# Patient Record
Sex: MALE | Race: WHITE | NOT HISPANIC OR LATINO | Employment: OTHER | URBAN - METROPOLITAN AREA
[De-identification: names, ages, dates, MRNs, and addresses within clinical notes are randomized per-mention and may not be internally consistent; named-entity substitution may affect disease eponyms.]

---

## 2017-01-06 ENCOUNTER — GENERIC CONVERSION - ENCOUNTER (OUTPATIENT)
Dept: OTHER | Facility: OTHER | Age: 82
End: 2017-01-06

## 2017-01-09 ENCOUNTER — APPOINTMENT (OUTPATIENT)
Dept: LAB | Facility: CLINIC | Age: 82
End: 2017-01-09
Payer: MEDICARE

## 2017-01-09 DIAGNOSIS — E11.9 TYPE 2 DIABETES MELLITUS WITHOUT COMPLICATIONS (HCC): ICD-10-CM

## 2017-01-09 LAB
ANION GAP SERPL CALCULATED.3IONS-SCNC: 8 MMOL/L (ref 4–13)
BUN SERPL-MCNC: 30 MG/DL (ref 5–25)
CALCIUM SERPL-MCNC: 9.1 MG/DL (ref 8.3–10.1)
CHLORIDE SERPL-SCNC: 106 MMOL/L (ref 100–108)
CO2 SERPL-SCNC: 25 MMOL/L (ref 21–32)
CREAT SERPL-MCNC: 1.82 MG/DL (ref 0.6–1.3)
EST. AVERAGE GLUCOSE BLD GHB EST-MCNC: 140 MG/DL
GFR SERPL CREATININE-BSD FRML MDRD: 35.5 ML/MIN/1.73SQ M
GLUCOSE SERPL-MCNC: 137 MG/DL (ref 65–140)
HBA1C MFR BLD: 6.5 % (ref 4.2–6.3)
POTASSIUM SERPL-SCNC: 4.1 MMOL/L (ref 3.5–5.3)
SODIUM SERPL-SCNC: 139 MMOL/L (ref 136–145)

## 2017-01-09 PROCEDURE — 83036 HEMOGLOBIN GLYCOSYLATED A1C: CPT

## 2017-01-09 PROCEDURE — 80048 BASIC METABOLIC PNL TOTAL CA: CPT

## 2017-01-09 PROCEDURE — 36415 COLL VENOUS BLD VENIPUNCTURE: CPT

## 2017-01-13 ENCOUNTER — ALLSCRIPTS OFFICE VISIT (OUTPATIENT)
Dept: OTHER | Facility: OTHER | Age: 82
End: 2017-01-13

## 2017-01-13 DIAGNOSIS — I10 ESSENTIAL (PRIMARY) HYPERTENSION: ICD-10-CM

## 2017-01-16 ENCOUNTER — APPOINTMENT (OUTPATIENT)
Dept: LAB | Facility: HOSPITAL | Age: 82
End: 2017-01-16
Attending: INTERNAL MEDICINE
Payer: MEDICARE

## 2017-01-16 DIAGNOSIS — N18.30 CHRONIC KIDNEY DISEASE, STAGE III (MODERATE) (HCC): ICD-10-CM

## 2017-01-16 LAB
ALBUMIN SERPL BCP-MCNC: 3.7 G/DL (ref 3.5–5)
ANION GAP SERPL CALCULATED.3IONS-SCNC: 7 MMOL/L (ref 4–13)
BASOPHILS # BLD AUTO: 0.03 THOUSANDS/ΜL (ref 0–0.1)
BASOPHILS NFR BLD AUTO: 0 % (ref 0–1)
BUN SERPL-MCNC: 37 MG/DL (ref 5–25)
CALCIUM SERPL-MCNC: 9.4 MG/DL (ref 8.3–10.1)
CHLORIDE SERPL-SCNC: 108 MMOL/L (ref 100–108)
CO2 SERPL-SCNC: 27 MMOL/L (ref 21–32)
CREAT SERPL-MCNC: 2.14 MG/DL (ref 0.6–1.3)
CREAT UR-MCNC: 170 MG/DL
EOSINOPHIL # BLD AUTO: 0.46 THOUSAND/ΜL (ref 0–0.61)
EOSINOPHIL NFR BLD AUTO: 7 % (ref 0–6)
ERYTHROCYTE [DISTWIDTH] IN BLOOD BY AUTOMATED COUNT: 13 % (ref 11.6–15.1)
GFR SERPL CREATININE-BSD FRML MDRD: 29.4 ML/MIN/1.73SQ M
GLUCOSE SERPL-MCNC: 147 MG/DL (ref 65–140)
HCT VFR BLD AUTO: 36.5 % (ref 36.5–49.3)
HGB BLD-MCNC: 11.8 G/DL (ref 12–17)
LYMPHOCYTES # BLD AUTO: 1.66 THOUSANDS/ΜL (ref 0.6–4.47)
LYMPHOCYTES NFR BLD AUTO: 25 % (ref 14–44)
MCH RBC QN AUTO: 30.3 PG (ref 26.8–34.3)
MCHC RBC AUTO-ENTMCNC: 32.3 G/DL (ref 31.4–37.4)
MCV RBC AUTO: 94 FL (ref 82–98)
MONOCYTES # BLD AUTO: 0.7 THOUSAND/ΜL (ref 0.17–1.22)
MONOCYTES NFR BLD AUTO: 10 % (ref 4–12)
NEUTROPHILS # BLD AUTO: 3.85 THOUSANDS/ΜL (ref 1.85–7.62)
NEUTS SEG NFR BLD AUTO: 58 % (ref 43–75)
PHOSPHATE SERPL-MCNC: 2.8 MG/DL (ref 2.3–4.1)
PLATELET # BLD AUTO: 188 THOUSANDS/UL (ref 149–390)
PMV BLD AUTO: 9.3 FL (ref 8.9–12.7)
POTASSIUM SERPL-SCNC: 4.7 MMOL/L (ref 3.5–5.3)
PROT UR-MCNC: 32 MG/DL
PROT/CREAT UR: 0.19 MG/G{CREAT} (ref 0–0.1)
PTH-INTACT SERPL-MCNC: 80.1 PG/ML (ref 14–72)
RBC # BLD AUTO: 3.9 MILLION/UL (ref 3.88–5.62)
SODIUM SERPL-SCNC: 142 MMOL/L (ref 136–145)
WBC # BLD AUTO: 6.7 THOUSAND/UL (ref 4.31–10.16)

## 2017-01-16 PROCEDURE — 85025 COMPLETE CBC W/AUTO DIFF WBC: CPT

## 2017-01-16 PROCEDURE — 84156 ASSAY OF PROTEIN URINE: CPT

## 2017-01-16 PROCEDURE — 82570 ASSAY OF URINE CREATININE: CPT

## 2017-01-16 PROCEDURE — 83970 ASSAY OF PARATHORMONE: CPT

## 2017-01-16 PROCEDURE — 80069 RENAL FUNCTION PANEL: CPT

## 2017-01-16 PROCEDURE — 36415 COLL VENOUS BLD VENIPUNCTURE: CPT

## 2017-01-24 ENCOUNTER — ALLSCRIPTS OFFICE VISIT (OUTPATIENT)
Dept: OTHER | Facility: OTHER | Age: 82
End: 2017-01-24

## 2017-02-08 ENCOUNTER — GENERIC CONVERSION - ENCOUNTER (OUTPATIENT)
Dept: OTHER | Facility: OTHER | Age: 82
End: 2017-02-08

## 2017-02-15 ENCOUNTER — GENERIC CONVERSION - ENCOUNTER (OUTPATIENT)
Dept: OTHER | Facility: OTHER | Age: 82
End: 2017-02-15

## 2017-04-01 DIAGNOSIS — N18.30 CHRONIC KIDNEY DISEASE, STAGE III (MODERATE) (HCC): ICD-10-CM

## 2017-04-25 ENCOUNTER — TRANSCRIBE ORDERS (OUTPATIENT)
Dept: LAB | Facility: CLINIC | Age: 82
End: 2017-04-25

## 2017-04-25 ENCOUNTER — APPOINTMENT (OUTPATIENT)
Dept: LAB | Facility: CLINIC | Age: 82
End: 2017-04-25
Payer: MEDICARE

## 2017-04-25 DIAGNOSIS — N18.30 CHRONIC KIDNEY DISEASE, STAGE III (MODERATE) (HCC): ICD-10-CM

## 2017-04-25 DIAGNOSIS — I10 ESSENTIAL (PRIMARY) HYPERTENSION: ICD-10-CM

## 2017-04-25 DIAGNOSIS — I10 ESSENTIAL HYPERTENSION, MALIGNANT: ICD-10-CM

## 2017-04-25 DIAGNOSIS — I10 ESSENTIAL HYPERTENSION, MALIGNANT: Primary | ICD-10-CM

## 2017-04-25 LAB
ALBUMIN SERPL BCP-MCNC: 3.6 G/DL (ref 3.5–5)
ALP SERPL-CCNC: 71 U/L (ref 46–116)
ALT SERPL W P-5'-P-CCNC: 20 U/L (ref 12–78)
ANION GAP SERPL CALCULATED.3IONS-SCNC: 7 MMOL/L (ref 4–13)
AST SERPL W P-5'-P-CCNC: 14 U/L (ref 5–45)
BASOPHILS # BLD AUTO: 0.03 THOUSANDS/ΜL (ref 0–0.1)
BASOPHILS NFR BLD AUTO: 1 % (ref 0–1)
BILIRUB SERPL-MCNC: 0.66 MG/DL (ref 0.2–1)
BUN SERPL-MCNC: 24 MG/DL (ref 5–25)
CALCIUM SERPL-MCNC: 8.7 MG/DL (ref 8.3–10.1)
CHLORIDE SERPL-SCNC: 108 MMOL/L (ref 100–108)
CHOLEST SERPL-MCNC: 124 MG/DL (ref 50–200)
CO2 SERPL-SCNC: 26 MMOL/L (ref 21–32)
CREAT SERPL-MCNC: 1.76 MG/DL (ref 0.6–1.3)
EOSINOPHIL # BLD AUTO: 0.51 THOUSAND/ΜL (ref 0–0.61)
EOSINOPHIL NFR BLD AUTO: 8 % (ref 0–6)
ERYTHROCYTE [DISTWIDTH] IN BLOOD BY AUTOMATED COUNT: 13.7 % (ref 11.6–15.1)
GFR SERPL CREATININE-BSD FRML MDRD: 36.9 ML/MIN/1.73SQ M
GLUCOSE P FAST SERPL-MCNC: 146 MG/DL (ref 65–99)
HCT VFR BLD AUTO: 34.8 % (ref 36.5–49.3)
HDLC SERPL-MCNC: 38 MG/DL (ref 40–60)
HGB BLD-MCNC: 11.4 G/DL (ref 12–17)
LDLC SERPL CALC-MCNC: 67 MG/DL (ref 0–100)
LYMPHOCYTES # BLD AUTO: 1.75 THOUSANDS/ΜL (ref 0.6–4.47)
LYMPHOCYTES NFR BLD AUTO: 27 % (ref 14–44)
MCH RBC QN AUTO: 30.4 PG (ref 26.8–34.3)
MCHC RBC AUTO-ENTMCNC: 32.8 G/DL (ref 31.4–37.4)
MCV RBC AUTO: 93 FL (ref 82–98)
MONOCYTES # BLD AUTO: 0.62 THOUSAND/ΜL (ref 0.17–1.22)
MONOCYTES NFR BLD AUTO: 9 % (ref 4–12)
NEUTROPHILS # BLD AUTO: 3.69 THOUSANDS/ΜL (ref 1.85–7.62)
NEUTS SEG NFR BLD AUTO: 55 % (ref 43–75)
NRBC BLD AUTO-RTO: 0 /100 WBCS
PLATELET # BLD AUTO: 209 THOUSANDS/UL (ref 149–390)
PMV BLD AUTO: 9.8 FL (ref 8.9–12.7)
POTASSIUM SERPL-SCNC: 4.4 MMOL/L (ref 3.5–5.3)
PROT SERPL-MCNC: 6.9 G/DL (ref 6.4–8.2)
RBC # BLD AUTO: 3.75 MILLION/UL (ref 3.88–5.62)
SODIUM SERPL-SCNC: 141 MMOL/L (ref 136–145)
T4 FREE SERPL-MCNC: 0.93 NG/DL (ref 0.76–1.46)
TRIGL SERPL-MCNC: 96 MG/DL
TSH SERPL DL<=0.05 MIU/L-ACNC: 1.44 UIU/ML (ref 0.36–3.74)
WBC # BLD AUTO: 6.61 THOUSAND/UL (ref 4.31–10.16)

## 2017-04-25 PROCEDURE — 85025 COMPLETE CBC W/AUTO DIFF WBC: CPT

## 2017-04-25 PROCEDURE — 80061 LIPID PANEL: CPT

## 2017-04-25 PROCEDURE — 84439 ASSAY OF FREE THYROXINE: CPT

## 2017-04-25 PROCEDURE — 80053 COMPREHEN METABOLIC PANEL: CPT

## 2017-04-25 PROCEDURE — 84443 ASSAY THYROID STIM HORMONE: CPT

## 2017-04-25 PROCEDURE — 36415 COLL VENOUS BLD VENIPUNCTURE: CPT

## 2017-05-01 ENCOUNTER — APPOINTMENT (OUTPATIENT)
Dept: LAB | Facility: CLINIC | Age: 82
End: 2017-05-01
Payer: MEDICARE

## 2017-05-01 DIAGNOSIS — I10 ESSENTIAL (PRIMARY) HYPERTENSION: ICD-10-CM

## 2017-05-01 LAB — HEMOCCULT STL QL IA: NEGATIVE

## 2017-05-01 PROCEDURE — G0328 FECAL BLOOD SCRN IMMUNOASSAY: HCPCS

## 2017-05-19 ENCOUNTER — ALLSCRIPTS OFFICE VISIT (OUTPATIENT)
Dept: OTHER | Facility: OTHER | Age: 82
End: 2017-05-19

## 2017-05-19 DIAGNOSIS — E11.29 TYPE 2 DIABETES MELLITUS WITH OTHER DIABETIC KIDNEY COMPLICATION (HCC): ICD-10-CM

## 2017-06-06 ENCOUNTER — GENERIC CONVERSION - ENCOUNTER (OUTPATIENT)
Dept: OTHER | Facility: OTHER | Age: 82
End: 2017-06-06

## 2017-06-15 ENCOUNTER — GENERIC CONVERSION - ENCOUNTER (OUTPATIENT)
Dept: OTHER | Facility: OTHER | Age: 82
End: 2017-06-15

## 2017-09-16 ENCOUNTER — APPOINTMENT (OUTPATIENT)
Dept: LAB | Facility: HOSPITAL | Age: 82
End: 2017-09-16
Payer: MEDICARE

## 2017-09-16 ENCOUNTER — TRANSCRIBE ORDERS (OUTPATIENT)
Dept: ADMINISTRATIVE | Facility: HOSPITAL | Age: 82
End: 2017-09-16

## 2017-09-16 DIAGNOSIS — E11.29 TYPE 2 DIABETES MELLITUS WITH OTHER DIABETIC KIDNEY COMPLICATION (HCC): ICD-10-CM

## 2017-09-16 LAB
EST. AVERAGE GLUCOSE BLD GHB EST-MCNC: 143 MG/DL
GLUCOSE P FAST SERPL-MCNC: 122 MG/DL (ref 65–99)
HBA1C MFR BLD: 6.6 % (ref 4.2–6.3)

## 2017-09-16 PROCEDURE — 36415 COLL VENOUS BLD VENIPUNCTURE: CPT

## 2017-09-16 PROCEDURE — 83036 HEMOGLOBIN GLYCOSYLATED A1C: CPT

## 2017-09-16 PROCEDURE — 82947 ASSAY GLUCOSE BLOOD QUANT: CPT

## 2017-09-19 ENCOUNTER — ALLSCRIPTS OFFICE VISIT (OUTPATIENT)
Dept: OTHER | Facility: OTHER | Age: 82
End: 2017-09-19

## 2017-09-19 DIAGNOSIS — E11.29 TYPE 2 DIABETES MELLITUS WITH OTHER DIABETIC KIDNEY COMPLICATION (HCC): ICD-10-CM

## 2017-09-19 DIAGNOSIS — N25.81 SECONDARY HYPERPARATHYROIDISM OF RENAL ORIGIN (HCC): ICD-10-CM

## 2017-09-19 DIAGNOSIS — I10 ESSENTIAL (PRIMARY) HYPERTENSION: ICD-10-CM

## 2017-09-19 DIAGNOSIS — N18.9 CHRONIC KIDNEY DISEASE: ICD-10-CM

## 2017-09-19 DIAGNOSIS — N18.30 CHRONIC KIDNEY DISEASE, STAGE III (MODERATE) (HCC): ICD-10-CM

## 2017-10-06 ENCOUNTER — APPOINTMENT (OUTPATIENT)
Dept: LAB | Facility: HOSPITAL | Age: 82
End: 2017-10-06
Attending: INTERNAL MEDICINE
Payer: MEDICARE

## 2017-10-06 ENCOUNTER — TRANSCRIBE ORDERS (OUTPATIENT)
Dept: ADMINISTRATIVE | Facility: HOSPITAL | Age: 82
End: 2017-10-06

## 2017-10-06 DIAGNOSIS — N18.30 CHRONIC KIDNEY DISEASE, STAGE III (MODERATE) (HCC): ICD-10-CM

## 2017-10-06 DIAGNOSIS — I10 ESSENTIAL (PRIMARY) HYPERTENSION: ICD-10-CM

## 2017-10-06 DIAGNOSIS — N25.81 SECONDARY HYPERPARATHYROIDISM OF RENAL ORIGIN (HCC): ICD-10-CM

## 2017-10-06 DIAGNOSIS — N18.9 CHRONIC KIDNEY DISEASE: ICD-10-CM

## 2017-10-06 LAB
ALBUMIN SERPL BCP-MCNC: 3.6 G/DL (ref 3.5–5)
ANION GAP SERPL CALCULATED.3IONS-SCNC: 8 MMOL/L (ref 4–13)
BUN SERPL-MCNC: 27 MG/DL (ref 5–25)
CALCIUM SERPL-MCNC: 8.4 MG/DL (ref 8.3–10.1)
CHLORIDE SERPL-SCNC: 108 MMOL/L (ref 100–108)
CO2 SERPL-SCNC: 26 MMOL/L (ref 21–32)
CREAT SERPL-MCNC: 1.94 MG/DL (ref 0.6–1.3)
CREAT UR-MCNC: 139 MG/DL
ERYTHROCYTE [DISTWIDTH] IN BLOOD BY AUTOMATED COUNT: 13.3 % (ref 11.6–15.1)
GFR SERPL CREATININE-BSD FRML MDRD: 30 ML/MIN/1.73SQ M
GLUCOSE P FAST SERPL-MCNC: 142 MG/DL (ref 65–99)
HCT VFR BLD AUTO: 36.4 % (ref 36.5–49.3)
HGB BLD-MCNC: 12.1 G/DL (ref 12–17)
MCH RBC QN AUTO: 31.8 PG (ref 26.8–34.3)
MCHC RBC AUTO-ENTMCNC: 33.2 G/DL (ref 31.4–37.4)
MCV RBC AUTO: 96 FL (ref 82–98)
PHOSPHATE SERPL-MCNC: 3.1 MG/DL (ref 2.3–4.1)
PLATELET # BLD AUTO: 168 THOUSANDS/UL (ref 149–390)
PMV BLD AUTO: 9.4 FL (ref 8.9–12.7)
POTASSIUM SERPL-SCNC: 4.9 MMOL/L (ref 3.5–5.3)
PROT UR-MCNC: 29 MG/DL
PROT/CREAT UR: 0.21 MG/G{CREAT} (ref 0–0.1)
PTH-INTACT SERPL-MCNC: 84.6 PG/ML (ref 14–72)
RBC # BLD AUTO: 3.8 MILLION/UL (ref 3.88–5.62)
SODIUM SERPL-SCNC: 142 MMOL/L (ref 136–145)
WBC # BLD AUTO: 7.75 THOUSAND/UL (ref 4.31–10.16)

## 2017-10-06 PROCEDURE — 84156 ASSAY OF PROTEIN URINE: CPT

## 2017-10-06 PROCEDURE — 83970 ASSAY OF PARATHORMONE: CPT

## 2017-10-06 PROCEDURE — 82570 ASSAY OF URINE CREATININE: CPT

## 2017-10-06 PROCEDURE — 36415 COLL VENOUS BLD VENIPUNCTURE: CPT

## 2017-10-06 PROCEDURE — 85027 COMPLETE CBC AUTOMATED: CPT

## 2017-10-06 PROCEDURE — 80069 RENAL FUNCTION PANEL: CPT

## 2017-10-10 ENCOUNTER — ALLSCRIPTS OFFICE VISIT (OUTPATIENT)
Dept: OTHER | Facility: OTHER | Age: 82
End: 2017-10-10

## 2017-10-11 NOTE — PROGRESS NOTES
Assessment  1  Chronic kidney disease, stage 3 (585 3) (N18 3)   2  Controlled type 2 diabetes with renal manifestation (250 40) (E11 29)   3  Hypertension (401 9) (I10)   4  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)    Plan  Chronic kidney disease, stage 3    · (1) CBC/PLT/DIFF; Status:Active; Requested for:01Jul2018; Perform:[facility]; IPE:[id number];DJGBVIK; For:Chronic kidney disease, stage 3; Ordered By:Otilio Esposito;   · (1) PTH N-TERMINAL (INTACT); Status:Active; Requested for:01Jul2018; Perform:[facility]; ZED:31WMG3227;ZZIKYDJ; For:Chronic kidney disease, stage 3; Ordered By:Otilio Esposito;   · (1) RENAL FUNCTION PANEL; Status:Active; Requested for:01Jul2018; Perform:[facility]; OIX:13VLL6291;RJYSWWS; For:Chronic kidney disease, stage 3; Ordered By:Otilio Esposito;   · (1) URINE PROTEIN CREATININE RATIO; Status:Active; Requested for:01Jul2018; Perform:[facility]; VXZ:42ETN6055;YIAFXOC; For:Chronic kidney disease, stage 3; Ordered By:Otilio Esposito;   · Diabetes & Kidney Disease handout given today; Status:Complete;   Done: 53COB4114   Ordered; For:Chronic kidney disease, stage 3; Ordered By:Otilio Esposito;   · Do not take aspirin or anti-inflammatory medicines ; Status:Complete;   Done: 18PYI3124   Ordered; For:Chronic kidney disease, stage 3; Ordered By:Otilio Esposito;   · Restrict your sodium (salt) intake to 2 grams per day ; Status:Complete;   Done:  81YBL4490   Ordered; For:Chronic kidney disease, stage 3; Ordered By:Otilio Esposito;   · Understanding CKD handout given today; Status:Complete;   Done: 37CXQ3857   Ordered; For:Chronic kidney disease, stage 3; Ordered By:Otilio Esposito;   · Follow-Up visit in 9 months Evaluation and Treatment  Follow-up  Status: Hold For -  Scheduling  Requested for: 11NXE7729   Ordered; For: Chronic kidney disease, stage 3; Ordered By: Jim Sapp Performed:  Due: 18JLL8111    Discussion/Summary    Sameer Minor is a 80years old gentleman who returns for CKD follow up  CKD stage 3B (baseline Creatinine around 1 4-1 9) without proteinurialikely second to DM nephropathy plus some component of HTN  function fairly stable, most recent creatinine 1 94 OTC NSAIDs - pain medications (Motrin, Aleve, Naproxen, Ibuprofen, Advil)  to take Tylenol if needed for pain  up in 9 months with repeat labs  Hypertensionpressure OKwith low salt diet (less than 2 gr salt daily) and blood pressure medications  Hemoglobin 12 1, OK, follow repeat CBC in 9 months  Mineral bone disease - phosphorus and PTH acceptable, follow labs in 9 months  Diabetes - managed by PCP, noted he is on Metformin, IF kidney function decreases recommend to change to an alternative agent as per PCP  NSAIDsrenal function worsened, then recommend to stop Metformin and use an alternative agent as per PCP  up in 9 months with repeat labs  The patient was counseled regarding diagnostic results,-instructions for management,-risk factor reductions,-risks and benefits of treatment options,-importance of compliance with treatment  Patient is able to Self-Care  He has no barriers to learning  Indication for Services: CKD Stage 3  CKD Teaching includes hypertension management, Avoid nephrotoxic medication, sodium restriction and fluid management  Current Patient Status: no bone mineral disease,-no anemia-and-no worsening of renal function  Discussed with the patient      Reason For Visit  CKD, HTN follow up      History of Present Illness  80years old gentleman with CKD stage 3B (Creatinine 1 5 - 1 9 since 2013), DM for 20 years, HTN for >5 years, hyperlipidemia   time seen in our office was Jan 2017  Since that visit, no acute events  returns for CKD follow up  is feeling OK, denies complaints other than chronic joint pain over knees  SOB, no CP, no leg swollen  abdominal pain, no N/V/D/C  urinary problems, nocturia twice per night  is OK, weight stable since last visit  Ginna Dowd deniesoccasionallyNSAIDs use      Review of Systems    Constitutional: no fever,-no chills,-no anorexia,-no fatigue,-no recent weight gain-and-no recent weight loss  Integumentary: no rashes  Gastrointestinal: no abdominal pain,-no constipation,-no nausea,-no diarrhea-and-no vomiting  Respiratory: no shortness of breath,-no cough-and-not coughing up sputum  Cardiovascular: no orthopnea,-no PND,-no chest pain,-no palpitations-and-no lower extremity edema  Musculoskeletal: joint pain, but-no joint swelling  Neurological: no headache,-no lightheadedness-and-no dizziness  Genitourinary: no dysuria,-no hematuria,-no nocturia-and-no change in urinary frequency  Eyes: no eyesight problems-and-no dryness of the eyes  ENT: no hearing loss-and-no nasal discharge  Past Medical History    The active problems and past medical history were reviewed and updated today  Surgical History    The surgical history was reviewed and updated today  Family History    The family history was reviewed and updated today  Social History  The social history was reviewed and updated today  Current Meds   1  AmLODIPine Besylate 2 5 MG Oral Tablet; take one tablet by mouth every day; Therapy: 05ADR6402 to (Evaluate:86Wcg3648)  Requested for: 01UQJ8282; Last   Rx:04Oct2017 Ordered   2  [employer] Vitamin D3 1000 UNIT CAPS; take 1 capsule daily; Therapy: (Recorded:10Jun2014) to Recorded   3  Fish Oil 300 MG CAPS; take 1 capsule daily; Therapy: (Recorded:10Jun2014) to Recorded   4  Glimepiride 1 MG Oral Tablet; take one tablet by mouth every day; Therapy: 47WGD2740 to (Evaluate:63Wea7955)  Requested for: 55Vmc2183; Last   Rx:96Nzi3544 Ordered   5  Lisinopril 20 MG Oral Tablet; TAKE ONE (1) TABLET(S) TWICE DAILY; Therapy: 55KBP7029 to (Evaluate:18Feb2018)  Requested for: 28Xyf3564; Last   Rx:11Wwn7283 Ordered   6   Magnesium 250 MG Oral Tablet; TAKE 1 TABLET DAILY; Therapy: (Recorded:10Jun2014) to Recorded   7  MetFORMIN HCl - 850 MG Oral Tablet; TAKE ONE (1) TABLET(S) TWICE DAILY WITH   MORNING AND EVENING MEAL; Therapy: 49XYD0019 to (XGGRVYQC:95LBJ6065)  Requested for: 31PKD4914; Last   Rx:30Mar2017 Ordered   8  Precision Xtra Blood Glucose In Vitro Strip; TEST ONCE DAILY; Therapy: 62CWM7024 to (Evaluate:59Zga5283)  Requested for: 18Mxn4477; Last   Rx:90Oph8138 Ordered   9  Simvastatin 20 MG Oral Tablet; take one tablet by mouth every day; Therapy: 70SYE5165 to (Evaluate:47Lid4651)  Requested for: 64Zay9711; Last   Rx:01Sep2017 Ordered   10  Terazosin HCl - 1 MG Oral Capsule; TAKE ONE CAPSULE BY MOUTH AT BEDTIME; Therapy: 21WDS5451 to (Evaluate:22Mar2018)  Requested for: 56NHU3949; Last    Rx:27Mar2017 Ordered   11  Vitamin B-12 500 MCG Oral Tablet; TAKE 1 TABLET DAILY; Therapy: (Recorded:10Jun2014) to Recorded    The medication list was reviewed and updated today  Allergies  1  No Known Drug Allergies    Vitals  Vital Signs    Recorded: 39EGU1431 11:40AM Recorded: 84MZT6618 11:29AM   Heart Rate 74    Systolic 912, LUE, Sitting    Diastolic 64, LUE, Sitting    Height  5 ft 10 in   Weight  198 lb    BMI Calculated  28 41   BSA Calculated  2 08     Physical Exam    Constitutional: General appearance: No acute distress, well appearing and well nourished  ENT: External ears and nose appear normal      Eyes: Anicteric sclerae  JVD:  No JVD present  Pulmonary:  Auscultation of lungs: Clear to auscultation  Cardiovascular: Auscultation of heart: Normal rate and rhythm, normal S1 and S2, without murmurs  Abdomen: Non-tender, no masses  Extremities: No cyanosis, clubbing or edema  Rash: No rash present  Neurologic: Non Focal      Psychiatric: Orientation to person, place, and time: Normal  -and-Mood and affect: Normal     Back: No CVA tenderness        Results/Data  (1) CBC/ PLT (NO DIFF) 70OXM6991 10:26AM Beverli Bang   TW Order Number: [id number]     Test Name Result Flag Reference   HEMATOCRIT 36 4 % L 36 5-49 3   HEMOGLOBIN 12 1 g/dL  12 0-17 0   MCHC 33 2 g/dL  31 4-37 4   MCH 31 8 pg  26 8-34 3   MCV 96 fL  82-98   PLATELET COUNT 682 Thousands/uL  149-390   RBC COUNT 3 80 Million/uL L 3 88-5 62   RDW 13 3 %  11 6-15 1   WBC COUNT 7 75 Thousand/uL  4 31-10 16   MPV 9 4 fL  8 9-12 7     (1) RENAL FUNCTION PANEL 09QBP8281 10:26AM [facility] Order Number: [id number]     Test Name Result Flag Reference   ANION GAP (CALC) 8 mmol/L  4-13   ALBUMIN 3 6 g/dL  3 5-5 0   BLOOD UREA NITROGEN 27 mg/dL H 5-25   CALCIUM 8 4 mg/dL  8 3-10 1   CHLORIDE 108 mmol/L  100-108   CARBON DIOXIDE 26 mmol/L  21-32   CREATININE 1 94 mg/dL H 0 60-1 30   Standardized to IDMS reference method   POTASSIUM 4 9 mmol/L  3 5-5 3   SODIUM 142 mmol/L  136-145   PHOSPHORUS 3 1 mg/dL  2 3-4 1   eGFR 30 ml/min/1 73sq m     GLUCOSE FASTING 142 mg/dL H 65-99   Specimen collection should occur prior to Sulfasalazine administration due to the potential for falsely depressed results  Specimen collection should occur prior to Sulfapyridine administration due to the potential for falsely elevated results  Specimen collection should occur prior to Sulfasalazine administration due to the potential for falsely depressed results  Specimen collection should occur prior to Sulfapyridine administration due to the potential for falsely elevated results  National Kidney Disease Education Program recommendations are as follows:  GFR calculation is accurate only with a steady state creatinine  Chronic Kidney disease less than 60 ml/min/1 73 sq  meters  Kidney failure less than 15 ml/min/1 73 sq  meters       (1) PTH N-TERMINAL (INTACT) 17UHL4400 10:26AM Trudy Matson Order Number: [id number]     Test Name Result Flag Reference   PARATHYROID HORMONE INTACT 84 6 pg/mL H 14 0-72 0     (1) URINE PROTEIN CREATININE RATIO 81VTE2488 10:26AM Shweta Brambila Order Number: [id number]     Test Name Result Flag Reference   CREATININE URINE 139 0 mg/dL     URINE PROTEIN:CREATININE RATIO 0 21 H 0 00-0 10   URINE PROTEIN 29 mg/dL         Future Appointments    Date/Time Provider Specialty Site   01/22/2018 01:15 PM Janie Solis DO Internal Medicine [facility] INTERNAL MED     Signatures   Electronically signed by : ROLAND Mendez ; Oct 10 2017 11:48AM EST                       (Author)

## 2017-10-26 NOTE — PROGRESS NOTES
Assessment  1  Controlled type 2 diabetes with renal manifestation (250 40) (E11 29)   2  Hypertension (401 9) (I10)   3  Hyperlipidemia (272 4) (E78 5)   4  Chronic kidney disease, stage 3 (585 3) (N18 3)    Plan  Chronic kidney disease, stage 3    · Follow-up visit in 3 months Evaluation and Treatment  Follow-up  Status: Hold For - Scheduling   Requested for: 37Rbi1720  Controlled diabetes mellitus    · Glimepiride 1 MG Oral Tablet; take one tablet by mouth every day  Controlled type 2 diabetes with renal manifestation    · Precision Xtra Blood Glucose In Vitro Strip; TEST ONCE DAILY   · (1) BASIC METABOLIC PROFILE; Status:Active; Requested for:55Rvk4288;    · (1) HEMOGLOBIN A1C; Status:Active; Requested WOU:42LTT3890;     Discussion/Summary  Discussion Summary:   #1  Diabetes mellitus type 2 with renal manifestations  Patient is stable with his blood sugars and we'll continue present medication, diet  He is limited in his exercise capacity secondary to his arthritis  We will check a fasting blood sugar hemoglobin A1c when he returns to the office  Hypertension  As noted patient's blood pressure is controlled and patient will continue with present medication  His blood pressures also checked with his nephrologist and adjustment of medication would reflect hopefully that we maintain good function of his kidneys  Hyperlipidemia  We did discuss with the patient today the importance of diet and avoiding concentrated fats and cholesterol in his diet  Patient states he is made some major changes in his diet over the years and seems to be doing well  Chronic kidney disease stage III  Patient is stable with his renal function  He continues to follow-up with his nephrologist  Patient was given a slip to check a basic metabolic profile when he returns to the office in 4 months     Counseling Documentation With Imm: The patient was counseled regarding diagnostic results,-- instructions for management,-- risk factor reductions,-- prognosis,-- patient and family education,-- impressions,-- risks and benefits of treatment options,-- importance of compliance with treatment  Medication SE Review and Pt Understands Tx: Possible side effects of new medications were reviewed with the patient/guardian today  The treatment plan was reviewed with the patient/guardian  The patient/guardian understands and agrees with the treatment plan      Chief Complaint  Chief Complaint Free Text Note Form: Patient is here today for a 4 month follow up w/ labs   Chief Complaint Chronic Condition [facility]: Patient is here today for follow up of chronic conditions described in HPI  History of Present Illness  HPI: Patient is a 77-year-old male with a history of hypertension, hyperlipidemia, diabetes mellitus type 2, diffuse DJD, chronic renal insufficiency and abnormal kidney function  Patient is here today for routine follow-up  Patient states he's been doing well and has no new complaints or problems  He's been following his sugars closely at home and that showing good control  With his labs his fasting blood sugars 122 and hemoglobin A1c was 6 6  Patient states he has some arthritic aches and pains but nothing new he continues to follow-up with his nephrologist       Review of Systems  Complete-Male:   Constitutional: No fever or chills, feels well, no tiredness, no recent weight gain or weight loss  Eyes: No complaints of eye pain, no red eyes, no discharge from eyes, no itchy eyes  ENT: no complaints of earache, no hearing loss, no nosebleeds, no nasal discharge, no sore throat, no hoarseness,-- no earache,-- no nosebleeds,-- no sore throat,-- no hearing loss,-- no nasal discharge-- and-- no hoarseness  Cardiovascular: No complaints of slow heart rate, no fast heart rate, no chest pain, no palpitations, no leg claudication, no lower extremity     Respiratory: No complaints of shortness of breath, no wheezing, no cough, no SOB on exertion, no orthopnea or PND  Gastrointestinal: No complaints of abdominal pain, no constipation, no nausea or vomiting, no diarrhea or bloody stools  Genitourinary: nocturia-- and-- Nocturia 1-2 times a night without change, but-- No complaints of dysuria, no incontinence, no hesitancy, no nocturia, no genital lesion, no testicular pain  Musculoskeletal: arthralgias-- and-- joint stiffness  Integumentary: No complaints of skin rash or skin lesions, no itching, no skin wound, no dry skin  Neurological: No compliants of headache, no confusion, no convulsions, no numbness or tingling, no dizziness or fainting, no limb weakness, no difficulty walking  Psychiatric: Is not suicidal, no sleep disturbances, no anxiety or depression, no change in personality, no emotional problems  Endocrine: No complaints of proptosis, no hot flashes, no muscle weakness, no erectile dysfunction, no deepening of the voice, no feelings of weakness  Hematologic/Lymphatic: No complaints of swollen glands, no swollen glands in the neck, does not bleed easily, no easy bruising  ROS Reviewed:   ROS reviewed  Active Problems  1  Abnormal glucose (790 29) (R73 09)   2  Abnormal kidney function (593 9) (N28 9)   3  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) (285 21) (N18 9,D63 1)   4  Arthritis of knee, left (716 96) (M17 12)   5  Arthritis of left shoulder region (716 91) (M19 012)   6  Carotid arterial disease (447 9) (I77 9)   7  Chronic kidney disease, stage 3 (585 3) (N18 3)   8  Controlled diabetes mellitus (250 00) (E11 9)   9  Controlled type 2 diabetes with renal manifestation (250 40) (E11 29)   10  Encounter for screening for malignant neoplasm (V76 9) (Z12 9)   11  Enlarged prostate without lower urinary tract symptoms (luts) (600 00) (N40 0)   12  Glaucoma (365 9) (H40 9)   13  Hyperlipidemia (272 4) (E78 5)   14  Hypertension (401 9) (I10)   15  Impacted cerumen of right ear (380 4) (H61 21)   16   Intestinal obstruction (560  9) (K56 60)   17  Left knee pain (719 46) (M23 562)   18  Need for immunization against influenza (V04 81) (Z23)   19  Screening for diabetic retinopathy (V80 2) (Z13 5)   20  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)   21  Special screening examination for neoplasm of prostate (V76 44) (Z12 5)    Past Medical History  1  History of Diabetes Mellitus (250 00)   2  History of hyperlipidemia (V12 29) (Z86 39)   3  History of hypertension (V12 59) (Z86 79)   4  History of syncope (V15 89) (Z87 898)   5  History of Osteoarthritis (V13 4)   6  History of Visit for screening (V82 9) (Z13 9)    Surgical History  1  History of Appendectomy   2  History of Hemorrhoidectomy    Family History  Mother    1  Family history of Diabetes Mellitus (V18 0)   2  Family history of Mother  At Age 58   4  Family history of Stroke Syndrome (V17 1)  Father    4  Family history of Diabetes Mellitus (V18 0)   5  Family history of Emphysema   6  Family history of Father  At Age 61    Social History   · Being A Social Drinker   · Daily Coffee Consumption (2  Cups/Day)   · Denied: History of Drug Use   · Former smoker (V15 82) (S34 175)    Current Meds   1  AmLODIPine Besylate 2 5 MG Oral Tablet; TAKE ONE (1) TABLET(S) DAILY; Therapy: 29RCO5943 to [phone])  Requested for: 42PAI1632; Last Rx:2016   Ordered   2  [employer] Vitamin D3 1000 UNIT CAPS; take 1 capsule daily; Therapy: (Recorded:2014) to Recorded   3  Fish Oil 300 MG CAPS; take 1 capsule daily; Therapy: (Recorded:2014) to Recorded   4  Glimepiride 1 MG Oral Tablet; take one tablet by mouth every day; Therapy: 59RSI8520 to (Evaluate:83Fsa5471)  Requested for: 30Kee6936; Last Rx:94Ubq9386   Ordered   5  Lisinopril 20 MG Oral Tablet; TAKE ONE (1) TABLET(S) TWICE DAILY; Therapy: 26KRN7005 to (Evaluate:2018)  Requested for: 58Hqe0869; Last Rx:81Zvu4191   Ordered   6   Magnesium 250 MG Oral Tablet; TAKE 1 TABLET DAILY; Therapy: (Recorded:10Jun2014) to Recorded   7  MetFORMIN HCl - 850 MG Oral Tablet; TAKE ONE (1) TABLET(S) TWICE DAILY WITH MORNING AND   EVENING MEAL; Therapy: 86KCZ8772 to (NIPAbra[facility] Scottsd[facility] CampusP:16TFT5320)  Requested for: 31BNJ1224; Last Rx:30Mar2017   Ordered   8  Precision Xtra Blood Glucose In Vitro Strip; TEST ONCE DAILY; Therapy: 10CIX9858 to (Evaluate:92Vlu8295)  Requested for: 88Kdq3979; Last Rx:17Apr2017   Ordered   9  Simvastatin 20 MG Oral Tablet; take one tablet by mouth every day; Therapy: 45IIX8508 to (Evaluate:31Ojl2485)  Requested for: 85Mue8215; Last Rx:01Sep2017   Ordered   10  Terazosin HCl - 1 MG Oral Capsule; TAKE ONE CAPSULE BY MOUTH AT BEDTIME; Therapy: 71SNP4735 to (Evaluate:22Mar2018)  Requested for: 65ZIB7823; Last Rx:27Mar2017    Ordered   11  Vitamin B-12 500 MCG Oral Tablet; TAKE 1 TABLET DAILY; Therapy: (Recorded:10Jun2014) to Recorded    Allergies  1  No Known Drug Allergies    Vitals  Vital Signs    Recorded: 19Sep2017 01:32PM   Temperature 98 4 F   Heart Rate 73   Systolic 443   Diastolic 54   Height 5 ft 10 in   Weight 197 lb    BMI Calculated 28 27   BSA Calculated 2 07   O2 Saturation 97     Physical Exam    Constitutional Pleasant overweight 80-year-old male who is awake alert in no acute distress oriented x3  Eyes   Conjunctiva and lids: No swelling, erythema, or discharge  Pupils and irises: Equal, round and reactive to light  Ears, Nose, Mouth, and Throat   External inspection of ears and nose: Normal     Otoscopic examination: Tympanic membrance translucent with normal light reflex  Canals patent without erythema  Nasal mucosa, septum, and turbinates: Normal without edema or erythema  Oropharynx: Normal with no erythema, edema, exudate or lesions  Pulmonary   Respiratory effort: No increased work of breathing or signs of respiratory distress  Auscultation of lungs: Clear to auscultation, equal breath sounds bilaterally, no wheezes, no rales, no rhonci  Cardiovascular   Palpation of heart: Normal PMI, no thrills  Auscultation of heart: Normal rate and rhythm, normal S1 and S2, without murmurs  Examination of extremities for edema and/or varicosities: Normal     Carotid pulses: Normal     Abdomen   Abdomen: Abnormal  -- Obese soft nontender with positive bowel sounds x4 quadrants  Liver and spleen: No hepatomegaly or splenomegaly  Lymphatic   Palpation of lymph nodes in neck: No lymphadenopathy  Musculoskeletal   Gait and station: Normal     Digits and nails: Abnormal  -- Diffuse degenerative changes to the hands and digits  Inspection/palpation of joints, bones, and muscles: Normal     Skin   Skin and subcutaneous tissue: Normal without rashes or lesions  Neurologic   Cranial nerves: Cranial nerves 2-12 intact  Reflexes: 2+ and symmetric  Sensation: No sensory loss  Psychiatric   Orientation to person, place and time: Normal     Mood and affect: Normal     Diabetic Foot Screen: Normal     Socks and shoes removed, the Right Foot: the foot was normal, no swelling, no erythema  The toes on the right were normal-- and-- with full range of motion  The sensory exam showed  normal vibratory sensation at the level of the toes on the right  Normal tactile sensation with monofilament testing throughout the right foot  Socks and shoes removed, Left Foot: the foot was normal, no swelling, no erythema  The toes on the left were normal-- and-- with full range of motion  The sensory exam showed  normal vibratory sensation at the level of the toes on the left , Normal tactile sensation with monofilament testing throughout the left foot  Pulses:   2+ in the dorsalis pedis on the right  Pulses:   2+ in the dorsalis pedis on the left  Future Appointments    Date/Time Provider Specialty Site   01/22/2018 01:15 PM Cecilia Harvey DO Internal Medicine [facility] INTERNAL MED   10/10/2017 11:30 AM ROLAND Botello   Nephrology [address]     Signatures   Electronically signed by : Carmela Anguiano DO; Sep 19 2017  3:54PM EST                       (Author)

## 2017-12-27 ENCOUNTER — GENERIC CONVERSION - ENCOUNTER (OUTPATIENT)
Dept: INTERNAL MEDICINE CLINIC | Facility: CLINIC | Age: 82
End: 2017-12-27

## 2018-01-12 NOTE — PROGRESS NOTES
Assessment    1  Controlled diabetes mellitus (250 00) (E11 9)   2  Chronic kidney disease, stage 3 (585 3) (N18 3)   3  Hypertension (401 9) (I10)   4  Hyperlipidemia (272 4) (E78 5)    Plan  Controlled diabetes mellitus    · (1) GLUCOSE,  FASTING; Status:Active; Requested for:70Xie4088;    · (1) HEMOGLOBIN A1C; Status:Active; Requested for:63Xrx4925;    · *VB-Foot Exam; Status:Active - Retrospective By Protocol Authorization; Requested  for:98Ggw6098; Controlled type 2 diabetes with renal manifestation    · Precision Xtra Blood Glucose In Vitro Strip; TEST ONCE DAILY  Hyperlipidemia    · Simvastatin 20 MG Oral Tablet; TAKE 1 TABLET DAILY   · Follow-up visit in 4 Months Evaluation and Treatment  Follow-up  Status: Hold For -  Scheduling  Requested for: [id number]    Discussion/Summary    #1  Diabetes mellitus type 2  Patient sugar showing a slight increase in his hemoglobin A1c to 7 2 but acceptable control at this time  Patient feels that because of diet and lack of exercise recently his sugars were little higher and hopefully we will see some improvement with his next visit  Patient was told that if his sugar remains elevated there are other treatment modalities available  #2  Chronic kidney disease stage III  Patient continues to follow-up with the nephrologist and his kidney function is stable  #3  Hypertension  Patient's blood pressure showing very good control with present treatment  #4  Hyperlipidemia  Patient remains under good control of his cholesterol and we will check this on a yearly basis  Patient will continue with his simvastatin as directed  Impression: Subsequent Annual Wellness Visit, with preventive exam as well as age and risk appropriate counseling completed  Cardiovascular screening and counseling: the risks and benefits of screening were discussed and screening is current     Diabetes screening and counseling: the risks and benefits of screening were discussed, screening is current and Dx - V77 1 Screen for DM  Colorectal cancer screening and counseling: the risks and benefits of screening were discussed, screening is current, due for fecal occult blood testing and Dx - V76 51 Screen for CRC  Prostate cancer screening and counseling: the risks and benefits of screening were discussed, the patient declines screening and Dx - V76 44 Screen PSA  Osteoporosis screening and counseling: the risks and benefits of screening were discussed and screening is current  Abdominal aortic aneurysm screening and counseling: the risks and benefits of screening were discussed, the patient declines screening and Dx - V81 2 Screen for CV Disorder  Glaucoma screening and counseling: the risks and benefits of screening were discussed and screening is current  HIV screening and counseling: the risks and benefits of screening were discussed and screening not indicated  Immunizations: influenza vaccine is up to date this year, the lifetime pneumococcal vaccine has been completed, hepatitis B vaccination series is not indicated at this time due to the patient's low risk of anne the disease, the risks and benefits of the Zostavax vaccine were discussed with the patient, the patient declines the Zostavax vaccine, the risks and benefits of the Td vaccine were discussed with the patient and Td vaccination up to date  Advance Directive Planning: complete and up to date  Patient Discussion: plan discussed with the patient, follow-up visit needed in 4 months  Chief Complaint  Patient is here today for his Medicare Wellness exam w/labs      History of Present Illness  HPI: Patient is an 59-year-old male with a history of hypertension, hyperlipidemia, chronic renal insufficiency, diabetes mellitus type 2  Patient's here today for general physical  Patient did have labs done prior to his visit today and his sugar is running a little high with a hemoglobin A1c of 7 2   Patient states he did not have is much exercise during the winter as usual and this is probably the major reason  Patient was also told that diabetes is a progressive disease and over period time we may need to make some adjustments in his medication  He is also not 100% compliant with his diet  He denies any chest pain or pressure no shortness of breath  Since last seen he was having some problems with his left knee and was seen by orthopedics who did a tap and also injection of hydrocortisone which helped relieve the pain  Welcome to [employer] and Wellness Visits: The patient is being seen for the subsequent annual wellness visit  Medicare Screening and Risk Factors   Once per lifetime medicare screening tests: ECG has not been done  Medicare Screening Tests Risk Questions   Abdominal aortic aneurysm risk assessment: over 72years of age  Osteoporosis risk assessment:  and over 48years of age  HIV risk assessment: none indicated  Drug and Alcohol Use: The patient is a former cigarette smoker  He has smoked for 50 pack years year(s)  The patient reports occasional alcohol use and drinking 4-5 drinks per week drinks per week  Alcohol concern:   The patient has no concerns about alcohol abuse  He has never used illicit drugs  Diet and Physical Activity: Current diet includes well balanced meals and limited junk food  He exercises infrequently  Exercise: walking  Mood Disorder and Cognitive Impairment Screening: Geriatric Depression Scale   Depression screening score was Total to the geriatric depression scale   no significant symptoms  He denies feeling down, depressed, or hopeless over the past two weeks  He denies feeling little interest or pleasure in doing things over the past two weeks     Cognitive impairment screening: denies difficulty learning/retaining new information, denies difficulty handling complex tasks, denies difficulty with reasoning, denies difficulty with spatial ability and orientation, denies difficulty with language, denies difficulty with behavior and Total of 30 on Mini-Mental status exam    Functional Ability/Level of Safety: Hearing is slightly decreased and a hearing aid is not used  He denies hearing difficulties  The patient is currently able to do activities of daily living without limitations, able to do instrumental activities of daily living without limitations, able to participate in social activities without limitations and able to drive without limitations  Activities of daily living details: does not need help using the phone, no transportation help needed, does not need help shopping, no meal preparation help needed, does not need help doing housework, does not need help doing laundry, does not need help managing medications and does not need help managing money  Fall risk factors:  alcohol use and antihypertensive use, but The patient fell 0 times in the past 12 months , no polypharmacy, no mobility impairment, no antidepressant use, no deconditioning, no postural hypotension, no sedative use, no visual impairment, no urinary incontinence, no cognitive impairment and up and go test was normal  Home safety risk factors:  no unfamiliar surroundings, no loose rugs, no poor household lighting, no uneven floors, no household clutter, grab bars in the bathroom and handrails on the stairs  Advance Directives: Advance directives: living will, durable power of  for health care directives and advance directives  end of life decisions were reviewed with the patient and I agree with the patient's decisions  Co-Managers and Medical Equipment/Suppliers: See Patient Care Team   Reviewed Updated [facility]:   Last Medicare Wellness Visit Information was reviewed, patient interviewed and updates made to the record today  Preventive Quality Program 65 and Older: Falls Risk: The patient fell 0 times in the past 12 months   The patient is currently asymptomatic Symptoms Include:  Associated symptoms:  No associated symptoms are reported  The patient currently has no urinary incontinence symptoms  Date of last glaucoma screen was Patient states he has screening with his eyes both for glaucoma and for diabetes on a regular basis at least every 4 months      Patient Care Team    Care Team Member Role Specialty Office Number   Joshua RUBIN  Nephrology [phone], [address] [place] [address]  Internal Medicine [phone]     Review of Systems    Constitutional: negative  Head and Face: negative  Eyes: negative  ENT: no sore throat, no scratchy throat, no hoarseness, no nasal congestion, no nasal discharge, no sneezing, no earache, no hearing loss and no white patches in the mouth  Cardiovascular: negative  Respiratory: negative  Gastrointestinal: negative  Genitourinary: nocturia, but no dysuria, no urinary frequency, no urinary urgency, no urinary incontinence, no urinary hesitancy, no hematuria, no pelvic pain and no testicular pain  Musculoskeletal: diffuse joint pain and joint stiffness, but no generalized muscle aches, no back pain, no joint swelling, no pain in other joints and no limping  Integumentary and Breasts: negative  Neurological: negative  Psychiatric: negative  Endocrine: negative  Hematologic and Lymphatic: negative  Score 2   Normal 0 - 9, Mild Depression 10 - 19, Severe Depression 20 - 30      Active Problems    1  Abnormal glucose (790 29) (R73 09)   2  Abnormal kidney function (593 9) (N28 9)   3  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) (285 21,585 9) (N18 9,D63 1)   4  Arthritis of knee, left (716 96) (M19 90)   5  Arthritis of left shoulder region (716 91) (M19 90)   6  Carotid arterial disease (447 9) (I77 9)   7  Chronic kidney disease, stage 3 (585 3) (N18 3)   8  Controlled diabetes mellitus (250 00) (E11 9)   9  Controlled type 2 diabetes with renal manifestation (250 40) (E11 29)   10   Encounter for screening for malignant neoplasm (V76 9) (Z12 9)   11  Enlarged prostate without lower urinary tract symptoms (luts) (600 00) (N40 0)   12  Glaucoma (365 9) (H40 9)   13  Hyperlipidemia (272 4) (E78 5)   14  Hypertension (401 9) (I10)   15  Impacted cerumen of right ear (380 4) (H61 21)   16  Intestinal obstruction (560 9) (K56 60)   17  Left knee pain (719 46) (M25 562)   18  Need for immunization against influenza (V04 81) (Z23)   19  Screening for diabetic retinopathy (V80 2) (Z13 5)   20  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)   21  Special screening examination for neoplasm of prostate (V76 44) (Z12 5)    Past Medical History    · History of Diabetes Mellitus (250 00)   · History of hyperlipidemia (V12 29) (Z86 39)   · History of hypertension (V12 59) (Z86 79)   · History of syncope (V15 89) (F06 325)   · History of Osteoarthritis (V13 4)   · History of Visit for screening (V82 9) (Z13 9)    The active problems and past medical history were reviewed and updated today  Surgical History    · History of Appendectomy   · History of Hemorrhoidectomy    The surgical history was reviewed and updated today  Family History  Mother    · Family history of Diabetes Mellitus (V18 0)   · Family history of Mother  At Age 64   · Family history of Stroke Syndrome (V17 1)  Father    · Family history of Diabetes Mellitus (V18 0)   · Family history of Emphysema   · Family history of Father  At Age 61    The family history was reviewed and updated today  Social History    · Being A Social Drinker   · Daily Coffee Consumption (2  Cups/Day)   · Denied: History of Drug Use   · Former smoker (D23 15) (N53 361)  The social history was reviewed and updated today  The social history was reviewed and is unchanged  Current Meds   1  AmLODIPine Besylate 2 5 MG Oral Tablet; TAKE 1 TABLET DAILY; Therapy: 91PER0814 [facility]  Requested for: 823 84 907;  Last   Rx:2015 Ordered   2  [employer] Vitamin D3 1000 UNIT CAPS; take 1 capsule daily; Therapy: (Recorded:10Jun2014) to Recorded   3  Diflorasone Diacetate 0 05 % External Ointment; Therapy: 59HMF1461 to (Last Rx:30Mar2011)  Requested for: 37QBE2531 Ordered   4  Fish Oil 300 MG CAPS; take 1 capsule daily; Therapy: (Recorded:10Jun2014) to Recorded   5  Glimepiride 1 MG Oral Tablet; TAKE 1 TABLET ONCE DAILY; Therapy: 42RNS1539 to (Evaluate:45Viy3275); Last Rx:44Esd7764 Recorded   6  Lisinopril 20 MG Oral Tablet; Take 1 tablet twice daily; Therapy: 95HHT3847 to (Evaluate:17Hhj2534)  Requested for: 82Czo2084; Last   Rx:69Fyt3232 Ordered   7  Magnesium 250 MG Oral Tablet; TAKE 1 TABLET DAILY; Therapy: (Recorded:10Jun2014) to Recorded   8  MetFORMIN HCl - 850 MG Oral Tablet; TAKE ONE (1) TABLET(S) TWIC E DAILY WITH   MORNING AND EVENING MEAL; Therapy: 78KCU6630 to (Evaluate:01Apr2017)  Requested for: 56KRH0052; Last   Rx:06Apr2016 Ordered   9  Simvastatin 20 MG Oral Tablet; TAKE 1 TABLET DAILY; Therapy: 83NHS0617 to (Evaluate:23May2016); Last Rx:29May2015 Ordered   10  Terazosin HCl - 1 MG Oral Capsule; TAKE ONE CAPSULE BY MOUTH EVERY DAY AT    BEDTIME; Therapy: 40NPL0230 to (Gabino Cushing)  Requested for: 03LCZ1790; Last    Rx:07Mar2016 Ordered   11  Vitamin B-12 500 MCG Oral Tablet; TAKE 1 TABLET DAILY; Therapy: (Recorded:10Jun2014) to Recorded    The medication list was reviewed and updated today  Allergies    1   No Known Drug Allergies    Immunizations   1 2 3    Influenza  [id number] 20Ktw8162    Pneumococcal  Oct 2006      Tetanus  [id number]       Vitals  Signs [Data Includes: Current Encounter]    Temperature: 98 9 F  Heart Rate: 76  Respiration: 18  Systolic: 248  Diastolic: 70  Height: 5 ft 10 in  Weight: 193 lb 6 08 oz  BMI Calculated: 27 75  BSA Calculated: 2 06  O2 Saturation: 96    Physical Exam    Constitutional pleasant intelligent obese 49-year-old male who is awake alert in no acute distress and oriented x3  Head and Face   Head and face: Normal     Palpation of the face and sinuses: No sinus tenderness  Eyes   Conjunctiva and lids: No erythema, swelling or discharge  Pupils and irises: Equal, round, reactive to light  Ophthalmoscopic examination: Normal fundi and optic discs  Unable to see fundi secondary to small pupils the patient sees those today ophthalmologist or retinal specialist twice a year  Ears, Nose, Mouth, and Throat   External inspection of ears and nose: Normal     Otoscopic examination: Tympanic membranes translucent with normal light reflex  Canals patent without erythema  Hearing: Normal     Nasal mucosa, septum, and turbinates: Abnormal   Slight erythema to nares with clear nasal discharge  Lips, teeth, and gums: Normal, good dentition  Oropharynx: Abnormal   Mild erythema to the posterior airway with a whitish postnasal drip  Neck   Neck: Supple, symmetric, trachea midline, no masses  Thyroid: Normal, no thyromegaly  Pulmonary   Respiratory effort: No increased work of breathing or signs of respiratory distress  Percussion of chest: Normal     Palpation of chest: Normal     Auscultation of lungs: Clear to auscultation  Cardiovascular   Palpation of heart: Normal PMI, no thrills  Auscultation of heart: Normal rate and rhythm, normal S1 and S2, no murmurs  Carotid pulses: 2+ bilaterally  Abdominal aorta: Normal     Femoral pulses: 2+ bilaterally  Pedal pulses: 2+ bilaterally  Peripheral vascular exam: Normal     Examination of extremities for edema and/or varicosities: Normal     Chest   Breasts: Normal, no dimpling or skin changes appreciated  Palpation of breasts and axillae: Normal, no masses palpated  Chest: Normal     Abdomen   Abdomen: Abnormal   Patient's abdomen is obese soft nontender with a large ventral hernia  Patient' has positive bowel sounds x4 quadrants     Liver and spleen: No hepatomegaly or splenomegaly  Examination for hernias: Abnormal   Large ventral hernia  Anus, perineum, and rectum: Normal sphincter tone, no masses, no prolapse  Stool sample for occult blood: Abnormal   Awaiting stool samples for occult blood  Genitourinary   Scrotal contents: Normal testes, no masses  Penis: Normal, no lesions  Digital rectal exam of prostate: Normal size, no masses  Prostate is very small firm with no nodules or masses  Lymphatic   Palpation of lymph nodes in neck: No lymphadenopathy  Palpation of lymph nodes in axillae: No lymphadenopathy  Palpation of lymph nodes in groin: No lymphadenopathy  Palpation of lymph nodes in other areas: No lymphadenopathy  Musculoskeletal   Gait and station: Normal     Inspection/palpation of digits and nails: Abnormal   Mild arthritic changes to the hands and digits  Inspection/palpation of joints, bones, and muscles: Abnormal   Generalized arthritis but no acute findings  Range of motion: Normal     Stability: Normal     Muscle strength/tone: Normal     Skin   Skin and subcutaneous tissue: Normal without rashes or lesions  Palpation of skin and subcutaneous tissue: Normal turgor  Neurologic   Cranial nerves: Cranial nerves 2-12 intact  Cortical function: Normal mental status  Reflexes: 2+ and symmetric  Sensation: No sensory loss  Coordination: Normal finger to nose and heel to shin  Diabetic Foot Exam: Right Foot Findings: normal foot  The toes were normal  The sensory exam showed normal vibratory sensation at the level of the toes and normal position sense at the level of the toes  Left Foot Findings: normal foot  The toes were normal  The sensory exam showed normal vibratory sensation at the level of the toes and normal position sense at the level of the toes  Monofilament Testing: normal tactile sensation with monofilament testing throughout both feet    Vascular: Capillary refills findings on the right were normal in the toes  Pulses: 1+ in the posterior tibialis and 1+ in the dorsalis pedis  Capillary refills findings on the left were normal in the toes  Pulses: 1+ in the posterior tibialis and 1+ in the dorsalis pedis  Psychiatric   Judgment and insight: Normal     Orientation to person, place and time: Normal     Recent and remote memory: Intact      Mood and affect: Normal        Future Appointments    Date/Time Provider Specialty Site   09/23/2016 01:00 PM Denny Pollard DO Internal Medicine [facility] INTERNAL MED     Signatures   Electronically signed by : Yesenia Enriquez DO; May 17 2016  3:56PM EST                       (Author)

## 2018-01-13 VITALS
SYSTOLIC BLOOD PRESSURE: 145 MMHG | HEIGHT: 70 IN | BODY MASS INDEX: 28.26 KG/M2 | HEART RATE: 78 BPM | DIASTOLIC BLOOD PRESSURE: 60 MMHG | WEIGHT: 197.38 LBS

## 2018-01-13 VITALS
OXYGEN SATURATION: 96 % | BODY MASS INDEX: 27.99 KG/M2 | HEIGHT: 70 IN | DIASTOLIC BLOOD PRESSURE: 52 MMHG | TEMPERATURE: 97.9 F | SYSTOLIC BLOOD PRESSURE: 120 MMHG | HEART RATE: 75 BPM | WEIGHT: 195.5 LBS

## 2018-01-13 VITALS
BODY MASS INDEX: 28.2 KG/M2 | HEIGHT: 70 IN | OXYGEN SATURATION: 97 % | DIASTOLIC BLOOD PRESSURE: 54 MMHG | WEIGHT: 197 LBS | HEART RATE: 73 BPM | SYSTOLIC BLOOD PRESSURE: 138 MMHG | TEMPERATURE: 98.4 F

## 2018-01-14 VITALS
HEART RATE: 85 BPM | SYSTOLIC BLOOD PRESSURE: 146 MMHG | TEMPERATURE: 97.1 F | HEIGHT: 70 IN | WEIGHT: 199.38 LBS | BODY MASS INDEX: 28.54 KG/M2 | DIASTOLIC BLOOD PRESSURE: 56 MMHG | OXYGEN SATURATION: 96 %

## 2018-01-14 VITALS
HEIGHT: 70 IN | BODY MASS INDEX: 28.35 KG/M2 | SYSTOLIC BLOOD PRESSURE: 136 MMHG | DIASTOLIC BLOOD PRESSURE: 64 MMHG | HEART RATE: 74 BPM | WEIGHT: 198 LBS

## 2018-01-18 ENCOUNTER — APPOINTMENT (OUTPATIENT)
Dept: LAB | Facility: CLINIC | Age: 83
End: 2018-01-18
Payer: MEDICARE

## 2018-01-18 DIAGNOSIS — E11.29 TYPE 2 DIABETES MELLITUS WITH OTHER DIABETIC KIDNEY COMPLICATION (HCC): ICD-10-CM

## 2018-01-18 LAB
ANION GAP SERPL CALCULATED.3IONS-SCNC: 8 MMOL/L (ref 4–13)
BUN SERPL-MCNC: 35 MG/DL (ref 5–25)
CALCIUM SERPL-MCNC: 9 MG/DL (ref 8.3–10.1)
CHLORIDE SERPL-SCNC: 106 MMOL/L (ref 100–108)
CO2 SERPL-SCNC: 24 MMOL/L (ref 21–32)
CREAT SERPL-MCNC: 1.92 MG/DL (ref 0.6–1.3)
EST. AVERAGE GLUCOSE BLD GHB EST-MCNC: 146 MG/DL
GFR SERPL CREATININE-BSD FRML MDRD: 31 ML/MIN/1.73SQ M
GLUCOSE P FAST SERPL-MCNC: 111 MG/DL (ref 65–99)
HBA1C MFR BLD: 6.7 % (ref 4.2–6.3)
POTASSIUM SERPL-SCNC: 4.7 MMOL/L (ref 3.5–5.3)
SODIUM SERPL-SCNC: 138 MMOL/L (ref 136–145)

## 2018-01-18 PROCEDURE — 36415 COLL VENOUS BLD VENIPUNCTURE: CPT

## 2018-01-18 PROCEDURE — 83036 HEMOGLOBIN GLYCOSYLATED A1C: CPT

## 2018-01-18 PROCEDURE — 80048 BASIC METABOLIC PNL TOTAL CA: CPT

## 2018-01-18 NOTE — PROGRESS NOTES
Assessment    1  Left knee pain (719 46) (M25 562)   2  Arthritis of knee, left (716 96) (M19 90)    Plan  Arthritis of knee, left    · *1 - [facility] (ORTHOPEDIC SURGERY ) Physician  Referral  Consult  Status: Hold For - Scheduling  Requested for:  [id number]  Care Summary provided  : Yes  Left knee pain    · * XR KNEE 3 VIEW LEFT; Status:Active; Requested P:51FMF1724;     Discussion/Summary  Discussion Summary:   X-ray left knee demonstrates arthritic changes  Tylenol, 1-2 tablets every 4-6 hours as needed for pain  Rest  May use cool compresses or warm compresses to knee for comfort  May use Ace wrap or neoprene knee sleeve for compression and support  Recommend pain for extra support if leg is painful to walk on  Recommend followup with family doctor in the next week to 2 weeks  If pain persists, further evaluation by family doctor and or orthopedist would be advised  Medication Side Effects Reviewed: Possible side effects of new medications were reviewed with the patient/guardian today  Understands and agrees with treatment plan: The treatment plan was reviewed with the patient/guardian  The patient/guardian understands and agrees with the treatment plan   Counseling Documentation With Imm: The patient was counseled regarding diagnostic results, instructions for management  Chief Complaint    1  Pain  Chief Complaint Free Text Note Form: C/O left knee pain for 4 days  Feels it below patella and behind the knee with walking  History of Present Illness  HPI: 24-year-old male with left knee pain that started about 3-4 days ago  Pain is under his kneecap and up alongside the knee and behind  The pain is worse with walking  He has not done anything other than buy a cane earlier today but has not used it  States that the knee feels like it's going to give out  He did fall onto his knee today after it started hurting      Pt does state that he had done a lot of walking over the last few weeks, hunting deer  Was walking up and [place] in the [place]  He states that last year after hunting he had some soreness but it resolved quickly  Reports that he had to have the left knee drained a number of years ago after he became acutely swollen and painful  Says that the physician who is no longer in practice took out 2 syringes full of fluid  Patient denies any fever or chills  Denies nausea or vomiting  Denies any known acute injury  He does have history of kidney disease and is not supposed to take ibuprofen products  Hospital Based Practices Required Assessment:   Pain Assessment   the patient states they have pain  The pain is located in the left knee  The patient describes the pain as sharp  (on a scale of 0 to 10, the patient rates the pain at 5 )   Abuse And Domestic Violence Screen    Yes, the patient is safe at home  The patient states no one is hurting them  Depression And Suicide Screen  No, the patient has not had thoughts of hurting themself  No, the patient has not felt depressed in the past 7 days  Review of Systems  Focused-Male:   Constitutional: no fever or chills, feels well, no tiredness, no recent weight loss or weight gain  Cardiovascular: no complaints of slow or fast heart rate, no chest pain, no palpitations, no leg claudication or lower extremity edema  Respiratory: no complaints of shortness of breath, no wheezing or cough, no dyspnea on exertion, no orthopnea or PND  Gastrointestinal: no complaints of abdominal pain, no constipation, no nausea or vomiting, no diarrhea or bloody stools  Integumentary: no complaints of skin rash or lesion, no itching or dry skin, no skin wounds  Active Problems    1  Abnormal glucose (790 29) (R73 09)   2  Abnormal kidney function (593 9) (N28 9)   3  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) (285 21,585 9) (N18 9,D63 1)   4  Arthritis of knee, left (716 96) (M19 90)   5   Arthritis of left shoulder region (716 91) (M19 90)   6  Carotid arterial disease (447 9) (I77 9)   7  Chronic kidney disease, stage 3 (585 3) (N18 3)   8  Controlled diabetes mellitus (250 00) (E11 9)   9  Diabetes mellitus with renal manifestation (250 40) (E11 29)   10  Encounter for screening for malignant neoplasm (V76 9) (Z12 9)   11  Enlarged prostate without lower urinary tract symptoms (luts) (600 00) (N40 0)   12  Glaucoma (365 9) (H40 9)   13  Hyperlipidemia (272 4) (E78 5)   14  Hypertension (401 9) (I10)   15  Impacted cerumen of right ear (380 4) (H61 21)   16  Intestinal obstruction (560 9) (K56 60)   17  Need for immunization against influenza (V04 81) (Z23)   18  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)   19  Special screening examination for neoplasm of prostate (V76 44) (Z12 5)    Past Medical History    1  History of Diabetes Mellitus (250 00)   2  History of hyperlipidemia (V12 29) (Z86 39)   3  History of hypertension (V12 59) (Z86 79)   4  History of syncope (V15 89) (Z87 898)   5  History of Osteoarthritis (V13 4)   6  History of Visit for screening (V82 9) (Z13 9)  Active Problems And Past Medical History Reviewed: The active problems and past medical history were reviewed and updated today  Family History    1  Family history of Diabetes Mellitus (V18 0)   2  Family history of Mother  At Age 58   4  Family history of Stroke Syndrome (V17 1)    4  Family history of Diabetes Mellitus (V18 0)   5  Family history of Emphysema   6  Family history of Father  At Age 61  Family History Reviewed: The family history was reviewed and updated today  Social History    · Being A Social Drinker   · Daily Coffee Consumption (2  Cups/Day)   · Denied: History of Drug Use   · Former smoker (U74 59) (M09 795)  Social History Reviewed: The social history was reviewed and updated today  Surgical History    1  History of Appendectomy   2   History of Hemorrhoidectomy  Surgical History Reviewed: The surgical history was reviewed and updated today  Current Meds   1  AmLODIPine Besylate 2 5 MG Oral Tablet; TAKE 1 TABLET DAILY; Therapy: 48SES1982 to (22 728266)  Requested for: 444 14 907; Last   Rx:26Oct2015 Ordered   2  [employer] Vitamin D3 1000 UNIT CAPS; take 1 capsule daily; Therapy: (Recorded:10Jun2014) to Recorded   3  Diflorasone Diacetate 0 05 % External Ointment; Therapy: 17IPL3700 to (Last Rx:30Mar2011)  Requested for: 17IAZ2113 Ordered   4  Fish Oil 300 MG CAPS; take 1 capsule daily; Therapy: (Recorded:10Jun2014) to Recorded   5  Glimepiride 1 MG Oral Tablet; TAKE 1 TABLET ONCE DAILY; Therapy: 03VTC5012 to Recorded   6  Lisinopril 20 MG Oral Tablet; Take 1 tablet twice daily; Therapy: 35DOK1608 to (Evaluate:82Kwb2320)  Requested for: 07Emk4747; Last   Rx:87Lxh5746 Ordered   7  Magnesium 250 MG Oral Tablet; TAKE 1 TABLET DAILY; Therapy: (Recorded:10Jun2014) to Recorded   8  MetFORMIN HCl - 850 MG Oral Tablet; Take one tablet by mouth twice daily  with meals; Therapy: 59BJI9823 to (Toy Severino)  Requested for: 42BZD2542; Last   EW:44TYS5501 Ordered   9  Simvastatin 20 MG Oral Tablet; TAKE 1 TABLET DAILY; Therapy: 43FAN8299 to (Evaluate:32Cls8701); Last Rx:10Rcy0940 Ordered   10  Terazosin HCl - 1 MG Oral Capsule; TAKE ONE CAPSULE BY MOUTH EVERY DAY AT    BEDTIME; Therapy: 71LWD8830 to (Evaluate:19Mar2016)  Requested for: 75MFY6433; Last    Rx:25Mar2015 Ordered   11  Vitamin B-12 500 MCG Oral Tablet; TAKE 1 TABLET DAILY; Therapy: (Recorded:10Jun2014) to Recorded  Medication List Reviewed: The medication list was reviewed and updated today  Allergies    1   No Known Drug Allergies    Vitals  Signs [Data Includes: Current Encounter]   Recorded: 08UFB7512 06:51PM   Temperature: 98 9 F  Heart Rate: 82  Respiration: 18  Systolic: 394, LUE, Sitting  Diastolic: 74, LUE, Sitting  Height: 5 ft 10 in  Weight: 200 lb   BMI Calculated: 28 7  BSA Calculated: 2 09  O2 Saturation: 96    Physical Exam    Constitutional   General appearance: No acute distress, well appearing and well nourished   elderly male  Pulmonary   Respiratory effort: No increased work of breathing or signs of respiratory distress  Musculoskeletal   Inspection/palpation of joints, bones, and muscles: Abnormal   Mild swelling of the left knee versus right  Tenderness to palpation along the medial tibial plateau and popliteal region  Crepitus with range of motion left knee greater than right knee  Skin   Skin and subcutaneous tissue: Abnormal   Slight warmth of left knee versus right  No redness  Skin is intact  Results/Data  Diagnostic Studies Reviewed: I personally reviewed the films/images/results in the office today  My interpretation follows  X-ray Review L  knee: DJD  No acute bony changes  Future Appointments    Date/Time Provider Specialty Site   05/06/2016 10:15 AM Pedro Fry DO Internal Medicine [facility] INTERNAL MED     Signatures   Electronically signed by :  Richard Aldrich, [facility]; Jan 19 2016  8:03PM EST                       (Author)    Electronically signed by : Herber Pérez DO; Jan 19 2016  8:07PM EST                       (Co-author)

## 2018-01-22 ENCOUNTER — ALLSCRIPTS OFFICE VISIT (OUTPATIENT)
Dept: OTHER | Facility: OTHER | Age: 83
End: 2018-01-22

## 2018-01-22 DIAGNOSIS — E78.5 HYPERLIPIDEMIA: ICD-10-CM

## 2018-01-22 DIAGNOSIS — E11.29 TYPE 2 DIABETES MELLITUS WITH OTHER DIABETIC KIDNEY COMPLICATION (CODE): ICD-10-CM

## 2018-01-22 DIAGNOSIS — E11.9 TYPE 2 DIABETES MELLITUS WITHOUT COMPLICATIONS (HCC): ICD-10-CM

## 2018-01-23 NOTE — PROGRESS NOTES
Assessment   1  Controlled diabetes mellitus (250 00) (E11 9)   2  Chronic kidney disease, stage 3 (585 3) (N18 3)   3  Hypertension (401 9) (I10)   4  Hyperlipidemia (272 4) (E78 5)    Plan   Controlled diabetes mellitus    · (1) MICROALBUMIN CREATININE RATIO, RANDOM URINE; Status:Active; Requested for:22Jan2018; Controlled type 2 diabetes with renal manifestation    · (1) BASIC METABOLIC PROFILE; Status:Active; Requested for:22Jan2018;    · (1) COMPREHENSIVE METABOLIC PANEL; Status:Active; Requested for:22Jan2018;    · (1) HEMOGLOBIN A1C; Status:Active; Requested for:22Jan2018;    · (1) URINALYSIS (will reflex a microscopy if leukocytes, occult blood, protein or nitrites are not within    normal limits); Status:Active; Requested for:22Jan2018;   Hyperlipidemia    · (1) LIPID PANEL, FASTING; Status:Active; Requested for:22Jan2018;    · Follow-up visit in 4 Months Evaluation and Treatment  Follow-up  Status: Hold For - Scheduling     Requested for: 15DUL3976    Discussion/Summary   Discussion Summary:    1  Diabetes mellitus type 2  As noted patient's blood sugars are still under control and patient will continue with present medication, diet and we will check a fasting blood sugar, hemoglobin A1c, urine for microalbumin with his next visit  Patient is up-to-date with diabetic eye and foot exams  Chronic kidney disease stage 3  Patient's renal function is stable and patient does have an appointment to see his nephrologist in approximately 6 months  He was given a slip to check on his renal function prior to his next visit  Hypertension  As noted patient's blood pressure is mildly elevated today in the office  The patient states that he will check this a few times a week at home and if it is continuously high will call the office for initiation or change treatment  Hyperlipidemia   Patient states he continues to watch his diet closely and a he will be sent for a lipid profile when he returns to the office in 4 months  Counseling Documentation With Imm: The patient was counseled regarding diagnostic results,-- instructions for management,-- risk factor reductions,-- prognosis,-- patient and family education,-- impressions,-- risks and benefits of treatment options,-- importance of compliance with treatment  Medication SE Review and Pt Understands Tx: Possible side effects of new medications were reviewed with the patient/guardian today  The treatment plan was reviewed with the patient/guardian  The patient/guardian understands and agrees with the treatment plan      Chief Complaint   Chief Complaint Free Text Note Form: Patient is here today for a 4 month follow up    [address] [place] [address] Ave: Patient is here today for follow up of chronic conditions described in HPI  History of Present Illness   HPI: Patient is an 59-year-old male with a history of hypertension, hyperlipidemia, diabetes mellitus type 2, chronic kidney disease stage 3  Patient is here today for routine follow-up  In general patient states he is feeling about the same and has no new medical complaints or concerns  He did have labs performed prior to the visit today and his kidney function is stable as well as his control of his diabetes  As noted patient's blood pressure was moderately elevated today with a visit to the office  This was Re checked with the patient after about 10 minutes of sitting quietly in the office and it still was approximately the same  Patient states that he did have his blood pressure checked recently in other offices and it was found to should be under adequate control  At this point we will continue to monitor this and if it is still elevated with his next visit increase his amlodipine      Review of Systems   Complete-Male:      Constitutional: No fever or chills, feels well, no tiredness, no recent weight gain or weight loss        Eyes: No complaints of eye pain, no red eyes, no discharge from eyes, no itchy eyes  ENT: no complaints of earache, no hearing loss, no nosebleeds, no nasal discharge, no sore throat, no hoarseness,-- no earache,-- no nosebleeds,-- no sore throat,-- no hearing loss,-- no nasal discharge-- and-- no hoarseness  Cardiovascular: No complaints of slow heart rate, no fast heart rate, no chest pain, no palpitations, no leg claudication, no lower extremity  Respiratory: No complaints of shortness of breath, no wheezing, no cough, no SOB on exertion, no orthopnea or PND  Gastrointestinal: No complaints of abdominal pain, no constipation, no nausea or vomiting, no diarrhea or bloody stools  Genitourinary: nocturia-- and-- Nocturia 1-2 times a night without change, but-- No complaints of dysuria, no incontinence, no hesitancy, no nocturia, no genital lesion, no testicular pain  Musculoskeletal: arthralgias-- and-- joint stiffness  Integumentary: No complaints of skin rash or skin lesions, no itching, no skin wound, no dry skin  Neurological: No compliants of headache, no confusion, no convulsions, no numbness or tingling, no dizziness or fainting, no limb weakness, no difficulty walking  Psychiatric: Is not suicidal, no sleep disturbances, no anxiety or depression, no change in personality, no emotional problems  Endocrine: No complaints of proptosis, no hot flashes, no muscle weakness, no erectile dysfunction, no deepening of the voice, no feelings of weakness  Hematologic/Lymphatic: No complaints of swollen glands, no swollen glands in the neck, does not bleed easily, no easy bruising  ROS Reviewed:    ROS reviewed  Active Problems   1  Abnormal glucose (790 29) (R73 09)   2  Abnormal kidney function (593 9) (N28 9)   3  Arthritis of knee, left (716 96) (M17 12)   4  Arthritis of left shoulder region (716 91) (M19 012)   5  Carotid arterial disease (447 9) (I77 9)   6  Chronic kidney disease, stage 3 (585 3) (N18 3)   7   Controlled diabetes mellitus (250 00) (E11 9)   8  Controlled type 2 diabetes with renal manifestation (250 40) (E11 29)   9  Encounter for screening for malignant neoplasm (V76 9) (Z12 9)   10  Enlarged prostate without lower urinary tract symptoms (luts) (600 00) (N40 0)   11  Glaucoma (365 9) (H40 9)   12  Hyperlipidemia (272 4) (E78 5)   13  Hypertension (401 9) (I10)   14  Impacted cerumen of right ear (380 4) (H61 21)   15  Intestinal obstruction (560 9) (K56 609)   16  Left knee pain (719 46) (M25 562)   17  Need for immunization against influenza (V04 81) (Z23)   18  Screening for diabetic retinopathy (V80 2) (Z13 5)   19  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)   20  Special screening examination for neoplasm of prostate (V76 44) (Z12 5)    Past Medical History   1  History of 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) (285 21) (N18 9,D63 1)   2  History of Diabetes Mellitus (250 00)   3  History of hyperlipidemia (V12 29) (Z86 39)   4  History of hypertension (V12 59) (Z86 79)   5  History of syncope (V15 89) (Z87 898)   6  History of Osteoarthritis (V13 4)   7  History of Visit for screening (V82 9) (Z13 9)  Active Problems And Past Medical History Reviewed: The active problems and past medical history were reviewed and updated today  Surgical History   1  History of Appendectomy   2  History of Colonoscopy   3  History of Hemorrhoidectomy  Surgical History Reviewed: The surgical history was reviewed and updated today  Family History   Mother    1  Family history of Diabetes Mellitus (V18 0)   2  Family history of Mother  At Age 58   4  Family history of Stroke Syndrome (V17 1)  Father    4  Family history of Diabetes Mellitus (V18 0)   5  Family history of Emphysema   6  Family history of Father  At Age 61  Family History Reviewed: The family history was reviewed and updated today         Social History    · Being A Social Drinker   · Daily Coffee Consumption (2 Cups/Day)   · Denied: History of Drug Use   · Former smoker (A65 34) (D61 842)  Social History Reviewed: The social history was reviewed and updated today  The social history was reviewed and is unchanged  Current Meds    1  AmLODIPine Besylate 2 5 MG Oral Tablet; take one tablet by mouth every day; Therapy: 96HWK9091 to (Evaluate:34Uix8480)  Requested for: 31REO2177; Last Rx:55Irh3735     Ordered   2  [employer]S Vitamin D3 1000 UNIT CAPS; take 1 capsule daily; Therapy: (Recorded:10Jun2014) to Recorded   3  Fish Oil 300 MG CAPS; take 1 capsule daily; Therapy: (Recorded:10Jun2014) to Recorded   4  Glimepiride 1 MG Oral Tablet; take one tablet by mouth every day; Therapy: 38ATB6560 to (Evaluate:82Hiv8338)  Requested for: 19Jyw4499; Last Rx:90Wpl7643     Ordered   5  Lisinopril 20 MG Oral Tablet; TAKE ONE TABLET BY MOUTH TWICE A DAY; Therapy: 52TNV8911 to (Evaluate:10Nov2018)  Requested for: 29OMF9966; Last Rx:53Iut2898     Ordered   6  Magnesium 250 MG Oral Tablet; TAKE 1 TABLET DAILY; Therapy: (Recorded:10Jun2014) to Recorded   7  MetFORMIN HCl - 850 MG Oral Tablet; TAKE ONE (1) TABLET(S) TWICE DAILY WITH MORNING AND     EVENING MEAL; Therapy: 74ZVI7176 to (HMKDGKCW:18JXI6999)  Requested for: 10YSR6995; Last Rx:30Mar2017     Ordered   8  Precision Xtra Blood Glucose In Vitro Strip; TEST ONCE DAILY; Therapy: 03FCV1639 to (Evaluate:77Xcb1087)  Requested for: 38Cae3948; Last Rx:69Hmj4109     Ordered   9  Simvastatin 20 MG Oral Tablet; take one tablet by mouth every day; Therapy: 75WFL9751 to (Evaluate:42Rbe9257)  Requested for: 10Kjw7529; Last Rx:58Pmj7460     Ordered   10  Terazosin HCl - 1 MG Oral Capsule; TAKE ONE CAPSULE BY MOUTH AT BEDTIME; Therapy: 31VTL4406 to (Evaluate:22Mar2018)  Requested for: 15RGN8036; Last Rx:27Mar2017      Ordered   11  Vitamin B-12 500 MCG Oral Tablet; TAKE 1 TABLET DAILY;       Therapy: (Recorded:10Jun2014) to Recorded  Medication List Reviewed: The medication list was reviewed and updated today  Allergies   1  No Known Drug Allergies    Vitals   Vital Signs    Recorded: 13KWO3749 12:57PM   Temperature 96 6 F   Heart Rate 80   Systolic 773   Diastolic 62   Height 5 ft 10 in   Weight 194 lb    BMI Calculated 27 84   BSA Calculated 2 06   O2 Saturation 98     Physical Exam        Constitutional Pleasant overweight 80-year-old male who is awake alert in no acute distress oriented x3  Eyes      Conjunctiva and lids: No swelling, erythema, or discharge  Pupils and irises: Equal, round and reactive to light  Ears, Nose, Mouth, and Throat      External inspection of ears and nose: Normal        Otoscopic examination: Tympanic membrance translucent with normal light reflex  Canals patent without erythema  Nasal mucosa, septum, and turbinates: Normal without edema or erythema  Oropharynx: Normal with no erythema, edema, exudate or lesions  Pulmonary      Respiratory effort: No increased work of breathing or signs of respiratory distress  Auscultation of lungs: Clear to auscultation, equal breath sounds bilaterally, no wheezes, no rales, no rhonci  Cardiovascular      Palpation of heart: Normal PMI, no thrills  Auscultation of heart: Normal rate and rhythm, normal S1 and S2, without murmurs  Examination of extremities for edema and/or varicosities: Normal        Carotid pulses: Normal        Abdomen      Abdomen: Abnormal  -- Obese soft nontender with positive bowel sounds x4 quadrants  Liver and spleen: No hepatomegaly or splenomegaly  Lymphatic      Palpation of lymph nodes in neck: No lymphadenopathy  Musculoskeletal      Gait and station: Normal        Digits and nails: Abnormal  -- Diffuse degenerative changes to the hands and digits  Inspection/palpation of joints, bones, and muscles: Normal        Skin      Skin and subcutaneous tissue: Normal without rashes or lesions  Neurologic      Cranial nerves: Cranial nerves 2-12 intact  Reflexes: 2+ and symmetric  Sensation: No sensory loss  Psychiatric      Orientation to person, place and time: Normal        Mood and affect: Normal        Diabetic Foot Screen: Normal        Socks and shoes removed, the Right Foot: the foot was normal, no swelling, no erythema  The toes on the right were normal-- and-- with full range of motion  The sensory exam showed  normal vibratory sensation at the level of the toes on the right  Normal tactile sensation with monofilament testing throughout the right foot  Socks and shoes removed, Left Foot: the foot was normal, no swelling, no erythema  The toes on the left were normal-- and-- with full range of motion  The sensory exam showed  normal vibratory sensation at the level of the toes on the left , Normal tactile sensation with monofilament testing throughout the left foot  Pulses:      2+ in the dorsalis pedis on the right  Pulses:      2+ in the dorsalis pedis on the left        Signatures    Electronically signed by : Sarita Herr DO; Jan 22 2018  1:47PM EST                       (Author)

## 2018-03-20 DIAGNOSIS — E11.9 TYPE 2 DIABETES MELLITUS WITHOUT COMPLICATION, WITHOUT LONG-TERM CURRENT USE OF INSULIN (HCC): Primary | ICD-10-CM

## 2018-03-20 DIAGNOSIS — N40.0 BENIGN PROSTATIC HYPERPLASIA, UNSPECIFIED WHETHER LOWER URINARY TRACT SYMPTOMS PRESENT: ICD-10-CM

## 2018-03-20 RX ORDER — TERAZOSIN 1 MG/1
CAPSULE ORAL
Qty: 90 CAPSULE | Refills: 3 | Status: SHIPPED | OUTPATIENT
Start: 2018-03-20 | End: 2019-02-27 | Stop reason: SDUPTHER

## 2018-05-17 ENCOUNTER — APPOINTMENT (OUTPATIENT)
Dept: LAB | Facility: CLINIC | Age: 83
End: 2018-05-17
Payer: MEDICARE

## 2018-05-17 DIAGNOSIS — E11.9 TYPE 2 DIABETES MELLITUS WITHOUT COMPLICATIONS (HCC): ICD-10-CM

## 2018-05-17 DIAGNOSIS — E78.5 HYPERLIPIDEMIA: ICD-10-CM

## 2018-05-17 DIAGNOSIS — E11.29 TYPE 2 DIABETES MELLITUS WITH OTHER DIABETIC KIDNEY COMPLICATION (CODE): ICD-10-CM

## 2018-05-17 LAB
ALBUMIN SERPL BCP-MCNC: 3.7 G/DL (ref 3.5–5)
ALP SERPL-CCNC: 71 U/L (ref 46–116)
ALT SERPL W P-5'-P-CCNC: 19 U/L (ref 12–78)
ANION GAP SERPL CALCULATED.3IONS-SCNC: 6 MMOL/L (ref 4–13)
AST SERPL W P-5'-P-CCNC: 15 U/L (ref 5–45)
BACTERIA UR QL AUTO: NORMAL /HPF
BILIRUB SERPL-MCNC: 0.84 MG/DL (ref 0.2–1)
BILIRUB UR QL STRIP: NEGATIVE
BUN SERPL-MCNC: 31 MG/DL (ref 5–25)
CALCIUM SERPL-MCNC: 8.8 MG/DL (ref 8.3–10.1)
CHLORIDE SERPL-SCNC: 108 MMOL/L (ref 100–108)
CHOLEST SERPL-MCNC: 98 MG/DL (ref 50–200)
CLARITY UR: CLEAR
CO2 SERPL-SCNC: 24 MMOL/L (ref 21–32)
COLOR UR: YELLOW
CREAT SERPL-MCNC: 2.14 MG/DL (ref 0.6–1.3)
CREAT UR-MCNC: 135 MG/DL
EST. AVERAGE GLUCOSE BLD GHB EST-MCNC: 148 MG/DL
GFR SERPL CREATININE-BSD FRML MDRD: 27 ML/MIN/1.73SQ M
GLUCOSE P FAST SERPL-MCNC: 108 MG/DL (ref 65–99)
GLUCOSE UR STRIP-MCNC: NEGATIVE MG/DL
HBA1C MFR BLD: 6.8 % (ref 4.2–6.3)
HDLC SERPL-MCNC: 35 MG/DL (ref 40–60)
HGB UR QL STRIP.AUTO: NEGATIVE
HYALINE CASTS #/AREA URNS LPF: NORMAL /LPF
KETONES UR STRIP-MCNC: NEGATIVE MG/DL
LDLC SERPL CALC-MCNC: 44 MG/DL (ref 0–100)
LEUKOCYTE ESTERASE UR QL STRIP: NEGATIVE
MICROALBUMIN UR-MCNC: 46.4 MG/L (ref 0–20)
MICROALBUMIN/CREAT 24H UR: 34 MG/G CREATININE (ref 0–30)
NITRITE UR QL STRIP: NEGATIVE
NON-SQ EPI CELLS URNS QL MICRO: NORMAL /HPF
NONHDLC SERPL-MCNC: 63 MG/DL
PH UR STRIP.AUTO: 5.5 [PH] (ref 4.5–8)
POTASSIUM SERPL-SCNC: 4.5 MMOL/L (ref 3.5–5.3)
PROT SERPL-MCNC: 7.3 G/DL (ref 6.4–8.2)
PROT UR STRIP-MCNC: ABNORMAL MG/DL
RBC #/AREA URNS AUTO: NORMAL /HPF
SODIUM SERPL-SCNC: 138 MMOL/L (ref 136–145)
SP GR UR STRIP.AUTO: 1.02 (ref 1–1.03)
TRIGL SERPL-MCNC: 93 MG/DL
UROBILINOGEN UR QL STRIP.AUTO: 0.2 E.U./DL
WBC #/AREA URNS AUTO: NORMAL /HPF

## 2018-05-17 PROCEDURE — 80061 LIPID PANEL: CPT

## 2018-05-17 PROCEDURE — 36415 COLL VENOUS BLD VENIPUNCTURE: CPT

## 2018-05-17 PROCEDURE — 82570 ASSAY OF URINE CREATININE: CPT

## 2018-05-17 PROCEDURE — 81001 URINALYSIS AUTO W/SCOPE: CPT

## 2018-05-17 PROCEDURE — 80053 COMPREHEN METABOLIC PANEL: CPT

## 2018-05-17 PROCEDURE — 82043 UR ALBUMIN QUANTITATIVE: CPT

## 2018-05-17 PROCEDURE — 83036 HEMOGLOBIN GLYCOSYLATED A1C: CPT

## 2018-05-25 ENCOUNTER — OFFICE VISIT (OUTPATIENT)
Dept: INTERNAL MEDICINE CLINIC | Facility: CLINIC | Age: 83
End: 2018-05-25
Payer: MEDICARE

## 2018-05-25 VITALS
BODY MASS INDEX: 27.63 KG/M2 | HEART RATE: 64 BPM | TEMPERATURE: 97.2 F | WEIGHT: 193 LBS | OXYGEN SATURATION: 98 % | HEIGHT: 70 IN | DIASTOLIC BLOOD PRESSURE: 40 MMHG | SYSTOLIC BLOOD PRESSURE: 128 MMHG

## 2018-05-25 DIAGNOSIS — I10 ESSENTIAL HYPERTENSION: ICD-10-CM

## 2018-05-25 DIAGNOSIS — Z00.00 MEDICARE ANNUAL WELLNESS VISIT, SUBSEQUENT: ICD-10-CM

## 2018-05-25 DIAGNOSIS — E11.22 CONTROLLED TYPE 2 DIABETES MELLITUS WITH STAGE 3 CHRONIC KIDNEY DISEASE, WITHOUT LONG-TERM CURRENT USE OF INSULIN (HCC): ICD-10-CM

## 2018-05-25 DIAGNOSIS — N18.30 CHRONIC KIDNEY DISEASE, STAGE 3 (HCC): ICD-10-CM

## 2018-05-25 DIAGNOSIS — E11.9 TYPE 2 DIABETES MELLITUS WITHOUT COMPLICATION, WITHOUT LONG-TERM CURRENT USE OF INSULIN (HCC): Primary | ICD-10-CM

## 2018-05-25 DIAGNOSIS — N18.30 CONTROLLED TYPE 2 DIABETES MELLITUS WITH STAGE 3 CHRONIC KIDNEY DISEASE, WITHOUT LONG-TERM CURRENT USE OF INSULIN (HCC): ICD-10-CM

## 2018-05-25 DIAGNOSIS — Z00.00 HEALTHCARE MAINTENANCE: ICD-10-CM

## 2018-05-25 PROCEDURE — G0439 PPPS, SUBSEQ VISIT: HCPCS | Performed by: INTERNAL MEDICINE

## 2018-05-25 RX ORDER — SIMVASTATIN 20 MG
TABLET ORAL
COMMUNITY
Start: 2018-03-10 | End: 2018-09-07 | Stop reason: SDUPTHER

## 2018-05-25 RX ORDER — CHOLECALCIFEROL (VITAMIN D3) 125 MCG
1 CAPSULE ORAL DAILY
COMMUNITY

## 2018-05-25 RX ORDER — AMLODIPINE BESYLATE 2.5 MG/1
TABLET ORAL
COMMUNITY
Start: 2018-03-10 | End: 2018-09-07 | Stop reason: SDUPTHER

## 2018-05-25 RX ORDER — GLIMEPIRIDE 1 MG/1
TABLET ORAL
COMMUNITY
Start: 2018-05-07 | End: 2018-11-03 | Stop reason: SDUPTHER

## 2018-05-25 RX ORDER — LISINOPRIL 20 MG/1
TABLET ORAL
COMMUNITY
Start: 2018-05-12 | End: 2018-09-13 | Stop reason: SDUPTHER

## 2018-05-25 RX ORDER — OMEGA-3S/DHA/EPA/FISH OIL 300-1000MG
1 CAPSULE ORAL DAILY
COMMUNITY

## 2018-05-25 NOTE — PATIENT INSTRUCTIONS
Thank you for enrolling in April kaley Tomas  Please follow the instructions below to securely access your online medical record  [employer] allows you to send messages to your doctor, view your test results, renew your prescriptions, schedule appointments, and more  [facility] uses Single Sign on (SSO) Technology for you to log in and access our [address]  [employer] including [facility]  No more remembering multiple user names and passwords! We are going to guide you through, step by step, to help you set up your [facility] Sanders account which will provide access to your [employer] account  How Do I Sign Up? 1  In your Internet browser, go to [email or web address]       2  Click on the   [employer] patient account and then click Dont have an                 Account? Create one now      3  Enter your demographic information and chose a user name (email address) and password  Think of one that is secure and easy to remember  Enter a Referral code if you have one (this is not your [facility]SK[employer] code ) Accept the Terms and Conditions and the Privacy Policy  4  Select your security questions that you will use to reset your password should you forget it  Click Submit  5  Enter your [employer] Activation Code exactly as it appears below  You will not need to use this code after you have completed the sign-up process  If you do not sign up before the expiration date, you must request a new code  [facility] Activation Code: [email or web address]  Expires: 5/28/2018  1:15 PM    6  Confirm your email address  An email confirmation was sent to you  Please open that email and click Confirm your Email   You should then be redirected to our [facility] Single sign on page, where you will log on with the user name and password you created! Proceed to the [facility] to view your personal health information          Additional Information  If you have questions, you can e-mail patient  [email or web address] or call [phone] to talk to our customer support staff  Remember, MyChart is NOT to be used for urgent needs  For medical emergencies, dial 911

## 2018-05-25 NOTE — PROGRESS NOTES
HPI:  Chet Robles is a 80 y o  male here for his Subsequent Wellness Visit  There is no problem list on file for this patient  Past Medical History:   Diagnosis Date    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)     Last Assessed:  5/19/17    Diabetes mellitus ([facility] [address] )     Hyperlipidemia     Hypertension     Osteoarthritis      Past Surgical History:   Procedure Laterality Date    APPENDECTOMY      COLONOSCOPY  2012    HEMORROIDECTOMY       Family History   Problem Relation Age of Onset    Diabetes Mother     Other Mother      Stroke syndrome    Diabetes Father     Emphysema Father      History   Smoking Status    Former Smoker   Smokeless Tobacco    Never Used     History   Alcohol Use    Yes     Comment: Social drinker      History   Drug Use No     BP (!) 128/40   Pulse 64   Temp (!) 97 2 °F (36 2 °C)   Ht 5' 10" (1 778 m)   Wt 87 5 kg (193 lb)   SpO2 98%   BMI 27 69 kg/m²       Current Outpatient Prescriptions   Medication Sig Dispense Refill    amLODIPine (NORVASC) 2 5 mg tablet       Cholecalciferol (CVS VITAMIN D3) 1000 units capsule Take 1 capsule by mouth daily      cyanocobalamin (VITAMIN B-12) 500 mcg tablet Take 1 tablet by mouth daily      glimepiride (AMARYL) 1 mg tablet       lisinopril (ZESTRIL) 20 mg tablet       metFORMIN (GLUCOPHAGE) 850 mg tablet TAKE ONE (1) TABLET(S) TWICE DAILY WITH MORNING AND EVENING MEAL 180 tablet 3    Omega-3 Fatty Acids (OMEGA-3 FISH OIL) 300 MG CAPS Take 1 capsule by mouth daily      simvastatin (ZOCOR) 20 mg tablet       terazosin (HYTRIN) 1 mg capsule TAKE ONE CAPSULE BY MOUTH AT BEDTIME 90 capsule 3     No current facility-administered medications for this visit        No Known Allergies  Immunization History   Administered Date(s) Administered    Influenza Split High Dose Preservative Free IM 09/27/2013, 09/25/2014, 09/29/2015, 09/23/2016, 09/19/2017    Pneumococcal Polysaccharide PPV23 10/01/2006    TD (adult) Preservative Free 12/11/1930       Patient Care Team:  Memo Soto DO as PCP - MD Memo Garcia DO    Medicare Screening Tests and Risk Assessments:  AWV Clinical     ISAR:   Previous hospitalizations?:  No       Once in a Lifetime Medicare Screening:       Medicare Screening Tests and Risk Assessment:   AAA Risk Assessment    None Indicated:  Yes    Osteoporosis Risk Assessment    HIV Risk Assessment        Drug and Alcohol Use:   Tobacco use    Cigarettes:  former smoker    Smokeless:  never used smokeless tobacco    Tobacco use duration    Tobacco Cessation Readiness    Alcohol use    Alcohol use:  rare use    Alcohol Treatment Readiness   Illicit Drug Use    Drug use:  never        Diet & Exercise:   Diet   What is your diet?:  Regular   How many servings a day of the following:   Fruits and Vegetables:  1-2 Meat:  1-2, 3-4   Whole Grains:  1 Simple Carbs:  1   Dairy:  1 Soda:  1   Coffee:  2 Tea:  0   Exercise    Do you currently exercise?:  yes    Type of exercise:  walking       Cognitive Impairment Screening:   Depression screening preformed:  Yes Depression screen score:  0   Cognitive Impairment Screening    Do you have difficulty learning or retaining new information?:  No Do you have difficulty handling new tasks?:  No   Do you have difficulty with reasoning?:  No Do you have difficulty with spatial ability and orientation?:  No   Do you have difficulty with language?:  No Do you have difficulty with behavior?:  No       Functional Ability/Level of Safety:   Hearing    Hearing difficulties:  No    Hearing aid:  No    Hearing Impairment Assessment    Hearing status:  No impairment   Current Activities    Status:  unlimited driving, unlimited social activities, unlimited IADL's, unlimited ADL's   Help needed with the folllowing:    Using the phone:  No Transportation:  No   Shopping:  No Preparing Meals:  No   Doing Housework:  No Doing Laundry:  No   Managing Medications:  No Managing Money: No   ADL    Feeding:  Independant   Oral hygiene and Facial grooming:  Independant   Bathing:  Independant   Upper Body Dressing:  Independant   Lower Body Dressing:  Independant   Toileting:  Independant   Bed Mobility:  Independant   Fall Risk   Have you fallen in the last 12 months?:  No Are you unsteady on your feet?:  No    Are you taking any medications that may cause fatigue or dizziness?:  No    Do you rush to the bathroom potentially risking a fall?:  No   Injury History       Home Safety:   Are there hazards in your environment?:  No   If you fell, would you need help to get back up from the ground?:  No Do you have problems or concerns getting in/out of a bed, chair, tub, or toilet?:  No   Do you feel unsteady when walking?:  No Is your activity limited by pain?:  No   Do you have handrails and grab-bars in the home?:  Yes    Are you and/or family members aware of the dangers of smoking in bed?:  Yes Are firearms stored securely?:  Yes   Do you have working smoke alarms and fire extinguisher?:  Yes Do all household members know how to use them?:  Yes   Have you left the stove on unsupervised?:  No    Home Safety Risk Factors   Unfamilar with surroundings:  No Uneven floors:  No   Stairs or handrail saftey risk:  No Loose rugs:  No   Household clutter:  No Poor household lighting:  No   No grab bars in bathroom:  No Further evaluation needed:  No       Advanced Directives:   Advanced Directives    Living Will:  Yes Durable POA for healthcare:   Yes   Advanced directive:  Yes    Patient's End of Life Decisions        Urinary Incontinence:   Do you have urinary incontinence?:  No Do you have incomplete emptying?:  No   Do you urinate frequently?:  No Do you have urinary urgency?:  No    Do you have dysuria (painful and/or difficult urination)?:  No   Do you have nocturia (waking up to urinate)?:  Yes Do you strain when urinating (have to push to urinate)?:  No   Do you have a weak stream when urinating?:  No Do you have intermittent streaming when urinating?:  No   Do you dribble urine after finishing?:  No        Glaucoma:            Provider Screening     Preventative Screening/Counseling:   Cardiovascular Screening/Counseling:   (Labs Q5 years, EKG optional one-time)   General:  Risks and Benefits Discussed, Screening Current, Patient Declines Counseling:  Healthy Diet, Healthy Weight, Improve Cholesterol, Improve Blood Pressure, Improve Exercise Tolerance          Diabetes Screening/Counseling:   (2 tests/year if Pre-Diabetes or 1 test/year if no Diabetes)   General:  Risks and Benefits Discussed, Screening Current Counseling:  Healthy Diet, Healthy Weight, Improve Physical Activity          Colorectal Cancer Screening/Counseling:   (FOBT Q1 yr; Flex Sig Q4 yrs or Q10 yrs after Screening Colonoscopy; Screening Colonoscpy Q2 yrs High Risk or Q10 yrs Low Risk; Barium Enema Q2 yrs High Risk or Q4 yrs Low Risk)   General:  Risks and Benefits Discussed, Screening Current           Prostate Cancer Screening/Counseling:   (Annual)    General:  Risks and Benefits Discussed, Screening Current          Breast Cancer Screening/Counseling:   (Baseline Age 28 - 43; Annual Age 36+)         Cervical Cancer Screening/Counseling:   (Annual for High Risk or Childbearing Age with Abnormal Pap in Last 3 yrs; Every 2 all others)         Osteoporosis Screening/Counseling:   (Every 2 Yrs if at risk or more if medically necessary)   General:  Risks and Benefits Discussed, Screening Current           AAA Screening/Counseling:   (Once per Lifetime with risk factors)    General:  Screening Current           Glaucoma Screening/Counseling:   (Annual)   General:  Risks and Benefits Discussed, Screening Current          HIV Screening/Counseling:   (Voluntary;  Once annually for high risk OR 3 times for Pregnancy at diagnosis of IUP; 3rd trimester; and at Labor   General:  Screening Not Indicated           Hepatitis C Screening: Hepatitis C Counseling Provided: Yes               Immunizations:   Influenza (annual):  Risks & Benefits Discussed, Influenza Recommended Annually, Influenza UTD This Year   Pneumococcal (Once in a Lifetime):  Risks & Benefits Discussed, Lifetime Vaccine Completed   Hepatitis B Series (low risk patients):  Series Not Indicated   Zostavax (Medicare D Coverage, Pt >72 yo):  Risks & Benefits Discussed, Patient Declines   TD (Non-Medicare Wellness  Visit required):  Vaccine Status Unknown   Tdap (Non-Medicare Wellness Visit required):  Vaccine Status Unknown       Other Preventative Couseling (Non-Medicare Wellness Visit Required):   nutrition counseling performed, fall prevention education provided, car/seat belt/driving safety reviewed, alcohol use counseling provided, sunscreen education provided, weight reduction was discussed, Skin self-exam counseling given, Increased physical activity counseling given       Referrals (Non-Medicare Wellness Visit Required):       Medical Equipment/Suppliers:           No exam data present    Physical Exam :  Physical Exam   Constitutional: He is oriented to person, place, and time  He appears well-developed and well-nourished  No distress  Pleasant, overweight, articulate 49-year-old male who is awake alert, truncal obesity   HENT:   Head: Normocephalic  Left Ear: External ear normal    Nose: Nose normal    Mouth/Throat: Oropharynx is clear and moist  No oropharyngeal exudate  Eyes: Conjunctivae and EOM are normal  Pupils are equal, round, and reactive to light  Right eye exhibits no discharge  Left eye exhibits no discharge  No scleral icterus  Neck: Neck supple  No JVD present  No tracheal deviation present  No thyromegaly present  Cardiovascular: Normal rate, regular rhythm and intact distal pulses  Exam reveals no gallop and no friction rub  No murmur heard  Pulmonary/Chest: Effort normal  No stridor  No respiratory distress  He has no wheezes   He has no rales  He exhibits no tenderness  Patient on examination has slightly decreased breath sounds anterior and posteriorly but no rales rhonchi or wheezes could be heard on examination   Abdominal: Soft  Bowel sounds are normal  He exhibits no distension and no mass  There is no tenderness  There is no rebound and no guarding  No hernia  Patient's abdomen is notable for truncal obesity  Patient abdomen is soft nontender with positive bowel sounds x4 quadrants with no organomegaly  Patient is refusing rectal, perineal exam   Musculoskeletal: He exhibits deformity  He exhibits no edema or tenderness  Patient has some minor arthritic changes to the hands and digits  Patient has no acute lesions or any inflammation on exam and no disability   Lymphadenopathy:     He has no cervical adenopathy  Neurological: He is alert and oriented to person, place, and time  He displays normal reflexes  No cranial nerve deficit or sensory deficit  He exhibits normal muscle tone  Coordination normal    Skin: Skin is warm and dry  Capillary refill takes less than 2 seconds  No rash noted  He is not diaphoretic  No erythema  No pallor  Psychiatric: He has a normal mood and affect  His behavior is normal  Judgment and thought content normal    Nursing note and vitals reviewed  Reviewed Updated [facility] Prior Wellness Visits:   Last Medicare wellness visit information was reviewed, patient interviewed , no change since last AWVyes  Last Medicare wellness visit information was reviewed, patient interviewed and updates made to the record today yes    Assessment and Plan:  1   Medicare annual wellness visit, subsequent         Health Maintenance Due   Topic Date Due    Depression Screening PHQ-9  12/11/1930    DTaP,Tdap,and Td Vaccines (1 - Tdap) 12/11/1951    Fall Risk  12/11/1995    GLAUCOMA SCREENING 67+ YR  12/11/1997    Diabetic Foot Exam  09/23/2017

## 2018-05-25 NOTE — ASSESSMENT & PLAN NOTE
Chronic kidney disease stage 3  With recent lab test patient is had mild improvement in his kidney function  Patient will continue to follow up with his nephrologist   Patient was told the importance of keeping well hydrated especially during the summer months

## 2018-05-25 NOTE — ASSESSMENT & PLAN NOTE
Hypertension  Patient's blood pressure is showing relatively good control but we did get a lower diastolic reading today which may be secondary to his vascular disease  Patient will continue present medication  He is no compromise as far as his neurologic status with no lightheadedness or dizziness and definitely no episodes of syncope or orthostasis

## 2018-05-25 NOTE — ASSESSMENT & PLAN NOTE
Diabetes mellitus type 2  Patient's sugar showing good control with present medication  Patient has had no episodes of either hyper or hypoglycemia  We did discuss with the patient methods to help him lose weight but he is really not interested    We will check a fasting blood sugar, basic metabolic profile with fasting blood sugar with his next visit

## 2018-05-25 NOTE — ASSESSMENT & PLAN NOTE
Patient is an 51-year-old male with a history of multiple medical problems who is here today for Medicare wellness visit  Patient did have extensive lab testing performed prior to the visit today and we did discuss the results  The only abnormalities are his chronic renal disease but stabilization of his kidney function  Patient's sugar showing good control  Patient because of his age does not wish any further colon rectal screening for prostate screening and LEs that would be a problem  In general the patient states he is feeling well and he has no new complaints or concerns

## 2018-06-06 LAB
LEFT EYE DIABETIC RETINOPATHY: NORMAL
RIGHT EYE DIABETIC RETINOPATHY: NORMAL

## 2018-07-01 DIAGNOSIS — N18.30 CHRONIC KIDNEY DISEASE, STAGE III (MODERATE) (HCC): ICD-10-CM

## 2018-08-24 DIAGNOSIS — E11.9 TYPE 2 DIABETES MELLITUS WITHOUT COMPLICATION, WITHOUT LONG-TERM CURRENT USE OF INSULIN (HCC): Primary | ICD-10-CM

## 2018-08-24 RX ORDER — BLOOD SUGAR DIAGNOSTIC
STRIP MISCELLANEOUS
Qty: 100 EACH | Refills: 2 | Status: SHIPPED | OUTPATIENT
Start: 2018-08-24 | End: 2019-05-21 | Stop reason: SDUPTHER

## 2018-09-07 DIAGNOSIS — I10 ESSENTIAL HYPERTENSION: Primary | ICD-10-CM

## 2018-09-07 DIAGNOSIS — E78.00 PURE HYPERCHOLESTEROLEMIA: ICD-10-CM

## 2018-09-07 RX ORDER — AMLODIPINE BESYLATE 2.5 MG/1
TABLET ORAL
Qty: 90 TABLET | Refills: 3 | Status: SHIPPED | OUTPATIENT
Start: 2018-09-07 | End: 2019-01-25

## 2018-09-07 RX ORDER — SIMVASTATIN 20 MG
TABLET ORAL
Qty: 90 TABLET | Refills: 3 | Status: SHIPPED | OUTPATIENT
Start: 2018-09-07 | End: 2019-08-28 | Stop reason: SDUPTHER

## 2018-09-13 DIAGNOSIS — I10 ESSENTIAL HYPERTENSION: Primary | ICD-10-CM

## 2018-09-13 DIAGNOSIS — N18.30 STAGE 3 CHRONIC KIDNEY DISEASE (HCC): Primary | ICD-10-CM

## 2018-09-13 RX ORDER — LISINOPRIL 20 MG/1
TABLET ORAL
Qty: 180 TABLET | Refills: 3 | Status: SHIPPED | OUTPATIENT
Start: 2018-09-13 | End: 2019-11-26 | Stop reason: SDUPTHER

## 2018-09-14 ENCOUNTER — APPOINTMENT (OUTPATIENT)
Dept: LAB | Facility: CLINIC | Age: 83
End: 2018-09-14
Payer: MEDICARE

## 2018-09-14 DIAGNOSIS — N18.30 STAGE 3 CHRONIC KIDNEY DISEASE (HCC): ICD-10-CM

## 2018-09-14 DIAGNOSIS — E11.9 TYPE 2 DIABETES MELLITUS WITHOUT COMPLICATION, WITHOUT LONG-TERM CURRENT USE OF INSULIN (HCC): ICD-10-CM

## 2018-09-14 DIAGNOSIS — N18.30 CHRONIC KIDNEY DISEASE, STAGE III (MODERATE) (HCC): ICD-10-CM

## 2018-09-14 LAB
ALBUMIN SERPL BCP-MCNC: 3.5 G/DL (ref 3.5–5)
ANION GAP SERPL CALCULATED.3IONS-SCNC: 10 MMOL/L (ref 4–13)
BASOPHILS # BLD AUTO: 0.04 THOUSANDS/ΜL (ref 0–0.1)
BASOPHILS NFR BLD AUTO: 1 % (ref 0–1)
BUN SERPL-MCNC: 31 MG/DL (ref 5–25)
CALCIUM SERPL-MCNC: 8.6 MG/DL (ref 8.3–10.1)
CHLORIDE SERPL-SCNC: 110 MMOL/L (ref 100–108)
CO2 SERPL-SCNC: 21 MMOL/L (ref 21–32)
CREAT SERPL-MCNC: 2.24 MG/DL (ref 0.6–1.3)
CREAT UR-MCNC: 146 MG/DL
EOSINOPHIL # BLD AUTO: 0.42 THOUSAND/ΜL (ref 0–0.61)
EOSINOPHIL NFR BLD AUTO: 7 % (ref 0–6)
ERYTHROCYTE [DISTWIDTH] IN BLOOD BY AUTOMATED COUNT: 13.3 % (ref 11.6–15.1)
EST. AVERAGE GLUCOSE BLD GHB EST-MCNC: 134 MG/DL
GFR SERPL CREATININE-BSD FRML MDRD: 25 ML/MIN/1.73SQ M
GLUCOSE P FAST SERPL-MCNC: 115 MG/DL (ref 65–99)
HBA1C MFR BLD: 6.3 % (ref 4.2–6.3)
HCT VFR BLD AUTO: 34.4 % (ref 36.5–49.3)
HGB BLD-MCNC: 10.9 G/DL (ref 12–17)
IMM GRANULOCYTES # BLD AUTO: 0.01 THOUSAND/UL (ref 0–0.2)
IMM GRANULOCYTES NFR BLD AUTO: 0 % (ref 0–2)
LYMPHOCYTES # BLD AUTO: 1.54 THOUSANDS/ΜL (ref 0.6–4.47)
LYMPHOCYTES NFR BLD AUTO: 26 % (ref 14–44)
MCH RBC QN AUTO: 30.8 PG (ref 26.8–34.3)
MCHC RBC AUTO-ENTMCNC: 31.7 G/DL (ref 31.4–37.4)
MCV RBC AUTO: 97 FL (ref 82–98)
MONOCYTES # BLD AUTO: 0.58 THOUSAND/ΜL (ref 0.17–1.22)
MONOCYTES NFR BLD AUTO: 10 % (ref 4–12)
NEUTROPHILS # BLD AUTO: 3.37 THOUSANDS/ΜL (ref 1.85–7.62)
NEUTS SEG NFR BLD AUTO: 56 % (ref 43–75)
NRBC BLD AUTO-RTO: 0 /100 WBCS
PHOSPHATE SERPL-MCNC: 2.7 MG/DL (ref 2.3–4.1)
PLATELET # BLD AUTO: 168 THOUSANDS/UL (ref 149–390)
PMV BLD AUTO: 10.5 FL (ref 8.9–12.7)
POTASSIUM SERPL-SCNC: 4.8 MMOL/L (ref 3.5–5.3)
PROT UR-MCNC: 28 MG/DL
PROT/CREAT UR: 0.19 MG/G{CREAT} (ref 0–0.1)
PTH-INTACT SERPL-MCNC: 74.1 PG/ML (ref 18.4–80.1)
RBC # BLD AUTO: 3.54 MILLION/UL (ref 3.88–5.62)
SODIUM SERPL-SCNC: 141 MMOL/L (ref 136–145)
WBC # BLD AUTO: 5.96 THOUSAND/UL (ref 4.31–10.16)

## 2018-09-14 PROCEDURE — 84156 ASSAY OF PROTEIN URINE: CPT

## 2018-09-14 PROCEDURE — 85025 COMPLETE CBC W/AUTO DIFF WBC: CPT

## 2018-09-14 PROCEDURE — 80069 RENAL FUNCTION PANEL: CPT

## 2018-09-14 PROCEDURE — 83036 HEMOGLOBIN GLYCOSYLATED A1C: CPT

## 2018-09-14 PROCEDURE — 82570 ASSAY OF URINE CREATININE: CPT

## 2018-09-14 PROCEDURE — 83970 ASSAY OF PARATHORMONE: CPT

## 2018-09-14 PROCEDURE — 36415 COLL VENOUS BLD VENIPUNCTURE: CPT

## 2018-09-25 ENCOUNTER — OFFICE VISIT (OUTPATIENT)
Dept: INTERNAL MEDICINE CLINIC | Facility: CLINIC | Age: 83
End: 2018-09-25
Payer: MEDICARE

## 2018-09-25 VITALS
OXYGEN SATURATION: 95 % | HEIGHT: 70 IN | DIASTOLIC BLOOD PRESSURE: 50 MMHG | SYSTOLIC BLOOD PRESSURE: 132 MMHG | TEMPERATURE: 96.9 F | HEART RATE: 70 BPM | WEIGHT: 189 LBS | BODY MASS INDEX: 27.06 KG/M2

## 2018-09-25 DIAGNOSIS — D63.8 ANEMIA IN OTHER CHRONIC DISEASES CLASSIFIED ELSEWHERE: ICD-10-CM

## 2018-09-25 DIAGNOSIS — N28.9 ABNORMAL KIDNEY FUNCTION: ICD-10-CM

## 2018-09-25 DIAGNOSIS — E11.22 CONTROLLED TYPE 2 DIABETES MELLITUS WITH STAGE 3 CHRONIC KIDNEY DISEASE, WITHOUT LONG-TERM CURRENT USE OF INSULIN (HCC): ICD-10-CM

## 2018-09-25 DIAGNOSIS — I49.9 CARDIAC ARRHYTHMIA, UNSPECIFIED CARDIAC ARRHYTHMIA TYPE: Primary | ICD-10-CM

## 2018-09-25 DIAGNOSIS — N40.0 ENLARGED PROSTATE WITHOUT LOWER URINARY TRACT SYMPTOMS (LUTS): ICD-10-CM

## 2018-09-25 DIAGNOSIS — E78.00 PURE HYPERCHOLESTEROLEMIA: ICD-10-CM

## 2018-09-25 DIAGNOSIS — Z00.00 HEALTHCARE MAINTENANCE: ICD-10-CM

## 2018-09-25 DIAGNOSIS — N18.30 CHRONIC KIDNEY DISEASE, STAGE 3 (HCC): ICD-10-CM

## 2018-09-25 DIAGNOSIS — N18.30 CONTROLLED TYPE 2 DIABETES MELLITUS WITH STAGE 3 CHRONIC KIDNEY DISEASE, WITHOUT LONG-TERM CURRENT USE OF INSULIN (HCC): ICD-10-CM

## 2018-09-25 DIAGNOSIS — I10 ESSENTIAL HYPERTENSION: ICD-10-CM

## 2018-09-25 DIAGNOSIS — Z23 NEED FOR INFLUENZA VACCINATION: ICD-10-CM

## 2018-09-25 PROBLEM — R00.2 PALPITATIONS: Status: ACTIVE | Noted: 2018-09-25

## 2018-09-25 PROCEDURE — 90662 IIV NO PRSV INCREASED AG IM: CPT

## 2018-09-25 PROCEDURE — G0008 ADMIN INFLUENZA VIRUS VAC: HCPCS

## 2018-09-25 PROCEDURE — 99214 OFFICE O/P EST MOD 30 MIN: CPT | Performed by: INTERNAL MEDICINE

## 2018-09-25 NOTE — ASSESSMENT & PLAN NOTE
Patient's blood pressure showing acceptable control with present treatment  Patient will continue with present medication and we will continued to monitor his blood pressure and make adjustments to medication if needed

## 2018-09-25 NOTE — ASSESSMENT & PLAN NOTE
Lab Results   Component Value Date    HGBA1C 6 3 09/14/2018       No results for input(s): POCGLU in the last 72 hours  Blood Sugar Average: Last 72 hrs:   patient did have recent lab tests as noted patient's hemoglobin A1c is now 6 3 showing outstanding control  Patient feels that the improvement in the control of his sugars is secondary to him now adding cinnamon to his dietary regime  We will continue to monitor his blood sugar, hemoglobin A1c  He refused a diabetic foot exam stating he saw his podiatrist yesterday

## 2018-09-25 NOTE — ASSESSMENT & PLAN NOTE
Patient complains of some irregularity to her heart beat with occasional pauses in his rhythm  Patient is asymptomatic from this    We will send the patient for 24 hr Holter for further evaluation and discuss the results when they are available

## 2018-09-25 NOTE — ASSESSMENT & PLAN NOTE
Recent lab testing shows a continued deterioration in his renal function  Patient does have an appointment to be seen by Nephrology next week for further evaluation and consideration of modification of treatment plan  With his continued deterioration in his her renal status we may consider stopping his Glucophage that he takes for his diabetes

## 2018-09-25 NOTE — ASSESSMENT & PLAN NOTE
Patient remains on statin, Zocor 20 mg daily for his hyperlipidemia  Patient admits that he is not always perfect with his diet    We will continue to monitor his cholesterol levels and make adjustments to medication as needed

## 2018-09-25 NOTE — ASSESSMENT & PLAN NOTE
In discussion patient states that he has been having no new urinary complaints, frequency, burning, decreased flow  Patient will remain on Hytrin 1 mg a day

## 2018-09-27 ENCOUNTER — OFFICE VISIT (OUTPATIENT)
Dept: NEPHROLOGY | Facility: CLINIC | Age: 83
End: 2018-09-27
Payer: MEDICARE

## 2018-09-27 VITALS
HEART RATE: 68 BPM | DIASTOLIC BLOOD PRESSURE: 62 MMHG | BODY MASS INDEX: 27.11 KG/M2 | SYSTOLIC BLOOD PRESSURE: 121 MMHG | HEIGHT: 70 IN | WEIGHT: 189.4 LBS

## 2018-09-27 DIAGNOSIS — E11.22 CKD STAGE 4 DUE TO TYPE 2 DIABETES MELLITUS (HCC): Primary | ICD-10-CM

## 2018-09-27 DIAGNOSIS — I12.9 BENIGN HYPERTENSION WITH CKD (CHRONIC KIDNEY DISEASE) STAGE IV (HCC): ICD-10-CM

## 2018-09-27 DIAGNOSIS — E78.00 PURE HYPERCHOLESTEROLEMIA: ICD-10-CM

## 2018-09-27 DIAGNOSIS — N18.4 CKD STAGE 4 DUE TO TYPE 2 DIABETES MELLITUS (HCC): Primary | ICD-10-CM

## 2018-09-27 DIAGNOSIS — N18.4 BENIGN HYPERTENSION WITH CKD (CHRONIC KIDNEY DISEASE) STAGE IV (HCC): ICD-10-CM

## 2018-09-27 PROCEDURE — 99214 OFFICE O/P EST MOD 30 MIN: CPT | Performed by: INTERNAL MEDICINE

## 2018-09-27 NOTE — PROGRESS NOTES
OFFICE FOLLOW UP - Nephrology   Deandra Kennedy 80 y o  male MRN: [id number]       ASSESSMENT and PLAN:  Sharon Sands was seen today for follow-up  Diagnoses and all orders for this visit:    CKD stage 4 due to type 2 diabetes mellitus ([facility] [address] )  -     Renal function panel; Future  -     CBC; Future  -     Protein / creatinine ratio, urine; Future  -     PTH, intact; Future  -     Renal function panel; Future    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 (HCC)    Pure hypercholesterolemia        This is an 49-year-old gentleman for returns to the office for CKD follow-up  1   Chronic kidney disease stage 4, previous creatinine around 1 5-1 9 but noted that his kidney function over the last few months progressed most recent creatinine 2 2 with an estimated GFR of 25 with minimal proteinuria (most recent UPC 0 19 g)  Electrolytes in general are okay, progression of his kidney disease  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, atherosclerotic disease, aging and possible diabetes the noted he does not have significant proteinuria  I would like to see him back in the office in 6 months, given that his kidney function is declining I would like to follow labs in 3 months  Once again discussed about importance of avoiding NSAIDs  Okay to take Tylenol as needed for pain  Noted that blood pressure is currently well controlled, and his most recent is A1c 6 3%  Discussed with patient, recommend to assist to Kidney Smart classes  Discussed with Sharon Sands also about the possibility of dialysis in the future if his kidney function continues to decline, right now he is not 100% sure if he will want it or not if needed  2   Well controlled type 2 diabetes, most recent A1c 6 3%, patient is currently on metformin and glimepiride  Given his declining kidney function recommend to stop metformin now that his GFR is below 30  Okay to continue with the knee per ID    I will cc note to his family doctor  3   Hypertension, blood pressure currently well controlled, on amlodipine and lisinopril  4   Anemia of chronic kidney disease, most recent hemoglobin 10 9, will follow up CBC in 6 months  5   Mineral bone disease, PTH and phosphorus between normal limits  There are no Patient Instructions on file for this visit  HPI: Tiago Uriarte is a 80 y o  male who is here for Follow-up    Last time seen was in October 2017, today he returns for follow-up  Since our last visit, there has been no ER visits or hospitilizations  Patient currently has no complaints at this time and is feeling well  Patient denies any chest pain, shortness of breath and swelling  The last blood work was done on 09/14/2018, which we have reviewed together  Appetite is stable, denies any urinary problems, no nausea vomiting or diarrhea  He does not smoke  Denies NSAID use  ROS:   All the systems were reviewed and were negative except as documented on the HPI  Allergies: Patient has no known allergies      Medications:   Current Outpatient Prescriptions:     amLODIPine (NORVASC) 2 5 mg tablet, TAKE ONE TABLET BY MOUTH EVERY DAY, Disp: 90 tablet, Rfl: 3    Cholecalciferol (CVS VITAMIN D3) 1000 units capsule, Take 1 capsule by mouth daily, Disp: , Rfl:     CINNAMON PO, Take by mouth, Disp: , Rfl:     cyanocobalamin (VITAMIN B-12) 500 mcg tablet, Take 1 tablet by mouth daily, Disp: , Rfl:     glimepiride (AMARYL) 1 mg tablet, , Disp: , Rfl:     lisinopril (ZESTRIL) 20 mg tablet, TAKE ONE TABLET BY MOUTH TWICE A DAY, Disp: 180 tablet, Rfl: 3    metFORMIN (GLUCOPHAGE) 850 mg tablet, TAKE ONE (1) TABLET(S) TWICE DAILY WITH MORNING AND EVENING MEAL, Disp: 180 tablet, Rfl: 3    Omega-3 Fatty Acids (OMEGA-3 FISH OIL) 300 MG CAPS, Take 1 capsule by mouth daily, Disp: , Rfl:     PRECISION XTRA TEST STRIPS test strip, USE TO TEST BLOOD GLUCOSE ONCE DAILY, Disp: 100 each, Rfl: 2    simvastatin (ZOCOR) 20 mg tablet, TAKE ONE TABLET BY MOUTH EVERY DAY, Disp: 90 tablet, Rfl: 3    terazosin (HYTRIN) 1 mg capsule, TAKE ONE CAPSULE BY MOUTH AT BEDTIME, Disp: 90 capsule, Rfl: 3    Past Medical History:   Diagnosis Date    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)     Last Assessed:  5/19/17    Diabetes mellitus ([facility] [address] )     Hyperlipidemia     Hypertension     Osteoarthritis      Past Surgical History:   Procedure Laterality Date    APPENDECTOMY      COLONOSCOPY  2012    HEMORROIDECTOMY       Family History   Problem Relation Age of Onset    Diabetes Mother     Other Mother         Stroke syndrome    Diabetes Father     Emphysema Father       reports that he has quit smoking  He has never used smokeless tobacco  He reports that he drinks alcohol  He reports that he does not use drugs  Physical Exam:   Vitals:    09/27/18 1255   BP: 121/62   BP Location: Right arm   Patient Position: Sitting   Pulse: 68   Weight: 85 9 kg (189 lb 6 4 oz)   Height: 5' 10" (1 778 m)     Body mass index is 27 18 kg/m²      General: cooperative, in not acute distress  Eyes: conjunctivae pink, anicteric sclerae  ENT: lips and mucous membranes moist  Neck: supple, no JVD  Chest: clear breath sounds bilateral, no crackles, ronchus or wheezings  CVS: distinct S1 & S2, normal rate, regular rhythm  Abdomen: soft, non-tender, non-distended, normoactive bowel sounds  Extremities: no edema of both legs  Skin: no rash  Neuro: awake, alert, oriented      Lab Results:  Results for orders placed or performed in visit on 09/14/18   CBC and differential   Result Value Ref Range    WBC 5 96 4 31 - 10 16 Thousand/uL    RBC 3 54 (L) 3 88 - 5 62 Million/uL    Hemoglobin 10 9 (L) 12 0 - 17 0 g/dL    Hematocrit 34 4 (L) 36 5 - 49 3 %    MCV 97 82 - 98 fL    MCH 30 8 26 8 - 34 3 pg    MCHC 31 7 31 4 - 37 4 g/dL    RDW 13 3 11 6 - 15 1 %    MPV 10 5 8 9 - 12 7 fL    Platelets 309 537 - 098 Thousands/uL    nRBC 0 /100 WBCs    Neutrophils Relative 56 43 - 75 %    Immat GRANS % 0 0 - 2 %    Lymphocytes Relative 26 14 - 44 %    Monocytes Relative 10 4 - 12 %    Eosinophils Relative 7 (H) 0 - 6 %    Basophils Relative 1 0 - 1 %    Neutrophils Absolute 3 37 1 85 - 7 62 Thousands/µL    Immature Grans Absolute 0 01 0 00 - 0 20 Thousand/uL    Lymphocytes Absolute 1 54 0 60 - 4 47 Thousands/µL    Monocytes Absolute 0 58 0 17 - 1 22 Thousand/µL    Eosinophils Absolute 0 42 0 00 - 0 61 Thousand/µL    Basophils Absolute 0 04 0 00 - 0 10 Thousands/µL   PTH, intact   Result Value Ref Range    PTH 74 1 18 4 - 80 1 pg/mL   Renal function panel   Result Value Ref Range    Albumin 3 5 3 5 - 5 0 g/dL    Calcium 8 6 8 3 - 10 1 mg/dL    Phosphorus 2 7 2 3 - 4 1 mg/dL    BUN 31 (H) 5 - 25 mg/dL    Creatinine 2 24 (H) 0 60 - 1 30 mg/dL    Sodium 141 136 - 145 mmol/L    Potassium 4 8 3 5 - 5 3 mmol/L    Chloride 110 (H) 100 - 108 mmol/L    CO2 21 21 - 32 mmol/L    ANION GAP 10 4 - 13 mmol/L    eGFR 25 ml/min/1 73sq m    Glucose, Fasting 115 (H) 65 - 99 mg/dL   Protein / creatinine ratio, urine   Result Value Ref Range    Creatinine, Ur 146 0 mg/dL    Protein Urine Random 28 mg/dL    Prot/Creat Ratio, Ur 0 19 (H) 0 00 - 0 10   HEMOGLOBIN A1C W/ EAG ESTIMATION   Result Value Ref Range    Hemoglobin A1C 6 3 4 2 - 6 3 %     mg/dl               Portions of the record may have been created with voice recognition software  Occasional wrong word or "sound a like" substitutions may have occurred due to the inherent limitations of voice recognition software  Read the chart carefully and recognize, using context, where substitutions have occurred  If you have any questions, please contact the dictating provider

## 2018-09-27 NOTE — PATIENT INSTRUCTIONS
Recommend to stop taking metformin given that acute kidney function is decreased, please contact your family doctor for further recommendations  Okay to continue taking glimepiride for diabetes  Avoid NSAIDs (no ibuprofen, Motrin, Aleve, Advil, naproxen)  Okay to take Tylenol as needed for pain  I want you to have blood test in 6 months, if renal function remains stable I would like to see you back in the office in 6 months  Recommend to go to Kidney Smart class

## 2018-10-01 ENCOUNTER — HOSPITAL ENCOUNTER (OUTPATIENT)
Dept: NON INVASIVE DIAGNOSTICS | Facility: CLINIC | Age: 83
Discharge: HOME | End: 2018-10-01
Payer: MEDICARE

## 2018-10-01 DIAGNOSIS — I49.9 CARDIAC ARRHYTHMIA, UNSPECIFIED CARDIAC ARRHYTHMIA TYPE: ICD-10-CM

## 2018-10-01 PROCEDURE — 93225 XTRNL ECG REC<48 HRS REC: CPT

## 2018-10-01 PROCEDURE — 93226 XTRNL ECG REC<48 HR SCAN A/R: CPT

## 2018-10-05 PROCEDURE — 93227 XTRNL ECG REC<48 HR R&I: CPT | Performed by: INTERNAL MEDICINE

## 2018-10-15 ENCOUNTER — OFFICE VISIT (OUTPATIENT)
Dept: INTERNAL MEDICINE CLINIC | Facility: CLINIC | Age: 83
End: 2018-10-15
Payer: MEDICARE

## 2018-10-15 VITALS
WEIGHT: 192 LBS | HEART RATE: 80 BPM | DIASTOLIC BLOOD PRESSURE: 50 MMHG | HEIGHT: 70 IN | SYSTOLIC BLOOD PRESSURE: 136 MMHG | BODY MASS INDEX: 27.49 KG/M2 | OXYGEN SATURATION: 98 % | TEMPERATURE: 96.5 F

## 2018-10-15 DIAGNOSIS — E78.00 PURE HYPERCHOLESTEROLEMIA: ICD-10-CM

## 2018-10-15 DIAGNOSIS — N18.4 STAGE 4 CHRONIC KIDNEY DISEASE (HCC): Primary | ICD-10-CM

## 2018-10-15 DIAGNOSIS — I12.9 BENIGN HYPERTENSION WITH CKD (CHRONIC KIDNEY DISEASE) STAGE IV (HCC): ICD-10-CM

## 2018-10-15 DIAGNOSIS — N18.30 CHRONIC KIDNEY DISEASE, STAGE 3 (HCC): ICD-10-CM

## 2018-10-15 DIAGNOSIS — N40.0 ENLARGED PROSTATE WITHOUT LOWER URINARY TRACT SYMPTOMS (LUTS): ICD-10-CM

## 2018-10-15 DIAGNOSIS — N18.30 CONTROLLED TYPE 2 DIABETES MELLITUS WITH STAGE 3 CHRONIC KIDNEY DISEASE, WITHOUT LONG-TERM CURRENT USE OF INSULIN (HCC): ICD-10-CM

## 2018-10-15 DIAGNOSIS — E11.22 CONTROLLED TYPE 2 DIABETES MELLITUS WITH STAGE 3 CHRONIC KIDNEY DISEASE, WITHOUT LONG-TERM CURRENT USE OF INSULIN (HCC): ICD-10-CM

## 2018-10-15 DIAGNOSIS — Z00.00 HEALTHCARE MAINTENANCE: ICD-10-CM

## 2018-10-15 DIAGNOSIS — N28.9 ABNORMAL KIDNEY FUNCTION: ICD-10-CM

## 2018-10-15 DIAGNOSIS — N18.4 BENIGN HYPERTENSION WITH CKD (CHRONIC KIDNEY DISEASE) STAGE IV (HCC): ICD-10-CM

## 2018-10-15 DIAGNOSIS — R00.2 PALPITATIONS: ICD-10-CM

## 2018-10-15 PROCEDURE — 99214 OFFICE O/P EST MOD 30 MIN: CPT | Performed by: INTERNAL MEDICINE

## 2018-10-15 NOTE — ASSESSMENT & PLAN NOTE
Patient has a history of hypertension with chronic kidney disease stage 4  A again we will be sending the patient for an echocardiogram to look at his LV function    This is more secondary to his arrhythmia and history of hypertension rather than concerned about ischemia

## 2018-10-15 NOTE — PROGRESS NOTES
Assessment/Plan:    Palpitations  Patient is here today to discuss the results of recent Holter monitor testing  Patient was in a normal sinus rhythm throughout the day  He had frequent PVCs, some PACs  Patient with his rhythm today does have some ectopy  Patient is willing to stop drinking caffeinated beverages and we will be checking an echocardiogram for completeness  Would consider placing the patient on a low dose metoprolol if there are no abnormalities found on the echo  Patient has no history of bradycardia  The other consideration is cardiac workup evaluation which could be extensive  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 (HCC)  Patient has a history of hypertension with chronic kidney disease stage 4  A again we will be sending the patient for an echocardiogram to look at his LV function  This is more secondary to his arrhythmia and history of hypertension rather than concerned about ischemia    Controlled diabetes mellitus ([address] )  Lab Results   Component Value Date    HGBA1C 6 3 09/14/2018       No results for input(s): POCGLU in the last 72 hours  Blood Sugar Average: Last 72 hrs:   we continue to monitor the patient's blood sugar readings  Patient has been under relatively good control  Again we reinforced to the patient the importance of watching his diet and limiting his intake of concentrated sweets and simple carbohydrates       Diagnoses and all orders for this visit:    Stage 4 chronic kidney disease ([address] )    Chronic kidney disease, stage 3 ([address] )    Healthcare maintenance    Enlarged prostate without lower urinary tract symptoms (luts)    Pure hypercholesterolemia    Palpitations  -     Echo complete with contrast if indicated;  Future    Abnormal kidney function    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 (HCC)    Controlled type 2 diabetes mellitus with stage 3 chronic kidney disease, without long-term current use of insulin ([address] ) Subjective:      Patient ID: Emerald Pierson is a 80 y o  male  Patient is an 42-year-old male with a history of multiple medical problems  Patient is here today to discuss the results of recent Holter monitor testing with the patient complaining of frequent palpitations of the heart  The following portions of the patient's history were reviewed and updated as appropriate:   He  has a past medical history of 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); Diabetes mellitus ([facility] ); Hyperlipidemia; Hypertension; and Osteoarthritis  He   Patient Active Problem List    Diagnosis Date Noted    Stage 4 chronic kidney disease ([facility] ) 09/27/2018    CKD stage 4 due to type 2 diabetes mellitus ([place] [address] ) 09/27/2018    Palpitations 09/25/2018    Healthcare maintenance 05/25/2018    Enlarged prostate without lower urinary tract symptoms (luts) 03/07/2013    Controlled diabetes mellitus ([facility] ) 08/09/2012    Hyperlipidemia 08/09/2012    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 ([place] [address] ) 08/09/2012     He  has a past surgical history that includes Colonoscopy (2012); Hemorroidectomy; and Appendectomy  His family history includes Diabetes in his father and mother; Emphysema in his father; Other in his mother  He  reports that he has quit smoking  He has never used smokeless tobacco  He reports that he drinks alcohol  He reports that he does not use drugs    Current Outpatient Prescriptions   Medication Sig Dispense Refill    amLODIPine (NORVASC) 2 5 mg tablet TAKE ONE TABLET BY MOUTH EVERY DAY 90 tablet 3    Cholecalciferol ([employer] VITAMIN D3) 1000 units capsule Take 1 capsule by mouth daily      CINNAMON PO Take by mouth      cyanocobalamin (VITAMIN B-12) 500 mcg tablet Take 1 tablet by mouth daily      glimepiride (AMARYL) 1 mg tablet       lisinopril (ZESTRIL) 20 mg tablet TAKE ONE TABLET BY MOUTH TWICE A  tablet 3    metFORMIN (GLUCOPHAGE) 850 mg tablet TAKE ONE (1) TABLET(S) TWICE DAILY WITH MORNING AND EVENING MEAL 180 tablet 3    Omega-3 Fatty Acids (OMEGA-3 FISH OIL) 300 MG CAPS Take 1 capsule by mouth daily      PRECISION XTRA TEST STRIPS test strip USE TO TEST BLOOD GLUCOSE ONCE DAILY 100 each 2    simvastatin (ZOCOR) 20 mg tablet TAKE ONE TABLET BY MOUTH EVERY DAY 90 tablet 3    terazosin (HYTRIN) 1 mg capsule TAKE ONE CAPSULE BY MOUTH AT BEDTIME 90 capsule 3     No current facility-administered medications for this visit  Current Outpatient Prescriptions on File Prior to Visit   Medication Sig    amLODIPine (NORVASC) 2 5 mg tablet TAKE ONE TABLET BY MOUTH EVERY DAY    Cholecalciferol ([employer]S VITAMIN D3) 1000 units capsule Take 1 capsule by mouth daily    CINNAMON PO Take by mouth    cyanocobalamin (VITAMIN B-12) 500 mcg tablet Take 1 tablet by mouth daily    glimepiride (AMARYL) 1 mg tablet     lisinopril (ZESTRIL) 20 mg tablet TAKE ONE TABLET BY MOUTH TWICE A DAY    metFORMIN (GLUCOPHAGE) 850 mg tablet TAKE ONE (1) TABLET(S) TWICE DAILY WITH MORNING AND EVENING MEAL    Omega-3 Fatty Acids (OMEGA-3 FISH OIL) 300 MG CAPS Take 1 capsule by mouth daily    PRECISION XTRA TEST STRIPS test strip USE TO TEST BLOOD GLUCOSE ONCE DAILY    simvastatin (ZOCOR) 20 mg tablet TAKE ONE TABLET BY MOUTH EVERY DAY    terazosin (HYTRIN) 1 mg capsule TAKE ONE CAPSULE BY MOUTH AT BEDTIME     No current facility-administered medications on file prior to visit  He has No Known Allergies       Review of Systems   Constitutional: Negative  HENT: Negative  Eyes: Negative  Respiratory: Negative  Cardiovascular: Positive for palpitations  Negative for chest pain and leg swelling  Gastrointestinal: Negative  Endocrine: Negative  Musculoskeletal: Positive for arthralgias (Some minor diffuse arthritic aches and pains but nothing new and or disabling)  Negative for back pain, gait problem, joint swelling, myalgias and neck pain  Allergic/Immunologic: Negative  Neurological: Negative  Hematological: Negative  Psychiatric/Behavioral: Negative  Objective:      /50   Pulse 80   Temp (!) 96 5 °F (35 8 °C)   Ht 5' 10" (1 778 m)   Wt 87 1 kg (192 lb)   SpO2 98%   BMI 27 55 kg/m²          Physical Exam   Constitutional: He is oriented to person, place, and time  He appears well-developed and well-nourished  No distress  Pleasant, mildly overweight 59-year-old male who is awake alert no acute distress oriented x3   HENT:   Head: Normocephalic and atraumatic  Right Ear: External ear normal    Left Ear: External ear normal    Nose: Nose normal    Mouth/Throat: Oropharynx is clear and moist  No oropharyngeal exudate  Eyes: Pupils are equal, round, and reactive to light  Conjunctivae and EOM are normal  Right eye exhibits no discharge  Left eye exhibits no discharge  No scleral icterus  Neck: Normal range of motion  Neck supple  No JVD present  No tracheal deviation present  No thyromegaly present  Cardiovascular: Normal rate, normal heart sounds and intact distal pulses  Exam reveals no gallop and no friction rub  On auscultation patient has a normal rate, frequent ectopy every 3rd to 4th beat   Pulmonary/Chest: Effort normal and breath sounds normal  No stridor  No respiratory distress  He has no wheezes  He has no rales  He exhibits no tenderness  Abdominal: Soft  Bowel sounds are normal    Musculoskeletal: Normal range of motion  He exhibits deformity  He exhibits no edema or tenderness  Lymphadenopathy:     He has no cervical adenopathy  Neurological: He is alert and oriented to person, place, and time  No cranial nerve deficit  He exhibits normal muscle tone  Coordination normal    Skin: Skin is warm and dry  He is not diaphoretic  Psychiatric: He has a normal mood and affect  His behavior is normal  Judgment and thought content normal    Nursing note and vitals reviewed

## 2018-10-15 NOTE — ASSESSMENT & PLAN NOTE
Patient is here today to discuss the results of recent Holter monitor testing  Patient was in a normal sinus rhythm throughout the day  He had frequent PVCs, some PACs  Patient with his rhythm today does have some ectopy  Patient is willing to stop drinking caffeinated beverages and we will be checking an echocardiogram for completeness  Would consider placing the patient on a low dose metoprolol if there are no abnormalities found on the echo  Patient has no history of bradycardia  The other consideration is cardiac workup evaluation which could be extensive

## 2018-10-15 NOTE — ASSESSMENT & PLAN NOTE
Lab Results   Component Value Date    HGBA1C 6 3 09/14/2018       No results for input(s): POCGLU in the last 72 hours  Blood Sugar Average: Last 72 hrs:   we continue to monitor the patient's blood sugar readings  Patient has been under relatively good control    Again we reinforced to the patient the importance of watching his diet and limiting his intake of concentrated sweets and simple carbohydrates

## 2018-10-22 ENCOUNTER — HOSPITAL ENCOUNTER (OUTPATIENT)
Dept: NON INVASIVE DIAGNOSTICS | Facility: HOSPITAL | Age: 83
Discharge: HOME | End: 2018-10-22
Payer: MEDICARE

## 2018-10-22 DIAGNOSIS — R00.2 PALPITATIONS: ICD-10-CM

## 2018-10-22 PROCEDURE — 93306 TTE W/DOPPLER COMPLETE: CPT

## 2018-10-22 PROCEDURE — 93306 TTE W/DOPPLER COMPLETE: CPT | Performed by: INTERNAL MEDICINE

## 2018-11-02 ENCOUNTER — OFFICE VISIT (OUTPATIENT)
Dept: INTERNAL MEDICINE CLINIC | Facility: CLINIC | Age: 83
End: 2018-11-02
Payer: MEDICARE

## 2018-11-02 VITALS
WEIGHT: 195 LBS | SYSTOLIC BLOOD PRESSURE: 160 MMHG | BODY MASS INDEX: 27.92 KG/M2 | HEART RATE: 68 BPM | DIASTOLIC BLOOD PRESSURE: 56 MMHG | HEIGHT: 70 IN | OXYGEN SATURATION: 98 % | TEMPERATURE: 97 F

## 2018-11-02 DIAGNOSIS — I12.9 BENIGN HYPERTENSION WITH CKD (CHRONIC KIDNEY DISEASE) STAGE IV (HCC): ICD-10-CM

## 2018-11-02 DIAGNOSIS — N28.9 ABNORMAL KIDNEY FUNCTION: ICD-10-CM

## 2018-11-02 DIAGNOSIS — N40.0 ENLARGED PROSTATE WITHOUT LOWER URINARY TRACT SYMPTOMS (LUTS): ICD-10-CM

## 2018-11-02 DIAGNOSIS — R00.2 PALPITATIONS: ICD-10-CM

## 2018-11-02 DIAGNOSIS — N18.4 BENIGN HYPERTENSION WITH CKD (CHRONIC KIDNEY DISEASE) STAGE IV (HCC): ICD-10-CM

## 2018-11-02 DIAGNOSIS — E11.22 CKD STAGE 4 DUE TO TYPE 2 DIABETES MELLITUS (HCC): ICD-10-CM

## 2018-11-02 DIAGNOSIS — N18.4 STAGE 4 CHRONIC KIDNEY DISEASE (HCC): ICD-10-CM

## 2018-11-02 DIAGNOSIS — Z00.00 HEALTHCARE MAINTENANCE: ICD-10-CM

## 2018-11-02 DIAGNOSIS — E78.00 PURE HYPERCHOLESTEROLEMIA: ICD-10-CM

## 2018-11-02 DIAGNOSIS — E11.22 CONTROLLED TYPE 2 DIABETES MELLITUS WITH STAGE 3 CHRONIC KIDNEY DISEASE, WITHOUT LONG-TERM CURRENT USE OF INSULIN (HCC): Primary | ICD-10-CM

## 2018-11-02 DIAGNOSIS — N18.4 CKD STAGE 4 DUE TO TYPE 2 DIABETES MELLITUS (HCC): ICD-10-CM

## 2018-11-02 DIAGNOSIS — N18.30 CONTROLLED TYPE 2 DIABETES MELLITUS WITH STAGE 3 CHRONIC KIDNEY DISEASE, WITHOUT LONG-TERM CURRENT USE OF INSULIN (HCC): Primary | ICD-10-CM

## 2018-11-02 DIAGNOSIS — N18.30 CHRONIC KIDNEY DISEASE, STAGE 3 (HCC): ICD-10-CM

## 2018-11-02 PROCEDURE — 99214 OFFICE O/P EST MOD 30 MIN: CPT | Performed by: INTERNAL MEDICINE

## 2018-11-02 NOTE — ASSESSMENT & PLAN NOTE
Patient has just undergone an echocardiogram to look at the patient's cardiac function  The reason for this is patient is been having problems recently with increasing palpitations of the heart  This was verified by Holter monitor testing  Were happy to report to the patient that he is cardiac function could be considered to be normal for his age with no major abnormalities seen either valvular disease or with left ventricular function  Interestingly patient states that he has had less palpitations to the heart more recently  We did discuss with the patient placing him on a low-dose beta-blocker and he states he prefers to monitor his heart and the palpitations on his own

## 2018-11-02 NOTE — ASSESSMENT & PLAN NOTE
Lab Results   Component Value Date    HGBA1C 6 3 09/14/2018       No results for input(s): POCGLU in the last 72 hours  Blood Sugar Average: Last 72 hrs:   patient's sugars have shown excellent control with present treatment  We did discuss again with the patient today reducing and stopping eventually his metformin because of his deterioration in his renal function  Patient questions this because he states his sugars have been under such excellent control  His finally agreeable to reducing his metformin to once a day instead of twice a day and to have a hemoglobin A1c checked with his next visit and consider stopping the medication altogether  Patient continues to monitor his blood sugars at home and states he is trying the best he can to watch his diet

## 2018-11-02 NOTE — PROGRESS NOTES
Assessment/Plan:      Diagnoses and all orders for this visit:    Controlled type 2 diabetes mellitus with stage 3 chronic kidney disease, without long-term current use of insulin ([address] )    CKD stage 4 due to type 2 diabetes mellitus ([address] )    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 (HCC)    Stage 4 chronic kidney disease (HCC)    Chronic kidney disease, stage 3 ([address] )    Healthcare maintenance    Enlarged prostate without lower urinary tract symptoms (luts)    Pure hypercholesterolemia    Palpitations    Abnormal kidney function          Subjective:     Patient ID: Toyin Steiner is a 80 y o  male  Patient is an 51-year-old male with a history of multiple medical problems  Patient is here today discuss the results of a recent echocardiogram that was obtained in order to look at patient's cardiac function        Review of Systems   Constitutional: Negative  HENT: Negative  Eyes: Negative  Respiratory: Negative  Cardiovascular: Positive for palpitations (Patient states he occasionally still has some palpitations but not as frequently)  Negative for chest pain and leg swelling  Gastrointestinal: Negative  Endocrine: Negative  Genitourinary: Negative  Musculoskeletal: Positive for arthralgias ( some minor arthritic aches and pains but nothing new or disabling at this time)  Negative for back pain, gait problem, joint swelling, myalgias, neck pain and neck stiffness  Skin: Negative  Allergic/Immunologic: Negative  Neurological: Negative  Hematological: Negative  Psychiatric/Behavioral: Negative  Objective:     Physical Exam   Constitutional: He is oriented to person, place, and time  He appears well-developed and well-nourished  No distress  Pleasant, cheerful 51-year-old male who is awake alert no acute distress and oriented times   HENT:   Head: Normocephalic and atraumatic     Right Ear: External ear normal    Left Ear: External ear normal    Nose: Nose normal    Mouth/Throat: Oropharynx is clear and moist  No oropharyngeal exudate  Eyes: Pupils are equal, round, and reactive to light  Conjunctivae and EOM are normal  Right eye exhibits no discharge  Left eye exhibits no discharge  No scleral icterus  Neck: Normal range of motion  Neck supple  No JVD present  No tracheal deviation present  No thyromegaly present  Cardiovascular: Normal rate, normal heart sounds and intact distal pulses  Exam reveals no gallop and no friction rub  No murmur heard  A regular rhythm with occasional palpitations, ectopy   Pulmonary/Chest: Breath sounds normal  No stridor  No respiratory distress  He has no wheezes  He has no rales  He exhibits no tenderness  Abdominal: Soft  Bowel sounds are normal    Obese soft nontender   Lymphadenopathy:     He has no cervical adenopathy  Neurological: He is alert and oriented to person, place, and time  Skin: Skin is warm and dry  He is not diaphoretic  Psychiatric: He has a normal mood and affect  His behavior is normal  Judgment and thought content normal    Nursing note and vitals reviewed

## 2018-11-02 NOTE — ASSESSMENT & PLAN NOTE
The patient's kidney function has been stable  Patient has an order to check on his renal function prior to his next visit  Patient continues to follow-up with Nephrology  Patient was told again the importance of hopefully stopping his metformin in the future secondary to his chronic kidney disease

## 2018-11-02 NOTE — ASSESSMENT & PLAN NOTE
Lab Results   Component Value Date    HGBA1C 6 3 09/14/2018       No results for input(s): POCGLU in the last 72 hours      Blood Sugar Average: Last 72 hrs:

## 2018-11-03 DIAGNOSIS — N18.30 TYPE 2 DIABETES MELLITUS WITH STAGE 3 CHRONIC KIDNEY DISEASE, WITHOUT LONG-TERM CURRENT USE OF INSULIN (HCC): Primary | ICD-10-CM

## 2018-11-03 DIAGNOSIS — E11.22 TYPE 2 DIABETES MELLITUS WITH STAGE 3 CHRONIC KIDNEY DISEASE, WITHOUT LONG-TERM CURRENT USE OF INSULIN (HCC): Primary | ICD-10-CM

## 2018-11-05 RX ORDER — GLIMEPIRIDE 1 MG/1
TABLET ORAL
Qty: 90 TABLET | Refills: 3 | Status: SHIPPED | OUTPATIENT
Start: 2018-11-05 | End: 2019-04-25

## 2018-12-06 LAB
LEFT EYE DIABETIC RETINOPATHY: NORMAL
RIGHT EYE DIABETIC RETINOPATHY: NORMAL

## 2018-12-28 ENCOUNTER — APPOINTMENT (OUTPATIENT)
Dept: LAB | Facility: CLINIC | Age: 83
End: 2018-12-28
Payer: MEDICARE

## 2018-12-28 DIAGNOSIS — E11.22 CKD STAGE 4 DUE TO TYPE 2 DIABETES MELLITUS (HCC): ICD-10-CM

## 2018-12-28 DIAGNOSIS — N18.4 CKD STAGE 4 DUE TO TYPE 2 DIABETES MELLITUS (HCC): ICD-10-CM

## 2018-12-28 LAB
ALBUMIN SERPL BCP-MCNC: 3.8 G/DL (ref 3.5–5)
ANION GAP SERPL CALCULATED.3IONS-SCNC: 7 MMOL/L (ref 4–13)
BUN SERPL-MCNC: 35 MG/DL (ref 5–25)
CALCIUM SERPL-MCNC: 9 MG/DL (ref 8.3–10.1)
CHLORIDE SERPL-SCNC: 108 MMOL/L (ref 100–108)
CO2 SERPL-SCNC: 25 MMOL/L (ref 21–32)
CREAT SERPL-MCNC: 2.23 MG/DL (ref 0.6–1.3)
GFR SERPL CREATININE-BSD FRML MDRD: 25 ML/MIN/1.73SQ M
GLUCOSE P FAST SERPL-MCNC: 129 MG/DL (ref 65–99)
PHOSPHATE SERPL-MCNC: 3.3 MG/DL (ref 2.3–4.1)
POTASSIUM SERPL-SCNC: 4.3 MMOL/L (ref 3.5–5.3)
SODIUM SERPL-SCNC: 140 MMOL/L (ref 136–145)

## 2018-12-28 PROCEDURE — 36415 COLL VENOUS BLD VENIPUNCTURE: CPT

## 2018-12-28 PROCEDURE — 80069 RENAL FUNCTION PANEL: CPT

## 2019-01-02 ENCOUNTER — TELEPHONE (OUTPATIENT)
Dept: NEPHROLOGY | Facility: CLINIC | Age: 84
End: 2019-01-02

## 2019-01-02 NOTE — TELEPHONE ENCOUNTER
Repeat blood test reviewed showing and stable kidney function with serum creatinine of 2 2  I have called and spoke with patient, renal function fairly stable, I would like to see him back in the office as instructed in approximately 3 months with repeat labs (6 months from last office visit)

## 2019-01-18 ENCOUNTER — APPOINTMENT (OUTPATIENT)
Dept: LAB | Facility: CLINIC | Age: 84
End: 2019-01-18
Payer: MEDICARE

## 2019-01-18 DIAGNOSIS — N18.30 CONTROLLED TYPE 2 DIABETES MELLITUS WITH STAGE 3 CHRONIC KIDNEY DISEASE, WITHOUT LONG-TERM CURRENT USE OF INSULIN (HCC): ICD-10-CM

## 2019-01-18 DIAGNOSIS — I10 ESSENTIAL HYPERTENSION: ICD-10-CM

## 2019-01-18 DIAGNOSIS — D63.8 ANEMIA IN OTHER CHRONIC DISEASES CLASSIFIED ELSEWHERE: ICD-10-CM

## 2019-01-18 DIAGNOSIS — E11.22 CONTROLLED TYPE 2 DIABETES MELLITUS WITH STAGE 3 CHRONIC KIDNEY DISEASE, WITHOUT LONG-TERM CURRENT USE OF INSULIN (HCC): ICD-10-CM

## 2019-01-18 DIAGNOSIS — N18.30 CHRONIC KIDNEY DISEASE, STAGE 3 (HCC): ICD-10-CM

## 2019-01-18 LAB
ANION GAP SERPL CALCULATED.3IONS-SCNC: 7 MMOL/L (ref 4–13)
BASOPHILS # BLD AUTO: 0.04 THOUSANDS/ΜL (ref 0–0.1)
BASOPHILS NFR BLD AUTO: 1 % (ref 0–1)
BUN SERPL-MCNC: 35 MG/DL (ref 5–25)
CALCIUM SERPL-MCNC: 8.7 MG/DL (ref 8.3–10.1)
CHLORIDE SERPL-SCNC: 106 MMOL/L (ref 100–108)
CO2 SERPL-SCNC: 25 MMOL/L (ref 21–32)
CREAT SERPL-MCNC: 2.19 MG/DL (ref 0.6–1.3)
EOSINOPHIL # BLD AUTO: 0.53 THOUSAND/ΜL (ref 0–0.61)
EOSINOPHIL NFR BLD AUTO: 8 % (ref 0–6)
ERYTHROCYTE [DISTWIDTH] IN BLOOD BY AUTOMATED COUNT: 13 % (ref 11.6–15.1)
EST. AVERAGE GLUCOSE BLD GHB EST-MCNC: 160 MG/DL
GFR SERPL CREATININE-BSD FRML MDRD: 26 ML/MIN/1.73SQ M
GLUCOSE P FAST SERPL-MCNC: 141 MG/DL (ref 65–99)
HBA1C MFR BLD: 7.2 % (ref 4.2–6.3)
HCT VFR BLD AUTO: 35 % (ref 36.5–49.3)
HGB BLD-MCNC: 11.3 G/DL (ref 12–17)
IMM GRANULOCYTES # BLD AUTO: 0.02 THOUSAND/UL (ref 0–0.2)
IMM GRANULOCYTES NFR BLD AUTO: 0 % (ref 0–2)
LYMPHOCYTES # BLD AUTO: 2.02 THOUSANDS/ΜL (ref 0.6–4.47)
LYMPHOCYTES NFR BLD AUTO: 30 % (ref 14–44)
MCH RBC QN AUTO: 30.5 PG (ref 26.8–34.3)
MCHC RBC AUTO-ENTMCNC: 32.3 G/DL (ref 31.4–37.4)
MCV RBC AUTO: 95 FL (ref 82–98)
MONOCYTES # BLD AUTO: 0.69 THOUSAND/ΜL (ref 0.17–1.22)
MONOCYTES NFR BLD AUTO: 10 % (ref 4–12)
NEUTROPHILS # BLD AUTO: 3.37 THOUSANDS/ΜL (ref 1.85–7.62)
NEUTS SEG NFR BLD AUTO: 51 % (ref 43–75)
NRBC BLD AUTO-RTO: 0 /100 WBCS
PLATELET # BLD AUTO: 173 THOUSANDS/UL (ref 149–390)
PMV BLD AUTO: 9.5 FL (ref 8.9–12.7)
POTASSIUM SERPL-SCNC: 4.2 MMOL/L (ref 3.5–5.3)
RBC # BLD AUTO: 3.7 MILLION/UL (ref 3.88–5.62)
SODIUM SERPL-SCNC: 138 MMOL/L (ref 136–145)
WBC # BLD AUTO: 6.67 THOUSAND/UL (ref 4.31–10.16)

## 2019-01-18 PROCEDURE — 80048 BASIC METABOLIC PNL TOTAL CA: CPT

## 2019-01-18 PROCEDURE — 85025 COMPLETE CBC W/AUTO DIFF WBC: CPT

## 2019-01-18 PROCEDURE — 36415 COLL VENOUS BLD VENIPUNCTURE: CPT

## 2019-01-18 PROCEDURE — 83036 HEMOGLOBIN GLYCOSYLATED A1C: CPT

## 2019-01-25 ENCOUNTER — OFFICE VISIT (OUTPATIENT)
Dept: INTERNAL MEDICINE CLINIC | Facility: CLINIC | Age: 84
End: 2019-01-25
Payer: MEDICARE

## 2019-01-25 VITALS
BODY MASS INDEX: 27.92 KG/M2 | DIASTOLIC BLOOD PRESSURE: 74 MMHG | WEIGHT: 195 LBS | HEART RATE: 68 BPM | HEIGHT: 70 IN | SYSTOLIC BLOOD PRESSURE: 162 MMHG | OXYGEN SATURATION: 99 % | TEMPERATURE: 97.6 F | RESPIRATION RATE: 14 BRPM

## 2019-01-25 DIAGNOSIS — E11.22 CONTROLLED TYPE 2 DIABETES MELLITUS WITH STAGE 3 CHRONIC KIDNEY DISEASE, WITHOUT LONG-TERM CURRENT USE OF INSULIN (HCC): ICD-10-CM

## 2019-01-25 DIAGNOSIS — E78.00 PURE HYPERCHOLESTEROLEMIA: ICD-10-CM

## 2019-01-25 DIAGNOSIS — N18.4 BENIGN HYPERTENSION WITH CKD (CHRONIC KIDNEY DISEASE) STAGE IV (HCC): ICD-10-CM

## 2019-01-25 DIAGNOSIS — N18.4 STAGE 4 CHRONIC KIDNEY DISEASE (HCC): ICD-10-CM

## 2019-01-25 DIAGNOSIS — E11.22 CKD STAGE 4 DUE TO TYPE 2 DIABETES MELLITUS (HCC): Primary | ICD-10-CM

## 2019-01-25 DIAGNOSIS — N18.30 CONTROLLED TYPE 2 DIABETES MELLITUS WITH STAGE 3 CHRONIC KIDNEY DISEASE, WITHOUT LONG-TERM CURRENT USE OF INSULIN (HCC): ICD-10-CM

## 2019-01-25 DIAGNOSIS — E66.3 OVERWEIGHT (BMI 25.0-29.9): ICD-10-CM

## 2019-01-25 DIAGNOSIS — N18.4 CKD STAGE 4 DUE TO TYPE 2 DIABETES MELLITUS (HCC): Primary | ICD-10-CM

## 2019-01-25 DIAGNOSIS — I12.9 BENIGN HYPERTENSION WITH CKD (CHRONIC KIDNEY DISEASE) STAGE IV (HCC): ICD-10-CM

## 2019-01-25 PROCEDURE — 99214 OFFICE O/P EST MOD 30 MIN: CPT | Performed by: INTERNAL MEDICINE

## 2019-01-25 RX ORDER — AMLODIPINE BESYLATE 5 MG/1
5 TABLET ORAL DAILY
Qty: 90 TABLET | Refills: 3 | Status: SHIPPED | OUTPATIENT
Start: 2019-01-25 | End: 2020-02-27

## 2019-01-25 NOTE — PROGRESS NOTES
Assessment/Plan:    Controlled diabetes mellitus ([facility] )  Lab Results   Component Value Date    HGBA1C 7 2 (H) 01/18/2019       No results for input(s): POCGLU in the last 72 hours  Blood Sugar Average: Last 72 hrs:   patient's hemoglobin A1c is now 7 2  Patient was reassured that this point time is acceptable especially considering his advanced age  I did state to the patient the importance of still watching his diet, eliminating concentrated sweets and simple carbohydrates from his diet and trying his best to lose some weight  Patient understands  We will check a fasting blood sugar, hemoglobin A1c with his next visit  There is no indication to change her modify his treatment at this point time he did decrease his metformin dose    CKD stage 4 due to type 2 diabetes mellitus ([facility] )  Lab Results   Component Value Date    HGBA1C 7 2 (H) 01/18/2019       No results for input(s): POCGLU in the last 72 hours  Blood Sugar Average: Last 72 hrs:   recent studies looking at his kidney function show stability  Patient will continue surveillance  He does follow up with his nephrologist   Was told again the importance of watching his sugar and keeping it under control and the hopes of preventing further progression of his kidney disease  Hyperlipidemia  Patient remains on simvastatin 20 mg a day  He states that he was not perfect as far as his diet was concerned during the holidays  We did discuss again the importance of watching his intake of fats and cholesterol  [place] Overweight (BMI 25 0-29  9)  With the patient's BMI he is considered overweight  We did discuss with the patient today the importance of watching his caloric intake and working to reduce his weight  Patient is somewhat limited with his exercise capacity because of his age  We will continue to monitor this    At this point I would be more concerned with weight loss rather than not being able to lose weight       Diagnoses and all orders for this visit:    CKD stage 4 due to type 2 diabetes mellitus ([facility])  -     amLODIPine (NORVASC) 5 mg tablet; Take 1 tablet (5 mg total) by mouth daily  -     Basic metabolic panel; Future    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 ([facility])  -     amLODIPine (NORVASC) 5 mg tablet; Take 1 tablet (5 mg total) by mouth daily    Stage 4 chronic kidney disease ([facility] )    Controlled type 2 diabetes mellitus with stage 3 chronic kidney disease, without long-term current use of insulin ([facility])  -     Hemoglobin A1C; Future    Pure hypercholesterolemia    Overweight (BMI 25 0-29  9)          Subjective:      Patient ID: Piotr Blanc is a 80 y o  male  HPI    The following portions of the patient's history were reviewed and updated as appropriate:   He  has a past medical history of 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); Diabetes mellitus ([facility] ); Hyperlipidemia; Hypertension; and Osteoarthritis  He   Patient Active Problem List    Diagnosis Date Noted    Overweight (BMI 25 0-29 9) 01/25/2019    Stage 4 chronic kidney disease ([facility] ) 09/27/2018    CKD stage 4 due to type 2 diabetes mellitus ([place] [address] ) 09/27/2018    Palpitations 09/25/2018    Healthcare maintenance 05/25/2018    Enlarged prostate without lower urinary tract symptoms (luts) 03/07/2013    Controlled diabetes mellitus ([facility] ) 08/09/2012    Hyperlipidemia 08/09/2012    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 ([place] [address] ) 08/09/2012     He  has a past surgical history that includes Colonoscopy (2012); Hemorroidectomy; and Appendectomy  His family history includes Diabetes in his father and mother; Emphysema in his father; Other in his mother  He  reports that he has quit smoking  He has never used smokeless tobacco  He reports that he drinks alcohol  He reports that he does not use drugs    Current Outpatient Prescriptions   Medication Sig Dispense Refill    Cholecalciferol ([employer] VITAMIN D3) 1000 units capsule Take 1 capsule by mouth daily      CINNAMON PO Take by mouth      cyanocobalamin (VITAMIN B-12) 500 mcg tablet Take 1 tablet by mouth daily      glimepiride (AMARYL) 1 mg tablet TAKE ONE TABLET BY MOUTH EVERY DAY 90 tablet 3    lisinopril (ZESTRIL) 20 mg tablet TAKE ONE TABLET BY MOUTH TWICE A  tablet 3    metFORMIN (GLUCOPHAGE) 850 mg tablet TAKE ONE (1) TABLET(S) TWICE DAILY WITH MORNING AND EVENING MEAL 180 tablet 3    Omega-3 Fatty Acids (OMEGA-3 FISH OIL) 300 MG CAPS Take 1 capsule by mouth daily      PRECISION XTRA TEST STRIPS test strip USE TO TEST BLOOD GLUCOSE ONCE DAILY 100 each 2    simvastatin (ZOCOR) 20 mg tablet TAKE ONE TABLET BY MOUTH EVERY DAY 90 tablet 3    terazosin (HYTRIN) 1 mg capsule TAKE ONE CAPSULE BY MOUTH AT BEDTIME 90 capsule 3    amLODIPine (NORVASC) 5 mg tablet Take 1 tablet (5 mg total) by mouth daily 90 tablet 3     No current facility-administered medications for this visit  Current Outpatient Prescriptions on File Prior to Visit   Medication Sig    Cholecalciferol ([employer]S VITAMIN D3) 1000 units capsule Take 1 capsule by mouth daily    CINNAMON PO Take by mouth    cyanocobalamin (VITAMIN B-12) 500 mcg tablet Take 1 tablet by mouth daily    glimepiride (AMARYL) 1 mg tablet TAKE ONE TABLET BY MOUTH EVERY DAY    lisinopril (ZESTRIL) 20 mg tablet TAKE ONE TABLET BY MOUTH TWICE A DAY    metFORMIN (GLUCOPHAGE) 850 mg tablet TAKE ONE (1) TABLET(S) TWICE DAILY WITH MORNING AND EVENING MEAL    Omega-3 Fatty Acids (OMEGA-3 FISH OIL) 300 MG CAPS Take 1 capsule by mouth daily    PRECISION XTRA TEST STRIPS test strip USE TO TEST BLOOD GLUCOSE ONCE DAILY    simvastatin (ZOCOR) 20 mg tablet TAKE ONE TABLET BY MOUTH EVERY DAY    terazosin (HYTRIN) 1 mg capsule TAKE ONE CAPSULE BY MOUTH AT BEDTIME    [DISCONTINUED] amLODIPine (NORVASC) 2 5 mg tablet TAKE ONE TABLET BY MOUTH EVERY DAY     No current facility-administered medications on file prior to visit        He has No Known Allergies       Review of Systems   Constitutional: Negative  HENT: Negative  Eyes: Positive for visual disturbance (Chronic visual changes and patient sees an ophthalmologist yearly, retinal specialist twice a year)  Negative for photophobia, pain, discharge, redness and itching  Respiratory: Negative  Cardiovascular: Positive for palpitations  Negative for chest pain and leg swelling  Gastrointestinal: Negative  Endocrine: Negative  Genitourinary: Negative  Musculoskeletal: Positive for arthralgias ( some minor diffuse arthritic aches and pains but nothing new or disabling at this time)  Negative for back pain, gait problem, joint swelling, myalgias, neck pain and neck stiffness  Skin: Negative  Allergic/Immunologic: Negative  Neurological: Negative  Hematological: Negative  Psychiatric/Behavioral: Negative  Objective:      /74 (BP Location: Left arm, Patient Position: Sitting, Cuff Size: Large)   Pulse 68   Temp 97 6 °F (36 4 °C)   Resp 14   Ht 5' 10" (1 778 m)   Wt 88 5 kg (195 lb)   SpO2 99%   BMI 27 98 kg/m²          Physical Exam   Constitutional: He is oriented to person, place, and time  He appears well-developed and well-nourished  No distress  Pleasant, cheerful 59-year-old male who is awake alert no acute distress and oriented times   HENT:   Head: Normocephalic and atraumatic  Right Ear: External ear normal    Left Ear: External ear normal    Nose: Nose normal    Mouth/Throat: Oropharynx is clear and moist  No oropharyngeal exudate  Eyes: Pupils are equal, round, and reactive to light  Conjunctivae and EOM are normal  Right eye exhibits no discharge  Left eye exhibits no discharge  No scleral icterus  Neck: Normal range of motion  Neck supple  No JVD present  No tracheal deviation present  No thyromegaly present  Cardiovascular: Normal rate, normal heart sounds and intact distal pulses  Exam reveals no gallop and no friction rub      No murmur heard  A regular rhythm with occasional palpitations, ectopy   Pulmonary/Chest: Breath sounds normal  No stridor  No respiratory distress  He has no wheezes  He has no rales  He exhibits no tenderness  Abdominal: Soft  Bowel sounds are normal    Obese soft nontender   Musculoskeletal: He exhibits deformity (Diffuse arthritis, some increased kyphosis to the dorsal spine, flattening to his lumbar curve, arthritic changes to the hands and digits feet and toes)  He exhibits no edema or tenderness  Lymphadenopathy:     He has no cervical adenopathy  Neurological: He is alert and oriented to person, place, and time  Skin: Skin is warm and dry  He is not diaphoretic  Psychiatric: He has a normal mood and affect  His behavior is normal  Judgment and thought content normal    Nursing note and vitals reviewed

## 2019-01-25 NOTE — ASSESSMENT & PLAN NOTE
Lab Results   Component Value Date    HGBA1C 7 2 (H) 01/18/2019       No results for input(s): POCGLU in the last 72 hours  Blood Sugar Average: Last 72 hrs:   patient's hemoglobin A1c is now 7 2  Patient was reassured that this point time is acceptable especially considering his advanced age  I did state to the patient the importance of still watching his diet, eliminating concentrated sweets and simple carbohydrates from his diet and trying his best to lose some weight  Patient understands  We will check a fasting blood sugar, hemoglobin A1c with his next visit    There is no indication to change her modify his treatment at this point time he did decrease his metformin dose

## 2019-01-25 NOTE — PROGRESS NOTES
Diabetic Foot Exam    Patient's shoes and socks removed  Right Foot/Ankle   Right Foot Inspection  Skin Exam: skin normal and skin intact no dry skin, no warmth, no callus, no erythema, no maceration, no abnormal color, no pre-ulcer, no ulcer and no callus                          Toe Exam: ROM and strength within normal limits and right toe deformity (Had diffuse arthritic changes to the feet and toes, no acute lesions)no swelling, no tenderness and erythema  Sensory   Vibration: intact  Proprioception: intact   Monofilament testing: intact  Vascular  Capillary refills: < 3 seconds  The right DP pulse is 2+  The right PT pulse is 2+  Left Foot/Ankle  Left Foot Inspection  Skin Exam: skin normal and skin intactno dry skin, no warmth, no erythema, no maceration, normal color, no pre-ulcer, no ulcer and no callus                         Toe Exam: ROM and strength within normal limits and left toe deformityno swelling, no tenderness and no erythema                   Sensory   Vibration: intact  Proprioception: intact  Monofilament: intact  Vascular  Capillary refills: < 3 seconds  The left DP pulse is 2+  The left PT pulse is 2+  Assign Risk Category:  No deformity present; Loss of protective sensation; Weak pulses       Risk: 1  BMI Counseling: Body mass index is 27 98 kg/m²  Discussed the patient's BMI with him  The BMI is above average  BMI counseling and education was provided to the patient  Nutrition recommendations include reducing portion sizes and decreasing overall calorie intake

## 2019-01-25 NOTE — PROGRESS NOTES
Assessment/Plan:    Controlled diabetes mellitus ([place] [address] )  Lab Results   Component Value Date    HGBA1C 7 2 (H) 01/18/2019       No results for input(s): POCGLU in the last 72 hours  Blood Sugar Average: Last 72 hrs:   patient's hemoglobin A1c is now 7 2  Patient was reassured that this point time is acceptable especially considering his advanced age  I did state to the patient the importance of still watching his diet, eliminating concentrated sweets and simple carbohydrates from his diet and trying his best to lose some weight  Patient understands  We will check a fasting blood sugar, hemoglobin A1c with his next visit  There is no indication to change her modify his treatment at this point time he did decrease his metformin dose    CKD stage 4 due to type 2 diabetes mellitus ([place] [address] )  Lab Results   Component Value Date    HGBA1C 7 2 (H) 01/18/2019       No results for input(s): POCGLU in the last 72 hours  Blood Sugar Average: Last 72 hrs:   recent studies looking at his kidney function show stability  Patient will continue surveillance  He does follow up with his nephrologist   Was told again the importance of watching his sugar and keeping it under control and the hopes of preventing further progression of his kidney disease  Hyperlipidemia  Patient remains on simvastatin 20 mg a day  He states that he was not perfect as far as his diet was concerned during the holidays  We did discuss again the importance of watching his intake of fats and cholesterol          Diagnoses and all orders for this visit:    CKD stage 4 due to type 2 diabetes mellitus (HCC)  -     amLODIPine (NORVASC) 5 mg tablet; Take 1 tablet (5 mg total) by mouth daily  -     Basic metabolic panel; Future    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 (HCC)  -     amLODIPine (NORVASC) 5 mg tablet;  Take 1 tablet (5 mg total) by mouth daily    Stage 4 chronic kidney disease ([facility] )    Controlled type 2 diabetes mellitus with stage 3 chronic kidney disease, without long-term current use of insulin ([facility])  -     Hemoglobin A1C; Future    Pure hypercholesterolemia          Subjective:      Patient ID: Roverto Troy is a 80 y o  male  Patient is an 44-year-old male with a history of multiple medical problems as outlined previously  Patient is here today for routine follow-up  Patient states he is feeling well and he has no new complaints or concerns  He did have routine labs performed prior to the visit today and we did discuss the results  His renal function remained stable  His hemoglobin A1c is at 7 2 which with his agent underlying disease is adequate  He denies any chest pain or pressure and no increasing shortness of breath with exertion  He states his appetite is good and he is having no bowel or bladder dysfunction        The following portions of the patient's history were reviewed and updated as appropriate:   He  has a past medical history of 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); Diabetes mellitus ([place] [address] ); Hyperlipidemia; Hypertension; and Osteoarthritis  He   Patient Active Problem List    Diagnosis Date Noted    Stage 4 chronic kidney disease ([place] [address] ) 09/27/2018    CKD stage 4 due to type 2 diabetes mellitus ([place] [address] ) 09/27/2018    Palpitations 09/25/2018    Healthcare maintenance 05/25/2018    Enlarged prostate without lower urinary tract symptoms (luts) 03/07/2013    Controlled diabetes mellitus ([place] [address] ) 08/09/2012    Hyperlipidemia 08/09/2012    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 ([place] [address] ) 08/09/2012     He  has a past surgical history that includes Colonoscopy (2012); Hemorroidectomy; and Appendectomy  His family history includes Diabetes in his father and mother; Emphysema in his father; Other in his mother  He  reports that he has quit smoking  He has never used smokeless tobacco  He reports that he drinks alcohol  He reports that he does not use drugs    Current Outpatient Prescriptions   Medication Sig Dispense Refill    Cholecalciferol ([employer] VITAMIN D3) 1000 units capsule Take 1 capsule by mouth daily      CINNAMON PO Take by mouth      cyanocobalamin (VITAMIN B-12) 500 mcg tablet Take 1 tablet by mouth daily      glimepiride (AMARYL) 1 mg tablet TAKE ONE TABLET BY MOUTH EVERY DAY 90 tablet 3    lisinopril (ZESTRIL) 20 mg tablet TAKE ONE TABLET BY MOUTH TWICE A  tablet 3    metFORMIN (GLUCOPHAGE) 850 mg tablet TAKE ONE (1) TABLET(S) TWICE DAILY WITH MORNING AND EVENING MEAL 180 tablet 3    Omega-3 Fatty Acids (OMEGA-3 FISH OIL) 300 MG CAPS Take 1 capsule by mouth daily      PRECISION XTRA TEST STRIPS test strip USE TO TEST BLOOD GLUCOSE ONCE DAILY 100 each 2    simvastatin (ZOCOR) 20 mg tablet TAKE ONE TABLET BY MOUTH EVERY DAY 90 tablet 3    terazosin (HYTRIN) 1 mg capsule TAKE ONE CAPSULE BY MOUTH AT BEDTIME 90 capsule 3    amLODIPine (NORVASC) 5 mg tablet Take 1 tablet (5 mg total) by mouth daily 90 tablet 3     No current facility-administered medications for this visit        Current Outpatient Prescriptions on File Prior to Visit   Medication Sig    Cholecalciferol ([employer] VITAMIN D3) 1000 units capsule Take 1 capsule by mouth daily    CINNAMON PO Take by mouth    cyanocobalamin (VITAMIN B-12) 500 mcg tablet Take 1 tablet by mouth daily    glimepiride (AMARYL) 1 mg tablet TAKE ONE TABLET BY MOUTH EVERY DAY    lisinopril (ZESTRIL) 20 mg tablet TAKE ONE TABLET BY MOUTH TWICE A DAY    metFORMIN (GLUCOPHAGE) 850 mg tablet TAKE ONE (1) TABLET(S) TWICE DAILY WITH MORNING AND EVENING MEAL    Omega-3 Fatty Acids (OMEGA-3 FISH OIL) 300 MG CAPS Take 1 capsule by mouth daily    PRECISION XTRA TEST STRIPS test strip USE TO TEST BLOOD GLUCOSE ONCE DAILY    simvastatin (ZOCOR) 20 mg tablet TAKE ONE TABLET BY MOUTH EVERY DAY    terazosin (HYTRIN) 1 mg capsule TAKE ONE CAPSULE BY MOUTH AT BEDTIME    [DISCONTINUED] amLODIPine (NORVASC) 2 5 mg tablet TAKE ONE TABLET BY MOUTH EVERY DAY     No current facility-administered medications on file prior to visit  He has No Known Allergies       Review of Systems   Constitutional: Negative  HENT: Negative  Eyes: Negative  Respiratory: Negative  Cardiovascular: Negative  Gastrointestinal: Negative  Endocrine: Negative  Genitourinary: Negative  Musculoskeletal: Positive for arthralgias (Some diffuse arthritic aches and pains but nothing new or disabling at this point time)  Negative for back pain, gait problem, joint swelling, myalgias, neck pain and neck stiffness  Skin: Negative  Allergic/Immunologic: Negative  Neurological: Negative  Hematological: Negative  Psychiatric/Behavioral: Negative  Objective:      /74 (BP Location: Left arm, Patient Position: Sitting, Cuff Size: Large)   Pulse 68   Temp 97 6 °F (36 4 °C)   Resp 14   Ht 5' 10" (1 778 m)   Wt 88 5 kg (195 lb)   SpO2 99%   BMI 27 98 kg/m²          Physical Exam   Constitutional: He is oriented to person, place, and time  He appears well-developed and well-nourished  No distress  Pleasant, cheerful 70-year-old male who is awake alert no acute distress and oriented times 3   HENT:   Head: Normocephalic and atraumatic  Right Ear: External ear normal    Left Ear: External ear normal    Nose: Nose normal    Mouth/Throat: Oropharynx is clear and moist  No oropharyngeal exudate  Is very slightly dry but pink oral mucous membranes   Eyes: Pupils are equal, round, and reactive to light  Conjunctivae and EOM are normal  Right eye exhibits no discharge  Left eye exhibits no discharge  No scleral icterus  Neck: Normal range of motion  Neck supple  No JVD present  No tracheal deviation present  No thyromegaly present  Cardiovascular: Normal rate, normal heart sounds and intact distal pulses  Exam reveals no gallop and no friction rub  No murmur heard    A regular rhythm with occasional palpitations, ectopy Pulmonary/Chest: Effort normal and breath sounds normal  No stridor  No respiratory distress  He has no wheezes  He has no rales  He exhibits no tenderness  Abdominal: Soft  Bowel sounds are normal  He exhibits no distension and no mass  There is no tenderness  There is no rebound and no guarding  Obese soft nontender   Musculoskeletal: He exhibits deformity  He exhibits no edema or tenderness  Some slight increased kyphosis to the dorsal spine, flattening to his lumbar curve, some diffuse arthritic changes to the hands and digits  Lymphadenopathy:     He has no cervical adenopathy  Neurological: He is alert and oriented to person, place, and time  Skin: Skin is warm and dry  He is not diaphoretic  Psychiatric: He has a normal mood and affect  His behavior is normal  Judgment and thought content normal    Nursing note and vitals reviewed

## 2019-01-25 NOTE — ASSESSMENT & PLAN NOTE
Lab Results   Component Value Date    HGBA1C 7 2 (H) 01/18/2019       No results for input(s): POCGLU in the last 72 hours  Blood Sugar Average: Last 72 hrs:   recent studies looking at his kidney function show stability  Patient will continue surveillance  He does follow up with his nephrologist   Was told again the importance of watching his sugar and keeping it under control and the hopes of preventing further progression of his kidney disease

## 2019-01-25 NOTE — ASSESSMENT & PLAN NOTE
With the patient's BMI he is considered overweight  We did discuss with the patient today the importance of watching his caloric intake and working to reduce his weight  Patient is somewhat limited with his exercise capacity because of his age  We will continue to monitor this    At this point I would be more concerned with weight loss rather than not being able to lose weight

## 2019-01-25 NOTE — ASSESSMENT & PLAN NOTE
Patient remains on simvastatin 20 mg a day  He states that he was not perfect as far as his diet was concerned during the holidays  We did discuss again the importance of watching his intake of fats and cholesterol  Peyotn Irvin

## 2019-02-27 DIAGNOSIS — E11.9 TYPE 2 DIABETES MELLITUS WITHOUT COMPLICATION, WITHOUT LONG-TERM CURRENT USE OF INSULIN (HCC): ICD-10-CM

## 2019-02-27 DIAGNOSIS — N40.0 BENIGN PROSTATIC HYPERPLASIA, UNSPECIFIED WHETHER LOWER URINARY TRACT SYMPTOMS PRESENT: ICD-10-CM

## 2019-02-27 RX ORDER — TERAZOSIN 1 MG/1
CAPSULE ORAL
Qty: 90 CAPSULE | Refills: 3 | Status: SHIPPED | OUTPATIENT
Start: 2019-02-27 | End: 2020-02-25

## 2019-03-05 ENCOUNTER — APPOINTMENT (OUTPATIENT)
Dept: LAB | Facility: CLINIC | Age: 84
End: 2019-03-05
Payer: MEDICARE

## 2019-03-05 DIAGNOSIS — E11.22 CKD STAGE 4 DUE TO TYPE 2 DIABETES MELLITUS (HCC): ICD-10-CM

## 2019-03-05 DIAGNOSIS — N18.4 CKD STAGE 4 DUE TO TYPE 2 DIABETES MELLITUS (HCC): ICD-10-CM

## 2019-03-05 LAB
ALBUMIN SERPL BCP-MCNC: 3.7 G/DL (ref 3.5–5)
ANION GAP SERPL CALCULATED.3IONS-SCNC: 6 MMOL/L (ref 4–13)
BUN SERPL-MCNC: 39 MG/DL (ref 5–25)
CALCIUM SERPL-MCNC: 8.9 MG/DL (ref 8.3–10.1)
CHLORIDE SERPL-SCNC: 105 MMOL/L (ref 100–108)
CO2 SERPL-SCNC: 25 MMOL/L (ref 21–32)
CREAT SERPL-MCNC: 2.23 MG/DL (ref 0.6–1.3)
CREAT UR-MCNC: 74.3 MG/DL
ERYTHROCYTE [DISTWIDTH] IN BLOOD BY AUTOMATED COUNT: 13 % (ref 11.6–15.1)
GFR SERPL CREATININE-BSD FRML MDRD: 25 ML/MIN/1.73SQ M
GLUCOSE SERPL-MCNC: 150 MG/DL (ref 65–140)
HCT VFR BLD AUTO: 35.1 % (ref 36.5–49.3)
HGB BLD-MCNC: 11.2 G/DL (ref 12–17)
MCH RBC QN AUTO: 30.2 PG (ref 26.8–34.3)
MCHC RBC AUTO-ENTMCNC: 31.9 G/DL (ref 31.4–37.4)
MCV RBC AUTO: 95 FL (ref 82–98)
PHOSPHATE SERPL-MCNC: 2.9 MG/DL (ref 2.3–4.1)
PLATELET # BLD AUTO: 199 THOUSANDS/UL (ref 149–390)
PMV BLD AUTO: 9.5 FL (ref 8.9–12.7)
POTASSIUM SERPL-SCNC: 4.4 MMOL/L (ref 3.5–5.3)
PROT UR-MCNC: 24 MG/DL
PROT/CREAT UR: 0.32 MG/G{CREAT} (ref 0–0.1)
PTH-INTACT SERPL-MCNC: 45.8 PG/ML (ref 18.4–80.1)
RBC # BLD AUTO: 3.71 MILLION/UL (ref 3.88–5.62)
SODIUM SERPL-SCNC: 136 MMOL/L (ref 136–145)
WBC # BLD AUTO: 6.46 THOUSAND/UL (ref 4.31–10.16)

## 2019-03-05 PROCEDURE — 82570 ASSAY OF URINE CREATININE: CPT

## 2019-03-05 PROCEDURE — 36415 COLL VENOUS BLD VENIPUNCTURE: CPT

## 2019-03-05 PROCEDURE — 83970 ASSAY OF PARATHORMONE: CPT

## 2019-03-05 PROCEDURE — 85027 COMPLETE CBC AUTOMATED: CPT

## 2019-03-05 PROCEDURE — 84156 ASSAY OF PROTEIN URINE: CPT

## 2019-03-05 PROCEDURE — 80069 RENAL FUNCTION PANEL: CPT

## 2019-03-06 ENCOUNTER — TELEPHONE (OUTPATIENT)
Dept: NEPHROLOGY | Facility: CLINIC | Age: 84
End: 2019-03-06

## 2019-03-07 ENCOUNTER — OFFICE VISIT (OUTPATIENT)
Dept: NEPHROLOGY | Facility: CLINIC | Age: 84
End: 2019-03-07
Payer: MEDICARE

## 2019-03-07 VITALS — HEIGHT: 70 IN | WEIGHT: 191 LBS | BODY MASS INDEX: 27.35 KG/M2

## 2019-03-07 DIAGNOSIS — I12.9 BENIGN HYPERTENSION WITH CKD (CHRONIC KIDNEY DISEASE) STAGE IV (HCC): Primary | ICD-10-CM

## 2019-03-07 DIAGNOSIS — E11.22 CKD STAGE 4 DUE TO TYPE 2 DIABETES MELLITUS (HCC): ICD-10-CM

## 2019-03-07 DIAGNOSIS — N18.4 BENIGN HYPERTENSION WITH CKD (CHRONIC KIDNEY DISEASE) STAGE IV (HCC): Primary | ICD-10-CM

## 2019-03-07 DIAGNOSIS — E78.00 PURE HYPERCHOLESTEROLEMIA: ICD-10-CM

## 2019-03-07 DIAGNOSIS — N40.0 ENLARGED PROSTATE WITHOUT LOWER URINARY TRACT SYMPTOMS (LUTS): ICD-10-CM

## 2019-03-07 DIAGNOSIS — N18.4 CKD STAGE 4 DUE TO TYPE 2 DIABETES MELLITUS (HCC): ICD-10-CM

## 2019-03-07 DIAGNOSIS — N18.4 STAGE 4 CHRONIC KIDNEY DISEASE (HCC): ICD-10-CM

## 2019-03-07 PROCEDURE — 99214 OFFICE O/P EST MOD 30 MIN: CPT | Performed by: INTERNAL MEDICINE

## 2019-03-07 NOTE — PROGRESS NOTES
OFFICE FOLLOW UP - Nephrology   Ina Pineda 80 y o  male MRN: [id number]       ASSESSMENT and PLAN:  Komal Tran was seen today for follow-up and chronic kidney disease  Diagnoses and all orders for this visit:    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 ([facility])  -     CBC; Future  -     Protein / creatinine ratio, urine; Future  -     PTH, intact; Future  -     Renal function panel; Future    CKD stage 4 due to type 2 diabetes mellitus ([facility] )    Stage 4 chronic kidney disease ([facility] )    Enlarged prostate without lower urinary tract symptoms (luts)    Pure hypercholesterolemia      This is an 44-year-old gentleman with known history of chronic kidney disease stage 4 who returns to the office for six-month follow-up  1   Chronic kidney disease stage 4, his creatinine previously was around 1 5-1 9, but his creatinine increased to 2 2 and is been stable over the last 6 months  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, atherosclerotic disease, aging and possible diabetes, noted he does not have significant proteinuria  Renal function is stable over the last 6 months  Given that his GFR is below 30 recommend to change metformin for an alternative agent  I will defer to his primary care doctor  I would like to see him back in the office in 6 months  He went to [facility] [employer] class  He still and decide if he will want dialysis or not in the future if needed    2  Well controlled type 2 diabetes, most recent A1c 7 2 % on January 2019, patient is currently on metformin and glimepiride  Noted his kidney function is below 30, recommend to use an alternative agent instead of metformin  I will defer to his primary care doctor  3   Hypertension, blood pressure well controlled, on amlodipine and lisinopril  4   Anemia of chronic kidney disease, most recent hemoglobin 11 2, will follow up CBC in 6 months      5   Mineral bone disease, PTH and phosphorus between normal limits  Patient Instructions   I would like to see you back in the office in 6 months with repeat labs  Follow a low-salt diet  Avoid NSAIDs (ibuprofen, Motrin, Aleve, naproxen, Advil)  Okay to take Tylenol or paracetamol or acetaminophen as needed for pain  Recommend to discuss with family doctor regarding metformin use, given that your kidney function is below 30, I recommend to use an alternative agent  HPI: Ramón Wheatley is a 80 y o  male who is here for Follow-up and Chronic Kidney Disease    Last time seen in our office was in September 2018, today returns for six-month follow-up  Since our last visit, there has been no ER visits or hospitilizations  Patient currently has no complaints at this time and is feeling well  Patient denies any chest pain, shortness of breath and swelling  The last blood work was done on 03/05/2018, which we have reviewed together  Appetite is stable, denies any urinary problems, no nausea vomiting or diarrhea  He does not smoke, he quit smoking 28 years ago  Denies NSAID use  He still on metformin, dose previously was cut on half by his PCP    ROS:   All the systems were reviewed and were negative except as documented on the HPI  Allergies: Patient has no known allergies      Medications:   Current Outpatient Medications:     amLODIPine (NORVASC) 5 mg tablet, Take 1 tablet (5 mg total) by mouth daily, Disp: 90 tablet, Rfl: 3    Cholecalciferol ([employer]S VITAMIN D3) 1000 units capsule, Take 1 capsule by mouth daily, Disp: , Rfl:     CINNAMON PO, Take by mouth, Disp: , Rfl:     cyanocobalamin (VITAMIN B-12) 500 mcg tablet, Take 1 tablet by mouth daily, Disp: , Rfl:     glimepiride (AMARYL) 1 mg tablet, TAKE ONE TABLET BY MOUTH EVERY DAY, Disp: 90 tablet, Rfl: 3    lisinopril (ZESTRIL) 20 mg tablet, TAKE ONE TABLET BY MOUTH TWICE A DAY, Disp: 180 tablet, Rfl: 3    metFORMIN (GLUCOPHAGE) 850 mg tablet, TAKE ONE (1) TABLET(S) TWICE DAILY WITH MORNING AND EVENING MEAL (Patient taking differently: TAKE ONE (1) TABLET ONCE A DAY), Disp: 180 tablet, Rfl: 3    Omega-3 Fatty Acids (OMEGA-3 FISH OIL) 300 MG CAPS, Take 1 capsule by mouth daily, Disp: , Rfl:     PRECISION XTRA TEST STRIPS test strip, USE TO TEST BLOOD GLUCOSE ONCE DAILY, Disp: 100 each, Rfl: 2    simvastatin (ZOCOR) 20 mg tablet, TAKE ONE TABLET BY MOUTH EVERY DAY, Disp: 90 tablet, Rfl: 3    terazosin (HYTRIN) 1 mg capsule, TAKE ONE CAPSULE BY MOUTH AT BEDTIME, Disp: 90 capsule, Rfl: 3    Past Medical History:   Diagnosis Date    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)     Last Assessed:  5/19/17    Diabetes mellitus ([facility] [address] )     Hyperlipidemia     Hypertension     Osteoarthritis      Past Surgical History:   Procedure Laterality Date    APPENDECTOMY      COLONOSCOPY  2012    HEMORROIDECTOMY       Family History   Problem Relation Age of Onset    Diabetes Mother     Other Mother         Stroke syndrome    Diabetes Father     Emphysema Father       reports that he has quit smoking  He has never used smokeless tobacco  He reports that he drinks alcohol  He reports that he does not use drugs  Physical Exam:   Vitals:    03/07/19 0930   Weight: 86 6 kg (191 lb)   Height: 5' 10" (1 778 m)     Body mass index is 27 41 kg/m²      General: cooperative, in not acute distress  Eyes: conjunctivae pink, anicteric sclerae  ENT: lips and mucous membranes moist  Neck: supple, no JVD  Chest: clear breath sounds bilateral, no crackles, ronchus or wheezings  CVS: distinct S1 & S2, normal rate, regular rhythm  Abdomen: soft, non-tender, non-distended, normoactive bowel sounds  Extremities: no edema of both legs  Skin: no rash  Neuro: awake, alert, oriented      Lab Results:  Results for orders placed or performed in visit on 03/05/19   CBC   Result Value Ref Range    WBC 6 46 4 31 - 10 16 Thousand/uL    RBC 3 71 (L) 3 88 - 5 62 Million/uL    Hemoglobin 11 2 (L) 12 0 - 17 0 g/dL    Hematocrit 35 1 (L) 36 5 - 49 3 %    MCV 95 82 - 98 fL    MCH 30 2 26 8 - 34 3 pg    MCHC 31 9 31 4 - 37 4 g/dL    RDW 13 0 11 6 - 15 1 %    Platelets 209 152 - 219 Thousands/uL    MPV 9 5 8 9 - 12 7 fL   Protein / creatinine ratio, urine   Result Value Ref Range    Creatinine, Ur 74 3 mg/dL    Protein Urine Random 24 mg/dL    Prot/Creat Ratio, Ur 0 32 (H) 0 00 - 0 10   PTH, intact   Result Value Ref Range    PTH 45 8 18 4 - 80 1 pg/mL   Renal function panel   Result Value Ref Range    Albumin 3 7 3 5 - 5 0 g/dL    Calcium 8 9 8 3 - 10 1 mg/dL    Phosphorus 2 9 2 3 - 4 1 mg/dL    Glucose 150 (H) 65 - 140 mg/dL    BUN 39 (H) 5 - 25 mg/dL    Creatinine 2 23 (H) 0 60 - 1 30 mg/dL    Sodium 136 136 - 145 mmol/L    Potassium 4 4 3 5 - 5 3 mmol/L    Chloride 105 100 - 108 mmol/L    CO2 25 21 - 32 mmol/L    ANION GAP 6 4 - 13 mmol/L    eGFR 25 ml/min/1 73sq m       Portions of the record may have been created with voice recognition software  Occasional wrong word or "sound a like" substitutions may have occurred due to the inherent limitations of voice recognition software  Read the chart carefully and recognize, using context, where substitutions have occurred  If you have any questions, please contact the dictating provider

## 2019-03-07 NOTE — PATIENT INSTRUCTIONS
I would like to see you back in the office in 6 months with repeat labs  Follow a low-salt diet  Avoid NSAIDs (ibuprofen, Motrin, Aleve, naproxen, Advil)  Okay to take Tylenol or paracetamol or acetaminophen as needed for pain  Recommend to discuss with family doctor regarding metformin use, given that your kidney function is below 30, I recommend to use an alternative agent

## 2019-04-17 LAB
LEFT EYE DIABETIC RETINOPATHY: NORMAL
RIGHT EYE DIABETIC RETINOPATHY: NORMAL

## 2019-04-22 ENCOUNTER — APPOINTMENT (OUTPATIENT)
Dept: LAB | Facility: CLINIC | Age: 84
End: 2019-04-22
Payer: MEDICARE

## 2019-04-22 DIAGNOSIS — E11.22 CKD STAGE 4 DUE TO TYPE 2 DIABETES MELLITUS (HCC): ICD-10-CM

## 2019-04-22 DIAGNOSIS — N18.4 CKD STAGE 4 DUE TO TYPE 2 DIABETES MELLITUS (HCC): ICD-10-CM

## 2019-04-22 DIAGNOSIS — N18.30 CONTROLLED TYPE 2 DIABETES MELLITUS WITH STAGE 3 CHRONIC KIDNEY DISEASE, WITHOUT LONG-TERM CURRENT USE OF INSULIN (HCC): ICD-10-CM

## 2019-04-22 DIAGNOSIS — E11.22 CONTROLLED TYPE 2 DIABETES MELLITUS WITH STAGE 3 CHRONIC KIDNEY DISEASE, WITHOUT LONG-TERM CURRENT USE OF INSULIN (HCC): ICD-10-CM

## 2019-04-22 LAB
ANION GAP SERPL CALCULATED.3IONS-SCNC: 5 MMOL/L (ref 4–13)
BUN SERPL-MCNC: 30 MG/DL (ref 5–25)
CALCIUM SERPL-MCNC: 8.7 MG/DL (ref 8.3–10.1)
CHLORIDE SERPL-SCNC: 108 MMOL/L (ref 100–108)
CO2 SERPL-SCNC: 24 MMOL/L (ref 21–32)
CREAT SERPL-MCNC: 2.19 MG/DL (ref 0.6–1.3)
EST. AVERAGE GLUCOSE BLD GHB EST-MCNC: 180 MG/DL
GFR SERPL CREATININE-BSD FRML MDRD: 26 ML/MIN/1.73SQ M
GLUCOSE P FAST SERPL-MCNC: 128 MG/DL (ref 65–99)
HBA1C MFR BLD: 7.9 % (ref 4.2–6.3)
POTASSIUM SERPL-SCNC: 4.6 MMOL/L (ref 3.5–5.3)
SODIUM SERPL-SCNC: 137 MMOL/L (ref 136–145)

## 2019-04-22 PROCEDURE — 36415 COLL VENOUS BLD VENIPUNCTURE: CPT

## 2019-04-22 PROCEDURE — 80048 BASIC METABOLIC PNL TOTAL CA: CPT

## 2019-04-22 PROCEDURE — 83036 HEMOGLOBIN GLYCOSYLATED A1C: CPT

## 2019-04-25 ENCOUNTER — OFFICE VISIT (OUTPATIENT)
Dept: INTERNAL MEDICINE CLINIC | Facility: CLINIC | Age: 84
End: 2019-04-25
Payer: MEDICARE

## 2019-04-25 VITALS
BODY MASS INDEX: 27.2 KG/M2 | HEIGHT: 70 IN | SYSTOLIC BLOOD PRESSURE: 160 MMHG | OXYGEN SATURATION: 97 % | DIASTOLIC BLOOD PRESSURE: 58 MMHG | HEART RATE: 78 BPM | TEMPERATURE: 96.3 F | WEIGHT: 190 LBS

## 2019-04-25 DIAGNOSIS — N18.4 CKD STAGE 4 DUE TO TYPE 2 DIABETES MELLITUS (HCC): ICD-10-CM

## 2019-04-25 DIAGNOSIS — N25.81 SECONDARY HYPERPARATHYROIDISM OF RENAL ORIGIN (HCC): ICD-10-CM

## 2019-04-25 DIAGNOSIS — N18.4 BENIGN HYPERTENSION WITH CKD (CHRONIC KIDNEY DISEASE) STAGE IV (HCC): ICD-10-CM

## 2019-04-25 DIAGNOSIS — Z23 ENCOUNTER FOR IMMUNIZATION: Primary | ICD-10-CM

## 2019-04-25 DIAGNOSIS — I12.9 BENIGN HYPERTENSION WITH CKD (CHRONIC KIDNEY DISEASE) STAGE IV (HCC): ICD-10-CM

## 2019-04-25 DIAGNOSIS — N18.30 CONTROLLED TYPE 2 DIABETES MELLITUS WITH STAGE 3 CHRONIC KIDNEY DISEASE, WITHOUT LONG-TERM CURRENT USE OF INSULIN (HCC): ICD-10-CM

## 2019-04-25 DIAGNOSIS — N18.4 STAGE 4 CHRONIC KIDNEY DISEASE (HCC): ICD-10-CM

## 2019-04-25 DIAGNOSIS — E78.00 PURE HYPERCHOLESTEROLEMIA: ICD-10-CM

## 2019-04-25 DIAGNOSIS — E11.22 CKD STAGE 4 DUE TO TYPE 2 DIABETES MELLITUS (HCC): ICD-10-CM

## 2019-04-25 DIAGNOSIS — E11.22 CONTROLLED TYPE 2 DIABETES MELLITUS WITH STAGE 3 CHRONIC KIDNEY DISEASE, WITHOUT LONG-TERM CURRENT USE OF INSULIN (HCC): ICD-10-CM

## 2019-04-25 PROCEDURE — 99214 OFFICE O/P EST MOD 30 MIN: CPT | Performed by: INTERNAL MEDICINE

## 2019-04-25 PROCEDURE — G0009 ADMIN PNEUMOCOCCAL VACCINE: HCPCS | Performed by: INTERNAL MEDICINE

## 2019-04-25 PROCEDURE — 90670 PCV13 VACCINE IM: CPT | Performed by: INTERNAL MEDICINE

## 2019-04-25 RX ORDER — GLIMEPIRIDE 2 MG/1
2 TABLET ORAL
Qty: 90 TABLET | Refills: 3 | Status: SHIPPED | OUTPATIENT
Start: 2019-04-25 | End: 2020-02-27

## 2019-05-21 DIAGNOSIS — E11.9 TYPE 2 DIABETES MELLITUS WITHOUT COMPLICATION, WITHOUT LONG-TERM CURRENT USE OF INSULIN (HCC): ICD-10-CM

## 2019-05-21 RX ORDER — BLOOD SUGAR DIAGNOSTIC
STRIP MISCELLANEOUS
Qty: 100 EACH | Refills: 2 | Status: SHIPPED | OUTPATIENT
Start: 2019-05-21 | End: 2020-02-14

## 2019-07-24 ENCOUNTER — TELEPHONE (OUTPATIENT)
Dept: NEPHROLOGY | Facility: CLINIC | Age: 84
End: 2019-07-24

## 2019-07-24 NOTE — TELEPHONE ENCOUNTER
Unable to leave a message in regards to scheduling for a Sept follow  Up appointment  His phone kept ringing  No voicemail was set up

## 2019-08-21 ENCOUNTER — APPOINTMENT (OUTPATIENT)
Dept: LAB | Facility: CLINIC | Age: 84
End: 2019-08-21
Payer: MEDICARE

## 2019-08-21 DIAGNOSIS — N18.4 STAGE 4 CHRONIC KIDNEY DISEASE (HCC): ICD-10-CM

## 2019-08-21 DIAGNOSIS — N18.4 BENIGN HYPERTENSION WITH CKD (CHRONIC KIDNEY DISEASE) STAGE IV (HCC): ICD-10-CM

## 2019-08-21 DIAGNOSIS — E78.00 PURE HYPERCHOLESTEROLEMIA: ICD-10-CM

## 2019-08-21 DIAGNOSIS — I12.9 BENIGN HYPERTENSION WITH CKD (CHRONIC KIDNEY DISEASE) STAGE IV (HCC): ICD-10-CM

## 2019-08-21 DIAGNOSIS — N18.30 CONTROLLED TYPE 2 DIABETES MELLITUS WITH STAGE 3 CHRONIC KIDNEY DISEASE, WITHOUT LONG-TERM CURRENT USE OF INSULIN (HCC): ICD-10-CM

## 2019-08-21 DIAGNOSIS — E11.22 CONTROLLED TYPE 2 DIABETES MELLITUS WITH STAGE 3 CHRONIC KIDNEY DISEASE, WITHOUT LONG-TERM CURRENT USE OF INSULIN (HCC): ICD-10-CM

## 2019-08-21 LAB
ALBUMIN SERPL BCP-MCNC: 4.2 G/DL (ref 3.5–5)
ALP SERPL-CCNC: 76 U/L (ref 46–116)
ALT SERPL W P-5'-P-CCNC: 19 U/L (ref 12–78)
ANION GAP SERPL CALCULATED.3IONS-SCNC: 7 MMOL/L (ref 4–13)
AST SERPL W P-5'-P-CCNC: 12 U/L (ref 5–45)
BACTERIA UR QL AUTO: NORMAL /HPF
BASOPHILS # BLD AUTO: 0.05 THOUSANDS/ΜL (ref 0–0.1)
BASOPHILS NFR BLD AUTO: 1 % (ref 0–1)
BILIRUB SERPL-MCNC: 0.67 MG/DL (ref 0.2–1)
BILIRUB UR QL STRIP: NEGATIVE
BUN SERPL-MCNC: 38 MG/DL (ref 5–25)
CALCIUM SERPL-MCNC: 8.7 MG/DL (ref 8.3–10.1)
CHLORIDE SERPL-SCNC: 111 MMOL/L (ref 100–108)
CHOLEST SERPL-MCNC: 126 MG/DL (ref 50–200)
CLARITY UR: CLEAR
CO2 SERPL-SCNC: 25 MMOL/L (ref 21–32)
COLOR UR: YELLOW
CREAT SERPL-MCNC: 2.23 MG/DL (ref 0.6–1.3)
CREAT UR-MCNC: 128 MG/DL
CREAT UR-MCNC: 131 MG/DL
EOSINOPHIL # BLD AUTO: 0.56 THOUSAND/ΜL (ref 0–0.61)
EOSINOPHIL NFR BLD AUTO: 8 % (ref 0–6)
ERYTHROCYTE [DISTWIDTH] IN BLOOD BY AUTOMATED COUNT: 13 % (ref 11.6–15.1)
EST. AVERAGE GLUCOSE BLD GHB EST-MCNC: 166 MG/DL
GFR SERPL CREATININE-BSD FRML MDRD: 25 ML/MIN/1.73SQ M
GLUCOSE P FAST SERPL-MCNC: 125 MG/DL (ref 65–99)
GLUCOSE UR STRIP-MCNC: NEGATIVE MG/DL
HBA1C MFR BLD: 7.4 % (ref 4.2–6.3)
HCT VFR BLD AUTO: 35 % (ref 36.5–49.3)
HDLC SERPL-MCNC: 37 MG/DL (ref 40–60)
HGB BLD-MCNC: 11.3 G/DL (ref 12–17)
HGB UR QL STRIP.AUTO: NEGATIVE
HYALINE CASTS #/AREA URNS LPF: NORMAL /LPF
IMM GRANULOCYTES # BLD AUTO: 0.02 THOUSAND/UL (ref 0–0.2)
IMM GRANULOCYTES NFR BLD AUTO: 0 % (ref 0–2)
KETONES UR STRIP-MCNC: NEGATIVE MG/DL
LDLC SERPL CALC-MCNC: 76 MG/DL (ref 0–100)
LEUKOCYTE ESTERASE UR QL STRIP: NEGATIVE
LYMPHOCYTES # BLD AUTO: 1.92 THOUSANDS/ΜL (ref 0.6–4.47)
LYMPHOCYTES NFR BLD AUTO: 29 % (ref 14–44)
MCH RBC QN AUTO: 30.4 PG (ref 26.8–34.3)
MCHC RBC AUTO-ENTMCNC: 32.3 G/DL (ref 31.4–37.4)
MCV RBC AUTO: 94 FL (ref 82–98)
MICROALBUMIN UR-MCNC: 65.4 MG/L (ref 0–20)
MICROALBUMIN/CREAT 24H UR: 51 MG/G CREATININE (ref 0–30)
MONOCYTES # BLD AUTO: 0.66 THOUSAND/ΜL (ref 0.17–1.22)
MONOCYTES NFR BLD AUTO: 10 % (ref 4–12)
NEUTROPHILS # BLD AUTO: 3.42 THOUSANDS/ΜL (ref 1.85–7.62)
NEUTS SEG NFR BLD AUTO: 52 % (ref 43–75)
NITRITE UR QL STRIP: NEGATIVE
NON-SQ EPI CELLS URNS QL MICRO: NORMAL /HPF
NONHDLC SERPL-MCNC: 89 MG/DL
NRBC BLD AUTO-RTO: 0 /100 WBCS
PH UR STRIP.AUTO: 6 [PH]
PHOSPHATE SERPL-MCNC: 2.8 MG/DL (ref 2.3–4.1)
PLATELET # BLD AUTO: 184 THOUSANDS/UL (ref 149–390)
PMV BLD AUTO: 9.4 FL (ref 8.9–12.7)
POTASSIUM SERPL-SCNC: 4.3 MMOL/L (ref 3.5–5.3)
PROT SERPL-MCNC: 7.1 G/DL (ref 6.4–8.2)
PROT UR STRIP-MCNC: ABNORMAL MG/DL
PROT UR-MCNC: 29 MG/DL
PROT/CREAT UR: 0.22 MG/G{CREAT} (ref 0–0.1)
PTH-INTACT SERPL-MCNC: 93.5 PG/ML (ref 18.4–80.1)
RBC # BLD AUTO: 3.72 MILLION/UL (ref 3.88–5.62)
RBC #/AREA URNS AUTO: NORMAL /HPF
SODIUM SERPL-SCNC: 143 MMOL/L (ref 136–145)
SP GR UR STRIP.AUTO: 1.02 (ref 1–1.03)
TRIGL SERPL-MCNC: 66 MG/DL
UROBILINOGEN UR QL STRIP.AUTO: 0.2 E.U./DL
WBC # BLD AUTO: 6.63 THOUSAND/UL (ref 4.31–10.16)
WBC #/AREA URNS AUTO: NORMAL /HPF

## 2019-08-21 PROCEDURE — 36415 COLL VENOUS BLD VENIPUNCTURE: CPT

## 2019-08-21 PROCEDURE — 84156 ASSAY OF PROTEIN URINE: CPT

## 2019-08-21 PROCEDURE — 81001 URINALYSIS AUTO W/SCOPE: CPT

## 2019-08-21 PROCEDURE — 80053 COMPREHEN METABOLIC PANEL: CPT

## 2019-08-21 PROCEDURE — 83970 ASSAY OF PARATHORMONE: CPT

## 2019-08-21 PROCEDURE — 83036 HEMOGLOBIN GLYCOSYLATED A1C: CPT

## 2019-08-21 PROCEDURE — 82043 UR ALBUMIN QUANTITATIVE: CPT | Performed by: INTERNAL MEDICINE

## 2019-08-21 PROCEDURE — 82570 ASSAY OF URINE CREATININE: CPT | Performed by: INTERNAL MEDICINE

## 2019-08-21 PROCEDURE — 80061 LIPID PANEL: CPT

## 2019-08-21 PROCEDURE — 84100 ASSAY OF PHOSPHORUS: CPT

## 2019-08-21 PROCEDURE — 82570 ASSAY OF URINE CREATININE: CPT

## 2019-08-21 PROCEDURE — 85025 COMPLETE CBC W/AUTO DIFF WBC: CPT

## 2019-08-26 ENCOUNTER — OFFICE VISIT (OUTPATIENT)
Dept: INTERNAL MEDICINE CLINIC | Facility: CLINIC | Age: 84
End: 2019-08-26
Payer: MEDICARE

## 2019-08-26 VITALS
TEMPERATURE: 97.5 F | WEIGHT: 193.2 LBS | HEIGHT: 70 IN | BODY MASS INDEX: 27.66 KG/M2 | DIASTOLIC BLOOD PRESSURE: 46 MMHG | SYSTOLIC BLOOD PRESSURE: 128 MMHG | HEART RATE: 71 BPM | OXYGEN SATURATION: 97 %

## 2019-08-26 DIAGNOSIS — I12.9 BENIGN HYPERTENSION WITH CKD (CHRONIC KIDNEY DISEASE) STAGE IV (HCC): ICD-10-CM

## 2019-08-26 DIAGNOSIS — E78.00 PURE HYPERCHOLESTEROLEMIA: ICD-10-CM

## 2019-08-26 DIAGNOSIS — N18.30 CONTROLLED TYPE 2 DIABETES MELLITUS WITH STAGE 3 CHRONIC KIDNEY DISEASE, WITHOUT LONG-TERM CURRENT USE OF INSULIN (HCC): Primary | ICD-10-CM

## 2019-08-26 DIAGNOSIS — E11.22 CONTROLLED TYPE 2 DIABETES MELLITUS WITH STAGE 3 CHRONIC KIDNEY DISEASE, WITHOUT LONG-TERM CURRENT USE OF INSULIN (HCC): Primary | ICD-10-CM

## 2019-08-26 DIAGNOSIS — N18.4 BENIGN HYPERTENSION WITH CKD (CHRONIC KIDNEY DISEASE) STAGE IV (HCC): ICD-10-CM

## 2019-08-26 DIAGNOSIS — Z00.00 HEALTHCARE MAINTENANCE: ICD-10-CM

## 2019-08-26 PROCEDURE — 99214 OFFICE O/P EST MOD 30 MIN: CPT | Performed by: INTERNAL MEDICINE

## 2019-08-26 PROCEDURE — G0439 PPPS, SUBSEQ VISIT: HCPCS | Performed by: INTERNAL MEDICINE

## 2019-08-26 NOTE — ASSESSMENT & PLAN NOTE
Lab Results   Component Value Date    HGBA1C 7 4 (H) 08/21/2019       No results for input(s): POCGLU in the last 72 hours  Blood Sugar Average: Last 72 hrs:   patient's hemoglobin A1c is 7 4  He states that it is elevated because since his last visit he has been off of the metformin which is contraindicated with his chronic kidney disease  Even though his hemoglobin A1c is elevated from previously it is acceptable at this point time  Patient admits that he is not always perfect with his diet  We will continue present medication and surveillance  Patient has protein in the urine which is expected with his chronic kidney disease and diabetes    He will continue to follow up with Nephrology

## 2019-08-26 NOTE — ASSESSMENT & PLAN NOTE
Patient is here today for repeat Medicare wellness visit  He did have extensive lab testing performed prior to the visit today we did discuss the results  His sugar was checked and the results as noted  Patient also had further studies looking at his renal function which is stable but he is still stage 4 chronic kidney disease  Patient states in general he is feeling well and has no new medical complaints or problems  He is exam did from routine colonoscopy, prostate screening because of his age

## 2019-08-26 NOTE — PROGRESS NOTES
Assessment/Plan:    Controlled diabetes mellitus ([facility] [address] )  Lab Results   Component Value Date    HGBA1C 7 4 (H) 08/21/2019       No results for input(s): POCGLU in the last 72 hours  Blood Sugar Average: Last 72 hrs:   patient's hemoglobin A1c is 7 4  He states that it is elevated because since his last visit he has been off of the metformin which is contraindicated with his chronic kidney disease  Even though his hemoglobin A1c is elevated from previously it is acceptable at this point time  Patient admits that he is not always perfect with his diet  We will continue present medication and surveillance  Patient has protein in the urine which is expected with his chronic kidney disease and diabetes  He will continue to follow up with Nephrology    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 ([facility])  Longstanding history of hypertension, chronic kidney disease stage IV  Recent studies show stability with his renal function  Patient will continue present medication and surveillance and does have an upcoming visit to be seen by Nephrology  Healthcare maintenance  Patient is here today for repeat Medicare wellness visit  He did have extensive lab testing performed prior to the visit today we did discuss the results  His sugar was checked and the results as noted  Patient also had further studies looking at his renal function which is stable but he is still stage 4 chronic kidney disease  Patient states in general he is feeling well and has no new medical complaints or problems  He is exam did from routine colonoscopy, prostate screening because of his age  Diagnoses and all orders for this visit:    Controlled type 2 diabetes mellitus with stage 3 chronic kidney disease, without long-term current use of insulin ([facility])  -     Basic metabolic panel;  Future  -     Hemoglobin A1C; Future    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 ([facility])    Pure hypercholesterolemia    Healthcare maintenance          Subjective:      Patient ID: Robinson Santiago is a 80 y o  male  Patient is an 19-year-old male with a history of multiple medical problems including hypertension, diabetes mellitus type 2, diffuse DJD, chronic kidney disease stage 4  Patient is here today for Medicare wellness visit  Patient states he is feeling well and has no new complaints or problems  He continues to follow-up with his nephrologist concerning his chronic kidney disease  He admits that he is not always perfect his diet has had no significant weight loss or gain since his last visit  The following portions of the patient's history were reviewed and updated as appropriate: allergies, current medications, past family history, past medical history, past social history, past surgical history and problem list     Review of Systems   Constitutional: Negative  HENT: Negative  Eyes: Negative  Patient continues disease out the modest on a regular basis   Respiratory: Negative  Cardiovascular: Negative  Gastrointestinal: Negative  Endocrine: Negative  Genitourinary: Negative  Musculoskeletal: Positive for arthralgias  Negative for back pain, gait problem, joint swelling, myalgias, neck pain and neck stiffness  Skin: Negative  Allergic/Immunologic: Negative  Neurological: Negative  Hematological: Negative  Psychiatric/Behavioral: Negative  Objective:      BP (!) 128/46   Pulse 71   Temp 97 5 °F (36 4 °C)   Ht 5' 10" (1 778 m)   Wt 87 6 kg (193 lb 3 2 oz)   SpO2 97%   BMI 27 72 kg/m²          Physical Exam   Constitutional: He is oriented to person, place, and time  He appears well-developed and well-nourished  No distress  Pleasant, articulate, overweight 19-year-old male who is a week alert no acute distress and oriented x3   HENT:   Head: Normocephalic and atraumatic     Right Ear: External ear normal    Left Ear: External ear normal  Nose: Nose normal    Mouth/Throat: Oropharynx is clear and moist  No oropharyngeal exudate  Eyes: Pupils are equal, round, and reactive to light  Conjunctivae and EOM are normal  Right eye exhibits no discharge  Left eye exhibits no discharge  No scleral icterus  Neck: Normal range of motion  Neck supple  No JVD present  No tracheal deviation present  No thyromegaly present  Cardiovascular: Normal rate, regular rhythm and intact distal pulses  Exam reveals no gallop and no friction rub  No murmur heard  Pulmonary/Chest: Breath sounds normal  No stridor  No respiratory distress  He has no wheezes  He has no rales  He exhibits no tenderness  Abdominal: Soft  Bowel sounds are normal  He exhibits no distension and no mass  There is no tenderness  There is no rebound and no guarding  A hernia is present  Obese   Genitourinary:   Genitourinary Comments: Defers exam   Musculoskeletal: Normal range of motion  He exhibits no edema, tenderness or deformity  Lymphadenopathy:     He has no cervical adenopathy  Neurological: He is alert and oriented to person, place, and time  He displays normal reflexes  No cranial nerve deficit or sensory deficit  He exhibits normal muscle tone  Coordination normal    Skin: Skin is warm and dry  Capillary refill takes less than 2 seconds  He is not diaphoretic  No erythema  Psychiatric: He has a normal mood and affect  His behavior is normal  Judgment and thought content normal    Nursing note and vitals reviewed

## 2019-08-26 NOTE — PROGRESS NOTES
Assessment and Plan:     Problem List Items Addressed This Visit     None         History of Present Illness:     Patient presents for Medicare Annual Wellness visit    Patient Care Team:  Jem Gottlieb DO as PCP - MD Jem Bradshaw DO     Problem List:     Patient Active Problem List   Diagnosis    Healthcare maintenance    Controlled diabetes mellitus ([facility] [address] )    Enlarged prostate without lower urinary tract symptoms (luts)    Hyperlipidemia    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 (HCC)    Palpitations    Stage 4 chronic kidney disease ([facility] [address] )    CKD stage 4 due to type 2 diabetes mellitus ([facility] )    Overweight (BMI 25 0-29  9)    Secondary hyperparathyroidism of renal origin [facility])      Past Medical and Surgical History:     Past Medical History:   Diagnosis Date    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)     Last Assessed:  5/19/17    Diabetes mellitus ([facility] )     Hyperlipidemia     Hypertension     Osteoarthritis      Past Surgical History:   Procedure Laterality Date    APPENDECTOMY      COLONOSCOPY  2012    HEMORROIDECTOMY        Family History:     Family History   Problem Relation Age of Onset    Diabetes Mother     Other Mother         Stroke syndrome    Diabetes Father     Emphysema Father       Social History:     Social History     Tobacco Use   Smoking Status Former Smoker   Smokeless Tobacco Never Used     Social History     Substance and Sexual Activity   Alcohol Use Yes    Comment: Social drinker     Social History     Substance and Sexual Activity   Drug Use No      Medications and Allergies:     Current Outpatient Medications   Medication Sig Dispense Refill    amLODIPine (NORVASC) 5 mg tablet Take 1 tablet (5 mg total) by mouth daily 90 tablet 3    Cholecalciferol ([employer] VITAMIN D3) 1000 units capsule Take 1 capsule by mouth daily      CINNAMON PO Take by mouth      cyanocobalamin (VITAMIN B-12) 500 mcg tablet Take 1 tablet by mouth daily      glimepiride (AMARYL) 2 mg tablet Take 1 tablet (2 mg total) by mouth daily with breakfast 90 tablet 3    lisinopril (ZESTRIL) 20 mg tablet TAKE ONE TABLET BY MOUTH TWICE A  tablet 3    Omega-3 Fatty Acids (OMEGA-3 FISH OIL) 300 MG CAPS Take 1 capsule by mouth daily      PRECISION XTRA TEST STRIPS test strip USE TO TEST BLOOD GLUCOSE ONCE DAILY 100 each 2    simvastatin (ZOCOR) 20 mg tablet TAKE ONE TABLET BY MOUTH EVERY DAY 90 tablet 3    terazosin (HYTRIN) 1 mg capsule TAKE ONE CAPSULE BY MOUTH AT BEDTIME 90 capsule 3     No current facility-administered medications for this visit  No Known Allergies   Immunizations:     Immunization History   Administered Date(s) Administered    Influenza Split High Dose Preservative Free IM 09/27/2013, 09/25/2014, 09/29/2015, 09/23/2016, 09/19/2017    Influenza, high dose seasonal 0 5 mL 09/25/2018    Pneumococcal Conjugate 13-Valent 04/25/2019    Pneumococcal Polysaccharide PPV23 10/01/2006    TD (adult) Preservative Free 12/11/1930      Medicare Screening Tests and Risk Assessments:     Thomas Fermin is here for his Subsequent Wellness visit  Health Risk Assessment:  Patient rates overall health as very good  Patient feels that their physical health rating is Same  Eyesight was rated as Same  Hearing was rated as Same  Patient feels that their emotional and mental health rating is Slightly better  Pain experienced by patient in the last 7 days has been None  Emotional/Mental Health:  Patient has not been feeling nervous/anxious  PHQ-9 Depression Screening:    Frequency of the following problems over the past two weeks:      1  Little interest or pleasure in doing things: 0 - not at all      2  Feeling down, depressed, or hopeless: 0 - not at all  PHQ-2 Score: 0          Broken Bones/Falls:     Fall Risk Assessment:    In the past year, patient has experienced: No history of falling in past year          Bladder/Bowel:  Patient has not leaked urine accidently in the last six months  Patient reports no loss of bowel control  Immunizations:  Patient has had a flu vaccination within the last year  Patient has received a pneumonia shot  Patient has not received a shingles shot  Patient has received tetanus/diphtheria shot  Home Safety:  Patient does not have trouble with stairs inside or outside of their home  Patient currently reports that there are no safety hazards present in home, working smoke alarms, working carbon monoxide detectors  Preventative Screenings:   prostate cancer screen performed, colon cancer screen completed, cholesterol screen completed, glaucoma eye exam completed,     Nutrition:  Current diet: Regular with servings of the following:    Medications:  Patient is currently taking over-the-counter supplements  Patient is able to manage medications  Lifestyle Choices:  Patient reports no tobacco use  Patient has smoked or used tobacco in the past   Patient has stopped his tobacco use  Patient reports alcohol use  Patient drives a vehicle  Patient wears seat belt  Activities of Daily Living:  Can get out of bed by his or her self, able to dress self, able to make own meals, able to do own shopping, able to bathe self, can do own laundry/housekeeping, can manage own money, pay bills and track expenses    Previous Hospitalizations:  No hospitalization or ED visit in past 12 months        Advanced Directives:  Patient has decided on a power of   Patient has spoken to designated power of   Patient has completed advanced directive          Preventative Screening/Counseling:      Cardiovascular:      General: Risks and Benefits Discussed and Screening Current      Counseling: Healthy Diet, Healthy Weight and Improve Exercise Tolerance          Diabetes:      General: Risks and Benefits Discussed and Screening Current      Counseling: Healthy Diet, Healthy Weight and Improve Physical Activity          Colorectal Cancer:      General: Screening Not Indicated          Prostate Cancer:      General: Screening Not Indicated          Osteoporosis:      General: Risks and Benefits Discussed and Patient Declines          AAA:      General: Screening Current          Glaucoma:      General: Risks and Benefits Discussed and Screening Current          HIV:      General: Screening Not Indicated          Hepatitis C:      General: Screening Not Indicated        Advanced Directives:   Patient has living will for healthcare, has durable POA for healthcare, patient has an advanced directive  Information on ACP and/or AD provided  No 5 wishes given  End of life assessment reviewed with patient  Provider agrees with end of life decisions        Immunizations:      Influenza: Influenza UTD This Year      Pneumococcal: Lifetime Vaccine Completed      Shingrix: Patient Declines      Hepatitis B (Low risk patients): Series Not Indicated      Zostavax: Patient Declines      TD: Vaccine Status Unknown      TDAP: Vaccine Status Unknown      Other Preventative Counseling (Non-Medicare):  Alcohol Use, Fall Prevention, Helmet Safety, Increase physical activity, Nutrition Counseling, Car/seat belt/driving safety reviewed, Skin self-exam, Sunscreen use, Dietary education for weight gain and Weight reduction discussed

## 2019-08-26 NOTE — ASSESSMENT & PLAN NOTE
Longstanding history of hypertension, chronic kidney disease stage IV  Recent studies show stability with his renal function  Patient will continue present medication and surveillance and does have an upcoming visit to be seen by Nephrology

## 2019-08-28 DIAGNOSIS — E78.00 PURE HYPERCHOLESTEROLEMIA: ICD-10-CM

## 2019-08-28 RX ORDER — SIMVASTATIN 20 MG
TABLET ORAL
Qty: 90 TABLET | Refills: 3 | Status: SHIPPED | OUTPATIENT
Start: 2019-08-28 | End: 2020-06-29 | Stop reason: HOSPADM

## 2019-09-12 ENCOUNTER — OFFICE VISIT (OUTPATIENT)
Dept: NEPHROLOGY | Facility: CLINIC | Age: 84
End: 2019-09-12
Payer: MEDICARE

## 2019-09-12 VITALS — BODY MASS INDEX: 27.02 KG/M2 | HEIGHT: 71 IN | WEIGHT: 193 LBS

## 2019-09-12 DIAGNOSIS — N18.4 BENIGN HYPERTENSION WITH CKD (CHRONIC KIDNEY DISEASE) STAGE IV (HCC): ICD-10-CM

## 2019-09-12 DIAGNOSIS — E78.00 PURE HYPERCHOLESTEROLEMIA: ICD-10-CM

## 2019-09-12 DIAGNOSIS — N18.4 CKD STAGE 4 DUE TO TYPE 2 DIABETES MELLITUS (HCC): Primary | ICD-10-CM

## 2019-09-12 DIAGNOSIS — E11.22 CKD STAGE 4 DUE TO TYPE 2 DIABETES MELLITUS (HCC): Primary | ICD-10-CM

## 2019-09-12 DIAGNOSIS — I12.9 BENIGN HYPERTENSION WITH CKD (CHRONIC KIDNEY DISEASE) STAGE IV (HCC): ICD-10-CM

## 2019-09-12 DIAGNOSIS — N25.81 SECONDARY HYPERPARATHYROIDISM OF RENAL ORIGIN (HCC): ICD-10-CM

## 2019-09-12 DIAGNOSIS — N40.0 ENLARGED PROSTATE WITHOUT LOWER URINARY TRACT SYMPTOMS (LUTS): ICD-10-CM

## 2019-09-12 PROCEDURE — 99214 OFFICE O/P EST MOD 30 MIN: CPT | Performed by: INTERNAL MEDICINE

## 2019-09-12 NOTE — PROGRESS NOTES
OFFICE FOLLOW UP - Nephrology   Misti Carpio 80 y o  male MRN: [id number]       ASSESSMENT and PLAN:  Ibis Newby was seen today for follow-up, chronic kidney disease and hypertension  Diagnoses and all orders for this visit:    CKD stage 4 due to type 2 diabetes mellitus ([facility] )  -     CBC; Future  -     Protein / creatinine ratio, urine; Future  -     Renal function panel; Future  -     PTH, intact; Future    Secondary hyperparathyroidism of renal origin ([facility] )    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 ([facility])    Enlarged prostate without lower urinary tract symptoms (luts)    Pure hypercholesterolemia      This is an 80-year-old gentleman with known history of chronic kidney disease stage 4 who returns to the office for six-month follow-up  1   Chronic kidney disease stage 4, his creatinine previously was around 1 5-1 9, creatinine around 1 9 to 2 2 since 2018  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, atherosclerotic disease, and possible diabetes as well as age-related nephron loss  Renal function is stable, most recent creatinine at 2 23 with an eGFR 25 with minimal proteinuria  He went to [facility] class  He still undecided if he will want dialysis or not in the future if needed    2  Well controlled type 2 diabetes, most recent A1c 7 4% on 08/2019,   Management as per primary care doctor  3   Hypertension, blood pressure well controlled, on amlodipine and lisinopril  4   Anemia of chronic kidney disease, most recent hemoglobin 11 3, will follow up CBC in 6 months  5   Mineral bone disease, PTH and phosphorus acceptable  Patient Instructions   Your kidney function remains fairly stable, I would like to see you back in the office in 6 months with repeat labs  Continue with good diabetes control (goal to have a hemoglobin A1c close of below 7%), continue with good blood pressure control to slow down the progression of her kidney disease    Stay well-hydrated  Avoid NSAIDs  Okay to take Tylenol or paracetamol or acetaminophen as needed for pain  HPI: Kylie Monday is a 80 y o  male who is here for Follow-up; Chronic Kidney Disease; and Hypertension    Last time seen in our office was in March 2019, today returns for six-month follow-up  Since our last visit, there has been no ER visits or hospitilizations  Patient currently has no complaints at this time and is feeling well  Patient denies any chest pain, shortness of breath and leg swelling  The last blood work was done on 08/21/2019, which we have reviewed together  Most recent serum creatinine 2 23 with an eGFR 25, Hgb 11 3, PTH 93, UPC 0 22  Appetite is stable, denies any urinary problems, nocturia 1-2 per night, no nausea or vomiting or diarrhea  He does not smoke, he quit smoking 28 years ago  Denies NSAID use  Your PCP stopped metformin, started glimepiride instead  ROS:   All the systems were reviewed and were negative except as documented on the HPI  Allergies: Patient has no known allergies      Medications:   Current Outpatient Medications:     amLODIPine (NORVASC) 5 mg tablet, Take 1 tablet (5 mg total) by mouth daily, Disp: 90 tablet, Rfl: 3    Cholecalciferol (CVS VITAMIN D3) 1000 units capsule, Take 1 capsule by mouth daily, Disp: , Rfl:     CINNAMON PO, Take by mouth, Disp: , Rfl:     cyanocobalamin (VITAMIN B-12) 500 mcg tablet, Take 1 tablet by mouth daily, Disp: , Rfl:     glimepiride (AMARYL) 2 mg tablet, Take 1 tablet (2 mg total) by mouth daily with breakfast, Disp: 90 tablet, Rfl: 3    lisinopril (ZESTRIL) 20 mg tablet, TAKE ONE TABLET BY MOUTH TWICE A DAY, Disp: 180 tablet, Rfl: 3    Omega-3 Fatty Acids (OMEGA-3 FISH OIL) 300 MG CAPS, Take 1 capsule by mouth daily, Disp: , Rfl:     PRECISION XTRA TEST STRIPS test strip, USE TO TEST BLOOD GLUCOSE ONCE DAILY, Disp: 100 each, Rfl: 2    simvastatin (ZOCOR) 20 mg tablet, TAKE ONE TABLET BY MOUTH EVERY DAY, Disp: 90 tablet, Rfl: 3    terazosin (HYTRIN) 1 mg capsule, TAKE ONE CAPSULE BY MOUTH AT BEDTIME, Disp: 90 capsule, Rfl: 3    Past Medical History:   Diagnosis Date    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)     Last Assessed:  5/19/17    Diabetes mellitus ([facility] [address] )     Hyperlipidemia     Hypertension     Osteoarthritis      Past Surgical History:   Procedure Laterality Date    APPENDECTOMY      COLONOSCOPY  2012    HEMORROIDECTOMY       Family History   Problem Relation Age of Onset    Diabetes Mother     Other Mother         Stroke syndrome    Diabetes Father     Emphysema Father       reports that he has quit smoking  He has never used smokeless tobacco  He reports that he drinks alcohol  He reports that he does not use drugs  Physical Exam:   Vitals:    09/12/19 0936   Weight: 87 5 kg (193 lb)   Height: 5' 11" (1 803 m)     Body mass index is 26 92 kg/m²      General: cooperative, in not acute distress  Eyes: conjunctivae pink, anicteric sclerae  ENT: lips and mucous membranes moist  Neck: supple, no JVD  Chest: clear breath sounds bilateral, no crackles, ronchus or wheezings  CVS: distinct S1 & S2, normal rate, regular rhythm  Abdomen: soft, non-tender, non-distended, normoactive bowel sounds  Back: no CVA tenderness   Extremities: no edema of both legs  Skin: no rash  Neuro: awake, alert, oriented      Lab Results:  Results for orders placed or performed in visit on 08/21/19   Protein / creatinine ratio, urine   Result Value Ref Range    Creatinine, Ur 131 0 mg/dL    Protein Urine Random 29 mg/dL    Prot/Creat Ratio, Ur 0 22 (H) 0 00 - 0 10   PTH, intact   Result Value Ref Range    PTH 93 5 (H) 18 4 - 80 1 pg/mL   Comprehensive metabolic panel   Result Value Ref Range    Sodium 143 136 - 145 mmol/L    Potassium 4 3 3 5 - 5 3 mmol/L    Chloride 111 (H) 100 - 108 mmol/L    CO2 25 21 - 32 mmol/L    ANION GAP 7 4 - 13 mmol/L    BUN 38 (H) 5 - 25 mg/dL    Creatinine 2 23 (H) 0 60 - 1 30 mg/dL Glucose, Fasting 125 (H) 65 - 99 mg/dL    Calcium 8 7 8 3 - 10 1 mg/dL    AST 12 5 - 45 U/L    ALT 19 12 - 78 U/L    Alkaline Phosphatase 76 46 - 116 U/L    Total Protein 7 1 6 4 - 8 2 g/dL    Albumin 4 2 3 5 - 5 0 g/dL    Total Bilirubin 0 67 0 20 - 1 00 mg/dL    eGFR 25 ml/min/1 73sq m   CBC and differential   Result Value Ref Range    WBC 6 63 4 31 - 10 16 Thousand/uL    RBC 3 72 (L) 3 88 - 5 62 Million/uL    Hemoglobin 11 3 (L) 12 0 - 17 0 g/dL    Hematocrit 35 0 (L) 36 5 - 49 3 %    MCV 94 82 - 98 fL    MCH 30 4 26 8 - 34 3 pg    MCHC 32 3 31 4 - 37 4 g/dL    RDW 13 0 11 6 - 15 1 %    MPV 9 4 8 9 - 12 7 fL    Platelets 250 208 - 885 Thousands/uL    nRBC 0 /100 WBCs    Neutrophils Relative 52 43 - 75 %    Immat GRANS % 0 0 - 2 %    Lymphocytes Relative 29 14 - 44 %    Monocytes Relative 10 4 - 12 %    Eosinophils Relative 8 (H) 0 - 6 %    Basophils Relative 1 0 - 1 %    Neutrophils Absolute 3 42 1 85 - 7 62 Thousands/µL    Immature Grans Absolute 0 02 0 00 - 0 20 Thousand/uL    Lymphocytes Absolute 1 92 0 60 - 4 47 Thousands/µL    Monocytes Absolute 0 66 0 17 - 1 22 Thousand/µL    Eosinophils Absolute 0 56 0 00 - 0 61 Thousand/µL    Basophils Absolute 0 05 0 00 - 0 10 Thousands/µL   Lipid panel   Result Value Ref Range    Cholesterol 126 50 - 200 mg/dL    Triglycerides 66 <=150 mg/dL    HDL, Direct 37 (L) 40 - 60 mg/dL    LDL Calculated 76 0 - 100 mg/dL    Non-HDL-Chol (CHOL-HDL) 89 mg/dl   Urinalysis with reflex to microscopic   Result Value Ref Range    Color, UA Yellow     Clarity, UA Clear     Specific [place], UA 1 018 1 003 - 1 030    pH, UA 6 0 4 5, 5 0, 5 5, 6 0, 6 5, 7 0, 7 5, 8 0    Leukocytes, UA Negative Negative    Nitrite, UA Negative Negative    Protein, UA Trace (A) Negative mg/dl    Glucose, UA Negative Negative mg/dl    Ketones, UA Negative Negative mg/dl    Urobilinogen, UA 0 2 0 2, 1 0 E U /dl E U /dl    Bilirubin, UA Negative Negative    Blood, UA Negative Negative   Hemoglobin A1C Result Value Ref Range    Hemoglobin A1C 7 4 (H) 4 2 - 6 3 %     mg/dl   Phosphorus   Result Value Ref Range    Phosphorus 2 8 2 3 - 4 1 mg/dL   Urine Microscopic   Result Value Ref Range    RBC, UA None Seen None Seen, 0-5 /hpf    WBC, UA None Seen None Seen, 0-5, 5-55, 5-65 /hpf    Epithelial Cells None Seen None Seen, Occasional /hpf    Bacteria, UA None Seen None Seen, Occasional /hpf    Hyaline Casts, UA None Seen None Seen /lpf       Portions of the record may have been created with voice recognition software  Occasional wrong word or "sound a like" substitutions may have occurred due to the inherent limitations of voice recognition software  Read the chart carefully and recognize, using context, where substitutions have occurred  If you have any questions, please contact the dictating provider

## 2019-09-12 NOTE — PATIENT INSTRUCTIONS
Your kidney function remains fairly stable, I would like to see you back in the office in 6 months with repeat labs  Continue with good diabetes control (goal to have a hemoglobin A1c close of below 7%), continue with good blood pressure control to slow down the progression of her kidney disease  Stay well-hydrated  Avoid NSAIDs  Okay to take Tylenol or paracetamol or acetaminophen as needed for pain

## 2019-10-17 LAB
LEFT EYE DIABETIC RETINOPATHY: NORMAL
RIGHT EYE DIABETIC RETINOPATHY: NORMAL

## 2019-11-26 DIAGNOSIS — I10 ESSENTIAL HYPERTENSION: ICD-10-CM

## 2019-11-26 RX ORDER — LISINOPRIL 20 MG/1
TABLET ORAL
Qty: 180 TABLET | Refills: 3 | Status: SHIPPED | OUTPATIENT
Start: 2019-11-26 | End: 2020-07-15 | Stop reason: DRUGHIGH

## 2019-12-23 ENCOUNTER — APPOINTMENT (OUTPATIENT)
Dept: LAB | Facility: CLINIC | Age: 84
End: 2019-12-23
Payer: MEDICARE

## 2019-12-23 DIAGNOSIS — E11.22 CONTROLLED TYPE 2 DIABETES MELLITUS WITH STAGE 3 CHRONIC KIDNEY DISEASE, WITHOUT LONG-TERM CURRENT USE OF INSULIN (HCC): ICD-10-CM

## 2019-12-23 DIAGNOSIS — N18.30 CONTROLLED TYPE 2 DIABETES MELLITUS WITH STAGE 3 CHRONIC KIDNEY DISEASE, WITHOUT LONG-TERM CURRENT USE OF INSULIN (HCC): ICD-10-CM

## 2019-12-23 LAB
ANION GAP SERPL CALCULATED.3IONS-SCNC: 3 MMOL/L (ref 4–13)
BUN SERPL-MCNC: 35 MG/DL (ref 5–25)
CALCIUM SERPL-MCNC: 9.1 MG/DL (ref 8.3–10.1)
CHLORIDE SERPL-SCNC: 108 MMOL/L (ref 100–108)
CO2 SERPL-SCNC: 26 MMOL/L (ref 21–32)
CREAT SERPL-MCNC: 2.21 MG/DL (ref 0.6–1.3)
EST. AVERAGE GLUCOSE BLD GHB EST-MCNC: 171 MG/DL
GFR SERPL CREATININE-BSD FRML MDRD: 25 ML/MIN/1.73SQ M
GLUCOSE P FAST SERPL-MCNC: 141 MG/DL (ref 65–99)
HBA1C MFR BLD: 7.6 % (ref 4.2–6.3)
POTASSIUM SERPL-SCNC: 4.3 MMOL/L (ref 3.5–5.3)
SODIUM SERPL-SCNC: 137 MMOL/L (ref 136–145)

## 2019-12-23 PROCEDURE — 80048 BASIC METABOLIC PNL TOTAL CA: CPT

## 2019-12-23 PROCEDURE — 36415 COLL VENOUS BLD VENIPUNCTURE: CPT

## 2019-12-23 PROCEDURE — 83036 HEMOGLOBIN GLYCOSYLATED A1C: CPT

## 2019-12-30 ENCOUNTER — OFFICE VISIT (OUTPATIENT)
Dept: INTERNAL MEDICINE CLINIC | Facility: CLINIC | Age: 84
End: 2019-12-30
Payer: MEDICARE

## 2019-12-30 VITALS
SYSTOLIC BLOOD PRESSURE: 140 MMHG | DIASTOLIC BLOOD PRESSURE: 62 MMHG | WEIGHT: 197 LBS | HEART RATE: 73 BPM | BODY MASS INDEX: 27.58 KG/M2 | HEIGHT: 71 IN | OXYGEN SATURATION: 98 % | TEMPERATURE: 97.6 F

## 2019-12-30 DIAGNOSIS — E11.22 CONTROLLED TYPE 2 DIABETES MELLITUS WITH STAGE 3 CHRONIC KIDNEY DISEASE, WITHOUT LONG-TERM CURRENT USE OF INSULIN (HCC): ICD-10-CM

## 2019-12-30 DIAGNOSIS — N18.4 BENIGN HYPERTENSION WITH CKD (CHRONIC KIDNEY DISEASE) STAGE IV (HCC): Primary | ICD-10-CM

## 2019-12-30 DIAGNOSIS — N40.0 ENLARGED PROSTATE WITHOUT LOWER URINARY TRACT SYMPTOMS (LUTS): ICD-10-CM

## 2019-12-30 DIAGNOSIS — I12.9 BENIGN HYPERTENSION WITH CKD (CHRONIC KIDNEY DISEASE) STAGE IV (HCC): Primary | ICD-10-CM

## 2019-12-30 DIAGNOSIS — E78.00 PURE HYPERCHOLESTEROLEMIA: ICD-10-CM

## 2019-12-30 DIAGNOSIS — N18.30 CONTROLLED TYPE 2 DIABETES MELLITUS WITH STAGE 3 CHRONIC KIDNEY DISEASE, WITHOUT LONG-TERM CURRENT USE OF INSULIN (HCC): ICD-10-CM

## 2019-12-30 PROCEDURE — 99214 OFFICE O/P EST MOD 30 MIN: CPT | Performed by: INTERNAL MEDICINE

## 2019-12-30 NOTE — PROGRESS NOTES
Assessment/Plan:    Controlled diabetes mellitus ([facility] [address] )    Lab Results   Component Value Date    HGBA1C 7 6 (H) 12/23/2019    Since the patient was taken off of his metformin for his diabetes showing less controlled than previously  He was told that he really needs to work harder on his diet and dietary control of his intake of carbohydrates and concentrated sweets  Will check a fasting blood sugar and hemoglobin A1c with his next visit  Patient is up-to-date with routine screening including diabetic eye exam, diabetic foot exam, urine for microalbumin    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 ([facility])  Patient has a history of hypertension, chronic kidney disease stage 4  Kidney disease most likely secondary to prolonged hypertension long with diabetes  He continues to follow-up with his nephrologist   Blood pressure is variable    Hyperlipidemia  Patient admits that he is not always compliant with his diet as far as his intake of fats and cholesterol  He remains on simvastatin 20 mg a day and omega-3 fatty acids  We continue to monitor his cholesterol levels and modify treatment if necessary in the future  Enlarged prostate without lower urinary tract symptoms (luts)  Patient does have a history of BPH  He remains on Hytrin at 1 mg daily  He states that he read in a paper that if you eat reasons at nighttime it helps with decreasing nocturia  He states he has already tried this and seems to be helpful but I told the patient have no medical knowledge that correlates with this  Diagnoses and all orders for this visit:    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 ([facility])  -     Basic metabolic panel;  Future    Controlled type 2 diabetes mellitus with stage 3 chronic kidney disease, without long-term current use of insulin ([facility])  -     Hemoglobin A1C; Future    Pure hypercholesterolemia    Enlarged prostate without lower urinary tract symptoms (luts) Subjective:      Patient ID: Anais Ray is a 80 y o  male  Patient is a 22-year-old male with history of multiple medical problems as outlined previously  Patient is here today for routine follow-up after a four-month period of time  He did have labs performed prior the visit today we did discuss the results  Patient states in general he is doing about the same and he denies any new complaints or concerns  He continues to follow-up with his nephrologist   He had labs performed prior the visit today we did discuss the results including results of sugar testing, hemoglobin A1c  The following portions of the patient's history were reviewed and updated as appropriate: He  has a past medical history of 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), Diabetes mellitus ([facility] ), Hyperlipidemia, Hypertension, and Osteoarthritis  He   Patient Active Problem List    Diagnosis Date Noted    Secondary hyperparathyroidism of renal origin ([place] [address] ) 04/25/2019    Overweight (BMI 25 0-29 9) 01/25/2019    Stage 4 chronic kidney disease ([place] [address] ) 09/27/2018    CKD stage 4 due to type 2 diabetes mellitus ([place] [address] ) 09/27/2018    Palpitations 09/25/2018    Healthcare maintenance 05/25/2018    Enlarged prostate without lower urinary tract symptoms (luts) 03/07/2013    Controlled diabetes mellitus ([facility] ) 08/09/2012    Hyperlipidemia 08/09/2012    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 ([place] [address] ) 08/09/2012     He  has a past surgical history that includes Colonoscopy (2012); Hemorroidectomy; and Appendectomy  His family history includes Diabetes in his father and mother; Emphysema in his father; Other in his mother  He  reports that he has quit smoking  He has never used smokeless tobacco  He reports that he drinks alcohol  He reports that he does not use drugs    Current Outpatient Medications   Medication Sig Dispense Refill    amLODIPine (NORVASC) 5 mg tablet Take 1 tablet (5 mg total) by mouth daily 90 tablet 3    Cholecalciferol ([employer]S VITAMIN D3) 1000 units capsule Take 1 capsule by mouth daily      CINNAMON PO Take by mouth      cyanocobalamin (VITAMIN B-12) 500 mcg tablet Take 1 tablet by mouth daily      glimepiride (AMARYL) 2 mg tablet Take 1 tablet (2 mg total) by mouth daily with breakfast 90 tablet 3    lisinopril (ZESTRIL) 20 mg tablet TAKE ONE TABLET BY MOUTH TWICE A  tablet 3    Omega-3 Fatty Acids (OMEGA-3 FISH OIL) 300 MG CAPS Take 1 capsule by mouth daily      PRECISION XTRA TEST STRIPS test strip USE TO TEST BLOOD GLUCOSE ONCE DAILY 100 each 2    simvastatin (ZOCOR) 20 mg tablet TAKE ONE TABLET BY MOUTH EVERY DAY 90 tablet 3    terazosin (HYTRIN) 1 mg capsule TAKE ONE CAPSULE BY MOUTH AT BEDTIME 90 capsule 3     No current facility-administered medications for this visit  Current Outpatient Medications on File Prior to Visit   Medication Sig    amLODIPine (NORVASC) 5 mg tablet Take 1 tablet (5 mg total) by mouth daily    Cholecalciferol (CVS VITAMIN D3) 1000 units capsule Take 1 capsule by mouth daily    CINNAMON PO Take by mouth    cyanocobalamin (VITAMIN B-12) 500 mcg tablet Take 1 tablet by mouth daily    glimepiride (AMARYL) 2 mg tablet Take 1 tablet (2 mg total) by mouth daily with breakfast    lisinopril (ZESTRIL) 20 mg tablet TAKE ONE TABLET BY MOUTH TWICE A DAY    Omega-3 Fatty Acids (OMEGA-3 FISH OIL) 300 MG CAPS Take 1 capsule by mouth daily    PRECISION XTRA TEST STRIPS test strip USE TO TEST BLOOD GLUCOSE ONCE DAILY    simvastatin (ZOCOR) 20 mg tablet TAKE ONE TABLET BY MOUTH EVERY DAY    terazosin (HYTRIN) 1 mg capsule TAKE ONE CAPSULE BY MOUTH AT BEDTIME     No current facility-administered medications on file prior to visit  He has No Known Allergies       Review of Systems   Constitutional: Negative  HENT: Negative  Eyes: Negative  Respiratory: Negative  Cardiovascular: Negative  Gastrointestinal: Negative  Endocrine: Negative  Genitourinary: Negative  Musculoskeletal: Positive for arthralgias  Negative for back pain, gait problem, joint swelling, myalgias, neck pain and neck stiffness  Skin: Negative  Allergic/Immunologic: Negative  Hematological: Negative  Psychiatric/Behavioral: Negative  Objective:      /62   Pulse 73   Temp 97 6 °F (36 4 °C)   Ht 5' 11" (1 803 m)   Wt 89 4 kg (197 lb)   SpO2 98%   BMI 27 48 kg/m²          Physical Exam   Constitutional: He is oriented to person, place, and time  He appears well-developed and well-nourished  No distress  Very pleasant, mildly overweight 29-year-old male who is awake alert no acute distress and oriented x3   HENT:   Head: Normocephalic and atraumatic  Right Ear: External ear normal    Left Ear: External ear normal    Nose: Nose normal    Mouth/Throat: Oropharynx is clear and moist  No oropharyngeal exudate  Eyes: Pupils are equal, round, and reactive to light  Conjunctivae and EOM are normal  Right eye exhibits no discharge  Left eye exhibits no discharge  No scleral icterus  Neck: Normal range of motion  Neck supple  No JVD present  No tracheal deviation present  No thyromegaly present  Cardiovascular: Normal rate, regular rhythm, normal heart sounds and intact distal pulses  Exam reveals no gallop and no friction rub  No murmur heard  Pulmonary/Chest: Effort normal and breath sounds normal  No stridor  No respiratory distress  He has no wheezes  He has no rales  He exhibits no tenderness  Abdominal: Soft  Bowel sounds are normal  He exhibits no distension and no mass  There is no tenderness  There is no rebound and no guarding  No hernia  Overweight   Genitourinary: Rectum normal, prostate normal and penis normal  Rectal exam shows guaiac negative stool  No penile tenderness  Musculoskeletal: He exhibits deformity  He exhibits no edema or tenderness     The patient has generalized arthritic changes but not only to the hands and digits, flattening to lumbar spine, no acute lesions or inflammation on exam, decreased range of motion to the lumbar spine both passively and actively but no pain with movement  Lymphadenopathy:     He has no cervical adenopathy  Neurological: He is alert and oriented to person, place, and time  He displays normal reflexes  No cranial nerve deficit or sensory deficit  He exhibits normal muscle tone  Coordination normal    Skin: Skin is warm and dry  Capillary refill takes less than 2 seconds  No rash noted  He is not diaphoretic  No erythema  No pallor  Psychiatric: He has a normal mood and affect  His behavior is normal  Judgment and thought content normal    Nursing note and vitals reviewed

## 2019-12-30 NOTE — ASSESSMENT & PLAN NOTE
Patient admits that he is not always compliant with his diet as far as his intake of fats and cholesterol  He remains on simvastatin 20 mg a day and omega-3 fatty acids  We continue to monitor his cholesterol levels and modify treatment if necessary in the future

## 2019-12-30 NOTE — ASSESSMENT & PLAN NOTE
Lab Results   Component Value Date    HGBA1C 7 6 (H) 12/23/2019    Since the patient was taken off of his metformin for his diabetes showing less controlled than previously  He was told that he really needs to work harder on his diet and dietary control of his intake of carbohydrates and concentrated sweets  Will check a fasting blood sugar and hemoglobin A1c with his next visit    Patient is up-to-date with routine screening including diabetic eye exam, diabetic foot exam, urine for microalbumin

## 2019-12-30 NOTE — ASSESSMENT & PLAN NOTE
Patient has a history of hypertension, chronic kidney disease stage 4  Kidney disease most likely secondary to prolonged hypertension long with diabetes    He continues to follow-up with his nephrologist   Blood pressure is variable

## 2019-12-30 NOTE — ASSESSMENT & PLAN NOTE
Patient does have a history of BPH  He remains on Hytrin at 1 mg daily  He states that he read in a paper that if you eat reasons at nighttime it helps with decreasing nocturia  He states he has already tried this and seems to be helpful but I told the patient have no medical knowledge that correlates with this

## 2020-01-16 DIAGNOSIS — E11.22 CONTROLLED TYPE 2 DIABETES MELLITUS WITH STAGE 3 CHRONIC KIDNEY DISEASE, WITHOUT LONG-TERM CURRENT USE OF INSULIN (HCC): Primary | ICD-10-CM

## 2020-01-16 DIAGNOSIS — N18.30 CONTROLLED TYPE 2 DIABETES MELLITUS WITH STAGE 3 CHRONIC KIDNEY DISEASE, WITHOUT LONG-TERM CURRENT USE OF INSULIN (HCC): Primary | ICD-10-CM

## 2020-01-16 RX ORDER — BLOOD-GLUCOSE METER
EACH MISCELLANEOUS DAILY
Qty: 1 EACH | Refills: 0 | Status: SHIPPED | OUTPATIENT
Start: 2020-01-16

## 2020-02-13 ENCOUNTER — APPOINTMENT (OUTPATIENT)
Dept: LAB | Facility: CLINIC | Age: 85
End: 2020-02-13
Payer: MEDICARE

## 2020-02-13 DIAGNOSIS — N18.4 BENIGN HYPERTENSION WITH CKD (CHRONIC KIDNEY DISEASE) STAGE IV (HCC): ICD-10-CM

## 2020-02-13 DIAGNOSIS — N18.30 CONTROLLED TYPE 2 DIABETES MELLITUS WITH STAGE 3 CHRONIC KIDNEY DISEASE, WITHOUT LONG-TERM CURRENT USE OF INSULIN (HCC): ICD-10-CM

## 2020-02-13 DIAGNOSIS — E11.22 CKD STAGE 4 DUE TO TYPE 2 DIABETES MELLITUS (HCC): ICD-10-CM

## 2020-02-13 DIAGNOSIS — I12.9 BENIGN HYPERTENSION WITH CKD (CHRONIC KIDNEY DISEASE) STAGE IV (HCC): ICD-10-CM

## 2020-02-13 DIAGNOSIS — N18.4 CKD STAGE 4 DUE TO TYPE 2 DIABETES MELLITUS (HCC): ICD-10-CM

## 2020-02-13 DIAGNOSIS — E11.22 CONTROLLED TYPE 2 DIABETES MELLITUS WITH STAGE 3 CHRONIC KIDNEY DISEASE, WITHOUT LONG-TERM CURRENT USE OF INSULIN (HCC): ICD-10-CM

## 2020-02-13 LAB
ALBUMIN SERPL BCP-MCNC: 3.6 G/DL (ref 3.5–5)
ANION GAP SERPL CALCULATED.3IONS-SCNC: 5 MMOL/L (ref 4–13)
BUN SERPL-MCNC: 31 MG/DL (ref 5–25)
CALCIUM SERPL-MCNC: 9 MG/DL (ref 8.3–10.1)
CHLORIDE SERPL-SCNC: 107 MMOL/L (ref 100–108)
CO2 SERPL-SCNC: 25 MMOL/L (ref 21–32)
CREAT SERPL-MCNC: 2.11 MG/DL (ref 0.6–1.3)
CREAT UR-MCNC: 85.3 MG/DL
ERYTHROCYTE [DISTWIDTH] IN BLOOD BY AUTOMATED COUNT: 12.8 % (ref 11.6–15.1)
EST. AVERAGE GLUCOSE BLD GHB EST-MCNC: 171 MG/DL
GFR SERPL CREATININE-BSD FRML MDRD: 27 ML/MIN/1.73SQ M
GLUCOSE P FAST SERPL-MCNC: 150 MG/DL (ref 65–99)
HBA1C MFR BLD: 7.6 %
HCT VFR BLD AUTO: 37.5 % (ref 36.5–49.3)
HGB BLD-MCNC: 12.1 G/DL (ref 12–17)
MCH RBC QN AUTO: 30.7 PG (ref 26.8–34.3)
MCHC RBC AUTO-ENTMCNC: 32.3 G/DL (ref 31.4–37.4)
MCV RBC AUTO: 95 FL (ref 82–98)
PHOSPHATE SERPL-MCNC: 3.1 MG/DL (ref 2.3–4.1)
PLATELET # BLD AUTO: 162 THOUSANDS/UL (ref 149–390)
PMV BLD AUTO: 9.7 FL (ref 8.9–12.7)
POTASSIUM SERPL-SCNC: 4.7 MMOL/L (ref 3.5–5.3)
PROT UR-MCNC: 32 MG/DL
PROT/CREAT UR: 0.38 MG/G{CREAT} (ref 0–0.1)
PTH-INTACT SERPL-MCNC: 113.2 PG/ML (ref 18.4–80.1)
RBC # BLD AUTO: 3.94 MILLION/UL (ref 3.88–5.62)
SODIUM SERPL-SCNC: 137 MMOL/L (ref 136–145)
WBC # BLD AUTO: 6.76 THOUSAND/UL (ref 4.31–10.16)

## 2020-02-13 PROCEDURE — 36415 COLL VENOUS BLD VENIPUNCTURE: CPT

## 2020-02-13 PROCEDURE — 83036 HEMOGLOBIN GLYCOSYLATED A1C: CPT

## 2020-02-13 PROCEDURE — 82570 ASSAY OF URINE CREATININE: CPT

## 2020-02-13 PROCEDURE — 80069 RENAL FUNCTION PANEL: CPT

## 2020-02-13 PROCEDURE — 84156 ASSAY OF PROTEIN URINE: CPT

## 2020-02-13 PROCEDURE — 83970 ASSAY OF PARATHORMONE: CPT

## 2020-02-13 PROCEDURE — 85027 COMPLETE CBC AUTOMATED: CPT

## 2020-02-14 ENCOUNTER — TELEPHONE (OUTPATIENT)
Dept: NEPHROLOGY | Facility: CLINIC | Age: 85
End: 2020-02-14

## 2020-02-14 DIAGNOSIS — E11.9 TYPE 2 DIABETES MELLITUS WITHOUT COMPLICATION, WITHOUT LONG-TERM CURRENT USE OF INSULIN (HCC): ICD-10-CM

## 2020-02-14 RX ORDER — BLOOD SUGAR DIAGNOSTIC
STRIP MISCELLANEOUS
Qty: 100 EACH | Refills: 2 | Status: SHIPPED | OUTPATIENT
Start: 2020-02-14 | End: 2020-02-17

## 2020-02-14 NOTE — TELEPHONE ENCOUNTER
----- Message from Oj Talley MD sent at 2/14/2020 10:18 AM EST -----  Recent blood test reviewed, renal function is stable  Please contact patient and tell him that his labs are essentially unchanged  Please make sure he has a follow-up appointment with me March 2020 (6 months from last office visit)    Thanks

## 2020-02-14 NOTE — TELEPHONE ENCOUNTER
I called and spoke with Jada Salomon  He is aware of his stable lab results   Jada Salomon has been scheduled for a follow up on 3/6/20 @ 10am

## 2020-02-15 DIAGNOSIS — E11.9 TYPE 2 DIABETES MELLITUS WITHOUT COMPLICATION, WITHOUT LONG-TERM CURRENT USE OF INSULIN (HCC): ICD-10-CM

## 2020-02-17 RX ORDER — BLOOD SUGAR DIAGNOSTIC
STRIP MISCELLANEOUS
Qty: 100 EACH | Refills: 2 | Status: SHIPPED | OUTPATIENT
Start: 2020-02-17 | End: 2020-11-27

## 2020-02-25 DIAGNOSIS — N40.0 BENIGN PROSTATIC HYPERPLASIA, UNSPECIFIED WHETHER LOWER URINARY TRACT SYMPTOMS PRESENT: ICD-10-CM

## 2020-02-25 RX ORDER — TERAZOSIN 1 MG/1
CAPSULE ORAL
Qty: 90 CAPSULE | Refills: 3 | Status: SHIPPED | OUTPATIENT
Start: 2020-02-25 | End: 2020-02-27

## 2020-02-27 DIAGNOSIS — N18.30 CONTROLLED TYPE 2 DIABETES MELLITUS WITH STAGE 3 CHRONIC KIDNEY DISEASE, WITHOUT LONG-TERM CURRENT USE OF INSULIN (HCC): ICD-10-CM

## 2020-02-27 DIAGNOSIS — N40.0 BENIGN PROSTATIC HYPERPLASIA, UNSPECIFIED WHETHER LOWER URINARY TRACT SYMPTOMS PRESENT: ICD-10-CM

## 2020-02-27 DIAGNOSIS — N18.4 CKD STAGE 4 DUE TO TYPE 2 DIABETES MELLITUS (HCC): ICD-10-CM

## 2020-02-27 DIAGNOSIS — N18.4 BENIGN HYPERTENSION WITH CKD (CHRONIC KIDNEY DISEASE) STAGE IV (HCC): ICD-10-CM

## 2020-02-27 DIAGNOSIS — E11.22 CKD STAGE 4 DUE TO TYPE 2 DIABETES MELLITUS (HCC): ICD-10-CM

## 2020-02-27 DIAGNOSIS — E11.22 CONTROLLED TYPE 2 DIABETES MELLITUS WITH STAGE 3 CHRONIC KIDNEY DISEASE, WITHOUT LONG-TERM CURRENT USE OF INSULIN (HCC): ICD-10-CM

## 2020-02-27 DIAGNOSIS — I12.9 BENIGN HYPERTENSION WITH CKD (CHRONIC KIDNEY DISEASE) STAGE IV (HCC): ICD-10-CM

## 2020-02-27 RX ORDER — AMLODIPINE BESYLATE 5 MG/1
TABLET ORAL
Qty: 90 TABLET | Refills: 3 | Status: SHIPPED | OUTPATIENT
Start: 2020-02-27 | End: 2020-07-15 | Stop reason: CLARIF

## 2020-02-27 RX ORDER — GLIMEPIRIDE 2 MG/1
TABLET ORAL
Qty: 90 TABLET | Refills: 3 | Status: SHIPPED | OUTPATIENT
Start: 2020-02-27 | End: 2020-11-19 | Stop reason: SDUPTHER

## 2020-02-27 RX ORDER — TERAZOSIN 1 MG/1
CAPSULE ORAL
Qty: 90 CAPSULE | Refills: 3 | Status: SHIPPED | OUTPATIENT
Start: 2020-02-27 | End: 2020-11-19 | Stop reason: SDUPTHER

## 2020-03-06 ENCOUNTER — OFFICE VISIT (OUTPATIENT)
Dept: NEPHROLOGY | Facility: CLINIC | Age: 85
End: 2020-03-06
Payer: MEDICARE

## 2020-03-06 VITALS
DIASTOLIC BLOOD PRESSURE: 60 MMHG | HEART RATE: 72 BPM | BODY MASS INDEX: 28.49 KG/M2 | SYSTOLIC BLOOD PRESSURE: 118 MMHG | RESPIRATION RATE: 18 BRPM | WEIGHT: 199 LBS | HEIGHT: 70 IN

## 2020-03-06 DIAGNOSIS — E78.00 PURE HYPERCHOLESTEROLEMIA: ICD-10-CM

## 2020-03-06 DIAGNOSIS — E11.22 CKD STAGE 4 DUE TO TYPE 2 DIABETES MELLITUS (HCC): Primary | ICD-10-CM

## 2020-03-06 DIAGNOSIS — N18.4 CKD STAGE 4 DUE TO TYPE 2 DIABETES MELLITUS (HCC): Primary | ICD-10-CM

## 2020-03-06 DIAGNOSIS — N18.4 BENIGN HYPERTENSION WITH CKD (CHRONIC KIDNEY DISEASE) STAGE IV (HCC): ICD-10-CM

## 2020-03-06 DIAGNOSIS — I12.9 BENIGN HYPERTENSION WITH CKD (CHRONIC KIDNEY DISEASE) STAGE IV (HCC): ICD-10-CM

## 2020-03-06 DIAGNOSIS — E66.3 OVERWEIGHT (BMI 25.0-29.9): ICD-10-CM

## 2020-03-06 DIAGNOSIS — N18.4 STAGE 4 CHRONIC KIDNEY DISEASE (HCC): ICD-10-CM

## 2020-03-06 DIAGNOSIS — N40.0 ENLARGED PROSTATE WITHOUT LOWER URINARY TRACT SYMPTOMS (LUTS): ICD-10-CM

## 2020-03-06 DIAGNOSIS — N25.81 SECONDARY HYPERPARATHYROIDISM OF RENAL ORIGIN (HCC): ICD-10-CM

## 2020-03-06 PROCEDURE — 3074F SYST BP LT 130 MM HG: CPT | Performed by: INTERNAL MEDICINE

## 2020-03-06 PROCEDURE — 3078F DIAST BP <80 MM HG: CPT | Performed by: INTERNAL MEDICINE

## 2020-03-06 PROCEDURE — 3066F NEPHROPATHY DOC TX: CPT | Performed by: INTERNAL MEDICINE

## 2020-03-06 PROCEDURE — 1160F RVW MEDS BY RX/DR IN RCRD: CPT | Performed by: INTERNAL MEDICINE

## 2020-03-06 PROCEDURE — 3051F HG A1C>EQUAL 7.0%<8.0%: CPT | Performed by: INTERNAL MEDICINE

## 2020-03-06 PROCEDURE — 4040F PNEUMOC VAC/ADMIN/RCVD: CPT | Performed by: INTERNAL MEDICINE

## 2020-03-06 PROCEDURE — 99214 OFFICE O/P EST MOD 30 MIN: CPT | Performed by: INTERNAL MEDICINE

## 2020-03-06 PROCEDURE — 1036F TOBACCO NON-USER: CPT | Performed by: INTERNAL MEDICINE

## 2020-03-06 PROCEDURE — 3008F BODY MASS INDEX DOCD: CPT | Performed by: INTERNAL MEDICINE

## 2020-03-06 NOTE — PATIENT INSTRUCTIONS
Your kidney function remains fairly stable for the last couple of years  I would like to see you back in the office in 6 months with another blood and urine test   Remember to have good blood sugar control as well as good blood pressure control to keep her kidney function is stable  Remember to stay well-hydrated  Avoid NSAIDs  Okay to take Tylenol or paracetamol or acetaminophen as needed for pain

## 2020-03-06 NOTE — PROGRESS NOTES
OFFICE FOLLOW UP - Nephrology   [place] 80 y o  male MRN: [id number]       ASSESSMENT and PLAN:  Antonio Kirk was seen today for follow-up and chronic kidney disease  Diagnoses and all orders for this visit:    CKD stage 4 due to type 2 diabetes mellitus (HCC)  -     PTH, intact; Future  -     Renal function panel; Future  -     Protein / creatinine ratio, urine; Future  -     CBC; Future    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 (HCC)    Stage 4 chronic kidney disease (HCC)    Overweight (BMI 25 0-29  9)    Pure hypercholesterolemia    Secondary hyperparathyroidism of renal origin ([address] )    Enlarged prostate without lower urinary tract symptoms (luts)           This is an 80-year-old gentleman with known history of chronic kidney disease stage 4 who returns to the office for six-month follow-up  1   Chronic kidney disease stage 4, baseline creatinine around 1 9 to 2 2 since 2018  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, atherosclerotic disease, possible diabetes as well as age-related nephron loss  Renal function stable, most recent creatinine at 2 11 with an eGFR 27 with minimal proteinuria  He went to [facility] 11/2018  He still undecided if he will want dialysis or not in the future if needed  No NSAID's     2  Controlled type 2 diabetes, most recent A1c 7 6% on 02/2020,   Management as per primary care doctor  3   Hypertension, blood pressure well controlled, on amlodipine and lisinopril  4   Anemia of chronic kidney disease, Hgb above goal and normal     5   Mineral bone disease, PTH and phosphorus acceptable for his degreee of kidney disease  Follow labs in 6 months  6  Hyperlipidemia, continue with statins, management as per primary doctor  Patient Instructions   Your kidney function remains fairly stable for the last couple of years    I would like to see you back in the office in 6 months with another blood and urine test   Remember to have good blood sugar control as well as good blood pressure control to keep her kidney function is stable  Remember to stay well-hydrated  Avoid NSAIDs  Okay to take Tylenol or paracetamol or acetaminophen as needed for pain  HPI: Oscar Tovar is a 80 y o  male who is here for Follow-up and Chronic Kidney Disease    Last time seen in our office was in 09/2019, today returns for six-month follow-up  Since our last visit, there has been no ER visits or hospitilizations  Patient currently has no complaints at this time and is feeling well  Patient denies any chest pain, shortness of breath and leg swelling  No abdominal pain, no nausea or diarrhea, no constipation  Appetite is stable, weight fairly stable  Denies any urinary problems, nocturia 1-2 per night  He does not smoke, he quit smoking on 1981  Denies NSAID use  The last blood work was done on 02/13/2020, which we have reviewed together  Most recent serum creatinine 2 11 with an eGFR 27, Hgb 12 1, , UPC 0 38  ROS: All the systems were reviewed and were negative except as documented on the H&P  Allergies: Patient has no known allergies      Medications:   Current Outpatient Medications:     amLODIPine (NORVASC) 5 mg tablet, TAKE ONE TABLET BY MOUTH EVERY DAY, Disp: 90 tablet, Rfl: 3    Blood Glucose Monitoring Suppl (PRECISION XTRA MONITOR) MARY, by Does not apply route daily, Disp: 1 each, Rfl: 0    Cholecalciferol (CVS VITAMIN D3) 1000 units capsule, Take 1 capsule by mouth daily, Disp: , Rfl:     CINNAMON PO, Take by mouth, Disp: , Rfl:     cyanocobalamin (VITAMIN B-12) 500 mcg tablet, Take 1 tablet by mouth daily, Disp: , Rfl:     glimepiride (AMARYL) 2 mg tablet, TAKE ONE TABLET BY MOUTH EVERY DAY WITH BREAKFAST , Disp: 90 tablet, Rfl: 3    lisinopril (ZESTRIL) 20 mg tablet, TAKE ONE TABLET BY MOUTH TWICE A DAY, Disp: 180 tablet, Rfl: 3    Omega-3 Fatty Acids (OMEGA-3 FISH OIL) 300 MG CAPS, Take 1 capsule by mouth daily, Disp: , Rfl:     PRECISION XTRA TEST STRIPS test strip, USE TO TEST BLOOD GLUCOSE ONCE DAILY, Disp: 100 each, Rfl: 2    simvastatin (ZOCOR) 20 mg tablet, TAKE ONE TABLET BY MOUTH EVERY DAY, Disp: 90 tablet, Rfl: 3    terazosin (HYTRIN) 1 mg capsule, TAKE ONE CAPSULE BY MOUTH AT BEDTIME, Disp: 90 capsule, Rfl: 3    Past Medical History:   Diagnosis Date    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)     Last Assessed:  5/19/17    Chronic kidney disease     Diabetes mellitus ([facility] [address] )     Hyperlipidemia     Hypertension     Osteoarthritis      Past Surgical History:   Procedure Laterality Date    APPENDECTOMY      COLONOSCOPY  2012    HEMORROIDECTOMY       Family History   Problem Relation Age of Onset    Diabetes Mother     Other Mother         Stroke syndrome    Diabetes Father     Emphysema Father       reports that he has quit smoking  He has never used smokeless tobacco  He reports that he drinks alcohol  He reports that he does not use drugs  Physical Exam:   Vitals:    03/06/20 0943   BP: 118/60   BP Location: Left arm   Patient Position: Sitting   Cuff Size: Standard   Pulse: 72   Resp: 18   Weight: 90 3 kg (199 lb)   Height: 5' 10" (1 778 m)     Body mass index is 28 55 kg/m²      General: conscious, cooperative, in not acute distress  Eyes: conjunctivae pink, anicteric sclerae  ENT: lips and mucous membranes moist  Neck: supple, no JVD  Chest: clear breath sounds bilateral, no crackles, ronchus or wheezings  CVS: distinct S1 & S2, normal rate, regular rhythm  Abdomen: soft, non-tender, non-distended, normoactive bowel sounds  Back: no CVA tenderness  Extremities: no edema of both legs  Skin: no rash  Neuro: awake, alert, oriented          Lab Results:  Results for orders placed or performed in visit on 02/13/20   CBC   Result Value Ref Range    WBC 6 76 4 31 - 10 16 Thousand/uL    RBC 3 94 3 88 - 5 62 Million/uL    Hemoglobin 12 1 12 0 - 17 0 g/dL    Hematocrit 37 5 36 5 - 49 3 %    MCV 95 82 - 98 fL    MCH 30 7 26 8 - 34 3 pg    MCHC 32 3 31 4 - 37 4 g/dL    RDW 12 8 11 6 - 15 1 %    Platelets 606 608 - 906 Thousands/uL    MPV 9 7 8 9 - 12 7 fL   Protein / creatinine ratio, urine   Result Value Ref Range    Creatinine, Ur 85 3 mg/dL    Protein Urine Random 32 mg/dL    Prot/Creat Ratio, Ur 0 38 (H) 0 00 - 0 10   Renal function panel   Result Value Ref Range    Albumin 3 6 3 5 - 5 0 g/dL    Calcium 9 0 8 3 - 10 1 mg/dL    Phosphorus 3 1 2 3 - 4 1 mg/dL    BUN 31 (H) 5 - 25 mg/dL    Creatinine 2 11 (H) 0 60 - 1 30 mg/dL    Sodium 137 136 - 145 mmol/L    Potassium 4 7 3 5 - 5 3 mmol/L    Chloride 107 100 - 108 mmol/L    CO2 25 21 - 32 mmol/L    ANION GAP 5 4 - 13 mmol/L    eGFR 27 ml/min/1 73sq m    Glucose, Fasting 150 (H) 65 - 99 mg/dL   PTH, intact   Result Value Ref Range     2 (H) 18 4 - 80 1 pg/mL   Hemoglobin A1C   Result Value Ref Range    Hemoglobin A1C 7 6 (H) Normal 3 8-5 6%; PreDiabetic 5 7-6 4%; Diabetic >=6 5%; Glycemic control for adults with diabetes <7 0% %     mg/dl       Portions of the record may have been created with voice recognition software  Occasional wrong word or "sound a like" substitutions may have occurred due to the inherent limitations of voice recognition software  Read the chart carefully and recognize, using context, where substitutions have occurred  If you have any questions, please contact the dictating provider

## 2020-06-04 LAB
LEFT EYE DIABETIC RETINOPATHY: NORMAL
RIGHT EYE DIABETIC RETINOPATHY: NORMAL

## 2020-06-08 LAB — HBA1C MFR BLD HPLC: 8.2 %

## 2020-06-15 ENCOUNTER — OFFICE VISIT (OUTPATIENT)
Dept: INTERNAL MEDICINE CLINIC | Facility: CLINIC | Age: 85
End: 2020-06-15
Payer: MEDICARE

## 2020-06-15 VITALS
DIASTOLIC BLOOD PRESSURE: 50 MMHG | HEIGHT: 70 IN | WEIGHT: 194 LBS | TEMPERATURE: 97.8 F | HEART RATE: 78 BPM | BODY MASS INDEX: 27.77 KG/M2 | SYSTOLIC BLOOD PRESSURE: 122 MMHG | OXYGEN SATURATION: 98 %

## 2020-06-15 DIAGNOSIS — N18.4 CKD STAGE 4 DUE TO TYPE 2 DIABETES MELLITUS (HCC): ICD-10-CM

## 2020-06-15 DIAGNOSIS — E11.22 CKD STAGE 4 DUE TO TYPE 2 DIABETES MELLITUS (HCC): ICD-10-CM

## 2020-06-15 DIAGNOSIS — E78.00 PURE HYPERCHOLESTEROLEMIA: ICD-10-CM

## 2020-06-15 DIAGNOSIS — Z00.00 HEALTHCARE MAINTENANCE: ICD-10-CM

## 2020-06-15 DIAGNOSIS — I12.9 BENIGN HYPERTENSION WITH CKD (CHRONIC KIDNEY DISEASE) STAGE IV (HCC): Primary | ICD-10-CM

## 2020-06-15 DIAGNOSIS — N18.4 BENIGN HYPERTENSION WITH CKD (CHRONIC KIDNEY DISEASE) STAGE IV (HCC): Primary | ICD-10-CM

## 2020-06-15 PROCEDURE — 3066F NEPHROPATHY DOC TX: CPT | Performed by: INTERNAL MEDICINE

## 2020-06-15 PROCEDURE — 3074F SYST BP LT 130 MM HG: CPT | Performed by: INTERNAL MEDICINE

## 2020-06-15 PROCEDURE — 3052F HG A1C>EQUAL 8.0%<EQUAL 9.0%: CPT | Performed by: INTERNAL MEDICINE

## 2020-06-15 PROCEDURE — 4040F PNEUMOC VAC/ADMIN/RCVD: CPT | Performed by: INTERNAL MEDICINE

## 2020-06-15 PROCEDURE — 3008F BODY MASS INDEX DOCD: CPT | Performed by: INTERNAL MEDICINE

## 2020-06-15 PROCEDURE — 3078F DIAST BP <80 MM HG: CPT | Performed by: INTERNAL MEDICINE

## 2020-06-15 PROCEDURE — 1036F TOBACCO NON-USER: CPT | Performed by: INTERNAL MEDICINE

## 2020-06-15 PROCEDURE — 1160F RVW MEDS BY RX/DR IN RCRD: CPT | Performed by: INTERNAL MEDICINE

## 2020-06-15 PROCEDURE — 99214 OFFICE O/P EST MOD 30 MIN: CPT | Performed by: INTERNAL MEDICINE

## 2020-06-15 PROCEDURE — 2022F DILAT RTA XM EVC RTNOPTHY: CPT | Performed by: INTERNAL MEDICINE

## 2020-06-26 ENCOUNTER — APPOINTMENT (EMERGENCY)
Dept: RADIOLOGY | Facility: HOSPITAL | Age: 85
DRG: 065 | End: 2020-06-26
Payer: MEDICARE

## 2020-06-26 ENCOUNTER — HOSPITAL ENCOUNTER (INPATIENT)
Facility: HOSPITAL | Age: 85
LOS: 3 days | Discharge: HOME | DRG: 065 | End: 2020-06-29
Attending: EMERGENCY MEDICINE | Admitting: INTERNAL MEDICINE
Payer: MEDICARE

## 2020-06-26 ENCOUNTER — APPOINTMENT (INPATIENT)
Dept: ULTRASOUND IMAGING | Facility: HOSPITAL | Age: 85
DRG: 065 | End: 2020-06-26
Payer: MEDICARE

## 2020-06-26 ENCOUNTER — APPOINTMENT (EMERGENCY)
Dept: CT IMAGING | Facility: HOSPITAL | Age: 85
DRG: 065 | End: 2020-06-26
Payer: MEDICARE

## 2020-06-26 DIAGNOSIS — N17.9 ACUTE-ON-CHRONIC KIDNEY INJURY (HCC): ICD-10-CM

## 2020-06-26 DIAGNOSIS — G45.9 TIA (TRANSIENT ISCHEMIC ATTACK): Primary | ICD-10-CM

## 2020-06-26 DIAGNOSIS — N18.9 ACUTE-ON-CHRONIC KIDNEY INJURY (HCC): ICD-10-CM

## 2020-06-26 DIAGNOSIS — B36.9 FUNGAL DERMATITIS: Primary | ICD-10-CM

## 2020-06-26 DIAGNOSIS — I63.40 CEREBROVASCULAR ACCIDENT (CVA) DUE TO EMBOLISM OF CEREBRAL ARTERY (HCC): ICD-10-CM

## 2020-06-26 DIAGNOSIS — R06.00 DOE (DYSPNEA ON EXERTION): ICD-10-CM

## 2020-06-26 DIAGNOSIS — E78.00 PURE HYPERCHOLESTEROLEMIA: ICD-10-CM

## 2020-06-26 PROBLEM — R29.898 LEFT HAND WEAKNESS: Status: ACTIVE | Noted: 2020-06-26

## 2020-06-26 PROBLEM — E66.3 OVERWEIGHT (BMI 25.0-29.9): Chronic | Status: ACTIVE | Noted: 2019-01-25

## 2020-06-26 PROBLEM — I10 ESSENTIAL HYPERTENSION: Status: ACTIVE | Noted: 2020-06-26

## 2020-06-26 LAB
ANION GAP SERPL CALCULATED.3IONS-SCNC: 11 MMOL/L (ref 4–13)
BASOPHILS # BLD AUTO: 0.04 THOUSANDS/ΜL (ref 0–0.1)
BASOPHILS NFR BLD AUTO: 1 % (ref 0–1)
BUN SERPL-MCNC: 45 MG/DL (ref 5–25)
CALCIUM SERPL-MCNC: 8.2 MG/DL (ref 8.3–10.1)
CHLORIDE SERPL-SCNC: 109 MMOL/L (ref 100–108)
CO2 SERPL-SCNC: 20 MMOL/L (ref 21–32)
CREAT SERPL-MCNC: 2.51 MG/DL (ref 0.6–1.3)
EOSINOPHIL # BLD AUTO: 0.4 THOUSAND/ΜL (ref 0–0.61)
EOSINOPHIL NFR BLD AUTO: 5 % (ref 0–6)
ERYTHROCYTE [DISTWIDTH] IN BLOOD BY AUTOMATED COUNT: 12.8 % (ref 11.6–15.1)
GFR SERPL CREATININE-BSD FRML MDRD: 22 ML/MIN/1.73SQ M
GLUCOSE SERPL-MCNC: 158 MG/DL (ref 65–140)
GLUCOSE SERPL-MCNC: 175 MG/DL (ref 65–140)
GLUCOSE SERPL-MCNC: 243 MG/DL (ref 65–140)
HCT VFR BLD AUTO: 37.6 % (ref 36.5–49.3)
HGB BLD-MCNC: 12.5 G/DL (ref 12–17)
IMM GRANULOCYTES # BLD AUTO: 0.02 THOUSAND/UL (ref 0–0.2)
IMM GRANULOCYTES NFR BLD AUTO: 0 % (ref 0–2)
LYMPHOCYTES # BLD AUTO: 1.85 THOUSANDS/ΜL (ref 0.6–4.47)
LYMPHOCYTES NFR BLD AUTO: 25 % (ref 14–44)
MCH RBC QN AUTO: 31 PG (ref 26.8–34.3)
MCHC RBC AUTO-ENTMCNC: 33.2 G/DL (ref 31.4–37.4)
MCV RBC AUTO: 93 FL (ref 82–98)
MONOCYTES # BLD AUTO: 0.81 THOUSAND/ΜL (ref 0.17–1.22)
MONOCYTES NFR BLD AUTO: 11 % (ref 4–12)
NEUTROPHILS # BLD AUTO: 4.39 THOUSANDS/ΜL (ref 1.85–7.62)
NEUTS SEG NFR BLD AUTO: 58 % (ref 43–75)
NRBC BLD AUTO-RTO: 0 /100 WBCS
PLATELET # BLD AUTO: 185 THOUSANDS/UL (ref 149–390)
PMV BLD AUTO: 10 FL (ref 8.9–12.7)
POTASSIUM SERPL-SCNC: 4.4 MMOL/L (ref 3.5–5.3)
RBC # BLD AUTO: 4.03 MILLION/UL (ref 3.88–5.62)
SODIUM SERPL-SCNC: 140 MMOL/L (ref 136–145)
TROPONIN I SERPL-MCNC: <0.02 NG/ML
WBC # BLD AUTO: 7.51 THOUSAND/UL (ref 4.31–10.16)

## 2020-06-26 PROCEDURE — 70450 CT HEAD/BRAIN W/O DYE: CPT

## 2020-06-26 PROCEDURE — 1123F ACP DISCUSS/DSCN MKR DOCD: CPT | Performed by: EMERGENCY MEDICINE

## 2020-06-26 PROCEDURE — 84484 ASSAY OF TROPONIN QUANT: CPT | Performed by: EMERGENCY MEDICINE

## 2020-06-26 PROCEDURE — 99223 1ST HOSP IP/OBS HIGH 75: CPT | Performed by: INTERNAL MEDICINE

## 2020-06-26 PROCEDURE — 99223 1ST HOSP IP/OBS HIGH 75: CPT | Performed by: PSYCHIATRY & NEUROLOGY

## 2020-06-26 PROCEDURE — 85025 COMPLETE CBC W/AUTO DIFF WBC: CPT | Performed by: EMERGENCY MEDICINE

## 2020-06-26 PROCEDURE — 93005 ELECTROCARDIOGRAM TRACING: CPT

## 2020-06-26 PROCEDURE — 82948 REAGENT STRIP/BLOOD GLUCOSE: CPT

## 2020-06-26 PROCEDURE — 71045 X-RAY EXAM CHEST 1 VIEW: CPT

## 2020-06-26 PROCEDURE — 99285 EMERGENCY DEPT VISIT HI MDM: CPT

## 2020-06-26 PROCEDURE — 93880 EXTRACRANIAL BILAT STUDY: CPT

## 2020-06-26 PROCEDURE — 80048 BASIC METABOLIC PNL TOTAL CA: CPT | Performed by: EMERGENCY MEDICINE

## 2020-06-26 PROCEDURE — 36415 COLL VENOUS BLD VENIPUNCTURE: CPT | Performed by: EMERGENCY MEDICINE

## 2020-06-26 RX ORDER — CLOTRIMAZOLE AND BETAMETHASONE DIPROPIONATE 10; .64 MG/G; MG/G
CREAM TOPICAL 2 TIMES DAILY
Qty: 30 G | Refills: 0 | Status: SHIPPED | OUTPATIENT
Start: 2020-06-26

## 2020-06-26 RX ORDER — ASPIRIN 81 MG/1
81 TABLET, CHEWABLE ORAL DAILY
Status: DISCONTINUED | OUTPATIENT
Start: 2020-06-27 | End: 2020-06-29 | Stop reason: HOSPADM

## 2020-06-26 RX ORDER — CALCIUM CARBONATE 200(500)MG
1000 TABLET,CHEWABLE ORAL DAILY PRN
Status: DISCONTINUED | OUTPATIENT
Start: 2020-06-26 | End: 2020-06-29 | Stop reason: HOSPADM

## 2020-06-26 RX ORDER — AMLODIPINE BESYLATE 5 MG/1
5 TABLET ORAL DAILY
Status: DISCONTINUED | OUTPATIENT
Start: 2020-06-27 | End: 2020-06-29 | Stop reason: HOSPADM

## 2020-06-26 RX ORDER — ONDANSETRON 2 MG/ML
4 INJECTION INTRAMUSCULAR; INTRAVENOUS EVERY 6 HOURS PRN
Status: DISCONTINUED | OUTPATIENT
Start: 2020-06-26 | End: 2020-06-29 | Stop reason: HOSPADM

## 2020-06-26 RX ORDER — SODIUM CHLORIDE 9 MG/ML
75 INJECTION, SOLUTION INTRAVENOUS ONCE
Status: COMPLETED | OUTPATIENT
Start: 2020-06-26 | End: 2020-06-26

## 2020-06-26 RX ORDER — ATORVASTATIN CALCIUM 40 MG/1
40 TABLET, FILM COATED ORAL
Status: DISCONTINUED | OUTPATIENT
Start: 2020-06-26 | End: 2020-06-29 | Stop reason: HOSPADM

## 2020-06-26 RX ORDER — ASPIRIN 81 MG/1
324 TABLET, CHEWABLE ORAL ONCE
Status: COMPLETED | OUTPATIENT
Start: 2020-06-26 | End: 2020-06-26

## 2020-06-26 RX ORDER — SENNOSIDES 8.6 MG
1 TABLET ORAL DAILY
Status: DISCONTINUED | OUTPATIENT
Start: 2020-06-27 | End: 2020-06-29 | Stop reason: HOSPADM

## 2020-06-26 RX ORDER — MELATONIN
1000 DAILY
Status: DISCONTINUED | OUTPATIENT
Start: 2020-06-27 | End: 2020-06-29 | Stop reason: HOSPADM

## 2020-06-26 RX ORDER — TERAZOSIN 1 MG/1
1 CAPSULE ORAL
Status: DISCONTINUED | OUTPATIENT
Start: 2020-06-26 | End: 2020-06-29 | Stop reason: HOSPADM

## 2020-06-26 RX ORDER — HEPARIN SODIUM 5000 [USP'U]/ML
5000 INJECTION, SOLUTION INTRAVENOUS; SUBCUTANEOUS EVERY 8 HOURS SCHEDULED
Status: DISCONTINUED | OUTPATIENT
Start: 2020-06-26 | End: 2020-06-29 | Stop reason: HOSPADM

## 2020-06-26 RX ORDER — CHLORAL HYDRATE 500 MG
1000 CAPSULE ORAL DAILY
Status: DISCONTINUED | OUTPATIENT
Start: 2020-06-27 | End: 2020-06-29 | Stop reason: HOSPADM

## 2020-06-26 RX ADMIN — INSULIN LISPRO 2 UNITS: 100 INJECTION, SOLUTION INTRAVENOUS; SUBCUTANEOUS at 21:31

## 2020-06-26 RX ADMIN — TERAZOSIN HYDROCHLORIDE 1 MG: 1 CAPSULE ORAL at 21:28

## 2020-06-26 RX ADMIN — SODIUM CHLORIDE 75 ML/HR: 0.9 INJECTION, SOLUTION INTRAVENOUS at 19:31

## 2020-06-26 RX ADMIN — ASPIRIN 81 MG 324 MG: 81 TABLET ORAL at 16:51

## 2020-06-26 RX ADMIN — HEPARIN SODIUM 5000 UNITS: 5000 INJECTION INTRAVENOUS; SUBCUTANEOUS at 21:29

## 2020-06-26 RX ADMIN — ATORVASTATIN CALCIUM 40 MG: 40 TABLET, FILM COATED ORAL at 19:33

## 2020-06-26 NOTE — ASSESSMENT & PLAN NOTE
Lab Results   Component Value Date    HGBA1C 8 2 (H) 06/08/2020     Recent Labs     06/26/20  1403   POCGLU 158*     Blood Sugar Average: Last 72 hrs:  (P) 158   · Home regimen includes Amaryl 2 mg daily  · Hold oral hypoglycemic- add sliding scale insulin, Accu-Cheks, diabetic diet

## 2020-06-26 NOTE — ASSESSMENT & PLAN NOTE
· Patient endorses history of palpitations and prior workup included Holter monitor ordered by his PCP in 2018 which revealed occasional PACs and frequent PVCs  · PVCs again noted on telemetry and admission EKG  · Check echocardiogram as per stroke workup  · Pt also complains of easy fatigability, r/o structural heart disease in the setting of PVCs

## 2020-06-26 NOTE — CONSULTS
Consultation - Neurology   Shankar Palomino 80 y o  male MRN: [id number]  Unit/Bed#: ED 24 Encounter: [id number]    Assessment/Plan   Assessment:  3 80year-old with multiple vascular risk factors who presented with left hand weakness and slurred speech  BP on arrival was 176/81  CT head was negative for hemorrhage  Will obtain MRI brain to rule out acute infarction  Plan:  - Initiate stroke pathway  - Patient was given ASA 324mg in the ED  - MRI brain pending  - Recommend echocardiogram  - Recommend MRA head and neck or carotid ultrasound (unable to obtain CTA due to kidney dysfunction)  - Initiate ASA 81mg  - Initiate Lipitor 40mg  - Obtain lipid panel and HgbA1c   - Neuro checks  - Telemetry  - PT/OT/ST  - Notify Neurology with any changes in neuro examination  - Medical management and supportive care as per primary team    Results:  1  CT head: no acute intracranial abnormality    History of Present Illness     Reason for Consult / Principal Problem: TIA, new onset  Hx and PE limited by: N/A  HPI: Shankar Palomino is a 80 y o  male with DTM, HLD, CKD, DM who presented to [facility] ED on 6/26 for left hand weakness and slurred speech  Patient woke up this morning in a normal state  Patient states that this morning he was eating a breakfast sandwich when he dropped something and attempted to pick it up  He states that he was experiencing left hand weakness and was unable to grasp the object with his left hand  Then, patient went outside to get the mail  He reports that the mail fell out of his left hand because he was unable to close his fingers around it completely  This episode occurred approximately around 9am and lasted for 30 minutes  Patient called his nephew around 11:45am  On the telephone, patient's nephew noted that patient's speech sound slurred  Patient's nephew drove him to the ED, and in the car-ride, he reported that patient's speech was very slurred for about 5 minutes and resolved   However, patient reports that his speech did not sound slurred to him  Patient reports a similar episode approximately one month ago, when he dropped his keys and was having difficulty pick them up in his left hand as well  Upon arrival to the ED, BP of 176/81  CT head showed no acute intracranial abnormality  At baseline, patient is on simvastatin 20mg and is not on any antiplatelet agents or anticoagulation  Patient denies history of prior stroke  He reports a family history of strokes in both of his parents  He denied difficulty ambulating, numbness, dizziness, lightheadedness, and changes in vision during the episode  Patient and his nephew report that the symptoms resolved in the ED  Patient was given ASA 324mg in the ED  Consult to Neurology  Consult performed by: Cleveland Lou PA-C  Consult ordered by: Sofia Allen MD        ROS:  A 12 point ROS was completed  Other than the above mentioned complaints in the HPI, all remaining systems were negative  Historical Information   Past Medical History:   Diagnosis Date    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)     Last Assessed:  5/19/17    Chronic kidney disease     Diabetes mellitus ([facility] [address] )     Hyperlipidemia     Hypertension     Osteoarthritis      Past Surgical History:   Procedure Laterality Date    APPENDECTOMY      COLONOSCOPY  2012    HEMORROIDECTOMY       Social History   Social History     Substance and Sexual Activity   Alcohol Use Yes    Comment: Social drinker     Social History     Substance and Sexual Activity   Drug Use No     E-Cigarette/Vaping     E-Cigarette/Vaping Substances     Social History     Tobacco Use   Smoking Status Former Smoker   Smokeless Tobacco Never Used     Family History:   Family History   Problem Relation Age of Onset    Diabetes Mother     Other Mother         Stroke syndrome    Diabetes Father     Emphysema Father      Review of previous medical records was completed       Meds/Allergies   current meds:   No current facility-administered medications for this encounter  and PTA meds:   Prior to Admission Medications   Prescriptions Last Dose Informant Patient Reported? Taking? Blood Glucose Monitoring Suppl (PRECISION XTRA MONITOR) MARY  Self No No   Sig: by Does not apply route daily   CINNAMON PO  Self Yes No   Sig: Take by mouth   Cholecalciferol ([employer] VITAMIN D3) 1000 units capsule  Self Yes No   Sig: Take 1 capsule by mouth daily   Omega-3 Fatty Acids (OMEGA-3 FISH OIL) 300 MG CAPS  Self Yes No   Sig: Take 1 capsule by mouth daily   PRECISION XTRA TEST STRIPS test strip  Self No No   Sig: USE TO TEST BLOOD GLUCOSE ONCE DAILY   amLODIPine (NORVASC) 5 mg tablet  Self No No   Sig: TAKE ONE TABLET BY MOUTH EVERY DAY   cyanocobalamin (VITAMIN B-12) 500 mcg tablet  Self Yes No   Sig: Take 1 tablet by mouth daily   glimepiride (AMARYL) 2 mg tablet  Self No No   Sig: TAKE ONE TABLET BY MOUTH EVERY DAY WITH BREAKFAST    lisinopril (ZESTRIL) 20 mg tablet  Self No No   Sig: TAKE ONE TABLET BY MOUTH TWICE A DAY   simvastatin (ZOCOR) 20 mg tablet  Self No No   Sig: TAKE ONE TABLET BY MOUTH EVERY DAY   terazosin (HYTRIN) 1 mg capsule  Self No No   Sig: TAKE ONE CAPSULE BY MOUTH AT BEDTIME      Facility-Administered Medications: None       No Known Allergies    Objective   Vitals:Blood pressure 155/72, pulse 75, temperature (!) 97 3 °F (36 3 °C), temperature source Oral, resp  rate 16, weight 86 4 kg (190 lb 7 6 oz), SpO2 99 %  ,Body mass index is 27 33 kg/m²  No intake or output data in the 24 hours ending 06/26/20 1516    Invasive Devices: Invasive Devices     Peripheral Intravenous Line            Peripheral IV 06/26/20 Left Arm less than 1 day              Physical Exam   Constitutional: No distress  Elderly male resting in bed  In no acute distress  HENT:   Head: Normocephalic and atraumatic     Right Ear: External ear normal    Left Ear: External ear normal    Nose: Nose normal    Eyes: Pupils are equal, round, and reactive to light  EOM are normal  Right eye exhibits no discharge  Left eye exhibits no discharge  Pulmonary/Chest: No respiratory distress  Neurological: He is alert  He has normal strength  He has a normal Finger-Nose-Finger Test    Reflex Scores:       Bicep reflexes are 1+ on the right side and 1+ on the left side  Brachioradialis reflexes are 1+ on the right side and 1+ on the left side  Patellar reflexes are 2+ on the right side and 2+ on the left side  Achilles reflexes are 0 on the right side and 0 on the left side  Skin: Skin is warm and dry  He is not diaphoretic  Psychiatric: He has a normal mood and affect  His behavior is normal  Judgment and thought content normal      Neurologic Exam     Mental Status   Patient is alert and oriented to person, place, and date  Patient is able to name simple objects  Patient is able to repeat, no evidence of significant dysarthria     Cranial Nerves     CN II   Visual fields full to confrontation  CN III, IV, VI   Pupils are equal, round, and reactive to light  Extraocular motions are normal    Conjugate gaze: present    CN V   Facial sensation intact  CN VII   Facial expression full, symmetric  CN VIII   Hearing impaired: Grossly intact  CN XII   CN XII normal      Motor Exam   Right arm pronator drift: absent  Left arm pronator drift: Very slight L pronator drift  Strength   Strength 5/5 throughout  Sensory Exam   Light touch normal    Right arm vibration: normal  Left arm vibration: normal  Temperature sensation intact X 4 extremities     Gait, Coordination, and Reflexes     Coordination   Finger to nose coordination: normal    Tremor   Resting tremor: absent    Reflexes   Right brachioradialis: 1+  Left brachioradialis: 1+  Right biceps: 1+  Left biceps: 1+  Right patellar: 2+  Left patellar: 2+  Right achilles: 0  Left achilles: 0    Lab Results: I have personally reviewed pertinent reports      Imaging Studies: I have personally reviewed pertinent reports  and I have personally reviewed pertinent films in PACS  EKG, Pathology, and Other Studies: I have personally reviewed pertinent reports      VTE Prophylaxis: Sequential compression device Andriy Velásquez)     Code Status: No Order  Advance Directive and Living Will:      Power of :    POLST:

## 2020-06-26 NOTE — ASSESSMENT & PLAN NOTE
· Creatinine on admission slightly higher than baseline 2 5  · Baseline creatinine since 2018 around 1 9-2 2  · Patient known to Dr Gordon Mejia, chronic kidney disease likely due to hypertensive nephrosclerosis, atherosclerotic disease, diabetes as well as age-related nephron loss  · Patient undecided if he wishes to pursue dialysis in the future  · Avoid NSAIDs and nephrotoxins, will hydrate with 1L IVF overnight   · Hold lisinopril for now

## 2020-06-26 NOTE — ED PROCEDURE NOTE
PROCEDURE  ECG 12 Lead Documentation Only  Date/Time: 6/26/2020 3:48 PM  Performed by: Andrew Espinosa MD  Authorized by: Andrew Espinosa MD     Indications / Diagnosis:  [place]  ECG reviewed by me, the ED Provider: yes    Patient location:  ED and bedside  Previous ECG:     Previous ECG:  Compared to current    Comparison ECG info:  7/21/12- rbbb and t wave cahgnes are new     Similarity:  Changes noted    Comparison to cardiac monitor: Yes    Interpretation:     Interpretation: non-specific    Rate:     ECG rate:  86    ECG rate assessment: normal    Rhythm:     Rhythm: sinus rhythm    Ectopy:     Ectopy: PVCs      PVCs:  Infrequent and multifocal  QRS:     QRS axis:  Normal    QRS intervals:   Wide  Conduction:     Conduction: abnormal      Abnormal conduction: complete RBBB    ST segments:     ST segments:  Normal  T waves:     T waves: flattening      Flattening:  AVL, V1, V2 and V3  Q waves:     Q waves:  V1  Comments:      No ecg signs of ischemia/ injury         Andrew Espinosa MD  06/26/20 1554

## 2020-06-26 NOTE — ASSESSMENT & PLAN NOTE
· Blood pressure elevated on admission in the setting of possible TIA  · Home regimen includes lisinopril 20 mg twice daily, Norvasc 5 mg daily, terazosin 1 mg q h s   · Hold lisinopril due to slight bump in creatinine today

## 2020-06-26 NOTE — PLAN OF CARE
Problem: Potential for Falls  Goal: Patient will remain free of falls  Description  INTERVENTIONS:  - Assess patient frequently for physical needs  -  Identify cognitive and physical deficits and behaviors that affect risk of falls    -  [place] fall precautions as indicated by assessment   - Educate patient/family on patient safety including physical limitations  - Instruct patient to call for assistance with activity based on assessment  - Modify environment to reduce risk of injury  - Consider OT/PT consult to assist with strengthening/mobility  Outcome: Progressing     Problem: PAIN - ADULT  Goal: Verbalizes/displays adequate comfort level or baseline comfort level  Description  Interventions:  - Encourage patient to monitor pain and request assistance  - Assess pain using appropriate pain scale  - Administer analgesics based on type and severity of pain and evaluate response  - Implement non-pharmacological measures as appropriate and evaluate response  - Consider cultural and social influences on pain and pain management  - Notify physician/advanced practitioner if interventions unsuccessful or patient reports new pain  Outcome: Progressing     Problem: SAFETY ADULT  Goal: Maintain or return to baseline ADL function  Description  INTERVENTIONS:  -  Assess patient's ability to carry out ADLs; assess patient's baseline for ADL function and identify physical deficits which impact ability to perform ADLs (bathing, care of mouth/teeth, toileting, grooming, dressing, etc )  - Assess/evaluate cause of self-care deficits   - Assess range of motion  - Assess patient's mobility; develop plan if impaired  - Assess patient's need for assistive devices and provide as appropriate  - Encourage maximum independence but intervene and supervise when necessary  - Involve family in performance of ADLs  - Assess for home care needs following discharge   - Consider OT consult to assist with ADL evaluation and planning for discharge  - Provide patient education as appropriate  Outcome: Progressing  Goal: Maintain or return mobility status to optimal level  Description  INTERVENTIONS:  - Assess patient's baseline mobility status (ambulation, transfers, stairs, etc )    - Identify cognitive and physical deficits and behaviors that affect mobility  - Identify mobility aids required to assist with transfers and/or ambulation (gait belt, sit-to-stand, lift, walker, cane, etc )  - [place] fall precautions as indicated by assessment  - Record patient progress and toleration of activity level on Mobility SBAR; progress patient to next Phase/Stage  - Instruct patient to call for assistance with activity based on assessment  - Consider rehabilitation consult to assist with strengthening/weightbearing, etc   Outcome: Progressing     Problem: DISCHARGE PLANNING  Goal: Discharge to home or other facility with appropriate resources  Description  INTERVENTIONS:  - Identify barriers to discharge w/patient and caregiver  - Arrange for needed discharge resources and transportation as appropriate  - Identify discharge learning needs (meds, wound care, etc )  - Arrange for interpretive services to assist at discharge as needed  - Refer to Case Management Department for coordinating discharge planning if the patient needs post-hospital services based on physician/advanced practitioner order or complex needs related to functional status, cognitive ability, or social support system  Outcome: Progressing     Problem: Knowledge Deficit  Goal: Patient/family/caregiver demonstrates understanding of disease process, treatment plan, medications, and discharge instructions  Description  Complete learning assessment and assess knowledge base    Interventions:  - Provide teaching at level of understanding  - Provide teaching via preferred learning methods  Outcome: Progressing     Problem: NEUROSENSORY - ADULT  Goal: Achieves stable or improved neurological status  Description  INTERVENTIONS  - Monitor and report changes in neurological status  - Monitor vital signs such as temperature, blood pressure, glucose, and any other labs ordered   - Initiate measures to prevent increased intracranial pressure  - Monitor for seizure activity and implement precautions if appropriate      Outcome: Progressing  Goal: Remains free of injury related to seizures activity  Description  INTERVENTIONS  - Maintain airway, patient safety  and administer oxygen as ordered  - Monitor patient for seizure activity, document and report duration and description of seizure to physician/advanced practitioner  - If seizure occurs,  ensure patient safety during seizure  - Reorient patient post seizure  - Seizure pads on all 4 side rails  - Instruct patient/family to notify RN of any seizure activity including if an aura is experienced  - Instruct patient/family to call for assistance with activity based on nursing assessment  - Administer anti-seizure medications if ordered    Outcome: Progressing  Goal: Achieves maximal functionality and self care  Description  INTERVENTIONS  - Monitor swallowing and airway patency with patient fatigue and changes in neurological status  - Encourage and assist patient to increase activity and self care     - Encourage visually impaired, hearing impaired and aphasic patients to use assistive/communication devices  Outcome: Progressing     Problem: CARDIOVASCULAR - ADULT  Goal: Maintains optimal cardiac output and hemodynamic stability  Description  INTERVENTIONS:  - Monitor I/O, vital signs and rhythm  - Monitor for S/S and trends of decreased cardiac output  - Administer and titrate ordered vasoactive medications to optimize hemodynamic stability  - Assess quality of pulses, skin color and temperature  - Assess for signs of decreased coronary artery perfusion  - Instruct patient to report change in severity of symptoms  Outcome: Progressing  Goal: Absence of cardiac dysrhythmias or at baseline rhythm  Description  INTERVENTIONS:  - Continuous cardiac monitoring, vital signs, obtain 12 lead EKG if ordered  - Administer antiarrhythmic and heart rate control medications as ordered  - Monitor electrolytes and administer replacement therapy as ordered  Outcome: Progressing     Problem: RESPIRATORY - ADULT  Goal: Achieves optimal ventilation and oxygenation  Description  INTERVENTIONS:  - Assess for changes in respiratory status  - Assess for changes in mentation and behavior  - Position to facilitate oxygenation and minimize respiratory effort  - Oxygen administered by appropriate delivery if ordered  - Initiate smoking cessation education as indicated  - Encourage broncho-pulmonary hygiene including cough, deep breathe, Incentive Spirometry  - Assess the need for suctioning and aspirate as needed  - Assess and instruct to report SOB or any respiratory difficulty  - Respiratory Therapy support as indicated  Outcome: Progressing     Problem: METABOLIC, FLUID AND ELECTROLYTES - ADULT  Goal: Electrolytes maintained within normal limits  Description  INTERVENTIONS:  - Monitor labs and assess patient for signs and symptoms of electrolyte imbalances  - Administer electrolyte replacement as ordered  - Monitor response to electrolyte replacements, including repeat lab results as appropriate  - Instruct patient on fluid and nutrition as appropriate  Outcome: Progressing     Problem: SKIN/TISSUE INTEGRITY - ADULT  Goal: Skin integrity remains intact  Description  INTERVENTIONS  - Identify patients at risk for skin breakdown  - Assess and monitor skin integrity  - Assess and monitor nutrition and hydration status  - Monitor labs (i e  albumin)  - Assess for incontinence   - Turn and reposition patient  - Assist with mobility/ambulation  - Relieve pressure over bony prominences  - Avoid friction and shearing  - Provide appropriate hygiene as needed including keeping skin clean and dry  - Evaluate need for skin moisturizer/barrier cream  - Collaborate with interdisciplinary team (i e  Nutrition, Rehabilitation, etc )   - Patient/family teaching  Outcome: Progressing  Goal: Incision(s), wounds(s) or drain site(s) healing without S/S of infection  Description  INTERVENTIONS  - Assess and document risk factors for skin impairment   - Assess and document dressing, incision, wound bed, drain sites and surrounding tissue  - Consider nutrition services referral as needed  - Oral mucous membranes remain intact  - Provide patient/ family education  Outcome: Progressing  Goal: Oral mucous membranes remain intact  Description  INTERVENTIONS  - Assess oral mucosa and hygiene practices  - Implement preventative oral hygiene regimen  - Implement oral medicated treatments as ordered  - Initiate Nutrition services referral as needed  Outcome: Progressing     Problem: MUSCULOSKELETAL - ADULT  Goal: Maintain or return mobility to safest level of function  Description  INTERVENTIONS:  - Assess patient's ability to carry out ADLs; assess patient's baseline for ADL function and identify physical deficits which impact ability to perform ADLs (bathing, care of mouth/teeth, toileting, grooming, dressing, etc )  - Assess/evaluate cause of self-care deficits   - Assess range of motion  - Assess patient's mobility  - Assess patient's need for assistive devices and provide as appropriate  - Encourage maximum independence but intervene and supervise when necessary  - Involve family in performance of ADLs  - Assess for home care needs following discharge   - Consider OT consult to assist with ADL evaluation and planning for discharge  - Provide patient education as appropriate  Outcome: Progressing  Goal: Maintain proper alignment of affected body part  Description  INTERVENTIONS:  - Support, maintain and protect limb and body alignment  - Provide patient/ family with appropriate education  Outcome: Progressing

## 2020-06-26 NOTE — ASSESSMENT & PLAN NOTE
· Patient admitted with approximately 30 minute episode of left hand weakness  Slurred speech noted by patient's nephew  Now resolved    · Admit on stroke pathway, neurology consult appreciated  · CT-H negative for intracranial abnormality  · Will check MRI brain, carotid duplex, echocardiogram  · Recent A1c 8 2, check LDL, UA, TSH, W03, folic acid, thiamine, Vit-D  · Start aspirin, statin  · PT/OT/SLP

## 2020-06-26 NOTE — H&P
H&P- Mercedez Able 12/11/1930, 80 y o  male MRN: [id number]  Unit/Bed#: ED 24 Encounter: [id number] DOS: 6/26/2020  Primary Care Provider: Sarita Herr DO   Date and time admitted to hospital: 6/26/2020  2:00 PM    * Left hand weakness  Assessment & Plan  · Patient admitted with approximately 30 minute episode of left hand weakness  Slurred speech noted by patient's nephew  Now resolved    · Admit on stroke pathway, neurology consult appreciated  · CT-H negative for intracranial abnormality  · Will check MRI brain, carotid duplex, echocardiogram  · Recent A1c 8 2, check LDL, UA, TSH, E73, folic acid, thiamine, Vit-D  · Start aspirin, statin  · PT/OT/SLP    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 ([facility])  Assessment & Plan  · Creatinine on admission slightly higher than baseline 2 5  · Baseline creatinine since 2018 around 1 9-2 2  · Patient known to Dr Nichole Hernandez, chronic kidney disease likely due to hypertensive nephrosclerosis, atherosclerotic disease, diabetes as well as age-related nephron loss  · Patient undecided if he wishes to pursue dialysis in the future  · Avoid NSAIDs and nephrotoxins, will hydrate with 1L IVF overnight   · Hold lisinopril for now    Controlled diabetes mellitus [facility])  Assessment & Plan  Lab Results   Component Value Date    HGBA1C 8 2 (H) 06/08/2020     Recent Labs     06/26/20  1403   POCGLU 158*     Blood Sugar Average: Last 72 hrs:  (P) 158   · Home regimen includes Amaryl 2 mg daily  · Hold oral hypoglycemic- add sliding scale insulin, Accu-Cheks, diabetic diet    Palpitations  Assessment & Plan  · Patient endorses history of palpitations and prior workup included Holter monitor ordered by his PCP in 2018 which revealed occasional PACs and frequent PVCs  · PVCs again noted on telemetry and admission EKG  · Check echocardiogram as per stroke workup  · Pt also complains of easy fatigability, r/o structural heart disease in the setting of PVCs    Essential hypertension  Assessment & Plan  · Blood pressure elevated on admission in the setting of possible TIA  · Home regimen includes lisinopril 20 mg twice daily, Norvasc 5 mg daily, terazosin 1 mg q h s   · Hold lisinopril due to slight bump in creatinine today    Hyperlipidemia  Assessment & Plan  · Patient on Zocor 20 mg daily at home, most recent LDL 1 year ago was 68  · Recheck FLP    Enlarged prostate without lower urinary tract symptoms (luts)  Assessment & Plan  · Continue terazosin    VTE Prophylaxis: Heparin  / sequential compression device   Code Status: DNR DNI - d/w pt   POLST: POLST form is not discussed and not completed at this time  Discussion with family: nephew at bedside     Anticipated Length of Stay:  Patient will be admitted on an Inpatient basis with an anticipated length of stay of  > 2 midnights  Justification for Hospital Stay: TIA work up     Total Time for Visit, including Counseling / Coordination of Care: 45 minutes  Greater than 50% of this total time spent on direct patient counseling and coordination of care  Chief Complaint:   Left hand weakness     History of Present Illness:    Romain Cai is a 80 y o  male with past medical history significant for hypertension, hyperlipidemia, stage 4 chronic kidney disease, obesity, BPH who presents with left hand weakness  Patient reports otherwise waking up in his normal state of health today and went to [facility] for breakfast sandwich  States when he got home he sat down to watch TV and eat his sandwich and felt something was wrong in his left hand after he dropped to sandwich and did not realize it  States he ended up finishing his breakfast and went outside to  his mail and newspaper and dropped is male  Sat down to read the newspaper and felt he could not hold the paper in his left hand  States this resolved about 30-60 minutes without recurrence throughout the day    He later on recalls an episode of similar hand weakness that occurred about 1 month ago in his left hand  States he kept dropping his keys  Also recalls that his mom had a mini-stroke and his dad also has history of stroke and felt he should come to the ER after talking to his nephew who works here  Patient states he occasionally has generalized fatigue with doing moderate activity likely needing his house  States getting dressed in the morning, showering and walking up a flight of steps does not cause him to feel fatigued  He denies shortness of breath but nephew at bedside does state he had some difficulty breathing earlier today when he picked him up  Patient denies history of stroke/TIA/mi/CAD/peripheral arterial disease  Denies chest pain but states he does get palpitations at times and was worked up with a Holter monitor in the past by his PCP  States he occasionally thinks his heart rates are low when he checks his pulse is  He admits to chronic pain but denies low back pain or sciatica type discomfort  Denies fevers or chills recently or sick contacts  States he resides alone and is very independent, still drives goes to grocery store and cooks for himself, etc   Currently his left hand weakness has resolved and he denies difficulty swallowing, facial weakness or numbness, is moving all 4 extremities without difficulty and actually talking with his left hand  Nephew at bedside states he noticed he had slurring of his words earlier  Patient himself denies difficulty forming sentences , finding the words to say or having difficulty speaking  He denies difficulty ambulating throughout the day  No numbness or tingling in the lower extremities  States he knows the signs and symptoms of a stroke since he is a retired   Review of Systems:    Review of Systems   Constitutional: Positive for fatigue  Negative for chills and fever  HENT: Negative for congestion  Respiratory: Negative for cough and shortness of breath      Cardiovascular: Positive for palpitations  Negative for chest pain and leg swelling  Gastrointestinal: Negative for abdominal pain, diarrhea, nausea and vomiting  Genitourinary: Negative for dysuria  Musculoskeletal: Positive for back pain (buttock)  Negative for gait problem  Neurological: Negative for dizziness, light-headedness and headaches  Psychiatric/Behavioral: Negative for confusion  Past Medical and Surgical History:     Past Medical History:   Diagnosis Date    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)     Last Assessed:  5/19/17    Chronic kidney disease     Diabetes mellitus ([facility] [address] )     Hyperlipidemia     Hypertension     Osteoarthritis        Past Surgical History:   Procedure Laterality Date    APPENDECTOMY      COLONOSCOPY  2012    HEMORROIDECTOMY         Meds/Allergies:    Prior to Admission medications    Medication Sig Start Date End Date Taking?  Authorizing Provider   amLODIPine (NORVASC) 5 mg tablet TAKE ONE TABLET BY MOUTH EVERY DAY 2/27/20   Carlos Garcia DO   Blood Glucose Monitoring Suppl (PRECISION XTRA MONITOR) MARY by Does not apply route daily 1/16/20   Carlos Garcia DO   Cholecalciferol ([employer] VITAMIN D3) 1000 units capsule Take 1 capsule by mouth daily    Historical Provider, MD   CINNAMON PO Take by mouth    Historical Provider, MD   clotrimazole-betamethasone (LOTRISONE) 1-0 05 % cream Apply topically 2 (two) times a day 6/26/20   Carlos Garcia DO   cyanocobalamin (VITAMIN B-12) 500 mcg tablet Take 1 tablet by mouth daily    Historical Provider, MD   glimepiride (AMARYL) 2 mg tablet TAKE ONE TABLET BY MOUTH EVERY DAY WITH BREAKFAST  2/27/20   Carlos Garcia DO   lisinopril (ZESTRIL) 20 mg tablet TAKE ONE TABLET BY MOUTH TWICE A DAY 11/26/19   Nicole Brian MD   Omega-3 Fatty Acids (OMEGA-3 FISH OIL) 300 MG CAPS Take 1 capsule by mouth daily    Historical Provider, MD   PRECISION XTRA TEST STRIPS test strip USE TO TEST BLOOD GLUCOSE ONCE DAILY 2/17/20   Julieta Mari DO Cristian   simvastatin (ZOCOR) 20 mg tablet TAKE ONE TABLET BY MOUTH EVERY DAY 8/28/19   Ledy Bruno DO   terazosin (HYTRIN) 1 mg capsule TAKE ONE CAPSULE BY MOUTH AT BEDTIME 2/27/20   Ledy Bruno DO     I have reviewed home medications with patient personally  Allergies: No Known Allergies    Social History:     Marital Status: Single   Occupation: retired    Patient Pre-hospital Living Situation: alone   Patient Pre-hospital Level of Mobility: no limits   Patient Pre-hospital Diet Restrictions: none   Substance Use History:   Social History     Substance and Sexual Activity   Alcohol Use Yes    Comment: Social drinker     Social History     Tobacco Use   Smoking Status Former Smoker   Smokeless Tobacco Never Used     Social History     Substance and Sexual Activity   Drug Use No       Family History:    Family History   Problem Relation Age of Onset    Diabetes Mother     Other Mother         Stroke syndrome    Diabetes Father     Emphysema Father        Physical Exam:     Vitals:   Blood Pressure: 155/72 (06/26/20 1450)  Pulse: 75 (06/26/20 1450)  Temperature: (!) 97 3 °F (36 3 °C) (06/26/20 1405)  Temp Source: Oral (06/26/20 1405)  Respirations: 16 (06/26/20 1450)  Weight - Scale: 86 4 kg (190 lb 7 6 oz) (06/26/20 1405)  SpO2: 99 % (06/26/20 1450)    Physical Exam   Constitutional: He is oriented to person, place, and time  He appears well-developed and well-nourished  No distress  Appears younger than stated age   HENT:   Head: Normocephalic and atraumatic  Eyes: EOM are normal  No scleral icterus  Neck: Normal range of motion  Neck supple  Cardiovascular: Normal rate, regular rhythm and normal heart sounds  Pulmonary/Chest: Effort normal and breath sounds normal    Abdominal: Soft  Bowel sounds are normal  There is no tenderness  Musculoskeletal: Normal range of motion  He exhibits no edema  Neurological: He is alert and oriented to person, place, and time     Moves all 4 extremities and strength intact 5/5 proximal and distally in all 4 extremities  No gross sensory deficit noted over extremities or face  No obvious cranial nerve deficit appreciated  Negative pronator drift  Speech is clear  Skin: Skin is warm and dry  Psychiatric: He has a normal mood and affect  Additional Data:     Lab Results: I have personally reviewed pertinent reports  Results from last 7 days   Lab Units 06/26/20  1446   WBC Thousand/uL 7 51   HEMOGLOBIN g/dL 12 5   HEMATOCRIT % 37 6   PLATELETS Thousands/uL 185   NEUTROS PCT % 58   LYMPHS PCT % 25   MONOS PCT % 11   EOS PCT % 5     Results from last 7 days   Lab Units 06/26/20  1446   SODIUM mmol/L 140   POTASSIUM mmol/L 4 4   CHLORIDE mmol/L 109*   CO2 mmol/L 20*   BUN mg/dL 45*   CREATININE mg/dL 2 51*   ANION GAP mmol/L 11   CALCIUM mg/dL 8 2*   GLUCOSE RANDOM mg/dL 175*         Results from last 7 days   Lab Units 06/26/20  1403   POC GLUCOSE mg/dl 158*               Imaging: I have personally reviewed pertinent reports  CT head without contrast   Final Result by Tom Gibson MD (06/26 1507)      No acute intracranial abnormality  Workstation performed: [id number]         XR chest 1 view portable   ED Interpretation by Verito Chan MD (06/26 1540)   nad      Final Result by Joselito Sequeira MD (06/26 1515)      Minimal left base atelectasis  Workstation performed: [id number]             EKG, Pathology, and Other Studies Reviewed on Admission:   · EKG: NSR with pvc    Allscripts / Epic Records Reviewed: Yes     ** Please Note: This note has been constructed using a voice recognition system   **

## 2020-06-27 ENCOUNTER — APPOINTMENT (INPATIENT)
Dept: MRI IMAGING | Facility: HOSPITAL | Age: 85
DRG: 065 | End: 2020-06-27
Payer: MEDICARE

## 2020-06-27 PROBLEM — I63.40 CEREBROVASCULAR ACCIDENT (CVA) DUE TO EMBOLISM OF CEREBRAL ARTERY (HCC): Status: ACTIVE | Noted: 2020-06-26

## 2020-06-27 LAB
ALBUMIN SERPL BCP-MCNC: 3.4 G/DL (ref 3.5–5)
ALP SERPL-CCNC: 76 U/L (ref 46–116)
ALT SERPL W P-5'-P-CCNC: 16 U/L (ref 12–78)
ANION GAP SERPL CALCULATED.3IONS-SCNC: 8 MMOL/L (ref 4–13)
AST SERPL W P-5'-P-CCNC: 22 U/L (ref 5–45)
BACTERIA UR QL AUTO: ABNORMAL /HPF
BILIRUB SERPL-MCNC: 0.72 MG/DL (ref 0.2–1)
BILIRUB UR QL STRIP: NEGATIVE
BUN SERPL-MCNC: 37 MG/DL (ref 5–25)
CALCIUM SERPL-MCNC: 8.1 MG/DL (ref 8.3–10.1)
CHLORIDE SERPL-SCNC: 111 MMOL/L (ref 100–108)
CHOLEST SERPL-MCNC: 107 MG/DL (ref 50–200)
CLARITY UR: CLEAR
CO2 SERPL-SCNC: 22 MMOL/L (ref 21–32)
COLOR UR: YELLOW
CREAT SERPL-MCNC: 2.09 MG/DL (ref 0.6–1.3)
ERYTHROCYTE [DISTWIDTH] IN BLOOD BY AUTOMATED COUNT: 13 % (ref 11.6–15.1)
EST. AVERAGE GLUCOSE BLD GHB EST-MCNC: 171 MG/DL
FOLATE SERPL-MCNC: 16.5 NG/ML (ref 3.1–17.5)
GFR SERPL CREATININE-BSD FRML MDRD: 27 ML/MIN/1.73SQ M
GLUCOSE SERPL-MCNC: 113 MG/DL (ref 65–140)
GLUCOSE SERPL-MCNC: 119 MG/DL (ref 65–140)
GLUCOSE SERPL-MCNC: 141 MG/DL (ref 65–140)
GLUCOSE SERPL-MCNC: 155 MG/DL (ref 65–140)
GLUCOSE SERPL-MCNC: 163 MG/DL (ref 65–140)
GLUCOSE UR STRIP-MCNC: ABNORMAL MG/DL
HBA1C MFR BLD: 7.6 %
HCT VFR BLD AUTO: 34.9 % (ref 36.5–49.3)
HDLC SERPL-MCNC: 35 MG/DL
HGB BLD-MCNC: 11.5 G/DL (ref 12–17)
HGB UR QL STRIP.AUTO: ABNORMAL
KETONES UR STRIP-MCNC: NEGATIVE MG/DL
LDLC SERPL CALC-MCNC: 61 MG/DL (ref 0–100)
LEUKOCYTE ESTERASE UR QL STRIP: NEGATIVE
MCH RBC QN AUTO: 30.8 PG (ref 26.8–34.3)
MCHC RBC AUTO-ENTMCNC: 33 G/DL (ref 31.4–37.4)
MCV RBC AUTO: 94 FL (ref 82–98)
NITRITE UR QL STRIP: NEGATIVE
NON-SQ EPI CELLS URNS QL MICRO: ABNORMAL /HPF
PH UR STRIP.AUTO: 6 [PH]
PLATELET # BLD AUTO: 171 THOUSANDS/UL (ref 149–390)
PMV BLD AUTO: 9.6 FL (ref 8.9–12.7)
POTASSIUM SERPL-SCNC: 4.4 MMOL/L (ref 3.5–5.3)
PROT SERPL-MCNC: 6.8 G/DL (ref 6.4–8.2)
PROT UR STRIP-MCNC: NEGATIVE MG/DL
RBC # BLD AUTO: 3.73 MILLION/UL (ref 3.88–5.62)
RBC #/AREA URNS AUTO: ABNORMAL /HPF
SODIUM SERPL-SCNC: 141 MMOL/L (ref 136–145)
SP GR UR STRIP.AUTO: 1.02 (ref 1–1.03)
TRIGL SERPL-MCNC: 53 MG/DL
TSH SERPL DL<=0.05 MIU/L-ACNC: 1.45 UIU/ML (ref 0.36–3.74)
UROBILINOGEN UR QL STRIP.AUTO: 0.2 E.U./DL
VIT B12 SERPL-MCNC: 244 PG/ML (ref 100–900)
WBC # BLD AUTO: 6.47 THOUSAND/UL (ref 4.31–10.16)
WBC #/AREA URNS AUTO: ABNORMAL /HPF

## 2020-06-27 PROCEDURE — 84425 ASSAY OF VITAMIN B-1: CPT | Performed by: PHYSICIAN ASSISTANT

## 2020-06-27 PROCEDURE — 83036 HEMOGLOBIN GLYCOSYLATED A1C: CPT | Performed by: PHYSICIAN ASSISTANT

## 2020-06-27 PROCEDURE — 99233 SBSQ HOSP IP/OBS HIGH 50: CPT | Performed by: PHYSICIAN ASSISTANT

## 2020-06-27 PROCEDURE — 82607 VITAMIN B-12: CPT | Performed by: PHYSICIAN ASSISTANT

## 2020-06-27 PROCEDURE — 80053 COMPREHEN METABOLIC PANEL: CPT | Performed by: PHYSICIAN ASSISTANT

## 2020-06-27 PROCEDURE — 70551 MRI BRAIN STEM W/O DYE: CPT

## 2020-06-27 PROCEDURE — 82948 REAGENT STRIP/BLOOD GLUCOSE: CPT

## 2020-06-27 PROCEDURE — 82746 ASSAY OF FOLIC ACID SERUM: CPT | Performed by: PHYSICIAN ASSISTANT

## 2020-06-27 PROCEDURE — 81001 URINALYSIS AUTO W/SCOPE: CPT | Performed by: PHYSICIAN ASSISTANT

## 2020-06-27 PROCEDURE — 82652 VIT D 1 25-DIHYDROXY: CPT | Performed by: PHYSICIAN ASSISTANT

## 2020-06-27 PROCEDURE — 99233 SBSQ HOSP IP/OBS HIGH 50: CPT | Performed by: PSYCHIATRY & NEUROLOGY

## 2020-06-27 PROCEDURE — 80061 LIPID PANEL: CPT | Performed by: PHYSICIAN ASSISTANT

## 2020-06-27 PROCEDURE — 85027 COMPLETE CBC AUTOMATED: CPT | Performed by: PHYSICIAN ASSISTANT

## 2020-06-27 PROCEDURE — 93880 EXTRACRANIAL BILAT STUDY: CPT | Performed by: SURGERY

## 2020-06-27 PROCEDURE — 84443 ASSAY THYROID STIM HORMONE: CPT | Performed by: PHYSICIAN ASSISTANT

## 2020-06-27 PROCEDURE — 97163 PT EVAL HIGH COMPLEX 45 MIN: CPT

## 2020-06-27 RX ORDER — CLOPIDOGREL BISULFATE 75 MG/1
75 TABLET ORAL DAILY
Status: DISCONTINUED | OUTPATIENT
Start: 2020-06-27 | End: 2020-06-29 | Stop reason: HOSPADM

## 2020-06-27 RX ORDER — CLOPIDOGREL BISULFATE 75 MG/1
75 TABLET ORAL DAILY
Status: DISCONTINUED | OUTPATIENT
Start: 2020-06-27 | End: 2020-06-27

## 2020-06-27 RX ORDER — LISINOPRIL 20 MG/1
20 TABLET ORAL 2 TIMES DAILY
Status: DISCONTINUED | OUTPATIENT
Start: 2020-06-27 | End: 2020-06-29 | Stop reason: HOSPADM

## 2020-06-27 RX ADMIN — INSULIN LISPRO 1 UNITS: 100 INJECTION, SOLUTION INTRAVENOUS; SUBCUTANEOUS at 17:55

## 2020-06-27 RX ADMIN — AMLODIPINE BESYLATE 5 MG: 5 TABLET ORAL at 08:50

## 2020-06-27 RX ADMIN — HEPARIN SODIUM 5000 UNITS: 5000 INJECTION INTRAVENOUS; SUBCUTANEOUS at 05:03

## 2020-06-27 RX ADMIN — HEPARIN SODIUM 5000 UNITS: 5000 INJECTION INTRAVENOUS; SUBCUTANEOUS at 21:52

## 2020-06-27 RX ADMIN — LISINOPRIL 20 MG: 20 TABLET ORAL at 09:33

## 2020-06-27 RX ADMIN — Medication 1000 MG: at 08:50

## 2020-06-27 RX ADMIN — ASPIRIN 81 MG 81 MG: 81 TABLET ORAL at 08:49

## 2020-06-27 RX ADMIN — HEPARIN SODIUM 5000 UNITS: 5000 INJECTION INTRAVENOUS; SUBCUTANEOUS at 13:57

## 2020-06-27 RX ADMIN — LISINOPRIL 20 MG: 20 TABLET ORAL at 17:58

## 2020-06-27 RX ADMIN — INSULIN LISPRO 1 UNITS: 100 INJECTION, SOLUTION INTRAVENOUS; SUBCUTANEOUS at 21:54

## 2020-06-27 RX ADMIN — TERAZOSIN HYDROCHLORIDE 1 MG: 1 CAPSULE ORAL at 21:52

## 2020-06-27 RX ADMIN — CYANOCOBALAMIN TAB 500 MCG 500 MCG: 500 TAB at 08:50

## 2020-06-27 RX ADMIN — MELATONIN 1000 UNITS: at 08:50

## 2020-06-27 RX ADMIN — CLOPIDOGREL BISULFATE 75 MG: 75 TABLET, FILM COATED ORAL at 12:13

## 2020-06-27 RX ADMIN — ATORVASTATIN CALCIUM 40 MG: 40 TABLET, FILM COATED ORAL at 17:55

## 2020-06-27 NOTE — PROGRESS NOTES
Neurology Progress Note - Piotr Blanc 12/11/1930, 80 y o  male   MRN: [id number] Unit/Bed#: S -01 Encounter: [id number]        * Embolic bilateral punctate CVA, acute as well as subacute  Assessment & Plan  The patient reports that in addition to the slurred speech which and mild left arm numbness in his hand that has now resolved which was responsible for bringing him in, he reports he had an episode 2 weeks ago to where his left hand was weak he could not hold anything it was floppy  He reports reports that that also cleared up quickly  Given his MRI there appears to be acute right-sided small punctate embolic appearing infarcts in the MCA territory  However also on the left on the pre motor cortex of the left MCA territory there is appears to be a somewhat subacute punctate infarct as well  At this time we would suggest that we keep this patient on dual anti-platelet, aspirin and Plavix and that he have a loop recorder placed as an outpatient  He can then subsequently be monitored the occurrence of AFib  He otherwise is doing well  I anticipate he should be able to be discharged on Monday the 28th  Subjective/Objective     Subjective:  No I feel pretty good right now everything went away  But it happened 2 weeks ago to    ROS:  The patient reports that he feels well right now  He has no complaints  He denies any headache or visual disturbances  He denies any residual numbness or tingling  He reports an incident similar to this 1 where he lost power in his left hand twice worse over the course of the day  He reports that it had not reoccurred until he had the numbness yesterday  He he was not necessarily aware of the slurred speech until his son said something to him as well  He is otherwise doing well by his report        Current Facility-Administered Medications:  amLODIPine 5 mg Oral Daily   aspirin 81 mg Oral Daily   atorvastatin 40 mg Oral Daily With Dinner   calcium carbonate 1,000 mg Oral Daily PRN   cholecalciferol 1,000 Units Oral Daily   clopidogrel x 21 days 75 mg Oral Daily   vitamin B-12 500 mcg Oral Daily   fish oil 1,000 mg Oral Daily   heparin (porcine) 5,000 Units Subcutaneous Q8H [address]   insulin lispro 1-5 Units Subcutaneous TID AC   insulin lispro 1-5 Units Subcutaneous HS   lisinopril 20 mg Oral BID   ondansetron 4 mg Intravenous Q6H PRN   senna 1 tablet Oral Daily   terazosin 1 mg Oral HS     calcium carbonate    ondansetron    Vitals: Blood pressure 133/63, pulse 72, temperature 98 1 °F (36 7 °C), temperature source Oral, resp  rate 16, height 5' 11" (1 803 m), weight 84 6 kg (186 lb 6 4 oz), SpO2 98 %  ,Body mass index is 26 kg/m²  Physical Exam:     Laron Care seen in:  Seated at the edge of the bed  General appearance: alert,   Neck, Lungs, Heart, & abdomen: WNL  Extremities: atraumatic, no cyanosis or edema    Neurologic:   Mental status: Alert, oriented, thought content appropriate, no cognitive deficits  CN:  Symmetrical cranial nerve exam EOM's I, Gaze conjugate  Non lateralizing sensory & motor exam, (PP not tested on face)  Reminder CNVIII-XII normal    Motor: full power age appropriate x 4 limbs bilaterally  Sensory: grossly intact  X 4 limbs, PP tested symmetrical  Cerebellar: no ataxia or past pointing w pronation from a modified Romberg position  Well preserved given his age    Gait: Fluid smooth, to and from the bathroom  DTR's: Age appropriate,  Plantars: downgoing bilaterally      Lab Results:   I have personally reviewed pertinent reports    , CBC:   Results from last 7 days   Lab Units 06/27/20  0440 06/26/20  1446   WBC Thousand/uL 6 47 7 51   RBC Million/uL 3 73* 4 03   HEMOGLOBIN g/dL 11 5* 12 5   HEMATOCRIT % 34 9* 37 6   MCV fL 94 93   PLATELETS Thousands/uL 171 185   , BMP/CMP:   Results from last 7 days   Lab Units 06/27/20  0440 06/26/20  1446   SODIUM mmol/L 141 140   POTASSIUM mmol/L 4 4 4 4   CHLORIDE mmol/L 111* 109*   CO2 mmol/L 22 20*   BUN mg/dL 37* 45*   CREATININE mg/dL 2 09* 2 51*   CALCIUM mg/dL 8 1* 8 2*   AST U/L 22  --    ALT U/L 16  --    ALK PHOS U/L 76  --    EGFR ml/min/1 73sq m 27 22   , Vitamin B12:   Results from last 7 days   Lab Units 06/27/20  0440   VITAMIN B 12 pg/mL 244   , HgBA1C:   Results from last 7 days   Lab Units 06/27/20  0440   HEMOGLOBIN A1C % 7 6*   , TSH:   Results from last 7 days   Lab Units 06/27/20  0440   TSH 3RD GENERATON uIU/mL 1 451   , Coagulation:   , Lipid Profile:   Results from last 7 days   Lab Units 06/27/20  0440   HDL mg/dL 35*   LDL CALC mg/dL 61   TRIGLYCERIDES mg/dL 53        Imaging Studies: I have personally reviewed pertinent films in PACS and His a CT scan appears some the without acute pathology  Review of his MRI demonstrate 2 small possibly 3 punctate infarcts in the right frontal territory  Also on the left there is a question of a small punctate ischemic infarct there is well  This appears somewhat older , subacute in nature  His flares are without significant small vessel disease  They are actually very clean  He does have atrophy appreciated globally  EEG, Echo, Pathology, and Other Studies: Echocardiogram is due for tomorrow  Counseling / Coordination of Care  Total time spent today Greater than 35 minutes  Greater than 50% of total time was spent with the patient and / or family counseling and / or coordination of care  A description of the counseling / coordination of care: We have discussed with this patient the findings of his MRI so far  We were also discussed with him drug therapy that we are planning  He will likely have a loop recorder placed as an outpatient  His echo is pending

## 2020-06-27 NOTE — ASSESSMENT & PLAN NOTE
Lab Results   Component Value Date    HGBA1C 8 2 (H) 06/08/2020     Recent Labs     06/26/20  1403 06/26/20  2115 06/27/20  0711   POCGLU 158* 243* 113     Blood Sugar Average: Last 72 hrs:  (P) 962 [id number]   · Home regimen includes Amaryl 2 mg daily  · Hold oral hypoglycemic- add sliding scale insulin, Accu-Cheks, diabetic diet  · Sugars improved this morning

## 2020-06-27 NOTE — ASSESSMENT & PLAN NOTE
· Patient endorses history of palpitations and prior workup included Holter monitor ordered by his PCP in 2018 which revealed occasional PACs and frequent PVCs  · PVCs again noted on telemetry and admission EKG  · Check echocardiogram as per stroke workup  · Pt also complains of easy fatigability, r/o structural heart disease in the setting of PVCs  · Echo still pending

## 2020-06-27 NOTE — PHYSICAL THERAPY NOTE
PHYSICAL THERAPY EVALUATION  NAME:  Oral Dux: 06/27/20    AGE:   80 y o   Mrn:   [id number]  ADMIT DX:  TIA (transient ischemic attack) [G45 9]  SAAB (dyspnea on exertion) [R06 00]  Acute-on-chronic kidney injury ([facility] [address] ) [N17 9, N18 9]    Past Medical History:   Diagnosis Date    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)     Last Assessed:  5/19/17    Chronic kidney disease     [facility] ([email or web address]     Hyperlipidemia     Hypertension     Osteoarthritis      Past Surgical History:   Procedure Laterality Date    APPENDECTOMY      COLONOSCOPY  2012    HEMORROIDECTOMY         Length Of Stay: 1  Performed at least 2 patient identifiers during session: Name and Birthday  PHYSICAL THERAPY EVALUATION :    06/27/20 1420   Note Type   Note type Eval only   Pain Assessment   Pain Assessment Tool Pain Assessment not indicated - pt denies pain   Home Living   Type of [address] Two level  (+ ROHAN)   Home Equipment   (none used prior to admit)   Prior Function   Level of [place] Independent with ADLs and functional mobility   Lives With Alone   Receives Help From Family   ADL Assistance Independent   IADLs Independent  (drives)   Vocational Retired  (army)   Restrictions/Precautions   [facility] Bearing Precautions Per Order No   General   Family/Caregiver Present No   Cognition   Overall Cognitive Status WFL   Arousal/Participation Alert   Memory Within functional limits   Following Commands Follows one step commands with increased time or repetition   RUE Strength   RUE Overall Strength Within Functional Limits - able to perform ADL tasks with strength   LUE Strength   LUE Overall Strength Within Functional Limits - able to perform ADL tasks with strength   Strength RLE   R Hip Flexion 4+/5   R Knee Extension 4+/5   R Ankle Dorsiflexion 4+/5   Strength LLE   L Hip Flexion 4+/5   L Knee Extension 4+/5   L Ankle Dorsiflexion 4+/5   Coordination   Movements are Fluid and Coordinated 0   Coordination and Movement Description propulsive, heavy heel strike gait   Sensation   (vision WFL, hearing mild limited (usually reads lips))   Light Touch   RLE Light Touch Grossly intact   LLE Light Touch Grossly intact   Bed Mobility   Supine to Sit 6  Modified independent   Additional items Assist x 1;HOB elevated; Bedrails   Sit to Supine 6  Modified independent   Additional items Assist x 1; Increased time required;HOB elevated   Transfers   Sit to Stand 6  Modified independent   Additional items Increased time required   Stand to Sit 6  Modified independent   Additional items Increased time required   Ambulation/Elevation   Gait pattern Wide SHAHIDA  (propulsive, heavy heel strike)   Gait Assistance 5  Supervision   Additional items Assist x 1   Assistive Device None   Distance 150'+450'   Stair Management Assistance 5  Supervision   Additional items Assist x 1   Stair Management Technique Alternating pattern  (1-2 rails)   Number of Stairs 8   Balance   Static Sitting Normal   Static Standing Fair +   Ambulatory Fair   Endurance Deficit   Endurance Deficit No   Activity Tolerance   Activity Tolerance Patient limited by fatigue   Nurse Made Aware spoke to [facility] RN   Assessment   Prognosis Fair   Problem List Decreased endurance; Impaired balance;Decreased mobility; Decreased coordination; Impaired hearing  (gait deviations)   Barriers to Discharge Decreased caregiver support   Goals   Patient Goals to get back home, walk   STG Expiration Date 07/07/20   PT Treatment Day 0   Plan   Treatment/Interventions Functional transfer training;LE strengthening/ROM; Elevations; Therapeutic exercise; Endurance training;Patient/family training;Equipment eval/education;Gait training;Bed mobility   PT Frequency Other (Comment)  (3-5x/wk)   Recommendation   PT Discharge Recommendation Return to previous environment with social support; +/- OPPT   Equipment Recommended   (+/- cane)   Barthel Index   Transfers (Bed/Chair) Score 15   Mobility (Level Surface) Score 10   Stairs Score 5   4 Item Dynamic Gait Index  2/3 Gait level surface  3/3 Change in gait speed  3/3 Gait with horizontal head turns  3/3 Gait with vertical head turns  11/12 total score (<10/12 indicates increased risk of fall)  (Please find full objective findings from PT assessment regarding body systems outlined above)  Assessment: Pt is a 80 y o  male seen for PT evaluation s/p admit to [facility] on 6/26/2020 w/ Left hand weakness  Order placed for PT  Upon evaluation: Pt requires no physical assistance for bed mobility and transfers and S assistance for ambulation with out device and S for stairs  Pt's clinical presentation is currently unstable/unpredictable given the functional mobility deficits above, especially (but not limited to) gait deviations, coupled with fall risks including impaired balance and limited hearing, and combined with medical complications of hypertension , bradycardia and abnormal renal lab values  Pt to benefit from continued skilled PT tx while in hospital and upon DC to address deficits as defined above and maximize level of functional mobility  Recommend trial with cane next 1-2 sessions  Goals: If not DCed Pt will:   Perform ambulation with +/- cane for >150' w/o uncorrected loss of balance and non-propulsive gait pattern with  modified I  to improve gait quality and promote proper use of assistive device  Perform flight of stairs w/ railing and w/Supervision to return to home with ROHAN and return to multilevel home  Perform 12 point DGI and achieve patient class cut off score to demonstrate decreased risk for falls  Comorbidities affecting pt's physical performance at time of assessment include:palpitations, hypertension, stage 4 chronic kidney disease, obesity  Personal factors affecting pt at time of IE include: steps to enter environment, multi-level environment, limited home support and advanced age       Jd Sanchez, PT, DPT

## 2020-06-27 NOTE — QUICK NOTE
Patient with acute kidney injury present on admission with creatinine elevated to 2 5  Creatinine has subsequently come down to 2 02 with hydration  Likely in the setting of dehydration  Continue to monitor creatinine  Will restart lisinopril

## 2020-06-27 NOTE — ASSESSMENT & PLAN NOTE
· Patient on Zocor 20 mg daily at home, most recent LDL 1 year ago was 76  · Recheck FLP  · Lipids noted to be cholesterol 107, TG 53, HDL 35, LDL 61

## 2020-06-27 NOTE — ASSESSMENT & PLAN NOTE
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

## 2020-06-27 NOTE — PLAN OF CARE
Problem: Potential for Falls  Goal: Patient will remain free of falls  Description  INTERVENTIONS:  - Assess patient frequently for physical needs  -  Identify cognitive and physical deficits and behaviors that affect risk of falls    -  [place] fall precautions as indicated by assessment   - Educate patient/family on patient safety including physical limitations  - Instruct patient to call for assistance with activity based on assessment  - Modify environment to reduce risk of injury  - Consider OT/PT consult to assist with strengthening/mobility  Outcome: Progressing     Problem: PAIN - ADULT  Goal: Verbalizes/displays adequate comfort level or baseline comfort level  Description  Interventions:  - Encourage patient to monitor pain and request assistance  - Assess pain using appropriate pain scale  - Administer analgesics based on type and severity of pain and evaluate response  - Implement non-pharmacological measures as appropriate and evaluate response  - Consider cultural and social influences on pain and pain management  - Notify physician/advanced practitioner if interventions unsuccessful or patient reports new pain  Outcome: Progressing     Problem: SAFETY ADULT  Goal: Maintain or return to baseline ADL function  Description  INTERVENTIONS:  -  Assess patient's ability to carry out ADLs; assess patient's baseline for ADL function and identify physical deficits which impact ability to perform ADLs (bathing, care of mouth/teeth, toileting, grooming, dressing, etc )  - Assess/evaluate cause of self-care deficits   - Assess range of motion  - Assess patient's mobility; develop plan if impaired  - Assess patient's need for assistive devices and provide as appropriate  - Encourage maximum independence but intervene and supervise when necessary  - Involve family in performance of ADLs  - Assess for home care needs following discharge   - Consider OT consult to assist with ADL evaluation and planning for discharge  - Provide patient education as appropriate  Outcome: Progressing  Goal: Maintain or return mobility status to optimal level  Description  INTERVENTIONS:  - Assess patient's baseline mobility status (ambulation, transfers, stairs, etc )    - Identify cognitive and physical deficits and behaviors that affect mobility  - Identify mobility aids required to assist with transfers and/or ambulation (gait belt, sit-to-stand, lift, walker, cane, etc )  - [place] fall precautions as indicated by assessment  - Record patient progress and toleration of activity level on Mobility SBAR; progress patient to next Phase/Stage  - Instruct patient to call for assistance with activity based on assessment  - Consider rehabilitation consult to assist with strengthening/weightbearing, etc   Outcome: Progressing     Problem: DISCHARGE PLANNING  Goal: Discharge to home or other facility with appropriate resources  Description  INTERVENTIONS:  - Identify barriers to discharge w/patient and caregiver  - Arrange for needed discharge resources and transportation as appropriate  - Identify discharge learning needs (meds, wound care, etc )  - Arrange for interpretive services to assist at discharge as needed  - Refer to Case Management Department for coordinating discharge planning if the patient needs post-hospital services based on physician/advanced practitioner order or complex needs related to functional status, cognitive ability, or social support system  Outcome: Progressing     Problem: Knowledge Deficit  Goal: Patient/family/caregiver demonstrates understanding of disease process, treatment plan, medications, and discharge instructions  Description  Complete learning assessment and assess knowledge base    Interventions:  - Provide teaching at level of understanding  - Provide teaching via preferred learning methods  Outcome: Progressing     Problem: NEUROSENSORY - ADULT  Goal: Achieves stable or improved neurological status  Description  INTERVENTIONS  - Monitor and report changes in neurological status  - Monitor vital signs such as temperature, blood pressure, glucose, and any other labs ordered   - Initiate measures to prevent increased intracranial pressure  - Monitor for seizure activity and implement precautions if appropriate      Outcome: Progressing  Goal: Remains free of injury related to seizures activity  Description  INTERVENTIONS  - Maintain airway, patient safety  and administer oxygen as ordered  - Monitor patient for seizure activity, document and report duration and description of seizure to physician/advanced practitioner  - If seizure occurs,  ensure patient safety during seizure  - Reorient patient post seizure  - Seizure pads on all 4 side rails  - Instruct patient/family to notify RN of any seizure activity including if an aura is experienced  - Instruct patient/family to call for assistance with activity based on nursing assessment  - Administer anti-seizure medications if ordered    Outcome: Progressing  Goal: Achieves maximal functionality and self care  Description  INTERVENTIONS  - Monitor swallowing and airway patency with patient fatigue and changes in neurological status  - Encourage and assist patient to increase activity and self care     - Encourage visually impaired, hearing impaired and aphasic patients to use assistive/communication devices  Outcome: Progressing     Problem: CARDIOVASCULAR - ADULT  Goal: Maintains optimal cardiac output and hemodynamic stability  Description  INTERVENTIONS:  - Monitor I/O, vital signs and rhythm  - Monitor for S/S and trends of decreased cardiac output  - Administer and titrate ordered vasoactive medications to optimize hemodynamic stability  - Assess quality of pulses, skin color and temperature  - Assess for signs of decreased coronary artery perfusion  - Instruct patient to report change in severity of symptoms  Outcome: Progressing  Goal: Absence of cardiac dysrhythmias or at baseline rhythm  Description  INTERVENTIONS:  - Continuous cardiac monitoring, vital signs, obtain 12 lead EKG if ordered  - Administer antiarrhythmic and heart rate control medications as ordered  - Monitor electrolytes and administer replacement therapy as ordered  Outcome: Progressing     Problem: RESPIRATORY - ADULT  Goal: Achieves optimal ventilation and oxygenation  Description  INTERVENTIONS:  - Assess for changes in respiratory status  - Assess for changes in mentation and behavior  - Position to facilitate oxygenation and minimize respiratory effort  - Oxygen administered by appropriate delivery if ordered  - Initiate smoking cessation education as indicated  - Encourage broncho-pulmonary hygiene including cough, deep breathe, Incentive Spirometry  - Assess the need for suctioning and aspirate as needed  - Assess and instruct to report SOB or any respiratory difficulty  - Respiratory Therapy support as indicated  Outcome: Progressing     Problem: METABOLIC, FLUID AND ELECTROLYTES - ADULT  Goal: Electrolytes maintained within normal limits  Description  INTERVENTIONS:  - Monitor labs and assess patient for signs and symptoms of electrolyte imbalances  - Administer electrolyte replacement as ordered  - Monitor response to electrolyte replacements, including repeat lab results as appropriate  - Instruct patient on fluid and nutrition as appropriate  Outcome: Progressing     Problem: SKIN/TISSUE INTEGRITY - ADULT  Goal: Skin integrity remains intact  Description  INTERVENTIONS  - Identify patients at risk for skin breakdown  - Assess and monitor skin integrity  - Assess and monitor nutrition and hydration status  - Monitor labs (i e  albumin)  - Assess for incontinence   - Turn and reposition patient  - Assist with mobility/ambulation  - Relieve pressure over bony prominences  - Avoid friction and shearing  - Provide appropriate hygiene as needed including keeping skin clean and dry  - Evaluate need for skin moisturizer/barrier cream  - Collaborate with interdisciplinary team (i e  Nutrition, Rehabilitation, etc )   - Patient/family teaching  Outcome: Progressing  Goal: Incision(s), wounds(s) or drain site(s) healing without S/S of infection  Description  INTERVENTIONS  - Assess and document risk factors for skin impairment   - Assess and document dressing, incision, wound bed, drain sites and surrounding tissue  - Consider nutrition services referral as needed  - Oral mucous membranes remain intact  - Provide patient/ family education  Outcome: Progressing  Goal: Oral mucous membranes remain intact  Description  INTERVENTIONS  - Assess oral mucosa and hygiene practices  - Implement preventative oral hygiene regimen  - Implement oral medicated treatments as ordered  - Initiate Nutrition services referral as needed  Outcome: Progressing     Problem: MUSCULOSKELETAL - ADULT  Goal: Maintain or return mobility to safest level of function  Description  INTERVENTIONS:  - Assess patient's ability to carry out ADLs; assess patient's baseline for ADL function and identify physical deficits which impact ability to perform ADLs (bathing, care of mouth/teeth, toileting, grooming, dressing, etc )  - Assess/evaluate cause of self-care deficits   - Assess range of motion  - Assess patient's mobility  - Assess patient's need for assistive devices and provide as appropriate  - Encourage maximum independence but intervene and supervise when necessary  - Involve family in performance of ADLs  - Assess for home care needs following discharge   - Consider OT consult to assist with ADL evaluation and planning for discharge  - Provide patient education as appropriate  Outcome: Progressing  Goal: Maintain proper alignment of affected body part  Description  INTERVENTIONS:  - Support, maintain and protect limb and body alignment  - Provide patient/ family with appropriate education  Outcome: Progressing Problem: Neurological Deficit  Goal: Neurological status is stable or improving  Description  Interventions:  - Monitor and assess patient's level of consciousness, motor function, sensory function, and level of assistance needed for ADLs  - Monitor and report changes from baseline  Collaborate with interdisciplinary team to initiate plan and implement interventions as ordered  - Provide and maintain a safe environment  - Consider seizure precautions  - Consider fall precautions  - Consider aspiration precautions  - Consider bleeding precautions  Outcome: Progressing     Problem: Activity Intolerance/Impaired Mobility  Goal: Mobility/activity is maintained at optimum level for patient  Description  Interventions:  - Assess and monitor patient  barriers to mobility and need for assistive/adaptive devices  - Assess patient's emotional response to limitations  - Collaborate with interdisciplinary team and initiate plans and interventions as ordered  - Encourage independent activity per ability   - Maintain proper body alignment  - Perform active/passive rom as tolerated/ordered  - Plan activities to conserve energy   - Turn patient as appropriate  Outcome: Progressing     Problem: Communication Impairment  Goal: Ability to express needs and understand communication  Description  Assess patient's communication skills and ability to understand information  Patient will demonstrate use of effective communication techniques, alternative methods of communication and understanding even if not able to speak  - Encourage communication and provide alternate methods of communication as needed  - Collaborate with case management/ for discharge needs  - Include patient/family/caregiver in decisions related to communication    Outcome: Progressing     Problem: Potential for Aspiration  Goal: Non-ventilated patient's risk of aspiration is minimized  Description  Assess and monitor vital signs, respiratory status, and labs (WBC)  Monitor for signs of aspiration (tachypnea, cough, rales, wheezing, cyanosis, fever)  - Assess and monitor patient's ability to swallow  - Place patient up in chair to eat if possible  - HOB up at 90 degrees to eat if unable to get patient up into chair   - Supervise patient during oral intake  - Instruct patient/ family to take small bites  - Instruct patient/ family to take small single sips when taking liquids  - Follow patient-specific strategies generated by speech pathologist   Outcome: Progressing  Goal: Ventilated patient's risk of aspiration is minimized  Description  Assess and monitor vital signs, respiratory status, airway cuff pressure, and labs (WBC)  Monitor for signs of aspiration (tachypnea, cough, rales, wheezing, cyanosis, fever)  - Elevate head of bed 30 degrees if patient has tube feeding   - Monitor tube feeding  Outcome: Progressing     Problem: Nutrition  Goal: Nutrition/Hydration status is improving  Description  Monitor and assess patient's nutrition/hydration status for malnutrition (ex- brittle hair, bruises, dry skin, pale skin and conjunctiva, muscle wasting, smooth red tongue, and disorientation)  Collaborate with interdisciplinary team and initiate plan and interventions as ordered  Monitor patient's weight and dietary intake as ordered or per policy  Utilize nutrition screening tool and intervene per policy  Determine patient's food preferences and provide high-protein, high-caloric foods as appropriate  - Assist patient with eating   - Allow adequate time for meals   - Encourage patient to take dietary supplement as ordered  - Collaborate with clinical nutritionist   - Include patient/family/caregiver in decisions related to nutrition    Outcome: Progressing

## 2020-06-27 NOTE — ASSESSMENT & PLAN NOTE
· Patient admitted with approximately 30 minute episode of left hand weakness  Slurred speech noted by patient's nephew  Now resolved  · Admit on stroke pathway, neurology consult appreciated  · CT-H negative for intracranial abnormality  · Will check MRI brain, carotid duplex, echocardiogram  · Recent A1c 8 2, check LDL, UA, TSH, Y56, folic acid, thiamine, Vit-D  · Start aspirin, statin  · PT/OT/SLP  · Carotid duplex shows no significant stenosis  MRI still pending  · Still awaiting neurology recommendations

## 2020-06-27 NOTE — PLAN OF CARE
Problem: PHYSICAL THERAPY ADULT  Goal: Performs mobility at highest level of function for planned discharge setting  See evaluation for individualized goals  Description  Treatment/Interventions: Functional transfer training, LE strengthening/ROM, Elevations, Therapeutic exercise, Endurance training, Patient/family training, Equipment eval/education, Gait training, Bed mobility  Equipment Recommended: (+/- cane)       See flowsheet documentation for full assessment, interventions and recommendations  Note:   Prognosis: Fair  Problem List: Decreased endurance, Impaired balance, Decreased mobility, Decreased coordination, Impaired hearing(gait deviations)  Assessment: Pt is a 80 y o  male seen for PT evaluation s/p admit to [facility] on 6/26/2020 w/ Left hand weakness  Order placed for PT  Upon evaluation: Pt requires no physical assistance for bed mobility and transfers and S assistance for ambulation with out device and S for stairs  Pt's clinical presentation is currently unstable/unpredictable given the functional mobility deficits above, especially (but not limited to) gait deviations, coupled with fall risks including impaired balance and limited hearing, and combined with medical complications of hypertension , bradycardia and abnormal renal lab values  Pt to benefit from continued skilled PT tx while in hospital and upon DC to address deficits as defined above and maximize level of functional mobility  Recommend trial with cane next 1-2 sessions  Barriers to Discharge: Decreased caregiver support     PT Discharge Recommendation: Return to previous environment with social support          See flowsheet documentation for full assessment

## 2020-06-27 NOTE — ASSESSMENT & PLAN NOTE
· Creatinine on admission slightly higher than baseline 2 5  · Baseline creatinine since 2018 around 1 9-2 2  · Patient known to Dr Mendez Felton, chronic kidney disease likely due to hypertensive nephrosclerosis, atherosclerotic disease, diabetes as well as age-related nephron loss  · Patient undecided if he wishes to pursue dialysis in the future  · Avoid NSAIDs and nephrotoxins, will hydrate with 1L IVF overnight   · Hold lisinopril for now  · Creatinine has improved to 2 02   · Will restart lisinopril no

## 2020-06-27 NOTE — ASSESSMENT & PLAN NOTE
· Blood pressure elevated on admission in the setting of possible TIA  · Home regimen includes lisinopril 20 mg twice daily, Norvasc 5 mg daily, terazosin 1 mg q h s   · Hold lisinopril due to slight bump in creatinine today  · Restart lisinopril as cr improved

## 2020-06-27 NOTE — SPEECH THERAPY NOTE
Consult received and chart reviewed  Per chart review, and discussion with RN, pt passed RN dysphagia assessment and is tolerating regular diet with thin liquids with no s/s of aspiration  No dysarthria or language deficits reported  Therefore, formal speech/swallow evaluation not indicated at this time  If new concerns arise, please reconsult       ROLAND Sosa , [place]  Pathologist   [phone]

## 2020-06-27 NOTE — PROGRESS NOTES
Progress Note - Altaf Rubio 12/11/1930, 80 y o  male MRN: [id number]    Unit/Bed#: S -01 Encounter: [id number]    Primary Care Provider: Mick Roman DO   Date and time admitted to hospital: 6/26/2020  2:00 PM        Essential hypertension  Assessment & Plan  · Blood pressure elevated on admission in the setting of possible TIA  · Home regimen includes lisinopril 20 mg twice daily, Norvasc 5 mg daily, terazosin 1 mg q h s   · Hold lisinopril due to slight bump in creatinine today  · Restart lisinopril as cr improved     Palpitations  Assessment & Plan  · Patient endorses history of palpitations and prior workup included Holter monitor ordered by his PCP in 2018 which revealed occasional PACs and frequent PVCs  · PVCs again noted on telemetry and admission EKG  · Check echocardiogram as per stroke workup  · Pt also complains of easy fatigability, r/o structural heart disease in the setting of PVCs  · Echo still pending    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 ([facility])  Assessment & Plan  · Creatinine on admission slightly higher than baseline 2 5  · Baseline creatinine since 2018 around 1 9-2 2  · Patient known to Dr Luis Fernando Naranjo, chronic kidney disease likely due to hypertensive nephrosclerosis, atherosclerotic disease, diabetes as well as age-related nephron loss  · Patient undecided if he wishes to pursue dialysis in the future  · Avoid NSAIDs and nephrotoxins, will hydrate with 1L IVF overnight   · Hold lisinopril for now  · Creatinine has improved to 2 02   · Will restart lisinopril    Hyperlipidemia  Assessment & Plan  · Patient on Zocor 20 mg daily at home, most recent LDL 1 year ago was 76  · Recheck FLP  · Lipids noted to be cholesterol 107, TG 53, HDL 35, LDL 61    Enlarged prostate without lower urinary tract symptoms (luts)  Assessment & Plan  · Continue terazosin    Controlled diabetes mellitus ([facility] [address] )  Assessment & Plan  Lab Results   Component Value Date    HGBA1C 8 2 (H) 2020     Recent Labs     20  1403 20  2115 20  0711   POCGLU 158* 243* 113     Blood Sugar Average: Last 72 hrs:  (P) 117 [id number]   · Home regimen includes Amaryl 2 mg daily  · Hold oral hypoglycemic- add sliding scale insulin, Accu-Cheks, diabetic diet  · Sugars improved this morning    * Left hand weakness  Assessment & Plan  · Patient admitted with approximately 30 minute episode of left hand weakness  Slurred speech noted by patient's nephew  Now resolved  · Admit on stroke pathway, neurology consult appreciated  · CT-H negative for intracranial abnormality  · Will check MRI brain, carotid duplex, echocardiogram  · Recent A1c 8 2, check LDL, UA, TSH, W72, folic acid, thiamine, Vit-D  · Start aspirin, statin  · PT/OT/SLP  · Carotid duplex shows no significant stenosis  MRI still pending  · Still awaiting neurology recommendations  VTE Pharmacologic Prophylaxis:   Pharmacologic: Heparin  Mechanical VTE Prophylaxis in Place: Yes    Patient Centered Rounds: I have performed bedside rounds with nursing staff today  Discussions with Specialists or Other Care Team Provider: neurology     Education and Discussions with Family / Patient: patient     Time Spent for Care: 30 min  More than 50% of total time spent on counseling and coordination of care as described above  Current Length of Stay: 1 day(s)    Current Patient Status: Inpatient   Certification Statement: The patient will continue to require additional inpatient hospital stay due to neurology follow up     Discharge Plan:  Patient to be discharged once cleared by Neurology  Still awaiting echocardiogram, and MRI    Code Status: Level 3 - DNAR and DNI      Subjective:   Patient feels a lot better today  Neurologic symptoms have completely resolved    Eager to leave    Objective:     Vitals:   Temp (24hrs), Av 9 °F (36 6 °C), Min:97 3 °F (36 3 °C), Max:98 4 °F (36 9 °C)    Temp:  [97 3 °F (36 3 °C)-98 4 °F (36 9 °C)] 98 °F (36 7 °C)  HR:  [44-88] 74  Resp:  [14-18] 18  BP: (128-178)/(60-84) 161/70  SpO2:  [94 %-100 %] 98 %  Body mass index is 26 kg/m²  Input and Output Summary (last 24 hours):     No intake or output data in the 24 hours ending 06/27/20 0901    Physical Exam:     Physical Exam   Constitutional: He is oriented to person, place, and time  He appears well-developed and well-nourished  No distress  HENT:   Head: Normocephalic and atraumatic  Eyes: Pupils are equal, round, and reactive to light  Conjunctivae and EOM are normal  Right eye exhibits no discharge  Left eye exhibits no discharge  Neck: Normal range of motion  Neck supple  No JVD present  No tracheal deviation present  Cardiovascular: Normal rate, regular rhythm and normal heart sounds  No murmur heard  Pulmonary/Chest: Effort normal and breath sounds normal  He has no wheezes  He has no rales  Abdominal: Soft  Bowel sounds are normal  He exhibits no distension  There is no tenderness  Musculoskeletal: Normal range of motion  He exhibits no edema or tenderness  Neurological: He is alert and oriented to person, place, and time  No cranial nerve deficit or sensory deficit  Cranial nerves 2-12 grossly intact  Finger-nose test normal   Strength in upper and lower extremities 5/5   strength normal   Patient follows commands appropriately  Speech is clear without slurring  Skin: Skin is warm and dry  Capillary refill takes less than 2 seconds  He is not diaphoretic  No erythema  No pallor  Psychiatric: He has a normal mood and affect  Nursing note and vitals reviewed        Additional Data:     Labs:    Results from last 7 days   Lab Units 06/27/20  0440 06/26/20  1446   WBC Thousand/uL 6 47 7 51   HEMOGLOBIN g/dL 11 5* 12 5   HEMATOCRIT % 34 9* 37 6   PLATELETS Thousands/uL 171 185   NEUTROS PCT %  --  58   LYMPHS PCT %  --  25   MONOS PCT %  --  11   EOS PCT %  --  5     Results from last 7 days   Lab Units 06/27/20  0440   SODIUM mmol/L 141   POTASSIUM mmol/L 4 4   CHLORIDE mmol/L 111*   CO2 mmol/L 22   BUN mg/dL 37*   CREATININE mg/dL 2 09*   ANION GAP mmol/L 8   CALCIUM mg/dL 8 1*   ALBUMIN g/dL 3 4*   TOTAL BILIRUBIN mg/dL 0 72   ALK PHOS U/L 76   ALT U/L 16   AST U/L 22   GLUCOSE RANDOM mg/dL 119         Results from last 7 days   Lab Units 06/27/20  0711 06/26/20  2115 06/26/20  1403   POC GLUCOSE mg/dl 113 243* 158*                   * I Have Reviewed All Lab Data Listed Above  * Additional Pertinent Lab Tests Reviewed: [place] Admission Reviewed    Imaging:    Imaging Reports Reviewed Today Include: carotid ultrasound  Imaging Personally Reviewed by Myself Includes:  None     Recent Cultures (last 7 days):           Last 24 Hours Medication List:     Current Facility-Administered Medications:  amLODIPine 5 mg Oral Daily Familia Murrieta PA-C   aspirin 81 mg Oral Daily Familia Murrieta PA-C   atorvastatin 40 mg Oral Daily With [employer] Felix Murrieta PA-C   calcium carbonate 1,000 mg Oral Daily PRN Hazen Fabry Deleon, PA-C   cholecalciferol 1,000 Units Oral Daily Familia Murrieta PA-C   vitamin B-12 500 mcg Oral Daily Familia Murrieta PA-C   fish oil 1,000 mg Oral Daily Familia Murrieta PA-C   heparin (porcine) 5,000 Units Subcutaneous Q8H [address] Familia Murrieta PA-C   insulin lispro 1-5 Units Subcutaneous TID AC Familia Murrieta PA-C   insulin lispro 1-5 Units Subcutaneous HS Familia Murrieta PA-C   lisinopril 20 mg Oral BID Sen Painter PA-C   ondansetron 4 mg Intravenous Q6H PRN Familia Murrieta PA-C   senna 1 tablet Oral Daily Familia Murrieta PA-C   terazosin 1 mg Oral HS Familia Murrieta PA-C        Today, Patient Was Seen By: Erik Deluca PA-C    ** Please Note: Dictation voice to text software may have been used in the creation of this document   **

## 2020-06-28 PROBLEM — N17.9 ACUTE RENAL FAILURE SUPERIMPOSED ON STAGE 4 CHRONIC KIDNEY DISEASE (HCC): Status: ACTIVE | Noted: 2018-09-27

## 2020-06-28 LAB
ATRIAL RATE: 86 BPM
GLUCOSE SERPL-MCNC: 116 MG/DL (ref 65–140)
GLUCOSE SERPL-MCNC: 130 MG/DL (ref 65–140)
GLUCOSE SERPL-MCNC: 150 MG/DL (ref 65–140)
GLUCOSE SERPL-MCNC: 183 MG/DL (ref 65–140)
P AXIS: 64 DEGREES
PR INTERVAL: 176 MS
QRS AXIS: -6 DEGREES
QRSD INTERVAL: 130 MS
QT INTERVAL: 378 MS
QTC INTERVAL: 452 MS
T WAVE AXIS: 48 DEGREES
VENTRICULAR RATE: 86 BPM

## 2020-06-28 PROCEDURE — 82948 REAGENT STRIP/BLOOD GLUCOSE: CPT

## 2020-06-28 PROCEDURE — 93010 ELECTROCARDIOGRAM REPORT: CPT | Performed by: INTERNAL MEDICINE

## 2020-06-28 PROCEDURE — 99232 SBSQ HOSP IP/OBS MODERATE 35: CPT | Performed by: PHYSICIAN ASSISTANT

## 2020-06-28 PROCEDURE — 97166 OT EVAL MOD COMPLEX 45 MIN: CPT

## 2020-06-28 RX ADMIN — CYANOCOBALAMIN TAB 500 MCG 500 MCG: 500 TAB at 08:35

## 2020-06-28 RX ADMIN — AMLODIPINE BESYLATE 5 MG: 5 TABLET ORAL at 08:35

## 2020-06-28 RX ADMIN — HEPARIN SODIUM 5000 UNITS: 5000 INJECTION INTRAVENOUS; SUBCUTANEOUS at 14:12

## 2020-06-28 RX ADMIN — INSULIN LISPRO 1 UNITS: 100 INJECTION, SOLUTION INTRAVENOUS; SUBCUTANEOUS at 14:12

## 2020-06-28 RX ADMIN — Medication 1000 MG: at 08:35

## 2020-06-28 RX ADMIN — ASPIRIN 81 MG 81 MG: 81 TABLET ORAL at 08:35

## 2020-06-28 RX ADMIN — TERAZOSIN HYDROCHLORIDE 1 MG: 1 CAPSULE ORAL at 21:44

## 2020-06-28 RX ADMIN — LISINOPRIL 20 MG: 20 TABLET ORAL at 17:49

## 2020-06-28 RX ADMIN — MELATONIN 1000 UNITS: at 08:35

## 2020-06-28 RX ADMIN — LISINOPRIL 20 MG: 20 TABLET ORAL at 08:35

## 2020-06-28 RX ADMIN — CLOPIDOGREL BISULFATE 75 MG: 75 TABLET, FILM COATED ORAL at 08:35

## 2020-06-28 RX ADMIN — ATORVASTATIN CALCIUM 40 MG: 40 TABLET, FILM COATED ORAL at 17:49

## 2020-06-28 NOTE — ASSESSMENT & PLAN NOTE
· Patient admitted with approximately 30 minute episode of left hand weakness  Slurred speech noted by patient's nephew  Now resolved  Echo pending   Subacute appearing tiny frontal lobe cortical infarcts  · Neurology consult appreciated  · Echo pending  · Needs loop recorder outpatient   · Continue DAPT, Statin

## 2020-06-28 NOTE — ASSESSMENT & PLAN NOTE
· Present on admission, now resolved and at baseline   · No further work up   · Home medications restarted

## 2020-06-28 NOTE — PLAN OF CARE
Problem: OCCUPATIONAL THERAPY ADULT  Goal: Performs self-care activities at highest level of function for planned discharge setting  See evaluation for individualized goals  Description  Treatment Interventions: ADL retraining, Functional transfer training, Endurance training, Patient/family training, Neuromuscular reeducation, Continued evaluation, Activityengagement          See flowsheet documentation for full assessment, interventions and recommendations  Note:   Limitation: Decreased ADL status, Decreased UE strength, Decreased endurance, Decreased self-care trans, Decreased high-level ADLs  Prognosis: Fair  Assessment: Patient evaluated by Occupational Therapy  Patient admitted with Cerebrovascular accident (CVA) due to embolism of cerebral artery ([facility] [address] )  The patients occupational profile, medical and therapy history includes a expanded review of medical and/or therapy records and additional review of physical, cognitive, or psychosocial history related to current functional performance  Comorbidities affecting functional mobility and ADLS include: CVA, diabetes, hypertension and hyperlipidemia  Prior to admission, patient was independent with functional mobility without assistive device, independent with ADLS, independent with IADLS and living alone in 2 story home with 2 steps to enter  The evaluation identifies the following performance deficits: weakness, impaired balance, decreased endurance, decreased coordination, increased fall risk, decreased ADLS, decreased IADLS and decreased activity tolerance, that result in activity limitations and/or participation restrictions  Pt performed LB dressing at Sup and grooming sup , UB dressing Mod I and Toileting at Sup  Patient performed Bed mobility at Mod I, Transfers at  [facility]  And functional mobility at [facility] with no AD  This evaluation requires clinical decision making of moderate complexity, because the patient may present with comorbidities that affect occupational performance and required minimal or moderate modification of tasks or assistance with the consideration of several treatment options  The Barthel Index was used as a functional outcome tool presenting with a score of 60  Patient will benefit from skilled Occupational Therapy services to address above deficits and facilitate a safe return to prior level of function

## 2020-06-28 NOTE — PLAN OF CARE
Problem: Potential for Falls  Goal: Patient will remain free of falls  Description  INTERVENTIONS:  - Assess patient frequently for physical needs  -  Identify cognitive and physical deficits and behaviors that affect risk of falls    -  [place] fall precautions as indicated by assessment   - Educate patient/family on patient safety including physical limitations  - Instruct patient to call for assistance with activity based on assessment  - Modify environment to reduce risk of injury  - Consider OT/PT consult to assist with strengthening/mobility  Outcome: Progressing     Problem: PAIN - ADULT  Goal: Verbalizes/displays adequate comfort level or baseline comfort level  Description  Interventions:  - Encourage patient to monitor pain and request assistance  - Assess pain using appropriate pain scale  - Administer analgesics based on type and severity of pain and evaluate response  - Implement non-pharmacological measures as appropriate and evaluate response  - Consider cultural and social influences on pain and pain management  - Notify physician/advanced practitioner if interventions unsuccessful or patient reports new pain  Outcome: Progressing     Problem: SAFETY ADULT  Goal: Maintain or return to baseline ADL function  Description  INTERVENTIONS:  -  Assess patient's ability to carry out ADLs; assess patient's baseline for ADL function and identify physical deficits which impact ability to perform ADLs (bathing, care of mouth/teeth, toileting, grooming, dressing, etc )  - Assess/evaluate cause of self-care deficits   - Assess range of motion  - Assess patient's mobility; develop plan if impaired  - Assess patient's need for assistive devices and provide as appropriate  - Encourage maximum independence but intervene and supervise when necessary  - Involve family in performance of ADLs  - Assess for home care needs following discharge   - Consider OT consult to assist with ADL evaluation and planning for discharge  - Provide patient education as appropriate  Outcome: Progressing  Goal: Maintain or return mobility status to optimal level  Description  INTERVENTIONS:  - Assess patient's baseline mobility status (ambulation, transfers, stairs, etc )    - Identify cognitive and physical deficits and behaviors that affect mobility  - Identify mobility aids required to assist with transfers and/or ambulation (gait belt, sit-to-stand, lift, walker, cane, etc )  - [place] fall precautions as indicated by assessment  - Record patient progress and toleration of activity level on Mobility SBAR; progress patient to next Phase/Stage  - Instruct patient to call for assistance with activity based on assessment  - Consider rehabilitation consult to assist with strengthening/weightbearing, etc   Outcome: Progressing     Problem: DISCHARGE PLANNING  Goal: Discharge to home or other facility with appropriate resources  Description  INTERVENTIONS:  - Identify barriers to discharge w/patient and caregiver  - Arrange for needed discharge resources and transportation as appropriate  - Identify discharge learning needs (meds, wound care, etc )  - Arrange for interpretive services to assist at discharge as needed  - Refer to Case Management Department for coordinating discharge planning if the patient needs post-hospital services based on physician/advanced practitioner order or complex needs related to functional status, cognitive ability, or social support system  Outcome: Progressing     Problem: Knowledge Deficit  Goal: Patient/family/caregiver demonstrates understanding of disease process, treatment plan, medications, and discharge instructions  Description  Complete learning assessment and assess knowledge base    Interventions:  - Provide teaching at level of understanding  - Provide teaching via preferred learning methods  Outcome: Progressing     Problem: NEUROSENSORY - ADULT  Goal: Achieves stable or improved neurological status  Description  INTERVENTIONS  - Monitor and report changes in neurological status  - Monitor vital signs such as temperature, blood pressure, glucose, and any other labs ordered   - Initiate measures to prevent increased intracranial pressure  - Monitor for seizure activity and implement precautions if appropriate      Outcome: Progressing  Goal: Remains free of injury related to seizures activity  Description  INTERVENTIONS  - Maintain airway, patient safety  and administer oxygen as ordered  - Monitor patient for seizure activity, document and report duration and description of seizure to physician/advanced practitioner  - If seizure occurs,  ensure patient safety during seizure  - Reorient patient post seizure  - Seizure pads on all 4 side rails  - Instruct patient/family to notify RN of any seizure activity including if an aura is experienced  - Instruct patient/family to call for assistance with activity based on nursing assessment  - Administer anti-seizure medications if ordered    Outcome: Progressing  Goal: Achieves maximal functionality and self care  Description  INTERVENTIONS  - Monitor swallowing and airway patency with patient fatigue and changes in neurological status  - Encourage and assist patient to increase activity and self care     - Encourage visually impaired, hearing impaired and aphasic patients to use assistive/communication devices  Outcome: Progressing     Problem: CARDIOVASCULAR - ADULT  Goal: Maintains optimal cardiac output and hemodynamic stability  Description  INTERVENTIONS:  - Monitor I/O, vital signs and rhythm  - Monitor for S/S and trends of decreased cardiac output  - Administer and titrate ordered vasoactive medications to optimize hemodynamic stability  - Assess quality of pulses, skin color and temperature  - Assess for signs of decreased coronary artery perfusion  - Instruct patient to report change in severity of symptoms  Outcome: Progressing  Goal: Absence of cardiac dysrhythmias or at baseline rhythm  Description  INTERVENTIONS:  - Continuous cardiac monitoring, vital signs, obtain 12 lead EKG if ordered  - Administer antiarrhythmic and heart rate control medications as ordered  - Monitor electrolytes and administer replacement therapy as ordered  Outcome: Progressing     Problem: RESPIRATORY - ADULT  Goal: Achieves optimal ventilation and oxygenation  Description  INTERVENTIONS:  - Assess for changes in respiratory status  - Assess for changes in mentation and behavior  - Position to facilitate oxygenation and minimize respiratory effort  - Oxygen administered by appropriate delivery if ordered  - Initiate smoking cessation education as indicated  - Encourage broncho-pulmonary hygiene including cough, deep breathe, Incentive Spirometry  - Assess the need for suctioning and aspirate as needed  - Assess and instruct to report SOB or any respiratory difficulty  - Respiratory Therapy support as indicated  Outcome: Progressing     Problem: METABOLIC, FLUID AND ELECTROLYTES - ADULT  Goal: Electrolytes maintained within normal limits  Description  INTERVENTIONS:  - Monitor labs and assess patient for signs and symptoms of electrolyte imbalances  - Administer electrolyte replacement as ordered  - Monitor response to electrolyte replacements, including repeat lab results as appropriate  - Instruct patient on fluid and nutrition as appropriate  Outcome: Progressing     Problem: SKIN/TISSUE INTEGRITY - ADULT  Goal: Skin integrity remains intact  Description  INTERVENTIONS  - Identify patients at risk for skin breakdown  - Assess and monitor skin integrity  - Assess and monitor nutrition and hydration status  - Monitor labs (i e  albumin)  - Assess for incontinence   - Turn and reposition patient  - Assist with mobility/ambulation  - Relieve pressure over bony prominences  - Avoid friction and shearing  - Provide appropriate hygiene as needed including keeping skin clean and dry  - Evaluate need for skin moisturizer/barrier cream  - Collaborate with interdisciplinary team (i e  Nutrition, Rehabilitation, etc )   - Patient/family teaching  Outcome: Progressing  Goal: Incision(s), wounds(s) or drain site(s) healing without S/S of infection  Description  INTERVENTIONS  - Assess and document risk factors for skin impairment   - Assess and document dressing, incision, wound bed, drain sites and surrounding tissue  - Consider nutrition services referral as needed  - Oral mucous membranes remain intact  - Provide patient/ family education  Outcome: Progressing  Goal: Oral mucous membranes remain intact  Description  INTERVENTIONS  - Assess oral mucosa and hygiene practices  - Implement preventative oral hygiene regimen  - Implement oral medicated treatments as ordered  - Initiate Nutrition services referral as needed  Outcome: Progressing     Problem: MUSCULOSKELETAL - ADULT  Goal: Maintain or return mobility to safest level of function  Description  INTERVENTIONS:  - Assess patient's ability to carry out ADLs; assess patient's baseline for ADL function and identify physical deficits which impact ability to perform ADLs (bathing, care of mouth/teeth, toileting, grooming, dressing, etc )  - Assess/evaluate cause of self-care deficits   - Assess range of motion  - Assess patient's mobility  - Assess patient's need for assistive devices and provide as appropriate  - Encourage maximum independence but intervene and supervise when necessary  - Involve family in performance of ADLs  - Assess for home care needs following discharge   - Consider OT consult to assist with ADL evaluation and planning for discharge  - Provide patient education as appropriate  Outcome: Progressing  Goal: Maintain proper alignment of affected body part  Description  INTERVENTIONS:  - Support, maintain and protect limb and body alignment  - Provide patient/ family with appropriate education  Outcome: Progressing Problem: Neurological Deficit  Goal: Neurological status is stable or improving  Description  Interventions:  - Monitor and assess patient's level of consciousness, motor function, sensory function, and level of assistance needed for ADLs  - Monitor and report changes from baseline  Collaborate with interdisciplinary team to initiate plan and implement interventions as ordered  - Provide and maintain a safe environment  - Consider seizure precautions  - Consider fall precautions  - Consider aspiration precautions  - Consider bleeding precautions  Outcome: Progressing     Problem: Activity Intolerance/Impaired Mobility  Goal: Mobility/activity is maintained at optimum level for patient  Description  Interventions:  - Assess and monitor patient  barriers to mobility and need for assistive/adaptive devices  - Assess patient's emotional response to limitations  - Collaborate with interdisciplinary team and initiate plans and interventions as ordered  - Encourage independent activity per ability   - Maintain proper body alignment  - Perform active/passive rom as tolerated/ordered  - Plan activities to conserve energy   - Turn patient as appropriate  Outcome: Progressing     Problem: Communication Impairment  Goal: Ability to express needs and understand communication  Description  Assess patient's communication skills and ability to understand information  Patient will demonstrate use of effective communication techniques, alternative methods of communication and understanding even if not able to speak  - Encourage communication and provide alternate methods of communication as needed  - Collaborate with case management/ for discharge needs  - Include patient/family/caregiver in decisions related to communication    Outcome: Progressing     Problem: Potential for Aspiration  Goal: Non-ventilated patient's risk of aspiration is minimized  Description  Assess and monitor vital signs, respiratory status, and labs (WBC)  Monitor for signs of aspiration (tachypnea, cough, rales, wheezing, cyanosis, fever)  - Assess and monitor patient's ability to swallow  - Place patient up in chair to eat if possible  - HOB up at 90 degrees to eat if unable to get patient up into chair   - Supervise patient during oral intake  - Instruct patient/ family to take small bites  - Instruct patient/ family to take small single sips when taking liquids  - Follow patient-specific strategies generated by speech pathologist   Outcome: Progressing  Goal: Ventilated patient's risk of aspiration is minimized  Description  Assess and monitor vital signs, respiratory status, airway cuff pressure, and labs (WBC)  Monitor for signs of aspiration (tachypnea, cough, rales, wheezing, cyanosis, fever)  - Elevate head of bed 30 degrees if patient has tube feeding   - Monitor tube feeding  Outcome: Progressing     Problem: Nutrition  Goal: Nutrition/Hydration status is improving  Description  Monitor and assess patient's nutrition/hydration status for malnutrition (ex- brittle hair, bruises, dry skin, pale skin and conjunctiva, muscle wasting, smooth red tongue, and disorientation)  Collaborate with interdisciplinary team and initiate plan and interventions as ordered  Monitor patient's weight and dietary intake as ordered or per policy  Utilize nutrition screening tool and intervene per policy  Determine patient's food preferences and provide high-protein, high-caloric foods as appropriate  - Assist patient with eating   - Allow adequate time for meals   - Encourage patient to take dietary supplement as ordered  - Collaborate with clinical nutritionist   - Include patient/family/caregiver in decisions related to nutrition    Outcome: Progressing

## 2020-06-28 NOTE — ASSESSMENT & PLAN NOTE
· Patient endorses history of palpitations and prior workup included Holter monitor ordered by his PCP in 2018 which revealed occasional PACs and frequent PVCs   PVCs again noted on telemetry and admission EKG  · Check echocardiogram as per stroke workup  · Pt also complains of easy fatigability, r/o structural heart disease in the setting of PVCs

## 2020-06-28 NOTE — OCCUPATIONAL THERAPY NOTE
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 (HCC)    Palpitations    Essential hypertension    Past Medical History  Past Medical History:   Diagnosis Date    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)     Last Assessed:  5/19/17    Chronic kidney disease     Diabetes mellitus ([facility] [address] )     Hyperlipidemia     Hypertension     Osteoarthritis      Past Surgical History  Past Surgical History:   Procedure Laterality Date    APPENDECTOMY      COLONOSCOPY  2012    HEMORROIDECTOMY             06/28/20 0950   Note Type   Note type Eval only   Restrictions/Precautions   Weight Bearing Precautions Per Order No   Other Precautions Fall Risk;Bed Alarm; Chair Alarm   Pain Assessment   Pain Assessment Tool Pain Assessment not indicated - pt denies pain   Home Living   Type of [address] [place] [address] Two level  (8 steps to second floor and 2 ROHAN)   Bathroom Shower/Tub Tub/shower unit   Bathroom Toilet Standard   Bathroom Equipment Shower chair   Home Equipment   (Did not use any equipment prior to admission)   Prior Function   Level of [place] Independent with ADLs and functional mobility   Lives With Alone   Receives Help From Family   ADL Assistance Independent   IADLs Independent   Falls in the last 6 months 0   Lifestyle   Intrinsic Gratification Walking   ADL   Eating Assistance 7  Independent   Grooming Assistance 5  Supervision/Setup   Grooming Deficit Standing with assistive device; Increased time to complete;Supervision/safety; Wash/dry hands; Wash/dry face;Brushing hair   UB Bathing Assistance 5  Supervision/Setup   UB Bathing Deficit Right arm;Left arm   LB Bathing Assistance Unable to assess   UB Dressing Assistance 6  Modified independent   LB Dressing Assistance 5  Supervision/Setup   LB Dressing Deficit Thread RLE into pants; Thread LLE into pants; Increased time to complete;Supervision/safety   Toileting Assistance  5  Supervision/Setup   Toileting Deficit Increased time to complete;Verbal cueing;Supervison/safety   Bed Mobility   Supine to Sit 6  Modified independent   Sit to Supine 6  Modified independent   Transfers   Sit to Stand 6  Modified independent   Additional items Verbal cues   Stand to Sit 6  Modified independent   Additional items Increased time required;Verbal cues   Toilet transfer 6  Modified independent   Additional items Increased time required;Verbal cues   Functional Mobility   Functional Mobility 5  Supervision   Additional Comments   (No AD)   Balance   Dynamic Sitting Fair +   Static Standing Fair +   Dynamic Standing Fair   Activity Tolerance   Activity Tolerance Patient tolerated treatment well   Nurse Made Aware Sheryl rahman   RUE Assessment   RUE Assessment WFL   RUE Strength   RUE Overall Strength Within Functional Limits - strength 5/5   LUE Assessment   LUE Assessment WFL   LUE Strength   LUE Overall Strength   (/5)   Hand Function   Fine Motor Coordination Functional   Sensation   Light Touch No apparent deficits   Vision - Complex Assessment   Tracking Able to track stimulus in all quads without difficulty   Acuity Able to read clock/calendar on wall without difficulty   Cognition   Orientation Level Oriented X4   Following Commands Follows one step commands with increased time or repetition   Comments Pt ID by wristband name and    Assessment   Limitation Decreased ADL status; Decreased UE strength;Decreased endurance;Decreased self-care trans;Decreased high-level ADLs   Prognosis Fair   Assessment Patient evaluated by Occupational Therapy  Patient admitted with Cerebrovascular accident (CVA) due to embolism of cerebral artery ([facility] [address] )    The patients occupational profile, medical and therapy history includes a expanded review of medical and/or therapy records and additional review of physical, cognitive, or psychosocial history related to current functional performance  Comorbidities affecting functional mobility and ADLS include: CVA, diabetes, hypertension and hyperlipidemia  Prior to admission, patient was independent with functional mobility without assistive device, independent with ADLS, independent with IADLS and living alone in 2 story home with 2 steps to enter  The evaluation identifies the following performance deficits: weakness, impaired balance, decreased endurance, decreased coordination, increased fall risk, decreased ADLS, decreased IADLS and decreased activity tolerance, that result in activity limitations and/or participation restrictions  Pt performed LB dressing at [facility] and grooming sup , UB dressing Mod I and Toileting at Sup  Patient performed Bed mobility at [facility], Transfers at  [facility]  And functional mobility at [facility] with no AD  This evaluation requires clinical decision making of moderate complexity, because the patient may present with comorbidities that affect occupational performance and required minimal or moderate modification of tasks or assistance with the consideration of several treatment options  The Barthel Index was used as a functional outcome tool presenting with a score of 60  Patient will benefit from skilled Occupational Therapy services to address above deficits and facilitate a safe return to prior level of function  Goals   Patient Goals To go home   STG Time Frame   (1-7)   Short Term Goal #1 Goals established to promote patient goal of going home:  Patient will increase standing tolerance to 10 minutes during ADL task to decrease assistance level and decrease fall risk  Patient will increase functional mobility to and from bathroom with no assistive device  To Mod I increase performance with ADLS and to use a toilet; Patient will tolerate 10 minutes of UE ROM/strengthening to increase general activity tolerance and performance in ADLS/IADLS; Patient will improve functional activity tolerance to 10 minutes of sustained functional tasks to increase participation in basic self-care and decrease assistance level;   Patient will increase dynamic standing balance to fair+ to improve postural stability and decrease fall risk during standing ADLS and transfers  LTG Time Frame   (8-14)   Long Term Goal #1 Goals established to promote patient goal of going home:  Patient will increase standing tolerance to 12 minutes during ADL task to decrease assistance level and decrease fall risk  Patient will increase functional mobility to and from bathroom with no assistive device  To Indepdent increase performance with ADLS and to use a toilet; Patient will tolerate 12 minutes of UE ROM/strengthening to increase general activity tolerance and performance in ADLS/IADLS; Patient will improve functional activity tolerance to 12 minutes of sustained functional tasks to increase participation in basic self-care and decrease assistance level;   Patient will increase dynamic standing balance to good to improve postural stability and decrease fall risk during standing ADLS and transfers  Goals established to promote patient goal of going home:  Patient will increase standing tolerance to 12 minutes during ADL task to decrease assistance level and decrease fall risk  Patient will increase functional mobility to and from bathroom with no assistive device  To Indepdent increase performance with ADLS and to use a toilet; Patient will tolerate 12 minutes of UE ROM/strengthening to increase general activity tolerance and performance in ADLS/IADLS; Patient will improve functional activity tolerance to 12 minutes of sustained functional tasks to increase participation in basic self-care and decrease assistance level;   Patient will increase dynamic standing balance to good to improve postural stability and decrease fall risk during standing ADLS and transfers  ADL Goals   Pt Will Perform Eating Independently   Pt Will Perform Grooming Independently   Pt Will Perform Bathing Independently   Pt Will Perform UE Dressing Independently   Pt Will Perform LE Dressing With mod indep   Pt Will Perform Toileting With mod indep   Plan   Treatment Interventions ADL retraining;Functional transfer training; Endurance training;Patient/family training;Neuromuscular reeducation;Continued evaluation; Activityengagement   Goal Expiration Date 07/12/20   OT Frequency 2-3x/wk     Discharge Recommendation: Return Home with social supports      Barthel Index   Feeding 10   Bathing 5   Grooming Score 0   Dressing Score 5   Bladder Score 10   Bowels Score 10   Toilet Use Score 5   Transfers (Bed/Chair) Score 10   Mobility (Level Surface) Score 0   Stairs Score 5   Barthel Index Score 60   Modified [place] scale 1    Discharge recommendation return home with social supports  Nikita OTR/L

## 2020-06-28 NOTE — ASSESSMENT & PLAN NOTE
· Given CVA needs to be on Lipitor rather than Zocor   · Continue Lipitor   · Lipids overall well controlled

## 2020-06-28 NOTE — ASSESSMENT & PLAN NOTE
· Blood pressure elevated on admission in the setting of possible TIA  · Home regimen includes lisinopril 20 mg twice daily, Norvasc 5 mg daily, terazosin 1 mg QHS - continue

## 2020-06-28 NOTE — PROGRESS NOTES
[facility]  Progress Note - Emerald Pierson 12/11/1930, 80 y o  male MRN: [id number]    Unit/Bed#: S -01 Encounter: [id number]    Primary Care Provider: Ora Tripp DO   Date and time admitted to hospital: 6/26/2020  2:00 PM        * Embolic bilateral punctate CVA, acute as well as subacute  Assessment & Plan  · Patient admitted with approximately 30 minute episode of left hand weakness  Slurred speech noted by patient's nephew  Now resolved  Echo pending  Subacute appearing tiny frontal lobe cortical infarcts  · Neurology consult appreciated  · Echo pending  · Needs loop recorder outpatient   · Continue DAPT, Statin     Acute renal failure superimposed on stage 4 chronic kidney disease ([facility] [address] )  Assessment & Plan  · Present on admission, now resolved and at baseline   · No further work up   · Home medications restarted     Controlled diabetes mellitus [facility])  Assessment & Plan  Lab Results   Component Value Date    HGBA1C 7 6 (H) 06/27/2020     Recent Labs     06/27/20  1640 06/27/20  2114 06/28/20  0655 06/28/20  1026   POCGLU 155* 163* 116 183*     Blood Sugar Average: Last 72 hrs:  (P) 159   · Home regimen includes Amaryl 2 mg daily  Hold oral hypoglycemic  · Add sliding scale insulin, Accu-Cheks, diabetic diet    Essential hypertension  Assessment & Plan  · Blood pressure elevated on admission in the setting of possible TIA  · Home regimen includes lisinopril 20 mg twice daily, Norvasc 5 mg daily, terazosin 1 mg QHS - continue      Hyperlipidemia  Assessment & Plan  · Given CVA needs to be on Lipitor rather than Zocor   · Continue Lipitor   · Lipids overall well controlled     Palpitations  Assessment & Plan  · Patient endorses history of palpitations and prior workup included Holter monitor ordered by his PCP in 2018 which revealed occasional PACs and frequent PVCs   PVCs again noted on telemetry and admission EKG  · Check echocardiogram as per stroke workup  · Pt also complains of easy fatigability, r/o structural heart disease in the setting of PVCs    Enlarged prostate without lower urinary tract symptoms (luts)  Assessment & Plan  · Continue terazosin      VTE Pharmacologic Prophylaxis:   Pharmacologic: Heparin  Mechanical VTE Prophylaxis in Place: No    Patient Centered Rounds: I have performed bedside rounds with nursing staff today  Discussions with Specialists or Other Care Team Provider: Discussed with RNARTHUR    Education and Discussions with Family / Patient: Discussed with patient, declined update     Time Spent for Care: 30 minutes  More than 50% of total time spent on counseling and coordination of care as described above  Current Length of Stay: 2 day(s)    Current Patient Status: Inpatient   Certification Statement: The patient will continue to require additional inpatient hospital stay due to pending echo     Discharge Plan: Tomorrow after echo done     Code Status: Level 3 - DNAR and DNI      Subjective:   Patient reports that his left hand numbness has resolved and he is feeling well  Denies new numbness, tingling, or weakness  Denies fevers or chills    Objective:     Vitals:   Temp (24hrs), Av 3 °F (36 8 °C), Min:98 1 °F (36 7 °C), Max:98 5 °F (36 9 °C)    Temp:  [98 1 °F (36 7 °C)-98 5 °F (36 9 °C)] 98 5 °F (36 9 °C)  HR:  [67-75] 69  Resp:  [16-20] 20  BP: (122-145)/(60-79) 122/60  SpO2:  [97 %-99 %] 97 %  Body mass index is 26 kg/m²  Input and Output Summary (last 24 hours): Intake/Output Summary (Last 24 hours) at 2020 1315  Last data filed at 2020 0842  Gross per 24 hour   Intake 240 ml   Output 240 ml   Net 0 ml       Physical Exam:     Physical Exam   Constitutional: He is oriented to person, place, and time  Vital signs are normal  He appears well-developed and well-nourished  Non-toxic appearance  No distress  HENT:   Head: Normocephalic and atraumatic  Mouth/Throat: Mucous membranes are not dry     Eyes: Pupils are equal, round, and reactive to light  Conjunctivae and EOM are normal  No scleral icterus  Pupils are equal    Neck: Neck supple  Cardiovascular: Normal rate, regular rhythm, S1 normal, S2 normal, normal heart sounds and intact distal pulses  Exam reveals no S3 and no S4  No murmur heard  Pulmonary/Chest: Effort normal and breath sounds normal  No accessory muscle usage or stridor  No respiratory distress  He has no decreased breath sounds  He has no wheezes  He has no rhonchi  He has no rales  He exhibits no tenderness  Abdominal: Soft  Bowel sounds are normal  He exhibits no distension and no mass  There is no tenderness  There is no rigidity, no rebound and no guarding  Neurological: He is alert and oriented to person, place, and time  He is not disoriented  He displays no tremor  He displays no seizure activity  Skin: Skin is warm and dry  Additional Data:     Labs:    Results from last 7 days   Lab Units 06/27/20  0440 06/26/20  1446   WBC Thousand/uL 6 47 7 51   HEMOGLOBIN g/dL 11 5* 12 5   HEMATOCRIT % 34 9* 37 6   PLATELETS Thousands/uL 171 185   NEUTROS PCT %  --  58   LYMPHS PCT %  --  25   MONOS PCT %  --  11   EOS PCT %  --  5     Results from last 7 days   Lab Units 06/27/20  0440   POTASSIUM mmol/L 4 4   CHLORIDE mmol/L 111*   CO2 mmol/L 22   BUN mg/dL 37*   CREATININE mg/dL 2 09*   CALCIUM mg/dL 8 1*   ALK PHOS U/L 76   ALT U/L 16   AST U/L 22           * I Have Reviewed All Lab Data Listed Above  * Additional Pertinent Lab Tests Reviewed:  [facility]    Imaging:    Imaging Reports Reviewed Today Include: MRI - subacute infarcts  Imaging Personally Reviewed by Myself Includes:  None    Recent Cultures (last 7 days):           Last 24 Hours Medication List:     Current Facility-Administered Medications:  amLODIPine 5 mg Oral Daily Familia Murrieta PA-C   aspirin 81 mg Oral Daily Familia Murrieta PA-C   atorvastatin 40 mg Oral Daily With [address] Edwin Chen PA-C calcium carbonate 1,000 mg Oral Daily PRN David Murritea PA-C   cholecalciferol 1,000 Units Oral Daily Familia Murrieta PA-C   clopidogrel 75 mg Oral Daily KONSTANTIN Butler   vitamin B-12 500 mcg Oral Daily Familia Murrieta PA-C   fish oil 1,000 mg Oral Daily Familia Murrieta PA-C   heparin (porcine) 5,000 Units Subcutaneous Q8H [address] Familia Murrieta PA-C   insulin lispro 1-5 Units Subcutaneous TID AC Familia Murrieta PA-C   insulin lispro 1-5 Units Subcutaneous HS Familia Murrieta PA-C   lisinopril 20 mg Oral BID Sen Painter PA-C   ondansetron 4 mg Intravenous Q6H PRN Familia Murrieta PA-C   senna 1 tablet Oral Daily Familia Murrieta PA-C   terazosin 1 mg Oral HS Familia Murrieta PA-C        Today, Patient Was Seen By: Shanice Madrid PA-C    ** Please Note: Dictation voice to text software may have been used in the creation of this document   **

## 2020-06-28 NOTE — PLAN OF CARE
Problem: Potential for Falls  Goal: Patient will remain free of falls  Description  INTERVENTIONS:  - Assess patient frequently for physical needs  -  Identify cognitive and physical deficits and behaviors that affect risk of falls    -  [place] fall precautions as indicated by assessment   - Educate patient/family on patient safety including physical limitations  - Instruct patient to call for assistance with activity based on assessment  - Modify environment to reduce risk of injury  - Consider OT/PT consult to assist with strengthening/mobility  Outcome: Progressing     Problem: PAIN - ADULT  Goal: Verbalizes/displays adequate comfort level or baseline comfort level  Description  Interventions:  - Encourage patient to monitor pain and request assistance  - Assess pain using appropriate pain scale  - Administer analgesics based on type and severity of pain and evaluate response  - Implement non-pharmacological measures as appropriate and evaluate response  - Consider cultural and social influences on pain and pain management  - Notify physician/advanced practitioner if interventions unsuccessful or patient reports new pain  Outcome: Progressing     Problem: SAFETY ADULT  Goal: Maintain or return to baseline ADL function  Description  INTERVENTIONS:  -  Assess patient's ability to carry out ADLs; assess patient's baseline for ADL function and identify physical deficits which impact ability to perform ADLs (bathing, care of mouth/teeth, toileting, grooming, dressing, etc )  - Assess/evaluate cause of self-care deficits   - Assess range of motion  - Assess patient's mobility; develop plan if impaired  - Assess patient's need for assistive devices and provide as appropriate  - Encourage maximum independence but intervene and supervise when necessary  - Involve family in performance of ADLs  - Assess for home care needs following discharge   - Consider OT consult to assist with ADL evaluation and planning for [FreeTextEntry1] : Pt with hematuria and urinary frequency. Dipstick today only with trace blood, negative for LE and nitrite. Does not seem highly suggestive of UTI. I do not believe an IUD can cause her symptoms. Will send a urine culture and empirically cover with Cipro for now. Drink plenty of fluids.\par \par RTO in 5/19 for annual physical. discharge  - Provide patient education as appropriate  Outcome: Progressing  Goal: Maintain or return mobility status to optimal level  Description  INTERVENTIONS:  - Assess patient's baseline mobility status (ambulation, transfers, stairs, etc )    - Identify cognitive and physical deficits and behaviors that affect mobility  - Identify mobility aids required to assist with transfers and/or ambulation (gait belt, sit-to-stand, lift, walker, cane, etc )  - [place] fall precautions as indicated by assessment  - Record patient progress and toleration of activity level on Mobility SBAR; progress patient to next Phase/Stage  - Instruct patient to call for assistance with activity based on assessment  - Consider rehabilitation consult to assist with strengthening/weightbearing, etc   Outcome: Progressing     Problem: DISCHARGE PLANNING  Goal: Discharge to home or other facility with appropriate resources  Description  INTERVENTIONS:  - Identify barriers to discharge w/patient and caregiver  - Arrange for needed discharge resources and transportation as appropriate  - Identify discharge learning needs (meds, wound care, etc )  - Arrange for interpretive services to assist at discharge as needed  - Refer to Case Management Department for coordinating discharge planning if the patient needs post-hospital services based on physician/advanced practitioner order or complex needs related to functional status, cognitive ability, or social support system  Outcome: Progressing     Problem: Knowledge Deficit  Goal: Patient/family/caregiver demonstrates understanding of disease process, treatment plan, medications, and discharge instructions  Description  Complete learning assessment and assess knowledge base    Interventions:  - Provide teaching at level of understanding  - Provide teaching via preferred learning methods  Outcome: Progressing     Problem: NEUROSENSORY - ADULT  Goal: Achieves stable or improved neurological status  Description  INTERVENTIONS  - Monitor and report changes in neurological status  - Monitor vital signs such as temperature, blood pressure, glucose, and any other labs ordered   - Initiate measures to prevent increased intracranial pressure  - Monitor for seizure activity and implement precautions if appropriate      Outcome: Progressing  Goal: Remains free of injury related to seizures activity  Description  INTERVENTIONS  - Maintain airway, patient safety  and administer oxygen as ordered  - Monitor patient for seizure activity, document and report duration and description of seizure to physician/advanced practitioner  - If seizure occurs,  ensure patient safety during seizure  - Reorient patient post seizure  - Seizure pads on all 4 side rails  - Instruct patient/family to notify RN of any seizure activity including if an aura is experienced  - Instruct patient/family to call for assistance with activity based on nursing assessment  - Administer anti-seizure medications if ordered    Outcome: Progressing  Goal: Achieves maximal functionality and self care  Description  INTERVENTIONS  - Monitor swallowing and airway patency with patient fatigue and changes in neurological status  - Encourage and assist patient to increase activity and self care     - Encourage visually impaired, hearing impaired and aphasic patients to use assistive/communication devices  Outcome: Progressing     Problem: CARDIOVASCULAR - ADULT  Goal: Maintains optimal cardiac output and hemodynamic stability  Description  INTERVENTIONS:  - Monitor I/O, vital signs and rhythm  - Monitor for S/S and trends of decreased cardiac output  - Administer and titrate ordered vasoactive medications to optimize hemodynamic stability  - Assess quality of pulses, skin color and temperature  - Assess for signs of decreased coronary artery perfusion  - Instruct patient to report change in severity of symptoms  Outcome: Progressing  Goal: Absence of cardiac dysrhythmias or at baseline rhythm  Description  INTERVENTIONS:  - Continuous cardiac monitoring, vital signs, obtain 12 lead EKG if ordered  - Administer antiarrhythmic and heart rate control medications as ordered  - Monitor electrolytes and administer replacement therapy as ordered  Outcome: Progressing     Problem: RESPIRATORY - ADULT  Goal: Achieves optimal ventilation and oxygenation  Description  INTERVENTIONS:  - Assess for changes in respiratory status  - Assess for changes in mentation and behavior  - Position to facilitate oxygenation and minimize respiratory effort  - Oxygen administered by appropriate delivery if ordered  - Initiate smoking cessation education as indicated  - Encourage broncho-pulmonary hygiene including cough, deep breathe, Incentive Spirometry  - Assess the need for suctioning and aspirate as needed  - Assess and instruct to report SOB or any respiratory difficulty  - Respiratory Therapy support as indicated  Outcome: Progressing     Problem: METABOLIC, FLUID AND ELECTROLYTES - ADULT  Goal: Electrolytes maintained within normal limits  Description  INTERVENTIONS:  - Monitor labs and assess patient for signs and symptoms of electrolyte imbalances  - Administer electrolyte replacement as ordered  - Monitor response to electrolyte replacements, including repeat lab results as appropriate  - Instruct patient on fluid and nutrition as appropriate  Outcome: Progressing     Problem: SKIN/TISSUE INTEGRITY - ADULT  Goal: Skin integrity remains intact  Description  INTERVENTIONS  - Identify patients at risk for skin breakdown  - Assess and monitor skin integrity  - Assess and monitor nutrition and hydration status  - Monitor labs (i e  albumin)  - Assess for incontinence   - Turn and reposition patient  - Assist with mobility/ambulation  - Relieve pressure over bony prominences  - Avoid friction and shearing  - Provide appropriate hygiene as needed including keeping skin clean and dry  - Evaluate need for skin moisturizer/barrier cream  - Collaborate with interdisciplinary team (i e  Nutrition, Rehabilitation, etc )   - Patient/family teaching  Outcome: Progressing  Goal: Incision(s), wounds(s) or drain site(s) healing without S/S of infection  Description  INTERVENTIONS  - Assess and document risk factors for skin impairment   - Assess and document dressing, incision, wound bed, drain sites and surrounding tissue  - Consider nutrition services referral as needed  - Oral mucous membranes remain intact  - Provide patient/ family education  Outcome: Progressing  Goal: Oral mucous membranes remain intact  Description  INTERVENTIONS  - Assess oral mucosa and hygiene practices  - Implement preventative oral hygiene regimen  - Implement oral medicated treatments as ordered  - Initiate Nutrition services referral as needed  Outcome: Progressing     Problem: MUSCULOSKELETAL - ADULT  Goal: Maintain or return mobility to safest level of function  Description  INTERVENTIONS:  - Assess patient's ability to carry out ADLs; assess patient's baseline for ADL function and identify physical deficits which impact ability to perform ADLs (bathing, care of mouth/teeth, toileting, grooming, dressing, etc )  - Assess/evaluate cause of self-care deficits   - Assess range of motion  - Assess patient's mobility  - Assess patient's need for assistive devices and provide as appropriate  - Encourage maximum independence but intervene and supervise when necessary  - Involve family in performance of ADLs  - Assess for home care needs following discharge   - Consider OT consult to assist with ADL evaluation and planning for discharge  - Provide patient education as appropriate  Outcome: Progressing  Goal: Maintain proper alignment of affected body part  Description  INTERVENTIONS:  - Support, maintain and protect limb and body alignment  - Provide patient/ family with appropriate education  Outcome: Progressing Problem: Neurological Deficit  Goal: Neurological status is stable or improving  Description  Interventions:  - Monitor and assess patient's level of consciousness, motor function, sensory function, and level of assistance needed for ADLs  - Monitor and report changes from baseline  Collaborate with interdisciplinary team to initiate plan and implement interventions as ordered  - Provide and maintain a safe environment  - Consider seizure precautions  - Consider fall precautions  - Consider aspiration precautions  - Consider bleeding precautions  Outcome: Progressing     Problem: Activity Intolerance/Impaired Mobility  Goal: Mobility/activity is maintained at optimum level for patient  Description  Interventions:  - Assess and monitor patient  barriers to mobility and need for assistive/adaptive devices  - Assess patient's emotional response to limitations  - Collaborate with interdisciplinary team and initiate plans and interventions as ordered  - Encourage independent activity per ability   - Maintain proper body alignment  - Perform active/passive rom as tolerated/ordered  - Plan activities to conserve energy   - Turn patient as appropriate  Outcome: Progressing     Problem: Communication Impairment  Goal: Ability to express needs and understand communication  Description  Assess patient's communication skills and ability to understand information  Patient will demonstrate use of effective communication techniques, alternative methods of communication and understanding even if not able to speak  - Encourage communication and provide alternate methods of communication as needed  - Collaborate with case management/ for discharge needs  - Include patient/family/caregiver in decisions related to communication    Outcome: Progressing     Problem: Potential for Aspiration  Goal: Non-ventilated patient's risk of aspiration is minimized  Description  Assess and monitor vital signs, respiratory status, and labs (WBC)  Monitor for signs of aspiration (tachypnea, cough, rales, wheezing, cyanosis, fever)  - Assess and monitor patient's ability to swallow  - Place patient up in chair to eat if possible  - HOB up at 90 degrees to eat if unable to get patient up into chair   - Supervise patient during oral intake  - Instruct patient/ family to take small bites  - Instruct patient/ family to take small single sips when taking liquids  - Follow patient-specific strategies generated by speech pathologist   Outcome: Progressing  Goal: Ventilated patient's risk of aspiration is minimized  Description  Assess and monitor vital signs, respiratory status, airway cuff pressure, and labs (WBC)  Monitor for signs of aspiration (tachypnea, cough, rales, wheezing, cyanosis, fever)  - Elevate head of bed 30 degrees if patient has tube feeding   - Monitor tube feeding  Outcome: Progressing     Problem: Nutrition  Goal: Nutrition/Hydration status is improving  Description  Monitor and assess patient's nutrition/hydration status for malnutrition (ex- brittle hair, bruises, dry skin, pale skin and conjunctiva, muscle wasting, smooth red tongue, and disorientation)  Collaborate with interdisciplinary team and initiate plan and interventions as ordered  Monitor patient's weight and dietary intake as ordered or per policy  Utilize nutrition screening tool and intervene per policy  Determine patient's food preferences and provide high-protein, high-caloric foods as appropriate  - Assist patient with eating   - Allow adequate time for meals   - Encourage patient to take dietary supplement as ordered  - Collaborate with clinical nutritionist   - Include patient/family/caregiver in decisions related to nutrition    Outcome: Progressing

## 2020-06-28 NOTE — ASSESSMENT & PLAN NOTE
Lab Results   Component Value Date    HGBA1C 7 6 (H) 06/27/2020     Recent Labs     06/27/20  1640 06/27/20  2114 06/28/20  0655 06/28/20  1026   POCGLU 155* 163* 116 183*     Blood Sugar Average: Last 72 hrs:  (P) 159   · Home regimen includes Amaryl 2 mg daily   Hold oral hypoglycemic  · Add sliding scale insulin, Accu-Cheks, diabetic diet

## 2020-06-29 ENCOUNTER — APPOINTMENT (INPATIENT)
Dept: NON INVASIVE DIAGNOSTICS | Facility: HOSPITAL | Age: 85
DRG: 065 | End: 2020-06-29
Payer: MEDICARE

## 2020-06-29 ENCOUNTER — TELEPHONE (OUTPATIENT)
Dept: NEUROLOGY | Facility: CLINIC | Age: 85
End: 2020-06-29

## 2020-06-29 VITALS
HEART RATE: 85 BPM | SYSTOLIC BLOOD PRESSURE: 120 MMHG | DIASTOLIC BLOOD PRESSURE: 60 MMHG | HEIGHT: 71 IN | RESPIRATION RATE: 18 BRPM | TEMPERATURE: 97 F | OXYGEN SATURATION: 98 % | BODY MASS INDEX: 26.1 KG/M2 | WEIGHT: 186.4 LBS

## 2020-06-29 LAB
1,25(OH)2D3 SERPL-MCNC: 42.4 PG/ML (ref 19.9–79.3)
GLUCOSE SERPL-MCNC: 125 MG/DL (ref 65–140)
GLUCOSE SERPL-MCNC: 146 MG/DL (ref 65–140)

## 2020-06-29 PROCEDURE — 82948 REAGENT STRIP/BLOOD GLUCOSE: CPT

## 2020-06-29 PROCEDURE — 99285 EMERGENCY DEPT VISIT HI MDM: CPT | Performed by: EMERGENCY MEDICINE

## 2020-06-29 PROCEDURE — C8929 TTE W OR WO FOL WCON,DOPPLER: HCPCS

## 2020-06-29 PROCEDURE — 93306 TTE W/DOPPLER COMPLETE: CPT | Performed by: INTERNAL MEDICINE

## 2020-06-29 PROCEDURE — 99239 HOSP IP/OBS DSCHRG MGMT >30: CPT | Performed by: NURSE PRACTITIONER

## 2020-06-29 RX ORDER — CLOPIDOGREL BISULFATE 75 MG/1
75 TABLET ORAL DAILY
Qty: 18 TABLET | Refills: 0 | Status: SHIPPED | OUTPATIENT
Start: 2020-06-30 | End: 2020-07-15 | Stop reason: SDUPTHER

## 2020-06-29 RX ORDER — ASPIRIN 81 MG/1
81 TABLET, CHEWABLE ORAL DAILY
Qty: 30 TABLET | Refills: 0 | Status: SHIPPED | OUTPATIENT
Start: 2020-06-30 | End: 2022-05-06 | Stop reason: ALTCHOICE

## 2020-06-29 RX ORDER — ATORVASTATIN CALCIUM 40 MG/1
40 TABLET, FILM COATED ORAL
Qty: 30 TABLET | Refills: 0 | Status: SHIPPED | OUTPATIENT
Start: 2020-06-29 | End: 2020-07-15 | Stop reason: SDUPTHER

## 2020-06-29 RX ADMIN — CLOPIDOGREL BISULFATE 75 MG: 75 TABLET, FILM COATED ORAL at 08:30

## 2020-06-29 RX ADMIN — PERFLUTREN 0.6 ML/MIN: 6.52 INJECTION, SUSPENSION INTRAVENOUS at 11:31

## 2020-06-29 RX ADMIN — AMLODIPINE BESYLATE 5 MG: 5 TABLET ORAL at 08:30

## 2020-06-29 RX ADMIN — MELATONIN 1000 UNITS: at 08:30

## 2020-06-29 RX ADMIN — CYANOCOBALAMIN TAB 500 MCG 500 MCG: 500 TAB at 08:30

## 2020-06-29 RX ADMIN — HEPARIN SODIUM 5000 UNITS: 5000 INJECTION INTRAVENOUS; SUBCUTANEOUS at 06:21

## 2020-06-29 RX ADMIN — Medication 1000 MG: at 08:30

## 2020-06-29 RX ADMIN — ASPIRIN 81 MG 81 MG: 81 TABLET ORAL at 08:30

## 2020-06-29 RX ADMIN — STANDARDIZED SENNA CONCENTRATE 8.6 MG: 8.6 TABLET ORAL at 08:30

## 2020-06-29 RX ADMIN — LISINOPRIL 20 MG: 20 TABLET ORAL at 08:30

## 2020-06-29 NOTE — ED PROVIDER NOTES
History  Chief Complaint   Patient presents with    Slurred Speech     C/o episode of left arm numbness two weeks ago  Called nephew today, slurred speech on phone at [phone] today  Pt admits second episode of left arm numbness again today  80 yr male- c/o  Onset this am -- greater than 3 hrs ago of left hand weakness- - which  Lasted for aprorx 2 hrs and has resolved-- pt is r handed- denies any vision problems- no left leg comps- no head/neck pain- - grandson states when he went to house to check on him speech seemed slurred-- grandson states en route to hospital speech returned back to normal -- unrelated- pt alsO states 1 WEEK OF SAAB- BUT NO CP AT ANYTTIME AND NO CP TODAY       History provided by:  Patient and relative   used: No        Prior to Admission Medications   Prescriptions Last Dose Informant Patient Reported? Taking?    Blood Glucose Monitoring Suppl (PRECISION XTRA MONITOR) MARY  Self No No   Sig: by Does not apply route daily   CINNAMON PO  Self Yes No   Sig: Take by mouth   Cholecalciferol ([employer] VITAMIN D3) 1000 units capsule  Self Yes No   Sig: Take 1 capsule by mouth daily   Omega-3 Fatty Acids (OMEGA-3 FISH OIL) 300 MG CAPS  Self Yes No   Sig: Take 1 capsule by mouth daily   PRECISION XTRA TEST STRIPS test strip  Self No No   Sig: USE TO TEST BLOOD GLUCOSE ONCE DAILY   amLODIPine (NORVASC) 5 mg tablet  Self No No   Sig: TAKE ONE TABLET BY MOUTH EVERY DAY   clotrimazole-betamethasone (LOTRISONE) 1-0 05 % cream   No No   Sig: Apply topically 2 (two) times a day   cyanocobalamin (VITAMIN B-12) 500 mcg tablet  Self Yes No   Sig: Take 1 tablet by mouth daily   glimepiride (AMARYL) 2 mg tablet  Self No No   Sig: TAKE ONE TABLET BY MOUTH EVERY DAY WITH BREAKFAST    lisinopril (ZESTRIL) 20 mg tablet  Self No No   Sig: TAKE ONE TABLET BY MOUTH TWICE A DAY   simvastatin (ZOCOR) 20 mg tablet  Self No No   Sig: TAKE ONE TABLET BY MOUTH EVERY DAY   terazosin (HYTRIN) 1 mg capsule  Self No No   Sig: TAKE ONE CAPSULE BY MOUTH AT BEDTIME      Facility-Administered Medications: None       Past Medical History:   Diagnosis Date    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)     Last Assessed:  5/19/17    Chronic kidney disease     Diabetes mellitus ([facility] [address]     Hyperlipidemia     Hypertension     Osteoarthritis        Past Surgical History:   Procedure Laterality Date    APPENDECTOMY      COLONOSCOPY  2012    HEMORROIDECTOMY         Family History   Problem Relation Age of Onset    Diabetes Mother     Other Mother         Stroke syndrome    Diabetes Father     Emphysema Father      I have reviewed and agree with the history as documented  E-Cigarette/Vaping     E-Cigarette/Vaping Substances     Social History     Tobacco Use    Smoking status: Former Smoker    Smokeless tobacco: Never Used   Substance Use Topics    Alcohol use: Yes     Comment: Social drinker    Drug use: No       Review of Systems   Constitutional: Negative  HENT: Negative  Eyes: Negative  Respiratory: Positive for shortness of breath  Negative for apnea, cough, choking, chest tightness, wheezing and stridor  Cardiovascular: Negative  Gastrointestinal: Negative  Endocrine: Negative  Genitourinary: Negative  Musculoskeletal: Negative  Skin: Negative  Allergic/Immunologic: Negative  Neurological: Positive for speech difficulty and weakness  Negative for dizziness, tremors, seizures, syncope, facial asymmetry, light-headedness, numbness and headaches  Hematological: Negative  Psychiatric/Behavioral: Negative  Physical Exam  Physical Exam   Constitutional: He is oriented to person, place, and time  No distress  AVSS-  HTNSIVE- WHICH RESOLVED IN THE ER- PULSE OX  98 % ON RA- INTERPRETATION IS NORMAL- NO INTERVENTION    HENT:   Head: Normocephalic and atraumatic  Eyes: Pupils are equal, round, and reactive to light  Conjunctivae and EOM are normal  Right eye exhibits no discharge  Left eye exhibits no discharge  No scleral icterus  MM PINK   Neck: Normal range of motion  Neck supple  No JVD present  No tracheal deviation present  No thyromegaly present  NO PMT C/T/L/S SPINE- NO CAROTID BRUITS BILATERALLY    Cardiovascular: Normal rate, regular rhythm, normal heart sounds and intact distal pulses  Exam reveals no gallop and no friction rub  No murmur heard  Pulmonary/Chest: Effort normal and breath sounds normal  No stridor  No respiratory distress  He has no wheezes  He has no rales  He exhibits no tenderness  Abdominal: Soft  Bowel sounds are normal  He exhibits no distension and no mass  There is no tenderness  There is no rebound and no guarding  No hernia  SOFT NT/ND- NO HSM/ NO CVA TENDERNESS- NO PULSATILE ABD MASS/BRUIT/ TENDERNESS- NO PERITONEAL SIGNS   Musculoskeletal: Normal range of motion  He exhibits no edema, tenderness or deformity  EQUAL BILATERAL RADIAL/DP PULSES- NO BLE EDEMA/CALF TENDERNESS/ASYM/ ERYTHEMA   Lymphadenopathy:     He has no cervical adenopathy  Neurological: He is alert and oriented to person, place, and time  He displays normal reflexes  No cranial nerve deficit or sensory deficit  He exhibits normal muscle tone  Coordination normal    NO NYSTAGMUS- NORMAL FINGER TO NOSE  BILATERALLY - NON FOCAL NEURO EXAM    Skin: Skin is warm  Capillary refill takes less than 2 seconds  No rash noted  He is not diaphoretic  No erythema  No pallor  Psychiatric: He has a normal mood and affect  His behavior is normal  Judgment and thought content normal    Nursing note and vitals reviewed        Vital Signs  ED Triage Vitals   Temperature Pulse Respirations Blood Pressure SpO2   06/26/20 1405 06/26/20 1405 06/26/20 1405 06/26/20 1405 06/26/20 1405   (!) 97 3 °F (36 3 °C) 88 18 (!) 176/81 98 %      Temp Source Heart Rate Source Patient Position - Orthostatic VS BP Location FiO2 (%)   06/26/20 1405 06/26/20 1405 06/26/20 1405 06/26/20 1405 --   Oral Monitor Sitting Right arm       Pain Score       06/26/20 1831       No Pain           Vitals:    06/28/20 1749 06/28/20 2144 06/28/20 2300 06/29/20 0700   BP: 120/59 125/58 125/58 120/60   Pulse:   74 85   Patient Position - Orthostatic VS:   Lying Lying         Visual Acuity  Visual Acuity      Most Recent Value   L Pupil Size (mm)  3   R Pupil Size (mm)  3   L Pupil Shape  Round   R Pupil Shape  Round          ED Medications  Medications   amLODIPine (NORVASC) tablet 5 mg (5 mg Oral Given 6/28/20 0835)   cholecalciferol (VITAMIN D3) tablet 1,000 Units (1,000 Units Oral Given 6/28/20 0835)   cyanocobalamin (VITAMIN B-12) tablet 500 mcg (500 mcg Oral Given 6/28/20 0835)   fish oil capsule 1,000 mg (1,000 mg Oral Given 6/28/20 0835)   atorvastatin (LIPITOR) tablet 40 mg (40 mg Oral Given 6/28/20 1749)   terazosin (HYTRIN) capsule 1 mg (1 mg Oral Given 6/28/20 2144)   senna (SENOKOT) tablet 8 6 mg (8 6 mg Oral Not Given 6/28/20 0835)   ondansetron (ZOFRAN) injection 4 mg (has no administration in time range)   calcium carbonate (TUMS) chewable tablet 1,000 mg (has no administration in time range)   aspirin chewable tablet 81 mg (81 mg Oral Given 6/28/20 0835)   heparin (porcine) subcutaneous injection 5,000 Units (5,000 Units Subcutaneous Given 6/29/20 0621)   insulin lispro (HumaLOG) 100 units/mL subcutaneous injection 1-5 Units (1 Units Subcutaneous Not Given 6/28/20 1740)   insulin lispro (HumaLOG) 100 units/mL subcutaneous injection 1-5 Units (1 Units Subcutaneous Not Given 6/28/20 2226)   lisinopril (ZESTRIL) tablet 20 mg (20 mg Oral Given 6/28/20 1749)   clopidogrel (PLAVIX) tablet 75 mg (75 mg Oral Given 6/28/20 0835)   aspirin chewable tablet 324 mg (324 mg Oral Given 6/26/20 1651)   sodium chloride 0 9 % infusion (75 mL/hr Intravenous New Bag 6/26/20 1931)       Diagnostic Studies  Results Reviewed     Procedure Component Value Units Date/Time    Vitamin B12 [[id number]]  (Normal) Collected:  06/27/20 0440    Lab Status:  Final result Specimen:  Blood from Arm, Right Updated:  06/27/20 1229     Vitamin B-12 244 pg/mL     Folate [[id number]]  (Normal) Collected:  06/27/20 0440    Lab Status:  Final result Specimen:  Blood from Arm, Right Updated:  06/27/20 1229     Folate 16 5 ng/mL     Hemoglobin A1c w/EAG Estimation [[id number]]  (Abnormal) Collected:  06/27/20 0440    Lab Status:  Final result Specimen:  Blood from Arm, Right Updated:  06/27/20 1201     Hemoglobin A1C 7 6 %       mg/dl     UA (URINE) with reflex to Scope [[id number]]  (Abnormal) Collected:  06/27/20 0611    Lab Status:  Final result Specimen:  Urine, Clean Catch Updated:  06/27/20 0625     Color, UA Yellow     Clarity, UA Clear     Specific [place], UA 1 020     pH, UA 6 0     Leukocytes, UA Negative     Nitrite, UA Negative     Protein, UA Negative mg/dl      Glucose,  (1/10%) mg/dl      Ketones, UA Negative mg/dl      Urobilinogen, UA 0 2 E U /dl      Bilirubin, UA Negative     Blood, UA Small    Lipid Panel with Direct LDL reflex [[id number]]  (Abnormal) Collected:  06/27/20 0440    Lab Status:  Final result Specimen:  Blood from Arm, Right Updated:  06/27/20 0552     Cholesterol 107 mg/dL      Triglycerides 53 mg/dL      HDL, Direct 35 mg/dL      LDL Calculated 61 mg/dL     TSH, 3rd generation [[id number]]  (Normal) Collected:  06/27/20 0440    Lab Status:  Final result Specimen:  Blood from Arm, Right Updated:  06/27/20 0552     TSH 3RD GENERATON 1 451 uIU/mL     Narrative:       Patients undergoing fluorescein dye angiography may retain small amounts of fluorescein in the body for 48-72 hours post procedure  Samples containing fluorescein can produce falsely depressed TSH values  If the patient had this procedure,a specimen should be resubmitted post fluorescein clearance        Comprehensive metabolic panel [[id number]]  (Abnormal) Collected:  06/27/20 0440    Lab Status:  Final result Specimen:  Blood from Arm, Right Updated:  06/27/20 6631 Sodium 141 mmol/L      Potassium 4 4 mmol/L      Chloride 111 mmol/L      CO2 22 mmol/L      ANION GAP 8 mmol/L      BUN 37 mg/dL      Creatinine 2 09 mg/dL      Glucose 119 mg/dL      Calcium 8 1 mg/dL      AST 22 U/L      ALT 16 U/L      Alkaline Phosphatase 76 U/L      Total Protein 6 8 g/dL      Albumin 3 4 g/dL      Total Bilirubin 0 72 mg/dL      eGFR 27 ml/min/1 73sq m     Narrative:       [place] guidelines for Chronic Kidney Disease (CKD):     Stage 1 with normal or high GFR (GFR > 90 mL/min/1 73 square meters)    Stage 2 Mild CKD (GFR = 60-89 mL/min/1 73 square meters)    Stage 3A Moderate CKD (GFR = 45-59 mL/min/1 73 square meters)    Stage 3B Moderate CKD (GFR = 30-44 mL/min/1 73 square meters)    Stage 4 Severe CKD (GFR = 15-29 mL/min/1 73 square meters)    Stage 5 End Stage CKD (GFR <15 mL/min/1 73 square meters)  Note: GFR calculation is accurate only with a steady state creatinine    CBC (With Platelets) [[id number]]  (Abnormal) Collected:  06/27/20 0440    Lab Status:  Final result Specimen:  Blood from Arm, Right Updated:  06/27/20 0501     WBC 6 47 Thousand/uL      RBC 3 73 Million/uL      Hemoglobin 11 5 g/dL      Hematocrit 34 9 %      MCV 94 fL      MCH 30 8 pg      MCHC 33 0 g/dL      RDW 13 0 %      Platelets 510 Thousands/uL      MPV 9 6 fL     Vitamin B1, whole blood [[id number]] Collected:  06/27/20 0440    Lab Status: In process Specimen:  Blood from Arm, Right Updated:  06/27/20 0458    Vitamin D 1,25 dihydroxy [[id number]] Collected:  06/27/20 0440    Lab Status:   In process Specimen:  Blood from Arm, Right Updated:  06/27/20 0457    Platelet count [[id number]]     Lab Status:  No result Specimen:  Blood     Basic metabolic panel [[id number]]  (Abnormal) Collected:  06/26/20 1446    Lab Status:  Final result Specimen:  Blood from Arm, Left Updated:  06/26/20 1519     Sodium 140 mmol/L      Potassium 4 4 mmol/L      Chloride 109 mmol/L      CO2 20 mmol/L ANION GAP 11 mmol/L      BUN 45 mg/dL      Creatinine 2 51 mg/dL      Glucose 175 mg/dL      Calcium 8 2 mg/dL      eGFR 22 ml/min/1 73sq m     Narrative:       National Kid[employer] Disease Foundation guidelines for Chronic Kidney Disease (CKD):     Stage 1 with normal or high GFR (GFR > 90 mL/min/1 73 square meters)    Stage 2 Mild CKD (GFR = 60-89 mL/min/1 73 square meters)    Stage 3A Moderate CKD (GFR = 45-59 mL/min/1 73 square meters)    Stage 3B Moderate CKD (GFR = 30-44 mL/min/1 73 square meters)    Stage 4 Severe CKD (GFR = 15-29 mL/min/1 73 square meters)    Stage 5 End Stage CKD (GFR <15 mL/min/1 73 square meters)  Note: GFR calculation is accurate only with a steady state creatinine    Troponin I [[id number]]  (Normal) Collected:  06/26/20 1446    Lab Status:  Final result Specimen:  Blood from Arm, Left Updated:  06/26/20 1518     Troponin I <0 02 ng/mL     CBC and differential [[id number]] Collected:  06/26/20 1446    Lab Status:  Final result Specimen:  Blood from Arm, Left Updated:  06/26/20 1459     WBC 7 51 Thousand/uL      RBC 4 03 Million/uL      Hemoglobin 12 5 g/dL      Hematocrit 37 6 %      MCV 93 fL      MCH 31 0 pg      MCHC 33 2 g/dL      RDW 12 8 %      MPV 10 0 fL      Platelets 526 Thousands/uL      nRBC 0 /100 WBCs      Neutrophils Relative 58 %      Immat GRANS % 0 %      Lymphocytes Relative 25 %      Monocytes Relative 11 %      Eosinophils Relative 5 %      Basophils Relative 1 %      Neutrophils Absolute 4 39 Thousands/µL      Immature Grans Absolute 0 02 Thousand/uL      Lymphocytes Absolute 1 85 Thousands/µL      Monocytes Absolute 0 81 Thousand/µL      Eosinophils Absolute 0 40 Thousand/µL      Basophils Absolute 0 04 Thousands/µL     Fingerstick Glucose (POCT) [[id number]]  (Abnormal) Collected:  06/26/20 1403    Lab Status:  Final result Updated:  06/26/20 1404     POC Glucose 158 mg/dl                  MRI brain wo contrast   Final Result by Franklin Zafar MD (06/27 1129) 1   Subacute appearing tiny frontal lobe cortical infarcts  No significant edema or mass effect  No intracranial hemorrhage  2   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  3   Paranasal sinus disease and left mastoid effusion  I personally discussed this study with Rosa Dunn on 6/27/2020 at 11:22 AM                Workstation performed: [id number]         VAS carotid complete study (*Order only if CTA has not been completed*)   Final Result by Vivi Olivia MD (06/27 1003)      CT head without contrast   Final Result by Racehl Obregon MD (06/26 1507)      No acute intracranial abnormality  Workstation performed: [id number]         XR chest 1 view portable   ED Interpretation by Andrew Espinosa MD (06/26 1540)   nad      Final Result by Ishan Gamboa MD (06/26 8958)      Minimal left base atelectasis  Workstation performed: [id number]                    Procedures  Procedures         ED Course  ED Course as of Jun 29 0755   Fri Jun 26, 2020   1419 Er md note- 6/15/20 labs reviewed by er md      46 Er md note- pt seen  immediately in er by er md- all neuro symptoms have resolved- will discuss with neurology and proceed with  cva /tia workup      65 Er md note- bs checked at home was 180 this am       1448 ER MD NOTE-  NEUROLOGY CONTACTED- WILL NOT CALL A CVA ALERT- WILL PRCEED WITH  TIA WORKUP AND ADMIT      1540 Cxr portable- borderline cardiomegaly - normal mediastinum/ no free /sq air- no infiltrate/ ptx/ pulm  edema   Pleural effusions                    Stroke Assessment     Row Name 06/26/20 1449             NIH Stroke Scale    Interval  Baseline      Level of Consciousness (1a )  0      LOC Questions (1b )  0      LOC Commands (1c )  0      Best Gaze (2 )  0      Visual (3 )  0      Facial Palsy (4 )  0      Motor Arm, Left (5a )  0      Motor Arm, Right (5b )  0      Motor Leg, Left (6a )  0      Motor Leg, Right (6b )  0 Limb Ataxia (7 )  0      Sensory (8 )  0      Best Language (9 )  0      Dysarthria (10 )  0      Extinction and Inattention (11 ) (Formerly Neglect)  0      Total  0          First Filed Value   TPA Decision  Patient not a TPA candidate  Patient is not a candidate options  comment [ALL SYMPOTMS HAVE RESOLVED]                                  MDM      Disposition  Final diagnoses:   TIA (transient ischemic attack)   SAAB (dyspnea on exertion)   Acute-on-chronic kidney injury ([facility] [address] )     Time reflects when diagnosis was documented in both MDM as applicable and the Disposition within this note     Time User Action Codes Description Comment    6/26/2020  3:51 PM [facility] D Add [G45 9] TIA (transient ischemic attack)     6/26/2020  3:51 PM Amy Glad Add [R06 00] SAAB (dyspnea on exertion)     6/26/2020  4:38 PM Amy Glad Add [N17 9,  N18 9] Acute-on-chronic kidney injury [facility])       ED Disposition     ED Disposition Condition Date/Time Comment    Admit Stable Fri Jun 26, 2020  3:51 PM Case was discussed with dr Cammie Tavarez and the patient's admission status was agreed to be Admission Status: inpatient status to the service of Dr Cammie Tavarez   Follow-up Information     Follow up With Specialties Details Why Contact Info Additional Mary [facility] Neurology [facility] Neurology Follow up Please call next week after discharge to set up an approximate 1 month stroke follow-up appointment in our office  Please stay on your aspirin, Plavix, and Lipitor until we see you in the office   Tyrell Margarita [id number] [phone] [facility] Neurology [facility], [address], [place], [place], [address] [facility]          Current Discharge Medication List      START taking these medications    Details   clotrimazole-betamethasone (LOTRISONE) 1-0 05 % cream Apply topically 2 (two) times a day  Qty: 30 g, Refills: 0    Associated Diagnoses: Fungal dermatitis         CONTINUE these medications which have NOT CHANGED    Details   amLODIPine (NORVASC) 5 mg tablet TAKE ONE TABLET BY MOUTH EVERY DAY  Qty: 90 tablet, Refills: 3    Associated Diagnoses: CKD stage 4 due to type 2 diabetes mellitus ([facility] [address] ); 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 ([facility])      Blood Glucose Monitoring Suppl (PRECISION XTRA MONITOR) MARY by Does not apply route daily  Qty: 1 each, Refills: 0    Associated Diagnoses: Controlled type 2 diabetes mellitus with stage 3 chronic kidney disease, without long-term current use of insulin ([facility])      Cholecalciferol ([employer] VITAMIN D3) 1000 units capsule Take 1 capsule by mouth daily      CINNAMON PO Take by mouth      cyanocobalamin (VITAMIN B-12) 500 mcg tablet Take 1 tablet by mouth daily      glimepiride (AMARYL) 2 mg tablet TAKE ONE TABLET BY MOUTH EVERY DAY WITH BREAKFAST  Qty: 90 tablet, Refills: 3    Associated Diagnoses: Controlled type 2 diabetes mellitus with stage 3 chronic kidney disease, without long-term current use of insulin ([facility])      lisinopril (ZESTRIL) 20 mg tablet TAKE ONE TABLET BY MOUTH TWICE A DAY  Qty: 180 tablet, Refills: 3    Associated Diagnoses: Essential hypertension      Omega-3 Fatty Acids (OMEGA-3 FISH OIL) 300 MG CAPS Take 1 capsule by mouth daily      PRECISION XTRA TEST STRIPS test strip USE TO TEST BLOOD GLUCOSE ONCE DAILY  Qty: 100 each, Refills: 2    Associated Diagnoses: Type 2 diabetes mellitus without complication, without long-term current use of insulin ([facility])      simvastatin (ZOCOR) 20 mg tablet TAKE ONE TABLET BY MOUTH EVERY DAY  Qty: 90 tablet, Refills: 3    Associated Diagnoses: Pure hypercholesterolemia      terazosin (HYTRIN) 1 mg capsule TAKE ONE CAPSULE BY MOUTH AT BEDTIME  Qty: 90 capsule, Refills: 3    Associated Diagnoses: Benign prostatic hyperplasia, unspecified whether lower urinary tract symptoms present           No discharge procedures on file      PDMP Review None          ED Provider  Electronically Signed by           Charlotte Parekh MD  06/29/20 4894

## 2020-06-29 NOTE — ASSESSMENT & PLAN NOTE
· Patient endorses history of palpitations and prior workup included Holter monitor ordered by his PCP in 2018 which revealed occasional PACs and frequent PVCs   PVCs again noted on telemetry and admission EKG  · Echo LVEF 55%, no regional wall motion abnormalities, wall thickness was mildly increased, G1DD, moderate tricuspid valve regurgitation, and moderate pulmonic valve regurgitation   · Cardiology follow-up

## 2020-06-29 NOTE — PLAN OF CARE
Problem: Potential for Falls  Goal: Patient will remain free of falls  Description  INTERVENTIONS:  - Assess patient frequently for physical needs  -  Identify cognitive and physical deficits and behaviors that affect risk of falls    -  [place] fall precautions as indicated by assessment   - Educate patient/family on patient safety including physical limitations  - Instruct patient to call for assistance with activity based on assessment  - Modify environment to reduce risk of injury  - Consider OT/PT consult to assist with strengthening/mobility  Outcome: Progressing     Problem: PAIN - ADULT  Goal: Verbalizes/displays adequate comfort level or baseline comfort level  Description  Interventions:  - Encourage patient to monitor pain and request assistance  - Assess pain using appropriate pain scale  - Administer analgesics based on type and severity of pain and evaluate response  - Implement non-pharmacological measures as appropriate and evaluate response  - Consider cultural and social influences on pain and pain management  - Notify physician/advanced practitioner if interventions unsuccessful or patient reports new pain  Outcome: Progressing     Problem: SAFETY ADULT  Goal: Maintain or return to baseline ADL function  Description  INTERVENTIONS:  -  Assess patient's ability to carry out ADLs; assess patient's baseline for ADL function and identify physical deficits which impact ability to perform ADLs (bathing, care of mouth/teeth, toileting, grooming, dressing, etc )  - Assess/evaluate cause of self-care deficits   - Assess range of motion  - Assess patient's mobility; develop plan if impaired  - Assess patient's need for assistive devices and provide as appropriate  - Encourage maximum independence but intervene and supervise when necessary  - Involve family in performance of ADLs  - Assess for home care needs following discharge   - Consider OT consult to assist with ADL evaluation and planning for discharge  - Provide patient education as appropriate  Outcome: Progressing  Goal: Maintain or return mobility status to optimal level  Description  INTERVENTIONS:  - Assess patient's baseline mobility status (ambulation, transfers, stairs, etc )    - Identify cognitive and physical deficits and behaviors that affect mobility  - Identify mobility aids required to assist with transfers and/or ambulation (gait belt, sit-to-stand, lift, walker, cane, etc )  - [place] fall precautions as indicated by assessment  - Record patient progress and toleration of activity level on Mobility SBAR; progress patient to next Phase/Stage  - Instruct patient to call for assistance with activity based on assessment  - Consider rehabilitation consult to assist with strengthening/weightbearing, etc   Outcome: Progressing     Problem: DISCHARGE PLANNING  Goal: Discharge to home or other facility with appropriate resources  Description  INTERVENTIONS:  - Identify barriers to discharge w/patient and caregiver  - Arrange for needed discharge resources and transportation as appropriate  - Identify discharge learning needs (meds, wound care, etc )  - Arrange for interpretive services to assist at discharge as needed  - Refer to Case Management Department for coordinating discharge planning if the patient needs post-hospital services based on physician/advanced practitioner order or complex needs related to functional status, cognitive ability, or social support system  Outcome: Progressing     Problem: Knowledge Deficit  Goal: Patient/family/caregiver demonstrates understanding of disease process, treatment plan, medications, and discharge instructions  Description  Complete learning assessment and assess knowledge base    Interventions:  - Provide teaching at level of understanding  - Provide teaching via preferred learning methods  Outcome: Progressing     Problem: NEUROSENSORY - ADULT  Goal: Achieves stable or improved neurological status  Description  INTERVENTIONS  - Monitor and report changes in neurological status  - Monitor vital signs such as temperature, blood pressure, glucose, and any other labs ordered   - Initiate measures to prevent increased intracranial pressure  - Monitor for seizure activity and implement precautions if appropriate      Outcome: Progressing  Goal: Remains free of injury related to seizures activity  Description  INTERVENTIONS  - Maintain airway, patient safety  and administer oxygen as ordered  - Monitor patient for seizure activity, document and report duration and description of seizure to physician/advanced practitioner  - If seizure occurs,  ensure patient safety during seizure  - Reorient patient post seizure  - Seizure pads on all 4 side rails  - Instruct patient/family to notify RN of any seizure activity including if an aura is experienced  - Instruct patient/family to call for assistance with activity based on nursing assessment  - Administer anti-seizure medications if ordered    Outcome: Progressing  Goal: Achieves maximal functionality and self care  Description  INTERVENTIONS  - Monitor swallowing and airway patency with patient fatigue and changes in neurological status  - Encourage and assist patient to increase activity and self care     - Encourage visually impaired, hearing impaired and aphasic patients to use assistive/communication devices  Outcome: Progressing     Problem: CARDIOVASCULAR - ADULT  Goal: Maintains optimal cardiac output and hemodynamic stability  Description  INTERVENTIONS:  - Monitor I/O, vital signs and rhythm  - Monitor for S/S and trends of decreased cardiac output  - Administer and titrate ordered vasoactive medications to optimize hemodynamic stability  - Assess quality of pulses, skin color and temperature  - Assess for signs of decreased coronary artery perfusion  - Instruct patient to report change in severity of symptoms  Outcome: Progressing  Goal: Absence of cardiac dysrhythmias or at baseline rhythm  Description  INTERVENTIONS:  - Continuous cardiac monitoring, vital signs, obtain 12 lead EKG if ordered  - Administer antiarrhythmic and heart rate control medications as ordered  - Monitor electrolytes and administer replacement therapy as ordered  Outcome: Progressing     Problem: RESPIRATORY - ADULT  Goal: Achieves optimal ventilation and oxygenation  Description  INTERVENTIONS:  - Assess for changes in respiratory status  - Assess for changes in mentation and behavior  - Position to facilitate oxygenation and minimize respiratory effort  - Oxygen administered by appropriate delivery if ordered  - Initiate smoking cessation education as indicated  - Encourage broncho-pulmonary hygiene including cough, deep breathe, Incentive Spirometry  - Assess the need for suctioning and aspirate as needed  - Assess and instruct to report SOB or any respiratory difficulty  - Respiratory Therapy support as indicated  Outcome: Progressing     Problem: METABOLIC, FLUID AND ELECTROLYTES - ADULT  Goal: Electrolytes maintained within normal limits  Description  INTERVENTIONS:  - Monitor labs and assess patient for signs and symptoms of electrolyte imbalances  - Administer electrolyte replacement as ordered  - Monitor response to electrolyte replacements, including repeat lab results as appropriate  - Instruct patient on fluid and nutrition as appropriate  Outcome: Progressing     Problem: SKIN/TISSUE INTEGRITY - ADULT  Goal: Skin integrity remains intact  Description  INTERVENTIONS  - Identify patients at risk for skin breakdown  - Assess and monitor skin integrity  - Assess and monitor nutrition and hydration status  - Monitor labs (i e  albumin)  - Assess for incontinence   - Turn and reposition patient  - Assist with mobility/ambulation  - Relieve pressure over bony prominences  - Avoid friction and shearing  - Provide appropriate hygiene as needed including keeping skin clean and dry  - Evaluate need for skin moisturizer/barrier cream  - Collaborate with interdisciplinary team (i e  Nutrition, Rehabilitation, etc )   - Patient/family teaching  Outcome: Progressing  Goal: Incision(s), wounds(s) or drain site(s) healing without S/S of infection  Description  INTERVENTIONS  - Assess and document risk factors for skin impairment   - Assess and document dressing, incision, wound bed, drain sites and surrounding tissue  - Consider nutrition services referral as needed  - Oral mucous membranes remain intact  - Provide patient/ family education  Outcome: Progressing  Goal: Oral mucous membranes remain intact  Description  INTERVENTIONS  - Assess oral mucosa and hygiene practices  - Implement preventative oral hygiene regimen  - Implement oral medicated treatments as ordered  - Initiate Nutrition services referral as needed  Outcome: Progressing     Problem: MUSCULOSKELETAL - ADULT  Goal: Maintain or return mobility to safest level of function  Description  INTERVENTIONS:  - Assess patient's ability to carry out ADLs; assess patient's baseline for ADL function and identify physical deficits which impact ability to perform ADLs (bathing, care of mouth/teeth, toileting, grooming, dressing, etc )  - Assess/evaluate cause of self-care deficits   - Assess range of motion  - Assess patient's mobility  - Assess patient's need for assistive devices and provide as appropriate  - Encourage maximum independence but intervene and supervise when necessary  - Involve family in performance of ADLs  - Assess for home care needs following discharge   - Consider OT consult to assist with ADL evaluation and planning for discharge  - Provide patient education as appropriate  Outcome: Progressing  Goal: Maintain proper alignment of affected body part  Description  INTERVENTIONS:  - Support, maintain and protect limb and body alignment  - Provide patient/ family with appropriate education  Outcome: Progressing Problem: Neurological Deficit  Goal: Neurological status is stable or improving  Description  Interventions:  - Monitor and assess patient's level of consciousness, motor function, sensory function, and level of assistance needed for ADLs  - Monitor and report changes from baseline  Collaborate with interdisciplinary team to initiate plan and implement interventions as ordered  - Provide and maintain a safe environment  - Consider seizure precautions  - Consider fall precautions  - Consider aspiration precautions  - Consider bleeding precautions  Outcome: Progressing     Problem: Activity Intolerance/Impaired Mobility  Goal: Mobility/activity is maintained at optimum level for patient  Description  Interventions:  - Assess and monitor patient  barriers to mobility and need for assistive/adaptive devices  - Assess patient's emotional response to limitations  - Collaborate with interdisciplinary team and initiate plans and interventions as ordered  - Encourage independent activity per ability   - Maintain proper body alignment  - Perform active/passive rom as tolerated/ordered  - Plan activities to conserve energy   - Turn patient as appropriate  Outcome: Progressing     Problem: Communication Impairment  Goal: Ability to express needs and understand communication  Description  Assess patient's communication skills and ability to understand information  Patient will demonstrate use of effective communication techniques, alternative methods of communication and understanding even if not able to speak  - Encourage communication and provide alternate methods of communication as needed  - Collaborate with case management/ for discharge needs  - Include patient/family/caregiver in decisions related to communication    Outcome: Progressing     Problem: Potential for Aspiration  Goal: Non-ventilated patient's risk of aspiration is minimized  Description  Assess and monitor vital signs, respiratory status, and labs (WBC)  Monitor for signs of aspiration (tachypnea, cough, rales, wheezing, cyanosis, fever)  - Assess and monitor patient's ability to swallow  - Place patient up in chair to eat if possible  - HOB up at 90 degrees to eat if unable to get patient up into chair   - Supervise patient during oral intake  - Instruct patient/ family to take small bites  - Instruct patient/ family to take small single sips when taking liquids  - Follow patient-specific strategies generated by speech pathologist   Outcome: Progressing  Goal: Ventilated patient's risk of aspiration is minimized  Description  Assess and monitor vital signs, respiratory status, airway cuff pressure, and labs (WBC)  Monitor for signs of aspiration (tachypnea, cough, rales, wheezing, cyanosis, fever)  - Elevate head of bed 30 degrees if patient has tube feeding   - Monitor tube feeding  Outcome: Progressing     Problem: Nutrition  Goal: Nutrition/Hydration status is improving  Description  Monitor and assess patient's nutrition/hydration status for malnutrition (ex- brittle hair, bruises, dry skin, pale skin and conjunctiva, muscle wasting, smooth red tongue, and disorientation)  Collaborate with interdisciplinary team and initiate plan and interventions as ordered  Monitor patient's weight and dietary intake as ordered or per policy  Utilize nutrition screening tool and intervene per policy  Determine patient's food preferences and provide high-protein, high-caloric foods as appropriate  - Assist patient with eating   - Allow adequate time for meals   - Encourage patient to take dietary supplement as ordered  - Collaborate with clinical nutritionist   - Include patient/family/caregiver in decisions related to nutrition    Outcome: Progressing

## 2020-06-29 NOTE — SOCIAL WORK
LOS 3 DAYS  RISK OF UNPLANNED READMISSION SCORE 16  30 DAY READMISSION: NO  BUNDLE: YES  SNF LOS 15-19    Per rounding, patient is from home and independent  Patient to discharge today pending ECHO with recommendations for OP follow-up  CM to follow through discharge as needed

## 2020-06-29 NOTE — DISCHARGE SUMMARY
Discharge- Hong Nolasco 12/11/1930, 80 y o  male MRN: [id number]    Unit/Bed#: S -01 Encounter: [id number]    Primary Care Provider: Caryn Patterson DO   Date and time admitted to hospital: 6/26/2020  2:00 PM        * Embolic bilateral punctate CVA, acute as well as subacute  Assessment & Plan  · Patient admitted with approximately 30 minute episode of left hand weakness  Slurred speech noted by patient's nephew  Now resolved  Echo pending  Subacute appearing tiny frontal lobe cortical infarcts  · Neurology consult appreciated  · Echo LVEF 55%, no regional wall motion abnormalities, wall thickness was mildly increased, G1DD, moderate tricuspid valve regurgitation, and moderate pulmonic valve regurgitation   · Needs loop recorder outpatient - cardiology follow-up  · Continue DAPT, statin (changed to Lipitor)    Palpitations  Assessment & Plan  · Patient endorses history of palpitations and prior workup included Holter monitor ordered by his PCP in 2018 which revealed occasional PACs and frequent PVCs   PVCs again noted on telemetry and admission EKG  · Echo LVEF 55%, no regional wall motion abnormalities, wall thickness was mildly increased, G1DD, moderate tricuspid valve regurgitation, and moderate pulmonic valve regurgitation   · Cardiology follow-up     Essential hypertension  Assessment & Plan  · Blood pressure elevated on admission in the setting of possible TIA  · Home regimen includes lisinopril 20 mg twice daily, Norvasc 5 mg daily, terazosin 1 mg QHS - continue    Hyperlipidemia  Assessment & Plan  · Given CVA needs to be on Lipitor rather than Zocor   · Continue Lipitor   · Lipids overall well controlled     Acute renal failure superimposed on stage 4 chronic kidney disease ([address] )  Assessment & Plan  · Present on admission, now resolved and at baseline   · No further work up   · Home medications restarted     Enlarged prostate without lower urinary tract symptoms (luts)  Assessment & Plan  · Continue terazosin    Controlled diabetes mellitus [facility])  Assessment & Plan  Lab Results   Component Value Date    HGBA1C 7 6 (H) 06/27/2020     Recent Labs     06/28/20  1026 06/28/20  1707 06/28/20  2130 06/29/20  0706   POCGLU 183* 150* 130 125     Blood Sugar Average: Last 72 hrs:  (P) 261 [id number]   · Home regimen includes Amaryl 2 mg daily  Hold oral hypoglycemic  · Add sliding scale insulin, Accu-Cheks, diabetic diet    Discharging Physician / Practitioner: KONSTANTIN Don  PCP: Corey Chatman DO  Admission Date:   Admission Orders (From admission, onward)     Ordered        06/26/20 1552  Inpatient Admission  Once                   Discharge Date: 06/29/20    Resolved Problems  Date Reviewed: 6/29/2020    None          Consultations During Hospital Stay:  · Neurology     Procedures Performed:   · CXR: Minimal left base atelectasis   · CT head: No acute intracranial abnormality  · Carotid doppler: < 50% stenosis noted in the ICA bilaterally  · MRI brain: 1  Subacute appearing tiny frontal lobe cortical infarcts  No significant edema or mass effect  No intracranial hemorrhage  2  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  3   Paranasal sinus disease and left mastoid effusion  · Echocardiogram: LVEF 55%, no regional wall motion abnormalities, wall thickness was mildly increased, G1DD, moderate tricuspid valve regurgitation, and moderate pulmonic valve regurgitation     Significant Findings / Test Results:   · Subacute appearing tiny frontal lobe cortical infarcts    Incidental Findings:   · As above     Test Results Pending at Discharge (will require follow up): · None      Outpatient Tests Requested:  · none    Complications:  none    Reason for Admission: left hand numbness, stroke pathway     Hospital Course:     Albert Jolly is a 80 y o  male patient who originally presented to the hospital on 6/26/2020 due to left hand numbness    Patient was admitted for stroke pathway  He was seen by Neurology  His MRI brain confirmed a subacute frontal lobe infarct  Patient will be discharged home with follow-up with neurology  He will need to see cardiology for an event monitor as an outpatient  His Zocor will be changed to Lipitor 40 mg daily with dinner  He is to start aspirin 81 mg daily  He is to start Plavix 75 mg daily for 18 more days  Please see above list of diagnoses and related plan for additional information  Condition at Discharge: stable     Discharge Day Visit / Exam:     Subjective:  Resting comfortably OOB in chair  Denies further numbness to left hand  Denies HA, dizziness, CP, SOB, abdominal pain, N/V/D  Desires discharge home  Vitals: Blood Pressure: 120/60 (06/29/20 0700)  Pulse: 85 (06/29/20 0700)  Temperature: (!) 97 °F (36 1 °C) (06/29/20 0700)  Temp Source: Oral (06/29/20 0700)  Respirations: 18 (06/29/20 0700)  Height: 5' 11" (180 3 cm) (06/26/20 1900)  Weight - Scale: 84 6 kg (186 lb 6 4 oz) (06/26/20 1900)  SpO2: 98 % (06/29/20 0700)  Exam:   Physical Exam   Constitutional: He is oriented to person, place, and time  He appears well-developed  No distress  HENT:   Head: Normocephalic  Neck: Normal range of motion  Cardiovascular: Normal rate and intact distal pulses  Pulmonary/Chest: Effort normal and breath sounds normal  No respiratory distress  He has no wheezes  He has no rhonchi  He has no rales  Abdominal: Soft  Bowel sounds are normal  He exhibits no distension  There is no tenderness  Musculoskeletal: Normal range of motion  He exhibits no edema or tenderness  Neurological: He is alert and oriented to person, place, and time  Skin: Skin is warm and dry  Capillary refill takes less than 2 seconds  No rash noted  He is not diaphoretic  Psychiatric: He has a normal mood and affect  Judgment normal    Nursing note and vitals reviewed  Discussion with Family:  Voicemail for patient's son, Rod He    Called patient's sister, no answer  Discharge instructions/Information to patient and family:   See after visit summary for information provided to patient and family  Provisions for Follow-Up Care:  See after visit summary for information related to follow-up care and any pertinent home health orders  Disposition:     Home    For Discharges to [address] SN[facility]:   · Not Applicable to this Patient - Not Applicable to this Patient    Planned Readmission: None     Discharge Statement:  I spent > 30 minutes discharging the patient  This time was spent on the day of discharge  I had direct contact with the patient on the day of discharge  Greater than 50% of the total time was spent examining patient, answering all patient questions, arranging and discussing plan of care with patient as well as directly providing post-discharge instructions  Additional time then spent on discharge activities  Discharge Medications:  See after visit summary for reconciled discharge medications provided to patient and family        ** Please Note: This note has been constructed using a voice recognition system **

## 2020-06-29 NOTE — PLAN OF CARE
Problem: Potential for Falls  Goal: Patient will remain free of falls  Description  INTERVENTIONS:  - Assess patient frequently for physical needs  -  Identify cognitive and physical deficits and behaviors that affect risk of falls    -  [place] fall precautions as indicated by assessment   - Educate patient/family on patient safety including physical limitations  - Instruct patient to call for assistance with activity based on assessment  - Modify environment to reduce risk of injury  - Consider OT/PT consult to assist with strengthening/mobility  Outcome: Progressing     Problem: PAIN - ADULT  Goal: Verbalizes/displays adequate comfort level or baseline comfort level  Description  Interventions:  - Encourage patient to monitor pain and request assistance  - Assess pain using appropriate pain scale  - Administer analgesics based on type and severity of pain and evaluate response  - Implement non-pharmacological measures as appropriate and evaluate response  - Consider cultural and social influences on pain and pain management  - Notify physician/advanced practitioner if interventions unsuccessful or patient reports new pain  Outcome: Progressing     Problem: SAFETY ADULT  Goal: Maintain or return to baseline ADL function  Description  INTERVENTIONS:  -  Assess patient's ability to carry out ADLs; assess patient's baseline for ADL function and identify physical deficits which impact ability to perform ADLs (bathing, care of mouth/teeth, toileting, grooming, dressing, etc )  - Assess/evaluate cause of self-care deficits   - Assess range of motion  - Assess patient's mobility; develop plan if impaired  - Assess patient's need for assistive devices and provide as appropriate  - Encourage maximum independence but intervene and supervise when necessary  - Involve family in performance of ADLs  - Assess for home care needs following discharge   - Consider OT consult to assist with ADL evaluation and planning for discharge  - Provide patient education as appropriate  Outcome: Progressing  Goal: Maintain or return mobility status to optimal level  Description  INTERVENTIONS:  - Assess patient's baseline mobility status (ambulation, transfers, stairs, etc )    - Identify cognitive and physical deficits and behaviors that affect mobility  - Identify mobility aids required to assist with transfers and/or ambulation (gait belt, sit-to-stand, lift, walker, cane, etc )  - [place] fall precautions as indicated by assessment  - Record patient progress and toleration of activity level on Mobility SBAR; progress patient to next Phase/Stage  - Instruct patient to call for assistance with activity based on assessment  - Consider rehabilitation consult to assist with strengthening/weightbearing, etc   Outcome: Progressing     Problem: DISCHARGE PLANNING  Goal: Discharge to home or other facility with appropriate resources  Description  INTERVENTIONS:  - Identify barriers to discharge w/patient and caregiver  - Arrange for needed discharge resources and transportation as appropriate  - Identify discharge learning needs (meds, wound care, etc )  - Arrange for interpretive services to assist at discharge as needed  - Refer to Case Management Department for coordinating discharge planning if the patient needs post-hospital services based on physician/advanced practitioner order or complex needs related to functional status, cognitive ability, or social support system  Outcome: Progressing     Problem: Knowledge Deficit  Goal: Patient/family/caregiver demonstrates understanding of disease process, treatment plan, medications, and discharge instructions  Description  Complete learning assessment and assess knowledge base    Interventions:  - Provide teaching at level of understanding  - Provide teaching via preferred learning methods  Outcome: Progressing     Problem: NEUROSENSORY - ADULT  Goal: Achieves stable or improved neurological status  Description  INTERVENTIONS  - Monitor and report changes in neurological status  - Monitor vital signs such as temperature, blood pressure, glucose, and any other labs ordered   - Initiate measures to prevent increased intracranial pressure  - Monitor for seizure activity and implement precautions if appropriate      Outcome: Progressing  Goal: Remains free of injury related to seizures activity  Description  INTERVENTIONS  - Maintain airway, patient safety  and administer oxygen as ordered  - Monitor patient for seizure activity, document and report duration and description of seizure to physician/advanced practitioner  - If seizure occurs,  ensure patient safety during seizure  - Reorient patient post seizure  - Seizure pads on all 4 side rails  - Instruct patient/family to notify RN of any seizure activity including if an aura is experienced  - Instruct patient/family to call for assistance with activity based on nursing assessment  - Administer anti-seizure medications if ordered    Outcome: Progressing  Goal: Achieves maximal functionality and self care  Description  INTERVENTIONS  - Monitor swallowing and airway patency with patient fatigue and changes in neurological status  - Encourage and assist patient to increase activity and self care     - Encourage visually impaired, hearing impaired and aphasic patients to use assistive/communication devices  Outcome: Progressing     Problem: CARDIOVASCULAR - ADULT  Goal: Maintains optimal cardiac output and hemodynamic stability  Description  INTERVENTIONS:  - Monitor I/O, vital signs and rhythm  - Monitor for S/S and trends of decreased cardiac output  - Administer and titrate ordered vasoactive medications to optimize hemodynamic stability  - Assess quality of pulses, skin color and temperature  - Assess for signs of decreased coronary artery perfusion  - Instruct patient to report change in severity of symptoms  Outcome: Progressing  Goal: Absence of cardiac dysrhythmias or at baseline rhythm  Description  INTERVENTIONS:  - Continuous cardiac monitoring, vital signs, obtain 12 lead EKG if ordered  - Administer antiarrhythmic and heart rate control medications as ordered  - Monitor electrolytes and administer replacement therapy as ordered  Outcome: Progressing     Problem: RESPIRATORY - ADULT  Goal: Achieves optimal ventilation and oxygenation  Description  INTERVENTIONS:  - Assess for changes in respiratory status  - Assess for changes in mentation and behavior  - Position to facilitate oxygenation and minimize respiratory effort  - Oxygen administered by appropriate delivery if ordered  - Initiate smoking cessation education as indicated  - Encourage broncho-pulmonary hygiene including cough, deep breathe, Incentive Spirometry  - Assess the need for suctioning and aspirate as needed  - Assess and instruct to report SOB or any respiratory difficulty  - Respiratory Therapy support as indicated  Outcome: Progressing     Problem: METABOLIC, FLUID AND ELECTROLYTES - ADULT  Goal: Electrolytes maintained within normal limits  Description  INTERVENTIONS:  - Monitor labs and assess patient for signs and symptoms of electrolyte imbalances  - Administer electrolyte replacement as ordered  - Monitor response to electrolyte replacements, including repeat lab results as appropriate  - Instruct patient on fluid and nutrition as appropriate  Outcome: Progressing     Problem: SKIN/TISSUE INTEGRITY - ADULT  Goal: Skin integrity remains intact  Description  INTERVENTIONS  - Identify patients at risk for skin breakdown  - Assess and monitor skin integrity  - Assess and monitor nutrition and hydration status  - Monitor labs (i e  albumin)  - Assess for incontinence   - Turn and reposition patient  - Assist with mobility/ambulation  - Relieve pressure over bony prominences  - Avoid friction and shearing  - Provide appropriate hygiene as needed including keeping skin clean and dry  - Evaluate need for skin moisturizer/barrier cream  - Collaborate with interdisciplinary team (i e  Nutrition, Rehabilitation, etc )   - Patient/family teaching  Outcome: Progressing  Goal: Incision(s), wounds(s) or drain site(s) healing without S/S of infection  Description  INTERVENTIONS  - Assess and document risk factors for skin impairment   - Assess and document dressing, incision, wound bed, drain sites and surrounding tissue  - Consider nutrition services referral as needed  - Oral mucous membranes remain intact  - Provide patient/ family education  Outcome: Progressing  Goal: Oral mucous membranes remain intact  Description  INTERVENTIONS  - Assess oral mucosa and hygiene practices  - Implement preventative oral hygiene regimen  - Implement oral medicated treatments as ordered  - Initiate Nutrition services referral as needed  Outcome: Progressing     Problem: MUSCULOSKELETAL - ADULT  Goal: Maintain or return mobility to safest level of function  Description  INTERVENTIONS:  - Assess patient's ability to carry out ADLs; assess patient's baseline for ADL function and identify physical deficits which impact ability to perform ADLs (bathing, care of mouth/teeth, toileting, grooming, dressing, etc )  - Assess/evaluate cause of self-care deficits   - Assess range of motion  - Assess patient's mobility  - Assess patient's need for assistive devices and provide as appropriate  - Encourage maximum independence but intervene and supervise when necessary  - Involve family in performance of ADLs  - Assess for home care needs following discharge   - Consider OT consult to assist with ADL evaluation and planning for discharge  - Provide patient education as appropriate  Outcome: Progressing  Goal: Maintain proper alignment of affected body part  Description  INTERVENTIONS:  - Support, maintain and protect limb and body alignment  - Provide patient/ family with appropriate education  Outcome: Progressing Problem: Neurological Deficit  Goal: Neurological status is stable or improving  Description  Interventions:  - Monitor and assess patient's level of consciousness, motor function, sensory function, and level of assistance needed for ADLs  - Monitor and report changes from baseline  Collaborate with interdisciplinary team to initiate plan and implement interventions as ordered  - Provide and maintain a safe environment  - Consider seizure precautions  - Consider fall precautions  - Consider aspiration precautions  - Consider bleeding precautions  Outcome: Progressing     Problem: Activity Intolerance/Impaired Mobility  Goal: Mobility/activity is maintained at optimum level for patient  Description  Interventions:  - Assess and monitor patient  barriers to mobility and need for assistive/adaptive devices  - Assess patient's emotional response to limitations  - Collaborate with interdisciplinary team and initiate plans and interventions as ordered  - Encourage independent activity per ability   - Maintain proper body alignment  - Perform active/passive rom as tolerated/ordered  - Plan activities to conserve energy   - Turn patient as appropriate  Outcome: Progressing     Problem: Communication Impairment  Goal: Ability to express needs and understand communication  Description  Assess patient's communication skills and ability to understand information  Patient will demonstrate use of effective communication techniques, alternative methods of communication and understanding even if not able to speak  - Encourage communication and provide alternate methods of communication as needed  - Collaborate with case management/ for discharge needs  - Include patient/family/caregiver in decisions related to communication    Outcome: Progressing     Problem: Potential for Aspiration  Goal: Non-ventilated patient's risk of aspiration is minimized  Description  Assess and monitor vital signs, respiratory status, and labs (WBC)  Monitor for signs of aspiration (tachypnea, cough, rales, wheezing, cyanosis, fever)  - Assess and monitor patient's ability to swallow  - Place patient up in chair to eat if possible  - HOB up at 90 degrees to eat if unable to get patient up into chair   - Supervise patient during oral intake  - Instruct patient/ family to take small bites  - Instruct patient/ family to take small single sips when taking liquids  - Follow patient-specific strategies generated by speech pathologist   Outcome: Progressing  Goal: Ventilated patient's risk of aspiration is minimized  Description  Assess and monitor vital signs, respiratory status, airway cuff pressure, and labs (WBC)  Monitor for signs of aspiration (tachypnea, cough, rales, wheezing, cyanosis, fever)  - Elevate head of bed 30 degrees if patient has tube feeding   - Monitor tube feeding  Outcome: Progressing     Problem: Nutrition  Goal: Nutrition/Hydration status is improving  Description  Monitor and assess patient's nutrition/hydration status for malnutrition (ex- brittle hair, bruises, dry skin, pale skin and conjunctiva, muscle wasting, smooth red tongue, and disorientation)  Collaborate with interdisciplinary team and initiate plan and interventions as ordered  Monitor patient's weight and dietary intake as ordered or per policy  Utilize nutrition screening tool and intervene per policy  Determine patient's food preferences and provide high-protein, high-caloric foods as appropriate  - Assist patient with eating   - Allow adequate time for meals   - Encourage patient to take dietary supplement as ordered  - Collaborate with clinical nutritionist   - Include patient/family/caregiver in decisions related to nutrition    Outcome: Progressing

## 2020-06-29 NOTE — ASSESSMENT & PLAN NOTE
· Patient admitted with approximately 30 minute episode of left hand weakness  Slurred speech noted by patient's nephew  Now resolved  Echo pending   Subacute appearing tiny frontal lobe cortical infarcts  · Neurology consult appreciated  · Echo LVEF 55%, no regional wall motion abnormalities, wall thickness was mildly increased, G1DD, moderate tricuspid valve regurgitation, and moderate pulmonic valve regurgitation   · Needs loop recorder outpatient - cardiology follow-up  · Continue DAPT, statin (changed to Lipitor)

## 2020-06-29 NOTE — ASSESSMENT & PLAN NOTE
Lab Results   Component Value Date    HGBA1C 7 6 (H) 06/27/2020     Recent Labs     06/28/20  1026 06/28/20  1707 06/28/20  2130 06/29/20  0706   POCGLU 183* 150* 130 125     Blood Sugar Average: Last 72 hrs:  (P) 289 [id number]   · Home regimen includes Amaryl 2 mg daily   Hold oral hypoglycemic  · Add sliding scale insulin, Accu-Cheks, diabetic diet

## 2020-06-29 NOTE — DISCHARGE INSTRUCTIONS
Stroke:   Take Plavix 75 mg daily for 18 more days  Take Aspirin 81 mg daily  Take Lipitor 40 mg daily with dinner (Take this in place of Zocor)  Follow-up with neurology  Follow-up with cardiology for an event monitor such as a Loop Recorder

## 2020-06-30 ENCOUNTER — EPISODE CHANGES (OUTPATIENT)
Dept: CASE MANAGEMENT | Facility: OTHER | Age: 85
End: 2020-06-30

## 2020-07-01 LAB — VIT B1 BLD-SCNC: 78.5 NMOL/L (ref 66.5–200)

## 2020-07-06 ENCOUNTER — TRANSITIONAL CARE MANAGEMENT (OUTPATIENT)
Dept: INTERNAL MEDICINE CLINIC | Facility: CLINIC | Age: 85
End: 2020-07-06

## 2020-07-08 ENCOUNTER — OFFICE VISIT (OUTPATIENT)
Dept: INTERNAL MEDICINE CLINIC | Facility: CLINIC | Age: 85
End: 2020-07-08
Payer: MEDICARE

## 2020-07-08 VITALS
BODY MASS INDEX: 25.9 KG/M2 | DIASTOLIC BLOOD PRESSURE: 60 MMHG | HEIGHT: 71 IN | OXYGEN SATURATION: 97 % | HEART RATE: 90 BPM | SYSTOLIC BLOOD PRESSURE: 138 MMHG | WEIGHT: 185 LBS | TEMPERATURE: 97.4 F | RESPIRATION RATE: 18 BRPM

## 2020-07-08 DIAGNOSIS — E08.21 DIABETES MELLITUS DUE TO UNDERLYING CONDITION, CONTROLLED, WITH DIABETIC NEPHROPATHY, WITH LONG-TERM CURRENT USE OF INSULIN (HCC): ICD-10-CM

## 2020-07-08 DIAGNOSIS — R00.2 PALPITATIONS: ICD-10-CM

## 2020-07-08 DIAGNOSIS — E11.22 CKD STAGE 4 DUE TO TYPE 2 DIABETES MELLITUS (HCC): ICD-10-CM

## 2020-07-08 DIAGNOSIS — I63.40 CEREBROVASCULAR ACCIDENT (CVA) DUE TO EMBOLISM OF CEREBRAL ARTERY (HCC): Primary | ICD-10-CM

## 2020-07-08 DIAGNOSIS — N18.4 CKD STAGE 4 DUE TO TYPE 2 DIABETES MELLITUS (HCC): ICD-10-CM

## 2020-07-08 DIAGNOSIS — E78.00 PURE HYPERCHOLESTEROLEMIA: ICD-10-CM

## 2020-07-08 DIAGNOSIS — I10 ESSENTIAL HYPERTENSION: ICD-10-CM

## 2020-07-08 DIAGNOSIS — Z79.4 DIABETES MELLITUS DUE TO UNDERLYING CONDITION, CONTROLLED, WITH DIABETIC NEPHROPATHY, WITH LONG-TERM CURRENT USE OF INSULIN (HCC): ICD-10-CM

## 2020-07-08 PROCEDURE — 99495 TRANSJ CARE MGMT MOD F2F 14D: CPT | Performed by: NURSE PRACTITIONER

## 2020-07-08 NOTE — ASSESSMENT & PLAN NOTE
Pt previously controlled on metformin but sugars have been higher since needing to stop metformin due to CKD  Leo is now on glimepiride  A1C was checked in the hospital and did show a decrease, now at 7 6  Plan for repeating at his f/u in October  He does try to avoid carbs as much as possible but indulges in candy, chips, donuts on occasion    Lab Results   Component Value Date    HGBA1C 7 6 (H) 06/27/2020

## 2020-07-08 NOTE — ASSESSMENT & PLAN NOTE
No longer symptomatic of presenting symptoms  He is now taking plavix and changed statin treatment from zocor to lipitor  He is scheduled for neuro f/u 7/30 and referred to cardiology as recommended for f/u and loop recorder  Reinforced return to ER with recurrent symptoms

## 2020-07-08 NOTE — ASSESSMENT & PLAN NOTE
Renal function at baseline, he is followed by nephrology  He is due for f/u and needs to call the office to schedule  Continue ongoing monitoring    Lab Results   Component Value Date    HGBA1C 7 6 (H) 06/27/2020

## 2020-07-08 NOTE — PROGRESS NOTES
Assessment/Plan:    Controlled diabetes mellitus (HCC)  Pt previously controlled on metformin but sugars have been higher since needing to stop metformin due to CKD  Leo is now on glimepiride  A1C was checked in the hospital and did show a decrease, now at 7 6  Plan for repeating at his f/u in October  He does try to avoid carbs as much as possible but indulges in candy, chips, donuts on occasion  Lab Results   Component Value Date    HGBA1C 7 6 (H) 06/27/2020       CKD stage 4 due to type 2 diabetes mellitus ([facility] )  Renal function at baseline, he is followed by nephrology  He is due for f/u and needs to call the office to schedule  Continue ongoing monitoring  Lab Results   Component Value Date    HGBA1C 7 6 (H) 06/27/2020       Essential hypertension  Blood pressure is well controlled  Continue with amlodipine, lisinopril    Embolic bilateral punctate CVA, acute as well as subacute  No longer symptomatic of presenting symptoms  He is now taking plavix and changed statin treatment from zocor to lipitor  He is scheduled for neuro f/u 7/30 and referred to cardiology as recommended for f/u and loop recorder  Reinforced return to ER with recurrent symptoms  Hyperlipidemia  No on lipitor 40 mg, aspirin  continue treatment with ongoing monitoring  Diagnoses and all orders for this visit:    Embolic bilateral punctate CVA, acute as well as subacute  -     Ambulatory referral to Cardiology; Future    Palpitations  -     Ambulatory referral to Cardiology; Future    Diabetes mellitus due to underlying condition, controlled, with diabetic nephropathy, with long-term current use of insulin ([place] [id number] )    CKD stage 4 due to type 2 diabetes mellitus ([facility] )    Essential hypertension    Pure hypercholesterolemia          Subjective:      Patient ID: Rebekah Alvarado is a 80 y o  male      Pt is a 80y o  year old male who is here for TCM visit following hospitalization at [address] [place] [address] from 6/26-6/29 with discharge to Home   PMH HTN, DM, HLD, BPH, palpitations, and CKD  Admission diagnosis was CVA  Presented to the ER with left hand weakness, slurred speech at home noted to have subacute frontal cortical infarcts  Discharge medications reviewed yes  Pt is not experiencing symptoms  The following portions of the patient's history were reviewed and updated as appropriate: allergies, current medications, past family history, past medical history, past social history, past surgical history and problem list     Review of Systems   Constitutional: Negative for activity change, appetite change, chills, fatigue, fever and unexpected weight change  HENT: Negative for hearing loss  Eyes: Negative for visual disturbance  Respiratory: Negative for cough, chest tightness and shortness of breath  Cardiovascular: Negative for chest pain, palpitations and leg swelling  Gastrointestinal: Negative for abdominal pain, constipation, diarrhea, nausea and vomiting  Genitourinary: Negative for difficulty urinating, dysuria and frequency  Musculoskeletal: Negative for arthralgias and myalgias  Neurological: Negative for dizziness, syncope, speech difficulty, weakness, numbness and headaches  Psychiatric/Behavioral: Negative for dysphoric mood and sleep disturbance  The patient is not nervous/anxious  Objective:      /60   Pulse 90   Temp (!) 97 4 °F (36 3 °C)   Resp 18   Ht 5' 11" (1 803 m)   Wt 83 9 kg (185 lb)   SpO2 97%   BMI 25 80 kg/m²          Physical Exam   Constitutional: He is oriented to person, place, and time  Vital signs are normal  He appears well-developed and well-nourished  He is cooperative  HENT:   Right Ear: Hearing and external ear normal    Left Ear: Hearing and external ear normal    Eyes: Pupils are equal, round, and reactive to light  Conjunctivae and lids are normal  Right eye exhibits abnormal extraocular motion   Left eye exhibits abnormal extraocular motion  EOM are not fluid   Neck: Normal carotid pulses and no JVD present  Carotid bruit is not present  Cardiovascular: Normal rate, regular rhythm, S1 normal, S2 normal, normal heart sounds and intact distal pulses  No murmur heard  Pulmonary/Chest: Effort normal and breath sounds normal    Abdominal: Soft  Normal appearance and bowel sounds are normal  There is no tenderness  Musculoskeletal: Normal range of motion  He exhibits no edema  Lymphadenopathy:     He has no cervical adenopathy  Neurological: He is alert and oriented to person, place, and time  He has normal reflexes  He displays no tremor  No cranial nerve deficit or sensory deficit  He exhibits abnormal muscle tone  Gait normal    R shoulder weakness   Skin: Skin is warm, dry and intact  Psychiatric: He has a normal mood and affect  His speech is normal and behavior is normal  Judgment and thought content normal  Cognition and memory are normal    Vitals reviewed  TCM Call (since 6/7/2020)     Date and time call was made  7/1/2020  1:09 PM    Hospital care reviewed  Records reviewed    Patient was hospitialized at  [address] [place] [address]    Date of Admission  06/26/20    Date of discharge  06/29/20    Diagnosis  Embolic Bilateral Puncture CVA    Disposition  Home    Current Symptoms  None      TCM Call (since 6/7/2020)     Post hospital issues  None    Scheduled for follow up?   Yes    I have advised the patient to call PCP with any new or worsening symptoms  Jagjit Sohail CMA    Counseling  Patient    Comments  Patient appt scheduled for 7/8/20

## 2020-07-10 ENCOUNTER — PATIENT OUTREACH (OUTPATIENT)
Dept: INTERNAL MEDICINE CLINIC | Facility: CLINIC | Age: 85
End: 2020-07-10

## 2020-07-10 NOTE — PROGRESS NOTES
Spoke with patient explained why I was calling and the [facility] program he will participate  He is driving feels good  Reviewed upcoming appointments   today  He is working on avoiding carbs  Does his own housework and yard work  I will check back in two weeks

## 2020-07-15 ENCOUNTER — TELEPHONE (OUTPATIENT)
Dept: CARDIOLOGY CLINIC | Facility: CLINIC | Age: 85
End: 2020-07-15

## 2020-07-15 ENCOUNTER — OFFICE VISIT (OUTPATIENT)
Dept: CARDIOLOGY CLINIC | Facility: CLINIC | Age: 85
End: 2020-07-15
Payer: MEDICARE

## 2020-07-15 VITALS
TEMPERATURE: 97.3 F | WEIGHT: 185 LBS | OXYGEN SATURATION: 96 % | HEART RATE: 89 BPM | DIASTOLIC BLOOD PRESSURE: 32 MMHG | HEIGHT: 71 IN | BODY MASS INDEX: 25.9 KG/M2 | SYSTOLIC BLOOD PRESSURE: 98 MMHG

## 2020-07-15 DIAGNOSIS — I63.40 CEREBROVASCULAR ACCIDENT (CVA) DUE TO EMBOLISM OF CEREBRAL ARTERY (HCC): ICD-10-CM

## 2020-07-15 DIAGNOSIS — E78.5 HYPERLIPIDEMIA, UNSPECIFIED HYPERLIPIDEMIA TYPE: ICD-10-CM

## 2020-07-15 DIAGNOSIS — N18.4 CKD (CHRONIC KIDNEY DISEASE), STAGE IV (HCC): ICD-10-CM

## 2020-07-15 DIAGNOSIS — I95.2 HYPOTENSION DUE TO DRUGS: Primary | ICD-10-CM

## 2020-07-15 PROCEDURE — 99203 OFFICE O/P NEW LOW 30 MIN: CPT | Performed by: NURSE PRACTITIONER

## 2020-07-15 PROCEDURE — 93000 ELECTROCARDIOGRAM COMPLETE: CPT | Performed by: NURSE PRACTITIONER

## 2020-07-15 RX ORDER — ATORVASTATIN CALCIUM 40 MG/1
40 TABLET, FILM COATED ORAL
Qty: 90 TABLET | Refills: 3 | Status: SHIPPED | OUTPATIENT
Start: 2020-07-15 | End: 2020-07-21 | Stop reason: SDUPTHER

## 2020-07-15 RX ORDER — LISINOPRIL 10 MG/1
10 TABLET ORAL 2 TIMES DAILY
Qty: 60 TABLET | Refills: 3 | Status: SHIPPED | OUTPATIENT
Start: 2020-07-15 | End: 2020-11-09

## 2020-07-15 RX ORDER — CLOPIDOGREL BISULFATE 75 MG/1
75 TABLET ORAL DAILY
Qty: 30 TABLET | Refills: 1 | Status: SHIPPED | OUTPATIENT
Start: 2020-07-15 | End: 2020-07-30 | Stop reason: SDUPTHER

## 2020-07-15 NOTE — TELEPHONE ENCOUNTER
COVID Pre-Visit Screening     1  Is this a family member screening? Yes  2  Have you traveled outside of your state in the past 2 weeks? No  3  Do you presently have a fever or flu-like symptoms? No  4  Do you have symptoms of an upper respiratory infection like runny nose, sore throat, or cough? No  5  Are you suffering from new headache that you have not had in the past?  No  6  Do you have/have you experienced any new shortness of breath recently? No  7  Do you have any new diarrhea, nausea or vomiting? No  8  Have you been in contact with anyone who has been sick or diagnosed with COVID-19? No  9  Do you have any new loss of taste or smell? No  10  Are you able to wear a mask without a valve for the entire visit? No    Patient schedule for loop implant at [address] [place] [address] on 7/21/20 with Dr Lucho Araujo  Patient aware of general instructions  Patient cover under medicare

## 2020-07-15 NOTE — PROGRESS NOTES
Cardiology Consultation     [facility]  [id number]  12/11/1930  HEART & VASCULAR [facility] [place] [id number] [place] [id number]    1  TIA (transient ischemic attack)  POCT ECG   Mr Christopher Juarez was admitted to [address] [place] [address] on 6/26 - 6/29/20 with with acute and subacute bilateral punctate CVA  Mr Noemi Espinosa Presented to the emergency room with left hand numbness  He was at home and   Experience 30 minutes episode of left hand weakness   And slurred speech  This was noted by his nephew He was placed on the stroke pathway  Neurology was consulted  6/27/20  MRI of the brain showed subacute  Frontal lobe cortical infarcts, sequential chronic infarcts and mild micro angiopathy  [place] Carotid ultrasound showed <50% ICA bilaterally  6/29/20 TTE showed LVEF 55%, grade 1 DD, LA and RA mildly dilated, mild to mod TR  Neurology recommended Loop recorder as OP  He was discharged on dual Antiplatelet and high Lipitor  Creat on admission 2 51 and at discharge improved to 2 09  Mr Christopher Juarez presents to our office for a recent hospitalization follow up visit  He denies numbness in his hands or fingers, memory difficulties, CP,  Palpitations, lightheadedness, or dizziness  Denies family hx of CAD  Remote tobacco abuse quit in [address] use     HPI:  HTN  Hyperlipidemia 6/27/20 , TG 53, HDL 35, LDL 61  CKD IV baseline creat 2 09- 2 21  DM2 HgbA1C 7 6 done on 6/27/20  Patient Active Problem List   Diagnosis    Healthcare maintenance    Controlled diabetes mellitus ([facility] )    Enlarged prostate without lower urinary tract symptoms (luts)    Hyperlipidemia    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 (HCC)    Palpitations    Acute renal failure superimposed on stage 4 chronic kidney disease ([address] )    CKD stage 4 due to type 2 diabetes mellitus ([address] )    Overweight (BMI 25 0-29  9)    Secondary hyperparathyroidism of renal origin ([facility] )    Embolic bilateral punctate CVA, acute as well as subacute    Essential hypertension     Past Medical History:   Diagnosis Date    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)     Last Assessed:  5/19/17    Chronic kidney disease     Diabetes mellitus ([facility] )     Hyperlipidemia     Hypertension     Osteoarthritis     Palpitations     TIA (transient ischemic attack)      Social History     Socioeconomic History    Marital status: Single     Spouse name: Not on file    Number of children: Not on file    Years of education: Not on file    Highest education level: Not on file   Occupational History    Not on file   Social Needs    Financial resource strain: Not on file    Food insecurity:     Worry: Never true     Inability: Never true   Aligned TeleHealth needs:     Medical: No     Non-medical: No   Tobacco Use    Smoking status: Former Smoker    Smokeless tobacco: Never Used   Substance and Sexual Activity    Alcohol use: Yes     Comment: Social drinker    Drug use: No    Sexual activity: Not on file   Lifestyle    Physical activity:     Days per week: 3 days     Minutes per session: 20 min    Stress: Not on file   Relationships    Social connections:     Talks on phone: Not on file     Gets together: Not on file     Attends Latter day service: Not on file     Active member of club or organization: Not on file     Attends meetings of clubs or organizations: Not on file     Relationship status: Not on file    Intimate partner violence:     Fear of current or ex partner: Not on file     Emotionally abused: Not on file     Physically abused: Not on file     Forced sexual activity: Not on file   Other Topics Concern    Not on file   Social History Narrative    Daily coffee consumption (2 cups/day)      Family History   Problem Relation Age of Onset    Diabetes Mother     Other Mother         Stroke syndrome    Diabetes Father     Emphysema Father Past Surgical History:   Procedure Laterality Date    APPENDECTOMY      COLONOSCOPY  2012    HEMORROIDECTOMY         Current Outpatient Medications:     amLODIPine (NORVASC) 5 mg tablet, TAKE ONE TABLET BY MOUTH EVERY DAY, Disp: 90 tablet, Rfl: 3    aspirin 81 mg chewable tablet, Chew 1 tablet (81 mg total) daily, Disp: 30 tablet, Rfl: 0    atorvastatin (LIPITOR) 40 mg tablet, Take 1 tablet (40 mg total) by mouth daily with dinner, Disp: 30 tablet, Rfl: 0    Blood Glucose Monitoring Suppl (PRECISION XTRA MONITOR) MARY, by Does not apply route daily, Disp: 1 each, Rfl: 0    Cholecalciferol ([employer]S VITAMIN D3) 1000 units capsule, Take 1 capsule by mouth daily, Disp: , Rfl:     CINNAMON PO, Take by mouth, Disp: , Rfl:     clotrimazole-betamethasone (LOTRISONE) 1-0 05 % cream, Apply topically 2 (two) times a day (Patient taking differently: Apply topically as needed ), Disp: 30 g, Rfl: 0    cyanocobalamin (VITAMIN B-12) 500 mcg tablet, Take 1 tablet by mouth daily, Disp: , Rfl:     glimepiride (AMARYL) 2 mg tablet, TAKE ONE TABLET BY MOUTH EVERY DAY WITH BREAKFAST , Disp: 90 tablet, Rfl: 3    lisinopril (ZESTRIL) 20 mg tablet, TAKE ONE TABLET BY MOUTH TWICE A DAY, Disp: 180 tablet, Rfl: 3    Omega-3 Fatty Acids (OMEGA-3 FISH OIL) 300 MG CAPS, Take 1 capsule by mouth daily, Disp: , Rfl:     PRECISION XTRA TEST STRIPS test strip, USE TO TEST BLOOD GLUCOSE ONCE DAILY, Disp: 100 each, Rfl: 2    terazosin (HYTRIN) 1 mg capsule, TAKE ONE CAPSULE BY MOUTH AT BEDTIME, Disp: 90 capsule, Rfl: 3    clopidogrel (PLAVIX) 75 mg tablet, Take 1 tablet (75 mg total) by mouth daily for 18 doses (Patient not taking: Reported on 7/15/2020), Disp: 18 tablet, Rfl: 0  No Known Allergies  Vitals:    07/15/20 0932   BP: (!) 98/32   BP Location: Right arm   Patient Position: Sitting   Cuff Size: Standard   Pulse: 89   Temp: (!) 97 3 °F (36 3 °C)   SpO2: 96%   Weight: 83 9 kg (185 lb)   Height: 5' 11" (1 803 m) Labs:  Admission on 06/26/2020, Discharged on 06/29/2020   Component Date Value    POC Glucose 06/26/2020 158*    WBC 06/26/2020 7 51     RBC 06/26/2020 4 03     Hemoglobin 06/26/2020 12 5     Hematocrit 06/26/2020 37 6     MCV 06/26/2020 93     MCH 06/26/2020 31 0     MCHC 06/26/2020 33 2     RDW 06/26/2020 12 8     MPV 06/26/2020 10 0     Platelets 49/33/6493 185     nRBC 06/26/2020 0     Neutrophils Relative 06/26/2020 58     Immat GRANS % 06/26/2020 0     Lymphocytes Relative 06/26/2020 25     Monocytes Relative 06/26/2020 11     Eosinophils Relative 06/26/2020 5     Basophils Relative 06/26/2020 1     Neutrophils Absolute 06/26/2020 4 39     Immature Grans Absolute 06/26/2020 0 02     Lymphocytes Absolute 06/26/2020 1 85     Monocytes Absolute 06/26/2020 0 81     Eosinophils Absolute 06/26/2020 0 40     Basophils Absolute 06/26/2020 0 04     Sodium 06/26/2020 140     Potassium 06/26/2020 4 4     Chloride 06/26/2020 109*    CO2 06/26/2020 20*    ANION GAP 06/26/2020 11     BUN 06/26/2020 45*    Creatinine 06/26/2020 2 51*    Glucose 06/26/2020 175*    Calcium 06/26/2020 8 2*    eGFR 06/26/2020 22     Troponin I 06/26/2020 <0 02     Color, UA 06/27/2020 Yellow     Clarity, UA 06/27/2020 Clear     Specific Gravity, UA 06/27/2020 1 020     pH, UA 06/27/2020 6 0     Leukocytes, UA 06/27/2020 Negative     Nitrite, UA 06/27/2020 Negative     Protein, UA 06/27/2020 Negative     Glucose, UA 06/27/2020 100 (1/10%)*    Ketones, UA 06/27/2020 Negative     Urobilinogen, UA 06/27/2020 0 2     Bilirubin, UA 06/27/2020 Negative     Blood, UA 06/27/2020 Small*    POC Glucose 06/26/2020 243*    Cholesterol 06/27/2020 107     Triglycerides 06/27/2020 53     HDL, Direct 06/27/2020 35*    LDL Calculated 06/27/2020 61     Hemoglobin A1C 06/27/2020 7 6*    EAG 06/27/2020 171     Sodium 06/27/2020 141     Potassium 06/27/2020 4 4     Chloride 06/27/2020 111*    CO2 06/27/2020 22     ANION GAP 06/27/2020 8     BUN 06/27/2020 37*    Creatinine 06/27/2020 2 09*    Glucose 06/27/2020 119     Calcium 06/27/2020 8 1*    AST 06/27/2020 22     ALT 06/27/2020 16     Alkaline Phosphatase 06/27/2020 76     Total Protein 06/27/2020 6 8     Albumin 06/27/2020 3 4*    Total Bilirubin 06/27/2020 0 72     eGFR 06/27/2020 27     WBC 06/27/2020 6 47     RBC 06/27/2020 3 73*    Hemoglobin 06/27/2020 11 5*    Hematocrit 06/27/2020 34 9*    MCV 06/27/2020 94     MCH 06/27/2020 30 8     MCHC 06/27/2020 33 0     RDW 06/27/2020 13 0     Platelets 80/28/2003 171     MPV 06/27/2020 9 6     TSH 3RD GENERATON 06/27/2020 1 451     Vitamin B-12 06/27/2020 244     Folate 06/27/2020 16 5     Vitamin B1, Whole Blood 06/27/2020 78 5     Vit D, 1,25-Dihydroxy 06/27/2020 42 4     RBC, UA 06/27/2020 0-1*    WBC, UA 06/27/2020 0-1*    Epithelial Cells 06/27/2020 Occasional     Bacteria, UA 06/27/2020 Occasional     POC Glucose 06/27/2020 113     POC Glucose 06/27/2020 141*    POC Glucose 06/27/2020 155*    POC Glucose 06/27/2020 163*    POC Glucose 06/28/2020 116     POC Glucose 06/28/2020 183*    POC Glucose 06/28/2020 150*    POC Glucose 06/28/2020 130     Ventricular Rate 06/26/2020 86     Atrial Rate 06/26/2020 86     NH Interval 06/26/2020 176     QRSD Interval 06/26/2020 130     QT Interval 06/26/2020 378     QTC Interval 06/26/2020 452     P Axis 06/26/2020 64     QRS Axis 06/26/2020 -6     T Wave Axis 06/26/2020 48     POC Glucose 06/29/2020 125     POC Glucose 06/29/2020 146*   Orders Only on 06/08/2020   Component Date Value    Hemoglobin A1C 06/08/2020 8 2    Orders Only on 06/04/2020   Component Date Value    Right Eye Diabetic Retin* 06/04/2020 None     Left Eye Diabetic Retino* 06/04/2020 None      Imaging: Xr Chest 1 View Portable    Result Date: 6/26/2020  Narrative: CHEST INDICATION:   sob   COMPARISON:  7/27/2012 EXAM PERFORMED/VIEWS:  XR CHEST PORTABLE FINDINGS: Cardiomediastinal silhouette appears unremarkable  Minimal left base atelectasis  No pneumothorax or pleural effusion  Osseous structures appear within normal limits for patient age  Impression: Minimal left base atelectasis  Workstation performed: [id number]     Ct Head Without Contrast    Result Date: 6/26/2020  Narrative: CT BRAIN - WITHOUT CONTRAST INDICATION:   TIA, initial exam  COMPARISON:  None  TECHNIQUE:  CT examination of the brain was performed  In addition to axial images, coronal 2D reformatted images were created and submitted for interpretation  Radiation dose length product (DLP) for this visit:  879 mGy-cm   This examination, like all CT scans performed in the [facility], was performed utilizing techniques to minimize radiation dose exposure, including the use of iterative reconstruction and automated exposure control  IMAGE QUALITY:  Diagnostic  FINDINGS: PARENCHYMA: Decreased attenuation is noted in periventricular and subcortical white matter demonstrating an appearance that is statistically most likely to represent mild microangiopathic change  No CT signs of acute infarction  No intracranial mass, mass effect or midline shift  No acute parenchymal hemorrhage  VENTRICLES AND EXTRA-AXIAL SPACES:  Normal for the patient's age  VISUALIZED ORBITS AND PARANASAL SINUSES:  No acute abnormality involving the orbits  There is complete opacification of the left maxillary sinus, and moderate mucosal thickening in the right maxillary sinus, with intermixed calcifications, compatible with chronic sinusitis  No air-fluid levels  No fluid levels are seen  CALVARIUM AND EXTRACRANIAL SOFT TISSUES:  Normal      Impression: No acute intracranial abnormality   Workstation performed: [id number]     Mri Brain Wo Contrast    Result Date: 6/27/2020  Narrative: MRI BRAIN WITHOUT CONTRAST INDICATION: cva  COMPARISON:   Head CT 6/26/2020 TECHNIQUE:  Sagittal T1, axial T2, axial FLAIR, axial T1, axial Achille and axial diffusion imaging  IMAGE QUALITY:  Diagnostic  FINDINGS: BRAIN PARENCHYMA: A few punctuate cortical foci of mild restricted diffusion in the right frontal lobe (series 3 image 21-22) Age-related cerebral atrophy  No intracranial hemorrhage  Nonspecific foci of T2/FLAIR hyperintensities involving periventricular and subcortical white matter most likely representing mild microangiopathic change  Sequela of chronic cortical infarcts in the right frontal, parietal, and occipital lobes (series 5 image 24 and 19-20)  VENTRICLES:  Normal for the patient's age  SELLA AND PITUITARY GLAND:  Normal  ORBITS:  Normal  PARANASAL SINUSES:  Paranasal sinus disease  No fluid level VASCULATURE:  Evaluation of the major intracranial vasculature demonstrates appropriate flow voids  CALVARIUM AND SKULL BASE:  Normal  Left mastoid effusion  EXTRACRANIAL SOFT TISSUES:  Normal      Impression: 1  Subacute appearing tiny frontal lobe cortical infarcts  No significant edema or mass effect  No intracranial hemorrhage  2   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  3   Paranasal sinus disease and left mastoid effusion  I personally discussed this study with Yamilet Kenney on 6/27/2020 at 11:22 AM  Workstation performed: [id number]     Vas Carotid Complete Study (*order Only If Cta Has Not Been Completed*)    Result Date: 6/27/2020  Narrative:  [facility] REPORT CLINICAL: Indications: The patient was experiencing left arm and hand weakness for one day  Operative History: appendectomy hemorrhiodectomy Risk Factors The patient has history of HTN, Diabetes (Yes) and CKD  He has no history of DVT or Recent Trauma  Clinical Right Pressure:  134/72 mm Hg  FINDINGS:  Right        Impression  PSV  EDV (cm/s)  Direction of Flow  Ratio  Dist  ICA                 66          14                      0 82  Mid  ICA                  86          15                      1 08  Prox   ICA 1 - 49%     104          18                      1 31  Dist CCA                  62           7                            Mid CCA                   80          12                      1 11  Prox CCA                  72          11                            Ext Carotid               98           5                      1 23  Prox Vert                 68          14  Antegrade                 Subclavian               183           0                             Left         Impression  PSV  EDV (cm/s)  Direction of Flow  Ratio  Dist  ICA                 62          13                      0 75  Mid  ICA                  88          15                      1 07  Prox  ICA    1 - 49%      72          14                      0 88  Dist CCA                  77          11                            Mid CCA                   82          14                      1 31  Prox CCA                  63          10                            Ext Carotid               94           7                      1 14  Prox Vert                 63          13  Antegrade                 Subclavian               165           0                               CONCLUSION:  Impression RIGHT: There is <50% stenosis noted in the internal carotid artery  Plaque is heterogenous and irregular  Vertebral artery flow is antegrade  There is no significant subclavian artery disease  LEFT: There is <50% stenosis noted in the internal carotid artery  Plaque is heterogenous and irregular  Vertebral artery flow is antegrade  There is no significant subclavian artery disease  Compared to previous study on 5/27/14 , there is no significant change in disease process  Internal carotid artery stenosis determination by consensus criteria from: Jessica Orantes et al  Carotid Artery Stenosis: Gray-Scale and Doppler US Diagnosis - Society of Radiologists in [address] [place] [address], Radiology 2003; [phone]    SIGNATURE: Electronically Signed by: Kyle Ocasio MD JOANNA,RVT on 2020-06-27 10:03:52 AM      Review of Systems:  Review of Systems   Constitutional: Positive for fatigue  HENT: Positive for dental problem and hearing loss  Respiratory: Positive for shortness of breath  Exertional CP   Cardiovascular: Negative for palpitations  Gastrointestinal: Negative  Genitourinary: Negative  Musculoskeletal: Negative  Neurological: Negative  Hematological: Negative  Psychiatric/Behavioral: Negative  Physical Exam:  Physical Exam   Constitutional: He is oriented to person, place, and time  He appears well-developed and well-nourished  HENT:   Head: Normocephalic  Eyes: Pupils are equal, round, and reactive to light  EOM are normal    Neck: Normal range of motion  Cardiovascular: Normal rate and regular rhythm  Ectopic beat, 1/2 right ANAYA, 2/6 left ICS flow murmur   Pulmonary/Chest: Effort normal and breath sounds normal    Abdominal: Soft  Bowel sounds are normal    obese   Musculoskeletal: Normal range of motion  He exhibits no edema  Neurological: He is alert and oriented to person, place, and time  Skin: Skin is warm and dry  Capillary refill takes less than 2 seconds  Psychiatric: He has a normal mood and affect  Vitals reviewed  Discussion/Summary:  1  Sp acute and subacute Punctuate CVA- Continues on Plavix 75mg daily, ASA 81mg daily, Mr Marilia Richmond is in agreement to undergo loop recorder placement to R/O atrial fibrillation/atrial flutter contributing to CVA  Loop recorder to be placed in near future  2  Hypotension- asymptomatic LUE sitting and RUE sitting 64/30, stop Amlodipine, decrease Lisinopril from 20mg BID to 10mg BID,  Follow up in our office at the end of the week for BP check  3  Hyperlipidemia 6/27/20 , TG 53, HDL 35, LDL 61- continue on Omega 3 fish oil, Lipitor 40mg daily    4  CKD IV baseline creat 2 09- 2 21  5   DM2 HgbA1C 7 6 done on 6/27/20

## 2020-07-15 NOTE — PATIENT INSTRUCTIONS
2gm sodium low fat low cholesterol diet, eating fresh is best, fresh fruit, fresh vegetables, lean protein    Stop Amlodipine  Decrease Lisinopril to 10mg twice a day  Plavix 75mg daily    Return for blood pressure check with your home BP monitor

## 2020-07-15 NOTE — H&P (VIEW-ONLY)
Cardiology Consultation     Justine Smith  [id number]  12/11/1930  HEART & VASCULAR [facility] [address] [id number]    1  TIA (transient ischemic attack)  POCT ECG   Mr Astrid Villagran was admitted to [facility] on 6/26 - 6/29/20 with with acute and subacute bilateral punctate CVA  Mr Astrid Garcia Presented to the emergency room with left hand numbness  He was at home and   Experience 30 minutes episode of left hand weakness   And slurred speech  This was noted by his nephew He was placed on the stroke pathway  Neurology was consulted  6/27/20  MRI of the brain showed subacute  Frontal lobe cortical infarcts, sequential chronic infarcts and mild micro angiopathy  [place] Carotid ultrasound showed <50% ICA bilaterally  6/29/20 TTE showed LVEF 55%, grade 1 DD, LA and RA mildly dilated, mild to mod TR  Neurology recommended Loop recorder as OP  He was discharged on dual Antiplatelet and high Lipitor  Creat on admission 2 51 and at discharge improved to 2 09  Mr Astrid Villagran presents to our office for a recent hospitalization follow up visit  He denies numbness in his hands or fingers, memory difficulties, CP,  Palpitations, lightheadedness, or dizziness  Denies family hx of CAD  Remote tobacco abuse quit in [address] use     HPI:  HTN  Hyperlipidemia 6/27/20 , TG 53, HDL 35, LDL 61  CKD IV baseline creat 2 09- 2 21  DM2 HgbA1C 7 6 done on 6/27/20  Patient Active Problem List   Diagnosis    Healthcare maintenance    Controlled diabetes mellitus ([facility] )    Enlarged prostate without lower urinary tract symptoms (luts)    Hyperlipidemia    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 (HCC)    Palpitations    Acute renal failure superimposed on stage 4 chronic kidney disease ([address] )    CKD stage 4 due to type 2 diabetes mellitus ([address] )    Overweight (BMI 25 0-29  9)    Secondary hyperparathyroidism of renal origin ([facility] )    Embolic bilateral punctate CVA, acute as well as subacute    Essential hypertension     Past Medical History:   Diagnosis Date    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)     Last Assessed:  5/19/17    Chronic kidney disease     Diabetes mellitus ([facility] )     Hyperlipidemia     Hypertension     Osteoarthritis     Palpitations     TIA (transient ischemic attack)      Social History     Socioeconomic History    Marital status: Single     Spouse name: Not on file    Number of children: Not on file    Years of education: Not on file    Highest education level: Not on file   Occupational History    Not on file   Social Needs    Financial resource strain: Not on file    Food insecurity:     Worry: Never true     Inability: Never true   [employer] needs:     Medical: No     Non-medical: No   Tobacco Use    Smoking status: Former Smoker    Smokeless tobacco: Never Used   Substance and Sexual Activity    Alcohol use: Yes     Comment: Social drinker    Drug use: No    Sexual activity: Not on file   Lifestyle    Physical activity:     Days per week: 3 days     Minutes per session: 20 min    Stress: Not on file   Relationships    Social connections:     Talks on phone: Not on file     Gets together: Not on file     Attends [employer] service: Not on file     Active member of club or organization: Not on file     Attends meetings of clubs or organizations: Not on file     Relationship status: Not on file    Intimate partner violence:     Fear of current or ex partner: Not on file     Emotionally abused: Not on file     Physically abused: Not on file     Forced sexual activity: Not on file   Other Topics Concern    Not on file   Social History Narrative    Daily coffee consumption (2 cups/day)      Family History   Problem Relation Age of Onset    Diabetes Mother     Other Mother         Stroke syndrome    Diabetes Father     Emphysema Father Past Surgical History:   Procedure Laterality Date    APPENDECTOMY      COLONOSCOPY  2012    HEMORROIDECTOMY         Current Outpatient Medications:     amLODIPine (NORVASC) 5 mg tablet, TAKE ONE TABLET BY MOUTH EVERY DAY, Disp: 90 tablet, Rfl: 3    aspirin 81 mg chewable tablet, Chew 1 tablet (81 mg total) daily, Disp: 30 tablet, Rfl: 0    atorvastatin (LIPITOR) 40 mg tablet, Take 1 tablet (40 mg total) by mouth daily with dinner, Disp: 30 tablet, Rfl: 0    Blood Glucose Monitoring Suppl (PRECISION XTRA MONITOR) MARY, by Does not apply route daily, Disp: 1 each, Rfl: 0    Cholecalciferol ([employer]S VITAMIN D3) 1000 units capsule, Take 1 capsule by mouth daily, Disp: , Rfl:     CINNAMON PO, Take by mouth, Disp: , Rfl:     clotrimazole-betamethasone (LOTRISONE) 1-0 05 % cream, Apply topically 2 (two) times a day (Patient taking differently: Apply topically as needed ), Disp: 30 g, Rfl: 0    cyanocobalamin (VITAMIN B-12) 500 mcg tablet, Take 1 tablet by mouth daily, Disp: , Rfl:     glimepiride (AMARYL) 2 mg tablet, TAKE ONE TABLET BY MOUTH EVERY DAY WITH BREAKFAST , Disp: 90 tablet, Rfl: 3    lisinopril (ZESTRIL) 20 mg tablet, TAKE ONE TABLET BY MOUTH TWICE A DAY, Disp: 180 tablet, Rfl: 3    Omega-3 Fatty Acids (OMEGA-3 FISH OIL) 300 MG CAPS, Take 1 capsule by mouth daily, Disp: , Rfl:     PRECISION XTRA TEST STRIPS test strip, USE TO TEST BLOOD GLUCOSE ONCE DAILY, Disp: 100 each, Rfl: 2    terazosin (HYTRIN) 1 mg capsule, TAKE ONE CAPSULE BY MOUTH AT BEDTIME, Disp: 90 capsule, Rfl: 3    clopidogrel (PLAVIX) 75 mg tablet, Take 1 tablet (75 mg total) by mouth daily for 18 doses (Patient not taking: Reported on 7/15/2020), Disp: 18 tablet, Rfl: 0  No Known Allergies  Vitals:    07/15/20 0932   BP: (!) 98/32   BP Location: Right arm   Patient Position: Sitting   Cuff Size: Standard   Pulse: 89   Temp: (!) 97 3 °F (36 3 °C)   SpO2: 96%   Weight: 83 9 kg (185 lb)   Height: 5' 11" (1 803 m) Labs:  Admission on 06/26/2020, Discharged on 06/29/2020   Component Date Value    POC Glucose 06/26/2020 158*    WBC 06/26/2020 7 51     RBC 06/26/2020 4 03     Hemoglobin 06/26/2020 12 5     Hematocrit 06/26/2020 37 6     MCV 06/26/2020 93     MCH 06/26/2020 31 0     MCHC 06/26/2020 33 2     RDW 06/26/2020 12 8     MPV 06/26/2020 10 0     Platelets 44/75/6344 185     nRBC 06/26/2020 0     Neutrophils Relative 06/26/2020 58     Immat GRANS % 06/26/2020 0     Lymphocytes Relative 06/26/2020 25     Monocytes Relative 06/26/2020 11     Eosinophils Relative 06/26/2020 5     Basophils Relative 06/26/2020 1     Neutrophils Absolute 06/26/2020 4 39     Immature Grans Absolute 06/26/2020 0 02     Lymphocytes Absolute 06/26/2020 1 85     Monocytes Absolute 06/26/2020 0 81     Eosinophils Absolute 06/26/2020 0 40     Basophils Absolute 06/26/2020 0 04     Sodium 06/26/2020 140     Potassium 06/26/2020 4 4     Chloride 06/26/2020 109*    CO2 06/26/2020 20*    ANION GAP 06/26/2020 11     BUN 06/26/2020 45*    Creatinine 06/26/2020 2 51*    Glucose 06/26/2020 175*    Calcium 06/26/2020 8 2*    eGFR 06/26/2020 22     Troponin I 06/26/2020 <0 02     Color, UA 06/27/2020 Yellow     Clarity, UA 06/27/2020 Clear     Specific Gravity, UA 06/27/2020 1 020     pH, UA 06/27/2020 6 0     Leukocytes, UA 06/27/2020 Negative     Nitrite, UA 06/27/2020 Negative     Protein, UA 06/27/2020 Negative     Glucose, UA 06/27/2020 100 (1/10%)*    Ketones, UA 06/27/2020 Negative     Urobilinogen, UA 06/27/2020 0 2     Bilirubin, UA 06/27/2020 Negative     Blood, UA 06/27/2020 Small*    POC Glucose 06/26/2020 243*    Cholesterol 06/27/2020 107     Triglycerides 06/27/2020 53     HDL, Direct 06/27/2020 35*    LDL Calculated 06/27/2020 61     Hemoglobin A1C 06/27/2020 7 6*    EAG 06/27/2020 171     Sodium 06/27/2020 141     Potassium 06/27/2020 4 4     Chloride 06/27/2020 111*    CO2 06/27/2020 22     ANION GAP 06/27/2020 8     BUN 06/27/2020 37*    Creatinine 06/27/2020 2 09*    Glucose 06/27/2020 119     Calcium 06/27/2020 8 1*    AST 06/27/2020 22     ALT 06/27/2020 16     Alkaline Phosphatase 06/27/2020 76     Total Protein 06/27/2020 6 8     Albumin 06/27/2020 3 4*    Total Bilirubin 06/27/2020 0 72     eGFR 06/27/2020 27     WBC 06/27/2020 6 47     RBC 06/27/2020 3 73*    Hemoglobin 06/27/2020 11 5*    Hematocrit 06/27/2020 34 9*    MCV 06/27/2020 94     MCH 06/27/2020 30 8     MCHC 06/27/2020 33 0     RDW 06/27/2020 13 0     Platelets 46/53/3191 171     MPV 06/27/2020 9 6     TSH 3RD GENERATON 06/27/2020 1 451     Vitamin B-12 06/27/2020 244     Folate 06/27/2020 16 5     Vitamin B1, Whole Blood 06/27/2020 78 5     Vit D, 1,25-Dihydroxy 06/27/2020 42 4     RBC, UA 06/27/2020 0-1*    WBC, UA 06/27/2020 0-1*    Epithelial Cells 06/27/2020 Occasional     Bacteria, UA 06/27/2020 Occasional     POC Glucose 06/27/2020 113     POC Glucose 06/27/2020 141*    POC Glucose 06/27/2020 155*    POC Glucose 06/27/2020 163*    POC Glucose 06/28/2020 116     POC Glucose 06/28/2020 183*    POC Glucose 06/28/2020 150*    POC Glucose 06/28/2020 130     Ventricular Rate 06/26/2020 86     Atrial Rate 06/26/2020 86     SC Interval 06/26/2020 176     QRSD Interval 06/26/2020 130     QT Interval 06/26/2020 378     QTC Interval 06/26/2020 452     P Axis 06/26/2020 64     QRS Axis 06/26/2020 -6     T Wave Axis 06/26/2020 48     POC Glucose 06/29/2020 125     POC Glucose 06/29/2020 146*   Orders Only on 06/08/2020   Component Date Value    Hemoglobin A1C 06/08/2020 8 2    Orders Only on 06/04/2020   Component Date Value    Right Eye Diabetic Retin* 06/04/2020 None     Left Eye Diabetic Retino* 06/04/2020 None      Imaging: Xr Chest 1 View Portable    Result Date: 6/26/2020  Narrative: CHEST INDICATION:   sob   COMPARISON:  7/27/2012 EXAM PERFORMED/VIEWS:  XR CHEST PORTABLE FINDINGS: Cardiomediastinal silhouette appears unremarkable  Minimal left base atelectasis  No pneumothorax or pleural effusion  Osseous structures appear within normal limits for patient age  Impression: Minimal left base atelectasis  Workstation performed: [id number]     Ct Head Without Contrast    Result Date: 6/26/2020  Narrative: CT BRAIN - WITHOUT CONTRAST INDICATION:   TIA, initial exam  COMPARISON:  None  TECHNIQUE:  CT examination of the brain was performed  In addition to axial images, coronal 2D reformatted images were created and submitted for interpretation  Radiation dose length product (DLP) for this visit:  879 mGy-cm   This examination, like all CT scans performed in the [facility], was performed utilizing techniques to minimize radiation dose exposure, including the use of iterative reconstruction and automated exposure control  IMAGE QUALITY:  Diagnostic  FINDINGS: PARENCHYMA: Decreased attenuation is noted in periventricular and subcortical white matter demonstrating an appearance that is statistically most likely to represent mild microangiopathic change  No CT signs of acute infarction  No intracranial mass, mass effect or midline shift  No acute parenchymal hemorrhage  VENTRICLES AND EXTRA-AXIAL SPACES:  Normal for the patient's age  VISUALIZED ORBITS AND PARANASAL SINUSES:  No acute abnormality involving the orbits  There is complete opacification of the left maxillary sinus, and moderate mucosal thickening in the right maxillary sinus, with intermixed calcifications, compatible with chronic sinusitis  No air-fluid levels  No fluid levels are seen  CALVARIUM AND EXTRACRANIAL SOFT TISSUES:  Normal      Impression: No acute intracranial abnormality   Workstation performed: [id number]     Mri Brain Wo Contrast    Result Date: 6/27/2020  Narrative: MRI BRAIN WITHOUT CONTRAST INDICATION: cva  COMPARISON:   Head CT 6/26/2020 TECHNIQUE:  Sagittal T1, axial T2, axial FLAIR, axial T1, axial [place] and axial diffusion imaging  IMAGE QUALITY:  Diagnostic  FINDINGS: BRAIN PARENCHYMA: A few punctuate cortical foci of mild restricted diffusion in the right frontal lobe (series 3 image 21-22) Age-related cerebral atrophy  No intracranial hemorrhage  Nonspecific foci of T2/FLAIR hyperintensities involving periventricular and subcortical white matter most likely representing mild microangiopathic change  Sequela of chronic cortical infarcts in the right frontal, parietal, and occipital lobes (series 5 image 24 and 19-20)  VENTRICLES:  Normal for the patient's age  SELLA AND PITUITARY GLAND:  Normal  ORBITS:  Normal  PARANASAL SINUSES:  Paranasal sinus disease  No fluid level VASCULATURE:  Evaluation of the major intracranial vasculature demonstrates appropriate flow voids  CALVARIUM AND SKULL BASE:  Normal  Left mastoid effusion  EXTRACRANIAL SOFT TISSUES:  Normal      Impression: 1  Subacute appearing tiny frontal lobe cortical infarcts  No significant edema or mass effect  No intracranial hemorrhage  2   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  3   Paranasal sinus disease and left mastoid effusion  I personally discussed this study with Beulah Rodas on 6/27/2020 at 11:22 AM  Workstation performed: [id number]     Vas Carotid Complete Study (*order Only If Cta Has Not Been Completed*)    Result Date: 6/27/2020  Narrative:  [facility] REPORT CLINICAL: Indications: The patient was experiencing left arm and hand weakness for one day  Operative History: appendectomy hemorrhiodectomy Risk Factors The patient has history of HTN, Diabetes (Yes) and CKD  He has no history of DVT or Recent Trauma  Clinical Right Pressure:  134/72 mm Hg  FINDINGS:  Right        Impression  PSV  EDV (cm/s)  Direction of Flow  Ratio  Dist  ICA                 66          14                      0 82  Mid  ICA                  86          15                      1 08  Prox   ICA 1 - 49%     104          18                      1 31  Dist CCA                  62           7                            Mid CCA                   80          12                      1 11  Prox CCA                  72          11                            Ext Carotid               98           5                      1 23  Prox Vert                 68          14  Antegrade                 Subclavian               183           0                             Left         Impression  PSV  EDV (cm/s)  Direction of Flow  Ratio  Dist  ICA                 62          13                      0 75  Mid  ICA                  88          15                      1 07  Prox  ICA    1 - 49%      72          14                      0 88  Dist CCA                  77          11                            Mid CCA                   82          14                      1 31  Prox CCA                  63          10                            Ext Carotid               94           7                      1 14  Prox Vert                 63          13  Antegrade                 Subclavian               165           0                               CONCLUSION:  Impression RIGHT: There is <50% stenosis noted in the internal carotid artery  Plaque is heterogenous and irregular  Vertebral artery flow is antegrade  There is no significant subclavian artery disease  LEFT: There is <50% stenosis noted in the internal carotid artery  Plaque is heterogenous and irregular  Vertebral artery flow is antegrade  There is no significant subclavian artery disease  Compared to previous study on 5/27/14 , there is no significant change in disease process  Internal carotid artery stenosis determination by consensus criteria from: Marguerite Butler et al  Carotid Artery Stenosis: Gray-Scale and Doppler US Diagnosis - Society of Radiologists in [address] [place] [address], Radiology 2003; [phone]    SIGNATURE: Electronically Signed by: Antonio Gonzalez MD JOANNA,RVT on 2020-06-27 10:03:52 AM      Review of Systems:  Review of Systems   Constitutional: Positive for fatigue  HENT: Positive for dental problem and hearing loss  Respiratory: Positive for shortness of breath  Exertional CP   Cardiovascular: Negative for palpitations  Gastrointestinal: Negative  Genitourinary: Negative  Musculoskeletal: Negative  Neurological: Negative  Hematological: Negative  Psychiatric/Behavioral: Negative  Physical Exam:  Physical Exam   Constitutional: He is oriented to person, place, and time  He appears well-developed and well-nourished  HENT:   Head: Normocephalic  Eyes: Pupils are equal, round, and reactive to light  EOM are normal    Neck: Normal range of motion  Cardiovascular: Normal rate and regular rhythm  Ectopic beat, 1/2 right ANAYA, 2/6 left ICS flow murmur   Pulmonary/Chest: Effort normal and breath sounds normal    Abdominal: Soft  Bowel sounds are normal    obese   Musculoskeletal: Normal range of motion  He exhibits no edema  Neurological: He is alert and oriented to person, place, and time  Skin: Skin is warm and dry  Capillary refill takes less than 2 seconds  Psychiatric: He has a normal mood and affect  Vitals reviewed  Discussion/Summary:  1  Sp acute and subacute Punctuate CVA- Continues on Plavix 75mg daily, ASA 81mg daily, Mr Tito Jensen is in agreement to undergo loop recorder placement to R/O atrial fibrillation/atrial flutter contributing to CVA  Loop recorder to be placed in near future  2  Hypotension- asymptomatic LUE sitting and RUE sitting 64/30, stop Amlodipine, decrease Lisinopril from 20mg BID to 10mg BID,  Follow up in our office at the end of the week for BP check  3  Hyperlipidemia 6/27/20 , TG 53, HDL 35, LDL 61- continue on Omega 3 fish oil, Lipitor 40mg daily    4  CKD IV baseline creat 2 09- 2 21  5   DM2 HgbA1C 7 6 done on 6/27/20

## 2020-07-17 ENCOUNTER — CLINICAL SUPPORT (OUTPATIENT)
Dept: CARDIOLOGY CLINIC | Facility: CLINIC | Age: 85
End: 2020-07-17
Payer: MEDICARE

## 2020-07-17 VITALS
SYSTOLIC BLOOD PRESSURE: 108 MMHG | TEMPERATURE: 98.2 F | BODY MASS INDEX: 25.9 KG/M2 | HEIGHT: 71 IN | HEART RATE: 94 BPM | WEIGHT: 185 LBS | DIASTOLIC BLOOD PRESSURE: 40 MMHG

## 2020-07-17 DIAGNOSIS — I12.9 BENIGN HYPERTENSION WITH CKD (CHRONIC KIDNEY DISEASE) STAGE IV (HCC): ICD-10-CM

## 2020-07-17 DIAGNOSIS — N18.4 BENIGN HYPERTENSION WITH CKD (CHRONIC KIDNEY DISEASE) STAGE IV (HCC): ICD-10-CM

## 2020-07-17 PROCEDURE — 3078F DIAST BP <80 MM HG: CPT | Performed by: INTERNAL MEDICINE

## 2020-07-17 PROCEDURE — 3008F BODY MASS INDEX DOCD: CPT | Performed by: INTERNAL MEDICINE

## 2020-07-17 PROCEDURE — 2022F DILAT RTA XM EVC RTNOPTHY: CPT | Performed by: INTERNAL MEDICINE

## 2020-07-17 PROCEDURE — 99211 OFF/OP EST MAY X REQ PHY/QHP: CPT | Performed by: INTERNAL MEDICINE

## 2020-07-17 PROCEDURE — 4040F PNEUMOC VAC/ADMIN/RCVD: CPT | Performed by: INTERNAL MEDICINE

## 2020-07-17 PROCEDURE — 1160F RVW MEDS BY RX/DR IN RCRD: CPT | Performed by: INTERNAL MEDICINE

## 2020-07-17 PROCEDURE — 3074F SYST BP LT 130 MM HG: CPT | Performed by: INTERNAL MEDICINE

## 2020-07-17 PROCEDURE — 1111F DSCHRG MED/CURRENT MED MERGE: CPT | Performed by: INTERNAL MEDICINE

## 2020-07-17 NOTE — PROGRESS NOTES
Mr Millan is here today under the direction of Severino Perez for a blood pressure check, he states no cardiac complaints, the doc in the box which was Dr Hercules Sportsman reviewed findings making no changes to his medical regimen and stating it was ok to check out

## 2020-07-21 ENCOUNTER — HOSPITAL ENCOUNTER (OUTPATIENT)
Dept: NON INVASIVE DIAGNOSTICS | Facility: HOSPITAL | Age: 85
Discharge: HOME | End: 2020-07-21
Payer: MEDICARE

## 2020-07-21 DIAGNOSIS — I63.40 CEREBROVASCULAR ACCIDENT (CVA) DUE TO EMBOLISM OF CEREBRAL ARTERY (HCC): ICD-10-CM

## 2020-07-21 DIAGNOSIS — E78.5 HYPERLIPIDEMIA, UNSPECIFIED HYPERLIPIDEMIA TYPE: ICD-10-CM

## 2020-07-21 PROCEDURE — 33285 INSJ SUBQ CAR RHYTHM MNTR: CPT

## 2020-07-21 PROCEDURE — 33285 INSJ SUBQ CAR RHYTHM MNTR: CPT | Performed by: INTERNAL MEDICINE

## 2020-07-21 PROCEDURE — C1764 EVENT RECORDER, CARDIAC: HCPCS

## 2020-07-21 RX ORDER — ATORVASTATIN CALCIUM 40 MG/1
40 TABLET, FILM COATED ORAL
Qty: 90 TABLET | Refills: 1 | Status: SHIPPED | OUTPATIENT
Start: 2020-07-21 | End: 2020-07-30 | Stop reason: SDUPTHER

## 2020-07-21 RX ORDER — LIDOCAINE HYDROCHLORIDE 10 MG/ML
INJECTION, SOLUTION EPIDURAL; INFILTRATION; INTRACAUDAL; PERINEURAL CODE/TRAUMA/SEDATION MEDICATION
Status: COMPLETED | OUTPATIENT
Start: 2020-07-21 | End: 2020-07-21

## 2020-07-21 RX ADMIN — LIDOCAINE HYDROCHLORIDE 20 ML: 10 INJECTION, SOLUTION EPIDURAL; INFILTRATION; INTRACAUDAL; PERINEURAL at 15:27

## 2020-07-21 NOTE — OP NOTE
PP  LINQ    History and physical were reviewed  Patient was examined and history was reviewed  No change in patient's condition Since history and physical has been completed        The pre- operative diagnosis:  Prior CVA  Hypertension  Hyperlipidemia  Diabetes mellitus  Chronic kidney disease stage 4     Proceed with LINQ implantation          Postoperative diagnosis:  Prior CVA  Hypertension  Hyperlipidemia  Diabetes mellitus  Chronic kidney disease stage 4     Proceed with LINQ implantation          Procedure:  LINQ        Surgeon: [id number] [address] -none    Specimens - none    Estimated blood loss-  5 ml    Findings-none    Complications none    Anesthesia-  local lidocaine by myself      Details of the device      Description of procedure: The patient was seen before the procedure  The details of the procedure was explained and patient agreed to the same  Appropriate consent was signed  The patient was brought to the electrophysiologic laboratory  Proper time out was done  Sterile dressing and draping was done  Local lidocaine was infiltrated, in the left second intercostal space, about 2 cm lateral to the sternal edge  Using the stab knife an incision was made  Thereafter the  was used, moved around to form a pocket, along the long axis of the heart, in the second intercostal space region  Then plunger was used to deploy the device  The plunger was disengaged and removed  The  was pulled back  Pressure was held and hemostasis was obtained  Dressing was done with Steri-Strips, Telfa and Tegaderm      Summary of the procedure: The patient came in to the laboratory for linq device placement   The device has been placed and patient tolerated the procedure well

## 2020-07-21 NOTE — DISCHARGE INSTRUCTIONS
Do not use lotions/powders/creams on incision  Remove outer bandage 48 hours after procedure - if present, leave underlying steri-strips in place, they will either fall off on their own or will be removed at 2 week follow up appointment  Please keep incision dry for the first first week - either keeping incision out of direct shower water or placing over the counter waterproof bandages over top when showering  Please call the office [phone] if you notice redness, swelling, bleeding, or drainage from incision or if you develop fevers  Cardiac Loop Recorder Insertion      WHAT YOU SHOULD KNOW:    A cardiac loop recorder is a device used to diagnose heart rhythm problems, such as a fast or irregular heartbeat  It is implanted in your left chest, just under the skin  The device records a pattern of your heart's rhythm, called an EKG  Your device records automatic EKGs, depending on how your caregiver programs it  You may also receive a handheld controller  You press a button on the controller when you have symptoms, such as dizziness, lightheadedness, or palpitations  The device will record an EKG at that moment  The recording can help your caregiver see if your symptoms may be caused by heart rhythm problems  Your caregiver will remove the device after it has collected enough data  You may need the device for up to 3 years  The procedure to remove the device is similar to the procedure used to implant it       AFTER YOU LEAVE:    Follow up with your cardiologist as directed: You will need to return in 1 to 2 weeks  Your cardiologist will check your incision  He may also program your device settings again  He will retrieve data from the device every 1 to 3 months with a monitor held over your skin  You may be able to transmit data from your device from home as well  You will do this by calling a number provided by your cardiologist, or as they have instructed you  Ask for information about this process   Write down your questions so you remember to ask them during your visits       Wound care: Keep loop recorder incision dry for one week  Do not use lotions/powders/creams on incision  Remove outer bandage 48 hours after procedure - leave underlying steri-strips in place, they will either fall off on their own or will be removed at 2 week follow up appointment  Please call the office if you notice redness, swelling, bleeding, or drainage from incision or if you develop fevers  After that first week, carefully wash your incision with soap and water  Keep the area clean and dry until it heals       Return to activity: If you received anesthesia, you will not be able to drive for 24 hours  Otherwise, most people can return to normal activities soon after the procedure  Your cardiologist may want to know if your work involves electrical current or high-voltage equipment  Ask about other electrical items that could interfere with your cardiac loop recorder       Contact your cardiologist if:   · You have a fever or chills  · Your wound is red, swollen, or draining pus  · You have questions or concerns about your condition or care  Seek care immediately or call 911 if:   · You feel weak, dizzy, or faint  · You lose consciousness  © 2014 [address] [employer] is for End User's use only and may not be sold, redistributed or otherwise used for commercial purposes  All illustrations and images included in CareNotes® are the copyrighted property of [employer]  or Navid Akhtar  The above information is an  only  It is not intended as medical advice for individual conditions or treatments  Talk to your doctor, nurse or pharmacist before following any medical regimen to see if it is safe and effective for you

## 2020-07-21 NOTE — INTERVAL H&P NOTE
Patient is a pleasant 80year old with prior CVA, essential hypertension, hyperlipidemia, EF of 55% per echo 6/2020, diabetes mellitus, and CKD stage IV  He presented to the [facility] last month due to left sided upper extremity weakness and was found to have acute and subacute bilateral punctate CVA  He was seen by neurology who recommended loop recorder implantation  He presents today to undergo this procedure  He denies any chest pain, shortness of breath, lightheadedness or dizziness  Only complain is fatigue that he contributes to the heat  There were no vitals taken for this visit

## 2020-07-24 ENCOUNTER — PATIENT OUTREACH (OUTPATIENT)
Dept: INTERNAL MEDICINE CLINIC | Facility: CLINIC | Age: 85
End: 2020-07-24

## 2020-07-24 NOTE — PROGRESS NOTES
Spoke with patient  He is doing well Had loop recorder placed on 7/21/20  Reviewed his follow up appointments and signs and symptoms of hypoglycemia  He has not had any hypoglycemia reactions in over 4 years  Weight today 185 pounds  FBS run 130's-170's  Patient feels his years of walking has kept him in good shape  No questions or concerns I will check back in two weeks

## 2020-07-30 ENCOUNTER — OFFICE VISIT (OUTPATIENT)
Dept: NEUROLOGY | Facility: CLINIC | Age: 85
End: 2020-07-30
Payer: MEDICARE

## 2020-07-30 VITALS
TEMPERATURE: 97.1 F | BODY MASS INDEX: 26.04 KG/M2 | WEIGHT: 186 LBS | DIASTOLIC BLOOD PRESSURE: 42 MMHG | HEIGHT: 71 IN | SYSTOLIC BLOOD PRESSURE: 112 MMHG

## 2020-07-30 DIAGNOSIS — E78.5 HYPERLIPIDEMIA, UNSPECIFIED HYPERLIPIDEMIA TYPE: ICD-10-CM

## 2020-07-30 DIAGNOSIS — I63.40 CEREBROVASCULAR ACCIDENT (CVA) DUE TO EMBOLISM OF CEREBRAL ARTERY (HCC): ICD-10-CM

## 2020-07-30 PROCEDURE — 3078F DIAST BP <80 MM HG: CPT | Performed by: PSYCHIATRY & NEUROLOGY

## 2020-07-30 PROCEDURE — 1111F DSCHRG MED/CURRENT MED MERGE: CPT | Performed by: PSYCHIATRY & NEUROLOGY

## 2020-07-30 PROCEDURE — 4040F PNEUMOC VAC/ADMIN/RCVD: CPT | Performed by: PSYCHIATRY & NEUROLOGY

## 2020-07-30 PROCEDURE — 1160F RVW MEDS BY RX/DR IN RCRD: CPT | Performed by: PSYCHIATRY & NEUROLOGY

## 2020-07-30 PROCEDURE — 1036F TOBACCO NON-USER: CPT | Performed by: PSYCHIATRY & NEUROLOGY

## 2020-07-30 PROCEDURE — 99215 OFFICE O/P EST HI 40 MIN: CPT | Performed by: PSYCHIATRY & NEUROLOGY

## 2020-07-30 PROCEDURE — 3008F BODY MASS INDEX DOCD: CPT | Performed by: PSYCHIATRY & NEUROLOGY

## 2020-07-30 PROCEDURE — 3074F SYST BP LT 130 MM HG: CPT | Performed by: PSYCHIATRY & NEUROLOGY

## 2020-07-30 PROCEDURE — 2022F DILAT RTA XM EVC RTNOPTHY: CPT | Performed by: PSYCHIATRY & NEUROLOGY

## 2020-07-30 RX ORDER — CLOPIDOGREL BISULFATE 75 MG/1
75 TABLET ORAL DAILY
Qty: 30 TABLET | Refills: 2 | Status: SHIPPED | OUTPATIENT
Start: 2020-07-30 | End: 2020-10-15

## 2020-07-30 RX ORDER — AMLODIPINE BESYLATE 5 MG/1
5 TABLET ORAL DAILY
COMMUNITY
End: 2021-02-10

## 2020-07-30 RX ORDER — ATORVASTATIN CALCIUM 40 MG/1
40 TABLET, FILM COATED ORAL
Qty: 90 TABLET | Refills: 1 | Status: SHIPPED | OUTPATIENT
Start: 2020-07-30 | End: 2020-10-29

## 2020-07-30 NOTE — PATIENT INSTRUCTIONS
Continue aspirin, plavix (clopidogrel) and atorvastatin     Things that we know are helpful for brain health   1) Exercise program: gradually increase your physical activity over time  Start small and be patient  Aerobic (cardio) activity is best but incorporate balance, strength and flexibility training as well    a  Try to get at least 30min 3 times per week   2) Diet: Mediterranean diet (colorful fruits and vegetables, olive oil, fish, whole grains, very little red meet is any), MIND diet, anti-inflammatory diet  a  Stay well hydrated: drink 6-8 glasses of water per day   b  What's good for your heart is good for your brain  c  Avoid high-salt foods   3) Sleep: aim for at least 7-8 hours per night   a   Avoid alcohol and medications like Benadryl (Tylenol PM) or other sedating drugs

## 2020-07-30 NOTE — PROGRESS NOTES
Patient: Leonard Mathew 12/11/1930, 80 y o , male  MRN: [id number]     Assessment and Plan:    1  Embolic bilateral punctate CVA, acute as well as subacute  Assessment & Plan:  Patient is a right-handed 80 y o gentleman with history of DM, CKD, and HTN  Presented with episode of left hand weakness noted by the patient when he went to  the mail  Lasted only a brief period of time and then resolved  No further recurrence of symptoms  No focal numbness or tingling, slurred speech, facial asymmetry  The patient was evaluated in the hospital and was found to have subacute frontal lobe cortical infarcts and sequelae of chronic infarcts with microangiopathy on MRI  No known history of A-fib or other arrhythmias and currently has a loop recorder  Carotid doppler and echo are essentially normal  Unable to obtain CTA of vessels given renal function  He was started on dual antiplatelet therapy and atorvastatin and was discharged home  Has been doing well since then  Episode likely represents embolic stroke given bilateral cortical infarcts, less likely TIA given MRI changes  Patient is to continue on dual antiplatelet therapy for a total of 3 months, continue atorvastatin daily, and is to follow up with neurology stroke clinic in 2 months  Orders:  -     clopidogrel (PLAVIX) 75 mg tablet; Take 1 tablet (75 mg total) by mouth daily for 90 doses  -     atorvastatin (LIPITOR) 40 mg tablet; Take 1 tablet (40 mg total) by mouth daily with dinner    2  Hyperlipidemia, unspecified hyperlipidemia type  -     atorvastatin (LIPITOR) 40 mg tablet; Take 1 tablet (40 mg total) by mouth daily with dinner      Chief Complaint: Hospital follow up for focal left hand weakness, stroke    HPI:     Chico Frederick is an 80 y o male, right handed, with history of DM, CKD, and HTN who was admitted at [address] [place] [address] from 6/26/2020  to 6/29/2020 for evaluation of left hand weakness      The patient reports that on 6/26/2020, he went to his mailbox to get his mail  He dropped a piece of mail on the ground and bent down to pick it up  When he tried to  the mail with his left hand, he realized that he had no strength in his left hand and was unable to pick it up  No other focal weakness  No vision, facial asymmetry, right arm or bilateral leg weakness, numbness, tingling  No headache  No dizziness or lightheadedness  Hand weakness lasted less than two hours and spontaneously resolved  He called his grandson after first noticing the symptoms  When his grandson arrived, he also noted that the patient had some slurred speech which resolved before he arrived at the hospital  As he arrived to the hospital outside of the window and symptoms were resolving, a code stroke was not called but TIA/Stroke workup was initiated  Initial CT of the head without contrast was negative for hemorrhage  MRI of the brain revealed subacute small frontal lobe chronic infarcts as well as scattered chronic infarcts and mild microangiopathy  Carotid doppler and echo were essentially normal  A CT head could not be obtained due to the patient's renal function in setting of chronic kidney disease  The patient was started on atorvastatin for secondary stroke prevention as well as dual antiplatelet therapy ASA and Plavix  He was discharged home in stable condition  Since being home, the patient reports that he has been feeling well  No recurrence of symptoms  He has since been evaluated by cardiology and has a loop recorder  He has not been taking the atorvastatin as it sounds like there was some confusion on how long he should be taking each of the new medications      PAST MEDICAL HISTORY  Past Medical History:   Diagnosis Date    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)     Last Assessed:  5/19/17    Chronic kidney disease     Diabetes mellitus ([address] )     Hyperlipidemia     Hypertension     Osteoarthritis     Palpitations     TIA (transient ischemic attack) PAST SURGICAL HISTORY  Past Surgical History:   Procedure Laterality Date    APPENDECTOMY      COLONOSCOPY  2012    HEMORROIDECTOMY         ALLERGIES:   No Known Allergies    CURRENT MEDICATIONS    Current Outpatient Medications:     amLODIPine (NORVASC) 5 mg tablet, Take 5 mg by mouth daily, Disp: , Rfl:     aspirin 81 mg chewable tablet, Chew 1 tablet (81 mg total) daily, Disp: 30 tablet, Rfl: 0    clopidogrel (PLAVIX) 75 mg tablet, Take 1 tablet (75 mg total) by mouth daily for 90 doses, Disp: 30 tablet, Rfl: 2    glimepiride (AMARYL) 2 mg tablet, TAKE ONE TABLET BY MOUTH EVERY DAY WITH BREAKFAST , Disp: 90 tablet, Rfl: 3    lisinopril (ZESTRIL) 10 mg tablet, Take 1 tablet (10 mg total) by mouth 2 (two) times a day, Disp: 60 tablet, Rfl: 3    atorvastatin (LIPITOR) 40 mg tablet, Take 1 tablet (40 mg total) by mouth daily with dinner, Disp: 90 tablet, Rfl: 1    Blood Glucose Monitoring Suppl (PRECISION XTRA MONITOR) MARY, by Does not apply route daily (Patient not taking: Reported on 7/30/2020), Disp: 1 each, Rfl: 0    Cholecalciferol (CVS VITAMIN D3) 1000 units capsule, Take 1 capsule by mouth daily, Disp: , Rfl:     CINNAMON PO, Take by mouth, Disp: , Rfl:     clotrimazole-betamethasone (LOTRISONE) 1-0 05 % cream, Apply topically 2 (two) times a day (Patient not taking: Reported on 7/17/2020), Disp: 30 g, Rfl: 0    cyanocobalamin (VITAMIN B-12) 500 mcg tablet, Take 1 tablet by mouth daily, Disp: , Rfl:     Omega-3 Fatty Acids (OMEGA-3 FISH OIL) 300 MG CAPS, Take 1 capsule by mouth daily, Disp: , Rfl:     PRECISION XTRA TEST STRIPS test strip, USE TO TEST BLOOD GLUCOSE ONCE DAILY (Patient not taking: Reported on 7/30/2020), Disp: 100 each, Rfl: 2    terazosin (HYTRIN) 1 mg capsule, TAKE ONE CAPSULE BY MOUTH AT BEDTIME, Disp: 90 capsule, Rfl: 3      SOCIAL HISTORY   reports that he has quit smoking  He has never used smokeless tobacco  He reports that he drinks alcohol   He reports that he does not use drugs  The patient currently lives alone  He is retired as a   His baseline functional status is good  and he is able to perform all ADLs  He does not utilize any assistive devices for ambulation  He is a former smoker, quit decades ago  FAMILY HISTORY      REVIEW OF SYSTEMS   Constitutional: Negative for fever, chills, diaphoresis, activity change and appetite change  HEENT: Negative for hearing loss, ear pain, facial swelling, neck pain, neck stiffness, tinnitus  Negative for ocular pain, discharge, redness and itching  Respiratory: Negative for apnea, chest tightness, shortness of breath, wheezing  Cardiovascular: Negative for chest pain, palpitations and leg swelling  Gastrointestinal: Negative for diarrhea, constipation and abdominal pain  Endocrine: Negative for cold intolerance and heat intolerance  Genitourinary: Negative for dysuria, urgency, frequency, hematuria, flank pain and difficulty urinating  Neurological: Negative for dizziness, seizures, syncope, facial asymmetry, speech difficulty, light-headedness, numbness and headaches  Hematological: Negative for adenopathy  Does not bruise/bleed easily  Psychiatric/Behavioral: Negative for behavioral problems  Otherwise complete review of systems is negative aside from what is mentioned above and in the HPI  PHYSICAL EXAMINATION  BP (!) 112/42   Temp (!) 97 1 °F (36 2 °C)   Ht 5' 11" (1 803 m)   Wt 84 4 kg (186 lb)   BMI 25 94 kg/m²   General Examination: In no apparent distress, well developed and well nourished, and cooperative   HEENT: Normocephalic, Atraumatic  Moist mucus membranes  Anicteric  PPC    CVS: Regular rate and rhythm  S1 S2 noted  No audible murmurs  No carotids bruits  Peripheral pulses palpable throughout   Lungs: Clear to auscultation bilaterally  No rales, rhonchi, wheezing  Ext: No edema   Psych: Normal mood and affect  Skin - No rash    Neurological Examination:   Mental Status:  The patient was awake, alert, attentive, oriented to person, place, and time  Recent and remote memory intact to conversation with no evidence of language dysfunction  Satisfactory fund of knowledge  Normal attention span and concentration  Cranial Nerves:   I: smell Not tested   II: visual fields Full to confrontation  Pupils equal, round, reactive to light with normal accomodation  Fundus: benign fundus  III,IV,VI: extraocular muscles EOMI, no nystagmus   V: masseter and pterygoid strength full  Sensation in the V1 through V3 distributions intact to pinprick and light touch bilaterally  VII: Face is symmetric with no weakness noted  VIII: Audition intact to finger rub bilaterally  IX/X: Uvula midline  Soft palate elevation symmetric  XI: Trapezius and SCM strength 5/5 B/L  XII: Tongue midline with no atrophy or fasciculations with appropriate movement  Motor Examination:   No pronator drift  Bulk: Normal  No atrophy Tone: Normal  Fasciculations: None  Deltoid Biceps Triceps WE   WF   FF IO     Right        4          5          5         5      5      5   5        Left           5         5          5          5      5     5   5                       IP        Quad   Ham     TA       Gastroc   Right      5            5          5         5                5  Left         5            5         5         5                5       Reflexes:                   Biceps Brachioradialis Triceps Patella Achilles Plantars   Right          2+            2+                  2+        2+       2+         Down   Left            2+             2+                 2+         2+       2+         Down     Clonus: None    Coordination: Patient able to perform normal finger-to-nose and heel to shin appropriately  Normal rapid alternating movements  Sensory: Normal sensation to light touch, pin prick and vibratory sensation throughout       Gait:normal stance and posture, normal stride length and arm swing, normal turn around  Labs:    Lab Results   Component Value Date    WBC 6 47 06/27/2020    HGB 11 5 (L) 06/27/2020    HCT 34 9 (L) 06/27/2020    MCV 94 06/27/2020     06/27/2020     Lab Results   Component Value Date     01/04/2016    SODIUM 141 06/27/2020    K 4 4 06/27/2020     (H) 06/27/2020    CO2 22 06/27/2020    ANIONGAP 7 01/04/2016    AGAP 8 06/27/2020    BUN 37 (H) 06/27/2020    CREATININE 2 09 (H) 06/27/2020    GLUC 119 06/27/2020    GLUF 150 (H) 02/13/2020    CALCIUM 8 1 (L) 06/27/2020    AST 22 06/27/2020    ALT 16 06/27/2020    ALKPHOS 76 06/27/2020    PROT 7 3 01/04/2016    TP 6 8 06/27/2020    BILITOT 0 91 01/04/2016    TBILI 0 72 06/27/2020    EGFR 27 06/27/2020        Lab Results   Component Value Date    HGBA1C 7 6 (H) 06/27/2020    TSH i: No results found for: TSH     Imaging reviewed personally by me: CT head, MRI brain        -----  Thank you for allowing me to participate in the care of this patient     Rell Rashid DO  Neurology PGY-1 Resident

## 2020-07-30 NOTE — ASSESSMENT & PLAN NOTE
Patient is a right-handed 80 y o gentleman with history of DM, CKD, and HTN  Presented with episode of left hand weakness noted by the patient when he went to  the mail  Lasted only a brief period of time and then resolved  No further recurrence of symptoms  No focal numbness or tingling, slurred speech, facial asymmetry  The patient was evaluated in the hospital and was found to have subacute frontal lobe cortical infarcts and sequelae of chronic infarcts with microangiopathy on MRI  No known history of A-fib or other arrhythmias and currently has a loop recorder  Carotid doppler and echo are essentially normal  Unable to obtain CTA of vessels given renal function  He was started on dual antiplatelet therapy and atorvastatin and was discharged home  Has been doing well since then  Episode likely represents embolic stroke given bilateral cortical infarcts, less likely TIA given MRI changes  Patient is to continue on dual antiplatelet therapy for a total of 3 months, continue atorvastatin daily, and is to follow up with neurology stroke clinic in 2 months

## 2020-08-06 ENCOUNTER — IN-CLINIC DEVICE VISIT (OUTPATIENT)
Dept: CARDIOLOGY CLINIC | Facility: CLINIC | Age: 85
End: 2020-08-06
Payer: MEDICARE

## 2020-08-06 DIAGNOSIS — Z95.818 PRESENCE OF CARDIAC DEVICE: Primary | ICD-10-CM

## 2020-08-06 PROCEDURE — 93291 INTERROG DEV EVAL SCRMS IP: CPT | Performed by: INTERNAL MEDICINE

## 2020-08-06 NOTE — PROGRESS NOTES
Results for orders placed or performed in visit on 08/06/20   Cardiac EP device report    Narrative    MDT LOOP/ ACTIVE SYSTEM IS MRI CONDITIONAL  WOUND CHECK: INCISION CLEAN AND DRY WITH EDGES APPROXIMATED; SUTURES REMOVED; WOUND CARE AND RESTRICTIONS REVIEWED WITH PATIENT  R:WAVES 0 32 mV  NO EPISODES  NORMAL DEVICE FUNCTION  WILL F/U REMOTELY   PL

## 2020-08-07 ENCOUNTER — PATIENT OUTREACH (OUTPATIENT)
Dept: INTERNAL MEDICINE CLINIC | Facility: CLINIC | Age: 85
End: 2020-08-07

## 2020-08-07 NOTE — PROGRESS NOTES
Cardiology Follow Up    Rebekah Alvarado  12/11/1930  [id number]  [facility]  [address] [address]  [address] [facility]  [phone]    1  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 ([address] )     2  Pure hypercholesterolemia     3  TIA (transient ischemic attack)         Interval History:  Patient is here for a follow-up visit  He has a prior history of hypertension, hyperlipidemia and TIA  He was hospitalized at the [facility] in June 2020  At that time,  MRI of the brain showed subacute frontal lobe cortical infarcts with micro angiopathy  Carotid Doppler showed noncritical disease  Loop recorder was recommended by Neurology at that time and was implanted July 21, 2020  Most recent interrogation of his device done 8/2020 demonstrated no significant arrhythmia  Echocardiogram done June 29, 2020 demonstrated preserved LV systolic function with mild LVH and mild biatrial cavity enlargement  There was mild to moderate tricuspid and pulmonic regurgitation  Patient has been well  He has had no chest pain or significant dyspnea  His vital signs are stable today  Patient Active Problem List   Diagnosis    Healthcare maintenance    Controlled diabetes mellitus ([address] )    Enlarged prostate without lower urinary tract symptoms (luts)    Hyperlipidemia    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 (HCC)    Palpitations    Acute renal failure superimposed on stage 4 chronic kidney disease (HCC)    CKD stage 4 due to type 2 diabetes mellitus ([address] )    Overweight (BMI 25 0-29  9)    Secondary hyperparathyroidism of renal origin ([address] )    Embolic bilateral punctate CVA, acute as well as subacute    Essential hypertension     Past Medical History:   Diagnosis Date    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)     Last Assessed:  5/19/17    Chronic kidney disease     Diabetes mellitus ([facility] )     Hyperlipidemia     Hypertension     Osteoarthritis     Palpitations     TIA (transient ischemic attack)      Social History     Socioeconomic History    Marital status: Single     Spouse name: Not on file    Number of children: Not on file    Years of education: Not on file    Highest education level: Not on file   Occupational History    Not on file   Social Needs    Financial resource strain: Not on file    Food insecurity     Worry: Never true     Inability: Never true    Transportation needs     Medical: No     Non-medical: No   Tobacco Use    Smoking status: Former Smoker    Smokeless tobacco: Never Used   Substance and Sexual Activity    Alcohol use: Yes     Frequency: Monthly or less     Comment: Social drinker    Drug use: No    Sexual activity: Not Currently   Lifestyle    Physical activity     Days per week: 3 days     Minutes per session: 20 min    Stress: Not on file   Relationships    Social connections     Talks on phone: Not on file     Gets together: Not on file     Attends L[employer] service: Not on file     Active member of club or organization: Not on file     Attends meetings of clubs or organizations: Not on file     Relationship status: Not on file    Intimate partner violence     Fear of current or ex partner: Not on file     Emotionally abused: Not on file     Physically abused: Not on file     Forced sexual activity: Not on file   Other Topics Concern    Not on file   Social History Narrative    Daily coffee consumption (2 cups/day)      Family History   Problem Relation Age of Onset    Diabetes Mother     Other Mother         Stroke syndrome    Diabetes Father     Emphysema Father      Past Surgical History:   Procedure Laterality Date    APPENDECTOMY      COLONOSCOPY  2012    HEMORROIDECTOMY         Current Outpatient Medications:     amLODIPine (NORVASC) 5 mg tablet, Take 5 mg by mouth daily, Disp: , Rfl:     aspirin 81 mg chewable tablet, Chew 1 tablet (81 mg total) daily, Disp: 30 tablet, Rfl: 0    atorvastatin (LIPITOR) 40 mg tablet, Take 1 tablet (40 mg total) by mouth daily with dinner, Disp: 90 tablet, Rfl: 1    Cholecalciferol (CVS VITAMIN D3) 1000 units capsule, Take 1 capsule by mouth daily, Disp: , Rfl:     CINNAMON PO, Take 1 capsule by mouth daily , Disp: , Rfl:     clopidogrel (PLAVIX) 75 mg tablet, Take 1 tablet (75 mg total) by mouth daily for 90 doses, Disp: 30 tablet, Rfl: 2    cyanocobalamin (VITAMIN B-12) 500 mcg tablet, Take 1 tablet by mouth daily, Disp: , Rfl:     glimepiride (AMARYL) 2 mg tablet, TAKE ONE TABLET BY MOUTH EVERY DAY WITH BREAKFAST , Disp: 90 tablet, Rfl: 3    lisinopril (ZESTRIL) 10 mg tablet, Take 1 tablet (10 mg total) by mouth 2 (two) times a day, Disp: 60 tablet, Rfl: 3    Omega-3 Fatty Acids (OMEGA-3 FISH OIL) 300 MG CAPS, Take 1 capsule by mouth daily, Disp: , Rfl:     terazosin (HYTRIN) 1 mg capsule, TAKE ONE CAPSULE BY MOUTH AT BEDTIME, Disp: 90 capsule, Rfl: 3    Blood Glucose Monitoring Suppl (PRECISION XTRA MONITOR) MARY, by Does not apply route daily (Patient not taking: Reported on 7/30/2020), Disp: 1 each, Rfl: 0    clotrimazole-betamethasone (LOTRISONE) 1-0 05 % cream, Apply topically 2 (two) times a day (Patient not taking: Reported on 7/17/2020), Disp: 30 g, Rfl: 0    PRECISION XTRA TEST STRIPS test strip, USE TO TEST BLOOD GLUCOSE ONCE DAILY (Patient not taking: Reported on 7/30/2020), Disp: 100 each, Rfl: 2  No Known Allergies    Labs:not applicable  Imaging: No results found  Review of Systems:  Review of Systems   All other systems reviewed and are negative  Physical Exam:  /58 (BP Location: Right arm, Patient Position: Sitting, Cuff Size: Standard)   Pulse 75   Temp 98 2 °F (36 8 °C)   Ht 5' 11" (1 803 m)   Wt 83 3 kg (183 lb 11 2 oz)   SpO2 95%   BMI 25 62 kg/m²   Physical Exam   Constitutional: He is oriented to person, place, and time   He appears well-developed  HENT:   Head: Normocephalic and atraumatic  Eyes: Pupils are equal, round, and reactive to light  Conjunctivae are normal    Neck: Normal range of motion  Neck supple  Cardiovascular: Normal rate and normal heart sounds  Pulmonary/Chest: Effort normal and breath sounds normal    Neurological: He is alert and oriented to person, place, and time  Skin: Skin is warm and dry  Vitals reviewed  Discussion/Summary:I will continue the patient's present medical regimen  The patient appears well compensated  I have asked the patient to call if there is a problem in the interim otherwise I will see the patient in six months time

## 2020-08-11 ENCOUNTER — APPOINTMENT (OUTPATIENT)
Dept: LAB | Facility: CLINIC | Age: 85
End: 2020-08-11
Payer: MEDICARE

## 2020-08-11 DIAGNOSIS — N18.4 BENIGN HYPERTENSION WITH CKD (CHRONIC KIDNEY DISEASE) STAGE IV (HCC): ICD-10-CM

## 2020-08-11 DIAGNOSIS — E11.22 CKD STAGE 4 DUE TO TYPE 2 DIABETES MELLITUS (HCC): ICD-10-CM

## 2020-08-11 DIAGNOSIS — Z00.00 HEALTHCARE MAINTENANCE: ICD-10-CM

## 2020-08-11 DIAGNOSIS — N18.4 CKD STAGE 4 DUE TO TYPE 2 DIABETES MELLITUS (HCC): ICD-10-CM

## 2020-08-11 DIAGNOSIS — I12.9 BENIGN HYPERTENSION WITH CKD (CHRONIC KIDNEY DISEASE) STAGE IV (HCC): ICD-10-CM

## 2020-08-11 DIAGNOSIS — E78.00 PURE HYPERCHOLESTEROLEMIA: ICD-10-CM

## 2020-08-11 LAB
ALBUMIN SERPL BCP-MCNC: 3.3 G/DL (ref 3.5–5)
ALP SERPL-CCNC: 73 U/L (ref 46–116)
ALT SERPL W P-5'-P-CCNC: 16 U/L (ref 12–78)
ANION GAP SERPL CALCULATED.3IONS-SCNC: 5 MMOL/L (ref 4–13)
AST SERPL W P-5'-P-CCNC: 13 U/L (ref 5–45)
BASOPHILS # BLD AUTO: 0.05 THOUSANDS/ΜL (ref 0–0.1)
BASOPHILS NFR BLD AUTO: 1 % (ref 0–1)
BILIRUB SERPL-MCNC: 0.69 MG/DL (ref 0.2–1)
BUN SERPL-MCNC: 30 MG/DL (ref 5–25)
CALCIUM SERPL-MCNC: 8.3 MG/DL (ref 8.3–10.1)
CHLORIDE SERPL-SCNC: 113 MMOL/L (ref 100–108)
CHOLEST SERPL-MCNC: 92 MG/DL (ref 50–200)
CO2 SERPL-SCNC: 24 MMOL/L (ref 21–32)
CREAT SERPL-MCNC: 2.11 MG/DL (ref 0.6–1.3)
CREAT UR-MCNC: 123 MG/DL
CREAT UR-MCNC: 129 MG/DL
EOSINOPHIL # BLD AUTO: 0.44 THOUSAND/ΜL (ref 0–0.61)
EOSINOPHIL NFR BLD AUTO: 7 % (ref 0–6)
ERYTHROCYTE [DISTWIDTH] IN BLOOD BY AUTOMATED COUNT: 13.9 % (ref 11.6–15.1)
EST. AVERAGE GLUCOSE BLD GHB EST-MCNC: 157 MG/DL
GFR SERPL CREATININE-BSD FRML MDRD: 27 ML/MIN/1.73SQ M
GLUCOSE P FAST SERPL-MCNC: 124 MG/DL (ref 65–99)
HBA1C MFR BLD: 7.1 %
HCT VFR BLD AUTO: 33.2 % (ref 36.5–49.3)
HDLC SERPL-MCNC: 31 MG/DL
HGB BLD-MCNC: 11 G/DL (ref 12–17)
IMM GRANULOCYTES # BLD AUTO: 0.02 THOUSAND/UL (ref 0–0.2)
IMM GRANULOCYTES NFR BLD AUTO: 0 % (ref 0–2)
LDLC SERPL CALC-MCNC: 48 MG/DL (ref 0–100)
LYMPHOCYTES # BLD AUTO: 1.57 THOUSANDS/ΜL (ref 0.6–4.47)
LYMPHOCYTES NFR BLD AUTO: 26 % (ref 14–44)
MCH RBC QN AUTO: 31.3 PG (ref 26.8–34.3)
MCHC RBC AUTO-ENTMCNC: 33.1 G/DL (ref 31.4–37.4)
MCV RBC AUTO: 94 FL (ref 82–98)
MICROALBUMIN UR-MCNC: 67.1 MG/L (ref 0–20)
MICROALBUMIN/CREAT 24H UR: 52 MG/G CREATININE (ref 0–30)
MONOCYTES # BLD AUTO: 0.71 THOUSAND/ΜL (ref 0.17–1.22)
MONOCYTES NFR BLD AUTO: 12 % (ref 4–12)
NEUTROPHILS # BLD AUTO: 3.2 THOUSANDS/ΜL (ref 1.85–7.62)
NEUTS SEG NFR BLD AUTO: 54 % (ref 43–75)
NONHDLC SERPL-MCNC: 61 MG/DL
NRBC BLD AUTO-RTO: 0 /100 WBCS
PHOSPHATE SERPL-MCNC: 2.6 MG/DL (ref 2.3–4.1)
PLATELET # BLD AUTO: 163 THOUSANDS/UL (ref 149–390)
PMV BLD AUTO: 10 FL (ref 8.9–12.7)
POTASSIUM SERPL-SCNC: 3.7 MMOL/L (ref 3.5–5.3)
PROT SERPL-MCNC: 6.6 G/DL (ref 6.4–8.2)
PROT UR-MCNC: 35 MG/DL
PROT/CREAT UR: 0.28 MG/G{CREAT} (ref 0–0.1)
PTH-INTACT SERPL-MCNC: 91.3 PG/ML (ref 18.4–80.1)
RBC # BLD AUTO: 3.52 MILLION/UL (ref 3.88–5.62)
SODIUM SERPL-SCNC: 142 MMOL/L (ref 136–145)
TRIGL SERPL-MCNC: 63 MG/DL
WBC # BLD AUTO: 5.99 THOUSAND/UL (ref 4.31–10.16)

## 2020-08-11 PROCEDURE — 82570 ASSAY OF URINE CREATININE: CPT

## 2020-08-11 PROCEDURE — 85025 COMPLETE CBC W/AUTO DIFF WBC: CPT

## 2020-08-11 PROCEDURE — 82570 ASSAY OF URINE CREATININE: CPT | Performed by: INTERNAL MEDICINE

## 2020-08-11 PROCEDURE — 84100 ASSAY OF PHOSPHORUS: CPT

## 2020-08-11 PROCEDURE — 83036 HEMOGLOBIN GLYCOSYLATED A1C: CPT

## 2020-08-11 PROCEDURE — 84156 ASSAY OF PROTEIN URINE: CPT

## 2020-08-11 PROCEDURE — 80061 LIPID PANEL: CPT

## 2020-08-11 PROCEDURE — 83970 ASSAY OF PARATHORMONE: CPT

## 2020-08-11 PROCEDURE — 80053 COMPREHEN METABOLIC PANEL: CPT

## 2020-08-11 PROCEDURE — 36415 COLL VENOUS BLD VENIPUNCTURE: CPT

## 2020-08-11 PROCEDURE — 82043 UR ALBUMIN QUANTITATIVE: CPT | Performed by: INTERNAL MEDICINE

## 2020-08-12 ENCOUNTER — OFFICE VISIT (OUTPATIENT)
Dept: CARDIOLOGY CLINIC | Facility: CLINIC | Age: 85
End: 2020-08-12
Payer: MEDICARE

## 2020-08-12 ENCOUNTER — PATIENT OUTREACH (OUTPATIENT)
Dept: INTERNAL MEDICINE CLINIC | Facility: CLINIC | Age: 85
End: 2020-08-12

## 2020-08-12 VITALS
SYSTOLIC BLOOD PRESSURE: 142 MMHG | TEMPERATURE: 98.2 F | BODY MASS INDEX: 25.72 KG/M2 | HEIGHT: 71 IN | HEART RATE: 75 BPM | OXYGEN SATURATION: 95 % | DIASTOLIC BLOOD PRESSURE: 58 MMHG | WEIGHT: 183.7 LBS

## 2020-08-12 DIAGNOSIS — N18.4 BENIGN HYPERTENSION WITH CKD (CHRONIC KIDNEY DISEASE) STAGE IV (HCC): Primary | ICD-10-CM

## 2020-08-12 DIAGNOSIS — E78.00 PURE HYPERCHOLESTEROLEMIA: ICD-10-CM

## 2020-08-12 DIAGNOSIS — I12.9 BENIGN HYPERTENSION WITH CKD (CHRONIC KIDNEY DISEASE) STAGE IV (HCC): Primary | ICD-10-CM

## 2020-08-12 DIAGNOSIS — G45.9 TIA (TRANSIENT ISCHEMIC ATTACK): ICD-10-CM

## 2020-08-12 PROCEDURE — 1036F TOBACCO NON-USER: CPT | Performed by: INTERNAL MEDICINE

## 2020-08-12 PROCEDURE — 4040F PNEUMOC VAC/ADMIN/RCVD: CPT | Performed by: INTERNAL MEDICINE

## 2020-08-12 PROCEDURE — 1160F RVW MEDS BY RX/DR IN RCRD: CPT | Performed by: INTERNAL MEDICINE

## 2020-08-12 PROCEDURE — 3077F SYST BP >= 140 MM HG: CPT | Performed by: INTERNAL MEDICINE

## 2020-08-12 PROCEDURE — 3008F BODY MASS INDEX DOCD: CPT | Performed by: INTERNAL MEDICINE

## 2020-08-12 PROCEDURE — 2022F DILAT RTA XM EVC RTNOPTHY: CPT | Performed by: INTERNAL MEDICINE

## 2020-08-12 PROCEDURE — 1111F DSCHRG MED/CURRENT MED MERGE: CPT | Performed by: INTERNAL MEDICINE

## 2020-08-12 PROCEDURE — 99214 OFFICE O/P EST MOD 30 MIN: CPT | Performed by: INTERNAL MEDICINE

## 2020-08-12 PROCEDURE — 3078F DIAST BP <80 MM HG: CPT | Performed by: INTERNAL MEDICINE

## 2020-08-12 NOTE — PROGRESS NOTES
Patient returned my call  Fasting Blood sugars 145-150  Weight today 182 pounds  He just saw podiatrist had a dead toenail  No other concerns or complaints  I will check back in 2-3 weeks

## 2020-08-24 ENCOUNTER — TELEPHONE (OUTPATIENT)
Dept: NEPHROLOGY | Facility: CLINIC | Age: 85
End: 2020-08-24

## 2020-08-24 NOTE — TELEPHONE ENCOUNTER
Left a message to schedule pt's follow up   The patient can be scheduled with Julio Cesar Perez or Ariel Campbell

## 2020-08-25 ENCOUNTER — TELEPHONE (OUTPATIENT)
Dept: OTHER | Facility: HOSPITAL | Age: 85
End: 2020-08-25

## 2020-08-25 NOTE — TELEPHONE ENCOUNTER
Recent blood test reviewed, renal function is stable with a creatinine of 2 1 and estimated GFR of 27    Please contact patient and arrange follow-up appointment with me, last time seen was on 03/06/2020 and he is supposed to come back for six-month follow-up    Thanks

## 2020-08-26 NOTE — TELEPHONE ENCOUNTER
Tried calling patient with no success  There is no answering machine to leave a message  I will keep trying throughout the day

## 2020-08-31 ENCOUNTER — PATIENT OUTREACH (OUTPATIENT)
Dept: INTERNAL MEDICINE CLINIC | Facility: CLINIC | Age: 85
End: 2020-08-31

## 2020-09-03 ENCOUNTER — PATIENT OUTREACH (OUTPATIENT)
Dept: INTERNAL MEDICINE CLINIC | Facility: CLINIC | Age: 85
End: 2020-09-03

## 2020-09-03 NOTE — PROGRESS NOTES
Spoke with Rahel Jaramillo he is doing well  Weight running 188-192 pounds, Fasting  Blood sugar 148-161  He does take his blood pressure at times 138-150/ 68-74  No complaints or concerns expressed today  He keep busy around the house with yard work  I will check back at end of BPCI episode

## 2020-09-23 ENCOUNTER — TELEPHONE (OUTPATIENT)
Dept: NEPHROLOGY | Facility: CLINIC | Age: 85
End: 2020-09-23

## 2020-09-23 NOTE — PROGRESS NOTES
FFICE FOLLOW UP - Nephrology   Toyin Steiner 80 y o  male MRN: [id number]       ASSESSMENT and PLAN:  Lula Vargas was seen today for follow-up and chronic kidney disease  Diagnoses and all orders for this visit:    CKD stage 4 due to type 2 diabetes mellitus ([facility] [address] )  -     Basic metabolic panel; Future  -     CBC and differential; Future  -     Magnesium; Future  -     Phosphorus; Future  -     PTH, intact; Future  -     Protein / creatinine ratio, urine;  Future    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 (HCC)    Acute renal failure superimposed on stage 4 chronic kidney disease, unspecified acute renal failure type (HCC)    Embolic bilateral punctate CVA, acute as well as subacute    Hyperlipidemia, unspecified hyperlipidemia type    Enlarged prostate without lower urinary tract symptoms (luts)    Diabetes mellitus due to underlying condition, controlled, with diabetic nephropathy, with long-term current use of insulin ([facility])      Chronic kidney disease IV:  Suspect secondary to hypertensive nephrosclerosis, arterial nephrosclerosis possible diabetes along with age-related nephron loss  -after review medical records baseline creatinine appears to be 2-2 2 dating back to 2018  -most recent creatinine on 08/11/2020 was 2 11 stable electrolytes and acid-base balance already reviewed with Dr Jamin Stoddard  -of note he already attended the Kidney Smart class in November 2018  -he continues to be on decided if he would want dialysis or not in the future if needed-will continue to inquired with each encounter  -avoid NSAIDs  -avoid hypotension to prevent decreased renal perfusion  -renal function remains stable-will have patient return in four months for ongoing Chronic Kidney Disease management updated blood work and urine studies with Dr Jamin Stoddard    Recent acute kidney injury:  Resolved  -Suspect secondary to hemodynamic compromise in the setting of embolic and loss of autoregulatory system with lisinopril  -creatinine on admission in June 2 51 and decreased back to baseline at 2 11  -nephrology was not consulted during admission    Hypertension with Chronic Kidney Disease IV:  /54 on arrival and decreased to 128/40 after about 10 minutes  -continues on lisinopril 10 mg 2 times daily, amlodipine 5 mg daily  -no change in current medications  -will continue to trend    Embolic CVA:  Follows neurology as outpatient  -status post loop recorder implant  -following EP and Cardiology  -on Plavix  -carotid Doppler which revealed less than 50% stenosis bilaterally and when compared to previous study in 2014 was no significant change in disease process    Hyperlipidemia:  Changed to Lipitor from Zocor with recent CVA  -for cardiology and primary team    Enlarged prostate  -patient denies urinary issues  -currently on Hytrin    Diabetes:  Per primary team  -last A1c on 08/11/2020 was 7 1%  -continue with good glucose control        Patient Instructions   All questions asked and answered  The patient has been instructed to call office at [phone] with any questions or concerns  The patient has been instructed to obtain prescribed blood work and urine studies prior to next appointment  Avoid NSAID products to include Motrin, Ibuprofen, Aleve, Naprosyn, or naproxen   Continue low-sodium diet  Return in 4 months for Chronic Kidney Disease follow-up updated blood work and urine studies        HPI: Fatoumata Livingston is a 80 y o  male who is here for Follow-up and Chronic Kidney Disease  Patient returns to office for CKD IV follow-up  He was last seen 03/06/2020 with Dr Gordon Mejia in renal function remained stable  Patient has completed Kidney Smart class 11/2018 and is undecided if he would want dialysis in the future if needed  Most recent blood work already reviewed by [facility] which remains stable with creatinine 2 11, potassium 3 7, sodium 142, phosphorus 2 6  Medication list reviewed    Of note, he was hospitalized 6/26/2020-6/29/2020 at the [facility] with subacute frontal lobe cortical infarcts with micro angiopathy  Creatinine on 06/26/2020 was elevated at 2 51 and was discharged with creatinine 2 09  Nephrology was not consulted  He underwent carotid Doppler which revealed less than 50% stenosis bilaterally and when compared to previous study in 2014 was no significant change in disease process  He underwent loop recorder implant on 07/21/2020 and most recent interrogation in August did not reveal significant arrhythmias  He was changed to Lipitor from Zocor  Patient is following with Cardiology and last seen on 08/12/2020  On discussion, he feels well  Denies chest pain, shortness of breath, dizziness, lightheadedness, nausea, vomiting, urinary issues, fevers, chills  No lower extremity edema  Following cardiology and neurology  ROS:   All the systems were reviewed and were negative except as documented on the HPI  Allergies: Patient has no known allergies      Medications:   Current Outpatient Medications:     atorvastatin (LIPITOR) 40 mg tablet, Take 1 tablet (40 mg total) by mouth daily with dinner, Disp: 90 tablet, Rfl: 1    Cholecalciferol (CVS VITAMIN D3) 1000 units capsule, Take 1 capsule by mouth daily, Disp: , Rfl:     CINNAMON PO, Take 1 capsule by mouth daily , Disp: , Rfl:     clopidogrel (PLAVIX) 75 mg tablet, Take 1 tablet (75 mg total) by mouth daily for 90 doses, Disp: 30 tablet, Rfl: 2    cyanocobalamin (VITAMIN B-12) 500 mcg tablet, Take 1 tablet by mouth daily, Disp: , Rfl:     glimepiride (AMARYL) 2 mg tablet, TAKE ONE TABLET BY MOUTH EVERY DAY WITH BREAKFAST , Disp: 90 tablet, Rfl: 3    lisinopril (ZESTRIL) 10 mg tablet, Take 1 tablet (10 mg total) by mouth 2 (two) times a day, Disp: 60 tablet, Rfl: 3    magnesium oxide (MAG-OX) 400 mg, Take 400 mg by mouth daily, Disp: , Rfl:     Omega-3 Fatty Acids (OMEGA-3 FISH OIL) 300 MG CAPS, Take 1 capsule by mouth daily, Disp: , Rfl:     terazosin (HYTRIN) 1 mg capsule, TAKE ONE CAPSULE BY MOUTH AT BEDTIME, Disp: 90 capsule, Rfl: 3    amLODIPine (NORVASC) 5 mg tablet, Take 5 mg by mouth daily, Disp: , Rfl:     aspirin 81 mg chewable tablet, Chew 1 tablet (81 mg total) daily (Patient not taking: Reported on 9/24/2020), Disp: 30 tablet, Rfl: 0    Blood Glucose Monitoring Suppl (PRECISION XTRA MONITOR) MARY, by Does not apply route daily (Patient not taking: Reported on 7/30/2020), Disp: 1 each, Rfl: 0    clotrimazole-betamethasone (LOTRISONE) 1-0 05 % cream, Apply topically 2 (two) times a day (Patient not taking: Reported on 7/17/2020), Disp: 30 g, Rfl: 0    PRECISION XTRA TEST STRIPS test strip, USE TO TEST BLOOD GLUCOSE ONCE DAILY (Patient not taking: Reported on 7/30/2020), Disp: 100 each, Rfl: 2    Past Medical History:   Diagnosis Date    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)     Last Assessed:  5/19/17    Chronic kidney disease     Diabetes mellitus ([facility] [address] )     Hyperlipidemia     Hypertension     Osteoarthritis     Palpitations     TIA (transient ischemic attack)      Past Surgical History:   Procedure Laterality Date    APPENDECTOMY      COLONOSCOPY  2012    HEMORROIDECTOMY       Family History   Problem Relation Age of Onset    Diabetes Mother     Other Mother         Stroke syndrome    Diabetes Father     Emphysema Father       reports that he has quit smoking  He has never used smokeless tobacco  He reports current alcohol use  He reports that he does not use drugs  Physical Exam:   Vitals:    09/24/20 1449 09/24/20 1457   BP: 148/54 (!) 128/40   BP Location: Left arm    Patient Position: Sitting    Cuff Size: Standard    Pulse: 72    Resp: 16    Temp: (!) 96 5 °F (35 8 °C)    TempSrc: Temporal    Weight: 84 4 kg (186 lb)    Height: 5' 11" (1 803 m)      Body mass index is 25 94 kg/m²      General: cooperative, in not acute distress  Eyes: conjunctivae pink, anicteric sclerae  ENT: lips and mucous membranes moist  Neck: supple, no JVD  Chest: clear breath sounds bilateral, no crackles, ronchus or wheezings  CVS: normal rate, regular rhythm  Abdomen: soft, non-tender, non-distended, normoactive bowel sounds  Back: no CVA tenderness  Extremities: no edema of both legs  Skin: no rash, warm and dry  Neuro: awake, alert, oriented      Lab Results:  Results for orders placed or performed in visit on 08/11/20   PTH, intact   Result Value Ref Range    PTH 91 3 (H) 18 4 - 80 1 pg/mL   Protein / creatinine ratio, urine   Result Value Ref Range    Creatinine, Ur 123 0 mg/dL    Protein Urine Random 35 mg/dL    Prot/Creat Ratio, Ur 0 28 (H) 0 00 - 0 10   Comprehensive metabolic panel   Result Value Ref Range    Sodium 142 136 - 145 mmol/L    Potassium 3 7 3 5 - 5 3 mmol/L    Chloride 113 (H) 100 - 108 mmol/L    CO2 24 21 - 32 mmol/L    ANION GAP 5 4 - 13 mmol/L    BUN 30 (H) 5 - 25 mg/dL    Creatinine 2 11 (H) 0 60 - 1 30 mg/dL    Glucose, Fasting 124 (H) 65 - 99 mg/dL    Calcium 8 3 8 3 - 10 1 mg/dL    AST 13 5 - 45 U/L    ALT 16 12 - 78 U/L    Alkaline Phosphatase 73 46 - 116 U/L    Total Protein 6 6 6 4 - 8 2 g/dL    Albumin 3 3 (L) 3 5 - 5 0 g/dL    Total Bilirubin 0 69 0 20 - 1 00 mg/dL    eGFR 27 ml/min/1 73sq m   CBC and differential   Result Value Ref Range    WBC 5 99 4 31 - 10 16 Thousand/uL    RBC 3 52 (L) 3 88 - 5 62 Million/uL    Hemoglobin 11 0 (L) 12 0 - 17 0 g/dL    Hematocrit 33 2 (L) 36 5 - 49 3 %    MCV 94 82 - 98 fL    MCH 31 3 26 8 - 34 3 pg    MCHC 33 1 31 4 - 37 4 g/dL    RDW 13 9 11 6 - 15 1 %    MPV 10 0 8 9 - 12 7 fL    Platelets 434 723 - 420 Thousands/uL    nRBC 0 /100 WBCs    Neutrophils Relative 54 43 - 75 %    Immat GRANS % 0 0 - 2 %    Lymphocytes Relative 26 14 - 44 %    Monocytes Relative 12 4 - 12 %    Eosinophils Relative 7 (H) 0 - 6 %    Basophils Relative 1 0 - 1 %    Neutrophils Absolute 3 20 1 85 - 7 62 Thousands/µL    Immature Grans Absolute 0 02 0 00 - 0 20 Thousand/uL Lymphocytes Absolute 1 57 0 60 - 4 47 Thousands/µL    Monocytes Absolute 0 71 0 17 - 1 22 Thousand/µL    Eosinophils Absolute 0 44 0 00 - 0 61 Thousand/µL    Basophils Absolute 0 05 0 00 - 0 10 Thousands/µL   Lipid panel   Result Value Ref Range    Cholesterol 92 50 - 200 mg/dL    Triglycerides 63 <=150 mg/dL    HDL, Direct 31 (L) >=40 mg/dL    LDL Calculated 48 0 - 100 mg/dL    Non-HDL-Chol (CHOL-HDL) 61 mg/dl   Hemoglobin A1C   Result Value Ref Range    Hemoglobin A1C 7 1 (H) Normal 3 8-5 6%; PreDiabetic 5 7-6 4%; Diabetic >=6 5%; Glycemic control for adults with diabetes <7 0% %     mg/dl   Phosphorus   Result Value Ref Range    Phosphorus 2 6 2 3 - 4 1 mg/dL               Portions of the record may have been created with voice recognition software  Occasional wrong word or "sound a like" substitutions may have occurred due to the inherent limitations of voice recognition software   Read the chart carefully and recognize,

## 2020-09-23 NOTE — PATIENT INSTRUCTIONS
All questions asked and answered  The patient has been instructed to call office at [phone] with any questions or concerns      The patient has been instructed to obtain prescribed blood work and urine studies prior to next appointment  Avoid NSAID products to include Motrin, Ibuprofen, Aleve, Naprosyn, or naproxen   Continue low-sodium diet  Return in 4 months for Chronic Kidney Disease follow-up updated blood work and urine studies

## 2020-09-24 ENCOUNTER — OFFICE VISIT (OUTPATIENT)
Dept: NEPHROLOGY | Facility: CLINIC | Age: 85
End: 2020-09-24
Payer: MEDICARE

## 2020-09-24 VITALS
WEIGHT: 186 LBS | RESPIRATION RATE: 16 BRPM | HEART RATE: 72 BPM | BODY MASS INDEX: 26.04 KG/M2 | DIASTOLIC BLOOD PRESSURE: 40 MMHG | TEMPERATURE: 96.5 F | SYSTOLIC BLOOD PRESSURE: 128 MMHG | HEIGHT: 71 IN

## 2020-09-24 DIAGNOSIS — N18.4 ACUTE RENAL FAILURE SUPERIMPOSED ON STAGE 4 CHRONIC KIDNEY DISEASE, UNSPECIFIED ACUTE RENAL FAILURE TYPE (HCC): ICD-10-CM

## 2020-09-24 DIAGNOSIS — N17.9 ACUTE RENAL FAILURE SUPERIMPOSED ON STAGE 4 CHRONIC KIDNEY DISEASE, UNSPECIFIED ACUTE RENAL FAILURE TYPE (HCC): ICD-10-CM

## 2020-09-24 DIAGNOSIS — N18.4 CKD STAGE 4 DUE TO TYPE 2 DIABETES MELLITUS (HCC): Primary | ICD-10-CM

## 2020-09-24 DIAGNOSIS — N40.0 ENLARGED PROSTATE WITHOUT LOWER URINARY TRACT SYMPTOMS (LUTS): ICD-10-CM

## 2020-09-24 DIAGNOSIS — E78.5 HYPERLIPIDEMIA, UNSPECIFIED HYPERLIPIDEMIA TYPE: ICD-10-CM

## 2020-09-24 DIAGNOSIS — I12.9 BENIGN HYPERTENSION WITH CKD (CHRONIC KIDNEY DISEASE) STAGE IV (HCC): ICD-10-CM

## 2020-09-24 DIAGNOSIS — I63.40 CEREBROVASCULAR ACCIDENT (CVA) DUE TO EMBOLISM OF CEREBRAL ARTERY (HCC): ICD-10-CM

## 2020-09-24 DIAGNOSIS — Z79.4 DIABETES MELLITUS DUE TO UNDERLYING CONDITION, CONTROLLED, WITH DIABETIC NEPHROPATHY, WITH LONG-TERM CURRENT USE OF INSULIN (HCC): ICD-10-CM

## 2020-09-24 DIAGNOSIS — E11.22 CKD STAGE 4 DUE TO TYPE 2 DIABETES MELLITUS (HCC): Primary | ICD-10-CM

## 2020-09-24 DIAGNOSIS — N18.4 BENIGN HYPERTENSION WITH CKD (CHRONIC KIDNEY DISEASE) STAGE IV (HCC): ICD-10-CM

## 2020-09-24 DIAGNOSIS — E08.21 DIABETES MELLITUS DUE TO UNDERLYING CONDITION, CONTROLLED, WITH DIABETIC NEPHROPATHY, WITH LONG-TERM CURRENT USE OF INSULIN (HCC): ICD-10-CM

## 2020-09-24 PROCEDURE — 99213 OFFICE O/P EST LOW 20 MIN: CPT | Performed by: NURSE PRACTITIONER

## 2020-09-25 ENCOUNTER — PATIENT OUTREACH (OUTPATIENT)
Dept: INTERNAL MEDICINE CLINIC | Facility: CLINIC | Age: 85
End: 2020-09-25

## 2020-09-25 NOTE — PROGRESS NOTES
Spoke with Katarina Ink he is doing well  He saw nephrology yesterday  No new orders   today it is running a little higher since off metformin  Weight 188 pounds that is normal range for him  I explained this was end of [facility] calling program  Reminded him of PCP appointment 10/15/20  He did not have any questions or concerns today

## 2020-09-28 ENCOUNTER — PATIENT OUTREACH (OUTPATIENT)
Dept: INTERNAL MEDICINE CLINIC | Facility: CLINIC | Age: 85
End: 2020-09-28

## 2020-10-15 ENCOUNTER — OFFICE VISIT (OUTPATIENT)
Dept: INTERNAL MEDICINE CLINIC | Facility: CLINIC | Age: 85
End: 2020-10-15
Payer: MEDICARE

## 2020-10-15 VITALS
TEMPERATURE: 97.7 F | HEIGHT: 71 IN | SYSTOLIC BLOOD PRESSURE: 140 MMHG | DIASTOLIC BLOOD PRESSURE: 70 MMHG | OXYGEN SATURATION: 96 % | BODY MASS INDEX: 26.74 KG/M2 | WEIGHT: 191 LBS | HEART RATE: 94 BPM

## 2020-10-15 DIAGNOSIS — N40.0 ENLARGED PROSTATE WITHOUT LOWER URINARY TRACT SYMPTOMS (LUTS): ICD-10-CM

## 2020-10-15 DIAGNOSIS — E11.22 CKD STAGE 4 DUE TO TYPE 2 DIABETES MELLITUS (HCC): ICD-10-CM

## 2020-10-15 DIAGNOSIS — I63.40 CEREBROVASCULAR ACCIDENT (CVA) DUE TO EMBOLISM OF CEREBRAL ARTERY (HCC): Primary | ICD-10-CM

## 2020-10-15 DIAGNOSIS — Z00.00 HEALTHCARE MAINTENANCE: ICD-10-CM

## 2020-10-15 DIAGNOSIS — I12.9 BENIGN HYPERTENSION WITH CKD (CHRONIC KIDNEY DISEASE) STAGE IV (HCC): ICD-10-CM

## 2020-10-15 DIAGNOSIS — E08.21 DIABETES MELLITUS DUE TO UNDERLYING CONDITION, CONTROLLED, WITH DIABETIC NEPHROPATHY, WITH LONG-TERM CURRENT USE OF INSULIN (HCC): ICD-10-CM

## 2020-10-15 DIAGNOSIS — Z23 ENCOUNTER FOR IMMUNIZATION: ICD-10-CM

## 2020-10-15 DIAGNOSIS — R00.2 PALPITATIONS: ICD-10-CM

## 2020-10-15 DIAGNOSIS — E66.3 OVERWEIGHT (BMI 25.0-29.9): Chronic | ICD-10-CM

## 2020-10-15 DIAGNOSIS — E78.5 HYPERLIPIDEMIA, UNSPECIFIED HYPERLIPIDEMIA TYPE: ICD-10-CM

## 2020-10-15 DIAGNOSIS — I10 ESSENTIAL HYPERTENSION: ICD-10-CM

## 2020-10-15 DIAGNOSIS — N18.4 BENIGN HYPERTENSION WITH CKD (CHRONIC KIDNEY DISEASE) STAGE IV (HCC): ICD-10-CM

## 2020-10-15 DIAGNOSIS — N18.4 CKD STAGE 4 DUE TO TYPE 2 DIABETES MELLITUS (HCC): ICD-10-CM

## 2020-10-15 DIAGNOSIS — Z79.4 DIABETES MELLITUS DUE TO UNDERLYING CONDITION, CONTROLLED, WITH DIABETIC NEPHROPATHY, WITH LONG-TERM CURRENT USE OF INSULIN (HCC): ICD-10-CM

## 2020-10-15 PROCEDURE — 90662 IIV NO PRSV INCREASED AG IM: CPT

## 2020-10-15 PROCEDURE — G0439 PPPS, SUBSEQ VISIT: HCPCS | Performed by: INTERNAL MEDICINE

## 2020-10-15 PROCEDURE — 99214 OFFICE O/P EST MOD 30 MIN: CPT | Performed by: INTERNAL MEDICINE

## 2020-10-15 PROCEDURE — G0008 ADMIN INFLUENZA VIRUS VAC: HCPCS

## 2020-10-15 RX ORDER — CLOPIDOGREL BISULFATE 75 MG/1
75 TABLET ORAL DAILY
Qty: 90 TABLET | Refills: 3 | Status: SHIPPED | OUTPATIENT
Start: 2020-10-15 | End: 2020-11-19 | Stop reason: SDUPTHER

## 2020-10-29 DIAGNOSIS — E78.5 HYPERLIPIDEMIA, UNSPECIFIED HYPERLIPIDEMIA TYPE: ICD-10-CM

## 2020-10-29 DIAGNOSIS — I63.40 CEREBROVASCULAR ACCIDENT (CVA) DUE TO EMBOLISM OF CEREBRAL ARTERY (HCC): ICD-10-CM

## 2020-10-29 RX ORDER — ATORVASTATIN CALCIUM 40 MG/1
40 TABLET, FILM COATED ORAL
Qty: 90 TABLET | Refills: 1 | Status: SHIPPED | OUTPATIENT
Start: 2020-10-29 | End: 2021-05-20 | Stop reason: SDUPTHER

## 2020-11-07 DIAGNOSIS — N18.4 CKD (CHRONIC KIDNEY DISEASE), STAGE IV (HCC): ICD-10-CM

## 2020-11-09 RX ORDER — LISINOPRIL 10 MG/1
TABLET ORAL
Qty: 60 TABLET | Refills: 3 | Status: SHIPPED | OUTPATIENT
Start: 2020-11-09 | End: 2020-12-02 | Stop reason: SDUPTHER

## 2020-11-10 ENCOUNTER — REMOTE DEVICE CLINIC VISIT (OUTPATIENT)
Dept: CARDIOLOGY CLINIC | Facility: CLINIC | Age: 85
End: 2020-11-10
Payer: MEDICARE

## 2020-11-10 DIAGNOSIS — Z95.818 PRESENCE OF OTHER CARDIAC IMPLANTS AND GRAFTS: Primary | ICD-10-CM

## 2020-11-10 PROCEDURE — 93298 REM INTERROG DEV EVAL SCRMS: CPT | Performed by: INTERNAL MEDICINE

## 2020-11-10 PROCEDURE — G2066 INTER DEVC REMOTE 30D: HCPCS | Performed by: INTERNAL MEDICINE

## 2020-11-19 DIAGNOSIS — N40.0 BENIGN PROSTATIC HYPERPLASIA, UNSPECIFIED WHETHER LOWER URINARY TRACT SYMPTOMS PRESENT: ICD-10-CM

## 2020-11-19 DIAGNOSIS — E11.22 CONTROLLED TYPE 2 DIABETES MELLITUS WITH STAGE 3 CHRONIC KIDNEY DISEASE, WITHOUT LONG-TERM CURRENT USE OF INSULIN (HCC): ICD-10-CM

## 2020-11-19 DIAGNOSIS — N18.30 CONTROLLED TYPE 2 DIABETES MELLITUS WITH STAGE 3 CHRONIC KIDNEY DISEASE, WITHOUT LONG-TERM CURRENT USE OF INSULIN (HCC): ICD-10-CM

## 2020-11-19 DIAGNOSIS — I63.40 CEREBROVASCULAR ACCIDENT (CVA) DUE TO EMBOLISM OF CEREBRAL ARTERY (HCC): ICD-10-CM

## 2020-11-19 RX ORDER — TERAZOSIN 1 MG/1
1 CAPSULE ORAL
Qty: 90 CAPSULE | Refills: 3 | Status: SHIPPED | OUTPATIENT
Start: 2020-11-19 | End: 2021-04-28

## 2020-11-19 RX ORDER — CLOPIDOGREL BISULFATE 75 MG/1
75 TABLET ORAL DAILY
Qty: 90 TABLET | Refills: 3 | Status: SHIPPED | OUTPATIENT
Start: 2020-11-19 | End: 2021-09-21

## 2020-11-19 RX ORDER — GLIMEPIRIDE 2 MG/1
2 TABLET ORAL
Qty: 90 TABLET | Refills: 3 | Status: SHIPPED | OUTPATIENT
Start: 2020-11-19 | End: 2021-10-25

## 2020-11-23 DIAGNOSIS — E78.5 HYPERLIPIDEMIA, UNSPECIFIED HYPERLIPIDEMIA TYPE: ICD-10-CM

## 2020-11-23 DIAGNOSIS — I63.40 CEREBROVASCULAR ACCIDENT (CVA) DUE TO EMBOLISM OF CEREBRAL ARTERY (HCC): ICD-10-CM

## 2020-11-23 RX ORDER — AMLODIPINE BESYLATE 5 MG/1
5 TABLET ORAL DAILY
Qty: 30 TABLET | Refills: 3 | OUTPATIENT
Start: 2020-11-23

## 2020-11-23 RX ORDER — ATORVASTATIN CALCIUM 40 MG/1
40 TABLET, FILM COATED ORAL
Qty: 90 TABLET | Refills: 1 | OUTPATIENT
Start: 2020-11-23

## 2020-11-27 DIAGNOSIS — E11.9 TYPE 2 DIABETES MELLITUS WITHOUT COMPLICATION, WITHOUT LONG-TERM CURRENT USE OF INSULIN (HCC): ICD-10-CM

## 2020-11-27 RX ORDER — BLOOD SUGAR DIAGNOSTIC
STRIP MISCELLANEOUS
Qty: 100 EACH | Refills: 2 | Status: SHIPPED | OUTPATIENT
Start: 2020-11-27 | End: 2021-08-20

## 2020-12-02 ENCOUNTER — TELEPHONE (OUTPATIENT)
Dept: NEPHROLOGY | Facility: CLINIC | Age: 85
End: 2020-12-02

## 2020-12-02 DIAGNOSIS — N18.4 CKD (CHRONIC KIDNEY DISEASE), STAGE IV (HCC): ICD-10-CM

## 2020-12-02 RX ORDER — LISINOPRIL 10 MG/1
10 TABLET ORAL 2 TIMES DAILY
Qty: 180 TABLET | Refills: 0 | Status: SHIPPED | OUTPATIENT
Start: 2020-12-02 | End: 2021-03-22

## 2021-01-08 ENCOUNTER — LAB (OUTPATIENT)
Dept: LAB | Facility: CLINIC | Age: 86
End: 2021-01-08
Payer: MEDICARE

## 2021-01-08 DIAGNOSIS — E08.21 DIABETES MELLITUS DUE TO UNDERLYING CONDITION, CONTROLLED, WITH DIABETIC NEPHROPATHY, WITH LONG-TERM CURRENT USE OF INSULIN (HCC): ICD-10-CM

## 2021-01-08 DIAGNOSIS — I10 ESSENTIAL HYPERTENSION: ICD-10-CM

## 2021-01-08 DIAGNOSIS — Z79.4 DIABETES MELLITUS DUE TO UNDERLYING CONDITION, CONTROLLED, WITH DIABETIC NEPHROPATHY, WITH LONG-TERM CURRENT USE OF INSULIN (HCC): ICD-10-CM

## 2021-01-08 DIAGNOSIS — N18.4 CKD STAGE 4 DUE TO TYPE 2 DIABETES MELLITUS (HCC): ICD-10-CM

## 2021-01-08 DIAGNOSIS — E11.22 CKD STAGE 4 DUE TO TYPE 2 DIABETES MELLITUS (HCC): ICD-10-CM

## 2021-01-08 LAB
ANION GAP SERPL CALCULATED.3IONS-SCNC: 3 MMOL/L (ref 4–13)
BASOPHILS # BLD AUTO: 0.05 THOUSANDS/ΜL (ref 0–0.1)
BASOPHILS NFR BLD AUTO: 1 % (ref 0–1)
BUN SERPL-MCNC: 35 MG/DL (ref 5–25)
CALCIUM SERPL-MCNC: 8.9 MG/DL (ref 8.3–10.1)
CHLORIDE SERPL-SCNC: 110 MMOL/L (ref 100–108)
CO2 SERPL-SCNC: 26 MMOL/L (ref 21–32)
CREAT SERPL-MCNC: 1.88 MG/DL (ref 0.6–1.3)
CREAT UR-MCNC: 86.8 MG/DL
EOSINOPHIL # BLD AUTO: 0.54 THOUSAND/ΜL (ref 0–0.61)
EOSINOPHIL NFR BLD AUTO: 7 % (ref 0–6)
ERYTHROCYTE [DISTWIDTH] IN BLOOD BY AUTOMATED COUNT: 13.5 % (ref 11.6–15.1)
EST. AVERAGE GLUCOSE BLD GHB EST-MCNC: 169 MG/DL
GFR SERPL CREATININE-BSD FRML MDRD: 31 ML/MIN/1.73SQ M
GLUCOSE P FAST SERPL-MCNC: 140 MG/DL (ref 65–99)
HBA1C MFR BLD: 7.5 %
HCT VFR BLD AUTO: 39.6 % (ref 36.5–49.3)
HGB BLD-MCNC: 12.3 G/DL (ref 12–17)
IMM GRANULOCYTES # BLD AUTO: 0.03 THOUSAND/UL (ref 0–0.2)
IMM GRANULOCYTES NFR BLD AUTO: 0 % (ref 0–2)
LYMPHOCYTES # BLD AUTO: 2.06 THOUSANDS/ΜL (ref 0.6–4.47)
LYMPHOCYTES NFR BLD AUTO: 27 % (ref 14–44)
MAGNESIUM SERPL-MCNC: 2 MG/DL (ref 1.6–2.6)
MCH RBC QN AUTO: 29.6 PG (ref 26.8–34.3)
MCHC RBC AUTO-ENTMCNC: 31.1 G/DL (ref 31.4–37.4)
MCV RBC AUTO: 95 FL (ref 82–98)
MONOCYTES # BLD AUTO: 0.74 THOUSAND/ΜL (ref 0.17–1.22)
MONOCYTES NFR BLD AUTO: 10 % (ref 4–12)
NEUTROPHILS # BLD AUTO: 4.33 THOUSANDS/ΜL (ref 1.85–7.62)
NEUTS SEG NFR BLD AUTO: 55 % (ref 43–75)
NRBC BLD AUTO-RTO: 0 /100 WBCS
PHOSPHATE SERPL-MCNC: 2.6 MG/DL (ref 2.3–4.1)
PLATELET # BLD AUTO: 177 THOUSANDS/UL (ref 149–390)
PMV BLD AUTO: 9.9 FL (ref 8.9–12.7)
POTASSIUM SERPL-SCNC: 4.7 MMOL/L (ref 3.5–5.3)
PROT UR-MCNC: 33 MG/DL
PROT/CREAT UR: 0.38 MG/G{CREAT} (ref 0–0.1)
PTH-INTACT SERPL-MCNC: 91.2 PG/ML (ref 18.4–80.1)
RBC # BLD AUTO: 4.16 MILLION/UL (ref 3.88–5.62)
SODIUM SERPL-SCNC: 139 MMOL/L (ref 136–145)
WBC # BLD AUTO: 7.75 THOUSAND/UL (ref 4.31–10.16)

## 2021-01-08 PROCEDURE — 83735 ASSAY OF MAGNESIUM: CPT

## 2021-01-08 PROCEDURE — 80048 BASIC METABOLIC PNL TOTAL CA: CPT

## 2021-01-08 PROCEDURE — 85025 COMPLETE CBC W/AUTO DIFF WBC: CPT

## 2021-01-08 PROCEDURE — 83036 HEMOGLOBIN GLYCOSYLATED A1C: CPT

## 2021-01-08 PROCEDURE — 83970 ASSAY OF PARATHORMONE: CPT

## 2021-01-08 PROCEDURE — 36415 COLL VENOUS BLD VENIPUNCTURE: CPT

## 2021-01-08 PROCEDURE — 84100 ASSAY OF PHOSPHORUS: CPT

## 2021-01-08 PROCEDURE — 82570 ASSAY OF URINE CREATININE: CPT

## 2021-01-08 PROCEDURE — 84156 ASSAY OF PROTEIN URINE: CPT

## 2021-01-15 ENCOUNTER — OFFICE VISIT (OUTPATIENT)
Dept: NEPHROLOGY | Facility: CLINIC | Age: 86
End: 2021-01-15
Payer: MEDICARE

## 2021-01-15 VITALS
SYSTOLIC BLOOD PRESSURE: 138 MMHG | BODY MASS INDEX: 26.88 KG/M2 | TEMPERATURE: 98 F | RESPIRATION RATE: 16 BRPM | HEIGHT: 71 IN | DIASTOLIC BLOOD PRESSURE: 78 MMHG | WEIGHT: 192 LBS | HEART RATE: 74 BPM

## 2021-01-15 DIAGNOSIS — E11.22 CKD STAGE 4 DUE TO TYPE 2 DIABETES MELLITUS (HCC): Primary | ICD-10-CM

## 2021-01-15 DIAGNOSIS — N25.81 SECONDARY HYPERPARATHYROIDISM OF RENAL ORIGIN (HCC): ICD-10-CM

## 2021-01-15 DIAGNOSIS — I63.40 CEREBROVASCULAR ACCIDENT (CVA) DUE TO EMBOLISM OF CEREBRAL ARTERY (HCC): ICD-10-CM

## 2021-01-15 DIAGNOSIS — N18.4 CKD STAGE 4 DUE TO TYPE 2 DIABETES MELLITUS (HCC): Primary | ICD-10-CM

## 2021-01-15 DIAGNOSIS — I12.9 BENIGN HYPERTENSION WITH CKD (CHRONIC KIDNEY DISEASE) STAGE IV (HCC): ICD-10-CM

## 2021-01-15 DIAGNOSIS — N18.4 BENIGN HYPERTENSION WITH CKD (CHRONIC KIDNEY DISEASE) STAGE IV (HCC): ICD-10-CM

## 2021-01-15 PROBLEM — N17.9 ACUTE RENAL FAILURE SUPERIMPOSED ON STAGE 4 CHRONIC KIDNEY DISEASE (HCC): Status: RESOLVED | Noted: 2018-09-27 | Resolved: 2021-01-15

## 2021-01-15 PROCEDURE — 99214 OFFICE O/P EST MOD 30 MIN: CPT | Performed by: INTERNAL MEDICINE

## 2021-01-15 NOTE — PATIENT INSTRUCTIONS
Based on your most recent blood test your kidney function remains stable  I would like to see you back in the office in 4-6 months with another blood and urine test   Remember to follow low-salt diet  Avoid NSAIDs (no ibuprofen, Motrin, Advil, Aleve naproxen)  Okay to take Tylenol or paracetamol or acetaminophen needed for pain  Continue with regular follow-up primary care doctor  As we discussed in the office visit, recommend to review your living will and it would be very important that you add your wishes regarding dialysis on it

## 2021-01-15 NOTE — PROGRESS NOTES
OFFICE FOLLOW UP - Nephrology   Oscar Tovar 80 y o  male MRN: [id number]       ASSESSMENT and PLAN:  Amilcar Camejo was seen today for chronic kidney disease and follow-up  Diagnoses and all orders for this visit:    CKD stage 4 due to type 2 diabetes mellitus ([facility] [address] )  -     CBC; Future  -     PTH, intact; Future  -     Renal function panel; Future  -     Protein / creatinine ratio, urine; Future    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 (HCC)    Secondary hyperparathyroidism of renal origin ([facility] [address] )    Embolic bilateral punctate CVA, acute as well as subacute           This is an 51-year-old gentleman with known history of chronic kidney disease stage 4 who returns to the office for follow-up  1   Chronic kidney disease stage 4, baseline creatinine around 1 9 to 2 2 since 2018  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, atherosclerotic disease, possible diabetes as well as age-related nephron loss  Renal function stable, most recent creatinine at 1 88 with an eGFR 31 with minimal proteinuria  He went to [facility] class 11/2018  At this moment history decide about dialysis in the future if needed or not  He states he has a living will, suggest to clarify his living will regarding dialysis  Again advised to avoid NSAIDs  Stay well-hydrated  Follow low-salt diet  I would like to see him back in the office in 4-6 months with repeat blood and urine test     2  Controlled type 2 diabetes, most recent A1c 7 5% on 01/08/2021,   Management as per primary care doctor  Hemoglobin A1c at goal given his age    1  Hypertension, blood pressure well controlled, on amlodipine and lisinopril  4   Hemoglobin normal    5  Mineral bone disease, PTH and phosphorus acceptable for his degreee of kidney disease  Follow labs in 6 months  6  Hyperlipidemia, continue with statins, management as per primary doctor          Patient Instructions   Based on your most recent blood test your kidney function remains stable  I would like to see you back in the office in 4-6 months with another blood and urine test   Remember to follow low-salt diet  Avoid NSAIDs (no ibuprofen, Motrin, Advil, Aleve naproxen)  Okay to take Tylenol or paracetamol or acetaminophen needed for pain  Continue with regular follow-up primary care doctor  As we discussed in the office visit, recommend to review your living will and it would be very important that you add your wishes regarding dialysis on it  HPI: Moe Mcnulty is a 80 y o  male who is here for Chronic Kidney Disease and Follow-up    Last time seen in our office was in 09/2020 by our nurse practitioner, today returns for follow-up  Since our last visit, there has been no ER visits or hospitilizations  Patient currently has no complaints at this time and is feeling well  Patient denies any chest pain, shortness of breath or leg swelling  No abdominal pain, no nausea or diarrhea, no constipation  Appetite is stable, weight fairly stable  Denies any urinary problems, nocturia 1-2 per night  He does not smoke, he quit smoking on 1981  Denies NSAID use  The last blood work was done on 01/08/2021, which we have reviewed together  Most recent serum creatinine 1 88 with an eGFR 31, Hgb 12 3, PTH 91, UPC 0 38  ROS: All the systems were reviewed and were negative except as documented on the H&P  Allergies: Patient has no known allergies      Medications:   Current Outpatient Medications:     aspirin 81 mg chewable tablet, Chew 1 tablet (81 mg total) daily, Disp: 30 tablet, Rfl: 0    atorvastatin (LIPITOR) 40 mg tablet, Take 1 tablet (40 mg total) by mouth daily with dinner, Disp: 90 tablet, Rfl: 1    Cholecalciferol (CVS VITAMIN D3) 1000 units capsule, Take 1 capsule by mouth daily, Disp: , Rfl:     CINNAMON PO, Take 1 capsule by mouth daily , Disp: , Rfl:     clopidogrel (PLAVIX) 75 mg tablet, Take 1 tablet (75 mg total) by mouth daily, Disp: 90 tablet, Rfl: 3    cyanocobalamin (VITAMIN B-12) 500 mcg tablet, Take 1 tablet by mouth daily, Disp: , Rfl:     glimepiride (AMARYL) 2 mg tablet, Take 1 tablet (2 mg total) by mouth daily with breakfast, Disp: 90 tablet, Rfl: 3    lisinopril (ZESTRIL) 10 mg tablet, Take 1 tablet (10 mg total) by mouth 2 (two) times a day, Disp: 180 tablet, Rfl: 0    magnesium oxide (MAG-OX) 400 mg, Take 400 mg by mouth daily, Disp: , Rfl:     Omega-3 Fatty Acids (OMEGA-3 FISH OIL) 300 MG CAPS, Take 1 capsule by mouth daily, Disp: , Rfl:     PRECISION XTRA TEST STRIPS test strip, USE TO TEST BLOOD GLUCOSE ONCE A DAY, Disp: 100 each, Rfl: 2    terazosin (HYTRIN) 1 mg capsule, Take 1 capsule (1 mg total) by mouth daily at bedtime, Disp: 90 capsule, Rfl: 3    amLODIPine (NORVASC) 5 mg tablet, Take 5 mg by mouth daily, Disp: , Rfl:     Blood Glucose Monitoring Suppl (PRECISION XTRA MONITOR) MARY, by Does not apply route daily (Patient not taking: Reported on 7/30/2020), Disp: 1 each, Rfl: 0    clotrimazole-betamethasone (LOTRISONE) 1-0 05 % cream, Apply topically 2 (two) times a day (Patient not taking: Reported on 7/17/2020), Disp: 30 g, Rfl: 0    Past Medical History:   Diagnosis Date    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)     Last Assessed:  5/19/17    Chronic kidney disease     Diabetes mellitus (HCC)     Hyperlipidemia     Hypertension     Osteoarthritis     Palpitations     TIA (transient ischemic attack)      Past Surgical History:   Procedure Laterality Date    APPENDECTOMY      COLONOSCOPY  2012    HEMORROIDECTOMY       Family History   Problem Relation Age of Onset    Diabetes Mother     Other Mother         Stroke syndrome    Diabetes Father     Emphysema Father       reports that he has quit smoking  He has never used smokeless tobacco  He reports current alcohol use  He reports that he does not use drugs        Physical Exam:   Vitals:    01/15/21 0908   BP: 138/78   BP Location: Left arm   Patient Position: Sitting   Cuff Size: Standard   Pulse: 74   Resp: 16   Temp: 98 °F (36 7 °C)   TempSrc: Temporal   Weight: 87 1 kg (192 lb)   Height: 5' 11" (1 803 m)     Body mass index is 26 78 kg/m²      General: conscious, cooperative, in not acute distress  Eyes: conjunctivae pink, anicteric sclerae  ENT: lips and mucous membranes moist  Neck: supple, no JVD  Chest: clear breath sounds bilateral, no crackles, ronchus or wheezings  CVS: distinct S1 & S2, normal rate, regular rhythm  Abdomen: soft, non-tender, non-distended, normoactive bowel sounds  Back: no CVA tenderness  Extremities: no edema of both legs  Skin: no rash  Neuro: awake, alert, oriented            Lab Results:  Results for orders placed or performed in visit on 96/99/39   Basic metabolic panel   Result Value Ref Range    Sodium 139 136 - 145 mmol/L    Potassium 4 7 3 5 - 5 3 mmol/L    Chloride 110 (H) 100 - 108 mmol/L    CO2 26 21 - 32 mmol/L    ANION GAP 3 (L) 4 - 13 mmol/L    BUN 35 (H) 5 - 25 mg/dL    Creatinine 1 88 (H) 0 60 - 1 30 mg/dL    Glucose, Fasting 140 (H) 65 - 99 mg/dL    Calcium 8 9 8 3 - 10 1 mg/dL    eGFR 31 ml/min/1 73sq m   CBC and differential   Result Value Ref Range    WBC 7 75 4 31 - 10 16 Thousand/uL    RBC 4 16 3 88 - 5 62 Million/uL    Hemoglobin 12 3 12 0 - 17 0 g/dL    Hematocrit 39 6 36 5 - 49 3 %    MCV 95 82 - 98 fL    MCH 29 6 26 8 - 34 3 pg    MCHC 31 1 (L) 31 4 - 37 4 g/dL    RDW 13 5 11 6 - 15 1 %    MPV 9 9 8 9 - 12 7 fL    Platelets 506 369 - 829 Thousands/uL    nRBC 0 /100 WBCs    Neutrophils Relative 55 43 - 75 %    Immat GRANS % 0 0 - 2 %    Lymphocytes Relative 27 14 - 44 %    Monocytes Relative 10 4 - 12 %    Eosinophils Relative 7 (H) 0 - 6 %    Basophils Relative 1 0 - 1 %    Neutrophils Absolute 4 33 1 85 - 7 62 Thousands/µL    Immature Grans Absolute 0 03 0 00 - 0 20 Thousand/uL    Lymphocytes Absolute 2 06 0 60 - 4 47 Thousands/µL    Monocytes Absolute 0 74 0 17 - 1 22 Thousand/µL    Eosinophils Absolute 0 54 0 00 - 0 61 Thousand/µL    Basophils Absolute 0 05 0 00 - 0 10 Thousands/µL   Magnesium   Result Value Ref Range    Magnesium 2 0 1 6 - 2 6 mg/dL   Phosphorus   Result Value Ref Range    Phosphorus 2 6 2 3 - 4 1 mg/dL   PTH, intact   Result Value Ref Range    PTH 91 2 (H) 18 4 - 80 1 pg/mL   Protein / creatinine ratio, urine   Result Value Ref Range    Creatinine, Ur 86 8 mg/dL    Protein Urine Random 33 mg/dL    Prot/Creat Ratio, Ur 0 38 (H) 0 00 - 0 10   Hemoglobin A1C   Result Value Ref Range    Hemoglobin A1C 7 5 (H) Normal 3 8-5 6%; PreDiabetic 5 7-6 4%; Diabetic >=6 5%; Glycemic control for adults with diabetes <7 0% %     mg/dl       Portions of the record may have been created with voice recognition software  Occasional wrong word or "sound a like" substitutions may have occurred due to the inherent limitations of voice recognition software  Read the chart carefully and recognize, using context, where substitutions have occurred  If you have any questions, please contact the dictating provider

## 2021-01-19 ENCOUNTER — OFFICE VISIT (OUTPATIENT)
Dept: INTERNAL MEDICINE CLINIC | Facility: CLINIC | Age: 86
End: 2021-01-19
Payer: MEDICARE

## 2021-01-19 VITALS
OXYGEN SATURATION: 98 % | BODY MASS INDEX: 26.74 KG/M2 | WEIGHT: 191 LBS | SYSTOLIC BLOOD PRESSURE: 126 MMHG | DIASTOLIC BLOOD PRESSURE: 52 MMHG | HEART RATE: 73 BPM | HEIGHT: 71 IN | TEMPERATURE: 97.3 F

## 2021-01-19 DIAGNOSIS — E08.21 DIABETES MELLITUS DUE TO UNDERLYING CONDITION, CONTROLLED, WITH DIABETIC NEPHROPATHY, WITH LONG-TERM CURRENT USE OF INSULIN (HCC): Primary | ICD-10-CM

## 2021-01-19 DIAGNOSIS — Z79.4 DIABETES MELLITUS DUE TO UNDERLYING CONDITION, CONTROLLED, WITH DIABETIC NEPHROPATHY, WITH LONG-TERM CURRENT USE OF INSULIN (HCC): Primary | ICD-10-CM

## 2021-01-19 DIAGNOSIS — E66.3 OVERWEIGHT (BMI 25.0-29.9): Chronic | ICD-10-CM

## 2021-01-19 DIAGNOSIS — N18.4 BENIGN HYPERTENSION WITH CKD (CHRONIC KIDNEY DISEASE) STAGE IV (HCC): ICD-10-CM

## 2021-01-19 DIAGNOSIS — I12.9 BENIGN HYPERTENSION WITH CKD (CHRONIC KIDNEY DISEASE) STAGE IV (HCC): ICD-10-CM

## 2021-01-19 DIAGNOSIS — I10 ESSENTIAL HYPERTENSION: ICD-10-CM

## 2021-01-19 DIAGNOSIS — E78.5 HYPERLIPIDEMIA, UNSPECIFIED HYPERLIPIDEMIA TYPE: ICD-10-CM

## 2021-01-19 PROCEDURE — 99214 OFFICE O/P EST MOD 30 MIN: CPT | Performed by: INTERNAL MEDICINE

## 2021-01-19 NOTE — ASSESSMENT & PLAN NOTE
Lab Results   Component Value Date    EGFR 31 01/08/2021    EGFR 27 08/11/2020    EGFR 27 06/27/2020    CREATININE 1 88 (H) 01/08/2021    CREATININE 2 11 (H) 08/11/2020    CREATININE 2 09 (H) 06/27/2020    patient was recently seen by his nephrologist   As noted his renal function at this point is stable    Patient will continue present surveillance

## 2021-01-19 NOTE — PROGRESS NOTES
Assessment/Plan:    Controlled diabetes mellitus ([facility] [address] )   As noted patient did have labs performed prior to the visit today looking at control of his blood sugar  Is hemoglobin A1c is 7 5 which is stable  He admits that he is not always compliant with his diet  He continues to follow-up with his podiatrist and does have an appointment next week period also continues with his eye doctors in no new visual changes  Patient was told to look closely at his dietary intake to try to reduce his calories especially concentrated sweets and simple carbohydrates  He has no episodes of hypoglycemia and we will check a fasting blood sugar hemoglobin A1c with his next visit  Lab Results   Component Value Date    HGBA1C 7 5 (H) 01/08/2021       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 (HCC)  Lab Results   Component Value Date    EGFR 31 01/08/2021    EGFR 27 08/11/2020    EGFR 27 06/27/2020    CREATININE 1 88 (H) 01/08/2021    CREATININE 2 11 (H) 08/11/2020    CREATININE 2 09 (H) 06/27/2020    patient was recently seen by his nephrologist   As noted his renal function at this point is stable  Patient will continue present surveillance    Essential hypertension   Blood pressure as well as renal function stable  Patient will continue present medication and surveillance  Hyperlipidemia    Patient remains on atorvastatin at 40 mg a day with a history of hyperlipidemia and CVA in the past   Again patient does not watch his diet closely and does not restrict his intake of fats and cholesterol although this is stressed  Will continue to monitor his cholesterol and make adjustment to medication depending on results    Overweight (BMI 25 0-29  9)   With the patient's BMI is considered overweight  Patient states he does not plan to make any adjustments with his diet and he is limited with his exercise capacity         Diagnoses and all orders for this visit:    Diabetes mellitus due to underlying condition, controlled, with diabetic nephropathy, with long-term current use of insulin ([facility] )  -     Hemoglobin A1C; Future    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 ([facility])  -     Basic metabolic panel; Future    Essential hypertension    Hyperlipidemia, unspecified hyperlipidemia type    Overweight (BMI 25 0-29  9)          Subjective:      Patient ID: Edith Wray is a 80 y o  male  51-year-old male history of medical problems as previously  Patient is here today for routine follow-up and he did have labs performed prior to visit today looking at his blood sugar control  Patient states in general he is feeling well and has no new medical complaints or concerns  He continues to follow-up with his nephrologist, cardiologist, neurologist and podiatrist      The following portions of the patient's history were reviewed and updated as appropriate: He  has a past medical history of 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), Chronic kidney disease, Diabetes mellitus ([facility] ), Hyperlipidemia, Hypertension, Osteoarthritis, Palpitations, and TIA (transient ischemic attack)  He   Patient Active Problem List    Diagnosis Date Noted    Embolic bilateral punctate CVA, 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/26/2020    Essential hypertension 06/26/2020    Secondary hyperparathyroidism of renal origin ([facility] ) 04/25/2019    Overweight (BMI 25 0-29 9) 01/25/2019    CKD stage 4 due to type 2 diabetes mellitus ([facility] ) 09/27/2018    Palpitations 09/25/2018    Healthcare maintenance 05/25/2018    Enlarged prostate without lower urinary tract symptoms (luts) 03/07/2013    Controlled diabetes mellitus ([facility] ) 08/09/2012    Hyperlipidemia 08/09/2012    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 ([facility] ) 08/09/2012     He  has a past surgical history that includes Colonoscopy (2012); Hemorroidectomy; and Appendectomy  His family history includes Diabetes in his father and mother; Emphysema in his father;  Other in his mother  He  reports that he has quit smoking  He has never used smokeless tobacco  He reports current alcohol use  He reports that he does not use drugs  Current Outpatient Medications   Medication Sig Dispense Refill    amLODIPine (NORVASC) 5 mg tablet Take 5 mg by mouth daily      aspirin 81 mg chewable tablet Chew 1 tablet (81 mg total) daily 30 tablet 0    atorvastatin (LIPITOR) 40 mg tablet Take 1 tablet (40 mg total) by mouth daily with dinner 90 tablet 1    Cholecalciferol ([employer]S VITAMIN D3) 1000 units capsule Take 1 capsule by mouth daily      CINNAMON PO Take 1 capsule by mouth daily       clopidogrel (PLAVIX) 75 mg tablet Take 1 tablet (75 mg total) by mouth daily 90 tablet 3    cyanocobalamin (VITAMIN B-12) 500 mcg tablet Take 1 tablet by mouth daily      glimepiride (AMARYL) 2 mg tablet Take 1 tablet (2 mg total) by mouth daily with breakfast 90 tablet 3    lisinopril (ZESTRIL) 10 mg tablet Take 1 tablet (10 mg total) by mouth 2 (two) times a day 180 tablet 0    magnesium oxide (MAG-OX) 400 mg Take 400 mg by mouth daily      Omega-3 Fatty Acids (OMEGA-3 FISH OIL) 300 MG CAPS Take 1 capsule by mouth daily      PRECISION XTRA TEST STRIPS test strip USE TO TEST BLOOD GLUCOSE ONCE A  each 2    terazosin (HYTRIN) 1 mg capsule Take 1 capsule (1 mg total) by mouth daily at bedtime 90 capsule 3    Blood Glucose Monitoring Suppl (PRECISION XTRA MONITOR) MARY by Does not apply route daily (Patient not taking: Reported on 1/19/2021) 1 each 0    clotrimazole-betamethasone (LOTRISONE) 1-0 05 % cream Apply topically 2 (two) times a day (Patient not taking: Reported on 1/19/2021) 30 g 0     No current facility-administered medications for this visit        Current Outpatient Medications on File Prior to Visit   Medication Sig    amLODIPine (NORVASC) 5 mg tablet Take 5 mg by mouth daily    aspirin 81 mg chewable tablet Chew 1 tablet (81 mg total) daily    atorvastatin (LIPITOR) 40 mg tablet Take 1 tablet (40 mg total) by mouth daily with dinner    Cholecalciferol (CVS VITAMIN D3) 1000 units capsule Take 1 capsule by mouth daily    CINNAMON PO Take 1 capsule by mouth daily     clopidogrel (PLAVIX) 75 mg tablet Take 1 tablet (75 mg total) by mouth daily    cyanocobalamin (VITAMIN B-12) 500 mcg tablet Take 1 tablet by mouth daily    glimepiride (AMARYL) 2 mg tablet Take 1 tablet (2 mg total) by mouth daily with breakfast    lisinopril (ZESTRIL) 10 mg tablet Take 1 tablet (10 mg total) by mouth 2 (two) times a day    magnesium oxide (MAG-OX) 400 mg Take 400 mg by mouth daily    Omega-3 Fatty Acids (OMEGA-3 FISH OIL) 300 MG CAPS Take 1 capsule by mouth daily    PRECISION XTRA TEST STRIPS test strip USE TO TEST BLOOD GLUCOSE ONCE A DAY    terazosin (HYTRIN) 1 mg capsule Take 1 capsule (1 mg total) by mouth daily at bedtime    Blood Glucose Monitoring Suppl (PRECISION XTRA MONITOR) MARY by Does not apply route daily (Patient not taking: Reported on 1/19/2021)    clotrimazole-betamethasone (LOTRISONE) 1-0 05 % cream Apply topically 2 (two) times a day (Patient not taking: Reported on 1/19/2021)     No current facility-administered medications on file prior to visit  He has No Known Allergies       Review of Systems   Constitutional: Negative  HENT: Negative  Eyes: Negative  Respiratory: Negative  Cardiovascular: Negative  Gastrointestinal: Negative  Endocrine: Negative  Genitourinary: Negative  Musculoskeletal: Negative  Skin: Negative  Allergic/Immunologic: Negative  Neurological: Negative  Hematological: Negative  Psychiatric/Behavioral: Negative  Objective:      /52   Pulse 73   Temp (!) 97 3 °F (36 3 °C) (Tympanic)   Ht 5' 11" (1 803 m)   Wt 86 6 kg (191 lb)   SpO2 98%   BMI 26 64 kg/m²          Physical Exam  Vitals signs and nursing note reviewed  Constitutional:       General: He is not in acute distress       Appearance: Normal appearance  He is not ill-appearing, toxic-appearing or diaphoretic  Comments:  Cheerful 51-year-old female who is awake alert in no acute distress and oriented x3   HENT:      Head: Normocephalic and atraumatic  Right Ear: Tympanic membrane, ear canal and external ear normal  There is no impacted cerumen  Left Ear: Tympanic membrane, ear canal and external ear normal  There is no impacted cerumen  Nose: Nose normal  No congestion or rhinorrhea  Mouth/Throat:      Mouth: Mucous membranes are moist       Pharynx: Oropharyngeal exudate present  No posterior oropharyngeal erythema  Comments:  Slight amount of thick white mucus to posterior airway  Eyes:      General: No scleral icterus  Right eye: No discharge  Left eye: No discharge  Extraocular Movements: Extraocular movements intact  Conjunctiva/sclera: Conjunctivae normal       Pupils: Pupils are equal, round, and reactive to light  Neck:      Musculoskeletal: Normal range of motion and neck supple  No neck rigidity or muscular tenderness  Vascular: No carotid bruit  Cardiovascular:      Rate and Rhythm: Normal rate and regular rhythm  Pulses: Normal pulses  Heart sounds: Normal heart sounds  No murmur  No friction rub  No gallop  Pulmonary:      Effort: Pulmonary effort is normal  No respiratory distress  Breath sounds: Normal breath sounds  No stridor  No wheezing, rhonchi or rales  Chest:      Chest wall: No tenderness  Abdominal:      General: Bowel sounds are normal  There is no distension  Palpations: Abdomen is soft  There is no mass  Tenderness: There is no abdominal tenderness  There is no right CVA tenderness, left CVA tenderness, guarding or rebound  Hernia: No hernia is present  Comments:  overweight   Musculoskeletal: Normal range of motion           General: Deformity ( diffuse arthritic changes as noted previously but nothing acute) present  No swelling, tenderness or signs of injury  Right lower leg: No edema  Left lower leg: No edema  Lymphadenopathy:      Cervical: No cervical adenopathy  Skin:     General: Skin is warm and dry  Coloration: Skin is not jaundiced or pale  Findings: No bruising, erythema, lesion or rash  Neurological:      General: No focal deficit present  Mental Status: He is alert  Mental status is at baseline  Cranial Nerves: No cranial nerve deficit  Sensory: No sensory deficit  Motor: No weakness  Coordination: Coordination normal       Gait: Gait normal       Deep Tendon Reflexes: Reflexes abnormal ( absent patella and Achilles tendon reflexes bilaterally)  Psychiatric:         Mood and Affect: Mood normal          Behavior: Behavior normal          Thought Content:  Thought content normal          Judgment: Judgment normal

## 2021-01-19 NOTE — ASSESSMENT & PLAN NOTE
Blood pressure as well as renal function stable  Patient will continue present medication and surveillance

## 2021-01-19 NOTE — ASSESSMENT & PLAN NOTE
Patient remains on atorvastatin at 40 mg a day with a history of hyperlipidemia and CVA in the past   Again patient does not watch his diet closely and does not restrict his intake of fats and cholesterol although this is stressed    Will continue to monitor his cholesterol and make adjustment to medication depending on results

## 2021-01-19 NOTE — ASSESSMENT & PLAN NOTE
As noted patient did have labs performed prior to the visit today looking at control of his blood sugar  Is hemoglobin A1c is 7 5 which is stable  He admits that he is not always compliant with his diet  He continues to follow-up with his podiatrist and does have an appointment next week period also continues with his eye doctors in no new visual changes  Patient was told to look closely at his dietary intake to try to reduce his calories especially concentrated sweets and simple carbohydrates    He has no episodes of hypoglycemia and we will check a fasting blood sugar hemoglobin A1c with his next visit  Lab Results   Component Value Date    HGBA1C 7 5 (H) 01/08/2021

## 2021-01-19 NOTE — ASSESSMENT & PLAN NOTE
With the patient's BMI is considered overweight  Patient states he does not plan to make any adjustments with his diet and he is limited with his exercise capacity

## 2021-01-26 NOTE — PROGRESS NOTES
Cardiology Follow Up    [place]  12/11/1930  [id number]  [facility]  [address] [place] [address]  [address]  [phone]    1  Essential (primary) hypertension     2  Pure hypercholesterolemia     3  Presence of cardiac device     4  TIA (transient ischemic attack)         Interval History:  Patient is here for a follow-up visit  He has a prior history of hypertension, hyperlipidemia and TIA  He was hospitalized at the [facility] in June 2020  At that time,  MRI of the brain showed subacute frontal lobe cortical infarcts with micro angiopathy  Carotid Doppler showed noncritical disease  Loop recorder was recommended by Neurology at that time and was implanted July 2020  Most recent interrogation of his device done 11/2020 demonstrated no significant arrhythmia  One episode of sinus rhythm with V bigeminy was noted  Echocardiogram done June 2020 demonstrated preserved LV systolic function with mild LVH and mild ELIZABETH  There was mild to moderate tricuspid and pulmonic regurgitation  A lipid profile done August 2020 demonstrated total cholesterol of 92 with an HDL of 31 and a calculated LDL of 48  Patient has been well  He has had no chest pain or significant dyspnea  His vital signs are stable today  Patient Active Problem List   Diagnosis    Healthcare maintenance    Controlled diabetes mellitus ([address] )    Enlarged prostate without lower urinary tract symptoms (luts)    Hyperlipidemia    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 (HCC)    Palpitations    CKD stage 4 due to type 2 diabetes mellitus (HCC)    Overweight (BMI 25 0-29  9)    Secondary hyperparathyroidism of renal origin ([address] )    Embolic bilateral punctate CVA, acute as well as subacute    Essential hypertension     Past Medical History:   Diagnosis Date    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)     Last Assessed:  5/19/17    Chronic kidney disease     Diabetes mellitus ([facility] [address] )     Hyperlipidemia     Hypertension     Osteoarthritis     Palpitations     TIA (transient ischemic attack)      Social History     Socioeconomic History    Marital status: Single     Spouse name: Not on file    Number of children: Not on file    Years of education: Not on file    Highest education level: Not on file   Occupational History    Not on file   Social Needs    Financial resource strain: Not on file    Food insecurity     Worry: Never true     Inability: Never true    Transportation needs     Medical: No     Non-medical: No   Tobacco Use    Smoking status: Former Smoker    Smokeless tobacco: Never Used   Substance and Sexual Activity    Alcohol use: Yes     Frequency: Monthly or less     Comment: Social drinker    Drug use: No    Sexual activity: Not Currently   Lifestyle    Physical activity     Days per week: 3 days     Minutes per session: 20 min    Stress: Not on file   Relationships    Social connections     Talks on phone: Not on file     Gets together: Not on file     Attends Buddhism service: Not on file     Active member of club or organization: Not on file     Attends meetings of clubs or organizations: Not on file     Relationship status: Not on file    Intimate partner violence     Fear of current or ex partner: Not on file     Emotionally abused: Not on file     Physically abused: Not on file     Forced sexual activity: Not on file   Other Topics Concern    Not on file   Social History Narrative    Daily coffee consumption (2 cups/day)      Family History   Problem Relation Age of Onset    Diabetes Mother     Other Mother         Stroke syndrome    Diabetes Father     Emphysema Father      Past Surgical History:   Procedure Laterality Date    APPENDECTOMY      COLONOSCOPY  2012    HEMORROIDECTOMY         Current Outpatient Medications:     aspirin 81 mg chewable tablet, Chew 1 tablet (81 mg total) daily, Disp: 30 tablet, Rfl: 0    atorvastatin (LIPITOR) 40 mg tablet, Take 1 tablet (40 mg total) by mouth daily with dinner, Disp: 90 tablet, Rfl: 1    Blood Glucose Monitoring Suppl (PRECISION XTRA MONITOR) MARY, by Does not apply route daily, Disp: 1 each, Rfl: 0    Cholecalciferol (CVS VITAMIN D3) 1000 units capsule, Take 1 capsule by mouth daily, Disp: , Rfl:     CINNAMON PO, Take 1 capsule by mouth daily , Disp: , Rfl:     clopidogrel (PLAVIX) 75 mg tablet, Take 1 tablet (75 mg total) by mouth daily, Disp: 90 tablet, Rfl: 3    cyanocobalamin (VITAMIN B-12) 500 mcg tablet, Take 1 tablet by mouth daily, Disp: , Rfl:     glimepiride (AMARYL) 2 mg tablet, Take 1 tablet (2 mg total) by mouth daily with breakfast, Disp: 90 tablet, Rfl: 3    lisinopril (ZESTRIL) 10 mg tablet, Take 1 tablet (10 mg total) by mouth 2 (two) times a day, Disp: 180 tablet, Rfl: 0    magnesium oxide (MAG-OX) 400 mg, Take 400 mg by mouth daily, Disp: , Rfl:     Omega-3 Fatty Acids (OMEGA-3 FISH OIL) 300 MG CAPS, Take 1 capsule by mouth daily, Disp: , Rfl:     terazosin (HYTRIN) 1 mg capsule, Take 1 capsule (1 mg total) by mouth daily at bedtime, Disp: 90 capsule, Rfl: 3    amLODIPine (NORVASC) 5 mg tablet, Take 5 mg by mouth daily, Disp: , Rfl:     clotrimazole-betamethasone (LOTRISONE) 1-0 05 % cream, Apply topically 2 (two) times a day (Patient not taking: Reported on 1/19/2021), Disp: 30 g, Rfl: 0    PRECISION XTRA TEST STRIPS test strip, USE TO TEST BLOOD GLUCOSE ONCE A DAY (Patient not taking: Reported on 2/4/2021), Disp: 100 each, Rfl: 2  No Known Allergies    Labs:not applicable  Imaging: No results found  Review of Systems:  Review of Systems   All other systems reviewed and are negative        Physical Exam:  /58 (BP Location: Right arm, Patient Position: Sitting, Cuff Size: Standard)   Pulse 66   Ht 5' 11" (1 803 m)   Wt 87 kg (191 lb 12 8 oz)   BMI 26 75 kg/m²   Physical Exam  Vitals signs reviewed  Constitutional:       Appearance: He is well-developed  HENT:      Head: Normocephalic and atraumatic  Eyes:      Conjunctiva/sclera: Conjunctivae normal       Pupils: Pupils are equal, round, and reactive to light  Neck:      Musculoskeletal: Normal range of motion and neck supple  Cardiovascular:      Rate and Rhythm: Normal rate  Heart sounds: Normal heart sounds  Pulmonary:      Effort: Pulmonary effort is normal       Breath sounds: Normal breath sounds  Skin:     General: Skin is warm and dry  Neurological:      Mental Status: He is alert and oriented to person, place, and time  Discussion/Summary:I will continue the patient's present medical regimen  The patient appears well compensated  I have asked the patient to call if there is a problem in the interim otherwise I will see the patient in six months time

## 2021-01-27 ENCOUNTER — IMMUNIZATIONS (OUTPATIENT)
Dept: FAMILY MEDICINE CLINIC | Facility: HOSPITAL | Age: 86
End: 2021-01-27

## 2021-01-27 DIAGNOSIS — Z23 ENCOUNTER FOR IMMUNIZATION: Primary | ICD-10-CM

## 2021-01-27 DIAGNOSIS — I63.40 CEREBROVASCULAR ACCIDENT (CVA) DUE TO EMBOLISM OF CEREBRAL ARTERY (HCC): ICD-10-CM

## 2021-01-27 DIAGNOSIS — E78.5 HYPERLIPIDEMIA, UNSPECIFIED HYPERLIPIDEMIA TYPE: ICD-10-CM

## 2021-01-27 PROCEDURE — 0011A SARS-COV-2 / COVID-19 MRNA VACCINE (MODERNA) 100 MCG: CPT

## 2021-01-27 PROCEDURE — 91301 SARS-COV-2 / COVID-19 MRNA VACCINE (MODERNA) 100 MCG: CPT

## 2021-01-27 RX ORDER — ATORVASTATIN CALCIUM 40 MG/1
TABLET, FILM COATED ORAL
Qty: 90 TABLET | Refills: 1 | OUTPATIENT
Start: 2021-01-27

## 2021-02-04 ENCOUNTER — OFFICE VISIT (OUTPATIENT)
Dept: CARDIOLOGY CLINIC | Facility: CLINIC | Age: 86
End: 2021-02-04
Payer: MEDICARE

## 2021-02-04 VITALS
SYSTOLIC BLOOD PRESSURE: 120 MMHG | HEART RATE: 66 BPM | WEIGHT: 191.8 LBS | DIASTOLIC BLOOD PRESSURE: 58 MMHG | HEIGHT: 71 IN | BODY MASS INDEX: 26.85 KG/M2

## 2021-02-04 DIAGNOSIS — E78.00 PURE HYPERCHOLESTEROLEMIA: ICD-10-CM

## 2021-02-04 DIAGNOSIS — Z95.818 PRESENCE OF CARDIAC DEVICE: ICD-10-CM

## 2021-02-04 DIAGNOSIS — G45.9 TIA (TRANSIENT ISCHEMIC ATTACK): ICD-10-CM

## 2021-02-04 DIAGNOSIS — I10 ESSENTIAL (PRIMARY) HYPERTENSION: Primary | ICD-10-CM

## 2021-02-04 PROCEDURE — 99214 OFFICE O/P EST MOD 30 MIN: CPT | Performed by: INTERNAL MEDICINE

## 2021-02-09 ENCOUNTER — REMOTE DEVICE CLINIC VISIT (OUTPATIENT)
Dept: CARDIOLOGY CLINIC | Facility: CLINIC | Age: 86
End: 2021-02-09
Payer: MEDICARE

## 2021-02-09 DIAGNOSIS — Z95.818 PRESENCE OF OTHER CARDIAC IMPLANTS AND GRAFTS: Primary | ICD-10-CM

## 2021-02-09 PROCEDURE — 93298 REM INTERROG DEV EVAL SCRMS: CPT | Performed by: INTERNAL MEDICINE

## 2021-02-09 PROCEDURE — G2066 INTER DEVC REMOTE 30D: HCPCS | Performed by: INTERNAL MEDICINE

## 2021-02-09 NOTE — PROGRESS NOTES
MDT LOOP/ ACTIVE SYSTEM IS MRI CONDITIONAL   CARELINK TRANSMISSION: LOOP RECORDER  PRESENTING RHYTHM NSR @ 85 BPM  BATTERY STATUS "OK"  1 TACHY EPISODES W/ EGRAM SHOWING SVT @ 167 BPM FOR 9 SECs  1 AF EPISODE W/ EGRAM SUGGESTING SR W/ PACs  PVCs AND UNDERSENSING  CAN NOT R/O AF  AF BURDEN= 0%  NO PATIENT ACTIVATED EPISODES  NORMAL DEVICE FUNCTION   DL

## 2021-02-10 DIAGNOSIS — N18.4 BENIGN HYPERTENSION WITH CKD (CHRONIC KIDNEY DISEASE) STAGE IV (HCC): Primary | ICD-10-CM

## 2021-02-10 DIAGNOSIS — I12.9 BENIGN HYPERTENSION WITH CKD (CHRONIC KIDNEY DISEASE) STAGE IV (HCC): Primary | ICD-10-CM

## 2021-02-10 RX ORDER — AMLODIPINE BESYLATE 5 MG/1
TABLET ORAL
Qty: 90 TABLET | Refills: 3 | Status: SHIPPED | OUTPATIENT
Start: 2021-02-10

## 2021-02-22 ENCOUNTER — IMMUNIZATIONS (OUTPATIENT)
Dept: FAMILY MEDICINE CLINIC | Facility: HOSPITAL | Age: 86
End: 2021-02-22

## 2021-02-22 DIAGNOSIS — Z23 ENCOUNTER FOR IMMUNIZATION: Primary | ICD-10-CM

## 2021-02-22 PROCEDURE — 91301 SARS-COV-2 / COVID-19 MRNA VACCINE (MODERNA) 100 MCG: CPT

## 2021-02-22 PROCEDURE — 0012A SARS-COV-2 / COVID-19 MRNA VACCINE (MODERNA) 100 MCG: CPT

## 2021-03-22 DIAGNOSIS — N18.4 CKD (CHRONIC KIDNEY DISEASE), STAGE IV (HCC): ICD-10-CM

## 2021-03-22 RX ORDER — LISINOPRIL 10 MG/1
TABLET ORAL
Qty: 180 TABLET | Refills: 0 | Status: SHIPPED | OUTPATIENT
Start: 2021-03-22 | End: 2021-06-01

## 2021-04-06 NOTE — PROGRESS NOTES
Diabetic Foot Exam    Right Foot/Ankle   Right Foot Inspection  Skin Exam: skin normal and skin intact no dry skin, no warmth, no callus, no erythema, no maceration, no abnormal color, no pre-ulcer, no ulcer and no callus                          Toe Exam: ROM and strength within normal limitsno swelling, no tenderness, erythema and  no right toe deformity  Sensory   Vibration: intact  Proprioception: intact   Monofilament testing: intact  Vascular  Capillary refills: < 3 seconds  The right DP pulse is 1+  The right PT pulse is 1+  Left Foot/Ankle  Left Foot Inspection  Skin Exam: skin normal and skin intactno dry skin, no warmth, no erythema, no maceration, normal color, no pre-ulcer, no ulcer and no callus                         Toe Exam: ROM and strength within normal limitsno swelling, no tenderness, no erythema and no left toe deformity                   Sensory   Vibration: intact  Proprioception: intact  Monofilament: intact  Vascular  Capillary refills: < 3 seconds  The left DP pulse is 1+  The left PT pulse is 1+  Assign Risk Category:  No deformity present; No loss of protective sensation;  No weak pulses       Risk: 0 I reviewed his results with him. His ultrasound indicates severe but stable bilateral hydronephrosis. I ordered a basic metabolic profile for him. Please have him scheduled to see me within the next few weeks and have him scheduled to obtain a bladder scan postvoid residual at that time. In the event that his basic metabolic profile is not consistent with prior results, we will need to get him in sooner. Thanks.     Dean Askew MD

## 2021-04-28 DIAGNOSIS — N40.0 BENIGN PROSTATIC HYPERPLASIA, UNSPECIFIED WHETHER LOWER URINARY TRACT SYMPTOMS PRESENT: ICD-10-CM

## 2021-04-28 RX ORDER — TERAZOSIN 1 MG/1
CAPSULE ORAL
Qty: 90 CAPSULE | Refills: 3 | Status: SHIPPED | OUTPATIENT
Start: 2021-04-28 | End: 2022-06-23

## 2021-05-07 NOTE — PATIENT INSTRUCTIONS
Low Fat Diet   AMBULATORY CARE:   A low-fat diet  is an eating plan that is low in total fat, unhealthy fat, and cholesterol  You may need to follow a low-fat diet if you have trouble digesting or absorbing fat  You may also need to follow this diet if you have high cholesterol  You can also lower your cholesterol by increasing the amount of fiber in your diet  Soluble fiber is a type of fiber that helps to decrease cholesterol levels  Different types of fat in food:   · Limit unhealthy fats  A diet that is high in cholesterol, saturated fat, and trans fat may cause unhealthy cholesterol levels  Unhealthy cholesterol levels increase your risk of heart disease  ¨ Cholesterol:  Limit intake of cholesterol to less than 200 mg per day  Cholesterol is found in meat, eggs, and dairy  ¨ Saturated fat:  Limit saturated fat to less than 7% of your total daily calories  Ask your dietitian how many calories you need each day  Saturated fat is found in butter, cheese, ice cream, whole milk, and palm oil  Saturated fat is also found in meat, such as beef, pork, chicken skin, and processed meats  Processed meats include sausage, hot dogs, and bologna  ¨ Trans fat:  Avoid trans fat as much as possible  Trans fat is used in fried and baked foods  Foods that say trans fat free on the label may still have up to 0 5 grams of trans fat per serving  · Include healthy fats  Replace foods that are high in saturated and trans fat with foods high in healthy fats  This may help to decrease high cholesterol levels  ¨ Monounsaturated fats: These are found in avocados, nuts, and vegetable oils, such as olive, canola, and sunflower oil  ¨ Polyunsaturated fats: These can be found in vegetable oils, such as soybean or corn oil  Omega-3 fats can help to decrease the risk of heart disease  Omega-3 fats are found in fish, such as salmon, herring, trout, and tuna   Omega-3 fats can also be found in plant foods, such as walnuts, flaxseed, soybeans, and canola oil    Foods to limit or avoid:   · Grains:      ¨ Snacks that are made with partially hydrogenated oils, such as chips, regular crackers, and butter-flavored popcorn    ¨ High-fat baked goods, such as biscuits, croissants, doughnuts, pies, cookies, and pastries    · Dairy:      ¨ Whole milk, 2% milk, and yogurt and ice cream made with whole milk    ¨ Half and half creamer, heavy cream, and whipping cream    ¨ Cheese, cream cheese, and sour cream    · Meats and proteins:      ¨ High-fat cuts of meat (T-bone steak, regular hamburger, and ribs)    ¨ Fried meat, poultry (turkey and chicken), and fish    ¨ Poultry (chicken and turkey) with skin    ¨ Cold cuts (salami or bologna), hot dogs, mendes, and sausage    ¨ Whole eggs and egg yolks    · Vegetables and fruits with added fat:      ¨ Fried vegetables or vegetables in butter or high-fat sauces, such as cream or cheese sauces    ¨ Fried fruit or fruit served with butter or cream    · Fats:      ¨ Butter, stick margarine, and shortening    ¨ Coconut, palm oil, and palm kernel oil  Foods to include:   · Grains:      ¨ Whole-grain breads, cereals, pasta, and brown rice    ¨ Low-fat crackers and pretzels    · Vegetables and fruits:      ¨ Fresh, frozen, or canned vegetables (no salt or low-sodium)    ¨ Fresh, frozen, dried, or canned fruit (canned in light syrup or fruit juice)    ¨ Avocado    · Low-fat dairy products:      ¨ Nonfat (skim) or 1% milk    ¨ Nonfat or low-fat cheese, yogurt, and cottage cheese    · Meats and proteins:      ¨ Chicken or turkey with no skin    ¨ Baked or broiled fish    ¨ Lean beef and pork (loin, round, extra lean hamburger)    ¨ Beans and peas, unsalted nuts, soy products    ¨ Egg whites and substitutes    ¨ Seeds and nuts    · Fats:      ¨ Unsaturated oil, such as canola, olive, peanut, soybean, or sunflower oil    ¨ Soft or liquid margarine and vegetable oil spread    ¨ Low-fat salad dressing  Other ways to decrease fat:   · Read food labels before you buy foods  Choose foods that have less than 30% of calories from fat  Choose low-fat or fat-free dairy products  Remember that fat free does not mean calorie free  These foods still contain calories, and too many calories can lead to weight gain  · Trim fat from meat and avoid fried food  Trim all visible fat from meat before you cook it  Remove the skin from poultry  Do not [place] meat, fish, or poultry  Bake, roast, boil, or broil these foods instead  Avoid fried foods  Eat a baked potato instead of [place] fries  Steam vegetables instead of sautéing them in butter  · Add less fat to foods  Use imitation mendes bits on salads and baked potatoes instead of regular mendes bits  Use fat-free or low-fat salad dressings instead of regular dressings  Use low-fat or nonfat butter-flavored topping instead of regular butter or margarine on popcorn and other foods  Ways to decrease fat in recipes:  Replace high-fat ingredients with low-fat or nonfat ones  This may cause baked goods to be drier than usual  You may need to use nonfat cooking spray on pans to prevent food from sticking  You also may need to change the amount of other ingredients, such as water, in the recipe  Try the following:  · Use low-fat or light margarine instead of regular margarine or shortening  · Use lean ground turkey breast or chicken, or lean ground beef (less than 5% fat) instead of hamburger  · Add 1 teaspoon of canola oil to 8 ounces of skim milk instead of using cream or half and half  · Use grated zucchini, carrots, or apples in breads instead of coconut  · Use blenderized, low-fat cottage cheese, plain tofu, or low-fat ricotta cheese instead of cream cheese  · Use 1 egg white and 1 teaspoon of canola oil, or use ¼ cup (2 ounces) of fat-free egg substitute instead of a whole egg       · Replace half of the oil that is called for in a recipe with applesauce when you bake  Use 3 tablespoons of cocoa powder and 1 tablespoon of canola oil instead of a square of baking chocolate  How to increase fiber:  Eat enough high-fiber foods to get 20 to 30 grams of fiber every day  Slowly increase your fiber intake to avoid stomach cramps, gas, and other problems  · Eat 3 ounces of whole-grain foods each day  An ounce is about 1 slice of bread  Eat whole-grain breads, such as whole-wheat bread  Whole wheat, whole-wheat flour, or other whole grains should be listed as the first ingredient on the food label  Replace white flour with whole-grain flour or use half of each in recipes  Whole-grain flour is heavier than white flour, so you may have to add more yeast or baking powder  · Eat a high-fiber cereal for breakfast   Oatmeal is a good source of soluble fiber  Look for cereals that have bran or fiber in the name  Choose whole-grain products, such as brown rice, barley, and whole-wheat pasta  · Eat more beans, peas, and lentils  For example, add beans to soups or salads  Eat at least 5 cups of fruits and vegetables each day  Eat fruits and vegetables with the peel because the peel is high in fiber  © 2017 [address] [employer] Information is for End User's use only and may not be sold, redistributed or otherwise used for commercial purposes  All illustrations and images included in CareNotes® are the copyrighted property of [employer]  or Navid Akhtar  The above information is an  only  It is not intended as medical advice for individual conditions or treatments  Talk to your doctor, nurse or pharmacist before following any medical regimen to see if it is safe and effective for you  Heart Healthy Diet   AMBULATORY CARE:   A heart healthy diet  is an eating plan low in total fat, unhealthy fats, and sodium (salt)  A heart healthy diet helps decrease your risk for heart disease and stroke   Limit the amount of fat you eat to 25% to 35% of your total daily calories  Limit sodium to less than 2,300 mg each day  Healthy fats:  Healthy fats can help improve cholesterol levels  The risk for heart disease is decreased when cholesterol levels are normal  Choose healthy fats, such as the following:  · Unsaturated fat  is found in foods such as soybean, canola, olive, corn, and safflower oils  It is also found in soft tub margarine that is made with liquid vegetable oil  · Omega-3 fat  is found in certain fish, such as salmon, tuna, and trout, and in walnuts and flaxseed  Unhealthy fats:  Unhealthy fats can cause unhealthy cholesterol levels in your blood and increase your risk of heart disease  Limit unhealthy fats, such as the following:  · Cholesterol  is found in animal foods, such as eggs and lobster, and in dairy products made from whole milk  Limit cholesterol to less than 300 milligrams (mg) each day  You may need to limit cholesterol to 200 mg each day if you have heart disease  · Saturated fat  is found in meats, such as mendes and hamburger  It is also found in chicken or turkey skin, whole milk, and butter  Limit saturated fat to less than 7% of your total daily calories  Limit saturated fat to less than 6% if you have heart disease or are at increased risk for it  · Trans fat  is found in packaged foods, such as potato chips and cookies  It is also in hard margarine, some fried foods, and shortening  Avoid trans fats as much as possible    Heart healthy foods and drinks to include:  Ask your dietitian or healthcare provider how many servings to have from each of the following food groups:  · Grains:      ¨ Whole-wheat breads, cereals, and pastas, and brown rice    ¨ Low-fat, low-sodium crackers and chips    · Vegetables:      ¨ Broccoli, green beans, green peas, and spinach    ¨ Collards, kale, and lima beans    ¨ Carrots, sweet potatoes, tomatoes, and peppers    ¨ Canned vegetables with no salt added    · Fruits:      ¨ Bananas, peaches, pears, and pineapple    ¨ Grapes, raisins, and dates    ¨ Oranges, tangerines, grapefruit, orange juice, and grapefruit juice    ¨ Apricots, mangoes, melons, and papaya    ¨ Raspberries and strawberries    ¨ Canned fruit with no added sugar    · Low-fat dairy products:      ¨ Nonfat (skim) milk, 1% milk, and low-fat almond, cashew, or soy milks fortified with calcium    ¨ Low-fat cheese, regular or frozen yogurt, and cottage cheese    · Meats and proteins , such as lean cuts of beef and pork (loin, leg, round), skinless chicken and turkey, legumes, soy products, egg whites, and nuts  Foods and drinks to limit or avoid:  Ask your dietitian or healthcare provider about these and other foods that are high in unhealthy fat, sodium, and sugar:  · Snack or packaged foods , such as frozen dinners, cookies, macaroni and cheese, and cereals with more than 300 mg of sodium per serving    · Canned or dry mixes  for cakes, soups, sauces, or gravies    · Vegetables with added sodium , such as instant potatoes, vegetables with added sauces, or regular canned vegetables    · Other foods high in sodium , such as ketchup, barbecue sauce, salad dressing, pickles, olives, soy sauce, and miso    · High-fat dairy foods  such as whole or 2% milk, cream cheese, or sour cream, and cheeses     · High-fat protein foods  such as high-fat cuts of beef (T-bone steaks, ribs), chicken or turkey with skin, and organ meats, such as liver    · Cured or smoked meats , such as hot dogs, mendes, and sausage    · Unhealthy fats and oils , such as butter, stick margarine, shortening, and cooking oils such as coconut or palm oil    · Food and drinks high in sugar , such as soft drinks (soda), sports drinks, sweetened tea, candy, cake, cookies, pies, and doughnuts  Other diet guidelines to follow:   · Eat more foods containing omega-3 fats  Eat fish high in omega-3 fats at least 2 times a week  · Limit alcohol    Too much alcohol can damage your heart and raise your blood pressure  Women should limit alcohol to 1 drink a day  Men should limit alcohol to 2 drinks a day  A drink of alcohol is 12 ounces of beer, 5 ounces of wine, or 1½ ounces of liquor  · Choose low-sodium foods  High-sodium foods can lead to high blood pressure  Add little or no salt to food you prepare  Use herbs and spices in place of salt  · Eat more fiber  to help lower cholesterol levels  Eat at least 5 servings of fruits and vegetables each day  Eat 3 ounces of whole-grain foods each day  Legumes (beans) are also a good source of fiber  Lifestyle guidelines:   · Do not smoke  Nicotine and other chemicals in cigarettes and cigars can cause lung and heart damage  Ask your healthcare provider for information if you currently smoke and need help to quit  E-cigarettes or smokeless tobacco still contain nicotine  Talk to your healthcare provider before you use these products  · Exercise regularly  to help you maintain a healthy weight and improve your blood pressure and cholesterol levels  Ask your healthcare provider about the best exercise plan for you  Do not start an exercise program without asking your healthcare provider  Follow up with your healthcare provider as directed:  Write down your questions so you remember to ask them during your visits  © 2017 [address] [facility] Information is for End User's use only and may not be sold, redistributed or otherwise used for commercial purposes  All illustrations and images included in CareNotes® are the copyrighted property of [employer]  or Navid Akhtar  The above information is an  only  It is not intended as medical advice for individual conditions or treatments  Talk to your doctor, nurse or pharmacist before following any medical regimen to see if it is safe and effective for you  Calorie Counting Diet   WHAT YOU NEED TO KNOW:   What is a calorie counting diet?   It is a meal plan based on counting calories each day to reach a healthy body weight  You will need to eat fewer calories if you are trying to lose weight  Weight loss may decrease your risk for certain health problems or improve your health if you have health problems  Some of these health problems include heart disease, high blood pressure, and diabetes  What foods should I avoid? Your dietitian will tell you if you need to avoid certain foods based on your body weight and health condition  You may need to avoid high-fat foods if you are at risk for or have heart disease  You may need to eat fewer foods from the breads and starches food group if you have diabetes  How many calories are in foods? The following is a list of foods and drinks with the approximate number of calories in each  Check the food label to find the exact number of calories  A dietitian can tell you how many calories you should have from each food group each day    · Carbohydrate:      ¨ ½ of a 3-inch bagel, 1 slice of bread, or ½ of a hamburger bun or hot dog bun (80)    ¨ 1 (8-inch) flour tortilla or ½ cup of cooked rice (100)    ¨ 1 (6-inch) corn tortilla (80)    ¨ 1 (6-inch) pancake or 1 cup of bran flakes cereal (110)    ¨ ½ cup of cooked cereal (80)    ¨ ½ cup of cooked pasta (85)    ¨ 1 ounce of pretzels (100)    ¨ 3 cups of air-popped popcorn without butter or oil (80)    · Dairy:      ¨ 1 cup of skim or 1% milk (90)    ¨ 1 cup of 2% milk (120)    ¨ 1 cup of whole milk (160)    ¨ 1 cup of 2% chocolate milk (220)    ¨ 1 ounce of low-fat cheese with 3 grams of fat per ounce (70)    ¨ 1 ounce of cheddar cheese (114)    ¨ ½ cup of 1% fat cottage cheese (80)    ¨ 1 cup of plain or sugar-free, fat-free yogurt (90)    · Protein foods:      ¨ 3 ounces of fish (not breaded or fried) (95)    ¨ 3 ounces of breaded, fried fish (195)    ¨ ¾ cup of tuna canned in water (105)    ¨ 3 ounces of chicken breast without skin (105)    ¨ 1 fried chicken breast with skin (350)    ¨ ¼ cup of fat free egg substitute (40)    ¨ 1 large egg (75)    ¨ 3 ounces of lean beef or pork (165)    ¨ 3 ounces of fried pork chop or ham (185)    ¨ ½ cup of cooked dried beans, such as kidney, bautista, lentils, or navy (115)    ¨ 3 ounces of bologna or lunch meat (225)    ¨ 2 links of breakfast sausage (140)    · Vegetables:      ¨ ½ cup of sliced mushrooms (10)    ¨ 1 cup of salad greens, such as lettuce, spinach, or shanti (15)    ¨ ½ cup of steamed asparagus (20)    ¨ ½ cup of cooked summer squash, zucchini squash, or green or wax beans (25)    ¨ 1 cup of broccoli or cauliflower florets, or 1 medium tomato (25)    ¨ 1 large raw carrot or ½ cup of cooked carrots (40)    ¨ ? of a medium cucumber or 1 stalk of celery (5)    ¨ 1 small baked potato (160)    ¨ 1 cup of breaded, fried vegetables (230)    · Fruit:      ¨ 1 (6-inch) banana (55)     ¨ ½ of a 4-inch grapefruit (55)    ¨ 15 grapes (60)    ¨ 1 medium orange or apple (70)    ¨ 1 large peach (65)    ¨ 1 cup of fresh pineapple chunks (75)    ¨ 1 cup of melon cubes (50)    ¨ 1¼ cups of whole strawberries (45)    ¨ ½ cup of fruit canned in juice (55)    ¨ ½ cup of fruit canned in heavy syrup (110)    ¨ ?  cup of raisins (130)    ¨ ½ cup of unsweetened fruit juice (60)    ¨ ½ cup of grape, cranberry, or prune juice (90)    · Fat:      ¨ 10 peanuts or 2 teaspoons of peanut butter (55)    ¨ 2 tablespoons of avocado or 1 tablespoon of regular salad dressing (45)    ¨ 2 slices of mendes (90)    ¨ 1 teaspoon of oil, such as safflower, canola, corn, or olive oil (45)    ¨ 2 teaspoons of low-fat margarine, or 1 tablespoon of low-fat mayonnaise (50)    ¨ 1 teaspoon of regular margarine (40)    ¨ 1 tablespoon of regular mayonnaise (135)    ¨ 1 tablespoon of cream cheese or 2 tablespoons of low-fat cream cheese (45)    ¨ 2 tablespoons of vegetable shortening (215)    · Dessert and sweets:      ¨ 8 animal crackers or 5 vanilla wafers (80)    ¨ 1 frozen fruit juice bar (80)    ¨ ½ cup of ice milk or low-fat frozen yogurt (90)    ¨ ½ cup of sherbet or sorbet (125)    ¨ ½ cup of sugar-free pudding or custard (60)    ¨ ½ cup of ice cream (140)    ¨ ½ cup of pudding or custard (175)    ¨ 1 (2-inch) square chocolate brownie (185)    · Combination foods:      ¨ Bean burrito made with an 8-inch tortilla, without cheese (275)    ¨ Chicken breast sandwich with lettuce and tomato (325)    ¨ 1 cup of chicken noodle soup (60)    ¨ 1 beef taco (175)    ¨ Regular hamburger with lettuce and tomato (310)    ¨ Regular cheeseburger with lettuce and tomato (410)     ¨ ¼ of a 12-inch cheese pizza (280)    ¨ Fried fish sandwich with lettuce and tomato (425)    ¨ Hot dog and bun (275)    ¨ 1½ cups of macaroni and cheese (310)    ¨ Taco salad with a fried tortilla shell (870)    · Low-calorie foods:      ¨ 1 tablespoon of ketchup or 1 tablespoon of fat free sour cream (15)    ¨ 1 teaspoon of mustard (5)    ¨ ¼ cup of salsa (20)    ¨ 1 large dill pickle (15)    ¨ 1 tablespoon of fat free salad dressing (10)    ¨ 2 teaspoons of low-sugar, light jam or jelly, or 1 tablespoon of sugar-free syrup (15)    ¨ 1 sugar-free popsicle (15)    ¨ 1 cup of club soda, seltzer water, or diet soda (0)  CARE AGREEMENT:   You have the right to help plan your care  Discuss treatment options with your caregivers to decide what care you want to receive  You always have the right to refuse treatment  The above information is an  only  It is not intended as medical advice for individual conditions or treatments  Talk to your doctor, nurse or pharmacist before following any medical regimen to see if it is safe and effective for you  © 2017 [address] [employer] Information is for End User's use only and may not be sold, redistributed or otherwise used for commercial purposes   All illustrations and images included in CareNotes® are the copyrighted property of [employer] [facility] [employer]  or [employer]  Helical Rim Text: The closure involved the helical rim.

## 2021-05-11 ENCOUNTER — REMOTE DEVICE CLINIC VISIT (OUTPATIENT)
Dept: CARDIOLOGY CLINIC | Facility: CLINIC | Age: 86
End: 2021-05-11
Payer: MEDICARE

## 2021-05-11 DIAGNOSIS — Z95.818 PRESENCE OF OTHER CARDIAC IMPLANTS AND GRAFTS: Primary | ICD-10-CM

## 2021-05-11 PROCEDURE — 93298 REM INTERROG DEV EVAL SCRMS: CPT | Performed by: INTERNAL MEDICINE

## 2021-05-11 PROCEDURE — G2066 INTER DEVC REMOTE 30D: HCPCS | Performed by: INTERNAL MEDICINE

## 2021-05-14 ENCOUNTER — APPOINTMENT (OUTPATIENT)
Dept: LAB | Facility: CLINIC | Age: 86
End: 2021-05-14
Payer: MEDICARE

## 2021-05-14 DIAGNOSIS — Z79.4 DIABETES MELLITUS DUE TO UNDERLYING CONDITION, CONTROLLED, WITH DIABETIC NEPHROPATHY, WITH LONG-TERM CURRENT USE OF INSULIN (HCC): ICD-10-CM

## 2021-05-14 DIAGNOSIS — I12.9 BENIGN HYPERTENSION WITH CKD (CHRONIC KIDNEY DISEASE) STAGE IV (HCC): ICD-10-CM

## 2021-05-14 DIAGNOSIS — N18.4 BENIGN HYPERTENSION WITH CKD (CHRONIC KIDNEY DISEASE) STAGE IV (HCC): ICD-10-CM

## 2021-05-14 DIAGNOSIS — E08.21 DIABETES MELLITUS DUE TO UNDERLYING CONDITION, CONTROLLED, WITH DIABETIC NEPHROPATHY, WITH LONG-TERM CURRENT USE OF INSULIN (HCC): ICD-10-CM

## 2021-05-14 DIAGNOSIS — N18.4 CKD STAGE 4 DUE TO TYPE 2 DIABETES MELLITUS (HCC): ICD-10-CM

## 2021-05-14 DIAGNOSIS — E11.22 CKD STAGE 4 DUE TO TYPE 2 DIABETES MELLITUS (HCC): ICD-10-CM

## 2021-05-14 LAB
ALBUMIN SERPL BCP-MCNC: 3.5 G/DL (ref 3.5–5)
ANION GAP SERPL CALCULATED.3IONS-SCNC: 6 MMOL/L (ref 4–13)
BUN SERPL-MCNC: 36 MG/DL (ref 5–25)
CALCIUM SERPL-MCNC: 9.3 MG/DL (ref 8.3–10.1)
CHLORIDE SERPL-SCNC: 110 MMOL/L (ref 100–108)
CO2 SERPL-SCNC: 24 MMOL/L (ref 21–32)
CREAT SERPL-MCNC: 2.13 MG/DL (ref 0.6–1.3)
CREAT UR-MCNC: 95.3 MG/DL
ERYTHROCYTE [DISTWIDTH] IN BLOOD BY AUTOMATED COUNT: 13.3 % (ref 11.6–15.1)
EST. AVERAGE GLUCOSE BLD GHB EST-MCNC: 186 MG/DL
GFR SERPL CREATININE-BSD FRML MDRD: 26 ML/MIN/1.73SQ M
GLUCOSE P FAST SERPL-MCNC: 187 MG/DL (ref 65–99)
HBA1C MFR BLD: 8.1 %
HCT VFR BLD AUTO: 38.2 % (ref 36.5–49.3)
HGB BLD-MCNC: 12.3 G/DL (ref 12–17)
MCH RBC QN AUTO: 30.7 PG (ref 26.8–34.3)
MCHC RBC AUTO-ENTMCNC: 32.2 G/DL (ref 31.4–37.4)
MCV RBC AUTO: 95 FL (ref 82–98)
PHOSPHATE SERPL-MCNC: 2.8 MG/DL (ref 2.3–4.1)
PLATELET # BLD AUTO: 191 THOUSANDS/UL (ref 149–390)
PMV BLD AUTO: 9.7 FL (ref 8.9–12.7)
POTASSIUM SERPL-SCNC: 4.5 MMOL/L (ref 3.5–5.3)
PROT UR-MCNC: 35 MG/DL
PROT/CREAT UR: 0.37 MG/G{CREAT} (ref 0–0.1)
PTH-INTACT SERPL-MCNC: 96.8 PG/ML (ref 18.4–80.1)
RBC # BLD AUTO: 4.01 MILLION/UL (ref 3.88–5.62)
SODIUM SERPL-SCNC: 140 MMOL/L (ref 136–145)
WBC # BLD AUTO: 7.08 THOUSAND/UL (ref 4.31–10.16)

## 2021-05-14 PROCEDURE — 85027 COMPLETE CBC AUTOMATED: CPT

## 2021-05-14 PROCEDURE — 84156 ASSAY OF PROTEIN URINE: CPT

## 2021-05-14 PROCEDURE — 83970 ASSAY OF PARATHORMONE: CPT

## 2021-05-14 PROCEDURE — 83036 HEMOGLOBIN GLYCOSYLATED A1C: CPT

## 2021-05-14 PROCEDURE — 80069 RENAL FUNCTION PANEL: CPT

## 2021-05-14 PROCEDURE — 36415 COLL VENOUS BLD VENIPUNCTURE: CPT

## 2021-05-14 PROCEDURE — 82570 ASSAY OF URINE CREATININE: CPT

## 2021-05-20 ENCOUNTER — OFFICE VISIT (OUTPATIENT)
Dept: INTERNAL MEDICINE CLINIC | Facility: CLINIC | Age: 86
End: 2021-05-20
Payer: MEDICARE

## 2021-05-20 VITALS
DIASTOLIC BLOOD PRESSURE: 60 MMHG | OXYGEN SATURATION: 98 % | HEIGHT: 71 IN | BODY MASS INDEX: 26.74 KG/M2 | SYSTOLIC BLOOD PRESSURE: 138 MMHG | TEMPERATURE: 98.1 F | HEART RATE: 88 BPM | WEIGHT: 191 LBS

## 2021-05-20 DIAGNOSIS — I12.9 BENIGN HYPERTENSION WITH CKD (CHRONIC KIDNEY DISEASE) STAGE IV (HCC): Primary | ICD-10-CM

## 2021-05-20 DIAGNOSIS — I77.9 DISORDER OF CAROTID ARTERY (HCC): ICD-10-CM

## 2021-05-20 DIAGNOSIS — I10 ESSENTIAL HYPERTENSION: ICD-10-CM

## 2021-05-20 DIAGNOSIS — E66.3 OVERWEIGHT (BMI 25.0-29.9): Chronic | ICD-10-CM

## 2021-05-20 DIAGNOSIS — E08.21 DIABETES MELLITUS DUE TO UNDERLYING CONDITION, CONTROLLED, WITH DIABETIC NEPHROPATHY, WITH LONG-TERM CURRENT USE OF INSULIN (HCC): ICD-10-CM

## 2021-05-20 DIAGNOSIS — N18.4 BENIGN HYPERTENSION WITH CKD (CHRONIC KIDNEY DISEASE) STAGE IV (HCC): Primary | ICD-10-CM

## 2021-05-20 DIAGNOSIS — Z79.4 DIABETES MELLITUS DUE TO UNDERLYING CONDITION, CONTROLLED, WITH DIABETIC NEPHROPATHY, WITH LONG-TERM CURRENT USE OF INSULIN (HCC): ICD-10-CM

## 2021-05-20 DIAGNOSIS — E78.5 HYPERLIPIDEMIA, UNSPECIFIED HYPERLIPIDEMIA TYPE: ICD-10-CM

## 2021-05-20 DIAGNOSIS — I63.40 CEREBROVASCULAR ACCIDENT (CVA) DUE TO EMBOLISM OF CEREBRAL ARTERY (HCC): ICD-10-CM

## 2021-05-20 PROCEDURE — 99214 OFFICE O/P EST MOD 30 MIN: CPT | Performed by: INTERNAL MEDICINE

## 2021-05-20 RX ORDER — ATORVASTATIN CALCIUM 40 MG/1
40 TABLET, FILM COATED ORAL
Qty: 90 TABLET | Refills: 1 | Status: SHIPPED | OUTPATIENT
Start: 2021-05-20 | End: 2021-09-23

## 2021-05-20 NOTE — PATIENT INSTRUCTIONS
Heart Healthy Diet   AMBULATORY CARE:   A heart healthy diet  is an eating plan low in unhealthy fats and sodium (salt)  The plan is high in healthy fats and fiber  A heart healthy diet helps improve your cholesterol levels and lowers your risk for heart disease and stroke  A dietitian will teach you how to read and understand food labels  Heart healthy diet guidelines to follow:   · Choose foods that contain healthy fats  ? Unsaturated fats  include monounsaturated and polyunsaturated fats  Unsaturated fat is found in foods such as soybean, canola, olive, corn, and safflower oils  It is also found in soft tub margarine that is made with liquid vegetable oil  ? Omega-3 fat  is found in certain fish, such as salmon, tuna, and trout, and in walnuts and flaxseed  Eat fish high in omega-3 fats at least 2 times a week  · Get 20 to 30 grams of fiber each day  Fruits, vegetables, whole-grain foods, and legumes (cooked beans) are good sources of fiber  · Limit or do not have unhealthy fats  ? Cholesterol  is found in animal foods, such as eggs and lobster, and in dairy products made from whole milk  Limit cholesterol to less than 200 mg each day  ? Saturated fat  is found in meats, such as mendes and hamburger  It is also found in chicken or turkey skin, whole milk, and butter  Limit saturated fat to less than 7% of your total daily calories  ? Trans fat  is found in packaged foods, such as potato chips and cookies  It is also in hard margarine, some fried foods, and shortening  Do not eat foods that contain trans fats  · Limit sodium as directed  You may be told to limit sodium to 2,000 to 2,300 mg each day  Choose low-sodium or no-salt-added foods  Add little or no salt to food you prepare  Use herbs and spices in place of salt         Include the following in your heart healthy plan:  Ask your dietitian or healthcare provider how many servings to have from each of the following food groups:  · Grains:      ? Whole-wheat breads, cereals, and pastas, and brown rice    ? Low-fat, low-sodium crackers and chips    · Vegetables:      ? Broccoli, green beans, green peas, and spinach    ? Collards, kale, and lima beans    ? Carrots, sweet potatoes, tomatoes, and peppers    ? Canned vegetables with no salt added    · Fruits:      ? Bananas, peaches, pears, and pineapple    ? Grapes, raisins, and dates    ? Oranges, tangerines, grapefruit, orange juice, and grapefruit juice    ? Apricots, mangoes, melons, and papaya    ? Raspberries and strawberries    ? Canned fruit with no added sugar    · Low-fat dairy:      ? Nonfat (skim) milk, 1% milk, and low-fat almond, cashew, or soy milks fortified with calcium    ? Low-fat cheese, regular or frozen yogurt, and cottage cheese    · Meats and proteins:      ? Lean cuts of beef and pork (loin, leg, round), skinless chicken and turkey    ? Legumes, soy products, egg whites, or nuts    Limit or do not include the following in your heart healthy plan:   · Unhealthy fats and oils:      ? Whole or 2% milk, cream cheese, sour cream, or cheese    ? High-fat cuts of beef (T-bone steaks, ribs), chicken or turkey with skin, and organ meats such as liver    ? Butter, stick margarine, shortening, and cooking oils such as coconut or palm oil    · Foods and liquids high in sodium:      ? Packaged foods, such as frozen dinners, cookies, macaroni and cheese, and cereals with more than 300 mg of sodium per serving    ? Vegetables with added sodium, such as instant potatoes, vegetables with added sauces, or regular canned vegetables    ? Cured or smoked meats, such as hot dogs, mendes, and sausage    ? High-sodium ketchup, barbecue sauce, salad dressing, pickles, olives, soy sauce, or miso    · Foods and liquids high in sugar:      ? Candy, cake, cookies, pies, or doughnuts    ? Soft drinks (soda), sports drinks, or sweetened tea    ?  Canned or dry mixes for cakes, soups, sauces, or gravies    Other healthy heart guidelines:   · Do not smoke  Nicotine and other chemicals in cigarettes and cigars can cause lung and heart damage  Ask your healthcare provider for information if you currently smoke and need help to quit  E-cigarettes or smokeless tobacco still contain nicotine  Talk to your healthcare provider before you use these products  · Limit or do not drink alcohol as directed  Alcohol can damage your heart and raise your blood pressure  Your healthcare provider may give you specific daily and weekly limits  The general recommended limit is 1 drink a day for women 21 or older and for men 72 or older  Do not have more than 3 drinks in a day or 7 in a week  The recommended limit is 2 drinks a day for men 24to 59years of age  Do not have more than 4 drinks in a day or 14 in a week  A drink of alcohol is 12 ounces of beer, 5 ounces of wine, or 1½ ounces of liquor  · Exercise regularly  Exercise can help you maintain a healthy weight and improve your blood pressure and cholesterol levels  Regular exercise can also decrease your risk for heart problems  Ask your healthcare provider about the best exercise plan for you  Do not start an exercise program without asking your healthcare provider  Follow up with your doctor or cardiologist as directed:  Write down your questions so you remember to ask them during your visits  © Copyright [address] Information is for End User's use only and may not be sold, redistributed or otherwise used for commercial purposes  All illustrations and images included in CareNotes® are the copyrighted property of [employer]  or [address] [place], [place] [id number]   The above information is an  only  It is not intended as medical advice for individual conditions or treatments  Talk to your doctor, nurse or pharmacist before following any medical regimen to see if it is safe and effective for you      Calorie Counting Diet   WHAT YOU NEED TO KNOW:   What is a calorie counting diet? It is a meal plan based on counting calories each day to reach a healthy body weight  You will need to eat fewer calories if you are trying to lose weight  Weight loss may decrease your risk for certain health problems or improve your health if you have health problems  Some of these health problems include heart disease, high blood pressure, and diabetes  What foods should I avoid? Your dietitian will tell you if you need to avoid certain foods based on your body weight and health condition  You may need to avoid high-fat foods if you are at risk for or have heart disease  You may need to eat fewer foods from the breads and starches food group if you have diabetes  How many calories are in foods? The following is a list of foods and drinks with the approximate number of calories in each  Check the food label to find the exact number of calories  A dietitian can tell you how many calories you should have from each food group each day  · Carbohydrate:      ? ½ of a 3-inch bagel, 1 slice of bread, or ½ of a hamburger bun or hot dog bun (80)    ? 1 (8-inch) flour tortilla or ½ cup of cooked rice (100)    ? 1 (6-inch) corn tortilla (80)    ? 1 (6-inch) pancake or 1 cup of bran flakes cereal (110)    ? ½ cup of cooked cereal (80)    ? ½ cup of cooked pasta (85)    ? 1 ounce of pretzels (100)    ? 3 cups of air-popped popcorn without butter or oil (80)    · Dairy:      ? 1 cup of skim or 1% milk (90)    ? 1 cup of 2% milk (120)    ? 1 cup of whole milk (160)    ? 1 cup of 2% chocolate milk (220)    ? 1 ounce of low-fat cheese with 3 grams of fat per ounce (70)    ? 1 ounce of cheddar cheese (114)    ? ½ cup of 1% fat cottage cheese (80)    ? 1 cup of plain or sugar-free, fat-free yogurt (90)    · Protein foods:      ? 3 ounces of fish (not breaded or fried) (95)    ? 3 ounces of breaded, fried fish (195)    ? ¾ cup of tuna canned in water (105)    ?  3 ounces of chicken breast without skin (105)    ? 1 fried chicken breast with skin (350)    ? ¼ cup of fat free egg substitute (40)    ? 1 large egg (75)    ? 3 ounces of lean beef or pork (165)    ? 3 ounces of fried pork chop or ham (185)    ? ½ cup of cooked dried beans, such as kidney, bautista, lentils, or navy (115)    ? 3 ounces of bologna or lunch meat (225)    ? 2 links of breakfast sausage (140)    · Vegetables:      ? ½ cup of sliced mushrooms (10)    ? 1 cup of salad greens, such as lettuce, spinach, or shanti (15)    ? ½ cup of steamed asparagus (20)    ? ½ cup of cooked summer squash, zucchini squash, or green or wax beans (25)    ? 1 cup of broccoli or cauliflower florets, or 1 medium tomato (25)    ? 1 large raw carrot or ½ cup of cooked carrots (40)    ? ? of a medium cucumber or 1 stalk of celery (5)    ? 1 small baked potato (160)    ? 1 cup of breaded, fried vegetables (230)    · Fruit:      ? 1 (6-inch) banana (55)     ? ½ of a 4-inch grapefruit (55)    ? 15 grapes (60)    ? 1 medium orange or apple (70)    ? 1 large peach (65)    ? 1 cup of fresh pineapple chunks (75)    ? 1 cup of melon cubes (50)    ? 1¼ cups of whole strawberries (45)    ? ½ cup of fruit canned in juice (55)    ? ½ cup of fruit canned in heavy syrup (110)    ? ? cup of raisins (130)    ? ½ cup of unsweetened fruit juice (60)    ? ½ cup of grape, cranberry, or prune juice (90)    · Fat:      ? 10 peanuts or 2 teaspoons of peanut butter (55)    ? 2 tablespoons of avocado or 1 tablespoon of regular salad dressing (45)    ? 2 slices of mendes (90)    ? 1 teaspoon of oil, such as safflower, canola, corn, or olive oil (45)    ? 2 teaspoons of low-fat margarine, or 1 tablespoon of low-fat mayonnaise (50)    ? 1 teaspoon of regular margarine (40)    ? 1 tablespoon of regular mayonnaise (135)    ? 1 tablespoon of cream cheese or 2 tablespoons of low-fat cream cheese (45)    ?  2 tablespoons of vegetable shortening (215)    · Dessert and sweets:      ? 8 animal crackers or 5 vanilla wafers (80)    ? 1 frozen fruit juice bar (80)    ? ½ cup of ice milk or low-fat frozen yogurt (90)    ? ½ cup of sherbet or sorbet (125)    ? ½ cup of sugar-free pudding or custard (60)    ? ½ cup of ice cream (140)    ? ½ cup of pudding or custard (175)    ? 1 (2-inch) square chocolate brownie (185)    · Combination foods:      ? Bean burrito made with an 8-inch tortilla, without cheese (275)    ? Chicken breast sandwich with lettuce and tomato (325)    ? 1 cup of chicken noodle soup (60)    ? 1 beef taco (175)    ? Regular hamburger with lettuce and tomato (310)    ? Regular cheeseburger with lettuce and tomato (410)     ? ¼ of a 12-inch cheese pizza (280)    ? Fried fish sandwich with lettuce and tomato (425)    ? Hot dog and bun (275)    ? 1½ cups of macaroni and cheese (310)    ? Taco salad with a fried tortilla shell (870)    · Low-calorie foods:      ? 1 tablespoon of ketchup or 1 tablespoon of fat free sour cream (15)    ? 1 teaspoon of mustard (5)    ? ¼ cup of salsa (20)    ? 1 large dill pickle (15)    ? 1 tablespoon of fat free salad dressing (10)    ? 2 teaspoons of low-sugar, light jam or jelly, or 1 tablespoon of sugar-free syrup (15)    ? 1 sugar-free popsicle (15)    ? 1 cup of club soda, seltzer water, or diet soda (0)    CARE AGREEMENT:   You have the right to help plan your care  Discuss treatment options with your healthcare provider to decide what care you want to receive  You always have the right to refuse treatment  The above information is an  only  It is not intended as medical advice for individual conditions or treatments  Talk to your doctor, nurse or pharmacist before following any medical regimen to see if it is safe and effective for you  © Copyright [address] Information is for End User's use only and may not be sold, redistributed or otherwise used for commercial purposes   All illustrations and images included in [facility] are the copyrighted property of [employer] M , [employer]  or [address] [place], [place] [address]abbie [facility]

## 2021-05-20 NOTE — ASSESSMENT & PLAN NOTE
Again patient's blood pressure was mildly elevated with initial presentation  He states he just walked up the stairs to our office period once the patient was allowed to relax is come down to an acceptable range  Patient will continue present surveillance, medication

## 2021-05-20 NOTE — ASSESSMENT & PLAN NOTE
Lab Results   Component Value Date    HGBA1C 8 1 (H) 05/14/2021     As noted patient's hemoglobin A1c is elevated from his baseline previously  Patient admits that he is not watching his dietary intake of concentrated sweets and simple carbohydrates  He states that when he was on the metformin he had much better control of his blood sugars  We did discuss with the patient starting on a low dose of insulin but he is refusing  We will check a fasting blood sugar, hemoglobin A1c with his next visit    Patient is up-to-date with diabetic eye exam diabetic foot exam

## 2021-05-20 NOTE — PROGRESS NOTES
Diabetic Foot Exam    Patient's shoes and socks removed  Right Foot/Ankle   Right Foot Inspection  Skin Exam: skin normal and skin intact no dry skin, no warmth, no callus, no erythema, no maceration, no abnormal color, no pre-ulcer, no ulcer and no callus                          Toe Exam: ROM and strength within normal limitsno swelling, no tenderness, erythema and  no right toe deformity  Sensory   Vibration: diminished  Proprioception: diminished   Monofilament testing: diminished  Vascular  Capillary refills: < 3 seconds  The right DP pulse is 2+  The right PT pulse is 2+  Left Foot/Ankle  Left Foot Inspection  Skin Exam: skin normal and skin intactno dry skin, no warmth, no erythema, no maceration, normal color, no pre-ulcer, no ulcer and no callus                         Toe Exam: ROM and strength within normal limitsno swelling, no tenderness, no erythema and no left toe deformity                   Sensory   Vibration: diminished  Proprioception: diminished  Monofilament: diminished  Vascular  Capillary refills: < 3 seconds  The left DP pulse is 2+  The left PT pulse is 2+  Assign Risk Category:  No deformity present; No loss of protective sensation;  No weak pulses       Risk: 0

## 2021-05-20 NOTE — ASSESSMENT & PLAN NOTE
With the patient's BMI is considered overweight  He admits that he  The does not follow any type of diet in order to help with weight loss  He is limited in his exercise capacity because of his age  His weight has been stable    We did discuss with him looking at his caloric intake not only to help with some weight loss but also to better control  his diabetes

## 2021-05-20 NOTE — ASSESSMENT & PLAN NOTE
Lab Results   Component Value Date    EGFR 26 05/14/2021    EGFR 31 01/08/2021    EGFR 27 08/11/2020    CREATININE 2 13 (H) 05/14/2021    CREATININE 1 88 (H) 01/08/2021    CREATININE 2 11 (H) 08/11/2020    initially with presentation the office today his blood pressure was slightly elevated above his baseline  Once the patient was allowed to sit relax it did come down to an acceptable range once the patient was allowed to sit relax  His renal function is being followed very closely by his nephrologist   They have already discussed with him possibility of dialysis in the future which she is contemplating but he states that his age he may not consider this an option  He will continue present medications surveillance

## 2021-05-20 NOTE — PROGRESS NOTES
Assessment/Plan:    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 [facility])  Lab Results   Component Value Date    EGFR 26 05/14/2021    EGFR 31 01/08/2021    EGFR 27 08/11/2020    CREATININE 2 13 (H) 05/14/2021    CREATININE 1 88 (H) 01/08/2021    CREATININE 2 11 (H) 08/11/2020    initially with presentation the office today his blood pressure was slightly elevated above his baseline  Once the patient was allowed to sit relax it did come down to an acceptable range once the patient was allowed to sit relax  His renal function is being followed very closely by his nephrologist   They have already discussed with him possibility of dialysis in the future which she is contemplating but he states that his age he may not consider this an option  He will continue present medications surveillance  CKD stage 4 due to type 2 diabetes mellitus ([facility] [address] )    Lab Results   Component Value Date    HGBA1C 8 1 (H) 05/14/2021     As noted patient's hemoglobin A1c is elevated from his baseline previously  Patient admits that he is not watching his dietary intake of concentrated sweets and simple carbohydrates  He states that when he was on the metformin he had much better control of his blood sugars  We did discuss with the patient starting on a low dose of insulin but he is refusing  We will check a fasting blood sugar, hemoglobin A1c with his next visit  Patient is up-to-date with diabetic eye exam diabetic foot exam    Essential hypertension   Again patient's blood pressure was mildly elevated with initial presentation  He states he just walked up the stairs to our office period once the patient was allowed to relax is come down to an acceptable range  Patient will continue present surveillance, medication  Overweight (BMI 25 0-29  9)    With the patient's BMI is considered overweight  He admits that he  The does not follow any type of diet in order to help with weight loss    He is limited in his exercise capacity because of his age  His weight has been stable  We did discuss with him looking at his caloric intake not only to help with some weight loss but also to better control  his diabetes       Diagnoses and all orders for this visit:    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 (HCC)    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HCC)    Diabetes mellitus due to underlying condition, controlled, with diabetic nephropathy, with long-term current use of insulin ([facility] [address] )  -     Basic metabolic panel; Future  -     Hemoglobin A1C; Future  -     Lipid panel; Future    Essential hypertension  -     Lipid panel; Future    Overweight (BMI 25 0-29  9)          Subjective:      Patient ID: Amber Marie is a 80 y o  male  78-year-old male history of multiple medical problems as outlined previously  Patient is here today for routine follow-up after four-month period of time  Patient did have labs performed prior to the visit today and we did discuss the results  Patient states in general he is doing relatively well and continues to follow-up with his nephrologist, podiatrist   He denies chest pain or pressure no increasing shortness of breath  Relates he is no longer seeing the physicians at the [facility]      The following portions of the patient's history were reviewed and updated as appropriate:   He  has a past medical history of 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), Chronic kidney disease, Diabetes mellitus ([address] ), Hyperlipidemia, Hypertension, Osteoarthritis, Palpitations, and TIA (transient ischemic attack)    He   Patient Active Problem List    Diagnosis Date Noted    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[address] ) 70/51/1673    Embolic bilateral punctate CVA, 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/26/2020    Essential hypertension 06/26/2020    Secondary hyperparathyroidism of renal origin ([address] ) 04/25/2019    Overweight (BMI 25 0-29 9) 01/25/2019    CKD stage 4 due to type 2 diabetes mellitus ([address] ) 09/27/2018    Palpitations 09/25/2018    Healthcare maintenance 05/25/2018    Enlarged prostate without lower urinary tract symptoms (luts) 03/07/2013    Controlled diabetes mellitus ([place] [id number] ) 08/09/2012    Hyperlipidemia 08/09/2012    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 ([place] [address] ) 08/09/2012     He  has a past surgical history that includes Colonoscopy (2012); Hemorroidectomy; and Appendectomy  His family history includes Diabetes in his father and mother; Emphysema in his father; Other in his mother  He  reports that he has quit smoking  He has never used smokeless tobacco  He reports current alcohol use  He reports that he does not use drugs    Current Outpatient Medications   Medication Sig Dispense Refill    amLODIPine (NORVASC) 5 mg tablet TAKE ONE TABLET BY MOUTH EVERY DAY 90 tablet 3    aspirin 81 mg chewable tablet Chew 1 tablet (81 mg total) daily 30 tablet 0    atorvastatin (LIPITOR) 40 mg tablet Take 1 tablet (40 mg total) by mouth daily with dinner 90 tablet 1    Blood Glucose Monitoring Suppl (PRECISION XTRA MONITOR) MARY by Does not apply route daily 1 each 0    Cholecalciferol (CVS VITAMIN D3) 1000 units capsule Take 1 capsule by mouth daily      CINNAMON PO Take 1 capsule by mouth daily       clopidogrel (PLAVIX) 75 mg tablet Take 1 tablet (75 mg total) by mouth daily 90 tablet 3    cyanocobalamin (VITAMIN B-12) 500 mcg tablet Take 1 tablet by mouth daily      glimepiride (AMARYL) 2 mg tablet Take 1 tablet (2 mg total) by mouth daily with breakfast 90 tablet 3    lisinopril (ZESTRIL) 10 mg tablet TAKE 1 TABLET TWICE DAILY 180 tablet 0    magnesium oxide (MAG-OX) 400 mg Take 400 mg by mouth daily      Omega-3 Fatty Acids (OMEGA-3 FISH OIL) 300 MG CAPS Take 1 capsule by mouth daily      terazosin (HYTRIN) 1 mg capsule TAKE 1 CAPSULE AT BEDTIME 90 capsule 3    clotrimazole-betamethasone (LOTRISONE) 1-0 05 % cream Apply topically 2 (two) times a day (Patient not taking: Reported on 1/19/2021) 30 g 0    PRECISION XTRA TEST STRIPS test strip USE TO TEST BLOOD GLUCOSE ONCE A DAY (Patient not taking: Reported on 2/4/2021) 100 each 2     No current facility-administered medications for this visit  Current Outpatient Medications on File Prior to Visit   Medication Sig    amLODIPine (NORVASC) 5 mg tablet TAKE ONE TABLET BY MOUTH EVERY DAY    aspirin 81 mg chewable tablet Chew 1 tablet (81 mg total) daily    Blood Glucose Monitoring Suppl (PRECISION XTRA MONITOR) MARY by Does not apply route daily    Cholecalciferol (CVS VITAMIN D3) 1000 units capsule Take 1 capsule by mouth daily    CINNAMON PO Take 1 capsule by mouth daily     clopidogrel (PLAVIX) 75 mg tablet Take 1 tablet (75 mg total) by mouth daily    cyanocobalamin (VITAMIN B-12) 500 mcg tablet Take 1 tablet by mouth daily    glimepiride (AMARYL) 2 mg tablet Take 1 tablet (2 mg total) by mouth daily with breakfast    lisinopril (ZESTRIL) 10 mg tablet TAKE 1 TABLET TWICE DAILY    magnesium oxide (MAG-OX) 400 mg Take 400 mg by mouth daily    Omega-3 Fatty Acids (OMEGA-3 FISH OIL) 300 MG CAPS Take 1 capsule by mouth daily    terazosin (HYTRIN) 1 mg capsule TAKE 1 CAPSULE AT BEDTIME    clotrimazole-betamethasone (LOTRISONE) 1-0 05 % cream Apply topically 2 (two) times a day (Patient not taking: Reported on 1/19/2021)    PRECISION XTRA TEST STRIPS test strip USE TO TEST BLOOD GLUCOSE ONCE A DAY (Patient not taking: Reported on 2/4/2021)    [DISCONTINUED] atorvastatin (LIPITOR) 40 mg tablet Take 1 tablet (40 mg total) by mouth daily with dinner     No current facility-administered medications on file prior to visit  He has No Known Allergies       Review of Systems   Constitutional: Negative  HENT: Negative  Eyes: Negative  Respiratory: Negative  Cardiovascular: Negative  Gastrointestinal: Negative  Endocrine: Negative  Genitourinary: Negative  Musculoskeletal: Negative      Skin: Negative  Allergic/Immunologic: Negative  Neurological: Negative  Hematological: Negative  Psychiatric/Behavioral: Negative  Objective:      /60   Pulse 88   Temp 98 1 °F (36 7 °C) (Skin)   Ht 5' 11" (1 803 m)   Wt 86 6 kg (191 lb)   SpO2 98%   BMI 26 64 kg/m²          Physical Exam  Vitals signs and nursing note reviewed  Constitutional:       General: He is not in acute distress  Appearance: Normal appearance  He is not ill-appearing, toxic-appearing or diaphoretic  Comments: Pleasant articulate, overweight 80-year-old male who is awake alert no acute distress and oriented x3   HENT:      Head: Normocephalic and atraumatic  Right Ear: Tympanic membrane, ear canal and external ear normal  There is no impacted cerumen  Left Ear: Tympanic membrane, ear canal and external ear normal  There is no impacted cerumen  Nose: Nose normal  No congestion or rhinorrhea  Mouth/Throat:      Mouth: Mucous membranes are dry  Pharynx: Oropharynx is clear  No oropharyngeal exudate or posterior oropharyngeal erythema  Eyes:      General: No scleral icterus  Right eye: No discharge  Left eye: No discharge  Extraocular Movements: Extraocular movements intact  Conjunctiva/sclera: Conjunctivae normal       Pupils: Pupils are equal, round, and reactive to light  Neck:      Musculoskeletal: Normal range of motion and neck supple  No neck rigidity or muscular tenderness  Vascular: No carotid bruit  Cardiovascular:      Rate and Rhythm: Normal rate and regular rhythm  Pulses: Normal pulses  Heart sounds: Normal heart sounds  No murmur  No friction rub  No gallop  Pulmonary:      Effort: Pulmonary effort is normal  No respiratory distress  Breath sounds: Normal breath sounds  No stridor  No wheezing, rhonchi or rales  Chest:      Chest wall: No tenderness     Abdominal:      General: Bowel sounds are normal  There is no distension  Palpations: Abdomen is soft  There is no mass  Tenderness: There is no abdominal tenderness  There is no right CVA tenderness, left CVA tenderness, guarding or rebound  Hernia: No hernia is present  Comments: Overweight   Musculoskeletal: Normal range of motion  General: Deformity present  No swelling, tenderness or signs of injury  Right lower leg: No edema  Lymphadenopathy:      Cervical: No cervical adenopathy  Skin:     General: Skin is warm and dry  Capillary Refill: Capillary refill takes less than 2 seconds  Coloration: Skin is not jaundiced or pale  Findings: No bruising, erythema, lesion or rash  Neurological:      Mental Status: He is alert and oriented to person, place, and time  Mental status is at baseline  Cranial Nerves: No cranial nerve deficit  Sensory: No sensory deficit  Motor: No weakness  Coordination: Coordination normal       Gait: Gait normal       Deep Tendon Reflexes: Reflexes normal    Psychiatric:         Mood and Affect: Mood normal          Behavior: Behavior normal          Thought Content: Thought content normal          Judgment: Judgment normal          BMI Counseling: Body mass index is 26 64 kg/m²  The BMI is above normal  Nutrition recommendations include reducing portion sizes, decreasing overall calorie intake, 3-5 servings of fruits/vegetables daily, consuming healthier snacks, moderation in carbohydrate intake, increasing intake of lean protein, reducing intake of saturated fat and trans fat and reducing intake of cholesterol

## 2021-05-31 DIAGNOSIS — N18.4 CKD (CHRONIC KIDNEY DISEASE), STAGE IV (HCC): ICD-10-CM

## 2021-06-01 RX ORDER — LISINOPRIL 10 MG/1
TABLET ORAL
Qty: 180 TABLET | Refills: 0 | Status: SHIPPED | OUTPATIENT
Start: 2021-06-01 | End: 2021-08-06

## 2021-06-07 ENCOUNTER — OFFICE VISIT (OUTPATIENT)
Dept: NEPHROLOGY | Facility: CLINIC | Age: 86
End: 2021-06-07
Payer: MEDICARE

## 2021-06-07 VITALS
WEIGHT: 187 LBS | RESPIRATION RATE: 16 BRPM | SYSTOLIC BLOOD PRESSURE: 108 MMHG | BODY MASS INDEX: 26.18 KG/M2 | HEART RATE: 74 BPM | DIASTOLIC BLOOD PRESSURE: 64 MMHG | HEIGHT: 71 IN

## 2021-06-07 DIAGNOSIS — I10 ESSENTIAL HYPERTENSION: ICD-10-CM

## 2021-06-07 DIAGNOSIS — N18.4 CKD STAGE 4 DUE TO TYPE 2 DIABETES MELLITUS (HCC): Primary | ICD-10-CM

## 2021-06-07 DIAGNOSIS — N18.4 BENIGN HYPERTENSION WITH CKD (CHRONIC KIDNEY DISEASE) STAGE IV (HCC): ICD-10-CM

## 2021-06-07 DIAGNOSIS — E78.5 HYPERLIPIDEMIA, UNSPECIFIED HYPERLIPIDEMIA TYPE: ICD-10-CM

## 2021-06-07 DIAGNOSIS — E11.22 CKD STAGE 4 DUE TO TYPE 2 DIABETES MELLITUS (HCC): Primary | ICD-10-CM

## 2021-06-07 DIAGNOSIS — N25.81 SECONDARY HYPERPARATHYROIDISM OF RENAL ORIGIN (HCC): ICD-10-CM

## 2021-06-07 DIAGNOSIS — I12.9 BENIGN HYPERTENSION WITH CKD (CHRONIC KIDNEY DISEASE) STAGE IV (HCC): ICD-10-CM

## 2021-06-07 DIAGNOSIS — I63.40 CEREBROVASCULAR ACCIDENT (CVA) DUE TO EMBOLISM OF CEREBRAL ARTERY (HCC): ICD-10-CM

## 2021-06-07 PROCEDURE — 99214 OFFICE O/P EST MOD 30 MIN: CPT | Performed by: INTERNAL MEDICINE

## 2021-06-07 NOTE — PROGRESS NOTES
OFFICE FOLLOW UP - Nephrology   Una Moon 80 y o  male MRN: [id number]       ASSESSMENT and PLAN:  Kaylee Gregory was seen today for chronic kidney disease and follow-up  Diagnoses and all orders for this visit:    CKD stage 4 due to type 2 diabetes mellitus ([facility] [address] )  -     CBC; Future  -     PTH, intact; Future  -     Renal function panel; Future  -     Protein / creatinine ratio, urine; Future    Secondary hyperparathyroidism of renal origin [facility])    Essential hypertension    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 (HCC)    Hyperlipidemia, unspecified hyperlipidemia type    Embolic bilateral punctate CVA, acute as well as subacute           This is an 44-year-old gentleman with known history of chronic kidney disease stage 4 who returns to the office for follow-up  1   Chronic kidney disease stage 4, baseline creatinine around 1 9 to 2 2 since 2018  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, atherosclerotic disease, possible diabetes as well as age-related nephron loss  Renal function stable, most recent creatinine at 2 13 with an eGFR 26 with minimal proteinuria  He went to [facility]  At this moment he still undecide about dialysis in the future when needed  Again advised to avoid NSAIDs  Stay well-hydrated  Follow low-salt diet  I would like to see him back in the office in 5 months with repeat blood and urine test     2  Controlled type 2 diabetes, most recent A1c 8 1% on 05/14/2021,   Management as per primary care doctor  3   Hypertension, blood pressure well controlled, on amlodipine and lisinopril  4   Hemoglobin normal    5  Mineral bone disease, PTH and phosphorus acceptable for his degreee of kidney disease  Follow labs in 5 months  6  Hyperlipidemia, continue with statins, management as per primary doctor  Patient Instructions   As we discussed in the office visit, your kidney function remains stable    I would like to see you back in the office in 5 months with repeat blood and urine test   Follow low-salt diet  Stay well-hydrated  No changes in medication at this moment  Continue regular follow-up primary care doctor  Okay to take Tylenol or paracetamol or acetaminophen as needed for pain  HPI: Toyin Steiner is a 80 y o  male who is here for Chronic Kidney Disease and Follow-up    Last time seen in our office was in 01/2021, today returns for 4 months follow-up  Since our last visit, there has been no ER visits or hospitilizations  Patient currently has no complaints at this time and is feeling well  Patient denies any chest pain, shortness of breath or leg swelling  No abdominal pain, no nausea or diarrhea, no constipation  Appetite is stable, weight decreased 5 lbs since lats visit  Denies any urinary problems, nocturia 1-2 per night  Got COVID vaccine w/o problems  He does not smoke, he quit smoking on 1981  Denies NSAID use  The last blood work was done on 05/14/2021, which we have reviewed together  Most recent serum creatinine 2 13 with an eGFR 26, Hgb 12 3, PTH 96, UPC 0 37  ROS: All the systems were reviewed and were negative except as documented on the H&P  Allergies: Patient has no known allergies      Medications:   Current Outpatient Medications:     amLODIPine (NORVASC) 5 mg tablet, TAKE ONE TABLET BY MOUTH EVERY DAY, Disp: 90 tablet, Rfl: 3    aspirin 81 mg chewable tablet, Chew 1 tablet (81 mg total) daily, Disp: 30 tablet, Rfl: 0    atorvastatin (LIPITOR) 40 mg tablet, Take 1 tablet (40 mg total) by mouth daily with dinner, Disp: 90 tablet, Rfl: 1    Blood Glucose Monitoring Suppl (PRECISION XTRA MONITOR) MARY, by Does not apply route daily, Disp: 1 each, Rfl: 0    Cholecalciferol (CVS VITAMIN D3) 1000 units capsule, Take 1 capsule by mouth daily, Disp: , Rfl:     CINNAMON PO, Take 1 capsule by mouth daily , Disp: , Rfl:     clopidogrel (PLAVIX) 75 mg tablet, Take 1 tablet (75 mg total) by mouth daily, Disp: 90 tablet, Rfl: 3    cyanocobalamin (VITAMIN B-12) 500 mcg tablet, Take 1 tablet by mouth daily, Disp: , Rfl:     glimepiride (AMARYL) 2 mg tablet, Take 1 tablet (2 mg total) by mouth daily with breakfast, Disp: 90 tablet, Rfl: 3    lisinopril (ZESTRIL) 10 mg tablet, TAKE 1 TABLET TWICE DAILY, Disp: 180 tablet, Rfl: 0    magnesium oxide (MAG-OX) 400 mg, Take 400 mg by mouth daily, Disp: , Rfl:     Omega-3 Fatty Acids (OMEGA-3 FISH OIL) 300 MG CAPS, Take 1 capsule by mouth daily, Disp: , Rfl:     terazosin (HYTRIN) 1 mg capsule, TAKE 1 CAPSULE AT BEDTIME, Disp: 90 capsule, Rfl: 3    clotrimazole-betamethasone (LOTRISONE) 1-0 05 % cream, Apply topically 2 (two) times a day (Patient not taking: Reported on 1/19/2021), Disp: 30 g, Rfl: 0    PRECISION XTRA TEST STRIPS test strip, USE TO TEST BLOOD GLUCOSE ONCE A DAY (Patient not taking: Reported on 2/4/2021), Disp: 100 each, Rfl: 2    Past Medical History:   Diagnosis Date    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)     Last Assessed:  5/19/17    Chronic kidney disease     Diabetes mellitus (HCC)     Hyperlipidemia     Hypertension     Osteoarthritis     Palpitations     TIA (transient ischemic attack)      Past Surgical History:   Procedure Laterality Date    APPENDECTOMY      COLONOSCOPY  2012    HEMORROIDECTOMY       Family History   Problem Relation Age of Onset    Diabetes Mother     Other Mother         Stroke syndrome    Diabetes Father     Emphysema Father       reports that he has quit smoking  He has never used smokeless tobacco  He reports current alcohol use  He reports that he does not use drugs  Physical Exam:   Vitals:    06/07/21 1027   BP: 108/64   BP Location: Left arm   Patient Position: Sitting   Cuff Size: Standard   Pulse: 74   Resp: 16   Weight: 84 8 kg (187 lb)   Height: 5' 11" (1 803 m)     Body mass index is 26 08 kg/m²      General: conscious, cooperative, in not acute distress  Eyes: conjunctivae pink, anicteric sclerae  ENT: lips and mucous membranes moist  Neck: supple, no JVD  Chest: clear breath sounds bilateral, no crackles, ronchus or wheezings  CVS: distinct S1 & S2, normal rate, regular rhythm  Abdomen: soft, non-tender, non-distended, normoactive bowel sounds  Back: no CVA tenderness  Extremities: no edema of both legs  Skin: no rash  Neuro: awake, alert, oriented          Lab Results:  Results for orders placed or performed in visit on 05/14/21   CBC   Result Value Ref Range    WBC 7 08 4 31 - 10 16 Thousand/uL    RBC 4 01 3 88 - 5 62 Million/uL    Hemoglobin 12 3 12 0 - 17 0 g/dL    Hematocrit 38 2 36 5 - 49 3 %    MCV 95 82 - 98 fL    MCH 30 7 26 8 - 34 3 pg    MCHC 32 2 31 4 - 37 4 g/dL    RDW 13 3 11 6 - 15 1 %    Platelets 712 517 - 724 Thousands/uL    MPV 9 7 8 9 - 12 7 fL   PTH, intact   Result Value Ref Range    PTH 96 8 (H) 18 4 - 80 1 pg/mL   Renal function panel   Result Value Ref Range    Albumin 3 5 3 5 - 5 0 g/dL    Calcium 9 3 8 3 - 10 1 mg/dL    Phosphorus 2 8 2 3 - 4 1 mg/dL    BUN 36 (H) 5 - 25 mg/dL    Creatinine 2 13 (H) 0 60 - 1 30 mg/dL    Sodium 140 136 - 145 mmol/L    Potassium 4 5 3 5 - 5 3 mmol/L    Chloride 110 (H) 100 - 108 mmol/L    CO2 24 21 - 32 mmol/L    ANION GAP 6 4 - 13 mmol/L    eGFR 26 ml/min/1 73sq m    Glucose, Fasting 187 (H) 65 - 99 mg/dL   Protein / creatinine ratio, urine   Result Value Ref Range    Creatinine, Ur 95 3 mg/dL    Protein Urine Random 35 mg/dL    Prot/Creat Ratio, Ur 0 37 (H) 0 00 - 0 10   Hemoglobin A1C   Result Value Ref Range    Hemoglobin A1C 8 1 (H) Normal 3 8-5 6%; PreDiabetic 5 7-6 4%; Diabetic >=6 5%; Glycemic control for adults with diabetes <7 0% %     mg/dl       Portions of the record may have been created with voice recognition software  Occasional wrong word or "sound a like" substitutions may have occurred due to the inherent limitations of voice recognition software   Read the chart carefully and recognize, using context, where substitutions have occurred  If you have any questions, please contact the dictating provider

## 2021-06-07 NOTE — PATIENT INSTRUCTIONS
As we discussed in the office visit, your kidney function remains stable  I would like to see you back in the office in 5 months with repeat blood and urine test   Follow low-salt diet  Stay well-hydrated  No changes in medication at this moment  Continue regular follow-up primary care doctor  Okay to take Tylenol or paracetamol or acetaminophen as needed for pain

## 2021-08-04 ENCOUNTER — REMOTE DEVICE CLINIC VISIT (OUTPATIENT)
Dept: CARDIOLOGY CLINIC | Facility: CLINIC | Age: 86
End: 2021-08-04
Payer: MEDICARE

## 2021-08-04 DIAGNOSIS — Z95.818 PRESENCE OF OTHER CARDIAC IMPLANTS AND GRAFTS: Primary | ICD-10-CM

## 2021-08-04 PROCEDURE — G2066 INTER DEVC REMOTE 30D: HCPCS | Performed by: INTERNAL MEDICINE

## 2021-08-04 PROCEDURE — 93298 REM INTERROG DEV EVAL SCRMS: CPT | Performed by: INTERNAL MEDICINE

## 2021-08-04 NOTE — PROGRESS NOTES
Results for orders placed or performed in visit on 08/04/21   Cardiac EP device report    Narrative    MDT LOOP/ ACTIVE SYSTEM IS MRI CONDITIONAL  CARELINK TRANSMISSION: BATTERY STATUS "OK " NO DEVICE DETECTED & NO PT ACTIVATED EPISODES  PRESENTING RHYTHM NSR W/ PVC'S  NORMAL DEVICE FUNCTION   GV

## 2021-08-06 DIAGNOSIS — N18.4 CKD (CHRONIC KIDNEY DISEASE), STAGE IV (HCC): ICD-10-CM

## 2021-08-06 RX ORDER — LISINOPRIL 10 MG/1
TABLET ORAL
Qty: 180 TABLET | Refills: 0 | Status: SHIPPED | OUTPATIENT
Start: 2021-08-06 | End: 2021-10-14

## 2021-08-10 NOTE — PROGRESS NOTES
Cardiology Follow Up    Sp Garcia  12/11/1930  [id number]  [facility] [place]  [address] [facility] [address]  [address]  [phone]    1  Essential (primary) hypertension     2  Pure hypercholesterolemia     3  Presence of cardiac device     4  TIA (transient ischemic attack)     5  SAAB (dyspnea on exertion)         Interval History: Patient is here for a follow-up visit  [facility] has a prior history of hypertension, hyperlipidemia and TIA  [facility] was hospitalized at the [facility] in June 2020   At that time,  MRI of the brain showed 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   Carotid Doppler showed noncritical disease  ILR was recommended by Neurology at that time and was implanted July 2020   Most recent interrogation of his device done 8/2021, demonstrated no significant arrhythmia  He has NSR with PVCs   Echocardiogram done June 2020 demonstrated preserved LV systolic function with mild LVH and mild ELIZABETH   There was mild to moderate tricuspid and pulmonic regurgitation  A lipid profile done August 2020 demonstrated total cholesterol of 92 with an HDL of 31 and a calculated LDL of 48  Patient's blood pressure is low today  I have asked him to cut his lisinopril to 10 mg once a day  He does have a blood pressure cuff at home and will recheck his blood pressure in about two weeks and call me if there is an issue  He has had no chest pain or significant dyspnea  He has rare lightheadedness  Patient Active Problem List   Diagnosis    Healthcare maintenance    Controlled diabetes mellitus ([address] )    Enlarged prostate without lower urinary tract symptoms (luts)    Hyperlipidemia    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 (HCC)    Palpitations    CKD stage 4 due to type 2 diabetes mellitus (HCC)    Overweight (BMI 25 0-29  9)    Secondary hyperparathyroidism of renal origin ([facility] )    Embolic bilateral punctate CVA, acute as well as subacute    Essential hypertension    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HCC)     Past Medical History:   Diagnosis Date    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)     Last Assessed:  5/19/17    Chronic kidney disease     Diabetes mellitus ([facility] )     Hyperlipidemia     Hypertension     Osteoarthritis     Palpitations     TIA (transient ischemic attack)      Social History     Socioeconomic History    Marital status: Single     Spouse name: Not on file    Number of children: Not on file    Years of education: Not on file    Highest education level: Not on file   Occupational History    Not on file   Tobacco Use    Smoking status: Former Smoker    Smokeless tobacco: Never Used   Vaping Use    Vaping Use: Never used   Substance and Sexual Activity    Alcohol use: Yes     Comment: Social drinker    Drug use: No    Sexual activity: Not Currently   Other Topics Concern    Not on file   Social History Narrative    Daily coffee consumption (2 cups/day)     Social Determinants of Health     Financial Resource Strain:     Difficulty of Paying Living Expenses:    Food Insecurity: No Food Insecurity    Worried About Running Out of Food in the Last Year: Never true    Tha of Food in the Last Year: Never true   Transportation Needs: No Transportation Needs    Lack of Transportation (Medical): No    Lack of Transportation (Non-Medical):  No   Physical Activity:     Days of Exercise per Week:     Minutes of Exercise per Session:    Stress:     Feeling of Stress :    Social Connections:     Frequency of Communication with Friends and Family:     Frequency of Social Gatherings with Friends and Family:     Attends Baptism Services:     Active Member of Clubs or Organizations:     Attends Club or Organization Meetings:     Marital Status:    Intimate Partner Violence:     Fear of Current or Ex-Partner:     Emotionally Abused:     Physically Abused:     Sexually Abused:       Family History   Problem Relation Age of Onset    Diabetes Mother     Other Mother         Stroke syndrome    Diabetes Father     Emphysema Father      Past Surgical History:   Procedure Laterality Date    APPENDECTOMY      COLONOSCOPY  2012    HEMORROIDECTOMY         Current Outpatient Medications:     amLODIPine (NORVASC) 5 mg tablet, TAKE ONE TABLET BY MOUTH EVERY DAY, Disp: 90 tablet, Rfl: 3    aspirin 81 mg chewable tablet, Chew 1 tablet (81 mg total) daily, Disp: 30 tablet, Rfl: 0    atorvastatin (LIPITOR) 40 mg tablet, Take 1 tablet (40 mg total) by mouth daily with dinner, Disp: 90 tablet, Rfl: 1    Blood Glucose Monitoring Suppl (PRECISION XTRA MONITOR) MARY, by Does not apply route daily, Disp: 1 each, Rfl: 0    Cholecalciferol (CVS VITAMIN D3) 1000 units capsule, Take 1 capsule by mouth daily, Disp: , Rfl:     clopidogrel (PLAVIX) 75 mg tablet, Take 1 tablet (75 mg total) by mouth daily, Disp: 90 tablet, Rfl: 3    cyanocobalamin (VITAMIN B-12) 500 mcg tablet, Take 1 tablet by mouth daily, Disp: , Rfl:     glimepiride (AMARYL) 2 mg tablet, Take 1 tablet (2 mg total) by mouth daily with breakfast, Disp: 90 tablet, Rfl: 3    lisinopril (ZESTRIL) 10 mg tablet, TAKE 1 TABLET TWICE DAILY, Disp: 180 tablet, Rfl: 0    magnesium oxide (MAG-OX) 400 mg, Take 400 mg by mouth daily, Disp: , Rfl:     Omega-3 Fatty Acids (OMEGA-3 FISH OIL) 300 MG CAPS, Take 1 capsule by mouth daily, Disp: , Rfl:     PRECISION XTRA TEST STRIPS test strip, USE TO TEST BLOOD GLUCOSE ONCE A DAY, Disp: 100 strip, Rfl: 2    terazosin (HYTRIN) 1 mg capsule, TAKE 1 CAPSULE AT BEDTIME, Disp: 90 capsule, Rfl: 3    CINNAMON PO, Take 1 capsule by mouth daily  (Patient not taking: Reported on 8/20/2021), Disp: , Rfl:     clotrimazole-betamethasone (LOTRISONE) 1-0 05 % cream, Apply topically 2 (two) times a day (Patient not taking: Reported on 8/20/2021), Disp: 30 g, Rfl: 0  No Known Allergies    Labs:not applicable  Imaging: Cardiac EP device report    Result Date: 8/4/2021  Narrative: MDT LOOP/ ACTIVE SYSTEM IS MRI CONDITIONAL CARELINK TRANSMISSION: BATTERY STATUS "OK " NO DEVICE DETECTED & NO PT ACTIVATED EPISODES  PRESENTING RHYTHM NSR W/ PVC'S  NORMAL DEVICE FUNCTION  GV       Review of Systems:  Review of Systems   All other systems reviewed and are negative  Physical Exam:  BP (!) 96/42 (BP Location: Left arm, Patient Position: Sitting, Cuff Size: Adult)   Pulse 80   Ht 5' 11" (1 803 m)   Wt 84 4 kg (186 lb)   BMI 25 94 kg/m²   Physical Exam  Vitals reviewed  Constitutional:       Appearance: He is well-developed  HENT:      Head: Normocephalic and atraumatic  Eyes:      Conjunctiva/sclera: Conjunctivae normal       Pupils: Pupils are equal, round, and reactive to light  Cardiovascular:      Rate and Rhythm: Normal rate  Heart sounds: Normal heart sounds  Pulmonary:      Effort: Pulmonary effort is normal       Breath sounds: Normal breath sounds  Musculoskeletal:      Cervical back: Normal range of motion and neck supple  Skin:     General: Skin is warm and dry  Neurological:      Mental Status: He is alert and oriented to person, place, and time  Discussion/Summary:  Have asked patient to reduce lisinopril to once a day  He will recheck his blood pressure and call me  If it is still low we will likely discontinue amlodipine  I have asked him to call in general if there is a problem in the interim otherwise I will see him in follow-up in six months time

## 2021-08-20 ENCOUNTER — OFFICE VISIT (OUTPATIENT)
Dept: CARDIOLOGY CLINIC | Facility: CLINIC | Age: 86
End: 2021-08-20
Payer: MEDICARE

## 2021-08-20 VITALS
BODY MASS INDEX: 26.04 KG/M2 | HEIGHT: 71 IN | HEART RATE: 80 BPM | DIASTOLIC BLOOD PRESSURE: 42 MMHG | SYSTOLIC BLOOD PRESSURE: 96 MMHG | WEIGHT: 186 LBS

## 2021-08-20 DIAGNOSIS — R06.00 DOE (DYSPNEA ON EXERTION): ICD-10-CM

## 2021-08-20 DIAGNOSIS — Z95.818 PRESENCE OF CARDIAC DEVICE: ICD-10-CM

## 2021-08-20 DIAGNOSIS — E11.9 TYPE 2 DIABETES MELLITUS WITHOUT COMPLICATION, WITHOUT LONG-TERM CURRENT USE OF INSULIN (HCC): ICD-10-CM

## 2021-08-20 DIAGNOSIS — I10 ESSENTIAL (PRIMARY) HYPERTENSION: Primary | ICD-10-CM

## 2021-08-20 DIAGNOSIS — E78.00 PURE HYPERCHOLESTEROLEMIA: ICD-10-CM

## 2021-08-20 DIAGNOSIS — G45.9 TIA (TRANSIENT ISCHEMIC ATTACK): ICD-10-CM

## 2021-08-20 PROCEDURE — 99214 OFFICE O/P EST MOD 30 MIN: CPT | Performed by: INTERNAL MEDICINE

## 2021-08-20 RX ORDER — BLOOD SUGAR DIAGNOSTIC
STRIP MISCELLANEOUS
Qty: 100 STRIP | Refills: 2 | Status: SHIPPED | OUTPATIENT
Start: 2021-08-20 | End: 2022-05-30

## 2021-08-20 NOTE — PATIENT INSTRUCTIONS
Decrease lisinopril to once a day  Take the morning dose and do not take the evening dose  Check your blood pressure in two weeks and call me if it is a problem  I will see you in follow-up in six months time

## 2021-09-15 ENCOUNTER — APPOINTMENT (OUTPATIENT)
Dept: LAB | Facility: CLINIC | Age: 86
End: 2021-09-15
Payer: MEDICARE

## 2021-09-15 DIAGNOSIS — Z79.4 DIABETES MELLITUS DUE TO UNDERLYING CONDITION, CONTROLLED, WITH DIABETIC NEPHROPATHY, WITH LONG-TERM CURRENT USE OF INSULIN (HCC): ICD-10-CM

## 2021-09-15 DIAGNOSIS — I10 ESSENTIAL HYPERTENSION: ICD-10-CM

## 2021-09-15 DIAGNOSIS — E08.21 DIABETES MELLITUS DUE TO UNDERLYING CONDITION, CONTROLLED, WITH DIABETIC NEPHROPATHY, WITH LONG-TERM CURRENT USE OF INSULIN (HCC): ICD-10-CM

## 2021-09-15 LAB
ANION GAP SERPL CALCULATED.3IONS-SCNC: 4 MMOL/L (ref 4–13)
BUN SERPL-MCNC: 28 MG/DL (ref 5–25)
CALCIUM SERPL-MCNC: 8.3 MG/DL (ref 8.3–10.1)
CHLORIDE SERPL-SCNC: 110 MMOL/L (ref 100–108)
CHOLEST SERPL-MCNC: 83 MG/DL (ref 50–200)
CO2 SERPL-SCNC: 24 MMOL/L (ref 21–32)
CREAT SERPL-MCNC: 2 MG/DL (ref 0.6–1.3)
EST. AVERAGE GLUCOSE BLD GHB EST-MCNC: 166 MG/DL
GFR SERPL CREATININE-BSD FRML MDRD: 29 ML/MIN/1.73SQ M
GLUCOSE P FAST SERPL-MCNC: 134 MG/DL (ref 65–99)
HBA1C MFR BLD: 7.4 %
HDLC SERPL-MCNC: 39 MG/DL
LDLC SERPL CALC-MCNC: 35 MG/DL (ref 0–100)
NONHDLC SERPL-MCNC: 44 MG/DL
POTASSIUM SERPL-SCNC: 4.5 MMOL/L (ref 3.5–5.3)
SODIUM SERPL-SCNC: 138 MMOL/L (ref 136–145)
TRIGL SERPL-MCNC: 45 MG/DL

## 2021-09-15 PROCEDURE — 80061 LIPID PANEL: CPT

## 2021-09-15 PROCEDURE — 83036 HEMOGLOBIN GLYCOSYLATED A1C: CPT

## 2021-09-15 PROCEDURE — 36415 COLL VENOUS BLD VENIPUNCTURE: CPT

## 2021-09-15 PROCEDURE — 80048 BASIC METABOLIC PNL TOTAL CA: CPT

## 2021-09-20 ENCOUNTER — OFFICE VISIT (OUTPATIENT)
Dept: INTERNAL MEDICINE CLINIC | Facility: CLINIC | Age: 86
End: 2021-09-20
Payer: MEDICARE

## 2021-09-20 VITALS
DIASTOLIC BLOOD PRESSURE: 34 MMHG | HEART RATE: 95 BPM | OXYGEN SATURATION: 98 % | BODY MASS INDEX: 25.62 KG/M2 | HEIGHT: 71 IN | SYSTOLIC BLOOD PRESSURE: 114 MMHG | TEMPERATURE: 98.1 F | WEIGHT: 183 LBS

## 2021-09-20 DIAGNOSIS — I63.40 CEREBROVASCULAR ACCIDENT (CVA) DUE TO EMBOLISM OF CEREBRAL ARTERY (HCC): ICD-10-CM

## 2021-09-20 DIAGNOSIS — E11.9 TYPE 2 DIABETES MELLITUS WITHOUT COMPLICATION, WITHOUT LONG-TERM CURRENT USE OF INSULIN (HCC): ICD-10-CM

## 2021-09-20 DIAGNOSIS — Z23 ENCOUNTER FOR IMMUNIZATION: ICD-10-CM

## 2021-09-20 DIAGNOSIS — I10 ESSENTIAL HYPERTENSION: ICD-10-CM

## 2021-09-20 DIAGNOSIS — E08.21 DIABETES MELLITUS DUE TO UNDERLYING CONDITION, CONTROLLED, WITH DIABETIC NEPHROPATHY, WITH LONG-TERM CURRENT USE OF INSULIN (HCC): ICD-10-CM

## 2021-09-20 DIAGNOSIS — I12.9 BENIGN HYPERTENSION WITH CKD (CHRONIC KIDNEY DISEASE) STAGE IV (HCC): ICD-10-CM

## 2021-09-20 DIAGNOSIS — E78.5 HYPERLIPIDEMIA, UNSPECIFIED HYPERLIPIDEMIA TYPE: ICD-10-CM

## 2021-09-20 DIAGNOSIS — N18.4 BENIGN HYPERTENSION WITH CKD (CHRONIC KIDNEY DISEASE) STAGE IV (HCC): ICD-10-CM

## 2021-09-20 DIAGNOSIS — H61.23 BILATERAL IMPACTED CERUMEN: Primary | ICD-10-CM

## 2021-09-20 DIAGNOSIS — Z79.4 DIABETES MELLITUS DUE TO UNDERLYING CONDITION, CONTROLLED, WITH DIABETIC NEPHROPATHY, WITH LONG-TERM CURRENT USE OF INSULIN (HCC): ICD-10-CM

## 2021-09-20 PROCEDURE — 99214 OFFICE O/P EST MOD 30 MIN: CPT | Performed by: INTERNAL MEDICINE

## 2021-09-20 PROCEDURE — 69210 REMOVE IMPACTED EAR WAX UNI: CPT | Performed by: INTERNAL MEDICINE

## 2021-09-20 PROCEDURE — G0008 ADMIN INFLUENZA VIRUS VAC: HCPCS | Performed by: INTERNAL MEDICINE

## 2021-09-20 PROCEDURE — 90662 IIV NO PRSV INCREASED AG IM: CPT | Performed by: INTERNAL MEDICINE

## 2021-09-20 NOTE — ASSESSMENT & PLAN NOTE
Lab Results   Component Value Date    EGFR 29 09/15/2021    EGFR 26 05/14/2021    EGFR 31 01/08/2021    CREATININE 2 00 (H) 09/15/2021    CREATININE 2 13 (H) 05/14/2021    CREATININE 1 88 (H) 01/08/2021    patient does have a history of hypertension with chronic kidney disease stage 4  He did have labs performed prior to the visit today and there has been a slight improvement in his renal function  Patient continues to follow-up with nephrology  They are watching him closely  He has had no changes with medication recently  Will continue present medication and surveillance

## 2021-09-20 NOTE — PROGRESS NOTES
Ear cerumen removal    Date/Time: 9/20/2021 1:49 PM  Performed by: Leonor Elliott DO  Authorized by: Leonor Elliott DO   Universal Protocol:  Consent: Verbal consent obtained  Consent given by: parent  Patient understanding: patient states understanding of the procedure being performed  Patient consent: the patient's understanding of the procedure matches consent given  Procedure consent: procedure consent matches procedure scheduled  Relevant documents: relevant documents not present or verified  Test results: test results not available  Site marked: the operative site was not marked  Radiology Images displayed and confirmed  If images not available, report reviewed: imaging studies not available  Patient identity confirmed: verbally with patient      Patient location:  Clinic  Indications / Diagnosis:   decreased auditory acuity secondary to impacted cerumen bilateral  Procedure details:     Local anesthetic:  None    Location:  L ear and R ear    Procedure type: irrigation with instrumentation      Instrumentation: curette      Approach:  External    Visualization (free text):   severely impacted cerumen bilaterally with lavage gentle and curette cerumen was disimpacted  Tolerated procedure well  Equipment used:    Curette and lavage  Post-procedure details:     Complication:  None    Hearing quality:  Improved    Patient tolerance of procedure:   Tolerated well, no immediate complications

## 2021-09-20 NOTE — ASSESSMENT & PLAN NOTE
Blood pressure is controlled  Patient will continue present medication and surveillance    He continues to follow-up with his nephrologist

## 2021-09-20 NOTE — ASSESSMENT & PLAN NOTE
On evaluation today patient was noted to have impacted cerumen bilaterally to external auditory canals  With gentle lavage and curette large amount cerumen was disimpacted  Patient tolerated procedure well, postprocedure tympanic membranes are intact and external canals shown no abnormalities    Patient states his hearing has improved

## 2021-09-20 NOTE — ASSESSMENT & PLAN NOTE
Lab Results   Component Value Date    HGBA1C 7 4 (H) 09/15/2021     Longstanding history of diabetes  As noted his hemoglobin A1c is showing improvement and is now down to 7 4 from 8 1  Patient states he is being more conscientious about his diet him eating more appropriately  Patient continue present surveillance  He has had no episodes of hypoglycemia but this is a concern with the patient being on Amaryl which can cause hypoglycemia    Patient is up-to-date with diabetic foot exam and eye exam

## 2021-09-20 NOTE — PROGRESS NOTES
Assessment/Plan:    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 ([facility])  Lab Results   Component Value Date    EGFR 29 09/15/2021    EGFR 26 05/14/2021    EGFR 31 01/08/2021    CREATININE 2 00 (H) 09/15/2021    CREATININE 2 13 (H) 05/14/2021    CREATININE 1 88 (H) 01/08/2021    patient does have a history of hypertension with chronic kidney disease stage 4  He did have labs performed prior to the visit today and there has been a slight improvement in his renal function  Patient continues to follow-up with nephrology  They are watching him closely  He has had no changes with medication recently  Will continue present medication and surveillance  Controlled diabetes mellitus ([facility] )    Lab Results   Component Value Date    HGBA1C 7 4 (H) 09/15/2021     Longstanding history of diabetes  As noted his hemoglobin A1c is showing improvement and is now down to 7 4 from 8 1  Patient states he is being more conscientious about his diet him eating more appropriately  Patient continue present surveillance  He has had no episodes of hypoglycemia but this is a concern with the patient being on Amaryl which can cause hypoglycemia  Patient is up-to-date with diabetic foot exam and eye exam     Essential hypertension    Blood pressure is controlled  Patient will continue present medication and surveillance  He continues to follow-up with his nephrologist    Hyperlipidemia   History of hyperlipidemia, CVA in the past   Patient's blood pressure showing adequate control with present treatment  Patient will continue present medication and surveillance and Nephrology continues to monitor his renal function  Bilateral impacted cerumen   On evaluation today patient was noted to have impacted cerumen bilaterally to external auditory canals  With gentle lavage and curette large amount cerumen was disimpacted    Patient tolerated procedure well, postprocedure tympanic membranes are intact and external canals shown no abnormalities  Patient states his hearing has improved       Diagnoses and all orders for this visit:    Encounter for immunization  -     influenza vaccine, high-dose, PF 0 7 mL (FLUZONE HIGH-DOSE)    Type 2 diabetes mellitus without complication, without long-term current use of insulin ([facility])  -     Hemoglobin A1C; Future    Hyperlipidemia, unspecified hyperlipidemia type  -     Lipid panel; Future    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 ([facility])  -     Basic metabolic panel; Future  -     UA (URINE) with reflex to Scope; Future  -     Lipid panel; Future    Diabetes mellitus due to underlying condition, controlled, with diabetic nephropathy, with long-term current use of insulin ([facility] )    Essential hypertension    Bilateral impacted cerumen          Subjective:      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  Patient is a 25-year-old male history of multiple medical problems including hypertension, diabetes mellitus type 2, hyperlipidemia, history of TIA in the past, chronic kidney disease stage 4  Patient is here today for routine follow-up and he did have labs performed prior to the visit today and we did discuss the results  Patient states in general he is doing about same and he has no new specific complaints or concerns  He denies any chest pain or pressure no increasing shortness of breath with exertion  Patient has been working on his diet and he states he is making better food choices and his sugars are showing better control  The following portions of the patient's history were reviewed and updated as appropriate:   He  has a past medical history of 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), Chronic kidney disease, Diabetes mellitus ([facility] ), Hyperlipidemia, Hypertension, Osteoarthritis, Palpitations, and TIA (transient ischemic attack)    He   Patient Active Problem List    Diagnosis Date Noted    Bilateral impacted cerumen 09/20/2021    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[place] [address] ) 98/41/3152    Embolic bilateral punctate CVA, 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/26/2020    Essential hypertension 06/26/2020    Secondary hyperparathyroidism of renal origin ([place] [address] ) 04/25/2019    Overweight (BMI 25 0-29 9) 01/25/2019    CKD stage 4 due to type 2 diabetes mellitus ([place] [address] ) 09/27/2018    Palpitations 09/25/2018    Healthcare maintenance 05/25/2018    Enlarged prostate without lower urinary tract symptoms (luts) 03/07/2013    Controlled diabetes mellitus ([place] [address] ) 08/09/2012    Hyperlipidemia 08/09/2012    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 ([place] [address] ) 08/09/2012     He  has a past surgical history that includes Colonoscopy (2012); Hemorroidectomy; and Appendectomy  His family history includes Diabetes in his father and mother; Emphysema in his father; Other in his mother  He  reports that he has quit smoking  He has never used smokeless tobacco  He reports current alcohol use  He reports that he does not use drugs    Current Outpatient Medications   Medication Sig Dispense Refill    amLODIPine (NORVASC) 5 mg tablet TAKE ONE TABLET BY MOUTH EVERY DAY 90 tablet 3    aspirin 81 mg chewable tablet Chew 1 tablet (81 mg total) daily 30 tablet 0    atorvastatin (LIPITOR) 40 mg tablet Take 1 tablet (40 mg total) by mouth daily with dinner 90 tablet 1    Blood Glucose Monitoring Suppl (PRECISION XTRA MONITOR) MARY by Does not apply route daily 1 each 0    Cholecalciferol (CVS VITAMIN D3) 1000 units capsule Take 1 capsule by mouth daily      clopidogrel (PLAVIX) 75 mg tablet Take 1 tablet (75 mg total) by mouth daily 90 tablet 3    cyanocobalamin (VITAMIN B-12) 500 mcg tablet Take 1 tablet by mouth daily      glimepiride (AMARYL) 2 mg tablet Take 1 tablet (2 mg total) by mouth daily with breakfast 90 tablet 3    lisinopril (ZESTRIL) 10 mg tablet TAKE 1 TABLET TWICE DAILY (Patient taking differently: Take 10 mg by mouth daily ) 180 tablet 0    magnesium oxide (MAG-OX) 400 mg Take 400 mg by mouth daily      Omega-3 Fatty Acids (OMEGA-3 FISH OIL) 300 MG CAPS Take 1 capsule by mouth daily      PRECISION XTRA TEST STRIPS test strip USE TO TEST BLOOD GLUCOSE ONCE A  strip 2    terazosin (HYTRIN) 1 mg capsule TAKE 1 CAPSULE AT BEDTIME 90 capsule 3    CINNAMON PO Take 1 capsule by mouth daily  (Patient not taking: Reported on 8/20/2021)      clotrimazole-betamethasone (LOTRISONE) 1-0 05 % cream Apply topically 2 (two) times a day (Patient not taking: Reported on 8/20/2021) 30 g 0     No current facility-administered medications for this visit       Current Outpatient Medications on File Prior to Visit   Medication Sig    amLODIPine (NORVASC) 5 mg tablet TAKE ONE TABLET BY MOUTH EVERY DAY    aspirin 81 mg chewable tablet Chew 1 tablet (81 mg total) daily    atorvastatin (LIPITOR) 40 mg tablet Take 1 tablet (40 mg total) by mouth daily with dinner    Blood Glucose Monitoring Suppl (PRECISION XTRA MONITOR) MARY by Does not apply route daily    Cholecalciferol (CVS VITAMIN D3) 1000 units capsule Take 1 capsule by mouth daily    clopidogrel (PLAVIX) 75 mg tablet Take 1 tablet (75 mg total) by mouth daily    cyanocobalamin (VITAMIN B-12) 500 mcg tablet Take 1 tablet by mouth daily    glimepiride (AMARYL) 2 mg tablet Take 1 tablet (2 mg total) by mouth daily with breakfast    lisinopril (ZESTRIL) 10 mg tablet TAKE 1 TABLET TWICE DAILY (Patient taking differently: Take 10 mg by mouth daily )    magnesium oxide (MAG-OX) 400 mg Take 400 mg by mouth daily    Omega-3 Fatty Acids (OMEGA-3 FISH OIL) 300 MG CAPS Take 1 capsule by mouth daily    PRECISION XTRA TEST STRIPS test strip USE TO TEST BLOOD GLUCOSE ONCE A DAY    terazosin (HYTRIN) 1 mg capsule TAKE 1 CAPSULE AT BEDTIME    CINNAMON PO Take 1 capsule by mouth daily  (Patient not taking: Reported on 8/20/2021)    clotrimazole-betamethasone (LOTRISONE) 1-0 05 % cream Apply topically 2 (two) times a day (Patient not taking: Reported on 8/20/2021)     No current facility-administered medications on file prior to visit  He has No Known Allergies       Review of Systems   Constitutional: Negative  HENT: Negative  Eyes: Negative  Respiratory: Negative  Cardiovascular: Negative  Gastrointestinal: Negative  Endocrine: Negative  Genitourinary: Negative  Musculoskeletal: Negative  Skin: Negative  Neurological: Negative  Hematological: Negative  Psychiatric/Behavioral: Negative  Objective:      BP (!) 114/34   Pulse 95   Temp 98 1 °F (36 7 °C)   Ht 5' 11" (1 803 m)   Wt 83 kg (183 lb)   SpO2 98%   BMI 25 52 kg/m²          Physical Exam  Vitals and nursing note reviewed  Constitutional:       General: He is not in acute distress  Appearance: Normal appearance  He is not ill-appearing, toxic-appearing or diaphoretic  Comments: Roberto Stewart articulate mildly overweight 80-year-old male who is awake alert no acute distress and oriented x3   HENT:      Head: Normocephalic and atraumatic  Right Ear: There is impacted cerumen  Left Ear: There is impacted cerumen  Ears:      Comments: The on examination patient has bilaterally impacted cerumen  With gentle lavage and curette large amount of cerumen was disimpacted from external canals  Postprocedure his hearing has improved tympanic membranes were intact and no irritation to the external canals  Nose: Nose normal  No congestion or rhinorrhea  Mouth/Throat:      Mouth: Mucous membranes are moist       Pharynx: Oropharyngeal exudate ( thick whitish mucus to posterior airway slight erythema) and posterior oropharyngeal erythema present  Eyes:      General: No scleral icterus  Right eye: No discharge  Left eye: No discharge  Extraocular Movements: Extraocular movements intact  Pupils: Pupils are equal, round, and reactive to light  Neck:      Vascular: No carotid bruit  Cardiovascular:      Rate and Rhythm: Normal rate and regular rhythm  Pulses: Normal pulses  Heart sounds: Normal heart sounds  No murmur heard  No friction rub  No gallop  Pulmonary:      Effort: Pulmonary effort is normal  No respiratory distress  Breath sounds: Normal breath sounds  No stridor  No wheezing, rhonchi or rales  Chest:      Chest wall: No tenderness  Abdominal:      General: Bowel sounds are normal  There is no distension  Palpations: Abdomen is soft  There is no mass  Tenderness: There is no abdominal tenderness  There is no right CVA tenderness, left CVA tenderness, guarding or rebound  Hernia: No hernia is present  Comments: Overweight   Musculoskeletal:         General: Deformity present  No swelling, tenderness or signs of injury  Normal range of motion  Cervical back: Normal range of motion and neck supple  No rigidity or tenderness  Right lower leg: No edema  Left lower leg: No edema  Lymphadenopathy:      Cervical: No cervical adenopathy  Skin:     General: Skin is warm and dry  Capillary Refill: Capillary refill takes less than 2 seconds  Coloration: Skin is not jaundiced or pale  Findings: No bruising, erythema, lesion or rash  Neurological:      General: No focal deficit present  Mental Status: He is alert and oriented to person, place, and time  Mental status is at baseline  Cranial Nerves: No cranial nerve deficit  Sensory: No sensory deficit  Motor: No weakness  Coordination: Coordination normal       Gait: Gait normal       Deep Tendon Reflexes: Reflexes normal    Psychiatric:         Mood and Affect: Mood normal          Behavior: Behavior normal          Thought Content:  Thought content normal          Judgment: Judgment normal

## 2021-09-21 RX ORDER — CLOPIDOGREL BISULFATE 75 MG/1
TABLET ORAL
Qty: 90 TABLET | Refills: 3 | Status: SHIPPED | OUTPATIENT
Start: 2021-09-21 | End: 2022-05-25

## 2021-09-22 DIAGNOSIS — E78.5 HYPERLIPIDEMIA, UNSPECIFIED HYPERLIPIDEMIA TYPE: ICD-10-CM

## 2021-09-22 DIAGNOSIS — I63.40 CEREBROVASCULAR ACCIDENT (CVA) DUE TO EMBOLISM OF CEREBRAL ARTERY (HCC): ICD-10-CM

## 2021-09-23 RX ORDER — ATORVASTATIN CALCIUM 40 MG/1
TABLET, FILM COATED ORAL
Qty: 90 TABLET | Refills: 1 | Status: SHIPPED | OUTPATIENT
Start: 2021-09-23 | End: 2022-02-14

## 2021-10-13 DIAGNOSIS — N18.4 CKD (CHRONIC KIDNEY DISEASE), STAGE IV (HCC): ICD-10-CM

## 2021-10-14 RX ORDER — LISINOPRIL 10 MG/1
TABLET ORAL
Qty: 180 TABLET | Refills: 0 | Status: SHIPPED | OUTPATIENT
Start: 2021-10-14 | End: 2022-01-26

## 2021-10-23 DIAGNOSIS — N18.30 CONTROLLED TYPE 2 DIABETES MELLITUS WITH STAGE 3 CHRONIC KIDNEY DISEASE, WITHOUT LONG-TERM CURRENT USE OF INSULIN (HCC): ICD-10-CM

## 2021-10-23 DIAGNOSIS — E11.22 CONTROLLED TYPE 2 DIABETES MELLITUS WITH STAGE 3 CHRONIC KIDNEY DISEASE, WITHOUT LONG-TERM CURRENT USE OF INSULIN (HCC): ICD-10-CM

## 2021-10-25 RX ORDER — GLIMEPIRIDE 2 MG/1
TABLET ORAL
Qty: 90 TABLET | Refills: 3 | Status: SHIPPED | OUTPATIENT
Start: 2021-10-25

## 2021-11-04 ENCOUNTER — REMOTE DEVICE CLINIC VISIT (OUTPATIENT)
Dept: CARDIOLOGY CLINIC | Facility: CLINIC | Age: 86
End: 2021-11-04
Payer: MEDICARE

## 2021-11-04 DIAGNOSIS — Z95.818 PRESENCE OF OTHER CARDIAC IMPLANTS AND GRAFTS: Primary | ICD-10-CM

## 2021-11-04 PROCEDURE — G2066 INTER DEVC REMOTE 30D: HCPCS | Performed by: INTERNAL MEDICINE

## 2021-11-04 PROCEDURE — 93298 REM INTERROG DEV EVAL SCRMS: CPT | Performed by: INTERNAL MEDICINE

## 2021-12-02 ENCOUNTER — APPOINTMENT (OUTPATIENT)
Dept: LAB | Facility: CLINIC | Age: 86
End: 2021-12-02
Payer: MEDICARE

## 2021-12-02 DIAGNOSIS — N18.4 BENIGN HYPERTENSION WITH CKD (CHRONIC KIDNEY DISEASE) STAGE IV (HCC): ICD-10-CM

## 2021-12-02 DIAGNOSIS — N18.4 CKD STAGE 4 DUE TO TYPE 2 DIABETES MELLITUS (HCC): ICD-10-CM

## 2021-12-02 DIAGNOSIS — I12.9 BENIGN HYPERTENSION WITH CKD (CHRONIC KIDNEY DISEASE) STAGE IV (HCC): ICD-10-CM

## 2021-12-02 DIAGNOSIS — E78.5 HYPERLIPIDEMIA, UNSPECIFIED HYPERLIPIDEMIA TYPE: ICD-10-CM

## 2021-12-02 DIAGNOSIS — E11.9 TYPE 2 DIABETES MELLITUS WITHOUT COMPLICATION, WITHOUT LONG-TERM CURRENT USE OF INSULIN (HCC): ICD-10-CM

## 2021-12-02 DIAGNOSIS — E11.22 CKD STAGE 4 DUE TO TYPE 2 DIABETES MELLITUS (HCC): ICD-10-CM

## 2021-12-02 LAB
ALBUMIN SERPL BCP-MCNC: 3.5 G/DL (ref 3.5–5)
ANION GAP SERPL CALCULATED.3IONS-SCNC: 7 MMOL/L (ref 4–13)
BACTERIA UR QL AUTO: ABNORMAL /HPF
BILIRUB UR QL STRIP: NEGATIVE
BUN SERPL-MCNC: 34 MG/DL (ref 5–25)
CALCIUM SERPL-MCNC: 8.9 MG/DL (ref 8.3–10.1)
CHLORIDE SERPL-SCNC: 109 MMOL/L (ref 100–108)
CHOLEST SERPL-MCNC: 89 MG/DL
CLARITY UR: ABNORMAL
CO2 SERPL-SCNC: 23 MMOL/L (ref 21–32)
COLOR UR: YELLOW
CREAT SERPL-MCNC: 2.12 MG/DL (ref 0.6–1.3)
CREAT UR-MCNC: 84.2 MG/DL
ERYTHROCYTE [DISTWIDTH] IN BLOOD BY AUTOMATED COUNT: 13.3 % (ref 11.6–15.1)
GFR SERPL CREATININE-BSD FRML MDRD: 27 ML/MIN/1.73SQ M
GLUCOSE P FAST SERPL-MCNC: 121 MG/DL (ref 65–99)
GLUCOSE UR STRIP-MCNC: NEGATIVE MG/DL
HCT VFR BLD AUTO: 37.3 % (ref 36.5–49.3)
HDLC SERPL-MCNC: 36 MG/DL
HGB BLD-MCNC: 11.9 G/DL (ref 12–17)
HGB UR QL STRIP.AUTO: NEGATIVE
HYALINE CASTS #/AREA URNS LPF: ABNORMAL /LPF
KETONES UR STRIP-MCNC: NEGATIVE MG/DL
LDLC SERPL CALC-MCNC: 42 MG/DL (ref 0–100)
LEUKOCYTE ESTERASE UR QL STRIP: ABNORMAL
MCH RBC QN AUTO: 30.7 PG (ref 26.8–34.3)
MCHC RBC AUTO-ENTMCNC: 31.9 G/DL (ref 31.4–37.4)
MCV RBC AUTO: 96 FL (ref 82–98)
NITRITE UR QL STRIP: NEGATIVE
NON-SQ EPI CELLS URNS QL MICRO: ABNORMAL /HPF
NONHDLC SERPL-MCNC: 53 MG/DL
PH UR STRIP.AUTO: 6.5 [PH]
PHOSPHATE SERPL-MCNC: 3.1 MG/DL (ref 2.3–4.1)
PLATELET # BLD AUTO: 194 THOUSANDS/UL (ref 149–390)
PMV BLD AUTO: 9.9 FL (ref 8.9–12.7)
POTASSIUM SERPL-SCNC: 4.3 MMOL/L (ref 3.5–5.3)
PROT UR STRIP-MCNC: ABNORMAL MG/DL
PROT UR-MCNC: 27 MG/DL
PROT/CREAT UR: 0.32 MG/G{CREAT} (ref 0–0.1)
PTH-INTACT SERPL-MCNC: 88.3 PG/ML (ref 18.4–80.1)
RBC # BLD AUTO: 3.88 MILLION/UL (ref 3.88–5.62)
RBC #/AREA URNS AUTO: ABNORMAL /HPF
SODIUM SERPL-SCNC: 139 MMOL/L (ref 136–145)
SP GR UR STRIP.AUTO: 1.01 (ref 1–1.03)
TRIGL SERPL-MCNC: 56 MG/DL
UROBILINOGEN UR QL STRIP.AUTO: 1 E.U./DL
WBC # BLD AUTO: 7.26 THOUSAND/UL (ref 4.31–10.16)
WBC #/AREA URNS AUTO: ABNORMAL /HPF

## 2021-12-02 PROCEDURE — 81001 URINALYSIS AUTO W/SCOPE: CPT

## 2021-12-02 PROCEDURE — 80069 RENAL FUNCTION PANEL: CPT

## 2021-12-02 PROCEDURE — 85027 COMPLETE CBC AUTOMATED: CPT

## 2021-12-02 PROCEDURE — 80061 LIPID PANEL: CPT

## 2021-12-02 PROCEDURE — 82570 ASSAY OF URINE CREATININE: CPT

## 2021-12-02 PROCEDURE — 83036 HEMOGLOBIN GLYCOSYLATED A1C: CPT

## 2021-12-02 PROCEDURE — 83970 ASSAY OF PARATHORMONE: CPT

## 2021-12-02 PROCEDURE — 84156 ASSAY OF PROTEIN URINE: CPT

## 2021-12-02 PROCEDURE — 36415 COLL VENOUS BLD VENIPUNCTURE: CPT

## 2021-12-03 LAB
EST. AVERAGE GLUCOSE BLD GHB EST-MCNC: 169 MG/DL
HBA1C MFR BLD: 7.5 %

## 2021-12-13 ENCOUNTER — OFFICE VISIT (OUTPATIENT)
Dept: NEPHROLOGY | Facility: CLINIC | Age: 86
End: 2021-12-13
Payer: MEDICARE

## 2021-12-13 VITALS
HEART RATE: 62 BPM | DIASTOLIC BLOOD PRESSURE: 58 MMHG | HEIGHT: 71 IN | BODY MASS INDEX: 26.23 KG/M2 | RESPIRATION RATE: 16 BRPM | WEIGHT: 187.4 LBS | SYSTOLIC BLOOD PRESSURE: 118 MMHG

## 2021-12-13 DIAGNOSIS — N25.81 SECONDARY HYPERPARATHYROIDISM OF RENAL ORIGIN (HCC): ICD-10-CM

## 2021-12-13 DIAGNOSIS — I12.9 BENIGN HYPERTENSION WITH CKD (CHRONIC KIDNEY DISEASE) STAGE IV (HCC): ICD-10-CM

## 2021-12-13 DIAGNOSIS — I10 ESSENTIAL HYPERTENSION: ICD-10-CM

## 2021-12-13 DIAGNOSIS — N40.0 ENLARGED PROSTATE WITHOUT LOWER URINARY TRACT SYMPTOMS (LUTS): ICD-10-CM

## 2021-12-13 DIAGNOSIS — N18.4 BENIGN HYPERTENSION WITH CKD (CHRONIC KIDNEY DISEASE) STAGE IV (HCC): ICD-10-CM

## 2021-12-13 DIAGNOSIS — E11.22 CKD STAGE 4 DUE TO TYPE 2 DIABETES MELLITUS (HCC): Primary | ICD-10-CM

## 2021-12-13 DIAGNOSIS — N18.4 CKD STAGE 4 DUE TO TYPE 2 DIABETES MELLITUS (HCC): Primary | ICD-10-CM

## 2021-12-13 PROCEDURE — 99214 OFFICE O/P EST MOD 30 MIN: CPT | Performed by: INTERNAL MEDICINE

## 2022-01-10 ENCOUNTER — HOSPITAL ENCOUNTER (EMERGENCY)
Facility: HOSPITAL | Age: 87
Discharge: HOME | End: 2022-01-10
Attending: EMERGENCY MEDICINE | Admitting: EMERGENCY MEDICINE
Payer: MEDICARE

## 2022-01-10 ENCOUNTER — APPOINTMENT (EMERGENCY)
Dept: CT IMAGING | Facility: HOSPITAL | Age: 87
End: 2022-01-10
Payer: MEDICARE

## 2022-01-10 VITALS
DIASTOLIC BLOOD PRESSURE: 89 MMHG | HEART RATE: 78 BPM | OXYGEN SATURATION: 97 % | RESPIRATION RATE: 16 BRPM | SYSTOLIC BLOOD PRESSURE: 142 MMHG | TEMPERATURE: 98.1 F

## 2022-01-10 DIAGNOSIS — S09.90XA INJURY OF HEAD, INITIAL ENCOUNTER: ICD-10-CM
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
## 2022-01-10 DIAGNOSIS — S51.011A SKIN TEAR OF RIGHT ELBOW WITHOUT COMPLICATION: ICD-10-CM

## 2022-01-10 PROCEDURE — 70450 CT HEAD/BRAIN W/O DYE: CPT

## 2022-01-10 PROCEDURE — 99282 EMERGENCY DEPT VISIT SF MDM: CPT | Performed by: EMERGENCY MEDICINE

## 2022-01-10 PROCEDURE — 93005 ELECTROCARDIOGRAM TRACING: CPT

## 2022-01-10 PROCEDURE — 99284 EMERGENCY DEPT VISIT MOD MDM: CPT

## 2022-01-10 NOTE — ED PROVIDER NOTES
Emergency Department Trauma Note  Albert Jolly 80 y o  male MRN: [id number]  Unit/Bed#: FRANCISCA POOL/FRANCISCA Encounter: [id number]      Trauma Alert: Trauma Acuity: Trauma Evaluation  Model of Arrival: Mode of Arrival: Direct from scene via    Trauma Team: Current Providers  Attending Provider: Sola Marquez DO  Registered Nurse: Mango Gaspar RN  Consultants: none      History of Present Illness     Chief Complaint:   Chief Complaint   Patient presents with    Fall     fell out of bed last night  Skin tear on right elbow and patient states he bumped head on carpeted floor  Patient states he is taking clopidigrel  HPI:  Albert Jolly is a 80 y o  male who presents with fall  Mechanism:Details of Incident: fell out of bed onto carpeted surface Injury Date: 01/10/22 Injury Time: 0300 Injury Occurence Location - [address] [place] [address] Way: [facility]    Patient presents by private vehicle with fall last night around 430 in morning at home  GCS 15  Patient accidentally rolled out of bed and bumped head on nightstand  He does not have headache, did not pass out  He also bumped his right elbow at that has been bleeding with a skin tear, unknown last tetanus vaccine  Patient is on blood thinner clopidogrel  No tenderness at spine, chest wall, or pelvis  Review of Systems   Constitutional: Negative for chills and fever  HENT: Negative for rhinorrhea and sore throat  Respiratory: Negative for shortness of breath  Cardiovascular: Negative for chest pain  Gastrointestinal: Negative for constipation, diarrhea, nausea and vomiting  Genitourinary: Negative for dysuria and frequency  Skin: Negative for rash  All other systems reviewed and are negative        Historical Information     Immunizations:   Immunization History   Administered Date(s) Administered    COVID-19 MODERNA VACC 0 5 ML IM 01/27/2021, 02/22/2021    INFLUENZA 11/01/2002, 01/05/2003, 11/24/2003, 10/06/2004, 11/07/2005, 10/21/2006, 09/28/2007, 10/10/2008, 10/20/2008, 11/04/2009, 10/05/2010, 10/03/2011, 11/04/2012    Influenza Split High Dose Preservative Free IM 09/27/2013, 09/25/2014, 09/29/2015, 09/23/2016, 09/19/2017    Influenza, high dose seasonal 0 7 mL 09/25/2018, 10/15/2020, 09/20/2021    Pneumococcal 01/01/1999    Pneumococcal Conjugate 13-Valent 04/25/2019    Pneumococcal Polysaccharide PPV23 10/01/2006    TD (adult) Preservative Free 12/11/1930       Past Medical History:   Diagnosis Date    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)     Last Assessed:  5/19/17    Chronic kidney disease     Diabetes mellitus ([facility] [address] )     Hyperlipidemia     Hypertension     Osteoarthritis     Palpitations     TIA (transient ischemic attack)        Family History   Problem Relation Age of Onset    Diabetes Mother     Other Mother         Stroke syndrome    Diabetes Father     Emphysema Father      Past Surgical History:   Procedure Laterality Date    APPENDECTOMY      COLONOSCOPY  2012    HEMORROIDECTOMY       Social History     Tobacco Use    Smoking status: Former Smoker    Smokeless tobacco: Never Used   Vaping Use    Vaping Use: Never used   Substance Use Topics    Alcohol use: Yes     Comment: Social drinker    Drug use: No     E-Cigarette/Vaping    E-Cigarette Use Never User      E-Cigarette/Vaping Substances       Family History: non-contributory    Meds/Allergies   Prior to Admission Medications   Prescriptions Last Dose Informant Patient Reported? Taking?    Blood Glucose Monitoring Suppl (PRECISION XTRA MONITOR) MARY  Self No No   Sig: by Does not apply route daily   CINNAMON PO  Self Yes No   Sig: Take 1 capsule by mouth daily    Patient not taking: Reported on 8/20/2021   Cholecalciferol ([employer] VITAMIN D3) 1000 units capsule  Self Yes No   Sig: Take 1 capsule by mouth daily   Omega-3 Fatty Acids (OMEGA-3 FISH OIL) 300 MG CAPS  Self Yes No   Sig: Take 1 capsule by mouth daily   PRECISION XTRA TEST STRIPS test strip   No No Sig: USE TO TEST BLOOD GLUCOSE ONCE A DAY   amLODIPine (NORVASC) 5 mg tablet   No No   Sig: TAKE ONE TABLET BY MOUTH EVERY DAY   aspirin 81 mg chewable tablet  Self No No   Sig: Chew 1 tablet (81 mg total) daily   atorvastatin (LIPITOR) 40 mg tablet   No No   Sig: TAKE 1 TABLET EVERY DAY WITH DINNER   clopidogrel (PLAVIX) 75 mg tablet   No No   Sig: TAKE 1 TABLET BY MOUTH DAILY   clotrimazole-betamethasone (LOTRISONE) 1-0 05 % cream  Self No No   Sig: Apply topically 2 (two) times a day   Patient not taking: Reported on 8/20/2021   cyanocobalamin (VITAMIN B-12) 500 mcg tablet  Self Yes No   Sig: Take 1 tablet by mouth daily   glimepiride (AMARYL) 2 mg tablet   No No   Sig: TAKE 1 TABLET BY MOUTH DAILY WITH BREAKFAST   lisinopril (ZESTRIL) 10 mg tablet   No No   Sig: TAKE 1 TABLET TWICE DAILY   magnesium oxide (MAG-OX) 400 mg  Self Yes No   Sig: Take 400 mg by mouth daily   terazosin (HYTRIN) 1 mg capsule   No No   Sig: TAKE 1 CAPSULE AT BEDTIME      Facility-Administered Medications: None       No Known Allergies    PHYSICAL EXAM    PE limited by: nothing    Objective   Vitals:   First set: Temperature: 98 1 °F (36 7 °C) (01/10/22 1840)  Pulse: 81 (01/10/22 1840)  Respirations: 18 (01/10/22 1840)  Blood Pressure: (!) 212/98 (01/10/22 1840)  SpO2: 95 % (01/10/22 1840)    Primary Survey:   (A) Airway: patent  (B) Breathing: clear  (C) Circulation: Pulses:   normal  (D) Disabliity:  GCS Total:  15  (E) Expose:  Completed    Secondary Survey: (Click on Physical Exam tab above)  Physical Exam  Vitals and nursing note reviewed  Constitutional:       Appearance: He is well-developed  HENT:      Head: Normocephalic  Right Ear: External ear normal       Left Ear: External ear normal       Nose: Nose normal    Eyes:      Conjunctiva/sclera: Conjunctivae normal       Pupils: Pupils are equal, round, and reactive to light  Cardiovascular:      Rate and Rhythm: Normal rate and regular rhythm        Heart sounds: Normal heart sounds  Pulmonary:      Effort: Pulmonary effort is normal  No respiratory distress  Breath sounds: Normal breath sounds  No wheezing  Abdominal:      General: Bowel sounds are normal  There is no distension  Palpations: Abdomen is soft  Tenderness: There is no abdominal tenderness  Musculoskeletal:         General: No deformity  Normal range of motion  Left elbow: No swelling or deformity  Normal range of motion  No tenderness  Arms:       Cervical back: Normal range of motion and neck supple  No bony tenderness  No spinous process tenderness  Thoracic back: No bony tenderness  Lumbar back: No bony tenderness  Skin:     General: Skin is warm and dry  Findings: No rash  Neurological:      General: No focal deficit present  Mental Status: He is alert  GCS: GCS eye subscore is 4  GCS verbal subscore is 5  GCS motor subscore is 6  Sensory: No sensory deficit  Psychiatric:         Mood and Affect: Mood normal          Cervical spine cleared by clinical criteria? Yes     Invasive Devices  Report    None                 Lab Results:   Results Reviewed     None                 Imaging Studies:   Direct to CT: Yes  TRAUMA - CT head wo contrast   Final Result by Alpheus Dance, MD (01/10 1940)      No acute intracranial abnormality                    Workstation performed: [id number]               Procedures  ECG 12 Lead Documentation Only    Date/Time: 1/10/2022 7:40 PM  Performed by: Allen Villalpando DO  Authorized by: Allen Villalpnado DO     Indications / Diagnosis:  Fall on blood thinners  ECG reviewed by me, the ED Provider: yes    Patient location:  ED  Previous ECG:     Previous ECG:  Compared to current    Comparison ECG info:  2020    Similarity:  Changes noted  Interpretation:     Interpretation: non-specific    Rate:     ECG rate:  85    ECG rate assessment: normal    Rhythm:     Rhythm: sinus rhythm    Ectopy:     Ectopy: none    QRS: QRS axis:  Normal    QRS intervals:  Normal  Conduction:     Conduction: abnormal      Abnormal conduction: 1st degree and non-specific intraventricular conduction delay    ST segments:     ST segments:  Normal  T waves:     T waves: normal      Laceration repair    Date/Time: 1/10/2022 5:10 PM  Performed by: Esteban Schmidt DO  Authorized by: Esteban Schmidt DO   Consent: Verbal consent obtained  Risks and benefits: risks, benefits and alternatives were discussed  Consent given by: patient  Patient understanding: patient states understanding of the procedure being performed  Patient identity confirmed: verbally with patient  Body area: upper extremity  Location details: right elbow  Laceration length: 3 (skin tear) cm  Tendon involvement: none    Wound Dehiscence:  Superficial Wound Dehiscence: simple closure      Procedure Details:  Irrigation solution: tap water  Irrigation method: tap  Amount of cleaning: extensive  Wound skin closure material used: steri-strips  Dressing: gauze roll  Patient tolerance: Patient tolerated the procedure well with no immediate complications               ED Course           MDM  Number of Diagnoses or Management Options  Fall, initial encounter: new and requires workup  Injury of head, initial encounter: new and requires workup  Skin tear of right elbow without complication: new and does not require workup     Amount and/or Complexity of Data Reviewed  Tests in the radiology section of CPT®: ordered and reviewed    Patient Progress  Patient progress: improved          Disposition  [facility] Transfer: No  Final diagnoses:   Fall, initial encounter   Injury of head, initial encounter   Skin tear of right elbow without complication - initial encounter     Time reflects when diagnosis was documented in both MDM as applicable and the Disposition within this note     Time User Action Codes Description Comment    1/10/2022  7:41 PM Tammy Reeves  XXXA] Fall, initial encounter 1/10/2022  7:41 PM Tony Knowles Add [T47 62MS] Injury of head, initial encounter     1/10/2022  7:41 PM Tony Knowles Add [K43 701W] Skin tear of right elbow without complication     8/27/7534  7:41 PM Tony Knowles Modify [J59 [id number]] Skin tear of right elbow without complication initial encounter      ED Disposition     ED Disposition Condition Date/Time Comment    Discharge Stable Mon [place] 10, 2022  7:41 PM Chio Lewis discharge to home/self care              Follow-up Information    None       Discharge Medication List as of 1/10/2022  7:43 PM      CONTINUE these medications which have NOT CHANGED    Details   amLODIPine (NORVASC) 5 mg tablet TAKE ONE TABLET BY MOUTH EVERY DAY, Normal      aspirin 81 mg chewable tablet Chew 1 tablet (81 mg total) daily, Starting Tue 6/30/2020, Normal      atorvastatin (LIPITOR) 40 mg tablet TAKE 1 TABLET EVERY DAY WITH DINNER, Normal      Blood Glucose Monitoring Suppl (PRECISION XTRA MONITOR) MARY by Does not apply route daily, Starting Thu 1/16/2020, Normal      Cholecalciferol ([employer] VITAMIN D3) 1000 units capsule Take 1 capsule by mouth daily, Historical Med      CINNAMON PO Take 1 capsule by mouth daily , Historical Med      clopidogrel (PLAVIX) 75 mg tablet TAKE 1 TABLET BY MOUTH DAILY, Normal      clotrimazole-betamethasone (LOTRISONE) 1-0 05 % cream Apply topically 2 (two) times a day, Starting Fri 6/26/2020, Normal      cyanocobalamin (VITAMIN B-12) 500 mcg tablet Take 1 tablet by mouth daily, Historical Med      glimepiride (AMARYL) 2 mg tablet TAKE 1 TABLET BY MOUTH DAILY WITH BREAKFAST, Normal      lisinopril (ZESTRIL) 10 mg tablet TAKE 1 TABLET TWICE DAILY, Normal      magnesium oxide (MAG-OX) 400 mg Take 400 mg by mouth daily, Historical Med      Omega-3 Fatty Acids (OMEGA-3 FISH OIL) 300 MG CAPS Take 1 capsule by mouth daily, Historical Med      PRECISION XTRA TEST STRIPS test strip USE TO TEST BLOOD GLUCOSE ONCE A DAY, Normal      terazosin (HYTRIN) 1 mg capsule TAKE 1 CAPSULE AT BEDTIME, Normal           No discharge procedures on file      PDMP Review     None          ED Provider  Electronically Signed by         Carrol Staples DO  01/11/22 5997

## 2022-01-11 LAB
ATRIAL RATE: 85 BPM
P AXIS: 70 DEGREES
PR INTERVAL: 220 MS
QRS AXIS: 89 DEGREES
QRSD INTERVAL: 130 MS
QT INTERVAL: 390 MS
QTC INTERVAL: 464 MS
T WAVE AXIS: 19 DEGREES
VENTRICULAR RATE: 85 BPM

## 2022-01-11 PROCEDURE — 93010 ELECTROCARDIOGRAM REPORT: CPT | Performed by: INTERNAL MEDICINE

## 2022-01-21 ENCOUNTER — OFFICE VISIT (OUTPATIENT)
Dept: INTERNAL MEDICINE CLINIC | Facility: CLINIC | Age: 87
End: 2022-01-21
Payer: MEDICARE

## 2022-01-21 VITALS
DIASTOLIC BLOOD PRESSURE: 56 MMHG | OXYGEN SATURATION: 98 % | HEIGHT: 71 IN | BODY MASS INDEX: 26.18 KG/M2 | WEIGHT: 187 LBS | TEMPERATURE: 96.8 F | HEART RATE: 91 BPM | SYSTOLIC BLOOD PRESSURE: 140 MMHG

## 2022-01-21 DIAGNOSIS — I10 ESSENTIAL HYPERTENSION: ICD-10-CM

## 2022-01-21 DIAGNOSIS — E08.21 DIABETES MELLITUS DUE TO UNDERLYING CONDITION, CONTROLLED, WITH DIABETIC NEPHROPATHY, WITH LONG-TERM CURRENT USE OF INSULIN (HCC): Primary | ICD-10-CM

## 2022-01-21 DIAGNOSIS — S51.011D SKIN TEAR OF RIGHT ELBOW WITHOUT COMPLICATION, SUBSEQUENT ENCOUNTER: ICD-10-CM

## 2022-01-21 DIAGNOSIS — Z00.00 HEALTHCARE MAINTENANCE: ICD-10-CM

## 2022-01-21 DIAGNOSIS — N25.81 SECONDARY HYPERPARATHYROIDISM OF RENAL ORIGIN (HCC): ICD-10-CM

## 2022-01-21 DIAGNOSIS — I77.9 DISORDER OF CAROTID ARTERY (HCC): ICD-10-CM

## 2022-01-21 DIAGNOSIS — Z79.4 DIABETES MELLITUS DUE TO UNDERLYING CONDITION, CONTROLLED, WITH DIABETIC NEPHROPATHY, WITH LONG-TERM CURRENT USE OF INSULIN (HCC): Primary | ICD-10-CM

## 2022-01-21 DIAGNOSIS — E11.22 CKD STAGE 4 DUE TO TYPE 2 DIABETES MELLITUS (HCC): ICD-10-CM

## 2022-01-21 DIAGNOSIS — E66.3 OVERWEIGHT (BMI 25.0-29.9): Chronic | ICD-10-CM

## 2022-01-21 DIAGNOSIS — N18.4 CKD STAGE 4 DUE TO TYPE 2 DIABETES MELLITUS (HCC): ICD-10-CM

## 2022-01-21 PROBLEM — S51.011A SKIN TEAR OF RIGHT ELBOW WITHOUT COMPLICATION: Status: ACTIVE | Noted: 2022-01-21

## 2022-01-21 PROCEDURE — 99214 OFFICE O/P EST MOD 30 MIN: CPT | Performed by: INTERNAL MEDICINE

## 2022-01-21 PROCEDURE — G0439 PPPS, SUBSEQ VISIT: HCPCS | Performed by: INTERNAL MEDICINE

## 2022-01-21 PROCEDURE — 1123F ACP DISCUSS/DSCN MKR DOCD: CPT | Performed by: INTERNAL MEDICINE

## 2022-01-21 NOTE — ASSESSMENT & PLAN NOTE
Patient did have accidental fall at a bed recently  Skin tear to right elbow  Was seen in the emergency room for evaluation  They placed Steri-Strips and there is no evidence of infection with normal healing    During his ER admission they did CT scan of the head to make sure there was no intercerebral bleeding which was negative

## 2022-01-21 NOTE — PROGRESS NOTES
Assessment/Plan:    Healthcare maintenance  Patient is a 68-year-old male history of multiple medical problems as outlined previously was here today for repeat Medicare wellness visit  Over the past few months he has had extensive lab testing performed both through our office and through his nephrologist   Patient states in general he is doing relatively well but was seen in the emergency room recently after fall the that home with a skin tear to his right elbow which is now healing no signs of infection  He denies any chest pain or pressure and no increasing shortness of breath  He states continues to he keep physically active but admits that he does not have the strength and stamina that he did when he was younger  He did go for who physical exam today in the office with no significant abnormalities noted on exam from previous  Patient will continue present medication and surveillance  We will check a fasting blood sugar, hemoglobin A1c with his next visit  He is up-to-date diabetic screening the including seeing a podiatrist and ophthalmologist   Patient also was up-to-date with COVID virus immunizations  Controlled diabetes mellitus ([facility] )  Patient does have stable control of his diabetes hemoglobin A1c 7 5  He continues to check his blood sugar readings at home and admits that he is not perfect as far as diet is concerned  Again as noted up-to-date with diabetic foot exam and eye exam   Patient will have another check a fasting blood sugar hemoglobin A1c when he returns to the office in 4 months  Lab Results   Component Value Date    HGBA1C 7 5 (H) 12/02/2021       CKD stage 4 due to type 2 diabetes mellitus ([facility] )  Continues to follow-up with nephrology and we did discuss his last exam with their office  They feel the patient's renal function is stable  The made no changes or adjustment with medication    The we did discuss again the importance of hydration with the patient  Lab Results   Component Value Date    HGBA1C 7 5 (H) 12/02/2021       Secondary hyperparathyroidism of renal origin ([facility] )  Continues to be monitored by Nephrology    Essential hypertension  Patient does have a longstanding history of benign essential hypertension  He continues as previously with medication and blood pressure is under acceptable control today in the office  Again along with his nephrologist we continue to monitor his renal function  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[facility] )  Continues to follow-up and have studies performed by vascular surgery  This time no progression of disease the  We will continue to optimize his cholesterol readings, blood pressure    Skin tear of right elbow without complication  Patient did have accidental fall at a bed recently  Skin tear to right elbow  Was seen in the emergency room for evaluation  They placed Steri-Strips and there is no evidence of infection with normal healing  During his ER admission they did CT scan of the head to make sure there was no intercerebral bleeding which was negative       Diagnoses and all orders for this visit:    Diabetes mellitus due to underlying condition, controlled, with diabetic nephropathy, with long-term current use of insulin ([facility] )  -     Hemoglobin A1C; Future    Essential hypertension  -     Basic metabolic panel; Future    CKD stage 4 due to type 2 diabetes mellitus ([facility] )    Healthcare maintenance    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HCC)    Secondary hyperparathyroidism of renal origin ([facility] )    Skin tear of right elbow without complication, subsequent encounter    Overweight (BMI 25 0-29  9)    BMI 26 0-26 9,adult          Subjective:      Patient ID: Coco Gomez is a 80 y o  male  Patient is a 27-year-old male history extensive medical problems as outlined previously who is here today for repeat Medicare wellness visit  Patient did have some labs performed prior the visit today we did discuss the results    Patient states in general he is doing well   Continues to follow-up with his nephrologist and other specialist   Reports recent fall at a better at home with skin tear to his right elbow now healing with no residual problems  The following portions of the patient's history were reviewed and updated as appropriate:   He  has a past medical history of 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), Chronic kidney disease, Diabetes mellitus ([facility] ), Hyperlipidemia, Hypertension, Osteoarthritis, Palpitations, and TIA (transient ischemic attack)  He   Patient Active Problem List    Diagnosis Date Noted    Skin tear of right elbow without complication 72/32/9775    Bilateral impacted cerumen 09/20/2021    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[place] [address] ) 88/89/4215    Embolic bilateral punctate CVA, 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/26/2020    Essential hypertension 06/26/2020    Secondary hyperparathyroidism of renal origin ([place] [address] ) 04/25/2019    Overweight (BMI 25 0-29 9) 01/25/2019    CKD stage 4 due to type 2 diabetes mellitus ([place] [address] ) 09/27/2018    Palpitations 09/25/2018    Healthcare maintenance 05/25/2018    Enlarged prostate without lower urinary tract symptoms (luts) 03/07/2013    Controlled diabetes mellitus ([place] [address] ) 08/09/2012    Hyperlipidemia 08/09/2012    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 ([place] [address] ) 08/09/2012     He  has a past surgical history that includes Colonoscopy (2012); Hemorroidectomy; and Appendectomy  His family history includes Diabetes in his father and mother; Emphysema in his father; Other in his mother  He  reports that he has quit smoking  He has never used smokeless tobacco  He reports current alcohol use  He reports that he does not use drugs    Current Outpatient Medications   Medication Sig Dispense Refill    amLODIPine (NORVASC) 5 mg tablet TAKE ONE TABLET BY MOUTH EVERY DAY 90 tablet 3    aspirin 81 mg chewable tablet Chew 1 tablet (81 mg total) daily 30 tablet 0    atorvastatin (LIPITOR) 40 mg tablet TAKE 1 TABLET EVERY DAY WITH DINNER 90 tablet 1    Blood Glucose Monitoring Suppl (PRECISION XTRA MONITOR) MARY by Does not apply route daily 1 each 0    Cholecalciferol (CVS VITAMIN D3) 1000 units capsule Take 1 capsule by mouth daily      clopidogrel (PLAVIX) 75 mg tablet TAKE 1 TABLET BY MOUTH DAILY 90 tablet 3    cyanocobalamin (VITAMIN B-12) 500 mcg tablet Take 1 tablet by mouth daily      glimepiride (AMARYL) 2 mg tablet TAKE 1 TABLET BY MOUTH DAILY WITH BREAKFAST 90 tablet 3    lisinopril (ZESTRIL) 10 mg tablet TAKE 1 TABLET TWICE DAILY 180 tablet 0    magnesium oxide (MAG-OX) 400 mg Take 400 mg by mouth daily      Omega-3 Fatty Acids (OMEGA-3 FISH OIL) 300 MG CAPS Take 1 capsule by mouth daily      PRECISION XTRA TEST STRIPS test strip USE TO TEST BLOOD GLUCOSE ONCE A  strip 2    terazosin (HYTRIN) 1 mg capsule TAKE 1 CAPSULE AT BEDTIME 90 capsule 3    CINNAMON PO Take 1 capsule by mouth daily  (Patient not taking: Reported on 8/20/2021)      clotrimazole-betamethasone (LOTRISONE) 1-0 05 % cream Apply topically 2 (two) times a day (Patient not taking: Reported on 8/20/2021) 30 g 0     No current facility-administered medications for this visit       Current Outpatient Medications on File Prior to Visit   Medication Sig    amLODIPine (NORVASC) 5 mg tablet TAKE ONE TABLET BY MOUTH EVERY DAY    aspirin 81 mg chewable tablet Chew 1 tablet (81 mg total) daily    atorvastatin (LIPITOR) 40 mg tablet TAKE 1 TABLET EVERY DAY WITH DINNER    Blood Glucose Monitoring Suppl (PRECISION XTRA MONITOR) MARY by Does not apply route daily    Cholecalciferol (CVS VITAMIN D3) 1000 units capsule Take 1 capsule by mouth daily    clopidogrel (PLAVIX) 75 mg tablet TAKE 1 TABLET BY MOUTH DAILY    cyanocobalamin (VITAMIN B-12) 500 mcg tablet Take 1 tablet by mouth daily    glimepiride (AMARYL) 2 mg tablet TAKE 1 TABLET BY MOUTH DAILY WITH BREAKFAST    lisinopril (ZESTRIL) 10 mg tablet TAKE 1 TABLET TWICE DAILY    magnesium oxide (MAG-OX) 400 mg Take 400 mg by mouth daily    Omega-3 Fatty Acids (OMEGA-3 FISH OIL) 300 MG CAPS Take 1 capsule by mouth daily    PRECISION XTRA TEST STRIPS test strip USE TO TEST BLOOD GLUCOSE ONCE A DAY    terazosin (HYTRIN) 1 mg capsule TAKE 1 CAPSULE AT BEDTIME    CINNAMON PO Take 1 capsule by mouth daily  (Patient not taking: Reported on 8/20/2021)    clotrimazole-betamethasone (LOTRISONE) 1-0 05 % cream Apply topically 2 (two) times a day (Patient not taking: Reported on 8/20/2021)     No current facility-administered medications on file prior to visit  He has No Known Allergies       Review of Systems   Constitutional: Negative  HENT: Negative  Eyes: Negative  Respiratory: Negative  Cardiovascular: Negative  Gastrointestinal: Negative  Endocrine: Negative  Genitourinary: Negative  Musculoskeletal: Negative  Skin: Positive for wound  Negative for color change, pallor and rash  Allergic/Immunologic: Negative  Neurological: Negative  Hematological: Negative  Psychiatric/Behavioral: Negative  Objective:      /56   Pulse 91   Temp (!) 96 8 °F (36 °C)   Ht 5' 11" (1 803 m)   Wt 84 8 kg (187 lb)   SpO2 98%   BMI 26 08 kg/m²          Physical Exam  Vitals and nursing note reviewed  Constitutional:       General: He is not in acute distress  Appearance: Normal appearance  He is not ill-appearing, toxic-appearing or diaphoretic  Comments: Pleasant cheerful overweight 26-year-old male who is awake alert in no acute distress and oriented x3   HENT:      Head: Normocephalic and atraumatic  Right Ear: Tympanic membrane, ear canal and external ear normal  There is no impacted cerumen  Left Ear: Tympanic membrane, ear canal and external ear normal  There is no impacted cerumen  Nose: Nose normal  No congestion or rhinorrhea  Mouth/Throat:      Mouth: Mucous membranes are moist       Pharynx: Oropharynx is clear  No oropharyngeal exudate or posterior oropharyngeal erythema  Eyes:      General: No scleral icterus  Right eye: No discharge  Left eye: No discharge  Extraocular Movements: Extraocular movements intact  Conjunctiva/sclera: Conjunctivae normal       Pupils: Pupils are equal, round, and reactive to light  Neck:      Vascular: No carotid bruit  Cardiovascular:      Rate and Rhythm: Normal rate and regular rhythm  Pulses: Normal pulses  Heart sounds: Normal heart sounds  No murmur heard  No friction rub  No gallop  Pulmonary:      Effort: Pulmonary effort is normal  No respiratory distress  Breath sounds: Normal breath sounds  No stridor  No wheezing, rhonchi or rales  Chest:      Chest wall: No tenderness  Abdominal:      General: Bowel sounds are normal  There is no distension  Palpations: Abdomen is soft  There is no mass  Tenderness: There is no abdominal tenderness  There is no right CVA tenderness, left CVA tenderness, guarding or rebound  Hernia: No hernia is present  Genitourinary:     Comments: Patient defers exam  Musculoskeletal:         General: Deformity present  No swelling, tenderness or signs of injury  Cervical back: Normal range of motion and neck supple  No rigidity or tenderness  Right lower leg: No edema  Left lower leg: No edema  Comments: Diffuse arthritic changes as previously with nothing acute   Lymphadenopathy:      Cervical: No cervical adenopathy  Skin:     Coloration: Skin is not jaundiced or pale  Findings: Lesion (Normal healing of skin tear to his right elbow  No signs of infection) present  No bruising, erythema or rash  Neurological:      General: No focal deficit present  Mental Status: He is alert and oriented to person, place, and time  Mental status is at baseline  Cranial Nerves: No cranial nerve deficit  Sensory: No sensory deficit  Motor: No weakness  Coordination: Coordination normal       Gait: Gait normal       Deep Tendon Reflexes: Reflexes normal    Psychiatric:         Mood and Affect: Mood normal          Behavior: Behavior normal          Thought Content: Thought content normal          Judgment: Judgment normal          BMI Counseling: Body mass index is 26 08 kg/m²  The BMI   BMI Counseling: Body mass index is 26 08 kg/m²  The BMI is above normal  No BMI follow-up plan is appropriate  Patient is 72 years old and weight reduction/weight gain would further complicate their underlying illness

## 2022-01-21 NOTE — ASSESSMENT & PLAN NOTE
Continues to follow-up with nephrology and we did discuss his last exam with their office  They feel the patient's renal function is stable  The made no changes or adjustment with medication    The we did discuss again the importance of hydration with the patient  Lab Results   Component Value Date    HGBA1C 7 5 (H) 12/02/2021

## 2022-01-21 NOTE — ASSESSMENT & PLAN NOTE
Patient does have a longstanding history of benign essential hypertension  He continues as previously with medication and blood pressure is under acceptable control today in the office  Again along with his nephrologist we continue to monitor his renal function

## 2022-01-21 NOTE — ASSESSMENT & PLAN NOTE
Patient is a 70-year-old male history of multiple medical problems as outlined previously was here today for repeat Medicare wellness visit  Over the past few months he has had extensive lab testing performed both through our office and through his nephrologist   Patient states in general he is doing relatively well but was seen in the emergency room recently after fall the that home with a skin tear to his right elbow which is now healing no signs of infection  He denies any chest pain or pressure and no increasing shortness of breath  He states continues to he keep physically active but admits that he does not have the strength and stamina that he did when he was younger  He did go for who physical exam today in the office with no significant abnormalities noted on exam from previous  Patient will continue present medication and surveillance  We will check a fasting blood sugar, hemoglobin A1c with his next visit  He is up-to-date diabetic screening the including seeing a podiatrist and ophthalmologist   Patient also was up-to-date with COVID virus immunizations

## 2022-01-21 NOTE — ASSESSMENT & PLAN NOTE
Patient does have stable control of his diabetes hemoglobin A1c 7 5  He continues to check his blood sugar readings at home and admits that he is not perfect as far as diet is concerned  Again as noted up-to-date with diabetic foot exam and eye exam   Patient will have another check a fasting blood sugar hemoglobin A1c when he returns to the office in 4 months    Lab Results   Component Value Date    HGBA1C 7 5 (H) 12/02/2021

## 2022-01-21 NOTE — PROGRESS NOTES
Assessment and Plan:     Problem List Items Addressed This Visit     None           Preventive health issues were discussed with patient, and age appropriate screening tests were ordered as noted in patient's After Visit Summary  Personalized health advice and appropriate referrals for health education or preventive services given if needed, as noted in patient's After Visit Summary  History of Present Illness:     Patient presents for Medicare Annual Wellness visit    Patient Care Team:  Eamon Cristobal DO as PCP - MD Eamon Shah DO     Problem List:     Patient Active Problem List   Diagnosis    Healthcare maintenance    Controlled diabetes mellitus ([facility] )    Enlarged prostate without lower urinary tract symptoms (luts)    Hyperlipidemia    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 ([facility] )    Palpitations    CKD stage 4 due to type 2 diabetes mellitus ([facility] )    Overweight (BMI 25 0-29  9)    Secondary hyperparathyroidism of renal origin ([facility] )    Embolic bilateral punctate CVA, acute as well as subacute    Essential hypertension    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HCC)    Bilateral impacted cerumen      Past Medical and Surgical History:     Past Medical History:   Diagnosis Date    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)     Last Assessed:  5/19/17    Chronic kidney disease     Diabetes mellitus ([facility] )     Hyperlipidemia     Hypertension     Osteoarthritis     Palpitations     TIA (transient ischemic attack)      Past Surgical History:   Procedure Laterality Date    APPENDECTOMY      COLONOSCOPY  2012    HEMORROIDECTOMY        Family History:     Family History   Problem Relation Age of Onset    Diabetes Mother     Other Mother         Stroke syndrome    Diabetes Father     Emphysema Father       Social History:     Social History     Socioeconomic History    Marital status: Single     Spouse name: None    Number of children: None    Years of education: None    Highest education level: None   Occupational History    None   Tobacco Use    Smoking status: Former Smoker    Smokeless tobacco: Never Used   Vaping Use    Vaping Use: Never used   Substance and Sexual Activity    Alcohol use: Yes     Comment: Social drinker    Drug use: No    Sexual activity: Not Currently   Other Topics Concern    None   Social History Narrative    Daily coffee consumption (2 cups/day)     Social Determinants of Health     Financial Resource Strain: Not on file   Food Insecurity: Not on file   Transportation Needs: Not on file   Physical Activity: Not on file   Stress: Not on file   Social Connections: Not on file   Intimate Partner Violence: Not on file   Housing Stability: Not on file      Medications and Allergies:     Current Outpatient Medications   Medication Sig Dispense Refill    amLODIPine (NORVASC) 5 mg tablet TAKE ONE TABLET BY MOUTH EVERY DAY 90 tablet 3    aspirin 81 mg chewable tablet Chew 1 tablet (81 mg total) daily 30 tablet 0    atorvastatin (LIPITOR) 40 mg tablet TAKE 1 TABLET EVERY DAY WITH DINNER 90 tablet 1    Blood Glucose Monitoring Suppl (PRECISION XTRA MONITOR) MARY by Does not apply route daily 1 each 0    Cholecalciferol (CVS VITAMIN D3) 1000 units capsule Take 1 capsule by mouth daily      clopidogrel (PLAVIX) 75 mg tablet TAKE 1 TABLET BY MOUTH DAILY 90 tablet 3    cyanocobalamin (VITAMIN B-12) 500 mcg tablet Take 1 tablet by mouth daily      glimepiride (AMARYL) 2 mg tablet TAKE 1 TABLET BY MOUTH DAILY WITH BREAKFAST 90 tablet 3    lisinopril (ZESTRIL) 10 mg tablet TAKE 1 TABLET TWICE DAILY 180 tablet 0    magnesium oxide (MAG-OX) 400 mg Take 400 mg by mouth daily      Omega-3 Fatty Acids (OMEGA-3 FISH OIL) 300 MG CAPS Take 1 capsule by mouth daily      PRECISION XTRA TEST STRIPS test strip USE TO TEST BLOOD GLUCOSE ONCE A  strip 2    terazosin (HYTRIN) 1 mg capsule TAKE 1 CAPSULE AT BEDTIME 90 capsule 3    CINNAMON PO Take 1 capsule by mouth daily  (Patient not taking: Reported on 8/20/2021)      clotrimazole-betamethasone (LOTRISONE) 1-0 05 % cream Apply topically 2 (two) times a day (Patient not taking: Reported on 8/20/2021) 30 g 0     No current facility-administered medications for this visit  No Known Allergies   Immunizations:     Immunization History   Administered Date(s) Administered    COVID-19 MODERNA VACC 0 5 ML IM 01/27/2021, 02/22/2021    INFLUENZA 11/01/2002, 01/05/2003, 11/24/2003, 10/06/2004, 11/07/2005, 10/21/2006, 09/28/2007, 10/10/2008, 10/20/2008, 11/04/2009, 10/05/2010, 10/03/2011, 11/04/2012    Influenza Split High Dose Preservative Free IM 09/27/2013, 09/25/2014, 09/29/2015, 09/23/2016, 09/19/2017    Influenza, high dose seasonal 0 7 mL 09/25/2018, 10/15/2020, 09/20/2021    Pneumococcal 01/01/1999    Pneumococcal Conjugate 13-Valent 04/25/2019    Pneumococcal Polysaccharide PPV23 10/01/2006    TD (adult) Preservative Free 12/[birth year]/1930      Health Maintenance: There are no preventive care reminders to display for this patient  Topic Date Due    DTaP,Tdap,and Td Vaccines (1 - Tdap) 12/11/1951    COVID-19 Vaccine (3 - Booster for Moderna series) 08/22/2021      Medicare Health Risk Assessment:     /56   Pulse 91   Temp (!) 96 8 °F (36 °C)   Ht 5' 11" (1 803 m)   Wt 84 8 kg (187 lb)   SpO2 98%   BMI 26 08 kg/m²      Rosalinda Sena is here for his Subsequent Wellness visit  Health Risk Assessment:   Patient rates overall health as very good  Patient feels that their physical health rating is slightly better  Patient is very satisfied with their life  Eyesight was rated as same  Hearing was rated as same  Patient feels that their emotional and mental health rating is much better  Patients states they are never, rarely angry  Patient states they are never, rarely unusually tired/fatigued  Pain experienced in the last 7 days has been none   Patient states that he has experienced no weight loss or gain in last 6 months  Depression Screening:   PHQ-2 Score: 0      Fall Risk Screening: In the past year, patient has experienced: no history of falling in past year      Home Safety:  Patient does not have trouble with stairs inside or outside of their home  Patient has working smoke alarms and has working carbon monoxide detector  Home safety hazards include: none  Nutrition:   Current diet is Low Carb, No Added Salt and Limited junk food  Medications:   Patient is not currently taking any over-the-counter supplements  Patient is able to manage medications  Activities of Daily Living (ADLs)/Instrumental Activities of Daily Living (IADLs):   Walk and transfer into and out of bed and chair?: Yes  Dress and groom yourself?: Yes    Bathe or shower yourself?: Yes    Feed yourself? Yes  Do your laundry/housekeeping?: Yes  Manage your money, pay your bills and track your expenses?: Yes  Make your own meals?: Yes    Do your own shopping?: Yes    Previous Hospitalizations:   Any hospitalizations or ED visits within the last 12 months?: Yes    How many hospitalizations have you had in the last year?: 1-2    Advance Care Planning:   Living will: Yes    Durable POA for healthcare:  Yes    Advanced directive: Yes      PREVENTIVE SCREENINGS      Cardiovascular Screening:    General: Screening Not Indicated and History Lipid Disorder      Diabetes Screening:     General: Screening Not Indicated and History Diabetes      Colorectal Cancer Screening:     General: Screening Not Indicated      Prostate Cancer Screening:    General: Screening Not Indicated      Abdominal Aortic Aneurysm (AAA) Screening:    Risk factors include: tobacco use        Lung Cancer Screening:     General: Screening Not Indicated    Screening, Brief Intervention, and Referral to Treatment (SBIRT)    Screening  Typical number of drinks in a day: 0  Typical number of drinks in a week: 0  Interpretation: Low risk drinking behavior  AUDIT-C Screenin) How often did you have a drink containing alcohol in the past year? monthly or less  2) How many drinks did you have on a typical day when you were drinking in the past year?  1 to 2  3) How often did you have 6 or more drinks on one occasion in the past year? never    AUDIT-C Score: 1  Interpretation: Score 0-3 (male): Negative screen for alcohol misuse    Single Item Drug Screening:  How often have you used an illegal drug (including marijuana) or a prescription medication for non-medical reasons in the past year? never    Single Item Drug Screen Score: 0  Interpretation: Negative screen for possible drug use disorder      Herrera Starks, DO

## 2022-01-21 NOTE — ASSESSMENT & PLAN NOTE
Continues to follow-up and have studies performed by vascular surgery  This time no progression of disease the    We will continue to optimize his cholesterol readings, blood pressure

## 2022-01-21 NOTE — ASSESSMENT & PLAN NOTE
With the patient's BMI he is considered overweight    Because of his underlying medical conditions he is not candidate for any modification of diet and or exercise

## 2022-01-26 DIAGNOSIS — N18.4 CKD (CHRONIC KIDNEY DISEASE), STAGE IV (HCC): ICD-10-CM

## 2022-01-26 RX ORDER — LISINOPRIL 10 MG/1
TABLET ORAL
Qty: 180 TABLET | Refills: 0 | Status: SHIPPED | OUTPATIENT
Start: 2022-01-26 | End: 2022-03-31

## 2022-02-08 ENCOUNTER — REMOTE DEVICE CLINIC VISIT (OUTPATIENT)
Dept: CARDIOLOGY CLINIC | Facility: CLINIC | Age: 87
End: 2022-02-08
Payer: MEDICARE

## 2022-02-08 DIAGNOSIS — Z95.818 PRESENCE OF OTHER CARDIAC IMPLANTS AND GRAFTS: Primary | ICD-10-CM

## 2022-02-08 PROCEDURE — 93298 REM INTERROG DEV EVAL SCRMS: CPT | Performed by: INTERNAL MEDICINE

## 2022-02-08 PROCEDURE — G2066 INTER DEVC REMOTE 30D: HCPCS | Performed by: INTERNAL MEDICINE

## 2022-02-08 NOTE — PROGRESS NOTES
Results for orders placed or performed in visit on 02/08/22   Cardiac EP device report    Narrative    MDT LOOP/ ACTIVE SYSTEM IS MRI CONDITIONAL  CARELINK TRANSMISSION: BATTERY STATUS "OK " 3 AF NOTED; 0% BURDEN  AVAIL EGRAMS PRESENT AS SR WITH PACS/PVCS; CAN'T EXCLUDE AF DUE TO BASELINE ARTIFACT  PT ON ASA  NORMAL DEVICE FUNCTION   NC         Current Outpatient Medications:     amLODIPine (NORVASC) 5 mg tablet, TAKE ONE TABLET BY MOUTH EVERY DAY, Disp: 90 tablet, Rfl: 3    aspirin 81 mg chewable tablet, Chew 1 tablet (81 mg total) daily, Disp: 30 tablet, Rfl: 0    atorvastatin (LIPITOR) 40 mg tablet, TAKE 1 TABLET EVERY DAY WITH DINNER, Disp: 90 tablet, Rfl: 1    Blood Glucose Monitoring Suppl (PRECISION XTRA MONITOR) MARY, by Does not apply route daily, Disp: 1 each, Rfl: 0    Cholecalciferol (CVS VITAMIN D3) 1000 units capsule, Take 1 capsule by mouth daily, Disp: , Rfl:     CINNAMON PO, Take 1 capsule by mouth daily  (Patient not taking: Reported on 8/20/2021), Disp: , Rfl:     clopidogrel (PLAVIX) 75 mg tablet, TAKE 1 TABLET BY MOUTH DAILY, Disp: 90 tablet, Rfl: 3    clotrimazole-betamethasone (LOTRISONE) 1-0 05 % cream, Apply topically 2 (two) times a day (Patient not taking: Reported on 8/20/2021), Disp: 30 g, Rfl: 0    cyanocobalamin (VITAMIN B-12) 500 mcg tablet, Take 1 tablet by mouth daily, Disp: , Rfl:     glimepiride (AMARYL) 2 mg tablet, TAKE 1 TABLET BY MOUTH DAILY WITH BREAKFAST, Disp: 90 tablet, Rfl: 3    lisinopril (ZESTRIL) 10 mg tablet, TAKE 1 TABLET TWICE DAILY, Disp: 180 tablet, Rfl: 0    magnesium oxide (MAG-OX) 400 mg, Take 400 mg by mouth daily, Disp: , Rfl:     Omega-3 Fatty Acids (OMEGA-3 FISH OIL) 300 MG CAPS, Take 1 capsule by mouth daily, Disp: , Rfl:     PRECISION XTRA TEST STRIPS test strip, USE TO TEST BLOOD GLUCOSE ONCE A DAY, Disp: 100 strip, Rfl: 2    terazosin (HYTRIN) 1 mg capsule, TAKE 1 CAPSULE AT BEDTIME, Disp: 90 capsule, Rfl: 3

## 2022-02-14 DIAGNOSIS — E78.5 HYPERLIPIDEMIA, UNSPECIFIED HYPERLIPIDEMIA TYPE: ICD-10-CM

## 2022-02-14 DIAGNOSIS — I63.40 CEREBROVASCULAR ACCIDENT (CVA) DUE TO EMBOLISM OF CEREBRAL ARTERY (HCC): ICD-10-CM

## 2022-02-14 RX ORDER — ATORVASTATIN CALCIUM 40 MG/1
TABLET, FILM COATED ORAL
Qty: 90 TABLET | Refills: 1 | Status: SHIPPED | OUTPATIENT
Start: 2022-02-14 | End: 2022-07-30

## 2022-02-16 NOTE — PROGRESS NOTES
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   Carotid Doppler showed noncritical disease  ILR was recommended by Neurology at that time and was implanted July 2020   Interrogation done 2/2022, demonstrated no significant arrhythmia  AFib burden was 0%     He has NSR with PVCs   Echocardiogram done June 2020 demonstrated preserved LV systolic function with mild LVH and mild ELIZABETH  There was mild to moderate tricuspid and pulmonic regurgitation  A lipid profile done December 2021 demonstrated total cholesterol of 89 with an HDL of 36 and a calculated LDL of 42  He was seen in the ED January 8, 2022 in reference to a fall out of bed  Patient has had no cardiac symptoms  His vital signs are stable today  Patient Active Problem List   Diagnosis    Healthcare maintenance    Controlled diabetes mellitus ([facility] [address] )    Enlarged prostate without lower urinary tract symptoms (luts)    Hyperlipidemia    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 (HCC)    Palpitations    CKD stage 4 due to type 2 diabetes mellitus (HCC)    Overweight (BMI 25 0-29  9)    Secondary hyperparathyroidism of renal origin ([address] )    Embolic bilateral punctate CVA, acute as well as subacute    Essential hypertension    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HCC)    Bilateral impacted cerumen    Skin tear of right elbow without complication     Past Medical History:   Diagnosis Date    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)     Last Assessed:  5/19/17    Chronic kidney disease     Diabetes mellitus ([facility] [address]     Hyperlipidemia     Hypertension     Osteoarthritis     Palpitations     TIA (transient ischemic attack)      Social History     Socioeconomic History    Marital status: Single     Spouse name: Not on file    Number of children: Not on file    Years of education: Not on file    Highest education level: Not on file   Occupational History    Not on file   Tobacco Use    Smoking status: Former Smoker    Smokeless tobacco: Never Used   Vaping Use    Vaping Use: Never used   Substance and Sexual Activity    Alcohol use: Yes     Comment: Social drinker    Drug use: No    Sexual activity: Not Currently   Other Topics Concern    Not on file   Social History Narrative    Daily coffee consumption (2 cups/day)     Social Determinants of Health     Financial Resource Strain: Not on file   Food Insecurity: Not on file   Transportation Needs: Not on file   Physical Activity: Not on file   Stress: Not on file   Social Connections: Not on file   Intimate Partner Violence: Not on file   Housing Stability: Not on file      Family History   Problem Relation Age of Onset    Diabetes Mother     Other Mother         Stroke syndrome    Diabetes Father     Emphysema Father      Past Surgical History:   Procedure Laterality Date    APPENDECTOMY      COLONOSCOPY  2012    HEMORROIDECTOMY         Current Outpatient Medications:     amLODIPine (NORVASC) 5 mg tablet, TAKE ONE TABLET BY MOUTH EVERY DAY, Disp: 90 tablet, Rfl: 3    aspirin 81 mg chewable tablet, Chew 1 tablet (81 mg total) daily, Disp: 30 tablet, Rfl: 0    atorvastatin (LIPITOR) 40 mg tablet, TAKE 1 TABLET EVERY DAY WITH DINNER, Disp: 90 tablet, Rfl: 1    Cholecalciferol (CVS VITAMIN D3) 1000 units capsule, Take 1 capsule by mouth daily, Disp: , Rfl:     clopidogrel (PLAVIX) 75 mg tablet, TAKE 1 TABLET BY MOUTH DAILY, Disp: 90 tablet, Rfl: 3   cyanocobalamin (VITAMIN B-12) 500 mcg tablet, Take 1 tablet by mouth daily, Disp: , Rfl:     glimepiride (AMARYL) 2 mg tablet, TAKE 1 TABLET BY MOUTH DAILY WITH BREAKFAST, Disp: 90 tablet, Rfl: 3    lisinopril (ZESTRIL) 10 mg tablet, TAKE 1 TABLET TWICE DAILY, Disp: 180 tablet, Rfl: 0    magnesium oxide (MAG-OX) 400 mg, Take 400 mg by mouth daily, Disp: , Rfl:     Omega-3 Fatty Acids (OMEGA-3 FISH OIL) 300 MG CAPS, Take 1 capsule by mouth daily, Disp: , Rfl:     terazosin (HYTRIN) 1 mg capsule, TAKE 1 CAPSULE AT BEDTIME, Disp: 90 capsule, Rfl: 3    Blood Glucose Monitoring Suppl (PRECISION XTRA MONITOR) MARY, by Does not apply route daily, Disp: 1 each, Rfl: 0    CINNAMON PO, Take 1 capsule by mouth daily  (Patient not taking: Reported on 8/20/2021), Disp: , Rfl:     clotrimazole-betamethasone (LOTRISONE) 1-0 05 % cream, Apply topically 2 (two) times a day (Patient not taking: Reported on 8/20/2021), Disp: 30 g, Rfl: 0    PRECISION XTRA TEST STRIPS test strip, USE TO TEST BLOOD GLUCOSE ONCE A DAY, Disp: 100 strip, Rfl: 2  No Known Allergies    Labs:not applicable  Imaging: Cardiac EP device report    Result Date: 2/8/2022  Narrative: MDT LOOP/ ACTIVE SYSTEM IS MRI CONDITIONAL CARELINK TRANSMISSION: BATTERY STATUS "OK " 3 AF NOTED; 0% BURDEN  AVAIL EGRAMS PRESENT AS SR WITH PACS/PVCS; CAN'T EXCLUDE AF DUE TO BASELINE ARTIFACT  PT ON ASA  NORMAL DEVICE FUNCTION  NC       Review of Systems:  Review of Systems   All other systems reviewed and are negative  Physical Exam:  /54 (BP Location: Right arm, Patient Position: Sitting, Cuff Size: Standard)   Pulse 76   Ht 5' 11" (1 803 m)   Wt 83 8 kg (184 lb 12 8 oz)   SpO2 96%   BMI 25 77 kg/m²   Physical Exam  Vitals reviewed  Constitutional:       Appearance: He is well-developed  HENT:      Head: Normocephalic and atraumatic  Eyes:      Conjunctiva/sclera: Conjunctivae normal       Pupils: Pupils are equal, round, and reactive to light  Cardiovascular:      Rate and Rhythm: Normal rate  Heart sounds: Normal heart sounds  Pulmonary:      Effort: Pulmonary effort is normal       Breath sounds: Normal breath sounds  Musculoskeletal:      Cervical back: Normal range of motion and neck supple  Skin:     General: Skin is warm and dry  Neurological:      Mental Status: He is alert and oriented to person, place, and time  Discussion/Summary:I will continue the patient's present medical regimen  The patient appears well compensated  I have asked the patient to call if there is a problem in the interim otherwise I will see the patient in six months time

## 2022-02-23 ENCOUNTER — OFFICE VISIT (OUTPATIENT)
Dept: CARDIOLOGY CLINIC | Facility: CLINIC | Age: 87
End: 2022-02-23
Payer: MEDICARE

## 2022-02-23 VITALS
DIASTOLIC BLOOD PRESSURE: 54 MMHG | OXYGEN SATURATION: 96 % | HEIGHT: 71 IN | BODY MASS INDEX: 25.87 KG/M2 | WEIGHT: 184.8 LBS | HEART RATE: 76 BPM | SYSTOLIC BLOOD PRESSURE: 142 MMHG

## 2022-02-23 DIAGNOSIS — E78.00 PURE HYPERCHOLESTEROLEMIA: ICD-10-CM

## 2022-02-23 DIAGNOSIS — I10 ESSENTIAL (PRIMARY) HYPERTENSION: Primary | ICD-10-CM

## 2022-02-23 DIAGNOSIS — Z95.818 PRESENCE OF CARDIAC DEVICE: ICD-10-CM

## 2022-02-23 PROCEDURE — 99214 OFFICE O/P EST MOD 30 MIN: CPT | Performed by: INTERNAL MEDICINE

## 2022-03-30 DIAGNOSIS — N18.4 CKD (CHRONIC KIDNEY DISEASE), STAGE IV (HCC): ICD-10-CM

## 2022-03-31 RX ORDER — LISINOPRIL 10 MG/1
TABLET ORAL
Qty: 180 TABLET | Refills: 0 | Status: SHIPPED | OUTPATIENT
Start: 2022-03-31 | End: 2022-06-09

## 2022-03-31 NOTE — TELEPHONE ENCOUNTER
Requested medication(s) are due for refill today: Yes  Patient has already received a courtesy refill: No  Other reason request has been forwarded to provider:   Cardiovascular:  ACE Inhibitors Failed 03/30/2022 07:40 PM   Protocol Details  Cr in normal range and within 360 days

## 2022-05-04 ENCOUNTER — REMOTE DEVICE CLINIC VISIT (OUTPATIENT)
Dept: CARDIOLOGY CLINIC | Facility: CLINIC | Age: 87
End: 2022-05-04
Payer: MEDICARE

## 2022-05-04 DIAGNOSIS — Z95.818 PRESENCE OF OTHER CARDIAC IMPLANTS AND GRAFTS: Primary | ICD-10-CM

## 2022-05-04 PROCEDURE — G2066 INTER DEVC REMOTE 30D: HCPCS | Performed by: INTERNAL MEDICINE

## 2022-05-04 PROCEDURE — 93298 REM INTERROG DEV EVAL SCRMS: CPT | Performed by: INTERNAL MEDICINE

## 2022-05-04 NOTE — PROGRESS NOTES
MDT LOOP/ ACTIVE SYSTEM IS MRI CONDITIONAL   CARELINK TRANSMISSION: LOOP RECORDER  PERESENTING RHYTHM NSR @ 66 BPM  BATTERY STATUS "OK " 1 TACHY EPISODE W/ EGRAM SHOWING OVERSENSING 1 DEVICE CLASSIFIED AF EPISODES, AVAILABLE STRIPS DEMONSTRATE NO ATRIAL FIBRILLATION  INSTEAD EGM'S SHOW SR W/ PACs  AF BURDEN IS 0%  AF BURDEN MAYBE OVERESTIMATED DUE TO INAPPROPRIATE CLASSIFICATION OF AF  SOME STRIPS MAY NOT BE INTERPRETABLE OR AVAILABLE, CANNOT DEFINITIVELY RULE OUT ATRIAL FIBRILLATION  NO PATIENT ACTIVATED EPISODES  NORMAL DEVICE FUNCTION   DL

## 2022-05-06 ENCOUNTER — TELEPHONE (OUTPATIENT)
Dept: CARDIOLOGY CLINIC | Facility: CLINIC | Age: 87
End: 2022-05-06

## 2022-05-06 DIAGNOSIS — I48.91 ATRIAL FIBRILLATION, UNSPECIFIED TYPE (HCC): Primary | ICD-10-CM

## 2022-05-06 NOTE — TELEPHONE ENCOUNTER
----- Message from Brendon Epstein MD sent at 5/4/2022  2:31 PM EDT -----  Please call the patient regarding his abnormal result  His loop recorder suggests atrial fibrillation  Would discontinue aspirin and have him start Eliquis 2 5 mg twice a day  Arrange follow-up with me so I can discuss in person  Follow-up in the next couple weeks is fine  Thank you

## 2022-05-06 NOTE — TELEPHONE ENCOUNTER
Spoke to patient, after leaving several messages-re:loop recorder result/afib, per JGG recommends Eliquis 2 5mg bid and will arrange f/u appt with Dr Susi Scales  Pt states he has not taken ASA for about a year, will continue Plavix

## 2022-05-09 NOTE — TELEPHONE ENCOUNTER
Spoke directly to patient, he will d/c Plavix per Dr Juana Elizabeth  Pt to  Eliquis today and start rx  Advised patient office will call him today to arrange f/u OV

## 2022-05-10 ENCOUNTER — APPOINTMENT (OUTPATIENT)
Dept: LAB | Facility: CLINIC | Age: 87
End: 2022-05-10
Payer: MEDICARE

## 2022-05-10 DIAGNOSIS — E11.22 CKD STAGE 4 DUE TO TYPE 2 DIABETES MELLITUS (HCC): ICD-10-CM

## 2022-05-10 DIAGNOSIS — N18.4 CKD STAGE 4 DUE TO TYPE 2 DIABETES MELLITUS (HCC): ICD-10-CM

## 2022-05-10 DIAGNOSIS — I10 ESSENTIAL HYPERTENSION: ICD-10-CM

## 2022-05-10 DIAGNOSIS — Z79.4 DIABETES MELLITUS DUE TO UNDERLYING CONDITION, CONTROLLED, WITH DIABETIC NEPHROPATHY, WITH LONG-TERM CURRENT USE OF INSULIN (HCC): ICD-10-CM

## 2022-05-10 DIAGNOSIS — E08.21 DIABETES MELLITUS DUE TO UNDERLYING CONDITION, CONTROLLED, WITH DIABETIC NEPHROPATHY, WITH LONG-TERM CURRENT USE OF INSULIN (HCC): ICD-10-CM

## 2022-05-10 LAB
ALBUMIN SERPL BCP-MCNC: 3.4 G/DL (ref 3.5–5)
ANION GAP SERPL CALCULATED.3IONS-SCNC: 2 MMOL/L (ref 4–13)
BUN SERPL-MCNC: 30 MG/DL (ref 5–25)
CALCIUM ALBUM COR SERPL-MCNC: 9.8 MG/DL (ref 8.3–10.1)
CALCIUM SERPL-MCNC: 9.3 MG/DL (ref 8.3–10.1)
CHLORIDE SERPL-SCNC: 109 MMOL/L (ref 100–108)
CO2 SERPL-SCNC: 28 MMOL/L (ref 21–32)
CREAT SERPL-MCNC: 2.22 MG/DL (ref 0.6–1.3)
CREAT UR-MCNC: 76.3 MG/DL
ERYTHROCYTE [DISTWIDTH] IN BLOOD BY AUTOMATED COUNT: 13.4 % (ref 11.6–15.1)
EST. AVERAGE GLUCOSE BLD GHB EST-MCNC: 174 MG/DL
GFR SERPL CREATININE-BSD FRML MDRD: 24 ML/MIN/1.73SQ M
GLUCOSE SERPL-MCNC: 158 MG/DL (ref 65–140)
HBA1C MFR BLD: 7.7 %
HCT VFR BLD AUTO: 37.6 % (ref 36.5–49.3)
HGB BLD-MCNC: 12.1 G/DL (ref 12–17)
MCH RBC QN AUTO: 30.8 PG (ref 26.8–34.3)
MCHC RBC AUTO-ENTMCNC: 32.2 G/DL (ref 31.4–37.4)
MCV RBC AUTO: 96 FL (ref 82–98)
PHOSPHATE SERPL-MCNC: 2.7 MG/DL (ref 2.3–4.1)
PLATELET # BLD AUTO: 169 THOUSANDS/UL (ref 149–390)
PMV BLD AUTO: 10.2 FL (ref 8.9–12.7)
POTASSIUM SERPL-SCNC: 4.4 MMOL/L (ref 3.5–5.3)
PROT UR-MCNC: 28 MG/DL
PROT/CREAT UR: 0.37 MG/G{CREAT} (ref 0–0.1)
PTH-INTACT SERPL-MCNC: 96.9 PG/ML (ref 18.4–80.1)
RBC # BLD AUTO: 3.93 MILLION/UL (ref 3.88–5.62)
SODIUM SERPL-SCNC: 139 MMOL/L (ref 136–145)
WBC # BLD AUTO: 6.94 THOUSAND/UL (ref 4.31–10.16)

## 2022-05-10 PROCEDURE — 85027 COMPLETE CBC AUTOMATED: CPT

## 2022-05-10 PROCEDURE — 82570 ASSAY OF URINE CREATININE: CPT

## 2022-05-10 PROCEDURE — 83036 HEMOGLOBIN GLYCOSYLATED A1C: CPT

## 2022-05-10 PROCEDURE — 84156 ASSAY OF PROTEIN URINE: CPT

## 2022-05-10 PROCEDURE — 83970 ASSAY OF PARATHORMONE: CPT

## 2022-05-10 PROCEDURE — 80069 RENAL FUNCTION PANEL: CPT

## 2022-05-10 PROCEDURE — 36415 COLL VENOUS BLD VENIPUNCTURE: CPT

## 2022-05-10 NOTE — PROGRESS NOTES
Cardiology Follow Up    Tia Puga  12/11/1930  [id number]  [facility]  [address] [address] [facility]  [phone]    1  Essential (primary) hypertension  POCT ECG   2  Pure hypercholesterolemia  POCT ECG   3  Presence of other cardiac implants and grafts  POCT ECG   4  Atrial fibrillation, unspecified type ([facility] [address] )  POCT ECG   5  TIA (transient ischemic attack)     6  Embolic bilateral punctate CVA, acute as well as subacute         Interval History: Interval History: Patient is here for a follow-up visit  Mohsen Dee has HTN, HLD and TIA  Mohsen Dee was hospitalized at the [facility] in June 2020   At that time, MRI of the brain showed 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   Carotid Doppler showed noncritical disease   ILR was recommended by Neurology at that time and was implanted July 2020  Interrogation 5/2022 suggested Afib per EP  Plavix was stopped and adjusted dose Eliquis started  Echocardiogram done June 2020 demonstrated preserved LV systolic function with mild LVH and mild ELIZABETH  There was mild to moderate tricuspid and pulmonic regurgitation  A lipid profile done December 2021 demonstrated total cholesterol of 89 with an HDL of 36 and a calculated LDL of 42  He has had no chest pain or significant dyspnea  EKG today demonstrates sinus rhythm with occasional PVCs  RBBB is noted  Patient Active Problem List   Diagnosis    Healthcare maintenance    Controlled diabetes mellitus ([address] )    Enlarged prostate without lower urinary tract symptoms (luts)    Hyperlipidemia    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 (HCC)    Palpitations    CKD stage 4 due to type 2 diabetes mellitus (HCC)    Overweight (BMI 25 0-29  9)    Secondary hyperparathyroidism of renal origin ([address] )    Embolic bilateral punctate CVA, acute as well as subacute    Essential hypertension    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HCC)    Bilateral impacted cerumen    Skin tear of right elbow without complication     Past Medical History:   Diagnosis Date    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)     Last Assessed:  5/19/17    Chronic kidney disease     Diabetes mellitus ([facility] [address] )     Hyperlipidemia     Hypertension     Osteoarthritis     Palpitations     TIA (transient ischemic attack)      Social History     Socioeconomic History    Marital status: Single     Spouse name: Not on file    Number of children: Not on file    Years of education: Not on file    Highest education level: Not on file   Occupational History    Not on file   Tobacco Use    Smoking status: Former Smoker    Smokeless tobacco: Never Used   Vaping Use    Vaping Use: Never used   Substance and Sexual Activity    Alcohol use: Yes     Comment: Social drinker    Drug use: No    Sexual activity: Not Currently   Other Topics Concern    Not on file   Social History Narrative    Daily coffee consumption (2 cups/day)     Social Determinants of Health     Financial Resource Strain: Not on file   Food Insecurity: Not on file   Transportation Needs: Not on file   Physical Activity: Not on file   Stress: Not on file   Social Connections: Not on file   Intimate Partner Violence: Not on file   Housing Stability: Not on file      Family History   Problem Relation Age of Onset    Diabetes Mother     Other Mother         Stroke syndrome    Diabetes Father     Emphysema Father      Past Surgical History:   Procedure Laterality Date    APPENDECTOMY      COLONOSCOPY  2012    HEMORROIDECTOMY         Current Outpatient Medications:     amLODIPine (NORVASC) 5 mg tablet, TAKE ONE TABLET BY MOUTH EVERY DAY, Disp: 90 tablet, Rfl: 3    apixaban (Eliquis) 2 5 mg, Take 1 tablet (2 5 mg total) by mouth 2 (two) times a day, Disp: 60 tablet, Rfl: 11    atorvastatin (LIPITOR) 40 mg tablet, TAKE 1 TABLET EVERY DAY WITH DINNER, Disp: 90 tablet, Rfl: 1    Blood Glucose Monitoring Suppl (PRECISION XTRA MONITOR) MARY, by Does not apply route daily, Disp: 1 each, Rfl: 0    Cholecalciferol 25 MCG (1000 UT) capsule, Take 1 capsule by mouth daily, Disp: , Rfl:     clopidogrel (PLAVIX) 75 mg tablet, TAKE 1 TABLET BY MOUTH DAILY, Disp: 90 tablet, Rfl: 3    cyanocobalamin (VITAMIN B-12) 500 mcg tablet, Take 1 tablet by mouth daily, Disp: , Rfl:     glimepiride (AMARYL) 2 mg tablet, TAKE 1 TABLET BY MOUTH DAILY WITH BREAKFAST, Disp: 90 tablet, Rfl: 3    lisinopril (ZESTRIL) 10 mg tablet, TAKE 1 TABLET TWICE DAILY, Disp: 180 tablet, Rfl: 0    magnesium oxide (MAG-OX) 400 mg, Take 400 mg by mouth daily, Disp: , Rfl:     Omega-3 Fatty Acids (OMEGA-3 FISH OIL) 300 MG CAPS, Take 1 capsule by mouth daily, Disp: , Rfl:     PRECISION XTRA TEST STRIPS test strip, USE TO TEST BLOOD GLUCOSE ONCE A DAY, Disp: 100 strip, Rfl: 2    terazosin (HYTRIN) 1 mg capsule, TAKE 1 CAPSULE AT BEDTIME, Disp: 90 capsule, Rfl: 3    CINNAMON PO, Take 1 capsule by mouth daily  (Patient not taking: No sig reported), Disp: , Rfl:     clotrimazole-betamethasone (LOTRISONE) 1-0 05 % cream, Apply topically 2 (two) times a day (Patient not taking: No sig reported), Disp: 30 g, Rfl: 0  No Known Allergies    Labs:not applicable  Imaging: Cardiac EP device report    Result Date: 5/4/2022  Narrative: MDT LOOP/ ACTIVE SYSTEM IS MRI CONDITIONAL CARELINK TRANSMISSION: LOOP RECORDER  PERESENTING RHYTHM NSR @ 66 BPM  BATTERY STATUS "OK " 1 TACHY EPISODE W/ EGRAM SHOWING OVERSENSING 1 DEVICE CLASSIFIED AF EPISODES, AVAILABLE STRIPS DEMONSTRATE NO ATRIAL FIBRILLATION  INSTEAD EGM'S SHOW SR W/ PACs  AF BURDEN IS 0%  AF BURDEN MAYBE OVERESTIMATED DUE TO INAPPROPRIATE CLASSIFICATION OF AF  SOME STRIPS MAY NOT BE INTERPRETABLE OR AVAILABLE, CANNOT DEFINITIVELY RULE OUT ATRIAL FIBRILLATION  NO PATIENT ACTIVATED EPISODES  NORMAL DEVICE FUNCTION   DL       Review of Systems:  Review of Systems   All other systems reviewed and are negative  Physical Exam:  /80 (BP Location: Left arm, Patient Position: Sitting, Cuff Size: Standard)   Pulse 69   Ht 5' 11" (1 803 m)   Wt 83 5 kg (184 lb)   SpO2 99%   BMI 25 66 kg/m²   Physical Exam  Vitals reviewed  Constitutional:       Appearance: He is well-developed  HENT:      Head: Normocephalic and atraumatic  Eyes:      Conjunctiva/sclera: Conjunctivae normal       Pupils: Pupils are equal, round, and reactive to light  Cardiovascular:      Rate and Rhythm: Normal rate  Heart sounds: Normal heart sounds  Pulmonary:      Effort: Pulmonary effort is normal       Breath sounds: Normal breath sounds  Musculoskeletal:      Cervical back: Normal range of motion and neck supple  Skin:     General: Skin is warm and dry  Neurological:      Mental Status: He is alert and oriented to person, place, and time  Discussion/Summary:I will continue the patient's present medical regimen  The patient appears well compensated  I have asked the patient to call if there is a problem in the interim otherwise I will see the patient in six months time

## 2022-05-17 ENCOUNTER — TELEPHONE (OUTPATIENT)
Dept: OTHER | Facility: OTHER | Age: 87
End: 2022-05-17

## 2022-05-17 ENCOUNTER — TELEPHONE (OUTPATIENT)
Dept: NEPHROLOGY | Facility: CLINIC | Age: 87
End: 2022-05-17

## 2022-05-17 NOTE — TELEPHONE ENCOUNTER
Recent blood test reviewed, renal function is stable, hemoglobin normal, other tests stable      Please contact patient and tell him that recent blood test are stable and please arrange follow-up with me next available appointment (last time patient was seen was on 12/2021 and should return for six-month follow-up)  Thanks,

## 2022-05-17 NOTE — TELEPHONE ENCOUNTER
Pt returning call from office I did read the note posted in his chart     Patient and tell him that recent blood test are stable and please arrange follow-up with me next available appointment (last time patient was seen was on 12/2021 and should return for six-month follow-up

## 2022-05-17 NOTE — TELEPHONE ENCOUNTER
Called patient and left a voicemail of the following:    Recent blood test reviewed, renal function is stable, hemoglobin normal, other tests stable      Patient and tell him that recent blood test are stable and please arrange follow-up with me next available appointment (last time patient was seen was on 12/2021 and should return for six-month follow-up    I will call patient again tomorrow to ensure he received this message

## 2022-05-18 NOTE — TELEPHONE ENCOUNTER
Patient had called back after hours and spoke with health call, She stated she relayed my previous note  I called the patient again to set up an appointment but had to leave a voicemail  This is the second attempt

## 2022-05-19 ENCOUNTER — OFFICE VISIT (OUTPATIENT)
Dept: CARDIOLOGY CLINIC | Facility: CLINIC | Age: 87
End: 2022-05-19
Payer: MEDICARE

## 2022-05-19 VITALS
WEIGHT: 184 LBS | BODY MASS INDEX: 25.76 KG/M2 | HEART RATE: 69 BPM | DIASTOLIC BLOOD PRESSURE: 80 MMHG | OXYGEN SATURATION: 99 % | SYSTOLIC BLOOD PRESSURE: 150 MMHG | HEIGHT: 71 IN

## 2022-05-19 DIAGNOSIS — I48.91 ATRIAL FIBRILLATION, UNSPECIFIED TYPE (HCC): ICD-10-CM

## 2022-05-19 DIAGNOSIS — E78.00 PURE HYPERCHOLESTEROLEMIA: ICD-10-CM

## 2022-05-19 DIAGNOSIS — I10 ESSENTIAL (PRIMARY) HYPERTENSION: Primary | ICD-10-CM

## 2022-05-19 DIAGNOSIS — I63.40 CEREBROVASCULAR ACCIDENT (CVA) DUE TO EMBOLISM OF CEREBRAL ARTERY (HCC): ICD-10-CM

## 2022-05-19 DIAGNOSIS — Z95.818 PRESENCE OF OTHER CARDIAC IMPLANTS AND GRAFTS: ICD-10-CM

## 2022-05-19 DIAGNOSIS — G45.9 TIA (TRANSIENT ISCHEMIC ATTACK): ICD-10-CM

## 2022-05-19 PROCEDURE — 99214 OFFICE O/P EST MOD 30 MIN: CPT | Performed by: INTERNAL MEDICINE

## 2022-05-19 PROCEDURE — 93000 ELECTROCARDIOGRAM COMPLETE: CPT | Performed by: INTERNAL MEDICINE

## 2022-05-23 ENCOUNTER — ESTABLISHED COMPREHENSIVE EXAM (OUTPATIENT)
Dept: URBAN - METROPOLITAN AREA CLINIC 6 | Facility: CLINIC | Age: 87
End: 2022-05-23

## 2022-05-23 DIAGNOSIS — H40.023: ICD-10-CM

## 2022-05-23 DIAGNOSIS — Z96.1: ICD-10-CM

## 2022-05-23 PROCEDURE — 92202 OPSCPY EXTND ON/MAC DRAW: CPT

## 2022-05-23 PROCEDURE — 92083 EXTENDED VISUAL FIELD XM: CPT

## 2022-05-23 PROCEDURE — 92014 COMPRE OPH EXAM EST PT 1/>: CPT

## 2022-05-23 PROCEDURE — 92020 GONIOSCOPY: CPT

## 2022-05-23 PROCEDURE — 92133 CPTRZD OPH DX IMG PST SGM ON: CPT

## 2022-05-23 ASSESSMENT — TONOMETRY
OS_IOP_MMHG: 16
OD_IOP_MMHG: 16

## 2022-05-23 ASSESSMENT — VISUAL ACUITY
OU_SC: J2
OS_PH: 20/50-1
OS_SC: 20/80
OD_SC: 20/30

## 2022-05-25 ENCOUNTER — OFFICE VISIT (OUTPATIENT)
Dept: INTERNAL MEDICINE CLINIC | Facility: CLINIC | Age: 87
End: 2022-05-25
Payer: MEDICARE

## 2022-05-25 VITALS
BODY MASS INDEX: 25.48 KG/M2 | OXYGEN SATURATION: 98 % | HEIGHT: 71 IN | DIASTOLIC BLOOD PRESSURE: 50 MMHG | HEART RATE: 77 BPM | SYSTOLIC BLOOD PRESSURE: 128 MMHG | WEIGHT: 182 LBS | TEMPERATURE: 97.2 F

## 2022-05-25 DIAGNOSIS — Z79.4 DIABETES MELLITUS DUE TO UNDERLYING CONDITION, CONTROLLED, WITH DIABETIC NEPHROPATHY, WITH LONG-TERM CURRENT USE OF INSULIN (HCC): Primary | ICD-10-CM

## 2022-05-25 DIAGNOSIS — N18.4 CKD STAGE 4 DUE TO TYPE 2 DIABETES MELLITUS (HCC): ICD-10-CM

## 2022-05-25 DIAGNOSIS — I63.40 CEREBROVASCULAR ACCIDENT (CVA) DUE TO EMBOLISM OF CEREBRAL ARTERY (HCC): ICD-10-CM

## 2022-05-25 DIAGNOSIS — E11.22 CKD STAGE 4 DUE TO TYPE 2 DIABETES MELLITUS (HCC): ICD-10-CM

## 2022-05-25 DIAGNOSIS — I10 ESSENTIAL HYPERTENSION: ICD-10-CM

## 2022-05-25 DIAGNOSIS — E08.21 DIABETES MELLITUS DUE TO UNDERLYING CONDITION, CONTROLLED, WITH DIABETIC NEPHROPATHY, WITH LONG-TERM CURRENT USE OF INSULIN (HCC): Primary | ICD-10-CM

## 2022-05-25 DIAGNOSIS — I77.9 DISORDER OF CAROTID ARTERY (HCC): ICD-10-CM

## 2022-05-25 PROCEDURE — 99214 OFFICE O/P EST MOD 30 MIN: CPT | Performed by: INTERNAL MEDICINE

## 2022-05-25 NOTE — ASSESSMENT & PLAN NOTE
Patient's blood pressure showing excellent control with present treatment  Does have blood pressure checks through his nephrologist, urologist, cardiologist   Patient will continue present medication and surveillance    Continue to monitor his renal function

## 2022-05-25 NOTE — ASSESSMENT & PLAN NOTE
Patient's hemoglobin A1c is slightly higher from previously at 7 7  Patient is on us off on a urea for control of his diabetes and he relates that he is having no episodes of hypoglycemia which would be concern with this particular medication  Because of his renal function no longer able to take metformin and he states other medications are too expensive  Will continue to monitor his blood sugar readings and make modification of treatment if necessary in the future  He states he will be investigating with the [employer] whether not they will pay for other medication to control his diabetes    He is up-to-date with diabetic foot exam and eye exam  Lab Results   Component Value Date    HGBA1C 7 7 (H) 05/10/2022

## 2022-05-25 NOTE — ASSESSMENT & PLAN NOTE
Given the diagnosis recently CVA and steady showing patient does have atrial fibrillation patient now is on Eliquis  States he has had no abnormal bleeding with medication  Continue to follow-up with cardiology    No new neurologic changes

## 2022-05-25 NOTE — PROGRESS NOTES
Assessment/Plan:    Essential hypertension  Patient's blood pressure showing excellent control with present treatment  Does have blood pressure checks through his nephrologist, urologist, cardiologist   Patient will continue present medication and surveillance  Continue to monitor his renal function    Controlled diabetes mellitus ([address] )  Patient's hemoglobin A1c is slightly higher from previously at 7 7  Patient is on us off on a urea for control of his diabetes and he relates that he is having no episodes of hypoglycemia which would be concern with this particular medication  Because of his renal function no longer able to take metformin and he states other medications are too expensive  Will continue to monitor his blood sugar readings and make modification of treatment if necessary in the future  He states he will be investigating with the [employer] whether not they will pay for other medication to control his diabetes  He is up-to-date with diabetic foot exam and eye exam  Lab Results   Component Value Date    HGBA1C 7 7 (H) 05/10/2022       Disorder of carotid artery [facility])  Patient continues to have vascular studies performed make sure that there is no progression to his disease to the carotid arteries  There are concerns with the patient having CVA previously    Embolic bilateral punctate CVA, acute as well as subacute  Given the diagnosis recently CVA and steady showing patient does have atrial fibrillation patient now is on Eliquis  States he has had no abnormal bleeding with medication  Continue to follow-up with cardiology  No new neurologic changes       Diagnoses and all orders for this visit:    Diabetes mellitus due to underlying condition, controlled, with diabetic nephropathy, with long-term current use of insulin ([facility] [address] )  -     Hemoglobin A1C; Future    Essential hypertension  -     Basic metabolic panel;  Future  -     CBC and differential; Future    CKD stage 4 due to type 2 diabetes mellitus ([facility] )    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HCC)    Embolic bilateral punctate CVA, acute as well as subacute          Subjective:      Patient ID: Francis Ashby is a 80 y o  male  Patient is a 66-year-old male history of medical problems as outlined previously who is here today for follow-up  He recently had blood test performed looking at his renal function and control of his blood sugars and we did discuss the results with the patient  Patient states in general he is doing about the same  He still remains physically active and is still is driving  Placed on Eliquis for AFib and CVA and has now stopped his Plavix  No new complaints      The following portions of the patient's history were reviewed and updated as appropriate:   He  has a past medical history of 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), Chronic kidney disease, Diabetes mellitus ([place] [address] ), Hyperlipidemia, Hypertension, Osteoarthritis, Palpitations, and TIA (transient ischemic attack)  He   Patient Active Problem List    Diagnosis Date Noted    Skin tear of right elbow without complication 05/63/0919    Bilateral impacted cerumen 09/20/2021    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[place] [address] ) 43/50/7308    Embolic bilateral punctate CVA, 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/26/2020    Essential hypertension 06/26/2020    Secondary hyperparathyroidism of renal origin ([place] [address] ) 04/25/2019    Overweight (BMI 25 0-29 9) 01/25/2019    CKD stage 4 due to type 2 diabetes mellitus ([place] [address] ) 09/27/2018    Palpitations 09/25/2018    Healthcare maintenance 05/25/2018    Enlarged prostate without lower urinary tract symptoms (luts) 03/07/2013    Controlled diabetes mellitus ([facility] ) 08/09/2012    Hyperlipidemia 08/09/2012    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 ([place] [address] ) 08/09/2012     He  has a past surgical history that includes Colonoscopy (2012); Hemorroidectomy; and Appendectomy    His family history includes Diabetes in his father and mother; Emphysema in his father; Other in his mother  He  reports that he has quit smoking  He has never used smokeless tobacco  He reports current alcohol use  He reports that he does not use drugs  Current Outpatient Medications   Medication Sig Dispense Refill    amLODIPine (NORVASC) 5 mg tablet TAKE ONE TABLET BY MOUTH EVERY DAY 90 tablet 3    apixaban (Eliquis) 2 5 mg Take 1 tablet (2 5 mg total) by mouth 2 (two) times a day 60 tablet 11    atorvastatin (LIPITOR) 40 mg tablet TAKE 1 TABLET EVERY DAY WITH DINNER 90 tablet 1    Blood Glucose Monitoring Suppl (PRECISION XTRA MONITOR) MARY by Does not apply route daily 1 each 0    Cholecalciferol 25 MCG (1000 UT) capsule Take 1 capsule by mouth daily      cyanocobalamin (VITAMIN B-12) 500 mcg tablet Take 1 tablet by mouth daily      glimepiride (AMARYL) 2 mg tablet TAKE 1 TABLET BY MOUTH DAILY WITH BREAKFAST 90 tablet 3    lisinopril (ZESTRIL) 10 mg tablet TAKE 1 TABLET TWICE DAILY 180 tablet 0    magnesium oxide (MAG-OX) 400 mg Take 400 mg by mouth daily      Omega-3 Fatty Acids (OMEGA-3 FISH OIL) 300 MG CAPS Take 1 capsule by mouth daily      PRECISION XTRA TEST STRIPS test strip USE TO TEST BLOOD GLUCOSE ONCE A  strip 2    terazosin (HYTRIN) 1 mg capsule TAKE 1 CAPSULE AT BEDTIME 90 capsule 3    CINNAMON PO Take 1 capsule by mouth daily  (Patient not taking: No sig reported)      clotrimazole-betamethasone (LOTRISONE) 1-0 05 % cream Apply topically 2 (two) times a day (Patient not taking: No sig reported) 30 g 0     No current facility-administered medications for this visit       Current Outpatient Medications on File Prior to Visit   Medication Sig    amLODIPine (NORVASC) 5 mg tablet TAKE ONE TABLET BY MOUTH EVERY DAY    apixaban (Eliquis) 2 5 mg Take 1 tablet (2 5 mg total) by mouth 2 (two) times a day    atorvastatin (LIPITOR) 40 mg tablet TAKE 1 TABLET EVERY DAY WITH DINNER    Blood Glucose Monitoring Suppl (PRECISION XTRA MONITOR) MARY by Does not apply route daily    Cholecalciferol 25 MCG (1000 UT) capsule Take 1 capsule by mouth daily    cyanocobalamin (VITAMIN B-12) 500 mcg tablet Take 1 tablet by mouth daily    glimepiride (AMARYL) 2 mg tablet TAKE 1 TABLET BY MOUTH DAILY WITH BREAKFAST    lisinopril (ZESTRIL) 10 mg tablet TAKE 1 TABLET TWICE DAILY    magnesium oxide (MAG-OX) 400 mg Take 400 mg by mouth daily    Omega-3 Fatty Acids (OMEGA-3 FISH OIL) 300 MG CAPS Take 1 capsule by mouth daily    PRECISION XTRA TEST STRIPS test strip USE TO TEST BLOOD GLUCOSE ONCE A DAY    terazosin (HYTRIN) 1 mg capsule TAKE 1 CAPSULE AT BEDTIME    CINNAMON PO Take 1 capsule by mouth daily  (Patient not taking: No sig reported)    clotrimazole-betamethasone (LOTRISONE) 1-0 05 % cream Apply topically 2 (two) times a day (Patient not taking: No sig reported)    [DISCONTINUED] clopidogrel (PLAVIX) 75 mg tablet TAKE 1 TABLET BY MOUTH DAILY (Patient not taking: Reported on 5/25/2022)     No current facility-administered medications on file prior to visit  He has No Known Allergies       Review of Systems   Constitutional: Negative  HENT: Negative  Eyes: Negative  Respiratory: Negative  Cardiovascular: Negative  Gastrointestinal: Negative  Endocrine: Negative  Genitourinary: Negative  Musculoskeletal: Negative  Skin: Negative  Allergic/Immunologic: Negative  Neurological: Negative  Hematological: Negative  Psychiatric/Behavioral: Negative  Objective:      /50   Pulse 77   Temp (!) 97 2 °F (36 2 °C)   Ht 5' 11" (1 803 m)   Wt 82 6 kg (182 lb)   SpO2 98%   BMI 25 38 kg/m²          Physical Exam  Vitals and nursing note reviewed  Constitutional:       General: He is not in acute distress  Appearance: Normal appearance  He is not ill-appearing, toxic-appearing or diaphoretic        Comments: Very pleasant, overweight 80-year-old male who is awake alert no acute distress oriented x3   HENT: Head: Normocephalic and atraumatic  Right Ear: Tympanic membrane, ear canal and external ear normal  There is no impacted cerumen  Left Ear: Tympanic membrane, ear canal and external ear normal  There is no impacted cerumen  Nose: Nose normal  No congestion or rhinorrhea  Mouth/Throat:      Mouth: Mucous membranes are moist       Pharynx: Oropharynx is clear  No oropharyngeal exudate or posterior oropharyngeal erythema  Eyes:      General: No scleral icterus  Right eye: No discharge  Left eye: No discharge  Extraocular Movements: Extraocular movements intact  Conjunctiva/sclera: Conjunctivae normal       Pupils: Pupils are equal, round, and reactive to light  Neck:      Vascular: No carotid bruit  Cardiovascular:      Rate and Rhythm: Normal rate and regular rhythm  Pulses: Normal pulses  Heart sounds: Normal heart sounds  No murmur heard  No friction rub  No gallop  Pulmonary:      Effort: Pulmonary effort is normal  No respiratory distress  Breath sounds: Normal breath sounds  No stridor  No wheezing, rhonchi or rales  Chest:      Chest wall: No tenderness  Abdominal:      General: Bowel sounds are normal  There is no distension  Palpations: Abdomen is soft  There is no mass  Tenderness: There is no abdominal tenderness  There is no right CVA tenderness, left CVA tenderness, guarding or rebound  Hernia: No hernia is present  Comments: Obese   Musculoskeletal:         General: Deformity ( diffuse arthritic changes as previously with nothing acute) present  No swelling, tenderness or signs of injury  Normal range of motion  Cervical back: Normal range of motion and neck supple  No rigidity or tenderness  Right lower leg: No edema  Left lower leg: No edema  Lymphadenopathy:      Cervical: No cervical adenopathy  Skin:     General: Skin is warm and dry  Coloration: Skin is not jaundiced or pale  Findings: No bruising, erythema, lesion or rash  Neurological:      Mental Status: He is alert and oriented to person, place, and time  Mental status is at baseline  Cranial Nerves: No cranial nerve deficit  Sensory: No sensory deficit  Motor: No weakness  Coordination: Coordination normal       Gait: Gait normal       Deep Tendon Reflexes: Reflexes abnormal (Absent patella and Achilles tendon reflexes bilaterally)  Psychiatric:         Mood and Affect: Mood normal          Behavior: Behavior normal          Thought Content:  Thought content normal          Judgment: Judgment normal

## 2022-05-25 NOTE — ASSESSMENT & PLAN NOTE
Patient continues to have vascular studies performed make sure that there is no progression to his disease to the carotid arteries    There are concerns with the patient having CVA previously

## 2022-05-30 DIAGNOSIS — E11.9 TYPE 2 DIABETES MELLITUS WITHOUT COMPLICATION, WITHOUT LONG-TERM CURRENT USE OF INSULIN (HCC): ICD-10-CM

## 2022-05-30 RX ORDER — BLOOD SUGAR DIAGNOSTIC
STRIP MISCELLANEOUS
Qty: 100 STRIP | Refills: 2 | Status: SHIPPED | OUTPATIENT
Start: 2022-05-30 | End: 2022-06-15

## 2022-06-08 DIAGNOSIS — N18.4 CKD (CHRONIC KIDNEY DISEASE), STAGE IV (HCC): ICD-10-CM

## 2022-06-09 RX ORDER — LISINOPRIL 10 MG/1
TABLET ORAL
Qty: 180 TABLET | Refills: 0 | Status: SHIPPED | OUTPATIENT
Start: 2022-06-09

## 2022-06-15 DIAGNOSIS — E11.9 TYPE 2 DIABETES MELLITUS WITHOUT COMPLICATION, WITHOUT LONG-TERM CURRENT USE OF INSULIN (HCC): ICD-10-CM

## 2022-06-15 RX ORDER — BLOOD SUGAR DIAGNOSTIC
STRIP MISCELLANEOUS
Qty: 100 STRIP | Refills: 2 | Status: SHIPPED | OUTPATIENT
Start: 2022-06-15 | End: 2022-06-16

## 2022-06-16 DIAGNOSIS — E11.9 TYPE 2 DIABETES MELLITUS WITHOUT COMPLICATION, WITHOUT LONG-TERM CURRENT USE OF INSULIN (HCC): ICD-10-CM

## 2022-06-16 RX ORDER — BLOOD SUGAR DIAGNOSTIC
STRIP MISCELLANEOUS
Qty: 100 STRIP | Refills: 2 | Status: SHIPPED | OUTPATIENT
Start: 2022-06-16 | End: 2022-06-17

## 2022-06-17 DIAGNOSIS — I48.91 ATRIAL FIBRILLATION, UNSPECIFIED TYPE (HCC): ICD-10-CM

## 2022-06-17 DIAGNOSIS — E11.9 TYPE 2 DIABETES MELLITUS WITHOUT COMPLICATION, WITHOUT LONG-TERM CURRENT USE OF INSULIN (HCC): ICD-10-CM

## 2022-06-17 RX ORDER — BLOOD SUGAR DIAGNOSTIC
STRIP MISCELLANEOUS
Qty: 100 STRIP | Refills: 2 | Status: SHIPPED | OUTPATIENT
Start: 2022-06-17

## 2022-06-23 DIAGNOSIS — N40.0 BENIGN PROSTATIC HYPERPLASIA, UNSPECIFIED WHETHER LOWER URINARY TRACT SYMPTOMS PRESENT: ICD-10-CM

## 2022-06-23 RX ORDER — TERAZOSIN 1 MG/1
CAPSULE ORAL
Qty: 90 CAPSULE | Refills: 3 | Status: SHIPPED | OUTPATIENT
Start: 2022-06-23

## 2022-07-30 DIAGNOSIS — E78.5 HYPERLIPIDEMIA, UNSPECIFIED HYPERLIPIDEMIA TYPE: ICD-10-CM

## 2022-07-30 DIAGNOSIS — I63.40 CEREBROVASCULAR ACCIDENT (CVA) DUE TO EMBOLISM OF CEREBRAL ARTERY (HCC): ICD-10-CM

## 2022-07-30 RX ORDER — ATORVASTATIN CALCIUM 40 MG/1
TABLET, FILM COATED ORAL
Qty: 90 TABLET | Refills: 1 | Status: SHIPPED | OUTPATIENT
Start: 2022-07-30

## 2022-08-04 ENCOUNTER — REMOTE DEVICE CLINIC VISIT (OUTPATIENT)
Dept: CARDIOLOGY CLINIC | Facility: CLINIC | Age: 87
End: 2022-08-04
Payer: MEDICARE

## 2022-08-04 DIAGNOSIS — Z95.818 PRESENCE OF OTHER CARDIAC IMPLANTS AND GRAFTS: Primary | ICD-10-CM

## 2022-08-04 PROCEDURE — G2066 INTER DEVC REMOTE 30D: HCPCS | Performed by: INTERNAL MEDICINE

## 2022-08-04 PROCEDURE — 93298 REM INTERROG DEV EVAL SCRMS: CPT | Performed by: INTERNAL MEDICINE

## 2022-08-04 NOTE — PROGRESS NOTES
MDT LOOP/ ACTIVE SYSTEM IS MRI CONDITIONAL   CARELINK TRANSMISSION: LOOP RECORDER  PRESENTING RHYTHM NSR @ 68 BPM  BATTERY STATUS "OK " 2 TACHY EPISODES W/ EGRAMS SHOWING SVT @ 171 BPM FOR 10 SECs AND OVERSENSING  3 DEVICE CLASSIFIED AF EPISODES, AVAILABLE STRIPS DEMONSTRATE NO ATRIAL FIBRILLATION  INSTEAD EGM'S SHOW SR W/ PACs, PVCs AND OVERSENSING   AF BURDEN IS 0%  AF BURDEN MAYBE OVERESTIMATED DUE TO INAPPROPRIATE CLASSIFICATION OF AF  SOME STRIPS MAY NOT BE INTERPRETABLE OR AVAILABLE, CANNOT DEFINITIVELY RULE OUT ATRIAL FIBRILLATION  HX OF AF  PT TAKES ELIQUIS  NO PATIENT ACTIVATED EPISODES  NORMAL DEVICE FUNCTION   DL

## 2022-08-11 ENCOUNTER — TELEPHONE (OUTPATIENT)
Dept: NEPHROLOGY | Facility: CLINIC | Age: 87
End: 2022-08-11

## 2022-08-11 NOTE — PATIENT INSTRUCTIONS
All questions asked and answered  The patient has been instructed to call office at [phone] with any questions or concerns  Please obtain prescribed blood work and urine studies prior to next appointment   Avoid NSAID products which include:  Motrin, Advil, Ibuprofen, Aleve, Naprosyn, Naproxen, Mobic or Celebrex    Low sodium diet  Return in 4 months with blood an urine studies

## 2022-08-11 NOTE — PROGRESS NOTES
OFFICE FOLLOW UP - Nephrology   Shankar Palomino 80 y o  male MRN: [id number]               ASSESSMENT and PLAN:  Sherrill Gongora was seen today for follow-up and chronic kidney disease  Diagnoses and all orders for this visit:    CKD stage 4 due to type 2 diabetes mellitus ([facility] )  -     Basic metabolic panel; Future  -     Magnesium; Future  -     Phosphorus; Future  -     PTH, intact; Future  -     Vitamin D 25 hydroxy; Future  -     Protein / creatinine ratio, urine; Future    Secondary hyperparathyroidism of renal origin ([facility] )    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 ([facility])    Embolic bilateral punctate CVA, acute as well as subacute    Essential hypertension    Mixed hyperlipidemia        Chronic kidney disease IV:   Assessment and Plan:  Etiology: Suspect secondary to hypertensive nephrosclerosis, arterial nephrosclerosis possible diabetes along with age-related nephron loss  · after review medical records baseline creatinine appears to be 1 9-2 2 dating back to 2018  · most recent creatinine on 5/10/2022 was 2 22   · Stable electrolytes and acid base  · of note, he already attended the [facility] class in November 2018  · he continues to be on decided if he would want dialysis or not in the future if needed-will continue to inquired with each encounter  · avoid NSAIDs  · UPCR 0 37-fairly stable  · will have patient return in 4 months for ongoing Chronic Kidney Disease management updated blood work and urine studies with Dr Ava Bennett     Hypertension with Chronic Kidney Disease IV:   Assessment and Plan:  · Current BP lower side on arrival but just walked up the stairs    Repeat was 122/60  · Not symptomatic  · Currently on amlodipine 5 mg daily, lisinopril 10 mg daily  · Low sodium diet  · no change in current medications  · Does not take home BP-encouraged to do so  · will continue to trend    Anemia of CKD  Assessment and Plan:  · Not anemia  · HGB 12 1  · Continue to trend     Bone Mineral Disease of CKD  Assessment and Plan  · Check PTH, PHos and Vit di with next office visiit    Embolic CVA:  Assessment and Plan:  · On Eliquis 2 5 mg 2 times daily  · Stopped plavix     Hyperlipidemia:    Assessment and plan:  · Follows with PCP for management  · Continue on Lipitor     Enlarged prostate  Assessment and Plan:  · patient denies urinary issues  · currently on Hytrin  · Follows urology as outpatient     Diabetes:  Assessment and Plan:  · Management per PCP  · A1c 7 7 in May  · UPCR 0 37  · continue with good glucose control       Age related screening: Your primary caregiver may do yearly screening for colorectal cancer  It is recommended in all men and women over 48years old  You may have screening earlier if you have colon disease or a family history of colorectal cancer  Patient Instructions    All questions asked and answered  The patient has been instructed to call office at [phone] with any questions or concerns   Please obtain prescribed blood work and urine studies prior to next appointment    Avoid NSAID products which include: Motrin, Advil, Ibuprofen, Aleve, Naprosyn, Naproxen, Mobic or Celebrex     Low sodium diet   Return in 4 months with blood an urine studies      HPI:    I had the pleasure of seeing Albert Jolly today in the renal clinic for the continued management of CKD  He was last seen on 12/31/2022 with Dr Selma Michele and was stable from renal standpoint  Since the last visit, there has been no ER visits or hospitilizations  Patient has no complaints at this time and is feeling well  Patient denies any chest pain, shortness of breath or swelling  The last blood work was done on 5/10/2022, which we have reviewed together  ROS:   Review of Systems   Constitutional: Negative  Negative for activity change, appetite change, chills, diaphoresis, fatigue and fever  HENT: Negative  Negative for congestion and facial swelling  Respiratory: Negative  Cardiovascular: Negative  Gastrointestinal: Negative  Endocrine: Negative  Genitourinary: Negative  Musculoskeletal: Negative  Skin: Negative  Allergic/Immunologic: Negative  Neurological: Negative  Hematological: Negative  Psychiatric/Behavioral: Negative  Allergies: Patient has no known allergies      Medications:   Current Outpatient Medications:     amLODIPine (NORVASC) 5 mg tablet, TAKE ONE TABLET BY MOUTH EVERY DAY, Disp: 90 tablet, Rfl: 3    apixaban (Eliquis) 2 5 mg, Take 1 tablet (2 5 mg total) by mouth 2 (two) times a day, Disp: 180 tablet, Rfl: 2    atorvastatin (LIPITOR) 40 mg tablet, TAKE 1 TABLET EVERY DAY WITH DINNER, Disp: 90 tablet, Rfl: 1    Blood Glucose Monitoring Suppl (PRECISION XTRA MONITOR) MARY, by Does not apply route daily, Disp: 1 each, Rfl: 0    Cholecalciferol 25 MCG (1000 UT) capsule, Take 1 capsule by mouth daily, Disp: , Rfl:     cyanocobalamin (VITAMIN B-12) 500 mcg tablet, Take 1 tablet by mouth daily, Disp: , Rfl:     glimepiride (AMARYL) 2 mg tablet, TAKE 1 TABLET BY MOUTH DAILY WITH BREAKFAST, Disp: 90 tablet, Rfl: 3    lisinopril (ZESTRIL) 10 mg tablet, TAKE 1 TABLET TWICE DAILY, Disp: 180 tablet, Rfl: 0    magnesium oxide (MAG-OX) 400 mg, Take 400 mg by mouth daily, Disp: , Rfl:     Omega-3 Fatty Acids (OMEGA-3 FISH OIL) 300 MG CAPS, Take 1 capsule by mouth daily, Disp: , Rfl:     PRECISION XTRA TEST STRIPS test strip, USE TO TEST BLOOD GLUCOSE ONCE A DAY, Disp: 100 strip, Rfl: 2    terazosin (HYTRIN) 1 mg capsule, TAKE 1 CAPSULE AT BEDTIME, Disp: 90 capsule, Rfl: 3    CINNAMON PO, Take 1 capsule by mouth daily  (Patient not taking: No sig reported), Disp: , Rfl:     clotrimazole-betamethasone (LOTRISONE) 1-0 05 % cream, Apply topically 2 (two) times a day (Patient not taking: No sig reported), Disp: 30 g, Rfl: 0    Past Medical History:   Diagnosis Date    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)     Last Assessed:  5/19/17    Chronic kidney disease     Diabetes mellitus ([facility] [address] )     Hyperlipidemia     Hypertension     Osteoarthritis     Palpitations     TIA (transient ischemic attack)      Past Surgical History:   Procedure Laterality Date    APPENDECTOMY      COLONOSCOPY  2012    HEMORROIDECTOMY      TOOTH EXTRACTION  02/02/2022     Family History   Problem Relation Age of Onset    Diabetes Mother     Other Mother         Stroke syndrome    Diabetes Father     Emphysema Father       reports that he has quit smoking  He has never used smokeless tobacco  He reports current alcohol use  He reports that he does not use drugs  Physical Exam:   Vitals:    08/12/22 1234 08/12/22 1258   BP: (!) 100/40 122/60   BP Location: Left arm    Patient Position: Sitting    Cuff Size: Adult    Pulse: 60    Weight: 82 6 kg (182 lb 3 2 oz)    Height: 5' 11" (1 803 m)      Body mass index is 25 41 kg/m²  Physical Exam  Vitals reviewed  Constitutional:       Appearance: Normal appearance  HENT:      Head: Normocephalic and atraumatic  Nose: Nose normal       Mouth/Throat:      Mouth: Mucous membranes are moist       Pharynx: Oropharynx is clear  Eyes:      Extraocular Movements: Extraocular movements intact  Conjunctiva/sclera: Conjunctivae normal    Cardiovascular:      Rate and Rhythm: Normal rate and regular rhythm  Pulses: Normal pulses  Heart sounds: Normal heart sounds  Pulmonary:      Effort: Pulmonary effort is normal       Breath sounds: Normal breath sounds  Abdominal:      General: Bowel sounds are normal       Palpations: Abdomen is soft  Musculoskeletal:         General: Normal range of motion  Cervical back: Normal range of motion and neck supple  Skin:     General: Skin is warm and dry  Neurological:      General: No focal deficit present  Mental Status: He is alert and oriented to person, place, and time     Psychiatric:         Mood and Affect: Mood normal          Behavior: Behavior normal          Thought Content: Thought content normal          Judgment: Judgment normal             Lab Results:  Results for orders placed or performed in visit on 05/10/22   CBC   Result Value Ref Range    WBC 6 94 4 31 - 10 16 Thousand/uL    RBC 3 93 3 88 - 5 62 Million/uL    Hemoglobin 12 1 12 0 - 17 0 g/dL    Hematocrit 37 6 36 5 - 49 3 %    MCV 96 82 - 98 fL    MCH 30 8 26 8 - 34 3 pg    MCHC 32 2 31 4 - 37 4 g/dL    RDW 13 4 11 6 - 15 1 %    Platelets 283 641 - 502 Thousands/uL    MPV 10 2 8 9 - 12 7 fL   Renal function panel   Result Value Ref Range    Albumin 3 4 (L) 3 5 - 5 0 g/dL    Calcium 9 3 8 3 - 10 1 mg/dL    Corrected Calcium 9 8 8 3 - 10 1 mg/dL    Phosphorus 2 7 2 3 - 4 1 mg/dL    Glucose 158 (H) 65 - 140 mg/dL    BUN 30 (H) 5 - 25 mg/dL    Creatinine 2 22 (H) 0 60 - 1 30 mg/dL    Sodium 139 136 - 145 mmol/L    Potassium 4 4 3 5 - 5 3 mmol/L    Chloride 109 (H) 100 - 108 mmol/L    CO2 28 21 - 32 mmol/L    ANION GAP 2 (L) 4 - 13 mmol/L    eGFR 24 ml/min/1 73sq m   PTH, intact   Result Value Ref Range    PTH 96 9 (H) 18 4 - 80 1 pg/mL   Protein / creatinine ratio, urine   Result Value Ref Range    Creatinine, Ur 76 3 mg/dL    Protein Urine Random 28 mg/dL    Prot/Creat Ratio, Ur 0 37 (H) 0 00 - 0 10   Hemoglobin A1C   Result Value Ref Range    Hemoglobin A1C 7 7 (H) Normal 3 8-5 6%; PreDiabetic 5 7-6 4%; Diabetic >=6 5%; Glycemic control for adults with diabetes <7 0% %     mg/dl               Portions of the record may have been created with voice recognition software  Occasional wrong word or "sound a like" substitutions may have occurred due to the inherent limitations of voice recognition software   Read the chart carefully and recognize,

## 2022-08-11 NOTE — TELEPHONE ENCOUNTER
Appointment Confirmation   Person confirmed appointment with  If not patient, name of the person Child  Paige   Date and time of appointment 8/12 12:30    Patient acknowledged and will be at appointment? yes    Did you advise the patient that they will need a urine sample if they are a new patient?  N/A    Did you advise the patient to bring their current medications for verification? (including any OTC) Yes    Additional Information

## 2022-08-12 ENCOUNTER — OFFICE VISIT (OUTPATIENT)
Dept: NEPHROLOGY | Facility: CLINIC | Age: 87
End: 2022-08-12
Payer: MEDICARE

## 2022-08-12 ENCOUNTER — TELEPHONE (OUTPATIENT)
Dept: NEPHROLOGY | Facility: CLINIC | Age: 87
End: 2022-08-12

## 2022-08-12 VITALS
BODY MASS INDEX: 25.51 KG/M2 | SYSTOLIC BLOOD PRESSURE: 122 MMHG | DIASTOLIC BLOOD PRESSURE: 60 MMHG | WEIGHT: 182.2 LBS | HEIGHT: 71 IN | HEART RATE: 60 BPM

## 2022-08-12 DIAGNOSIS — N18.4 BENIGN HYPERTENSION WITH CKD (CHRONIC KIDNEY DISEASE) STAGE IV (HCC): ICD-10-CM

## 2022-08-12 DIAGNOSIS — I12.9 BENIGN HYPERTENSION WITH CKD (CHRONIC KIDNEY DISEASE) STAGE IV (HCC): ICD-10-CM

## 2022-08-12 DIAGNOSIS — I63.40 CEREBROVASCULAR ACCIDENT (CVA) DUE TO EMBOLISM OF CEREBRAL ARTERY (HCC): ICD-10-CM

## 2022-08-12 DIAGNOSIS — E78.2 MIXED HYPERLIPIDEMIA: ICD-10-CM

## 2022-08-12 DIAGNOSIS — I10 ESSENTIAL HYPERTENSION: ICD-10-CM

## 2022-08-12 DIAGNOSIS — N25.81 SECONDARY HYPERPARATHYROIDISM OF RENAL ORIGIN (HCC): ICD-10-CM

## 2022-08-12 DIAGNOSIS — N18.4 CKD STAGE 4 DUE TO TYPE 2 DIABETES MELLITUS (HCC): Primary | ICD-10-CM

## 2022-08-12 DIAGNOSIS — E11.22 CKD STAGE 4 DUE TO TYPE 2 DIABETES MELLITUS (HCC): Primary | ICD-10-CM

## 2022-08-12 PROCEDURE — 99214 OFFICE O/P EST MOD 30 MIN: CPT | Performed by: NURSE PRACTITIONER

## 2022-08-12 NOTE — TELEPHONE ENCOUNTER
----- Message from Liliana Monday, [address] sent at 8/12/2022  2:47 PM EDT -----  Can you please call patient and just let him know that I was able to get in contact with Dr Virginia Vera  He is to only take his Eliquis    Do not take the Plavix  Thank you  Wali Perez

## 2022-08-12 NOTE — TELEPHONE ENCOUNTER
Called patient and let him know the following information:    Yarelis Peters was able to get in contact with Dr Deneen Lindo  He is to only take his Eliquis  Do not take the Plavix  Patient verbally understood and had no further questions for me at this time

## 2022-09-01 DIAGNOSIS — N18.30 CONTROLLED TYPE 2 DIABETES MELLITUS WITH STAGE 3 CHRONIC KIDNEY DISEASE, WITHOUT LONG-TERM CURRENT USE OF INSULIN (HCC): ICD-10-CM

## 2022-09-01 DIAGNOSIS — E11.22 CONTROLLED TYPE 2 DIABETES MELLITUS WITH STAGE 3 CHRONIC KIDNEY DISEASE, WITHOUT LONG-TERM CURRENT USE OF INSULIN (HCC): ICD-10-CM

## 2022-09-01 RX ORDER — GLIMEPIRIDE 2 MG/1
TABLET ORAL
Qty: 90 TABLET | Refills: 3 | Status: SHIPPED | OUTPATIENT
Start: 2022-09-01

## 2022-09-10 ENCOUNTER — APPOINTMENT (OUTPATIENT)
Dept: RADIOLOGY | Facility: HOSPITAL | Age: 87
End: 2022-09-10
Payer: MEDICARE

## 2022-09-10 ENCOUNTER — APPOINTMENT (EMERGENCY)
Dept: CT IMAGING | Facility: HOSPITAL | Age: 87
End: 2022-09-10
Payer: MEDICARE

## 2022-09-10 ENCOUNTER — HOSPITAL ENCOUNTER (EMERGENCY)
Facility: HOSPITAL | Age: 87
Discharge: HOME | End: 2022-09-10
Attending: SURGERY
Payer: MEDICARE

## 2022-09-10 VITALS
DIASTOLIC BLOOD PRESSURE: 88 MMHG | SYSTOLIC BLOOD PRESSURE: 195 MMHG | WEIGHT: 183.2 LBS | TEMPERATURE: 97.5 F | RESPIRATION RATE: 18 BRPM | OXYGEN SATURATION: 96 % | HEART RATE: 71 BPM | BODY MASS INDEX: 25.55 KG/M2
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
## 2022-09-10 LAB
BASE EXCESS BLDA CALC-SCNC: -4 MMOL/L (ref -2–3)
CA-I BLD-SCNC: 1.19 MMOL/L (ref 1.12–1.32)
GLUCOSE SERPL-MCNC: 213 MG/DL (ref 65–140)
HCO3 BLDA-SCNC: 21.3 MMOL/L (ref 24–30)
HCT VFR BLD CALC: 34 % (ref 36.5–49.3)
HGB BLDA-MCNC: 11.6 G/DL (ref 12–17)
PCO2 BLD: 23 MMOL/L (ref 21–32)
PCO2 BLD: 39.6 MM HG (ref 42–50)
PH BLD: 7.34 [PH] (ref 7.3–7.4)
PO2 BLD: 29 MM HG (ref 35–45)
POTASSIUM BLD-SCNC: 4.2 MMOL/L (ref 3.5–5.3)
SAO2 % BLD FROM PO2: 52 % (ref 60–85)
SODIUM BLD-SCNC: 140 MMOL/L (ref 136–145)
SPECIMEN SOURCE: ABNORMAL

## 2022-09-10 PROCEDURE — 85014 HEMATOCRIT: CPT

## 2022-09-10 PROCEDURE — 82330 ASSAY OF CALCIUM: CPT

## 2022-09-10 PROCEDURE — 73030 X-RAY EXAM OF SHOULDER: CPT

## 2022-09-10 PROCEDURE — 99285 EMERGENCY DEPT VISIT HI MDM: CPT

## 2022-09-10 PROCEDURE — 99282 EMERGENCY DEPT VISIT SF MDM: CPT | Performed by: SURGERY

## 2022-09-10 PROCEDURE — 72125 CT NECK SPINE W/O DYE: CPT

## 2022-09-10 PROCEDURE — 84132 ASSAY OF SERUM POTASSIUM: CPT

## 2022-09-10 PROCEDURE — 84295 ASSAY OF SERUM SODIUM: CPT

## 2022-09-10 PROCEDURE — 82803 BLOOD GASES ANY COMBINATION: CPT

## 2022-09-10 PROCEDURE — 70450 CT HEAD/BRAIN W/O DYE: CPT

## 2022-09-10 PROCEDURE — 71045 X-RAY EXAM CHEST 1 VIEW: CPT

## 2022-09-10 PROCEDURE — 82947 ASSAY GLUCOSE BLOOD QUANT: CPT

## 2022-09-10 NOTE — ASSESSMENT & PLAN NOTE
- Describes mechanical fall at home    Champ Cole on to the left side with chest pain and shoulder pain  -Is unsure if he hit his head  - Plan    - CT scan   -Ambulate

## 2022-09-11 NOTE — H&P
[facility]  H&P- Meeta Rincon 12/11/1930, 80 y o  male MRN: [id number]  Unit/Bed#: ED-05 Encounter: [id number]  Primary Care Provider: Brooke Phelps DO   Date and time admitted to hospital: 9/10/2022  6:24 PM    Fall  Assessment & Plan  - Describes mechanical fall at home  Renae Jeremiah on to the left side with chest pain and shoulder pain  -Is unsure if he hit his head  - Plan    - CT scan   -Ambulate       Trauma Alert: Level B  Model of Arrival: Self  Trauma Team: Attending Abeba Jenkins  and Residents Giuliana Bustamante  Consultants:     None    Trauma Active Problems:  Fall on Eliquis    Trauma Plan:  CT scan of head and neck, clear C-spine of clinically available, ambulate patient, anticipate discharge home if no acute traumatic findings  Chief Complaint:  I am here to get checked out after falling    History of Present Illness   HPI:  Meeta Rincon is a 80 y o  male who presents with a fall at home with unknown head strike  Patient states that I know that I am on a blood thinner, I want to come and get checked out  He states that he has had a fall in the past in which he was encouraged to go to a medical provider for evaluation  He states that he did not have any prodromal symptoms or loss of consciousness after his fall  He states that he tripped and landed onto his left side  He states he was then able to get himself up off the ground and was able to transport himself to the emergency department  He notes that he only had an abrasion over his left elbow and that it was not bleeding  Patient had negative CT imaging in the emergency department while awaiting Trauma evaluation  Patient C-collar was able to be clinically cleared at the bedside  Patient was ambulated in the emergency department and had no acute limitations to his ambulation status  Abrasion of his left elbow was properly bandaged    He was provided with contact information for follow-up with his PCP and trauma team  Patient expressed understanding with this plan  Patient was thankful for the evaluation  Mechanism:Location: Head/left elbow, Quality: Sharp, Severity: 2/10, Duration: Constant, Timing: Approximately 2/3 hours, Context: After a fall, Modifying Factors: No alleviating or exacerbating factors and Associated Signs and Symptoms: None    Review of Systems   Constitutional: Negative for chills and fever  HENT: Negative for ear pain and sore throat  Eyes: Negative for pain and visual disturbance  Respiratory: Negative for cough and shortness of breath  Cardiovascular: Negative for chest pain and palpitations  Gastrointestinal: Negative for abdominal pain and vomiting  Genitourinary: Negative for dysuria and hematuria  Musculoskeletal: Negative for arthralgias and back pain  Skin: Negative for color change and rash  Neurological: Negative for seizures and syncope  All other systems reviewed and are negative        Historical Information       Past Medical History:   Past Medical History:   Diagnosis Date    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)     Last Assessed:  5/19/17    Chronic kidney disease     Diabetes mellitus ([facility] [address] )     Hyperlipidemia     Hypertension     Osteoarthritis     Palpitations     TIA (transient ischemic attack)        Past Surgical History:   Past Surgical History:   Procedure Laterality Date    APPENDECTOMY      COLONOSCOPY  2012    HEMORROIDECTOMY      TOOTH EXTRACTION  02/02/2022       Social History:  Alcohol Use:   Social History     Substance and Sexual Activity   Alcohol Use Yes    Comment: Social drinker     Drug Use:   Social History     Substance and Sexual Activity   Drug Use No     Tobacco Use:   Social History     Tobacco Use   Smoking Status Former Smoker   Smokeless Tobacco Never Used       Immunizations:   Immunization History   Administered Date(s) Administered    COVID-19 MODERNA VACC 0 5 ML IM 01/27/2021, 02/22/2021    INFLUENZA 11/01/2002, 01/05/2003, 11/24/2003, 10/06/2004, 11/07/2005, 10/21/2006, 09/28/2007, 10/10/2008, 10/20/2008, 11/04/2009, 10/05/2010, 10/03/2011, 11/04/2012    Influenza Split High Dose Preservative Free IM 09/27/2013, 09/25/2014, 09/29/2015, 09/23/2016, 09/19/2017    Influenza, high dose seasonal 0 7 mL 09/25/2018, 10/15/2020, 09/20/2021    Pneumococcal 01/01/1999    Pneumococcal Conjugate 13-Valent 04/25/2019    Pneumococcal Polysaccharide PPV23 10/01/2006    TD (adult) Preservative Free 12/11/1930       Last Tetanus:  Unknown  Family History:   Family History   Problem Relation Age of Onset    Diabetes Mother     Other Mother         Stroke syndrome    Diabetes Father     Emphysema Father      Unable to obtain/limited by nothing     Meds/Allergies   all current active meds have been reviewed, current meds:   No current facility-administered medications for this encounter  and PTA meds:   Prior to Admission Medications   Prescriptions Last Dose Informant Patient Reported? Taking?    Blood Glucose Monitoring Suppl (PRECISION XTRA MONITOR) MARY  Self No No   Sig: by Does not apply route daily   CINNAMON PO   Yes No   Sig: Take 1 capsule by mouth daily    Patient not taking: No sig reported   Cholecalciferol 25 MCG (1000 UT) capsule  Self Yes No   Sig: Take 1 capsule by mouth daily   Omega-3 Fatty Acids (OMEGA-3 FISH OIL) 300 MG CAPS  Self Yes No   Sig: Take 1 capsule by mouth daily   PRECISION XTRA TEST STRIPS test strip   No No   Sig: USE TO TEST BLOOD GLUCOSE ONCE A DAY   amLODIPine (NORVASC) 5 mg tablet  Self No No   Sig: TAKE ONE TABLET BY MOUTH EVERY DAY   apixaban (Eliquis) 2 5 mg   No No   Sig: Take 1 tablet (2 5 mg total) by mouth 2 (two) times a day   atorvastatin (LIPITOR) 40 mg tablet   No No   Sig: TAKE 1 TABLET EVERY DAY WITH DINNER   clotrimazole-betamethasone (LOTRISONE) 1-0 05 % cream   No No   Sig: Apply topically 2 (two) times a day   Patient not taking: No sig reported   cyanocobalamin (VITAMIN B-12) 500 mcg tablet  Self Yes No   Sig: Take 1 tablet by mouth daily   glimepiride (AMARYL) 2 mg tablet   No No   Sig: TAKE 1 TABLET BY MOUTH DAILY WITH BREAKFAST   lisinopril (ZESTRIL) 10 mg tablet   No No   Sig: TAKE 1 TABLET TWICE DAILY   magnesium oxide (MAG-OX) 400 mg  Self Yes No   Sig: Take 400 mg by mouth daily   terazosin (HYTRIN) 1 mg capsule   No No   Sig: TAKE 1 CAPSULE AT BEDTIME      Facility-Administered Medications: None        No Known Allergies    PHYSICAL EXAM    PE limited by:  Nothing    Objective   Vitals:   First set: Temperature: 97 5 °F (36 4 °C) (09/10/22 1825)  Pulse: 83 (09/10/22 1825)  Respirations: 18 (09/10/22 1825)  Blood Pressure: (!) 211/91 (09/10/22 1825)    Primary Survey:   (A) Airway:  Intact, phonating appropriately  (B) Breathing:  Symmetrical chest rise, no accessory muscle usage for respiration  (C) Circulation: Pulses:   normal  (D) Disabliity:  GCS Total:  15  (E) Expose:  Completed    Secondary Survey: (Click on Physical Exam tab above)    Physical Exam  Vitals and nursing note reviewed  Constitutional:       Appearance: Normal appearance  He is not ill-appearing or diaphoretic  Comments: Patient was able to ambulate under his own strength and effort into the [facility] [place] and disrobed himself  HENT:      Head: Normocephalic and atraumatic  Right Ear: External ear normal  There is no impacted cerumen  Left Ear: External ear normal       Nose: Nose normal  No congestion or rhinorrhea  Mouth/Throat:      Mouth: Mucous membranes are moist       Pharynx: No posterior oropharyngeal erythema  Eyes:      General:         Right eye: No discharge  Left eye: No discharge  Extraocular Movements: Extraocular movements intact  Pupils: Pupils are equal, round, and reactive to light  Cardiovascular:      Rate and Rhythm: Normal rate and regular rhythm  Pulses: Normal pulses     Pulmonary:      Effort: Pulmonary effort is normal  Breath sounds: No wheezing  Abdominal:      General: Abdomen is flat  Palpations: Abdomen is soft  Tenderness: There is no abdominal tenderness  There is no guarding or rebound  Musculoskeletal:         General: No tenderness, deformity or signs of injury  Normal range of motion  Cervical back: Normal range of motion  No tenderness  Comments: Abrasion noted over the left lateral elbow  Skin:     General: Skin is warm  Capillary Refill: Capillary refill takes less than 2 seconds  Coloration: Skin is not pale  Findings: No bruising or erythema  Neurological:      General: No focal deficit present  Mental Status: He is alert and oriented to person, place, and time     Psychiatric:         Mood and Affect: Mood normal          Invasive Devices  Report    None                 Lab Results:   Results: I have personally reviewed all pertinent laboratory/tests results, BMP/CMP:   Lab Results   Component Value Date    CO2 23 09/10/2022    GLUCOSE 213 (H) 09/10/2022   , CBC:   Lab Results   Component Value Date    HGB 11 6 (L) 09/10/2022    HCT 34 (L) 09/10/2022   , Coagulation: No results found for: PT, INR, APTT, Lactate: No results found for: LACTATE, Amylase: No results found for: AMYLASE, Lipase: No results found for: LIPASE, AST: No results found for: AST, ALT: No results found for: ALT, Urinalysis: No results found for: Ival Copier, SPECGRAV, PHUR, LEUKOCYTESUR, NITRITE, PROTEINUA, GLUCOSEU, KETONESU, BILIRUBINUR, BLOODU, CK: No results found for: CKTOTAL, Troponin: No results found for: TROPONINI, EtOH: No results found for: ETOH, UDS: No components found for: RAPIDDRUGSCREEN, Pregnancy: No results found for: PREGTESTUR, ABG: No results found for: PHART, WFC1XEB, PO2ART, QKQ2FWT, R3UCISMG, BEART, SOURCE and ISTAT: No components found for: VBG  Imaging/EKG Studies: negative for acute findings  Other Studies:     Code Status: Prior  Advance Directive and Living Will: Power of :    POLST:      Counseling / Coordination of Care  Total floor / unit time spent today 20 minutes  This involved direct patient contact where I performed a full history and physical, reviewed previous records, and reviewed laboratory and other diagnostic studies  Total Critical Care time spent 20 minutes excluding procedures, teaching and family updates

## 2022-09-11 NOTE — RESULT ENCOUNTER NOTE
Called patient and informed of rib fracture on Xray  Pt endorses L upper rib pain  Advised tylenol for pain and lidocaine patches  Also recommended 5-10 deep breaths q1h while awake to prevent atelectasis/PNA   Advised schedule f/u with PCP

## 2022-09-11 NOTE — QUICK NOTE
The patient had a CT scan of the cervical spine demonstrating no acute fractures or traumatic malalignment  On exam, the patient had no midline cervical spine tenderness  The patient had full range of motion (was then able to flex, extend, and rotate head side to side) without pain  There were no distracting injuries and the patient was not intoxicated  The patients cervical collar was cleared radiologically and clinically      Kamaljit Sy MD  9/10/2022  9:30 PM

## 2022-09-11 NOTE — DISCHARGE INSTRUCTIONS
Trauma Discharge Instructions:    Please follow-up as instructed  If you need a follow-up appointment, please call the office when you leave to schedule an appointment  Activity:  - PT and OT evaluation and treatment as indicated  - You may resume activity as tolerated  - Walking and normal light activities are encouraged  - Normal daily activities including climbing steps are okay  - No driving until no longer using pain medications  Return to work:    - You may return to work once cleared by the [facility]  Diet:    - You may resume your normal diet  Medications:  - You should continue your current medication regimen after discharge unless otherwise instructed  Please refer to your discharge medication list for further details  - Please take the pain medications as directed  - You are encouraged to use non-narcotic pain medications first and whenever possible  Reserve the use of narcotic pain medication for moderate to severe pain not controlled by non-narcotic medications   - No driving while taking narcotic pain medications  - You may become constipated, especially if taking pain medications  You may take any over the counter stool softeners or laxatives as needed  Examples: Milk of Magnesia, Colace, Senna  Additional Instructions:  - May shower daily   - If you have any questions or concerns after discharge please call the office   - Call office or return to ER if fever greater than 101, chills, persistent nausea/vomiting, worsening/uncontrollable pain, develop productive cough, increasing shortness of breath, difficulty breathing, and/or increasing redness or purulent/foul smelling drainage from incision(s)

## 2022-09-15 ENCOUNTER — OFFICE VISIT (OUTPATIENT)
Dept: INTERNAL MEDICINE CLINIC | Facility: CLINIC | Age: 87
End: 2022-09-15
Payer: MEDICARE

## 2022-09-15 VITALS
RESPIRATION RATE: 18 BRPM | HEART RATE: 82 BPM | HEIGHT: 70 IN | BODY MASS INDEX: 25.91 KG/M2 | OXYGEN SATURATION: 98 % | SYSTOLIC BLOOD PRESSURE: 132 MMHG | TEMPERATURE: 98 F | WEIGHT: 181 LBS | DIASTOLIC BLOOD PRESSURE: 68 MMHG

## 2022-09-15 DIAGNOSIS — S51.011D SKIN TEAR OF RIGHT ELBOW WITHOUT COMPLICATION, SUBSEQUENT ENCOUNTER: ICD-10-CM
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
## 2022-09-15 DIAGNOSIS — I10 ESSENTIAL HYPERTENSION: ICD-10-CM

## 2022-09-15 DIAGNOSIS — S22.32XD CLOSED FRACTURE OF ONE RIB OF LEFT SIDE WITH ROUTINE HEALING: ICD-10-CM

## 2022-09-15 PROCEDURE — 99214 OFFICE O/P EST MOD 30 MIN: CPT | Performed by: INTERNAL MEDICINE

## 2022-09-15 NOTE — ASSESSMENT & PLAN NOTE
Patient had accidental fall  Tripped over his own feet when getting out of his car on Saturday  Did hit the pavement and curve on his left side  Did have bleeding from his left elbow and some chest wall pain and discomfort  Patient was seen in the emergency room in for evaluation  Had numerous the x-ray and CTs performed head neck shoulder and left arm  No fractures no intracranial bleed  Patient was found to have a skin tear to his left elbow  Chest x-ray showed a  rib fracture on the left  Patient is here for evaluation    States he does have some splinting with deep breathing

## 2022-09-15 NOTE — PROGRESS NOTES
Name: Toyin Steiner      : 1930      MRN: [id number]  Encounter Provider: Eamon Cristobal DO  Encounter Date: 9/15/2022   Encounter department: [facility]    Assessment & Plan     1  Fall, subsequent encounter  Assessment & Plan:  Patient had accidental fall  Tripped over his own feet when getting out of his car on Saturday  Did hit the pavement and curve on his left side  Did have bleeding from his left elbow and some chest wall pain and discomfort  Patient was seen in the emergency room in for evaluation  Had numerous the x-ray and CTs performed head neck shoulder and left arm  No fractures no intracranial bleed  Patient was found to have a skin tear to his left elbow  Chest x-ray showed a  rib fracture on the left  Patient is here for evaluation  States he does have some splinting with deep breathing      2  Skin tear of right elbow without complication, subsequent encounter  Assessment & Plan:  Patient has a moderate-sized skin tear to the right elbow  We took the dressing off of this area and he did still have some brisk bleeding  No sign of infection  He was told that the skin was stuck to the cause and did tear and open up the area so the bleeding restarted  Decision was to place some antibiotic cream to the area to prevent his hearing to the bandage  Was redressed with Telfa pads and a sterile gauze wrap  Was told if any signs of infection fever chills increasing redness to please call  Once the area stop bleeding he can be okay to the air but keep it dressed when he is out of the house  3  Essential hypertension  Assessment & Plan:  Blood pressure is well controlled  Patient will continue present medication  Surveillance in our office and also with this nephrologist       4  Closed fracture of one rib of left side with routine healing  Assessment & Plan:  Refused the results of chest x-ray  Fracture of rib left hand side    Does have some tenderness to palpation in slight splinting with deep inhalation  O2 sat is 98%  Patient will call if any problems with shortness of breath cough fever chills  He was told this may take 6-8 weeks to completely heal   Can take Tylenol for pain             Subjective     Patient is a 43-year-old male who is here today for evaluation  Accidental fall on Saturday all going to the bank  States he tripped over his feet when getting out of the car and hit the pavement and curved  Injury to his left elbow and chest wall but no head injury  Was seen in the emergency room and did have full workup and evaluation including CT scan of the head and neck, x-rays of the left arm and chest x-ray  Skin tear to left elbow with bleeding and fracture to rib on the left  Patient is trying to keep the area of injury to the elbow clean and dry again using sterile dressing to the area  Still is having chest wall discomfort especially with deep breathing    Review of Systems   Constitutional: Positive for activity change (States that he has decreased his activity level only slightly with his chest discomfort)  Negative for appetite change, chills, diaphoresis, fatigue, fever and unexpected weight change  HENT: Negative  Eyes: Negative  Respiratory: Negative  Cardiovascular: Positive for chest pain ( chest wall pain on the left laterally)  Negative for palpitations and leg swelling  Gastrointestinal: Negative  Endocrine: Negative  Genitourinary: Negative  Musculoskeletal: Positive for arthralgias  Negative for back pain, gait problem, joint swelling, myalgias, neck pain and neck stiffness  Skin: Positive for wound  Negative for color change, pallor and rash  Allergic/Immunologic: Negative  Neurological: Negative  Hematological: Negative  Psychiatric/Behavioral: Negative          Past Medical History:   Diagnosis Date    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)     Last Assessed:  5/19/17    Chronic kidney disease     Diabetes mellitus ([facility] [address] )     Hyperlipidemia     Hypertension     Osteoarthritis     Palpitations     TIA (transient ischemic attack)      Past Surgical History:   Procedure Laterality Date    APPENDECTOMY      COLONOSCOPY  2012    HEMORROIDECTOMY      TOOTH EXTRACTION  02/02/2022     Family History   Problem Relation Age of Onset    Diabetes Mother     Other Mother         Stroke syndrome    Diabetes Father     Emphysema Father      Social History     Socioeconomic History    Marital status: Single     Spouse name: None    Number of children: None    Years of education: None    Highest education level: None   Occupational History    None   Tobacco Use    Smoking status: Former Smoker    Smokeless tobacco: Never Used   Vaping Use    Vaping Use: Never used   Substance and Sexual Activity    Alcohol use: Yes     Comment: Social drinker    Drug use: No    Sexual activity: Not Currently   Other Topics Concern    None   Social History Narrative    Daily coffee consumption (2 cups/day)     Social Determinants of Health     Financial Resource Strain: Not on file   Food Insecurity: Not on file   Transportation Needs: Not on file   Physical Activity: Not on file   Stress: Not on file   Social Connections: Not on file   Intimate Partner Violence: Not on file   Housing Stability: Not on file     Current Outpatient Medications on File Prior to Visit   Medication Sig    amLODIPine (NORVASC) 5 mg tablet TAKE ONE TABLET BY MOUTH EVERY DAY    apixaban (Eliquis) 2 5 mg Take 1 tablet (2 5 mg total) by mouth 2 (two) times a day    atorvastatin (LIPITOR) 40 mg tablet TAKE 1 TABLET EVERY DAY WITH DINNER    Blood Glucose Monitoring Suppl (PRECISION XTRA MONITOR) MARY by Does not apply route daily    Cholecalciferol 25 MCG (1000 UT) capsule Take 1 capsule by mouth daily    clotrimazole-betamethasone (LOTRISONE) 1-0 05 % cream Apply topically 2 (two) times a day    cyanocobalamin (VITAMIN B-12) 500 mcg tablet Take 1 tablet by mouth daily    glimepiride (AMARYL) 2 mg tablet TAKE 1 TABLET BY MOUTH DAILY WITH BREAKFAST    lisinopril (ZESTRIL) 10 mg tablet TAKE 1 TABLET TWICE DAILY    magnesium oxide (MAG-OX) 400 mg Take 400 mg by mouth daily    Omega-3 Fatty Acids (OMEGA-3 FISH OIL) 300 MG CAPS Take 1 capsule by mouth daily    PRECISION XTRA TEST STRIPS test strip USE TO TEST BLOOD GLUCOSE ONCE A DAY    terazosin (HYTRIN) 1 mg capsule TAKE 1 CAPSULE AT BEDTIME    CINNAMON PO Take 1 capsule by mouth daily  (Patient not taking: No sig reported)     No Known Allergies  Immunization History   Administered Date(s) Administered    COVID-19 MODERNA VACC 0 5 ML IM 01/27/2021, 02/22/2021    INFLUENZA 11/01/2002, 01/05/2003, 11/24/2003, 10/06/2004, 11/07/2005, 10/21/2006, 09/28/2007, 10/10/2008, 10/20/2008, 11/04/2009, 10/05/2010, 10/03/2011, 11/04/2012    Influenza Split High Dose Preservative Free IM 09/27/2013, 09/25/2014, 09/29/2015, 09/23/2016, 09/19/2017    Influenza, high dose seasonal 0 7 mL 09/25/2018, 10/15/2020, 09/20/2021    Pneumococcal 01/01/1999    Pneumococcal Conjugate 13-Valent 04/25/2019    Pneumococcal Polysaccharide PPV23 10/01/2006    TD (adult) Preservative Free 12[birth year]1930       Objective     /68 (BP Location: Left arm, Patient Position: Sitting, Cuff Size: Adult)   Pulse 82   Temp 98 °F (36 7 °C) (Tympanic)   Resp 18   Ht 5' 10" (1 778 m)   Wt 82 1 kg (181 lb)   SpO2 98%   BMI 25 97 kg/m²     Physical Exam  Vitals and nursing note reviewed  Constitutional:       General: He is not in acute distress  Appearance: Normal appearance  He is not ill-appearing, toxic-appearing or diaphoretic  Comments: Pleasant articulate 80-year-old male who is awake alert no acute distress and oriented x3   HENT:      Head: Normocephalic and atraumatic  Right Ear: Tympanic membrane, ear canal and external ear normal  There is no impacted cerumen        Left Ear: Tympanic membrane, ear canal and external ear normal  There is no impacted cerumen  Nose: Nose normal  No congestion or rhinorrhea  Mouth/Throat:      Mouth: Mucous membranes are moist       Pharynx: Oropharynx is clear  No oropharyngeal exudate or posterior oropharyngeal erythema  Eyes:      General: No scleral icterus  Right eye: No discharge  Left eye: No discharge  Extraocular Movements: Extraocular movements intact  Conjunctiva/sclera: Conjunctivae normal       Pupils: Pupils are equal, round, and reactive to light  Neck:      Vascular: No carotid bruit  Cardiovascular:      Rate and Rhythm: Normal rate and regular rhythm  Pulmonary:      Effort: No respiratory distress  Breath sounds: Normal breath sounds  No stridor  No wheezing, rhonchi or rales  Comments: Some splinting on the left with deep inhalation  Chest:      Chest wall: Tenderness (Tenderness left chest wall laterally) present  Abdominal:      General: Bowel sounds are normal  There is no distension  Palpations: Abdomen is soft  There is no mass  Tenderness: There is no abdominal tenderness  There is no right CVA tenderness, left CVA tenderness, guarding or rebound  Hernia: No hernia is present  Musculoskeletal:         General: Deformity ( diffuse arthritic changes but nothing acute) present  No swelling, tenderness or signs of injury  Cervical back: Normal range of motion and neck supple  No rigidity or tenderness  Right lower leg: No edema  Left lower leg: No edema  Lymphadenopathy:      Cervical: No cervical adenopathy  Skin:     General: Skin is warm  Coloration: Skin is not jaundiced or pale  Findings: Lesion ( skin tear left elbow as described the lesion was clean and dressed an new sterile dressing placed) present  No bruising, erythema or rash  Neurological:      Mental Status: He is alert and oriented to person, place, and time   Mental status is at baseline  Cranial Nerves: No cranial nerve deficit  Motor: No weakness  Psychiatric:         Mood and Affect: Mood normal          Behavior: Behavior normal          Thought Content:  Thought content normal          Judgment: Judgment normal        Brooke Phelps DO

## 2022-09-15 NOTE — ASSESSMENT & PLAN NOTE
Refused the results of chest x-ray  Fracture of rib left hand side  Does have some tenderness to palpation in slight splinting with deep inhalation  O2 sat is 98%  Patient will call if any problems with shortness of breath cough fever chills    He was told this may take 6-8 weeks to completely heal   Can take Tylenol for pain

## 2022-09-15 NOTE — ASSESSMENT & PLAN NOTE
Patient has a moderate-sized skin tear to the right elbow  We took the dressing off of this area and he did still have some brisk bleeding  No sign of infection  He was told that the skin was stuck to the cause and did tear and open up the area so the bleeding restarted  Decision was to place some antibiotic cream to the area to prevent his hearing to the bandage  Was redressed with Telfa pads and a sterile gauze wrap  Was told if any signs of infection fever chills increasing redness to please call  Once the area stop bleeding he can be okay to the air but keep it dressed when he is out of the house

## 2022-09-15 NOTE — ASSESSMENT & PLAN NOTE
Blood pressure is well controlled  Patient will continue present medication    Surveillance in our office and also with this nephrologist

## 2022-09-23 ENCOUNTER — APPOINTMENT (OUTPATIENT)
Dept: LAB | Facility: CLINIC | Age: 87
End: 2022-09-23
Payer: MEDICARE

## 2022-09-23 DIAGNOSIS — I10 ESSENTIAL HYPERTENSION: ICD-10-CM

## 2022-09-23 DIAGNOSIS — E08.21 DIABETES MELLITUS DUE TO UNDERLYING CONDITION, CONTROLLED, WITH DIABETIC NEPHROPATHY, WITH LONG-TERM CURRENT USE OF INSULIN (HCC): ICD-10-CM

## 2022-09-23 DIAGNOSIS — Z79.4 DIABETES MELLITUS DUE TO UNDERLYING CONDITION, CONTROLLED, WITH DIABETIC NEPHROPATHY, WITH LONG-TERM CURRENT USE OF INSULIN (HCC): ICD-10-CM

## 2022-09-23 LAB
ANION GAP SERPL CALCULATED.3IONS-SCNC: 6 MMOL/L (ref 4–13)
BASOPHILS # BLD AUTO: 0.04 THOUSANDS/ΜL (ref 0–0.1)
BASOPHILS NFR BLD AUTO: 1 % (ref 0–1)
BUN SERPL-MCNC: 33 MG/DL (ref 5–25)
CALCIUM SERPL-MCNC: 9.3 MG/DL (ref 8.3–10.1)
CHLORIDE SERPL-SCNC: 108 MMOL/L (ref 96–108)
CO2 SERPL-SCNC: 22 MMOL/L (ref 21–32)
CREAT SERPL-MCNC: 2.01 MG/DL (ref 0.6–1.3)
EOSINOPHIL # BLD AUTO: 0.64 THOUSAND/ΜL (ref 0–0.61)
EOSINOPHIL NFR BLD AUTO: 9 % (ref 0–6)
ERYTHROCYTE [DISTWIDTH] IN BLOOD BY AUTOMATED COUNT: 13.2 % (ref 11.6–15.1)
EST. AVERAGE GLUCOSE BLD GHB EST-MCNC: 160 MG/DL
GFR SERPL CREATININE-BSD FRML MDRD: 28 ML/MIN/1.73SQ M
GLUCOSE P FAST SERPL-MCNC: 193 MG/DL (ref 65–99)
HBA1C MFR BLD: 7.2 %
HCT VFR BLD AUTO: 36.6 % (ref 36.5–49.3)
HGB BLD-MCNC: 11.9 G/DL (ref 12–17)
IMM GRANULOCYTES # BLD AUTO: 0.02 THOUSAND/UL (ref 0–0.2)
IMM GRANULOCYTES NFR BLD AUTO: 0 % (ref 0–2)
LYMPHOCYTES # BLD AUTO: 2.1 THOUSANDS/ΜL (ref 0.6–4.47)
LYMPHOCYTES NFR BLD AUTO: 28 % (ref 14–44)
MCH RBC QN AUTO: 30.9 PG (ref 26.8–34.3)
MCHC RBC AUTO-ENTMCNC: 32.5 G/DL (ref 31.4–37.4)
MCV RBC AUTO: 95 FL (ref 82–98)
MONOCYTES # BLD AUTO: 0.72 THOUSAND/ΜL (ref 0.17–1.22)
MONOCYTES NFR BLD AUTO: 10 % (ref 4–12)
NEUTROPHILS # BLD AUTO: 3.91 THOUSANDS/ΜL (ref 1.85–7.62)
NEUTS SEG NFR BLD AUTO: 52 % (ref 43–75)
NRBC BLD AUTO-RTO: 0 /100 WBCS
PLATELET # BLD AUTO: 214 THOUSANDS/UL (ref 149–390)
PMV BLD AUTO: 9.4 FL (ref 8.9–12.7)
POTASSIUM SERPL-SCNC: 4.7 MMOL/L (ref 3.5–5.3)
RBC # BLD AUTO: 3.85 MILLION/UL (ref 3.88–5.62)
SODIUM SERPL-SCNC: 136 MMOL/L (ref 135–147)
WBC # BLD AUTO: 7.43 THOUSAND/UL (ref 4.31–10.16)

## 2022-09-23 PROCEDURE — 80048 BASIC METABOLIC PNL TOTAL CA: CPT

## 2022-09-23 PROCEDURE — 85025 COMPLETE CBC W/AUTO DIFF WBC: CPT

## 2022-09-23 PROCEDURE — 83036 HEMOGLOBIN GLYCOSYLATED A1C: CPT

## 2022-09-23 PROCEDURE — 36415 COLL VENOUS BLD VENIPUNCTURE: CPT

## 2022-09-26 ENCOUNTER — OFFICE VISIT (OUTPATIENT)
Dept: INTERNAL MEDICINE CLINIC | Facility: CLINIC | Age: 87
End: 2022-09-26
Payer: MEDICARE

## 2022-09-26 VITALS
HEART RATE: 81 BPM | TEMPERATURE: 98 F | WEIGHT: 181 LBS | DIASTOLIC BLOOD PRESSURE: 76 MMHG | RESPIRATION RATE: 18 BRPM | BODY MASS INDEX: 26.81 KG/M2 | HEIGHT: 69 IN | SYSTOLIC BLOOD PRESSURE: 128 MMHG | OXYGEN SATURATION: 97 %

## 2022-09-26 DIAGNOSIS — I12.9 BENIGN HYPERTENSION WITH CKD (CHRONIC KIDNEY DISEASE) STAGE IV (HCC): ICD-10-CM

## 2022-09-26 DIAGNOSIS — Z23 ENCOUNTER FOR IMMUNIZATION: Primary | ICD-10-CM

## 2022-09-26 DIAGNOSIS — S22.32XD CLOSED FRACTURE OF ONE RIB OF LEFT SIDE WITH ROUTINE HEALING: ICD-10-CM

## 2022-09-26 DIAGNOSIS — N18.4 BENIGN HYPERTENSION WITH CKD (CHRONIC KIDNEY DISEASE) STAGE IV (HCC): ICD-10-CM

## 2022-09-26 DIAGNOSIS — E08.21 DIABETES MELLITUS DUE TO UNDERLYING CONDITION, CONTROLLED, WITH DIABETIC NEPHROPATHY, WITH LONG-TERM CURRENT USE OF INSULIN (HCC): ICD-10-CM

## 2022-09-26 DIAGNOSIS — Z00.00 HEALTHCARE MAINTENANCE: ICD-10-CM

## 2022-09-26 DIAGNOSIS — Z79.4 DIABETES MELLITUS DUE TO UNDERLYING CONDITION, CONTROLLED, WITH DIABETIC NEPHROPATHY, WITH LONG-TERM CURRENT USE OF INSULIN (HCC): ICD-10-CM

## 2022-09-26 PROCEDURE — G0008 ADMIN INFLUENZA VIRUS VAC: HCPCS | Performed by: INTERNAL MEDICINE

## 2022-09-26 PROCEDURE — 99214 OFFICE O/P EST MOD 30 MIN: CPT | Performed by: INTERNAL MEDICINE

## 2022-09-26 PROCEDURE — 90662 IIV NO PRSV INCREASED AG IM: CPT | Performed by: INTERNAL MEDICINE

## 2022-09-26 NOTE — ASSESSMENT & PLAN NOTE
Lab Results   Component Value Date    EGFR 28 09/23/2022    EGFR 24 05/10/2022    EGFR 27 12/02/2021    CREATININE 2 01 (H) 09/23/2022    CREATININE 2 22 (H) 05/10/2022    CREATININE 2 12 (H) 12/02/2021   Patient's blood pressure is controlled and renal function is stable  Patient will continue to follow-up with nephrology, continue with present medication    Check a basic metabolic profile with his next visit

## 2022-09-26 NOTE — ASSESSMENT & PLAN NOTE
Will be due for repeat Medicare wellness visit when he returns to the office for his next visit he was given a slip for lab test to be performed prior to the visit  In the interim he was told if any new medical problems or concerns to please call

## 2022-09-26 NOTE — ASSESSMENT & PLAN NOTE
Lab Results   Component Value Date    HGBA1C 7 2 (H) 09/23/2022   With recent lab testing patient did have blood sugar hemoglobin A1c performed  Hemoglobin A1c is stable at 7 2  Patient admits that he is not always perfect with his diet the patient's weight is stable  Will continue present medication and surveillance  He does have a glucose monitor that he uses intermittently check blood sugar readings  Check a fasting blood sugar hemoglobin A1c with his next visit    Is up-to-date with diabetic eye and foot exam

## 2022-09-26 NOTE — PROGRESS NOTES
Name: Dung Juarez      : 1930      MRN: [id number]  Encounter Provider: Sohpia Lou DO  Encounter Date: 2022   Encounter department: [facility]    Assessment & Plan     1  Encounter for immunization  -     influenza vaccine, high-dose, PF 0 7 mL (FLUZONE HIGH-DOSE)    2  Healthcare maintenance  Assessment & Plan: Will be due for repeat Medicare wellness visit when he returns to the office for his next visit he was given a slip for lab test to be performed prior to the visit  In the interim he was told if any new medical problems or concerns to please call  Orders:  -     Comprehensive metabolic panel; Future  -     CBC and differential; Future  -     Lipid panel; Future  -     UA (URINE) with reflex to Scope; Future; Expected date: 2022  -     Hemoglobin A1C; Future  -     Microalbumin / creatinine urine ratio    3  Diabetes mellitus due to underlying condition, controlled, with diabetic nephropathy, with long-term current use of insulin [facility])  Assessment & Plan:    Lab Results   Component Value Date    HGBA1C 7 2 (H) 2022   With recent lab testing patient did have blood sugar hemoglobin A1c performed  Hemoglobin A1c is stable at 7 2  Patient admits that he is not always perfect with his diet the patient's weight is stable  Will continue present medication and surveillance  He does have a glucose monitor that he uses intermittently check blood sugar readings  Check a fasting blood sugar hemoglobin A1c with his next visit  Is up-to-date with diabetic eye and foot exam    Orders:  -     Hemoglobin A1C; Future  -     Microalbumin / creatinine urine ratio    4  Closed fracture of one rib of left side with routine healing  Assessment & Plan:  Patient is still having some discomfort to his left chest wall and left arm  States that he does have some mild pain with deep inhalation  Lungs are clear    Patient was told if any respiratory symptoms such as increasing cough shortness of breath fever chills or wheezing to please contact us immediately for evaluation  He was told most rib fractures heal normally in 6  weeks      5  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 [facility])  Assessment & Plan:  Lab Results   Component Value Date    EGFR 28 09/23/2022    EGFR 24 05/10/2022    EGFR 27 12/02/2021    CREATININE 2 01 (H) 09/23/2022    CREATININE 2 22 (H) 05/10/2022    CREATININE 2 12 (H) 12/02/2021   Patient's blood pressure is controlled and renal function is stable  Patient will continue to follow-up with nephrology, continue with present medication  Check a basic metabolic profile with his next visit             Subjective     43-year-old male history of extensive medical problems who is here today for routine follow-up  The was seen recently he in the office after accidental fall the with a fracture rib on the left laterally, skin tear to his left elbow  States he still has some discomfort to his chest wall, appropriate healing to skin tear left arm  He did have labs performed prior the visit today we did discuss the results of length with the patient  Otherwise patient states he is doing relatively well  Because of some imbalance with gait he continues to walk with a walking stick to prevent further accidental injury    Review of Systems   Constitutional: Negative  HENT: Negative  Eyes: Negative  Respiratory: Negative  Cardiovascular: Positive for chest pain  Negative for palpitations and leg swelling  Gastrointestinal: Negative  Endocrine: Negative  Genitourinary: Negative  Musculoskeletal: Positive for gait problem (As noted previously feels somewhat unsteady with gait)  Negative for arthralgias, back pain, joint swelling, myalgias, neck pain and neck stiffness  Skin: Positive for wound ( a healing of skin tear left elbow  No evidence of infection)  Negative for color change, pallor and rash  Allergic/Immunologic: Negative  Neurological: Negative for dizziness, tremors, seizures, syncope, facial asymmetry, speech difficulty, weakness, light-headedness, numbness and headaches  Hematological: Negative  Psychiatric/Behavioral: Negative          Past Medical History:   Diagnosis Date    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)     Last Assessed:  5/19/17    Chronic kidney disease     Diabetes mellitus ([facility] [address] )     Hyperlipidemia     Hypertension     Osteoarthritis     Palpitations     TIA (transient ischemic attack)      Past Surgical History:   Procedure Laterality Date    APPENDECTOMY      COLONOSCOPY  2012    HEMORROIDECTOMY      TOOTH EXTRACTION  02/02/2022     Family History   Problem Relation Age of Onset    Diabetes Mother     Other Mother         Stroke syndrome    Diabetes Father     Emphysema Father      Social History     Socioeconomic History    Marital status: Single     Spouse name: None    Number of children: None    Years of education: None    Highest education level: None   Occupational History    None   Tobacco Use    Smoking status: Former Smoker    Smokeless tobacco: Never Used   Vaping Use    Vaping Use: Never used   Substance and Sexual Activity    Alcohol use: Yes     Comment: Social drinker    Drug use: No    Sexual activity: Not Currently   Other Topics Concern    None   Social History Narrative    Daily coffee consumption (2 cups/day)     Social Determinants of Health     Financial Resource Strain: Not on file   Food Insecurity: Not on file   Transportation Needs: Not on file   Physical Activity: Not on file   Stress: Not on file   Social Connections: Not on file   Intimate Partner Violence: Not on file   Housing Stability: Not on file     Current Outpatient Medications on File Prior to Visit   Medication Sig    amLODIPine (NORVASC) 5 mg tablet TAKE ONE TABLET BY MOUTH EVERY DAY    apixaban (Eliquis) 2 5 mg Take 1 tablet (2 5 mg total) by mouth 2 (two) times a day    atorvastatin (LIPITOR) 40 mg tablet TAKE 1 TABLET EVERY DAY WITH DINNER    Blood Glucose Monitoring Suppl (PRECISION XTRA MONITOR) MARY by Does not apply route daily    Cholecalciferol 25 MCG (1000 UT) capsule Take 1 capsule by mouth daily    clotrimazole-betamethasone (LOTRISONE) 1-0 05 % cream Apply topically 2 (two) times a day    cyanocobalamin (VITAMIN B-12) 500 mcg tablet Take 1 tablet by mouth daily    glimepiride (AMARYL) 2 mg tablet TAKE 1 TABLET BY MOUTH DAILY WITH BREAKFAST    lisinopril (ZESTRIL) 10 mg tablet TAKE 1 TABLET TWICE DAILY    magnesium oxide (MAG-OX) 400 mg Take 400 mg by mouth daily    Omega-3 Fatty Acids (OMEGA-3 FISH OIL) 300 MG CAPS Take 1 capsule by mouth daily    PRECISION XTRA TEST STRIPS test strip USE TO TEST BLOOD GLUCOSE ONCE A DAY    terazosin (HYTRIN) 1 mg capsule TAKE 1 CAPSULE AT BEDTIME    CINNAMON PO Take 1 capsule by mouth daily  (Patient not taking: No sig reported)     No Known Allergies  Immunization History   Administered Date(s) Administered    COVID-19 MODERNA VACC 0 5 ML IM 01/27/2021, 02/22/2021    INFLUENZA 11/01/2002, 01/05/2003, 11/24/2003, 10/06/2004, 11/07/2005, 10/21/2006, 09/28/2007, 10/10/2008, 10/20/2008, 11/04/2009, 10/05/2010, 10/03/2011, 11/04/2012    Influenza Split High Dose Preservative Free IM 09/27/2013, 09/25/2014, 09/29/2015, 09/23/2016, 09/19/2017    Influenza, high dose seasonal 0 7 mL 09/25/2018, 10/15/2020, 09/20/2021, 09/26/2022    Pneumococcal 01/01/1999    Pneumococcal Conjugate 13-Valent 04/25/2019    Pneumococcal Polysaccharide PPV23 10/01/2006    TD (adult) Preservative Free 12/11/1930       Objective     /76 (BP Location: Left arm, Patient Position: Sitting, Cuff Size: Adult)   Pulse 81   Temp 98 °F (36 7 °C) (Tympanic)   Resp 18   Ht 5' 9" (1 753 m)   Wt 82 1 kg (181 lb)   SpO2 97%   BMI 26 73 kg/m²     Physical Exam  Vitals and nursing note reviewed     Constitutional:       General: He is not in acute distress  Appearance: Normal appearance  He is not ill-appearing, toxic-appearing or diaphoretic  Comments: A pleasant articulate 80-year-old male who is awake alert no acute distress oriented x3   HENT:      Head: Normocephalic and atraumatic  Right Ear: Tympanic membrane, ear canal and external ear normal  There is no impacted cerumen  Left Ear: Tympanic membrane, ear canal and external ear normal  There is no impacted cerumen  Nose: Nose normal  No congestion or rhinorrhea  Mouth/Throat:      Mouth: Mucous membranes are moist       Pharynx: Oropharynx is clear  No oropharyngeal exudate or posterior oropharyngeal erythema  Eyes:      General: No scleral icterus  Right eye: No discharge  Left eye: No discharge  Extraocular Movements: Extraocular movements intact  Conjunctiva/sclera: Conjunctivae normal       Pupils: Pupils are equal, round, and reactive to light  Neck:      Vascular: No carotid bruit  Cardiovascular:      Rate and Rhythm: Normal rate and regular rhythm  Heart sounds: Normal heart sounds  No murmur heard  No friction rub  No gallop  Pulmonary:      Effort: Pulmonary effort is normal  No respiratory distress  Breath sounds: Normal breath sounds  No stridor  No wheezing, rhonchi or rales  Chest:      Chest wall: Tenderness (Some residual tenderness left chest wall anterior lateral ) present  Abdominal:      General: Bowel sounds are normal  There is no distension  Palpations: Abdomen is soft  There is no mass  Tenderness: There is no abdominal tenderness  There is no right CVA tenderness, left CVA tenderness, guarding or rebound  Hernia: No hernia is present  Musculoskeletal:         General: Deformity ( diffuse arthritic changes with nothing acute) present  No swelling, tenderness or signs of injury  Cervical back: Normal range of motion and neck supple  No rigidity or tenderness  Right lower leg: No edema  Left lower leg: No edema  Comments: Some decreased range of motion to left shoulder girdle after recent fall  States that he is moving better  Does have crepitus to the left shoulder girdle with movement both passively and actively but no pain with movement  Lymphadenopathy:      Cervical: No cervical adenopathy  Skin:     General: Skin is warm and dry  Coloration: Skin is not jaundiced or pale  Findings: Lesion ( healing of skin tear left elbow  Normal granulation no evidence of infection) present  No bruising, erythema or rash  Neurological:      Mental Status: He is alert and oriented to person, place, and time  Mental status is at baseline  Cranial Nerves: No cranial nerve deficit  Sensory: No sensory deficit  Motor: No weakness  Coordination: Coordination normal       Gait: Gait abnormal ( slightly wide base gait )  Deep Tendon Reflexes: Reflexes normal    Psychiatric:         Mood and Affect: Mood normal          Behavior: Behavior normal          Thought Content:  Thought content normal          Judgment: Judgment normal        Manan Winters DO

## 2022-09-26 NOTE — ASSESSMENT & PLAN NOTE
Patient is still having some discomfort to his left chest wall and left arm  States that he does have some mild pain with deep inhalation  Lungs are clear  Patient was told if any respiratory symptoms such as increasing cough shortness of breath fever chills or wheezing to please contact us immediately for evaluation    He was told most rib fractures heal normally in 6  weeks

## 2022-10-12 PROBLEM — H61.23 BILATERAL IMPACTED CERUMEN: Status: RESOLVED | Noted: 2021-09-20 | Resolved: 2022-10-12

## 2022-11-02 ENCOUNTER — REMOTE DEVICE CLINIC VISIT (OUTPATIENT)
Dept: CARDIOLOGY CLINIC | Facility: CLINIC | Age: 87
End: 2022-11-02

## 2022-11-02 DIAGNOSIS — Z95.818 PRESENCE OF OTHER CARDIAC IMPLANTS AND GRAFTS: Primary | ICD-10-CM

## 2022-11-02 NOTE — PROGRESS NOTES
MDT LOOP/ ACTIVE SYSTEM IS MRI CONDITIONAL   CARELINK TRANSMISSION: LOOP RECORDER  PRESENTING RHYTHM NSR @ 75 BPM  BATTERY STATUS "OK " 1 TACHY EPISODE W/ EGRAM SHOWING SVT @ 188 BPM FOR 18 SECs  20 DEVICE CLASSIFIED AF EPISODES, LONGEST EPISODE , AVAILABLE STRIPS DEMONSTRATE SR W/ PACs, PVCs, PROB ATRIAL FIBRILLATION, OVERSENSING AND UNDERSENSING   AF BURDEN IS 0 1%  AF BURDEN MAYBE OVERESTIMATED DUE TO INAPPROPRIATE CLASSIFICATION OF AF  SOME STRIPS MAY NOT BE INTERPRETABLE OR AVAILABLE, CANNOT DEFINITIVELY RULE OUT ATRIAL FIBRILLATION  HX OF AF  PT TAKES ELIQUIS  NO PATIENT ACTIVATED EPISODES  NORMAL DEVICE FUNCTION   DL

## 2022-11-06 NOTE — PROGRESS NOTES
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   Carotid Doppler showed noncritical disease   ILR was recommended by Neurology at that time and was implanted July 2020  Interrogation 11/2022 suggested possibility of Afib  AFib burden is 0 1%  Echocardiogram done June 2020 demonstrated preserved LV systolic function with mild LVH and mild ELIZABETH  There was mild to moderate tricuspid and pulmonic regurgitation  A lipid profile done December 2021 demonstrated total cholesterol of 89 with an HDL of 36 and a calculated LDL of 42  Patient has been well  He has had no chest pain or significant dyspnea  Patient Active Problem List   Diagnosis   • Healthcare maintenance   • Controlled diabetes mellitus ([place] [address] )   • Enlarged prostate without lower urinary tract symptoms (luts)   • Hyperlipidemia   • 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 (HCC)   • Palpitations   • CKD stage 4 due to type 2 diabetes mellitus ([place] [address] )   • Overweight (BMI 25 0-29  9)   • Secondary hyperparathyroidism of renal origin [facility])   • Embolic bilateral punctate CVA, acute as well as subacute   • Essential hypertension   • 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HCC)   • Skin tear of right elbow without complication   • Fall   • Closed fracture of one rib of left side with routine healing     Past Medical History:   Diagnosis Date   • 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)     Last Assessed:  5/19/17   • Chronic kidney disease    • Diabetes mellitus ([facility] [address] )    • Hyperlipidemia    • Hypertension    • Osteoarthritis    • Palpitations    • TIA (transient ischemic attack)      Social History     Socioeconomic History   • Marital status: Single     Spouse name: Not on file   • Number of children: Not on file   • Years of education: Not on file   • Highest education level: Not on file   Occupational History   • Not on file   Tobacco Use   • Smoking status: Former Smoker   • Smokeless tobacco: Never Used   Vaping Use   • Vaping Use: Never used   Substance and Sexual Activity   • Alcohol use: Yes     Comment: Social drinker   • Drug use: No   • Sexual activity: Not Currently   Other Topics Concern   • Not on file   Social History Narrative    Daily coffee consumption (2 cups/day)     Social Determinants of Health     Financial Resource Strain: Not on file   Food Insecurity: Not on file   Transportation Needs: Not on file   Physical Activity: Not on file   Stress: Not on file   Social Connections: Not on file   Intimate Partner Violence: Not on file   Housing Stability: Not on file      Family History   Problem Relation Age of Onset   • Diabetes Mother    • Other Mother         Stroke syndrome   • Diabetes Father    • Emphysema Father      Past Surgical History:   Procedure Laterality Date   • APPENDECTOMY     • COLONOSCOPY  2012   • HEMORROIDECTOMY     • TOOTH EXTRACTION  02/02/2022       Current Outpatient Medications:   •  amLODIPine (NORVASC) 5 mg tablet, TAKE ONE TABLET BY MOUTH EVERY DAY, Disp: 90 tablet, Rfl: 3  •  apixaban (Eliquis) 2 5 mg, Take 1 tablet (2 5 mg total) by mouth 2 (two) times a day, Disp: 180 tablet, Rfl: 2  •  atorvastatin (LIPITOR) 40 mg tablet, TAKE 1 TABLET EVERY DAY WITH DINNER, Disp: 90 tablet, Rfl: 1  •  Blood Glucose Monitoring Suppl (PRECISION XTRA MONITOR) MARY, by Does not apply route daily, Disp: 1 each, Rfl: 0  •  Cholecalciferol 25 MCG (1000 UT) capsule, Take 1 capsule by mouth daily, Disp: , Rfl:   •  cyanocobalamin (VITAMIN B-12) 500 mcg tablet, Take 1 tablet by mouth daily, Disp: , Rfl:   •  glimepiride (AMARYL) 2 mg tablet, TAKE 1 TABLET BY MOUTH DAILY WITH BREAKFAST, Disp: 90 tablet, Rfl: 3  •  lisinopril (ZESTRIL) 10 mg tablet, TAKE 1 TABLET TWICE DAILY, Disp: 180 tablet, Rfl: 0  •  magnesium oxide (MAG-OX) 400 mg, Take 400 mg by mouth daily, Disp: , Rfl:   •  PRECISION XTRA TEST STRIPS test strip, USE TO TEST BLOOD GLUCOSE ONCE A DAY, Disp: 100 strip, Rfl: 2  •  terazosin (HYTRIN) 1 mg capsule, TAKE 1 CAPSULE AT BEDTIME, Disp: 90 capsule, Rfl: 3  •  CINNAMON PO, Take 1 capsule by mouth daily  (Patient not taking: No sig reported), Disp: , Rfl:   •  clotrimazole-betamethasone (LOTRISONE) 1-0 05 % cream, Apply topically 2 (two) times a day (Patient not taking: No sig reported), Disp: 30 g, Rfl: 0  •  Omega-3 Fatty Acids (OMEGA-3 FISH OIL) 300 MG CAPS, Take 1 capsule by mouth daily (Patient not taking: Reported on 11/15/2022), Disp: , Rfl:   No Known Allergies    Labs:not applicable  Imaging: Cardiac EP device report    Result Date: 11/2/2022  Narrative: MDT LOOP/ ACTIVE SYSTEM IS MRI CONDITIONAL CARELINK TRANSMISSION: LOOP RECORDER  PRESENTING RHYTHM NSR @ 75 BPM  BATTERY STATUS "OK " 1 TACHY EPISODE W/ EGRAM SHOWING SVT @ 188 BPM FOR 18 SECs  20 DEVICE CLASSIFIED AF EPISODES, LONGEST EPISODE , AVAILABLE STRIPS DEMONSTRATE SR W/ PACs, PVCs, PROB ATRIAL FIBRILLATION, OVERSENSING AND UNDERSENSING  AF BURDEN IS 0 1%  AF BURDEN MAYBE OVERESTIMATED DUE TO INAPPROPRIATE CLASSIFICATION OF AF  SOME STRIPS MAY NOT BE INTERPRETABLE OR AVAILABLE, CANNOT DEFINITIVELY RULE OUT ATRIAL FIBRILLATION  HX OF AF  PT TAKES ELIQUIS  NO PATIENT ACTIVATED EPISODES  NORMAL DEVICE FUNCTION  DL       Review of Systems:  Review of Systems   All other systems reviewed and are negative        Physical Exam:  BP (!) 120/38 (BP Location: Right arm, Patient Position: Sitting, Cuff Size: Standard)   Pulse 73   Ht 5' 9" (1 753 m)   Wt 83 6 kg (184 lb 6 4 oz)   BMI 27 23 kg/m²   Physical Exam  Vitals reviewed  Constitutional:       Appearance: He is well-developed  HENT:      Head: Normocephalic and atraumatic  Eyes:      Conjunctiva/sclera: Conjunctivae normal       Pupils: Pupils are equal, round, and reactive to light  Cardiovascular:      Rate and Rhythm: Normal rate  Heart sounds: Normal heart sounds  Pulmonary:      Effort: Pulmonary effort is normal       Breath sounds: Normal breath sounds  Musculoskeletal:      Cervical back: Normal range of motion and neck supple  Skin:     General: Skin is warm and dry  Neurological:      Mental Status: He is alert and oriented to person, place, and time  Discussion/Summary:I will continue the patient's present medical regimen  The patient appears well compensated  I have asked the patient to call if there is a problem in the interim otherwise I will see the patient in six months time

## 2022-11-15 ENCOUNTER — OFFICE VISIT (OUTPATIENT)
Dept: CARDIOLOGY CLINIC | Facility: CLINIC | Age: 87
End: 2022-11-15

## 2022-11-15 VITALS
BODY MASS INDEX: 27.31 KG/M2 | SYSTOLIC BLOOD PRESSURE: 120 MMHG | HEIGHT: 69 IN | WEIGHT: 184.4 LBS | DIASTOLIC BLOOD PRESSURE: 38 MMHG | HEART RATE: 73 BPM

## 2022-11-15 DIAGNOSIS — I45.10 RBBB: ICD-10-CM

## 2022-11-15 DIAGNOSIS — I48.91 ATRIAL FIBRILLATION, UNSPECIFIED TYPE (HCC): ICD-10-CM

## 2022-11-15 DIAGNOSIS — Z95.818 PRESENCE OF OTHER CARDIAC IMPLANTS AND GRAFTS: Primary | ICD-10-CM

## 2022-11-15 DIAGNOSIS — I10 ESSENTIAL (PRIMARY) HYPERTENSION: ICD-10-CM

## 2022-11-15 DIAGNOSIS — E78.00 PURE HYPERCHOLESTEROLEMIA: ICD-10-CM

## 2022-11-22 ENCOUNTER — FOLLOW UP (OUTPATIENT)
Dept: URBAN - METROPOLITAN AREA CLINIC 6 | Facility: CLINIC | Age: 87
End: 2022-11-22

## 2022-11-22 DIAGNOSIS — H40.023: ICD-10-CM

## 2022-11-22 DIAGNOSIS — Z96.1: ICD-10-CM

## 2022-11-22 PROCEDURE — 92012 INTRM OPH EXAM EST PATIENT: CPT

## 2022-11-22 ASSESSMENT — TONOMETRY
OS_IOP_MMHG: 16
OD_IOP_MMHG: 17

## 2022-11-22 ASSESSMENT — VISUAL ACUITY
OS_CC: 20/25-1
OU_CC: J1
OD_CC: 20/20-2

## 2022-11-23 DIAGNOSIS — N18.4 CKD (CHRONIC KIDNEY DISEASE), STAGE IV (HCC): ICD-10-CM

## 2022-11-25 RX ORDER — LISINOPRIL 10 MG/1
TABLET ORAL
Qty: 180 TABLET | Refills: 0 | Status: SHIPPED | OUTPATIENT
Start: 2022-11-25

## 2023-01-04 ENCOUNTER — TELEPHONE (OUTPATIENT)
Dept: NEPHROLOGY | Facility: CLINIC | Age: 88
End: 2023-01-04

## 2023-01-04 ENCOUNTER — APPOINTMENT (OUTPATIENT)
Dept: LAB | Facility: CLINIC | Age: 88
End: 2023-01-04

## 2023-01-04 DIAGNOSIS — E08.21 DIABETES MELLITUS DUE TO UNDERLYING CONDITION, CONTROLLED, WITH DIABETIC NEPHROPATHY, WITH LONG-TERM CURRENT USE OF INSULIN (HCC): ICD-10-CM

## 2023-01-04 DIAGNOSIS — Z00.00 HEALTHCARE MAINTENANCE: ICD-10-CM

## 2023-01-04 DIAGNOSIS — E11.22 CKD STAGE 4 DUE TO TYPE 2 DIABETES MELLITUS (HCC): ICD-10-CM

## 2023-01-04 DIAGNOSIS — Z79.4 DIABETES MELLITUS DUE TO UNDERLYING CONDITION, CONTROLLED, WITH DIABETIC NEPHROPATHY, WITH LONG-TERM CURRENT USE OF INSULIN (HCC): ICD-10-CM

## 2023-01-04 DIAGNOSIS — N18.4 CKD STAGE 4 DUE TO TYPE 2 DIABETES MELLITUS (HCC): ICD-10-CM

## 2023-01-04 LAB
25(OH)D3 SERPL-MCNC: 22.5 NG/ML (ref 30–100)
ALBUMIN SERPL BCP-MCNC: 3.5 G/DL (ref 3.5–5)
ALP SERPL-CCNC: 86 U/L (ref 46–116)
ALT SERPL W P-5'-P-CCNC: 24 U/L (ref 12–78)
ANION GAP SERPL CALCULATED.3IONS-SCNC: 5 MMOL/L (ref 4–13)
AST SERPL W P-5'-P-CCNC: 17 U/L (ref 5–45)
BACTERIA UR QL AUTO: NORMAL /HPF
BASOPHILS # BLD AUTO: 0.04 THOUSANDS/ÂΜL (ref 0–0.1)
BASOPHILS NFR BLD AUTO: 1 % (ref 0–1)
BILIRUB SERPL-MCNC: 0.84 MG/DL (ref 0.2–1)
BILIRUB UR QL STRIP: NEGATIVE
BUN SERPL-MCNC: 31 MG/DL (ref 5–25)
CALCIUM SERPL-MCNC: 9 MG/DL (ref 8.3–10.1)
CHLORIDE SERPL-SCNC: 108 MMOL/L (ref 96–108)
CHOLEST SERPL-MCNC: 99 MG/DL
CLARITY UR: CLEAR
CO2 SERPL-SCNC: 24 MMOL/L (ref 21–32)
COLOR UR: ABNORMAL
CREAT SERPL-MCNC: 2.13 MG/DL (ref 0.6–1.3)
CREAT UR-MCNC: 98.3 MG/DL
CREAT UR-MCNC: 98.3 MG/DL
EOSINOPHIL # BLD AUTO: 0.55 THOUSAND/ÂΜL (ref 0–0.61)
EOSINOPHIL NFR BLD AUTO: 9 % (ref 0–6)
ERYTHROCYTE [DISTWIDTH] IN BLOOD BY AUTOMATED COUNT: 13.4 % (ref 11.6–15.1)
GFR SERPL CREATININE-BSD FRML MDRD: 26 ML/MIN/1.73SQ M
GLUCOSE P FAST SERPL-MCNC: 148 MG/DL (ref 65–99)
GLUCOSE UR STRIP-MCNC: ABNORMAL MG/DL
HCT VFR BLD AUTO: 33.7 % (ref 36.5–49.3)
HDLC SERPL-MCNC: 41 MG/DL
HGB BLD-MCNC: 11 G/DL (ref 12–17)
HGB UR QL STRIP.AUTO: NEGATIVE
IMM GRANULOCYTES # BLD AUTO: 0.02 THOUSAND/UL (ref 0–0.2)
IMM GRANULOCYTES NFR BLD AUTO: 0 % (ref 0–2)
KETONES UR STRIP-MCNC: NEGATIVE MG/DL
LDLC SERPL CALC-MCNC: 46 MG/DL (ref 0–100)
LEUKOCYTE ESTERASE UR QL STRIP: NEGATIVE
LYMPHOCYTES # BLD AUTO: 1.85 THOUSANDS/ÂΜL (ref 0.6–4.47)
LYMPHOCYTES NFR BLD AUTO: 29 % (ref 14–44)
MAGNESIUM SERPL-MCNC: 1.8 MG/DL (ref 1.6–2.6)
MCH RBC QN AUTO: 30.8 PG (ref 26.8–34.3)
MCHC RBC AUTO-ENTMCNC: 32.6 G/DL (ref 31.4–37.4)
MCV RBC AUTO: 94 FL (ref 82–98)
MICROALBUMIN UR-MCNC: 109 MG/L (ref 0–20)
MICROALBUMIN/CREAT 24H UR: 111 MG/G CREATININE (ref 0–30)
MONOCYTES # BLD AUTO: 0.73 THOUSAND/ÂΜL (ref 0.17–1.22)
MONOCYTES NFR BLD AUTO: 11 % (ref 4–12)
NEUTROPHILS # BLD AUTO: 3.2 THOUSANDS/ÂΜL (ref 1.85–7.62)
NEUTS SEG NFR BLD AUTO: 50 % (ref 43–75)
NITRITE UR QL STRIP: POSITIVE
NON-SQ EPI CELLS URNS QL MICRO: NORMAL /HPF
NONHDLC SERPL-MCNC: 58 MG/DL
NRBC BLD AUTO-RTO: 0 /100 WBCS
PH UR STRIP.AUTO: 5.5 [PH]
PHOSPHATE SERPL-MCNC: 2.9 MG/DL (ref 2.3–4.1)
PLATELET # BLD AUTO: 171 THOUSANDS/UL (ref 149–390)
PMV BLD AUTO: 9.3 FL (ref 8.9–12.7)
POTASSIUM SERPL-SCNC: 4.3 MMOL/L (ref 3.5–5.3)
PROT SERPL-MCNC: 6.7 G/DL (ref 6.4–8.4)
PROT UR STRIP-MCNC: ABNORMAL MG/DL
PROT UR-MCNC: 38 MG/DL
PROT/CREAT UR: 0.39 MG/G{CREAT} (ref 0–0.1)
PTH-INTACT SERPL-MCNC: 101.7 PG/ML (ref 18.4–80.1)
RBC # BLD AUTO: 3.57 MILLION/UL (ref 3.88–5.62)
RBC #/AREA URNS AUTO: NORMAL /HPF
SODIUM SERPL-SCNC: 137 MMOL/L (ref 135–147)
SP GR UR STRIP.AUTO: 1.02 (ref 1–1.03)
TRIGL SERPL-MCNC: 60 MG/DL
UROBILINOGEN UR STRIP-ACNC: <2 MG/DL
WBC # BLD AUTO: 6.39 THOUSAND/UL (ref 4.31–10.16)
WBC #/AREA URNS AUTO: NORMAL /HPF

## 2023-01-05 LAB
EST. AVERAGE GLUCOSE BLD GHB EST-MCNC: 177 MG/DL
HBA1C MFR BLD: 7.8 %

## 2023-01-11 ENCOUNTER — TELEPHONE (OUTPATIENT)
Dept: NEPHROLOGY | Facility: CLINIC | Age: 88
End: 2023-01-11

## 2023-01-11 NOTE — TELEPHONE ENCOUNTER
Appointment Confirmation   Person confirmed appointment with  If not patient, name of the person Patient    Date and time of appointment 1/12/23  1:30 pm    Patient acknowledged and will be at appointment? yes    Did you advise the patient that they will need a urine sample if they are a new patient?  N/A    Did you advise the patient to bring their current medications for verification? (including any OTC) Yes    Additional Information

## 2023-01-12 ENCOUNTER — OFFICE VISIT (OUTPATIENT)
Dept: NEPHROLOGY | Facility: CLINIC | Age: 88
End: 2023-01-12

## 2023-01-12 VITALS
WEIGHT: 183 LBS | SYSTOLIC BLOOD PRESSURE: 138 MMHG | DIASTOLIC BLOOD PRESSURE: 70 MMHG | HEIGHT: 69 IN | BODY MASS INDEX: 27.11 KG/M2

## 2023-01-12 DIAGNOSIS — I77.9 DISORDER OF CAROTID ARTERY (HCC): ICD-10-CM

## 2023-01-12 DIAGNOSIS — E11.22 CKD STAGE 4 DUE TO TYPE 2 DIABETES MELLITUS (HCC): Primary | ICD-10-CM

## 2023-01-12 DIAGNOSIS — N18.4 BENIGN HYPERTENSION WITH CKD (CHRONIC KIDNEY DISEASE) STAGE IV (HCC): ICD-10-CM

## 2023-01-12 DIAGNOSIS — I12.9 BENIGN HYPERTENSION WITH CKD (CHRONIC KIDNEY DISEASE) STAGE IV (HCC): ICD-10-CM

## 2023-01-12 DIAGNOSIS — E78.2 MIXED HYPERLIPIDEMIA: ICD-10-CM

## 2023-01-12 DIAGNOSIS — N18.4 CKD STAGE 4 DUE TO TYPE 2 DIABETES MELLITUS (HCC): Primary | ICD-10-CM

## 2023-01-12 DIAGNOSIS — N25.81 SECONDARY HYPERPARATHYROIDISM OF RENAL ORIGIN (HCC): ICD-10-CM

## 2023-01-12 DIAGNOSIS — I10 ESSENTIAL HYPERTENSION: ICD-10-CM

## 2023-01-12 NOTE — PATIENT INSTRUCTIONS
As we discussed in the office visit and based on the most recent blood test your kidney function remains fairly stable for several years  I would like to see you back in the office in 5 months with repeat blood and urine test   Remember to follow low-salt diet less than 2 g of sodium a day  Remember to avoid NSAIDs (no ibuprofen, Motrin, Advil, Aleve, naproxen)  Okay to take Tylenol or paracetamol or acetaminophen as needed for pain  Remember to stay well-hydrated  Continue close follow-up with your primary care doctor

## 2023-01-12 NOTE — PROGRESS NOTES
OFFICE FOLLOW UP - Nephrology   Coco Gomez 80 y o  male MRN: [id number]       ASSESSMENT and PLAN:  Guera Tariq was seen today for follow-up and chronic kidney disease  Diagnoses and all orders for this visit:    CKD stage 4 due to type 2 diabetes mellitus ([facility] [address] )    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 (HCC)    Secondary hyperparathyroidism of renal origin ([facility] [address] )    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HCC)    Essential hypertension    Mixed hyperlipidemia           This is an 40-year-old gentleman with known history of chronic kidney disease stage 4 who returns to the office for follow-up  1   Chronic kidney disease stage 4, baseline creatinine around 1 9 to 2 2 since 2018  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, atherosclerotic disease, possible diabetes as well as age-related nephron loss  Kidney function remains fairly stable with a creatinine of 2 1, no significant proteinuria  Once again discussed with patient how important to follow low-salt diet, stay well-hydrated and avoid NSAIDs  I would like to see him back in the office in 5 months with repeat labs  2  Controlled type 2 diabetes, most recent A1c 7 8% on 01/2023,   Management as per primary care doctor  3   Hypertension, blood pressure well controlled, on amlodipine and lisinopril  4   Hemoglobin acceptable  5   Mineral bone disease, PTH and phosphorus acceptable for his degreee of kidney disease  Follow labs in 5 months  6  Hyperlipidemia, continue with statins, management as per primary doctor  There are no Patient Instructions on file for this visit  HPI: Coco Gomez is a 80 y o  male who is here for Follow-up and Chronic Kidney Disease    Last time seen in our office was in 08/2022 by our nurse practitioner, today returns for six months follow-up  Since our last visit, there has been no ER visits or hospitilizations       Today in general is doing well, denies any complaints  Patient denies any chest pain, shortness of breath or leg swelling  No abdominal pain, no nausea or diarrhea, no constipation  Appetite is stable, weight unchanged since lats visit  Denies any urinary problems no burning sensation, continues with same nocturia 1-2 per night  He does not smoke, he quit smoking on 1981  Denies NSAID use  The last blood work was done on 01/04/2023, which we have reviewed together  Most recent serum creatinine 2 13 with an eGFR 26, Hgb 11, , UPC 0 39  ROS: All the systems were reviewed and were negative except as documented on the H&P  Allergies: Patient has no known allergies      Medications:   Current Outpatient Medications:   •  amLODIPine (NORVASC) 5 mg tablet, TAKE ONE TABLET BY MOUTH EVERY DAY, Disp: 90 tablet, Rfl: 3  •  apixaban (Eliquis) 2 5 mg, Take 1 tablet (2 5 mg total) by mouth 2 (two) times a day, Disp: 180 tablet, Rfl: 2  •  atorvastatin (LIPITOR) 40 mg tablet, TAKE 1 TABLET EVERY DAY WITH DINNER, Disp: 90 tablet, Rfl: 1  •  Blood Glucose Monitoring Suppl (PRECISION XTRA MONITOR) MARY, by Does not apply route daily, Disp: 1 each, Rfl: 0  •  Cholecalciferol 25 MCG (1000 UT) capsule, Take 1 capsule by mouth daily, Disp: , Rfl:   •  cyanocobalamin (VITAMIN B-12) 500 mcg tablet, Take 1 tablet by mouth daily, Disp: , Rfl:   •  glimepiride (AMARYL) 2 mg tablet, TAKE 1 TABLET BY MOUTH DAILY WITH BREAKFAST, Disp: 90 tablet, Rfl: 3  •  lisinopril (ZESTRIL) 10 mg tablet, TAKE 1 TABLET TWICE DAILY, Disp: 180 tablet, Rfl: 0  •  magnesium oxide (MAG-OX) 400 mg, Take 400 mg by mouth daily, Disp: , Rfl:   •  Omega-3 Fatty Acids (OMEGA-3 FISH OIL) 300 MG CAPS, Take 1 capsule by mouth daily, Disp: , Rfl:   •  PRECISION XTRA TEST STRIPS test strip, USE TO TEST BLOOD GLUCOSE ONCE A DAY, Disp: 100 strip, Rfl: 2  •  terazosin (HYTRIN) 1 mg capsule, TAKE 1 CAPSULE AT BEDTIME, Disp: 90 capsule, Rfl: 3  •  CINNAMON PO, Take 1 capsule by mouth daily (Patient not taking: Reported on 11/15/2022), Disp: , Rfl:   •  clotrimazole-betamethasone (LOTRISONE) 1-0 05 % cream, Apply topically 2 (two) times a day (Patient not taking: Reported on 11/15/2022), Disp: 30 g, Rfl: 0    Past Medical History:   Diagnosis Date   • 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)     Last Assessed:  5/19/17   • Chronic kidney disease    • Diabetes mellitus ([facility] [address] )    • Hyperlipidemia    • Hypertension    • Osteoarthritis    • Palpitations    • TIA (transient ischemic attack)      Past Surgical History:   Procedure Laterality Date   • APPENDECTOMY     • COLONOSCOPY  2012   • HEMORROIDECTOMY     • TOOTH EXTRACTION  02/02/2022     Family History   Problem Relation Age of Onset   • Diabetes Mother    • Other Mother         Stroke syndrome   • Diabetes Father    • Emphysema Father       reports that he has quit smoking  He has never used smokeless tobacco  He reports current alcohol use  He reports that he does not use drugs  Physical Exam:   Vitals:    01/12/23 1335   BP: 138/70   BP Location: Left arm   Patient Position: Sitting   Cuff Size: Standard   Weight: 83 kg (183 lb)   Height: 5' 9" (1 753 m)     Body mass index is 27 02 kg/m²      General: conscious, cooperative, in not acute distress  Eyes: conjunctivae pink, anicteric sclerae  ENT: lips and mucous membranes moist  Neck: supple, no JVD  Chest: clear breath sounds bilateral, no crackles, ronchus or wheezings  CVS: distinct S1 & S2, normal rate, regular rhythm  Abdomen: soft, non-tender, non-distended, normoactive bowel sounds  Back: no CVA tenderness  Extremities: no edema of both legs  Skin: no rash  Neuro: awake, alert, oriented            Lab Results:  Results for orders placed or performed in visit on 01/04/23   Magnesium   Result Value Ref Range    Magnesium 1 8 1 6 - 2 6 mg/dL   Phosphorus   Result Value Ref Range    Phosphorus 2 9 2 3 - 4 1 mg/dL   PTH, intact   Result Value Ref Range     7 (H) 18 4 - 80 1 pg/mL Vitamin D 25 hydroxy   Result Value Ref Range    Vit D, 25-Hydroxy 22 5 (L) 30 0 - 100 0 ng/mL   Protein / creatinine ratio, urine   Result Value Ref Range    Creatinine, Ur 98 3 mg/dL    Protein Urine Random 38 mg/dL    Prot/Creat Ratio, Ur 0 39 (H) 0 00 - 0 10   Comprehensive metabolic panel   Result Value Ref Range    Sodium 137 135 - 147 mmol/L    Potassium 4 3 3 5 - 5 3 mmol/L    Chloride 108 96 - 108 mmol/L    CO2 24 21 - 32 mmol/L    ANION GAP 5 4 - 13 mmol/L    BUN 31 (H) 5 - 25 mg/dL    Creatinine 2 13 (H) 0 60 - 1 30 mg/dL    Glucose, Fasting 148 (H) 65 - 99 mg/dL    Calcium 9 0 8 3 - 10 1 mg/dL    AST 17 5 - 45 U/L    ALT 24 12 - 78 U/L    Alkaline Phosphatase 86 46 - 116 U/L    Total Protein 6 7 6 4 - 8 4 g/dL    Albumin 3 5 3 5 - 5 0 g/dL    Total Bilirubin 0 84 0 20 - 1 00 mg/dL    eGFR 26 ml/min/1 73sq m   CBC and differential   Result Value Ref Range    WBC 6 39 4 31 - 10 16 Thousand/uL    RBC 3 57 (L) 3 88 - 5 62 Million/uL    Hemoglobin 11 0 (L) 12 0 - 17 0 g/dL    Hematocrit 33 7 (L) 36 5 - 49 3 %    MCV 94 82 - 98 fL    MCH 30 8 26 8 - 34 3 pg    MCHC 32 6 31 4 - 37 4 g/dL    RDW 13 4 11 6 - 15 1 %    MPV 9 3 8 9 - 12 7 fL    Platelets 899 092 - 359 Thousands/uL    nRBC 0 /100 WBCs    Neutrophils Relative 50 43 - 75 %    Immat GRANS % 0 0 - 2 %    Lymphocytes Relative 29 14 - 44 %    Monocytes Relative 11 4 - 12 %    Eosinophils Relative 9 (H) 0 - 6 %    Basophils Relative 1 0 - 1 %    Neutrophils Absolute 3 20 1 85 - 7 62 Thousands/µL    Immature Grans Absolute 0 02 0 00 - 0 20 Thousand/uL    Lymphocytes Absolute 1 85 0 60 - 4 47 Thousands/µL    Monocytes Absolute 0 73 0 17 - 1 22 Thousand/µL    Eosinophils Absolute 0 55 0 00 - 0 61 Thousand/µL    Basophils Absolute 0 04 0 00 - 0 10 Thousands/µL   Lipid panel   Result Value Ref Range    Cholesterol 99 See Comment mg/dL    Triglycerides 60 See Comment mg/dL    HDL, Direct 41 >=40 mg/dL    LDL Calculated 46 0 - 100 mg/dL    Non-HDL-Chol (CHOL-HDL) 58 mg/dl   UA (URINE) with reflex to Scope   Result Value Ref Range    Color, UA Light Yellow     Clarity, UA Clear     Specific [place], UA 1 016 1 003 - 1 030    pH, UA 5 5 4 5, 5 0, 5 5, 6 0, 6 5, 7 0, 7 5, 8 0    Leukocytes, UA Negative Negative    Nitrite, UA Positive (A) Negative    Protein, UA Trace (A) Negative mg/dl    Glucose, UA 70 (7/100%) (A) Negative mg/dl    Ketones, UA Negative Negative mg/dl    Urobilinogen, UA <2 0 <2 0 mg/dl mg/dl    Bilirubin, UA Negative Negative    Occult Blood, UA Negative Negative   Hemoglobin A1C   Result Value Ref Range    Hemoglobin A1C 7 8 (H) Normal 3 8-5 6%; PreDiabetic 5 7-6 4%; Diabetic >=6 5%; Glycemic control for adults with diabetes <7 0% %     mg/dl   Urine Microscopic   Result Value Ref Range    RBC, UA 1-2 None Seen, 1-2 /hpf    WBC, UA 1-2 None Seen, 1-2 /hpf    Epithelial Cells None Seen None Seen, Occasional /hpf    Bacteria, UA None Seen None Seen, Occasional /hpf       Portions of the record may have been created with voice recognition software  Occasional wrong word or "sound a like" substitutions may have occurred due to the inherent limitations of voice recognition software  Read the chart carefully and recognize, using context, where substitutions have occurred  If you have any questions, please contact the dictating provider

## 2023-01-25 ENCOUNTER — OFFICE VISIT (OUTPATIENT)
Dept: INTERNAL MEDICINE CLINIC | Facility: CLINIC | Age: 88
End: 2023-01-25

## 2023-01-25 VITALS
TEMPERATURE: 97.8 F | OXYGEN SATURATION: 98 % | SYSTOLIC BLOOD PRESSURE: 130 MMHG | HEIGHT: 69 IN | RESPIRATION RATE: 17 BRPM | WEIGHT: 182 LBS | BODY MASS INDEX: 26.96 KG/M2 | DIASTOLIC BLOOD PRESSURE: 58 MMHG | HEART RATE: 84 BPM

## 2023-01-25 DIAGNOSIS — I63.40 CEREBROVASCULAR ACCIDENT (CVA) DUE TO EMBOLISM OF CEREBRAL ARTERY (HCC): ICD-10-CM

## 2023-01-25 DIAGNOSIS — E08.21 DIABETES MELLITUS DUE TO UNDERLYING CONDITION, CONTROLLED, WITH DIABETIC NEPHROPATHY, WITH LONG-TERM CURRENT USE OF INSULIN (HCC): ICD-10-CM

## 2023-01-25 DIAGNOSIS — I10 ESSENTIAL HYPERTENSION: Primary | ICD-10-CM

## 2023-01-25 DIAGNOSIS — N18.4 CKD STAGE 4 DUE TO TYPE 2 DIABETES MELLITUS (HCC): ICD-10-CM

## 2023-01-25 DIAGNOSIS — Z00.00 HEALTHCARE MAINTENANCE: ICD-10-CM

## 2023-01-25 DIAGNOSIS — Z79.4 DIABETES MELLITUS DUE TO UNDERLYING CONDITION, CONTROLLED, WITH DIABETIC NEPHROPATHY, WITH LONG-TERM CURRENT USE OF INSULIN (HCC): ICD-10-CM

## 2023-01-25 DIAGNOSIS — E11.22 CKD STAGE 4 DUE TO TYPE 2 DIABETES MELLITUS (HCC): ICD-10-CM

## 2023-01-25 NOTE — PROGRESS NOTES
Name: Niya Webb      : 1930      MRN: [id number]  Encounter Provider: Jazz Hall DO  Encounter Date: 2023   Encounter department: [facility]    Assessment & Plan     1  Essential hypertension  Assessment & Plan: With evaluation today in the office patient's blood pressure is showing adequate control  He checks this at home and also has this checked with his nephrologist   Patient will continue present medication and treatment and they continue to monitor his renal function which she has been stable  Orders:  -     Basic metabolic panel; Future    2  Diabetes mellitus due to underlying condition, controlled, with diabetic nephropathy, with long-term current use of insulin [facility])  Assessment & Plan:  Patient admits that he is not watching his dietary intake  He does monitor his blood sugar readings but not consistently and only checks mostly if he feels like his sugars could be too low     Patient will remain on present medication  Refuses to be placed on insulin  We will check a fasting blood sugar and hemoglobin A1c with his next visit  Underwent recent diabetic eye exam   Up-to-date with diabetic eye exam  Lab Results   Component Value Date    HGBA1C 7 8 (H) 2023       Orders:  -     Hemoglobin A1C; Future    3  CKD stage 4 due to type 2 diabetes mellitus [facility])  Assessment & Plan:  Patient continues to follow-up with nephrology and they continue to monitor his renal function  They have recently made no changes with his medication  Lab Results   Component Value Date    HGBA1C 7 8 (H) 2023         4  Embolic bilateral punctate CVA, acute as well as subacute  Assessment & Plan:  Recent CVA felt to be secondary to atrial fibrillation  Patient was placed on Eliquis 2 5 mg twice a day  Patient is tolerating the medication states and no abnormal bleeding or bruising  He does complain about the cost   Check a CBC with his next visit      5  Healthcare maintenance  Assessment & Plan:  Patient is here for repeat Medicare wellness visit  He continues to be followed by his specialists including nephrology, cardiology, neurology  Patient states in general doing well  He has extensive lab testing performed on a regular basis and we did discuss the results  His sugar continues to show poor control and patient does not watch his diet and even though he does have a monitor he does not check his blood sugars on a regular basis  Did undergo physical exam today with no acute abnormalities  Patient will continue present medication and surveillance and told to call if any new medical problems or concerns  Subjective     80year-old male history of extensive medical problems who is here today for a repeat Medicare wellness visit  Did have labs performed prior to the visit today and we did discuss the results including blood sugar control  Patient states in general feeling well with no new complaints  Review of Systems   Constitutional: Negative  HENT: Negative  Eyes: Negative  Respiratory: Negative  Cardiovascular: Negative  Gastrointestinal: Negative  Endocrine: Negative  Genitourinary: Negative  Musculoskeletal: Negative  Skin: Negative  Allergic/Immunologic: Negative  Neurological: Negative  Hematological: Negative  Psychiatric/Behavioral: Negative          Past Medical History:   Diagnosis Date   • 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)     Last Assessed:  5/19/17   • Chronic kidney disease    • Diabetes mellitus ([facility] [address] )    • Hyperlipidemia    • Hypertension    • Osteoarthritis    • Palpitations    • TIA (transient ischemic attack)      Past Surgical History:   Procedure Laterality Date   • APPENDECTOMY     • COLONOSCOPY  2012   • HEMORROIDECTOMY     • TOOTH EXTRACTION  02/02/2022     Family History   Problem Relation Age of Onset   • Diabetes Mother    • Other Mother         Stroke syndrome   • Diabetes Father    • Emphysema Father      Social History     Socioeconomic History   • Marital status: Single     Spouse name: None   • Number of children: None   • Years of education: None   • Highest education level: None   Occupational History   • None   Tobacco Use   • Smoking status: Former   • Smokeless tobacco: Never   Vaping Use   • Vaping Use: Never used   Substance and Sexual Activity   • Alcohol use: Yes     Comment: Social drinker   • Drug use: No   • Sexual activity: Not Currently   Other Topics Concern   • None   Social History Narrative    Daily coffee consumption (2 cups/day)     Social Determinants of Health     Financial Resource Strain: Low Risk    • Difficulty of Paying Living Expenses: Not very hard   Food Insecurity: Not on file   Transportation Needs: No Transportation Needs   • Lack of Transportation (Medical): No   • Lack of Transportation (Non-Medical):  No   Physical Activity: Not on file   Stress: Not on file   Social Connections: Not on file   Intimate Partner Violence: Not on file   Housing Stability: Not on file     Current Outpatient Medications on File Prior to Visit   Medication Sig   • amLODIPine (NORVASC) 5 mg tablet TAKE ONE TABLET BY MOUTH EVERY DAY   • apixaban (Eliquis) 2 5 mg Take 1 tablet (2 5 mg total) by mouth 2 (two) times a day   • atorvastatin (LIPITOR) 40 mg tablet TAKE 1 TABLET EVERY DAY WITH DINNER   • Blood Glucose Monitoring Suppl (PRECISION XTRA MONITOR) MARY by Does not apply route daily   • Cholecalciferol 25 MCG (1000 UT) capsule Take 1 capsule by mouth daily   • CINNAMON PO Take 1 capsule by mouth daily  (Patient not taking: Reported on 11/15/2022)   • clotrimazole-betamethasone (LOTRISONE) 1-0 05 % cream Apply topically 2 (two) times a day (Patient not taking: Reported on 11/15/2022)   • cyanocobalamin (VITAMIN B-12) 500 mcg tablet Take 1 tablet by mouth daily   • glimepiride (AMARYL) 2 mg tablet TAKE 1 TABLET BY MOUTH DAILY WITH BREAKFAST   • lisinopril (ZESTRIL) 10 mg tablet TAKE 1 TABLET TWICE DAILY   • magnesium oxide (MAG-OX) 400 mg Take 400 mg by mouth daily   • Omega-3 Fatty Acids (OMEGA-3 FISH OIL) 300 MG CAPS Take 1 capsule by mouth daily   • PRECISION XTRA TEST STRIPS test strip USE TO TEST BLOOD GLUCOSE ONCE A DAY   • terazosin (HYTRIN) 1 mg capsule TAKE 1 CAPSULE AT BEDTIME     No Known Allergies  Immunization History   Administered Date(s) Administered   • COVID-19 MODERNA VACC 0 5 ML IM 01/27/2021, 02/22/2021   • INFLUENZA 11/01/2002, 01/05/2003, 11/24/2003, 10/06/2004, 11/07/2005, 10/21/2006, 09/28/2007, 10/10/2008, 10/20/2008, 11/04/2009, 10/05/2010, 10/03/2011, 11/04/2012   • Influenza Split High Dose Preservative Free IM 09/27/2013, 09/25/2014, 09/29/2015, 09/23/2016, 09/19/2017   • Influenza, high dose seasonal 0 7 mL 09/25/2018, 10/15/2020, 09/20/2021, 09/26/2022   • Pneumococcal 01/01/1999   • Pneumococcal Conjugate 13-Valent 04/25/2019   • Pneumococcal Polysaccharide PPV23 10/01/2006   • TD (adult) Preservative Free 12/11/1930       Objective     /58 (BP Location: Left arm, Patient Position: Sitting, Cuff Size: Adult)   Pulse 84   Temp 97 8 °F (36 6 °C) (Tympanic)   Resp 17   Ht 5' 9" (1 753 m)   Wt 82 6 kg (182 lb)   SpO2 98%   BMI 26 88 kg/m²     Physical Exam  Vitals and nursing note reviewed  Constitutional:       General: He is not in acute distress  Appearance: Normal appearance  He is not ill-appearing, toxic-appearing or diaphoretic  Comments: Pleasant, articulate 80-year-old male who is awake alert in no acute distress and oriented x3   HENT:      Head: Normocephalic and atraumatic  Right Ear: Tympanic membrane, ear canal and external ear normal  There is no impacted cerumen  Left Ear: Tympanic membrane, ear canal and external ear normal  There is no impacted cerumen  Nose: Nose normal  No congestion or rhinorrhea  Mouth/Throat:      Mouth: Mucous membranes are dry  Pharynx: Oropharynx is clear   No oropharyngeal exudate or posterior oropharyngeal erythema  Eyes:      General: No scleral icterus  Right eye: No discharge  Left eye: No discharge  Extraocular Movements: Extraocular movements intact  Conjunctiva/sclera: Conjunctivae normal       Pupils: Pupils are equal, round, and reactive to light  Neck:      Vascular: No carotid bruit  Cardiovascular:      Rate and Rhythm: Normal rate  Rhythm irregular  Heart sounds: Normal heart sounds  No murmur heard  No friction rub  No gallop  Pulmonary:      Effort: Pulmonary effort is normal  No respiratory distress  Breath sounds: Normal breath sounds  No stridor  No wheezing, rhonchi or rales  Chest:      Chest wall: No tenderness  Abdominal:      General: Abdomen is flat  Bowel sounds are normal  There is no distension  Palpations: Abdomen is soft  There is no mass  Tenderness: There is no abdominal tenderness  There is no right CVA tenderness, left CVA tenderness, guarding or rebound  Hernia: No hernia is present  Musculoskeletal:         General: Deformity present  No swelling, tenderness or signs of injury  Cervical back: Normal range of motion and neck supple  No rigidity or tenderness  Right lower leg: No edema  Left lower leg: No edema  Lymphadenopathy:      Cervical: No cervical adenopathy  Skin:     General: Skin is warm and dry  Coloration: Skin is not jaundiced or pale  Findings: No bruising, erythema, lesion or rash  Neurological:      Mental Status: He is alert and oriented to person, place, and time  Mental status is at baseline  Psychiatric:         Mood and Affect: Mood normal          Behavior: Behavior normal          Thought Content:  Thought content normal          Judgment: Judgment normal        Gloria Galindo DO

## 2023-01-25 NOTE — ASSESSMENT & PLAN NOTE
With evaluation today in the office patient's blood pressure is showing adequate control  He checks this at home and also has this checked with his nephrologist   Patient will continue present medication and treatment and they continue to monitor his renal function which she has been stable

## 2023-01-25 NOTE — ASSESSMENT & PLAN NOTE
Patient continues to follow-up with nephrology and they continue to monitor his renal function  They have recently made no changes with his medication    Lab Results   Component Value Date    HGBA1C 7 8 (H) 01/04/2023

## 2023-01-25 NOTE — PROGRESS NOTES
Assessment and Plan:     Problem List Items Addressed This Visit    None       Preventive health issues were discussed with patient, and age appropriate screening tests were ordered as noted in patient's After Visit Summary  Personalized health advice and appropriate referrals for health education or preventive services given if needed, as noted in patient's After Visit Summary  History of Present Illness:     Patient presents for a Medicare Wellness Visit    HPI   Patient Care Team:  Logan Whitley DO as PCP - MD Logan Barajas DO     Review of Systems:     Review of Systems     Problem List:     Patient Active Problem List   Diagnosis   • Healthcare maintenance   • Controlled diabetes mellitus ([facility] [address] )   • Enlarged prostate without lower urinary tract symptoms (luts)   • Hyperlipidemia   • 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 (HCC)   • Palpitations   • CKD stage 4 due to type 2 diabetes mellitus ([facility] [address] )   • Overweight (BMI 25 0-29  9)   • Secondary hyperparathyroidism of renal origin [facility])   • Embolic bilateral punctate CVA, acute as well as subacute   • Essential hypertension   • 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HCC)   • Skin tear of right elbow without complication   • Fall   • Closed fracture of one rib of left side with routine healing      Past Medical and Surgical History:     Past Medical History:   Diagnosis Date   • 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)     Last Assessed:  5/19/17   • Chronic kidney disease    • Diabetes mellitus ([address] )    • Hyperlipidemia    • Hypertension    • Osteoarthritis    • Palpitations    • TIA (transient ischemic attack)      Past Surgical History:   Procedure Laterality Date   • APPENDECTOMY     • COLONOSCOPY  2012   • HEMORROIDECTOMY     • TOOTH EXTRACTION  02/02/2022      Family History:     Family History   Problem Relation Age of Onset   • Diabetes Mother    • Other Mother         Stroke syndrome   • Diabetes Father    • Emphysema Father       Social History:     Social History     Socioeconomic History   • Marital status: Single     Spouse name: None   • Number of children: None   • Years of education: None   • Highest education level: None   Occupational History   • None   Tobacco Use   • Smoking status: Former   • Smokeless tobacco: Never   Vaping Use   • Vaping Use: Never used   Substance and Sexual Activity   • Alcohol use: Yes     Comment: Social drinker   • Drug use: No   • Sexual activity: Not Currently   Other Topics Concern   • None   Social History Narrative    Daily coffee consumption (2 cups/day)     Social Determinants of Health     Financial Resource Strain: Not on file   Food Insecurity: Not on file   Transportation Needs: Not on file   Physical Activity: Not on file   Stress: Not on file   Social Connections: Not on file   Intimate Partner Violence: Not on file   Housing Stability: Not on file      Medications and Allergies:     Current Outpatient Medications   Medication Sig Dispense Refill   • amLODIPine (NORVASC) 5 mg tablet TAKE ONE TABLET BY MOUTH EVERY DAY 90 tablet 3   • apixaban (Eliquis) 2 5 mg Take 1 tablet (2 5 mg total) by mouth 2 (two) times a day 180 tablet 2   • atorvastatin (LIPITOR) 40 mg tablet TAKE 1 TABLET EVERY DAY WITH DINNER 90 tablet 1   • Blood Glucose Monitoring Suppl (PRECISION XTRA MONITOR) MARY by Does not apply route daily 1 each 0   • Cholecalciferol 25 MCG (1000 UT) capsule Take 1 capsule by mouth daily     • CINNAMON PO Take 1 capsule by mouth daily  (Patient not taking: Reported on 11/15/2022)     • clotrimazole-betamethasone (LOTRISONE) 1-0 05 % cream Apply topically 2 (two) times a day (Patient not taking: Reported on 11/15/2022) 30 g 0   • cyanocobalamin (VITAMIN B-12) 500 mcg tablet Take 1 tablet by mouth daily     • glimepiride (AMARYL) 2 mg tablet TAKE 1 TABLET BY MOUTH DAILY WITH BREAKFAST 90 tablet 3   • lisinopril (ZESTRIL) 10 mg tablet TAKE 1 TABLET TWICE DAILY 180 tablet 0   • magnesium oxide (MAG-OX) 400 mg Take 400 mg by mouth daily     • Omega-3 Fatty Acids (OMEGA-3 FISH OIL) 300 MG CAPS Take 1 capsule by mouth daily     • PRECISION XTRA TEST STRIPS test strip USE TO TEST BLOOD GLUCOSE ONCE A  strip 2   • terazosin (HYTRIN) 1 mg capsule TAKE 1 CAPSULE AT BEDTIME 90 capsule 3     No current facility-administered medications for this visit  No Known Allergies   Immunizations:     Immunization History   Administered Date(s) Administered   • COVID-19 MODERNA VACC 0 5 ML IM 01/27/2021, 02/22/2021   • INFLUENZA 11/01/2002, 01/05/2003, 11/24/2003, 10/06/2004, 11/07/2005, 10/21/2006, 09/28/2007, 10/10/2008, 10/20/2008, 11/04/2009, 10/05/2010, 10/03/2011, 11/04/2012   • Influenza Split High Dose Preservative Free IM 09/27/2013, 09/25/2014, 09/29/2015, 09/23/2016, 09/19/2017   • Influenza, high dose seasonal 0 7 mL 09/25/2018, 10/15/2020, 09/20/2021, 09/26/2022   • Pneumococcal 01/01/1999   • Pneumococcal Conjugate 13-Valent 04/25/2019   • Pneumococcal Polysaccharide PPV23 10/01/2006   • TD (adult) Preservative Free 12/11/1930      Health Maintenance: There are no preventive care reminders to display for this patient  Topic Date Due   • COVID-19 Vaccine (3 - Booster for Moderna series) 04/19/2021      Medicare Screening Tests and Risk Assessments:     Verner Scull is here for his Subsequent Wellness visit  Last Medicare Wellness visit information reviewed, patient interviewed and updates made to the record today  Health Risk Assessment:   Patient rates overall health as fair  Patient feels that their physical health rating is same  Patient is very satisfied with their life  Eyesight was rated as same  Hearing was rated as same  Patient feels that their emotional and mental health rating is much better  Patients states they are never, rarely angry  Patient states they are never, rarely unusually tired/fatigued  Pain experienced in the last 7 days has been none   Patient states that he has experienced no weight loss or gain in last 6 months  Fall Risk Screening: In the past year, patient has experienced: no history of falling in past year      Home Safety:  Patient does not have trouble with stairs inside or outside of their home  Patient has working smoke alarms and has working carbon monoxide detector  Home safety hazards include: none  Nutrition:   Current diet is Regular  Medications:   Patient is not currently taking any over-the-counter supplements  Patient is able to manage medications  Activities of Daily Living (ADLs)/Instrumental Activities of Daily Living (IADLs):   Walk and transfer into and out of bed and chair?: Yes  Dress and groom yourself?: Yes    Bathe or shower yourself?: Yes    Feed yourself? Yes  Do your laundry/housekeeping?: Yes  Manage your money, pay your bills and track your expenses?: Yes  Make your own meals?: Yes    Do your own shopping?: Yes    Previous Hospitalizations:   Any hospitalizations or ED visits within the last 12 months?: No      Advance Care Planning:   Living will: Yes    Durable POA for healthcare: No    Advanced directive: Yes      PREVENTIVE SCREENINGS      Cardiovascular Screening:    General: Screening Not Indicated and History Lipid Disorder      Diabetes Screening:     General: Screening Not Indicated and History Diabetes      Colorectal Cancer Screening:     General: Screening Not Indicated      Prostate Cancer Screening:    General: Screening Not Indicated      Abdominal Aortic Aneurysm (AAA) Screening:    Risk factors include: tobacco use        Lung Cancer Screening:     General: Screening Not Indicated    Screening, Brief Intervention, and Referral to Treatment (SBIRT)    Screening  Typical number of drinks in a day: 0  Typical number of drinks in a week: 0  Interpretation: Low risk drinking behavior      AUDIT-C Screenin) How often did you have a drink containing alcohol in the past year? never  2) How many drinks did you have on a typical day when you were drinking in the past year? 0  3) How often did you have 6 or more drinks on one occasion in the past year? never    AUDIT-C Score: 0  Interpretation: Score 0-3 (male): Negative screen for alcohol misuse    Single Item Drug Screening:  How often have you used an illegal drug (including marijuana) or a prescription medication for non-medical reasons in the past year? never    Single Item Drug Screen Score: 0  Interpretation: Negative screen for possible drug use disorder    No results found       Physical Exam:     /58 (BP Location: Left arm, Patient Position: Sitting, Cuff Size: Adult)   Pulse 84   Temp 97 8 °F (36 6 °C) (Tympanic)   Resp 17   Ht 5' 9" (1 753 m)   Wt 82 6 kg (182 lb)   SpO2 98%   BMI 26 88 kg/m²     Physical Exam     Logan Setting, DO

## 2023-01-25 NOTE — ASSESSMENT & PLAN NOTE
Recent CVA felt to be secondary to atrial fibrillation  Patient was placed on Eliquis 2 5 mg twice a day  Patient is tolerating the medication states and no abnormal bleeding or bruising    He does complain about the cost   Check a CBC with his next visit

## 2023-01-25 NOTE — ASSESSMENT & PLAN NOTE
Patient admits that he is not watching his dietary intake  He does monitor his blood sugar readings but not consistently and only checks mostly if he feels like his sugars could be too low     Patient will remain on present medication  Refuses to be placed on insulin  We will check a fasting blood sugar and hemoglobin A1c with his next visit    Underwent recent diabetic eye exam   Up-to-date with diabetic eye exam  Lab Results   Component Value Date    HGBA1C 7 8 (H) 01/04/2023

## 2023-01-25 NOTE — ASSESSMENT & PLAN NOTE
Patient is here for repeat Medicare wellness visit  He continues to be followed by his specialists including nephrology, cardiology, neurology  Patient states in general doing well  He has extensive lab testing performed on a regular basis and we did discuss the results  His sugar continues to show poor control and patient does not watch his diet and even though he does have a monitor he does not check his blood sugars on a regular basis  Did undergo physical exam today with no acute abnormalities  Patient will continue present medication and surveillance and told to call if any new medical problems or concerns

## 2023-01-26 ENCOUNTER — TELEPHONE (OUTPATIENT)
Dept: ADMINISTRATIVE | Facility: OTHER | Age: 88
End: 2023-01-26

## 2023-01-26 NOTE — TELEPHONE ENCOUNTER
Upon review of the [employer] request we were able to locate, review, and update the patient chart as requested for Diabetic Foot Exam     Any additional questions or concerns should be emailed to the Practice Liaisons via the appropriate education email address, please do not reply via In Basket      Thank you  Tatyana Conner

## 2023-01-26 NOTE — TELEPHONE ENCOUNTER
----- Message from Odalys Rojo MA sent at 1/25/2023  2:30 PM EST -----  Regarding: foot exam  01/25/23 2:30 PM    Hello, our patient Aguila Aguilar has had foot exam completed/performed   Please assist in updating the patient chart by media The date of service is 01/25/2023    Thank you,  Odalys Rojo MA  [facility] INTERNAL MED

## 2023-01-30 DIAGNOSIS — I48.91 ATRIAL FIBRILLATION, UNSPECIFIED TYPE (HCC): ICD-10-CM

## 2023-01-30 RX ORDER — APIXABAN 2.5 MG/1
TABLET, FILM COATED ORAL
Qty: 180 TABLET | Refills: 2 | Status: SHIPPED | OUTPATIENT
Start: 2023-01-30

## 2023-02-01 ENCOUNTER — REMOTE DEVICE CLINIC VISIT (OUTPATIENT)
Dept: CARDIOLOGY CLINIC | Facility: CLINIC | Age: 88
End: 2023-02-01

## 2023-02-01 DIAGNOSIS — Z95.818 PRESENCE OF OTHER CARDIAC IMPLANTS AND GRAFTS: Primary | ICD-10-CM

## 2023-02-01 NOTE — PROGRESS NOTES
MDT LOOP/ ACTIVE SYSTEM IS MRI CONDITIONAL   CARELINK TRANSMISSION: LOOP RECORDER  PRESENTING RHYTHM NSR @ 82 BPM  BATTERY STATUS "OK " 55 DEVICE CLASSIFIED AF EPISODES, LONGEST EPISODE IS 2:40 HOURS, AVAILABLE STRIPS DEMONSTRATE SR W/ PACs, PVCs, OVERSENSING, UNDERSENSING AND ATRIAL FIBRILLATION   AF BURDEN IS 0 6%  AF BURDEN MAYBE OVERESTIMATED DUE TO INAPPROPRIATE CLASSIFICATION OF AF  SOME STRIPS MAY NOT BE INTERPRETABLE OR AVAILABLE, CANNOT DEFINITIVELY RULE OUT ATRIAL FIBRILLATION  HX OF AF  PT TAKES ELIQUIS  NO PATIENT ACTIVATED EPISODES  NORMAL DEVICE FUNCTION   DL

## 2023-03-13 DIAGNOSIS — E78.5 HYPERLIPIDEMIA, UNSPECIFIED HYPERLIPIDEMIA TYPE: ICD-10-CM

## 2023-03-13 DIAGNOSIS — I63.40 CEREBROVASCULAR ACCIDENT (CVA) DUE TO EMBOLISM OF CEREBRAL ARTERY (HCC): ICD-10-CM

## 2023-03-13 RX ORDER — ATORVASTATIN CALCIUM 40 MG/1
TABLET, FILM COATED ORAL
Qty: 90 TABLET | Refills: 1 | Status: SHIPPED | OUTPATIENT
Start: 2023-03-13

## 2023-04-05 DIAGNOSIS — E11.9 TYPE 2 DIABETES MELLITUS WITHOUT COMPLICATION, WITHOUT LONG-TERM CURRENT USE OF INSULIN (HCC): ICD-10-CM

## 2023-04-05 RX ORDER — BLOOD SUGAR DIAGNOSTIC
STRIP MISCELLANEOUS
Qty: 100 STRIP | Refills: 2 | Status: SHIPPED | OUTPATIENT
Start: 2023-04-05

## 2023-04-26 DIAGNOSIS — N18.4 CKD (CHRONIC KIDNEY DISEASE), STAGE IV (HCC): ICD-10-CM

## 2023-04-26 RX ORDER — LISINOPRIL 10 MG/1
TABLET ORAL
Qty: 180 TABLET | Refills: 0 | Status: SHIPPED | OUTPATIENT
Start: 2023-04-26

## 2023-04-27 ENCOUNTER — REMOTE DEVICE CLINIC VISIT (OUTPATIENT)
Dept: CARDIOLOGY CLINIC | Facility: CLINIC | Age: 88
End: 2023-04-27

## 2023-04-27 DIAGNOSIS — Z95.818 PRESENCE OF OTHER CARDIAC IMPLANTS AND GRAFTS: Primary | ICD-10-CM

## 2023-04-27 NOTE — PROGRESS NOTES
"MDT LOOP/ ACTIVE SYSTEM IS MRI CONDITIONAL   CARELINK TRANSMISSION: LOOP RECORDER  PRESENTING RHYTHM NSR @ 69 BPM  BATTERY STATUS \"OK  \" 2 TACHY EPISODES W/ EGRAMS SHOWING OVERSENSING  11 DEVICE CLASSIFIED AF EPISODES,AVAILABLE STRIPS DEMONSTRATE SR , ST, PACs, PVCs AND PROB ATRIAL FIBRILLATION  AF BURDEN IS 0 1%  AF BURDEN MAYBE OVERESTIMATED DUE TO INAPPROPRIATE CLASSIFICATION OF AF  SOME STRIPS MAY NOT BE INTERPRETABLE OR AVAILABLE, CANNOT DEFINITIVELY RULE OUT ATRIAL FIBRILLATION  HX OF PAF  PT TAKES ELIQUIS  NO PATIENT ACTIVATED EPISODES  NORMAL DEVICE FUNCTION   DL   "

## 2023-05-05 ENCOUNTER — APPOINTMENT (OUTPATIENT)
Dept: LAB | Facility: CLINIC | Age: 88
End: 2023-05-05

## 2023-05-05 DIAGNOSIS — I10 ESSENTIAL HYPERTENSION: ICD-10-CM

## 2023-05-05 DIAGNOSIS — Z79.4 DIABETES MELLITUS DUE TO UNDERLYING CONDITION, CONTROLLED, WITH DIABETIC NEPHROPATHY, WITH LONG-TERM CURRENT USE OF INSULIN (HCC): ICD-10-CM

## 2023-05-05 DIAGNOSIS — E08.21 DIABETES MELLITUS DUE TO UNDERLYING CONDITION, CONTROLLED, WITH DIABETIC NEPHROPATHY, WITH LONG-TERM CURRENT USE OF INSULIN (HCC): ICD-10-CM

## 2023-05-05 LAB
ANION GAP SERPL CALCULATED.3IONS-SCNC: 3 MMOL/L (ref 4–13)
BUN SERPL-MCNC: 34 MG/DL (ref 5–25)
CALCIUM SERPL-MCNC: 9 MG/DL (ref 8.3–10.1)
CHLORIDE SERPL-SCNC: 110 MMOL/L (ref 96–108)
CO2 SERPL-SCNC: 24 MMOL/L (ref 21–32)
CREAT SERPL-MCNC: 2.03 MG/DL (ref 0.6–1.3)
GFR SERPL CREATININE-BSD FRML MDRD: 27 ML/MIN/1.73SQ M
GLUCOSE P FAST SERPL-MCNC: 146 MG/DL (ref 65–99)
POTASSIUM SERPL-SCNC: 4.6 MMOL/L (ref 3.5–5.3)
SODIUM SERPL-SCNC: 137 MMOL/L (ref 135–147)

## 2023-05-07 LAB
EST. AVERAGE GLUCOSE BLD GHB EST-MCNC: 180 MG/DL
HBA1C MFR BLD: 7.9 %

## 2023-05-11 ENCOUNTER — ESTABLISHED COMPREHENSIVE EXAM (OUTPATIENT)
Dept: URBAN - METROPOLITAN AREA CLINIC 6 | Facility: CLINIC | Age: 88
End: 2023-05-11

## 2023-05-11 DIAGNOSIS — Z96.1: ICD-10-CM

## 2023-05-11 DIAGNOSIS — E11.9: ICD-10-CM

## 2023-05-11 DIAGNOSIS — H40.023: ICD-10-CM

## 2023-05-11 PROCEDURE — 92083 EXTENDED VISUAL FIELD XM: CPT

## 2023-05-11 PROCEDURE — 92020 GONIOSCOPY: CPT

## 2023-05-11 PROCEDURE — 92202 OPSCPY EXTND ON/MAC DRAW: CPT

## 2023-05-11 PROCEDURE — 92014 COMPRE OPH EXAM EST PT 1/>: CPT

## 2023-05-11 PROCEDURE — 92133 CPTRZD OPH DX IMG PST SGM ON: CPT

## 2023-05-11 ASSESSMENT — VISUAL ACUITY
OD_CC: 20/25
OS_CC: 20/30+1

## 2023-05-11 ASSESSMENT — TONOMETRY
OS_IOP_MMHG: 22
OD_IOP_MMHG: 17

## 2023-05-17 ENCOUNTER — DOCUMENTATION (OUTPATIENT)
Dept: NEPHROLOGY | Facility: CLINIC | Age: 88
End: 2023-05-17

## 2023-05-17 ENCOUNTER — TELEPHONE (OUTPATIENT)
Dept: NEPHROLOGY | Facility: CLINIC | Age: 88
End: 2023-05-17

## 2023-05-17 LAB
CREAT ?TM UR-SCNC: 102.4 UMOL/L
EXT GLUCOSE BLD: 223
EXT PROTEIN URINE: 45.2
EXTERNAL ALBUMIN: 3.8
EXTERNAL ANION GAP: 10
EXTERNAL BUN: 32
EXTERNAL CALCIUM: 8.8
EXTERNAL CHLORIDE: 105
EXTERNAL CO2: 23
EXTERNAL CREATININE: 2.31
EXTERNAL EGFR: 26
EXTERNAL PHOSPHORUS: 3.1
EXTERNAL POTASSIUM: 4.6
EXTERNAL PTH: 100.4
EXTERNAL SODIUM: 138
HCT VFR BLD AUTO: 35.3 % (ref 36.5–49.3)
HGB BLD-MCNC: 11.4 G/DL (ref 12–17)
MCH (CD4/8) (HISTORICAL): 30.6
MCHC (CD4/8) (HISTORICAL): 32.2
MCV (CD4/8) (HISTORICAL): 95
PLATELET # BLD AUTO: 172 THOUSANDS/UL (ref 149–390)
PROT/CREAT UR: 0.44 MG/G{CREAT}
RDW (CD4/8) (HISTORICAL): 13.9
WBC # BLD AUTO: 6.2 THOUSAND/UL

## 2023-05-17 NOTE — TELEPHONE ENCOUNTER
----- Message from Tracey Andrews MD sent at 5/16/2023  8:33 AM EDT -----  Please abstract results into patient's chart  Thanks      ----- Message -----  From: Interface, Transcription Incoming  Sent: 5/15/2023   9:54 AM EDT  To: Tracey Andrews MD

## 2023-05-18 ENCOUNTER — APPOINTMENT (EMERGENCY)
Dept: RADIOLOGY | Facility: HOSPITAL | Age: 88
End: 2023-05-18

## 2023-05-18 ENCOUNTER — HOSPITAL ENCOUNTER (EMERGENCY)
Facility: HOSPITAL | Age: 88
Discharge: HOME | End: 2023-05-18
Attending: SURGERY

## 2023-05-18 ENCOUNTER — APPOINTMENT (EMERGENCY)
Dept: CT IMAGING | Facility: HOSPITAL | Age: 88
End: 2023-05-18

## 2023-05-18 VITALS
TEMPERATURE: 98.2 F | RESPIRATION RATE: 18 BRPM | BODY MASS INDEX: 28.81 KG/M2 | DIASTOLIC BLOOD PRESSURE: 93 MMHG | OXYGEN SATURATION: 97 % | WEIGHT: 195.11 LBS | HEART RATE: 85 BPM | SYSTOLIC BLOOD PRESSURE: 216 MMHG

## 2023-05-18 DIAGNOSIS — E11.22 CKD STAGE 4 DUE TO TYPE 2 DIABETES MELLITUS (HCC): ICD-10-CM
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
## 2023-05-18 DIAGNOSIS — I10 ESSENTIAL HYPERTENSION: ICD-10-CM
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
## 2023-05-18 DIAGNOSIS — N18.4 CKD STAGE 4 DUE TO TYPE 2 DIABETES MELLITUS (HCC): ICD-10-CM

## 2023-05-18 LAB
ABO GROUP BLD: NORMAL
ABO GROUP BLD: NORMAL
ANION GAP SERPL CALCULATED.3IONS-SCNC: 6 MMOL/L (ref 4–13)
BASE EXCESS BLDA CALC-SCNC: -3 MMOL/L (ref -2–3)
BASOPHILS # BLD AUTO: 0.03 THOUSANDS/ÂΜL (ref 0–0.1)
BASOPHILS NFR BLD AUTO: 1 % (ref 0–1)
BLD GP AB SCN SERPL QL: NEGATIVE
BUN SERPL-MCNC: 29 MG/DL (ref 5–25)
CA-I BLD-SCNC: 1.18 MMOL/L (ref 1.12–1.32)
CALCIUM SERPL-MCNC: 8.3 MG/DL (ref 8.4–10.2)
CARDIAC TROPONIN I PNL SERPL HS: 15 NG/L
CHLORIDE SERPL-SCNC: 106 MMOL/L (ref 96–108)
CK SERPL-CCNC: 149 U/L (ref 39–308)
CO2 SERPL-SCNC: 23 MMOL/L (ref 21–32)
CREAT SERPL-MCNC: 2.01 MG/DL (ref 0.6–1.3)
EOSINOPHIL # BLD AUTO: 0.43 THOUSAND/ÂΜL (ref 0–0.61)
EOSINOPHIL NFR BLD AUTO: 7 % (ref 0–6)
ERYTHROCYTE [DISTWIDTH] IN BLOOD BY AUTOMATED COUNT: 13.5 % (ref 11.6–15.1)
GFR SERPL CREATININE-BSD FRML MDRD: 27 ML/MIN/1.73SQ M
GLUCOSE SERPL-MCNC: 158 MG/DL (ref 65–140)
GLUCOSE SERPL-MCNC: 166 MG/DL (ref 65–140)
HCO3 BLDA-SCNC: 21.9 MMOL/L (ref 24–30)
HCT VFR BLD AUTO: 32.6 % (ref 36.5–49.3)
HCT VFR BLD CALC: 35 % (ref 36.5–49.3)
HGB BLD-MCNC: 10.5 G/DL (ref 12–17)
HGB BLDA-MCNC: 11.9 G/DL (ref 12–17)
IMM GRANULOCYTES # BLD AUTO: 0.05 THOUSAND/UL (ref 0–0.2)
IMM GRANULOCYTES NFR BLD AUTO: 1 % (ref 0–2)
LACTATE SERPL-SCNC: 1.1 MMOL/L (ref 0.5–2)
LYMPHOCYTES # BLD AUTO: 1.34 THOUSANDS/ÂΜL (ref 0.6–4.47)
LYMPHOCYTES NFR BLD AUTO: 21 % (ref 14–44)
MCH RBC QN AUTO: 30.4 PG (ref 26.8–34.3)
MCHC RBC AUTO-ENTMCNC: 32.2 G/DL (ref 31.4–37.4)
MCV RBC AUTO: 95 FL (ref 82–98)
MONOCYTES # BLD AUTO: 0.62 THOUSAND/ÂΜL (ref 0.17–1.22)
MONOCYTES NFR BLD AUTO: 10 % (ref 4–12)
NEUTROPHILS # BLD AUTO: 3.94 THOUSANDS/ÂΜL (ref 1.85–7.62)
NEUTS SEG NFR BLD AUTO: 60 % (ref 43–75)
NRBC BLD AUTO-RTO: 0 /100 WBCS
PCO2 BLD: 23 MMOL/L (ref 21–32)
PCO2 BLD: 36.4 MM HG (ref 42–50)
PH BLD: 7.39 [PH] (ref 7.3–7.4)
PLATELET # BLD AUTO: 165 THOUSANDS/UL (ref 149–390)
PMV BLD AUTO: 9.4 FL (ref 8.9–12.7)
PO2 BLD: 30 MM HG (ref 35–45)
POTASSIUM BLD-SCNC: 4.2 MMOL/L (ref 3.5–5.3)
POTASSIUM SERPL-SCNC: 4.3 MMOL/L (ref 3.5–5.3)
RBC # BLD AUTO: 3.45 MILLION/UL (ref 3.88–5.62)
RH BLD: POSITIVE
RH BLD: POSITIVE
SAO2 % BLD FROM PO2: 57 % (ref 60–85)
SODIUM BLD-SCNC: 141 MMOL/L (ref 136–145)
SODIUM SERPL-SCNC: 135 MMOL/L (ref 135–147)
SPECIMEN EXPIRATION DATE: NORMAL
SPECIMEN SOURCE: ABNORMAL
WBC # BLD AUTO: 6.41 THOUSAND/UL (ref 4.31–10.16)

## 2023-05-18 RX ORDER — ACETAMINOPHEN 325 MG/1
650 TABLET ORAL EVERY 6 HOURS PRN
Refills: 0
Start: 2023-05-18

## 2023-05-18 RX ORDER — LISINOPRIL 10 MG/1
10 TABLET ORAL ONCE
Status: COMPLETED | OUTPATIENT
Start: 2023-05-18 | End: 2023-05-18

## 2023-05-18 RX ORDER — SODIUM CHLORIDE, SODIUM GLUCONATE, SODIUM ACETATE, POTASSIUM CHLORIDE, MAGNESIUM CHLORIDE, SODIUM PHOSPHATE, DIBASIC, AND POTASSIUM PHOSPHATE .53; .5; .37; .037; .03; .012; .00082 G/100ML; G/100ML; G/100ML; G/100ML; G/100ML; G/100ML; G/100ML
1000 INJECTION, SOLUTION INTRAVENOUS ONCE
Status: COMPLETED | OUTPATIENT
Start: 2023-05-18 | End: 2023-05-18

## 2023-05-18 RX ADMIN — LISINOPRIL 10 MG: 10 TABLET ORAL at 19:03

## 2023-05-18 RX ADMIN — IOHEXOL 100 ML: 350 INJECTION, SOLUTION INTRAVENOUS at 17:18

## 2023-05-18 RX ADMIN — SODIUM CHLORIDE, SODIUM GLUCONATE, SODIUM ACETATE, POTASSIUM CHLORIDE, MAGNESIUM CHLORIDE, SODIUM PHOSPHATE, DIBASIC, AND POTASSIUM PHOSPHATE 1000 ML: .53; .5; .37; .037; .03; .012; .00082 INJECTION, SOLUTION INTRAVENOUS at 17:33

## 2023-05-18 NOTE — INCIDENTAL FINDINGS
The following findings require follow up:  Radiographic finding   Finding: Interstitial pulmonary disease   0 3mm pulmonary nodule   Follow up required: Repeat CT chest 1 year, Pulmonary follow up   Follow up should be done within 12 month(s)  Please follow up with your PCP for further workup as needed
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
## 2023-05-18 NOTE — QUICK NOTE
Patient seen and examined  Complains of pain in his right buttock  Reports he was walking too quickly and that caused him to fall  He denies LOC or headache      Physical exam  Gen: No acute distress, resting supine in bed, no acute distress  Neuro: AAOX3, GCS 15, no focal neuro deficit  Cards: RRR S1 S2 without murmur, rub, or gallop\  Pulm: Clear to ausc bilaterally without wheezes, crackles, or rhonchi  GI: soft, nontender, nondistended  MSK: extremities non tender without deformity   Skin: abrasion to the posterior head    CT scans reviewed: no acute traumatic injuries    Ambulatory challenge in ER  Discharge to home

## 2023-05-18 NOTE — H&P
"[facility]  H&P  Name: Fran William 80 y o  male I MRN: [id number]  Unit/Bed#: TR-02 I Date of Admission: 5/18/2023   Date of Service: 5/18/2023 I Hospital Day: 0      Assessment/Plan   Fall  Assessment & Plan  - Mechanical fall  -CT scans pending    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 [facility])  Assessment & Plan  Lab Results   Component Value Date    EGFR 26 05/12/2023    EGFR 27 05/05/2023    EGFR 26 01/04/2023    CREATININE 2 31 05/12/2023    CREATININE 2 03 (H) 05/05/2023    CREATININE 2 13 (H) 01/04/2023   - Monitor renal function post contrast load  - IVF hydration    Controlled diabetes mellitus ([facility] [address] )  Assessment & Plan    Lab Results   Component Value Date    HGBA1C 7 9 (H) 05/05/2023         Trauma Alert: Level B   Model of Arrival: Ambulance    Trauma Team: Attending John Cardenas and MELANY Tuttle  Consultants:     None     History of Present Illness     Chief Complaint: \"I just came because I am on the blood thinner\"  Mechanism:Fall     HPI:    Fran William is a 80 y o  male with history of TIA and Eliquis use, presenting today for evaluation after suffering a fall  Patient describes the fall as mechanical losing his balance, falling to the ground striking the back of his head and his right buttocks  No reported loss of consciousness  Patient complains of posterior head pain, right shoulder pain, and right buttocks pain  Patient describes ambulating following the fall, and subsequently driving himself to the hospital to be evaluated, since he is on a blood thinner    Review of Systems   Reason unable to perform ROS: Right shoulder pain, right buttocks pain, contusion to occipital scalp  No other complaints offered  Respiratory: Negative for cough and shortness of breath  Cardiovascular: Negative for chest pain  Gastrointestinal: Negative for abdominal pain, diarrhea, nausea and vomiting     Neurological: Negative for dizziness, weakness, " light-headedness and numbness  Psychiatric/Behavioral: Negative for confusion  All other systems reviewed and are negative  12-point, complete review of systems was reviewed and negative except as stated above  Historical Information     Past Medical History:   Diagnosis Date   • 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)     Last Assessed:  5/19/17   • Chronic kidney disease    • Diabetes mellitus ([facility] [address] )    • Hyperlipidemia    • Hypertension    • Osteoarthritis    • Palpitations    • TIA (transient ischemic attack)      Past Surgical History:   Procedure Laterality Date   • APPENDECTOMY     • COLONOSCOPY  2012   • HEMORROIDECTOMY     • TOOTH EXTRACTION  02/02/2022        Social History     Tobacco Use   • Smoking status: Former   • Smokeless tobacco: Never   Vaping Use   • Vaping Use: Never used   Substance Use Topics   • Alcohol use: Yes     Comment: Social drinker   • Drug use: No     Immunization History   Administered Date(s) Administered   • COVID-19 MODERNA VACC 0 5 ML IM 01/27/2021, 02/22/2021   • INFLUENZA 11/01/2002, 01/05/2003, 11/24/2003, 10/06/2004, 11/07/2005, 10/21/2006, 09/28/2007, 10/10/2008, 10/20/2008, 11/04/2009, 10/05/2010, 10/03/2011, 11/04/2012   • Influenza Split High Dose Preservative Free IM 09/27/2013, 09/25/2014, 09/29/2015, 09/23/2016, 09/19/2017   • Influenza, high dose seasonal 0 7 mL 09/25/2018, 10/15/2020, 09/20/2021, 09/26/2022   • Pneumococcal 01/01/1999   • Pneumococcal Conjugate 13-Valent 04/25/2019   • Pneumococcal Polysaccharide PPV23 10/01/2006   • TD (adult) Preservative Free 12/11/1930     Last Tetanus: Unknown  Updated  Family History: Non-contributory    1  Before the illness or injury that brought you to the Emergency, did you need someone to help you on a regular basis? 0=No   2  Since the illness or injury that brought you to the Emergency, have you needed more help than usual to take care of yourself? 0=No   3   Have you been hospitalized for one or more nights during the past 6 months (excluding a stay in the Emergency Department)? 0=No   4  In general, do you see well? 0=Yes   5  In general, do you have serious problems with your memory? 0=No   6  Do you take more than three different medications everyday? 1=Yes   TOTAL   1     Did you order a geriatric consult if the score was 2 or greater?: n/a     Meds/Allergies   all current active meds have been reviewed  Allergies have not been reviewed; No Known Allergies    Objective   Initial Vitals:   Temperature: 98 2 °F (36 8 °C) (05/18/23 1703)  Pulse: 98 (05/18/23 1703)  Respirations: 19 (05/18/23 1703)  Blood Pressure: (!) 182/96 (05/18/23 1703)    Primary Survey:   Airway:        Status: patent;        Pre-hospital Interventions: none        Hospital Interventions: none  Breathing:        Pre-hospital Interventions: none       Effort: normal       Right breath sounds: normal       Left breath sounds: normal  Circulation:        Rhythm: regular       Rate: regular   Right Pulses Left Pulses    R radial: 2+  R femoral: 2+  R pedal: 2+     L radial: 2+  L femoral: 2+  L pedal: 2+       Disability:        GCS: Eye: 4; Verbal: 5 Motor: 6 Total: 15       Right Pupil: 4 mm;  round;  reactive         Left Pupil:  4 mm;  round;  reactive      R Motor Strength L Motor Strength    R : 5/5  R dorsiflex: 5/5  R plantarflex: 5/5 L : 5/5  L dorsiflex: 5/5  L plantarflex: 5/5        Sensory:  No sensory deficit  Exposure:       Completed: Yes      Secondary Survey:  Physical Exam  Constitutional:       General: He is not in acute distress  Appearance: He is not ill-appearing  HENT:      Head: Normocephalic  Comments: Contusion to occipital scalp with abrasion  Right Ear: External ear normal       Left Ear: External ear normal       Nose: Nose normal       Mouth/Throat:      Mouth: Mucous membranes are moist    Eyes:      Extraocular Movements: Extraocular movements intact        Pupils: Pupils are equal, round, and reactive to light  Neck:      Comments: C-collar placed on arrival to the trauma bay  Neck is nontender  Cardiovascular:      Rate and Rhythm: Normal rate and regular rhythm  Pulses: Normal pulses  Heart sounds: Normal heart sounds  Pulmonary:      Effort: Pulmonary effort is normal  No respiratory distress  Breath sounds: Normal breath sounds  No stridor  No wheezing or rales  Abdominal:      General: Abdomen is flat  Bowel sounds are normal  There is no distension  Palpations: Abdomen is soft  Tenderness: There is no abdominal tenderness  There is no guarding  Genitourinary:     Comments: Pelvis is stable  Musculoskeletal:      Cervical back: No tenderness  Comments: No C, T, L-spine tenderness  No palpable step-offs  Patient is able to fully range all extremities and displays 5/5 strength in all extremities  No joint effusions appreciated  No tenderness on palpation of right shoulder  Patient does have some soreness on palpation of his right gluteal muscle inferior pubic bone was palpated with no complaints of tenderness or palpable instability   Skin:     General: Skin is warm  Capillary Refill: Capillary refill takes less than 2 seconds  Comments: Abrasion on occipital scalp   Neurological:      General: No focal deficit present  Mental Status: He is alert and oriented to person, place, and time  Psychiatric:         Mood and Affect: Mood normal          Thought Content:  Thought content normal          Invasive Devices     Peripheral Intravenous Line  Duration           Peripheral IV 05/18/23 Left Antecubital <1 day              Lab Results:   Results: I have personally reviewed all pertinent laboratory/tests results, BMP/CMP:   Lab Results   Component Value Date    CO2 23 05/18/2023    GLUCOSE 166 (H) 05/18/2023    and CBC:   Lab Results   Component Value Date    HGB 11 9 (L) 05/18/2023    HCT 35 (L) 05/18/2023       Imaging Results: I have personally reviewed pertinent reports  Chest Xray(s): pending   FAST exam(s): negative for acute findings   CT Scan(s): pending   Additional Xray(s): N/A     Other Studies: none    Code Status: Prior  Advance Directive and Living Will:      Power of :    POLST:    I have spent 25 minutes with Patient  today in which greater than 50% of this time was spent in counseling/coordination of care regarding Diagnostic results, Prognosis, Risks and benefits of tx options, Instructions for management, Patient and family education, Importance of tx compliance, Risk factor reductions, Impressions, Counseling / Coordination of care, Documenting in the medical record, Reviewing / ordering tests, medicine, procedures  , Obtaining or reviewing history   and Communicating with other healthcare professionals

## 2023-05-18 NOTE — DISCHARGE INSTR - AVS FIRST PAGE
Trauma Discharge Instructions:    Please follow-up as instructed  If you need a follow-up appointment, please call the office when you leave to schedule an appointment  Activity:  - You may resume activity as tolerated  - Walking and normal light activities are encouraged  - Normal daily activities including climbing steps are okay  Return to work:    - You may return to work once cleared by the [facility]  Diet:    - You may resume your normal diet  - Drink a lot of water to protect your kidneys after contrast load    Medications:  - You should continue your current medication regimen after discharge unless otherwise instructed  Please refer to your discharge medication list for further details  - Please take the pain medications as directed  Additional Instructions:  - May shower daily   - If you have any questions or concerns after discharge please call the office   - Call office or return to ER if fever greater than 101, chills, persistent nausea/vomiting, worsening/uncontrollable pain, develop productive cough, increasing shortness of breath, difficulty breathing, and/or increasing redness or purulent/foul smelling drainage from incision(s)

## 2023-05-18 NOTE — ASSESSMENT & PLAN NOTE
Lab Results   Component Value Date    EGFR 26 05/12/2023    EGFR 27 05/05/2023    EGFR 26 01/04/2023    CREATININE 2 31 05/12/2023    CREATININE 2 03 (H) 05/05/2023    CREATININE 2 13 (H) 01/04/2023   - Monitor renal function post contrast load  - IVF hydration

## 2023-05-18 NOTE — PROCEDURES
POC FAST US    Date/Time: 5/18/2023 5:22 PM  Performed by: KONSTANTIN Gan  Authorized by: KONSTANTIN Gan     Patient location:  Trauma  Procedure details:     Exam Type:  Diagnostic    Indications: blunt abdominal trauma and blunt chest trauma      Assess for:  Intra-abdominal fluid and pericardial effusion    Technique: FAST      Views obtained:  Heart - Pericardial sac, LUQ - Splenorenal space, Suprapubic - Pouch of Paul and RUQ - Lackey's Pouch    Image quality: diagnostic      Image availability:  Images available in PACS and video obtained  FAST Findings:     RUQ (Hepatorenal) free fluid: absent      LUQ (Splenorenal) free fluid: absent      Suprapubic free fluid: absent      Cardiac wall motion: identified      Pericardial effusion: absent    Interpretation:     Impressions: negative

## 2023-05-19 ENCOUNTER — VBI (OUTPATIENT)
Dept: ADMINISTRATIVE | Facility: OTHER | Age: 88
End: 2023-05-19

## 2023-05-19 LAB
ATRIAL RATE: 85 BPM
P AXIS: 80 DEGREES
PR INTERVAL: 248 MS
QRS AXIS: -51 DEGREES
QRSD INTERVAL: 140 MS
QT INTERVAL: 426 MS
QTC INTERVAL: 506 MS
T WAVE AXIS: 79 DEGREES
VENTRICULAR RATE: 85 BPM

## 2023-05-19 NOTE — QUICK NOTE
Cervical Collar Clearance: The patient had a CT scan of the cervical spine demonstrating no acute injury  On exam, the patient had no midline point tenderness or paresthesias/numbness/weakness in the extremities  The patient had full range of motion (was then able to flex, extend, and rotate head laterally) without pain  There were no distracting injuries and the patient was not intoxicated  The patient's cervical spine was cleared radiologically and clinically  Cervical collar removed at this time       Mino Robertson PA-C

## 2023-05-19 NOTE — TELEPHONE ENCOUNTER
Mateus Normanwa    ED Visit Information     Ed visit date: 5/18/23  Diagnosis Description: Fall  In Network? Yes [address] [facility]  Discharge status: Home  Discharged with meds ? No  Number of ED visits to date: 1  ED Severity:2     Outreach Information    Outreach successful: Yes 1  Date letter mailed:NA  Date Finalized:5/19/23    Care Coordination    Follow up appointment with pcp: yes 6/2/23  Transportation issues ?  No    Value [employer] type: 7 Day Outreach  Emergent necessity warranted by diagnosis: Yes  [facility] PCP: No  Called PCP first?: No  Feels able to call PCP for urgent problems ?: Yes  Understands what emergencies can be handled by PCP ?: Yes  Ever any problems getting appointment with PCP for minor emergency/urgency problems?: No  Practice Contacted Patient ?: No  Pt had ED follow up with pcp/staff ?: No    Seen for follow-up out of network ?: No  Reason Patient went to ED instead of Urgent Care or PCP?: Perceived Severity of Illness  Urgent care Education?: No  05/19/2023 11:15 AM EDT by Lakhwinder Hinkle 05/19/2023 11:15 AM EDT by Theresa Longoria (Self) [phone] (FFEW)[phone] (Home)  [phone] (Home) Remove  - Communicated - feeling ok  has PCP OV 6/2

## 2023-06-02 ENCOUNTER — OFFICE VISIT (OUTPATIENT)
Dept: INTERNAL MEDICINE CLINIC | Facility: CLINIC | Age: 88
End: 2023-06-02

## 2023-06-02 VITALS
DIASTOLIC BLOOD PRESSURE: 72 MMHG | TEMPERATURE: 97.8 F | WEIGHT: 181 LBS | HEART RATE: 86 BPM | BODY MASS INDEX: 26.73 KG/M2 | OXYGEN SATURATION: 94 % | SYSTOLIC BLOOD PRESSURE: 138 MMHG

## 2023-06-02 DIAGNOSIS — E08.21 DIABETES MELLITUS DUE TO UNDERLYING CONDITION, CONTROLLED, WITH DIABETIC NEPHROPATHY, WITH LONG-TERM CURRENT USE OF INSULIN (HCC): Primary | ICD-10-CM

## 2023-06-02 DIAGNOSIS — E11.22 CKD STAGE 4 DUE TO TYPE 2 DIABETES MELLITUS (HCC): ICD-10-CM

## 2023-06-02 DIAGNOSIS — E78.2 MIXED HYPERLIPIDEMIA: ICD-10-CM

## 2023-06-02 DIAGNOSIS — I12.9 BENIGN HYPERTENSION WITH CKD (CHRONIC KIDNEY DISEASE) STAGE IV (HCC): ICD-10-CM
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
## 2023-06-02 DIAGNOSIS — N18.4 BENIGN HYPERTENSION WITH CKD (CHRONIC KIDNEY DISEASE) STAGE IV (HCC): ICD-10-CM

## 2023-06-02 DIAGNOSIS — N18.4 CKD STAGE 4 DUE TO TYPE 2 DIABETES MELLITUS (HCC): ICD-10-CM

## 2023-06-02 DIAGNOSIS — Z79.4 DIABETES MELLITUS DUE TO UNDERLYING CONDITION, CONTROLLED, WITH DIABETIC NEPHROPATHY, WITH LONG-TERM CURRENT USE OF INSULIN (HCC): Primary | ICD-10-CM

## 2023-06-02 DIAGNOSIS — I10 ESSENTIAL HYPERTENSION: ICD-10-CM

## 2023-06-02 NOTE — ASSESSMENT & PLAN NOTE
History of hypertension  As noted patient's blood pressure with recent trip to the emergency room was elevated after her accident and fall  Today blood pressure is stable  Patient also had his renal function checked and this is also stable  Patient will continue present medication and surveillance does follow-up with nephrology

## 2023-06-02 NOTE — PROGRESS NOTES
Name: Deedee Vallejo      : 1930      MRN: [id number]  Encounter Provider: Shell Murdock DO  Encounter Date: 2023   Encounter department: [facility]    Assessment & Plan     1  Diabetes mellitus due to underlying condition, controlled, with diabetic nephropathy, with long-term current use of insulin ([address] )  Assessment & Plan: At this point patient's blood sugars have been slightly elevated along with his hemoglobin A1c  As noted patient continues to lose some weight states admits not always perfect with his diet  Did not have repeat hemoglobin A1c performed prior to the visit today and last 1 was approximately a month ago  Does not report any episodes of hypoglycemia despite the fact that he is on Amaryl 2 mg daily  I feel it be appropriate to change him to a medication like Starlix that he only takes with his intake of food  Check a fasting blood sugar hemoglobin A1c with his next visit and make further adjustment with treatment is necessary  Again want to avoid any problems with hypoglycemia  Lab Results   Component Value Date    HGBA1C 7 9 (H) 2023       Orders:  -     Basic metabolic panel; Future  -     Hemoglobin A1C; Future  -     CBC and differential; Future    2  Mixed hyperlipidemia  Assessment & Plan:  History of hyperlipidemia  Continue present medications specifically Lipitor 40 mg daily  Patient admits not always perfect with his diet and we reinforced again the importance of watching his intake of fats and cholesterol  Orders:  -     CBC and differential; Future    3  CKD stage 4 due to type 2 diabetes mellitus [facility])  Assessment & Plan:  As noted patient continues to follow-up with nephrology  His renal function has remained stable  Patient will continue with present surveillance  Lab Results   Component Value Date    HGBA1C 7 9 (H) 2023       Orders:  -     Basic metabolic panel; Future  -     CBC and differential; Future    4  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 (HCC)  -     CBC and differential; Future    5  Essential hypertension  Assessment & Plan:  History of hypertension  As noted patient's blood pressure with recent trip to the emergency room was elevated after her accident and fall  Today blood pressure is stable  Patient also had his renal function checked and this is also stable  Patient will continue present medication and surveillance does follow-up with nephrology  10  Fall, subsequent encounter  Assessment & Plan:  Patient recently seen in the emergency room traumatic fall tripped while walking  Did have some head trauma and did have complete work-up evaluation which she understands is important with him being on blood thinners  Patient states that they found no evidence of any abnormalities  Patient has no sequela neurologically or physically otherwise             Subjective     Patient is a 80-year-old male history of extensive medical problems outlined previously who is here today for routine follow-up  He did have labs performed prior to the visit and we did discuss the results  Patient relates recently had accidental fall with injury and mild head trauma  Was seen in the emergency room did have work-up and evaluation and released  He continues to follow-up with his specialists as previously  Review of Systems   Constitutional: Negative  HENT: Negative  Eyes: Negative  Respiratory: Negative  Cardiovascular: Negative  Gastrointestinal: Negative  Endocrine: Negative  Genitourinary: Negative  Musculoskeletal: Negative  Skin: Negative  Allergic/Immunologic: Negative  Neurological: Negative  Hematological: Negative  Psychiatric/Behavioral: Negative          Past Medical History:   Diagnosis Date   • 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)     Last Assessed:  5/19/17   • Chronic kidney disease    • Diabetes mellitus ([facility] [address] )    • Hyperlipidemia    • Hypertension    • Osteoarthritis    • Palpitations    • TIA (transient ischemic attack)      Past Surgical History:   Procedure Laterality Date   • APPENDECTOMY     • COLONOSCOPY  2012   • HEMORROIDECTOMY     • TOOTH EXTRACTION  02/02/2022     Family History   Problem Relation Age of Onset   • Diabetes Mother    • Other Mother         Stroke syndrome   • Diabetes Father    • Emphysema Father      Social History     Socioeconomic History   • Marital status: Single     Spouse name: None   • Number of children: None   • Years of education: None   • Highest education level: None   Occupational History   • None   Tobacco Use   • Smoking status: Former   • Smokeless tobacco: Never   Vaping Use   • Vaping Use: Never used   Substance and Sexual Activity   • Alcohol use: Yes     Comment: Social drinker   • Drug use: No   • Sexual activity: Not Currently   Other Topics Concern   • None   Social History Narrative    Daily coffee consumption (2 cups/day)     Social Determinants of Health     Financial Resource Strain: Low Risk  (1/25/2023)    Overall Financial Resource Strain (CARDIA)    • Difficulty of Paying Living Expenses: Not very hard   Food Insecurity: No Food Insecurity (9/24/2020)    Hunger Vital Sign    • Worried About Running Out of Food in the Last Year: Never true    • Ran Out of Food in the Last Year: Never true   Transportation Needs: No Transportation Needs (1/25/2023)    PRAPARE - Transportation    • Lack of Transportation (Medical): No    • Lack of Transportation (Non-Medical):  No   Physical Activity: Insufficiently Active (7/10/2020)    Exercise Vital Sign    • Days of Exercise per Week: 3 days    • Minutes of Exercise per Session: 20 min   Stress: Not on file   Social Connections: Not on file   Intimate Partner Violence: Not on file   Housing Stability: Not on file     Current Outpatient Medications on File Prior to Visit   Medication Sig   • acetaminophen (TYLENOL) 325 mg tablet Take 2 tablets (650 mg total) by mouth every 6 (six) hours as needed for mild pain   • amLODIPine (NORVASC) 5 mg tablet TAKE ONE TABLET BY MOUTH EVERY DAY   • atorvastatin (LIPITOR) 40 mg tablet TAKE 1 TABLET EVERY DAY WITH DINNER   • Blood Glucose Monitoring Suppl (PRECISION XTRA MONITOR) MARY by Does not apply route daily   • Cholecalciferol 25 MCG (1000 UT) capsule Take 1 capsule by mouth daily   • CINNAMON PO Take 1 capsule by mouth daily   • clotrimazole-betamethasone (LOTRISONE) 1-0 05 % cream Apply topically 2 (two) times a day   • cyanocobalamin (VITAMIN B-12) 500 mcg tablet Take 1 tablet by mouth daily   • Eliquis 2 5 MG TAKE 1 TABLET TWICE DAILY   • glimepiride (AMARYL) 2 mg tablet TAKE 1 TABLET BY MOUTH DAILY WITH BREAKFAST   • lisinopril (ZESTRIL) 10 mg tablet TAKE 1 TABLET TWICE DAILY   • magnesium oxide (MAG-OX) 400 mg Take 400 mg by mouth daily   • Omega-3 Fatty Acids (OMEGA-3 FISH OIL) 300 MG CAPS Take 1 capsule by mouth daily   • PRECISION XTRA TEST STRIPS test strip USE TO TEST BLOOD GLUCOSE ONCE A DAY   • terazosin (HYTRIN) 1 mg capsule TAKE 1 CAPSULE AT BEDTIME     No Known Allergies  Immunization History   Administered Date(s) Administered   • COVID-19 MODERNA VACC 0 5 ML IM 01/27/2021, 02/22/2021   • INFLUENZA 11/01/2002, 01/05/2003, 11/24/2003, 10/06/2004, 11/07/2005, 10/21/2006, 09/28/2007, 10/10/2008, 10/20/2008, 11/04/2009, 10/05/2010, 10/03/2011, 11/04/2012   • Influenza Split High Dose Preservative Free IM 09/27/2013, 09/25/2014, 09/29/2015, 09/23/2016, 09/19/2017   • Influenza, high dose seasonal 0 7 mL 09/25/2018, 10/15/2020, 09/20/2021, 09/26/2022   • Pneumococcal 01/01/1999   • Pneumococcal Conjugate 13-Valent 04/25/2019   • Pneumococcal Polysaccharide PPV23 10/01/2006   • TD (adult) Preservative Free 12/11/1930       Objective     /72 (BP Location: Left arm, Patient Position: Sitting, Cuff Size: Standard)   Pulse 86   Temp 97 8 °F (36 6 °C)   Wt 82 1 kg (181 lb)   SpO2 94%   BMI 26 73 kg/m² Physical Exam  Vitals and nursing note reviewed  Constitutional:       General: He is not in acute distress  Appearance: Normal appearance  He is not ill-appearing, toxic-appearing or diaphoretic  Comments: Patient is a pleasant articulate 27-year-old male who is awake alert no acute distress oriented x3, overweight, cheerful   HENT:      Head: Normocephalic and atraumatic  Right Ear: Tympanic membrane, ear canal and external ear normal  There is no impacted cerumen  Left Ear: Tympanic membrane, ear canal and external ear normal  There is no impacted cerumen  Nose: Nose normal  No congestion or rhinorrhea  Mouth/Throat:      Mouth: Mucous membranes are dry  Pharynx: Oropharynx is clear  No oropharyngeal exudate or posterior oropharyngeal erythema  Eyes:      General: No scleral icterus  Right eye: No discharge  Left eye: No discharge  Extraocular Movements: Extraocular movements intact  Conjunctiva/sclera: Conjunctivae normal       Pupils: Pupils are equal, round, and reactive to light  Neck:      Vascular: No carotid bruit  Cardiovascular:      Rate and Rhythm: Normal rate  Rhythm irregular  Heart sounds: Normal heart sounds  No murmur heard  No friction rub  No gallop  Pulmonary:      Effort: Pulmonary effort is normal  No respiratory distress  Breath sounds: Normal breath sounds  No stridor  No wheezing, rhonchi or rales  Chest:      Chest wall: No tenderness  Abdominal:      General: Abdomen is flat  Bowel sounds are normal  There is no distension  Palpations: Abdomen is soft  There is no mass  Tenderness: There is no abdominal tenderness  There is no right CVA tenderness, left CVA tenderness, guarding or rebound  Hernia: No hernia is present  Musculoskeletal:         General: Deformity present  No swelling, tenderness or signs of injury  Cervical back: Normal range of motion and neck supple   No rigidity or tenderness  Right lower leg: No edema  Left lower leg: No edema  Comments: Diffuse arthritic changes with nothing acute   Lymphadenopathy:      Cervical: No cervical adenopathy  Skin:     General: Skin is warm and dry  Coloration: Skin is not jaundiced or pale  Findings: No bruising, erythema, lesion or rash  Neurological:      Mental Status: He is alert and oriented to person, place, and time  Mental status is at baseline  Cranial Nerves: No cranial nerve deficit  Sensory: Sensory deficit present  Motor: No weakness  Coordination: Coordination normal       Gait: Gait normal       Deep Tendon Reflexes: Reflexes abnormal       Comments: Some slight paresthesia bilateral lower extremities  Absent patella and Achilles tendon reflexes without change  Psychiatric:         Mood and Affect: Mood normal          Behavior: Behavior normal          Thought Content:  Thought content normal          Judgment: Judgment normal        Lalo Kitchen, DO

## 2023-06-02 NOTE — ASSESSMENT & PLAN NOTE
Patient recently seen in the emergency room traumatic fall tripped while walking  Did have some head trauma and did have complete work-up evaluation which she understands is important with him being on blood thinners  Patient states that they found no evidence of any abnormalities    Patient has no sequela neurologically or physically otherwise

## 2023-06-02 NOTE — ASSESSMENT & PLAN NOTE
History of hyperlipidemia  Continue present medications specifically Lipitor 40 mg daily  Patient admits not always perfect with his diet and we reinforced again the importance of watching his intake of fats and cholesterol

## 2023-06-02 NOTE — ASSESSMENT & PLAN NOTE
As noted patient continues to follow-up with nephrology  His renal function has remained stable    Patient will continue with present surveillance  Lab Results   Component Value Date    HGBA1C 7 9 (H) 05/05/2023

## 2023-06-02 NOTE — ASSESSMENT & PLAN NOTE
At this point patient's blood sugars have been slightly elevated along with his hemoglobin A1c  As noted patient continues to lose some weight states admits not always perfect with his diet  Did not have repeat hemoglobin A1c performed prior to the visit today and last 1 was approximately a month ago  Does not report any episodes of hypoglycemia despite the fact that he is on Amaryl 2 mg daily  I feel it be appropriate to change him to a medication like Starlix that he only takes with his intake of food  Check a fasting blood sugar hemoglobin A1c with his next visit and make further adjustment with treatment is necessary  Again want to avoid any problems with hypoglycemia    Lab Results   Component Value Date    HGBA1C 7 9 (H) 05/05/2023

## 2023-06-07 NOTE — PROGRESS NOTES
Cardiology Follow Up    Grisel Cotto  12/11/1930  [id number]  [address]  [address] [place] [address] [phone]    1  Essential (primary) hypertension        2  Pure hypercholesterolemia        3  Atrial fibrillation, unspecified type ([facility] [address] )        4  RBBB        5  CKD (chronic kidney disease), stage IV ([facility] [address] )        6  Presence of cardiac device            Interval History: Patient is here for a follow-up visit  Claudia Chung, HLD and TIA  Mala Tatum was hospitalized at the [facility] in June 2020   At that time, MRI of the brain showed 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   Carotid Doppler showed noncritical disease   ILR was recommended by Neurology at that time and was implanted July 2020  Interrogation 4/2023 showed normal device function and AFib burden is 0 1%  He is on adjusted dose Eliquis which was started May 6, 2022 in reference to A-fib on the ILR in the setting of prior stroke  Echocardiogram done June 2020 demonstrated preserved LV systolic function with mild LVH and mild ELIZABETH  There was mild to moderate tricuspid and pulmonic regurgitation  A lipid profile done 1/2023 demonstrated total cholesterol of 99 with an HDL of 41 and a calculated LDL of 46  Patient was seen in the ED May 2023 after mechanical fall  He did hit his head  There was no evidence of bleed  Patient saw nephrology service June 2023 in reference to CKD stage IV  Patient has been well  He has had no chest pain or significant dyspnea  Patient has had no chest pain or significant dyspnea  His vital signs are stable today      Patient Active Problem List   Diagnosis   • Healthcare maintenance   • Controlled diabetes mellitus ([facility] [address] )   • Enlarged prostate without lower urinary tract symptoms (luts)   • Hyperlipidemia   • 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 (HCC)   • Palpitations   • CKD stage 4 due to type 2 diabetes mellitus ([facility] [address] )   • Overweight (BMI 25 0-29  9)   • Secondary hyperparathyroidism of renal origin ([facility] )   • Embolic bilateral punctate CVA, acute as well as subacute   • Essential hypertension   • 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HCC)   • Skin tear of right elbow without complication   • Fall   • Closed fracture of one rib of left side with routine healing   • 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 (HCC)     Past Medical History:   Diagnosis Date   • 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)     Last Assessed:  5/19/17   • Chronic kidney disease    • Diabetes mellitus (HCC)    • Hyperlipidemia    • Hypertension    • Osteoarthritis    • Palpitations    • TIA (transient ischemic attack)      Social History     Socioeconomic History   • Marital status: Single     Spouse name: Not on file   • Number of children: Not on file   • Years of education: Not on file   • Highest education level: Not on file   Occupational History   • Not on file   Tobacco Use   • Smoking status: Former   • Smokeless tobacco: Never   Vaping Use   • Vaping Use: Never used   Substance and Sexual Activity   • Alcohol use: Yes     Comment: Social drinker   • Drug use: No   • Sexual activity: Not Currently   Other Topics Concern   • Not on file   Social History Narrative    Daily coffee consumption (2 cups/day)     Social Determinants of Health     Financial Resource Strain: Low Risk  (1/25/2023)    Overall Financial Resource Strain (CARDIA)    • Difficulty of Paying Living Expenses: Not very hard   Food Insecurity: No Food Insecurity (9/24/2020)    Hunger Vital Sign    • Worried About Running Out of Food in the Last Year: Never true    • Ran Out of Food in the Last Year: Never true   Transportation Needs: No Transportation Needs (1/25/2023)    PRAPARE - Transportation    • Lack of Transportation (Medical): No    • Lack of Transportation (Non-Medical):  No   Physical Activity: Insufficiently Active (7/10/2020)    Exercise Vital Sign    • Days of Exercise per Week: 3 days    • Minutes of Exercise per Session: 20 min   Stress: Not on file   Social Connections: Not on file   Intimate Partner Violence: Not on file   Housing Stability: Not on file      Family History   Problem Relation Age of Onset   • Diabetes Mother    • Other Mother         Stroke syndrome   • Diabetes Father    • Emphysema Father      Past Surgical History:   Procedure Laterality Date   • APPENDECTOMY     • COLONOSCOPY  2012   • HEMORROIDECTOMY     • TOOTH EXTRACTION  02/02/2022       Current Outpatient Medications:   •  acetaminophen (TYLENOL) 325 mg tablet, Take 2 tablets (650 mg total) by mouth every 6 (six) hours as needed for mild pain, Disp: , Rfl: 0  •  amLODIPine (NORVASC) 5 mg tablet, TAKE ONE TABLET BY MOUTH EVERY DAY, Disp: 90 tablet, Rfl: 3  •  atorvastatin (LIPITOR) 40 mg tablet, TAKE 1 TABLET EVERY DAY WITH DINNER, Disp: 90 tablet, Rfl: 1  •  Blood Glucose Monitoring Suppl (PRECISION XTRA MONITOR) MARY, by Does not apply route daily, Disp: 1 each, Rfl: 0  •  Cholecalciferol 25 MCG (1000 UT) capsule, Take 1 capsule by mouth daily, Disp: , Rfl:   •  CINNAMON PO, Take 1 capsule by mouth daily, Disp: , Rfl:   •  clotrimazole-betamethasone (LOTRISONE) 1-0 05 % cream, Apply topically 2 (two) times a day, Disp: 30 g, Rfl: 0  •  cyanocobalamin (VITAMIN B-12) 500 mcg tablet, Take 1 tablet by mouth daily, Disp: , Rfl:   •  Eliquis 2 5 MG, TAKE 1 TABLET TWICE DAILY, Disp: 180 tablet, Rfl: 2  •  glimepiride (AMARYL) 2 mg tablet, TAKE 1 TABLET BY MOUTH DAILY WITH BREAKFAST, Disp: 90 tablet, Rfl: 3  •  lisinopril (ZESTRIL) 10 mg tablet, TAKE 1 TABLET TWICE DAILY, Disp: 180 tablet, Rfl: 0  •  magnesium oxide (MAG-OX) 400 mg, Take 400 mg by mouth daily, Disp: , Rfl:   •  Omega-3 Fatty Acids (OMEGA-3 FISH OIL) 300 MG CAPS, Take 1 capsule by mouth daily, Disp: , Rfl:   •  PRECISION XTRA TEST STRIPS test strip, USE TO TEST BLOOD GLUCOSE ONCE A DAY, Disp: 100 strip, Rfl: 2  • terazosin (HYTRIN) 1 mg capsule, TAKE 1 CAPSULE AT BEDTIME, Disp: 90 capsule, Rfl: 3  No Known Allergies    Labs:not applicable  Imaging: US bedside procedure    Result Date: 5/19/2023  Narrative: 6 [phone] [id number]  7 [id number]  1 [id number] [phone]    XR chest 1 view    Result Date: 5/18/2023  Narrative: TRAUMA SERIES INDICATION:  TRAUMA  Fall from standing  COMPARISON: 9/10/2022 VIEWS:  XR TRAUMA MULTIPLE FINDINGS: CHEST: Supine frontal view of the chest is obtained  Cardiomediastinal silhouette is within normal limits accounting for technique and patient positioning  Left chest wall loop recorder  No consolidation  Mild reticular changes in the lung bases could reflect mild fibrosis  No layering pleural effusions detected  No pneumothorax is seen on this supine film  Upright images are more sensitive to detect anterior pneumothoraces if relevant  No displaced fractures  Probable healed left posterior sixth and seventh rib fractures  Paravertebral ossifications thoracic spine  Degenerative changes right shoulder  Impression: No acute traumatic injury within limitations of supine imaging  Question mild basilar fibrosis  Workstation performed: [id number]     TRAUMA - CT chest abdomen pelvis wo contrast    Result Date: 5/18/2023  Narrative: CT CHEST, ABDOMEN AND PELVIS WITHOUT IV CONTRAST INDICATION:   trauma  COMPARISON: CT abdomen pelvis 7/21/2012 TECHNIQUE: CT examination of the chest, abdomen and pelvis was performed without intravenous contrast  Multiplanar 2D reformatted images were created from the source data  3D reconstructions of the bony thorax were performed in order to improve sensitivity of evaluation for rib fractures  This examination, like all CT scans performed in the [facility], was performed utilizing techniques to minimize radiation dose exposure, including the use of iterative reconstruction and automated exposure control   Radiation dose length product (DLP) for this visit:  693 mGy-cm Enteric contrast was administered  FINDINGS: CHEST LUNGS:  Lungs are clear  A 0 3 cm left upper lobe pulmonary nodule (series 303 image 38)  There is bilateral honeycombing in the peripheral distribution and at bilateral lung bases  There is no tracheal or endobronchial lesion  PLEURA:  Unremarkable  HEART/GREAT VESSELS: Heavy atherosclerotic coronary artery calcification is noted  Heart is otherwise unremarkable  No thoracic aortic aneurysm  MEDIASTINUM AND ELLI:  Unremarkable  CHEST WALL AND LOWER NECK: A loop recorder in the left anterior chest wall ABDOMEN LIVER/BILIARY TREE:  One or more subcentimeter sharply circumscribed low-density hepatic lesion(s) are noted, too small to accurately characterize, but statistically most likely to represent subcentimeter hepatic cysts  No suspicious solid hepatic lesion is identified  Hepatic contours are normal   No biliary dilatation  GALLBLADDER:  No calcified gallstones  No pericholecystic inflammatory change  SPLEEN:  Unremarkable  PANCREAS: Partial fatty replacement of the pancreatic parenchyma  ADRENAL GLANDS:  Unremarkable  KIDNEYS/URETERS: Atrophic bilateral kidneys containing simple renal cysts  No hydronephrosis  STOMACH AND BOWEL:  There is colonic diverticulosis without evidence of acute diverticulitis  APPENDIX:  No findings to suggest appendicitis  ABDOMINOPELVIC CAVITY:  No ascites  No pneumoperitoneum  No lymphadenopathy  VESSELS:  Atherosclerotic changes are present  No evidence of aneurysm  PELVIS REPRODUCTIVE ORGANS:  Unremarkable for patient's age  URINARY BLADDER: Multiple diverticula involving the urinary bladder, the largest measuring 2 8 cm anteriorly    ABDOMINAL WALL/INGUINAL REGIONS:  Unremarkable  OSSEOUS STRUCTURES:  No acute fracture or destructive osseous lesion  Subacute healing left posterior sixth and seventh rib fractures  Impression: No acute traumatic injury of the chest, abdomen and pelvis   Subacute healing left posterior sixth and seventh rib fractures  0 3 cm left upper lobe pulmonary nodule  Based on current Fleischner Society 2017 Guidelines on incidental pulmonary nodule, no routine follow-up is needed if the patient is low risk  If the patient is high risk, optional follow-up chest CT at 12 months  can be considered  Imaging findings suggestive of interstitial lung disease  Pulmonology consultation can be obtained for further evaluation as clinically warranted  I personally discussed this study with Rose Mary Cary on 5/18/2023 6:02 PM  Workstation performed: [id number]     XR Trauma multiple ([facility] trauma bay ONLY)    Result Date: 5/18/2023  Narrative: TRAUMA SERIES INDICATION:  TRAUMA  Fall from standing  COMPARISON: 9/10/2022 VIEWS:  XR TRAUMA MULTIPLE FINDINGS: CHEST: Supine frontal view of the chest is obtained  Cardiomediastinal silhouette is within normal limits accounting for technique and patient positioning  Left chest wall loop recorder  No consolidation  Mild reticular changes in the lung bases could reflect mild fibrosis  No layering pleural effusions detected  No pneumothorax is seen on this supine film  Upright images are more sensitive to detect anterior pneumothoraces if relevant  No displaced fractures  Probable healed left posterior sixth and seventh rib fractures  Paravertebral ossifications thoracic spine  Degenerative changes right shoulder  Impression: No acute traumatic injury within limitations of supine imaging  Question mild basilar fibrosis  Workstation performed: [id number]     TRAUMA - CT spine cervical wo contrast    Result Date: 5/18/2023  Narrative: CT CERVICAL SPINE - WITHOUT CONTRAST INDICATION:   TRAUMA  COMPARISON: CT cervical spine 9/10/2022 TECHNIQUE:  CT examination of the cervical spine was performed without intravenous contrast   Contiguous axial images were obtained  Multiplanar 2D reformatted images were created from the source data   Radiation dose length product (DLP) for this visit:  392 mGy-cm   This examination, like all CT scans performed in the [facility], was performed utilizing techniques to minimize radiation dose exposure, including the use of iterative reconstruction and automated exposure control  IMAGE QUALITY:  Diagnostic  FINDINGS: ALIGNMENT:  Normal alignment of the cervical spine  No subluxation  VERTEBRAE:  No fracture  DEGENERATIVE CHANGES:  Moderate multilevel cervical degenerative changes are noted  No critical central canal stenosis  PREVERTEBRAL AND PARASPINAL SOFT TISSUES: Unremarkable THORACIC INLET:  Normal      Impression: No cervical spine fracture or traumatic malalignment  I personally discussed this study with Becka Mccormack on 5/18/2023 6:05 PM  Workstation performed: [id number]     TRAUMA - CT head wo contrast    Result Date: 5/18/2023  Narrative: CT BRAIN - WITHOUT CONTRAST INDICATION:   TRAUMA  COMPARISON: CT head 9/10/2022 TECHNIQUE:  CT examination of the brain was performed  Multiplanar 2D reformatted images were created from the source data  Radiation dose length product (DLP) for this visit:  734 605 387 mGy-cm   This examination, like all CT scans performed in the [facility], was performed utilizing techniques to minimize radiation dose exposure, including the use of iterative reconstruction and automated exposure control  IMAGE QUALITY:  Diagnostic  FINDINGS: PARENCHYMA:  No intracranial mass, mass effect or midline shift  No CT signs of acute infarction  No acute parenchymal hemorrhage  Mild cerebral volume loss  Patchy areas of low-attenuation in supratentorial white matter, nonspecific but likely representing chronic microvascular ischemic changes  VENTRICLES AND EXTRA-AXIAL SPACES:  Normal for the patient's age  VISUALIZED ORBITS: Bilateral globes are aphakic   PARANASAL SINUSES: Again seen is complete opacification of bilateral maxillary sinuses with partial opacification of right ethmoid and "bilateral mastoid air cells  There is high attenuation material in bilateral maxillary sinuses with foci of calcification, representing chronic complement  CALVARIUM AND EXTRACRANIAL SOFT TISSUES: There is right posterior parietal subgaleal hematoma without underlying bony fracture  Impression: No acute intracranial abnormality  Stable appearance of multifocal paranasal sinus disease which can represent either acute on chronic or chronic sinusitis  I personally discussed this study with Yolette Malhotra on 5/18/2023 6:04 PM  Workstation performed: [id number]       Review of Systems:  Review of Systems   All other systems reviewed and are negative  Physical Exam:  /52 (BP Location: Left arm, Patient Position: Sitting, Cuff Size: Standard)   Pulse 82   Ht 5' 9\" (1 753 m)   Wt 81 6 kg (180 lb)   SpO2 97%   BMI 26 58 kg/m²   Physical Exam  Vitals reviewed  Constitutional:       Appearance: He is well-developed  HENT:      Head: Normocephalic and atraumatic  Cardiovascular:      Rate and Rhythm: Normal rate  Heart sounds: Normal heart sounds  Pulmonary:      Effort: Pulmonary effort is normal       Breath sounds: Normal breath sounds  Musculoskeletal:      Cervical back: Normal range of motion  Skin:     General: Skin is warm and dry  Neurological:      Mental Status: He is alert and oriented to person, place, and time  Discussion/Summary:I will continue the patient's present medical regimen  The patient appears well compensated  I have asked the patient to call if there is a problem in the interim otherwise I will see the patient in six months time    "

## 2023-06-12 ENCOUNTER — OFFICE VISIT (OUTPATIENT)
Dept: NEPHROLOGY | Facility: CLINIC | Age: 88
End: 2023-06-12
Payer: MEDICARE

## 2023-06-12 VITALS
HEART RATE: 73 BPM | DIASTOLIC BLOOD PRESSURE: 56 MMHG | WEIGHT: 180 LBS | SYSTOLIC BLOOD PRESSURE: 126 MMHG | OXYGEN SATURATION: 96 % | BODY MASS INDEX: 26.66 KG/M2 | HEIGHT: 69 IN

## 2023-06-12 DIAGNOSIS — N18.4 CKD STAGE 4 DUE TO TYPE 2 DIABETES MELLITUS (HCC): ICD-10-CM

## 2023-06-12 DIAGNOSIS — N25.81 SECONDARY HYPERPARATHYROIDISM OF RENAL ORIGIN (HCC): ICD-10-CM

## 2023-06-12 DIAGNOSIS — E11.22 CKD STAGE 4 DUE TO TYPE 2 DIABETES MELLITUS (HCC): ICD-10-CM

## 2023-06-12 DIAGNOSIS — D63.1 ANEMIA IN STAGE 4 CHRONIC KIDNEY DISEASE (HCC): ICD-10-CM

## 2023-06-12 DIAGNOSIS — E78.2 MIXED HYPERLIPIDEMIA: ICD-10-CM

## 2023-06-12 DIAGNOSIS — E66.3 OVERWEIGHT (BMI 25.0-29.9): Chronic | ICD-10-CM

## 2023-06-12 DIAGNOSIS — N18.4 ANEMIA IN STAGE 4 CHRONIC KIDNEY DISEASE (HCC): ICD-10-CM

## 2023-06-12 DIAGNOSIS — N18.4 BENIGN HYPERTENSION WITH CKD (CHRONIC KIDNEY DISEASE) STAGE IV (HCC): Primary | ICD-10-CM

## 2023-06-12 DIAGNOSIS — I12.9 BENIGN HYPERTENSION WITH CKD (CHRONIC KIDNEY DISEASE) STAGE IV (HCC): Primary | ICD-10-CM

## 2023-06-12 DIAGNOSIS — I10 ESSENTIAL HYPERTENSION: ICD-10-CM

## 2023-06-12 PROCEDURE — 99214 OFFICE O/P EST MOD 30 MIN: CPT | Performed by: INTERNAL MEDICINE

## 2023-06-12 NOTE — PATIENT INSTRUCTIONS
As we discussed in the office visit and after reviewing your most recent blood test you have advanced chronic kidney disease stage IV but your kidney function remains fairly stable  Remember to follow low-salt diet  Remember to stay well-hydrated  No changes to your medication at this moment  I would like to see you back in the office in 5 to 6 months with repeat labs  Remember to avoid NSAIDs (no ibuprofen, Motrin, Advil, Aleve, naproxen)  Okay to take Tylenol or acetaminophen or paracetamol as needed for pain    Continue close follow-up with your primary care doctor no

## 2023-06-12 NOTE — PROGRESS NOTES
OFFICE FOLLOW UP - Nephrology   Mellissa Chandra 80 y o  male MRN: [id number]       ASSESSMENT and PLAN:  Bon Chowdary was seen today for follow-up and chronic kidney disease  Diagnoses and all orders for this visit:    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 ([facility])  -     CBC; Future  -     Renal function panel; Future  -     PTH, intact; Future  -     Protein / creatinine ratio, urine; Future    CKD stage 4 due to type 2 diabetes mellitus ([facility] [address] )    Essential hypertension    Secondary hyperparathyroidism of renal origin ([facility] )    Overweight (BMI 25 0-29  9)    Mixed hyperlipidemia    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 ([facility] )           This is an 70-year-old gentleman with known history of chronic kidney disease stage 4 who returns to the office for follow-up  1   Chronic kidney disease stage 4, baseline creatinine around 1 9 to 2 2 since 2018  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, atherosclerotic disease, possible diabetes as well as age-related nephron loss  Kidney function remains fairly stable for several years with a most recent creatinine of 2 01  Once again discussed with patient about importance to follow low-salt diet, stay well-hydrated and avoid NSAIDs  I would like to see him back in the office in 5-6 months with repeat labs  2  Controlled type 2 diabetes, most recent A1c 7 9% on 05/2023,   Management as per primary care doctor  3   Hypertension, blood pressure today well controlled, no changes on his medications, advised to follow low-salt diet    4  Mild anemia suspected secondary to CKD, most recent hemoglobin 10 5, follow CBC in 5 to 6 months  5   Mineral bone disease, can Maninder hyperparathyroidism, PTH is slightly elevated but acceptable for his degree of kidney dysfunction  Follow labs in 5 to 6 months  6  Hyperlipidemia, continue with statins, management as per primary doctor    Most recent lipid panel well-controlled on 1/2023        Patient Instructions   As we discussed in the office visit and after reviewing your most recent blood test you have advanced chronic kidney disease stage IV but your kidney function remains fairly stable  Remember to follow low-salt diet  Remember to stay well-hydrated  No changes to your medication at this moment  I would like to see you back in the office in 5 to 6 months with repeat labs  Remember to avoid NSAIDs (no ibuprofen, Motrin, Advil, Aleve, naproxen)  Okay to take Tylenol or acetaminophen or paracetamol as needed for pain  Continue close follow-up with your primary care doctor        HPI: Timoteo Larios is a 80 y o  male who is here for Follow-up and Chronic Kidney Disease    Last time patient was seen in our office was on 1/2023, today she returns for 5-month follow-up    Since our last visit, he went to [address] [place] [id number] over a month ago after he tripped and fell with head injury, was discharged home  Today in general is doing well, denies any complaints  Patient denies any chest pain, shortness of breath or leg swelling  No abdominal pain, no nausea or diarrhea, no constipation  Appetite is stable  Denies any urinary problems no burning sensation, continues with same nocturia 1-2 per night  He does not smoke, he quit smoking on 1981  Denies NSAID use  He lost 3 pounds since last office visit    The last blood work was done on 5/18/2023, which we have reviewed together  Most recent serum creatinine 2 01 with an eGFR 27, Hgb 10 5  ROS: All the systems were reviewed and were negative except as documented on the H&P  Allergies: Patient has no known allergies      Medications:   Current Outpatient Medications:   •  acetaminophen (TYLENOL) 325 mg tablet, Take 2 tablets (650 mg total) by mouth every 6 (six) hours as needed for mild pain, Disp: , Rfl: 0  •  amLODIPine (NORVASC) 5 mg tablet, TAKE ONE TABLET BY MOUTH EVERY DAY, Disp: 90 tablet, Rfl: 3  • atorvastatin (LIPITOR) 40 mg tablet, TAKE 1 TABLET EVERY DAY WITH DINNER, Disp: 90 tablet, Rfl: 1  •  Blood Glucose Monitoring Suppl (PRECISION XTRA MONITOR) MARY, by Does not apply route daily, Disp: 1 each, Rfl: 0  •  Cholecalciferol 25 MCG (1000 UT) capsule, Take 1 capsule by mouth daily, Disp: , Rfl:   •  CINNAMON PO, Take 1 capsule by mouth daily, Disp: , Rfl:   •  clotrimazole-betamethasone (LOTRISONE) 1-0 05 % cream, Apply topically 2 (two) times a day, Disp: 30 g, Rfl: 0  •  cyanocobalamin (VITAMIN B-12) 500 mcg tablet, Take 1 tablet by mouth daily, Disp: , Rfl:   •  Eliquis 2 5 MG, TAKE 1 TABLET TWICE DAILY, Disp: 180 tablet, Rfl: 2  •  glimepiride (AMARYL) 2 mg tablet, TAKE 1 TABLET BY MOUTH DAILY WITH BREAKFAST, Disp: 90 tablet, Rfl: 3  •  lisinopril (ZESTRIL) 10 mg tablet, TAKE 1 TABLET TWICE DAILY, Disp: 180 tablet, Rfl: 0  •  magnesium oxide (MAG-OX) 400 mg, Take 400 mg by mouth daily, Disp: , Rfl:   •  Omega-3 Fatty Acids (OMEGA-3 FISH OIL) 300 MG CAPS, Take 1 capsule by mouth daily, Disp: , Rfl:   •  PRECISION XTRA TEST STRIPS test strip, USE TO TEST BLOOD GLUCOSE ONCE A DAY, Disp: 100 strip, Rfl: 2  •  terazosin (HYTRIN) 1 mg capsule, TAKE 1 CAPSULE AT BEDTIME, Disp: 90 capsule, Rfl: 3    Past Medical History:   Diagnosis Date   • 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)     Last Assessed:  5/19/17   • Chronic kidney disease    • Diabetes mellitus (HCC)    • Hyperlipidemia    • Hypertension    • Osteoarthritis    • Palpitations    • TIA (transient ischemic attack)      Past Surgical History:   Procedure Laterality Date   • APPENDECTOMY     • COLONOSCOPY  2012   • HEMORROIDECTOMY     • TOOTH EXTRACTION  02/02/2022     Family History   Problem Relation Age of Onset   • Diabetes Mother    • Other Mother         Stroke syndrome   • Diabetes Father    • Emphysema Father       reports that he has quit smoking  He has never used smokeless tobacco  He reports current alcohol use   He reports that he does not "use drugs  Physical Exam:   Vitals:    06/12/23 1256   BP: 126/56   BP Location: Left arm   Patient Position: Sitting   Cuff Size: Adult   Pulse: 73   SpO2: 96%   Weight: 81 6 kg (180 lb)   Height: 5' 9\" (1 753 m)     Body mass index is 26 58 kg/m²  General: conscious, cooperative, in not acute distress  Eyes: conjunctivae pink, anicteric sclerae  ENT: lips and mucous membranes moist  Neck: supple, no JVD  Chest: clear breath sounds bilateral, no crackles, ronchus or wheezings  CVS: distinct S1 & S2, normal rate, regular rhythm  Abdomen: soft, non-tender, non-distended, normoactive bowel sounds  Back: no CVA tenderness  Extremities: no edema of both legs  Skin: no rash  Neuro: awake, alert, oriented            Laboratory Results:  Lab Results   Component Value Date    HCT 32 6 (L) 05/18/2023    HGB 10 5 (L) 05/18/2023    MCV 95 05/18/2023     05/18/2023    WBC 6 41 05/18/2023     Lab Results   Component Value Date    BUN 29 (H) 05/18/2023    CALCIUM 8 3 (L) 05/18/2023     05/18/2023    CO2 23 05/18/2023    CREATININE 2 01 (H) 05/18/2023    GLUC 158 (H) 05/18/2023    K 4 3 05/18/2023    SODIUM 135 05/18/2023     Lab Results   Component Value Date    CALCIUM 8 3 (L) 05/18/2023    PHOS 3 1 05/12/2023     4 05/12/2023         Portions of the record may have been created with voice recognition software  Occasional wrong word or \"sound a like\" substitutions may have occurred due to the inherent limitations of voice recognition software  Read the chart carefully and recognize, using context, where substitutions have occurred  If you have any questions, please contact the dictating provider    "

## 2023-06-15 ENCOUNTER — OFFICE VISIT (OUTPATIENT)
Dept: CARDIOLOGY CLINIC | Facility: CLINIC | Age: 88
End: 2023-06-15
Payer: MEDICARE

## 2023-06-15 VITALS
SYSTOLIC BLOOD PRESSURE: 124 MMHG | HEIGHT: 69 IN | OXYGEN SATURATION: 97 % | HEART RATE: 82 BPM | BODY MASS INDEX: 26.66 KG/M2 | DIASTOLIC BLOOD PRESSURE: 52 MMHG | WEIGHT: 180 LBS

## 2023-06-15 DIAGNOSIS — E78.00 PURE HYPERCHOLESTEROLEMIA: ICD-10-CM

## 2023-06-15 DIAGNOSIS — I45.10 RBBB: ICD-10-CM

## 2023-06-15 DIAGNOSIS — I48.91 ATRIAL FIBRILLATION, UNSPECIFIED TYPE (HCC): ICD-10-CM

## 2023-06-15 DIAGNOSIS — Z95.818 PRESENCE OF CARDIAC DEVICE: ICD-10-CM

## 2023-06-15 DIAGNOSIS — I10 ESSENTIAL (PRIMARY) HYPERTENSION: Primary | ICD-10-CM

## 2023-06-15 DIAGNOSIS — N18.4 CKD (CHRONIC KIDNEY DISEASE), STAGE IV (HCC): ICD-10-CM

## 2023-06-15 PROCEDURE — 99214 OFFICE O/P EST MOD 30 MIN: CPT | Performed by: INTERNAL MEDICINE

## 2023-07-20 DIAGNOSIS — N40.0 BENIGN PROSTATIC HYPERPLASIA, UNSPECIFIED WHETHER LOWER URINARY TRACT SYMPTOMS PRESENT: ICD-10-CM

## 2023-07-20 RX ORDER — TERAZOSIN 1 MG/1
CAPSULE ORAL
Qty: 90 CAPSULE | Refills: 3 | Status: SHIPPED | OUTPATIENT
Start: 2023-07-20

## 2023-07-20 RX ORDER — TERAZOSIN 1 MG/1
CAPSULE ORAL
Qty: 90 CAPSULE | Refills: 3 | OUTPATIENT
Start: 2023-07-20

## 2023-08-03 ENCOUNTER — REMOTE DEVICE CLINIC VISIT (OUTPATIENT)
Dept: CARDIOLOGY CLINIC | Facility: CLINIC | Age: 88
End: 2023-08-03
Payer: MEDICARE

## 2023-08-03 DIAGNOSIS — Z95.818 PRESENCE OF OTHER CARDIAC IMPLANTS AND GRAFTS: Primary | ICD-10-CM

## 2023-08-03 PROCEDURE — 93298 REM INTERROG DEV EVAL SCRMS: CPT | Performed by: INTERNAL MEDICINE

## 2023-08-03 PROCEDURE — G2066 INTER DEVC REMOTE 30D: HCPCS | Performed by: INTERNAL MEDICINE

## 2023-08-03 NOTE — PROGRESS NOTES
MDT LOOP/ ACTIVE SYSTEM IS MRI CONDITIONAL   CARELINK TRANSMISSION: LOOP RECORDER. PRESENTING RHYTHM SB W/ PVCs @ 54 BPM. BATTERY STATUS "OK." 3 TACHY EPISODES W/ EGRAMS SHOWING SVT W/ OVERSENSING @ 158-167 BPM. 18 DEVICE CLASSIFIED AF EPISODES, AVAILABLE STRIPS DEMONSTRATE UNDERSENSING, OVERSENSING AND ATRIAL FIBRILLATION.  AF BURDEN IS 0.1%. AF BURDEN MAYBE OVERESTIMATED DUE TO INAPPROPRIATE CLASSIFICATION OF AF. SOME STRIPS MAY NOT BE INTERPRETABLE OR AVAILABLE, CANNOT DEFINITIVELY RULE OUT ATRIAL FIBRILLATION. HX OF PAF. PT TAKES ELIQUIS. NO PATIENT ACTIVATED EPISODES. NORMAL DEVICE FUNCTION.  DL

## 2023-09-02 DIAGNOSIS — N18.30 CONTROLLED TYPE 2 DIABETES MELLITUS WITH STAGE 3 CHRONIC KIDNEY DISEASE, WITHOUT LONG-TERM CURRENT USE OF INSULIN (HCC): ICD-10-CM

## 2023-09-02 DIAGNOSIS — E11.22 CONTROLLED TYPE 2 DIABETES MELLITUS WITH STAGE 3 CHRONIC KIDNEY DISEASE, WITHOUT LONG-TERM CURRENT USE OF INSULIN (HCC): ICD-10-CM

## 2023-09-04 RX ORDER — GLIMEPIRIDE 2 MG/1
TABLET ORAL
Qty: 90 TABLET | Refills: 3 | Status: SHIPPED | OUTPATIENT
Start: 2023-09-04

## 2023-09-22 DIAGNOSIS — N18.4 CKD (CHRONIC KIDNEY DISEASE), STAGE IV (HCC): ICD-10-CM

## 2023-09-22 RX ORDER — LISINOPRIL 10 MG/1
TABLET ORAL
Qty: 180 TABLET | Refills: 0 | Status: SHIPPED | OUTPATIENT
Start: 2023-09-22

## 2023-09-22 NOTE — TELEPHONE ENCOUNTER
Requested medication(s) are due for refill today: Yes  Patient has already received a courtesy refill: No  Other reason request has been forwarded to provider: Central line placement

## 2023-09-25 ENCOUNTER — APPOINTMENT (OUTPATIENT)
Dept: LAB | Facility: CLINIC | Age: 88
End: 2023-09-25
Payer: MEDICARE

## 2023-09-25 DIAGNOSIS — E08.21 DIABETES MELLITUS DUE TO UNDERLYING CONDITION, CONTROLLED, WITH DIABETIC NEPHROPATHY, WITH LONG-TERM CURRENT USE OF INSULIN (HCC): ICD-10-CM

## 2023-09-25 DIAGNOSIS — E11.22 CKD STAGE 4 DUE TO TYPE 2 DIABETES MELLITUS (HCC): ICD-10-CM

## 2023-09-25 DIAGNOSIS — Z79.4 DIABETES MELLITUS DUE TO UNDERLYING CONDITION, CONTROLLED, WITH DIABETIC NEPHROPATHY, WITH LONG-TERM CURRENT USE OF INSULIN (HCC): ICD-10-CM

## 2023-09-25 DIAGNOSIS — I12.9 BENIGN HYPERTENSION WITH CKD (CHRONIC KIDNEY DISEASE) STAGE IV (HCC): ICD-10-CM

## 2023-09-25 DIAGNOSIS — E78.2 MIXED HYPERLIPIDEMIA: ICD-10-CM

## 2023-09-25 DIAGNOSIS — N18.4 BENIGN HYPERTENSION WITH CKD (CHRONIC KIDNEY DISEASE) STAGE IV (HCC): ICD-10-CM

## 2023-09-25 DIAGNOSIS — N18.4 CKD STAGE 4 DUE TO TYPE 2 DIABETES MELLITUS (HCC): ICD-10-CM

## 2023-09-25 LAB
ANION GAP SERPL CALCULATED.3IONS-SCNC: 10 MMOL/L
BASOPHILS # BLD AUTO: 0.06 THOUSANDS/ÂΜL (ref 0–0.1)
BASOPHILS NFR BLD AUTO: 1 % (ref 0–1)
BUN SERPL-MCNC: 25 MG/DL (ref 5–25)
CALCIUM SERPL-MCNC: 8.8 MG/DL (ref 8.4–10.2)
CHLORIDE SERPL-SCNC: 107 MMOL/L (ref 96–108)
CO2 SERPL-SCNC: 23 MMOL/L (ref 21–32)
CREAT SERPL-MCNC: 2.01 MG/DL (ref 0.6–1.3)
EOSINOPHIL # BLD AUTO: 0.53 THOUSAND/ÂΜL (ref 0–0.61)
EOSINOPHIL NFR BLD AUTO: 7 % (ref 0–6)
ERYTHROCYTE [DISTWIDTH] IN BLOOD BY AUTOMATED COUNT: 13.6 % (ref 11.6–15.1)
EST. AVERAGE GLUCOSE BLD GHB EST-MCNC: 171 MG/DL
GFR SERPL CREATININE-BSD FRML MDRD: 27 ML/MIN/1.73SQ M
GLUCOSE P FAST SERPL-MCNC: 122 MG/DL (ref 65–99)
HBA1C MFR BLD: 7.6 %
HCT VFR BLD AUTO: 37.2 % (ref 36.5–49.3)
HGB BLD-MCNC: 12.2 G/DL (ref 12–17)
IMM GRANULOCYTES # BLD AUTO: 0.03 THOUSAND/UL (ref 0–0.2)
IMM GRANULOCYTES NFR BLD AUTO: 0 % (ref 0–2)
LYMPHOCYTES # BLD AUTO: 1.8 THOUSANDS/ÂΜL (ref 0.6–4.47)
LYMPHOCYTES NFR BLD AUTO: 23 % (ref 14–44)
MCH RBC QN AUTO: 30.9 PG (ref 26.8–34.3)
MCHC RBC AUTO-ENTMCNC: 32.8 G/DL (ref 31.4–37.4)
MCV RBC AUTO: 94 FL (ref 82–98)
MONOCYTES # BLD AUTO: 0.69 THOUSAND/ÂΜL (ref 0.17–1.22)
MONOCYTES NFR BLD AUTO: 9 % (ref 4–12)
NEUTROPHILS # BLD AUTO: 4.58 THOUSANDS/ÂΜL (ref 1.85–7.62)
NEUTS SEG NFR BLD AUTO: 60 % (ref 43–75)
NRBC BLD AUTO-RTO: 0 /100 WBCS
PLATELET # BLD AUTO: 194 THOUSANDS/UL (ref 149–390)
PMV BLD AUTO: 9.8 FL (ref 8.9–12.7)
POTASSIUM SERPL-SCNC: 4.6 MMOL/L (ref 3.5–5.3)
RBC # BLD AUTO: 3.95 MILLION/UL (ref 3.88–5.62)
SODIUM SERPL-SCNC: 140 MMOL/L (ref 135–147)
WBC # BLD AUTO: 7.69 THOUSAND/UL (ref 4.31–10.16)

## 2023-09-25 PROCEDURE — 80048 BASIC METABOLIC PNL TOTAL CA: CPT

## 2023-09-25 PROCEDURE — 83036 HEMOGLOBIN GLYCOSYLATED A1C: CPT

## 2023-09-25 PROCEDURE — 85025 COMPLETE CBC W/AUTO DIFF WBC: CPT

## 2023-09-25 PROCEDURE — 36415 COLL VENOUS BLD VENIPUNCTURE: CPT

## 2023-10-02 ENCOUNTER — OFFICE VISIT (OUTPATIENT)
Dept: INTERNAL MEDICINE CLINIC | Facility: CLINIC | Age: 88
End: 2023-10-02
Payer: MEDICARE

## 2023-10-02 VITALS
WEIGHT: 173 LBS | BODY MASS INDEX: 25.62 KG/M2 | OXYGEN SATURATION: 95 % | DIASTOLIC BLOOD PRESSURE: 60 MMHG | HEIGHT: 69 IN | TEMPERATURE: 98.2 F | SYSTOLIC BLOOD PRESSURE: 130 MMHG | HEART RATE: 72 BPM

## 2023-10-02 DIAGNOSIS — Z23 ENCOUNTER FOR IMMUNIZATION: Primary | ICD-10-CM

## 2023-10-02 DIAGNOSIS — E11.22 CKD STAGE 4 DUE TO TYPE 2 DIABETES MELLITUS (HCC): ICD-10-CM

## 2023-10-02 DIAGNOSIS — Z79.4 DIABETES MELLITUS DUE TO UNDERLYING CONDITION, CONTROLLED, WITH DIABETIC NEPHROPATHY, WITH LONG-TERM CURRENT USE OF INSULIN (HCC): ICD-10-CM

## 2023-10-02 DIAGNOSIS — Z00.00 HEALTHCARE MAINTENANCE: ICD-10-CM

## 2023-10-02 DIAGNOSIS — I10 ESSENTIAL HYPERTENSION: ICD-10-CM

## 2023-10-02 DIAGNOSIS — N18.4 CKD STAGE 4 DUE TO TYPE 2 DIABETES MELLITUS (HCC): ICD-10-CM

## 2023-10-02 DIAGNOSIS — D63.1 ANEMIA IN STAGE 4 CHRONIC KIDNEY DISEASE: ICD-10-CM

## 2023-10-02 DIAGNOSIS — E08.21 DIABETES MELLITUS DUE TO UNDERLYING CONDITION, CONTROLLED, WITH DIABETIC NEPHROPATHY, WITH LONG-TERM CURRENT USE OF INSULIN (HCC): ICD-10-CM

## 2023-10-02 DIAGNOSIS — N18.4 ANEMIA IN STAGE 4 CHRONIC KIDNEY DISEASE: ICD-10-CM

## 2023-10-02 PROCEDURE — 90662 IIV NO PRSV INCREASED AG IM: CPT | Performed by: INTERNAL MEDICINE

## 2023-10-02 PROCEDURE — G0008 ADMIN INFLUENZA VIRUS VAC: HCPCS | Performed by: INTERNAL MEDICINE

## 2023-10-02 PROCEDURE — 99214 OFFICE O/P EST MOD 30 MIN: CPT | Performed by: INTERNAL MEDICINE

## 2023-10-02 NOTE — ASSESSMENT & PLAN NOTE
Patient does have a longstanding history of hypertension. As noted blood pressure showing adequate control today in the office. Patient is compliant with medication and is nephrologist continue also to keep an eye on the blood pressure readings and also his kidney function. At this point he will continue present medication and surveillance.

## 2023-10-02 NOTE — PROGRESS NOTES
Name: Jose Alexis      : 1930      MRN: [id number]  Encounter Provider: Yenni Sanderson DO  Encounter Date: 10/2/2023   Encounter department: [facility] INTERNAL MEDICINE    Assessment & Plan     1. Encounter for immunization  -     influenza vaccine, high-dose, PF 0.7 mL (FLUZONE HIGH-DOSE)    2. Diabetes mellitus due to underlying condition, controlled, with diabetic nephropathy, with long-term current use of insulin ([address])  Assessment & Plan:  Longstanding history of diabetes mellitus type 2. Although this is contraindicated especially with his age he remains on Amaryl 2 mg daily. He is watching his dietary intake states he does not eat the way he used to    Hemoglobin A1c 7.6 and we did discuss with the patient seeing endocrinologist to help with control of his blood sugars. I did remind the patient of the importance of routine eye exams  Lab Results   Component Value Date    HGBA1C 7.6 (H) 2023       Orders:  -     Hemoglobin A1C; Future    3. Healthcare maintenance  -     Comprehensive metabolic panel; Future  -     CBC and differential; Future  -     Lipid panel; Future  -     UA (URINE) with reflex to Scope; Future; Expected date: 10/02/2023  -     Hemoglobin A1C; Future    4. 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 Check a CBC with his next visit. Orders:  -     CBC and differential; Future    5. Essential hypertension  Assessment & Plan:  Patient does have a longstanding history of hypertension. As noted blood pressure showing adequate control today in the office. Patient is compliant with medication and is nephrologist continue also to keep an eye on the blood pressure readings and also his kidney function.   At this point he will continue present medication and surveillance. Orders:  -     Comprehensive metabolic panel; Future  -     CBC and differential; Future  -     Lipid panel; Future  -     UA (URINE) with reflex to Scope; Future; Expected date: 10/02/2023    6. CKD stage 4 due to type 2 diabetes mellitus [facility])  Assessment & Plan:  His renal function has been stable. He will continue present surveillance and follow-up with nephrology. Reinforced the importance of diet and hydration. Check on his renal function with his next visit  Lab Results   Component Value Date    HGBA1C 7.6 (H) 09/25/2023                Subjective     Patient is a 70-year-old male history of extensive medical problems outlined previously who is here today for a routine follow-up. Patient relates in general doing well continues to follow-up with his nephrologist and his kidney function is stable. His sugar continues to be showing poor control and patient states he is watching his diet taking medication as directed. Denies chest pain or pressure no increasing shortness of breath. Has previously we discussed his kidney disease and he still states he would not be a candidate for dialysis    Review of Systems   Constitutional: Negative. HENT: Negative. Eyes: Negative. Respiratory: Negative. Cardiovascular: Negative. Gastrointestinal: Negative. Endocrine: Negative. Genitourinary: Negative. Musculoskeletal: Negative. Skin: Negative. Allergic/Immunologic: Negative. Neurological: Negative. Hematological: Negative. Psychiatric/Behavioral: Negative.         Past Medical History:   Diagnosis Date   • 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)     Last Assessed:  5/19/17   • Chronic kidney disease    • Diabetes mellitus ([address])    • Hyperlipidemia    • Hypertension    • Osteoarthritis    • Palpitations    • TIA (transient ischemic attack)      Past Surgical History:   Procedure Laterality Date   • APPENDECTOMY     • COLONOSCOPY  2012   • HEMORROIDECTOMY     • TOOTH EXTRACTION  02/02/2022     Family History   Problem Relation Age of Onset   • Diabetes Mother    • Other Mother         Stroke syndrome   • Diabetes Father    • Emphysema Father      Social History     Socioeconomic History   • Marital status: Single     Spouse name: None   • Number of children: None   • Years of education: None   • Highest education level: None   Occupational History   • None   Tobacco Use   • Smoking status: Former   • Smokeless tobacco: Never   Vaping Use   • Vaping Use: Never used   Substance and Sexual Activity   • Alcohol use: Yes     Comment: Social drinker   • Drug use: No   • Sexual activity: Not Currently   Other Topics Concern   • None   Social History Narrative    Daily coffee consumption (2 cups/day)     Social Determinants of Health     Financial Resource Strain: Low Risk  (1/25/2023)    Overall Financial Resource Strain (CARDIA)    • Difficulty of Paying Living Expenses: Not very hard   Food Insecurity: No Food Insecurity (9/24/2020)    Hunger Vital Sign    • Worried About Running Out of Food in the Last Year: Never true    • Ran Out of Food in the Last Year: Never true   Transportation Needs: No Transportation Needs (1/25/2023)    PRAPARE - Transportation    • Lack of Transportation (Medical): No    • Lack of Transportation (Non-Medical):  No   Physical Activity: Insufficiently Active (7/10/2020)    Exercise Vital Sign    • Days of Exercise per Week: 3 days    • Minutes of Exercise per Session: 20 min   Stress: Not on file   Social Connections: Not on file   Intimate Partner Violence: Not on file   Housing Stability: Not on file     Current Outpatient Medications on File Prior to Visit   Medication Sig   • acetaminophen (TYLENOL) 325 mg tablet Take 2 tablets (650 mg total) by mouth every 6 (six) hours as needed for mild pain   • amLODIPine (NORVASC) 5 mg tablet TAKE ONE TABLET BY MOUTH EVERY DAY   • atorvastatin (LIPITOR) 40 mg tablet TAKE 1 TABLET EVERY DAY WITH DINNER   • Blood Glucose Monitoring Suppl (PRECISION XTRA MONITOR) MARY by Does not apply route daily   • Cholecalciferol 25 MCG (1000 UT) capsule Take 1 capsule by mouth daily   • CINNAMON PO Take 1 capsule by mouth daily   • cyanocobalamin (VITAMIN B-12) 500 mcg tablet Take 1 tablet by mouth daily   • Eliquis 2.5 MG TAKE 1 TABLET TWICE DAILY   • glimepiride (AMARYL) 2 mg tablet TAKE 1 TABLET BY MOUTH DAILY WITH BREAKFAST   • lisinopril (ZESTRIL) 10 mg tablet TAKE 1 TABLET TWICE DAILY   • magnesium oxide (MAG-OX) 400 mg Take 400 mg by mouth daily   • Omega-3 Fatty Acids (OMEGA-3 FISH OIL) 300 MG CAPS Take 1 capsule by mouth daily   • PRECISION XTRA TEST STRIPS test strip USE TO TEST BLOOD GLUCOSE ONCE A DAY   • terazosin (HYTRIN) 1 mg capsule Take 1 capsule at bedtime   • clotrimazole-betamethasone (LOTRISONE) 1-0.05 % cream Apply topically 2 (two) times a day (Patient not taking: Reported on 10/2/2023)     No Known Allergies  Immunization History   Administered Date(s) Administered   • COVID-19 MODERNA VACC 0.5 ML IM 01/27/2021, 02/22/2021   • INFLUENZA 11/01/2002, 01/05/2003, 11/24/2003, 10/06/2004, 11/07/2005, 10/21/2006, 09/28/2007, 10/10/2008, 10/20/2008, 11/04/2009, 10/05/2010, 10/03/2011, 11/04/2012   • Influenza Split High Dose Preservative Free IM 09/27/2013, 09/25/2014, 09/29/2015, 09/23/2016, 09/19/2017   • Influenza, high dose seasonal 0.7 mL 09/25/2018, 10/15/2020, 09/20/2021, 09/26/2022, 10/02/2023   • Pneumococcal 01/01/1999   • Pneumococcal Conjugate 13-Valent 04/25/2019   • Pneumococcal Polysaccharide PPV23 10/01/2006   • TD (adult) Preservative Free 12/11/1930       Objective     /60 (BP Location: Left arm, Patient Position: Sitting, Cuff Size: Standard)   Pulse 72   Temp 98.2 °F (36.8 °C)   Ht 5' 9" (1.753 m)   Wt 78.5 kg (173 lb)   SpO2 95%   BMI 25.55 kg/m²     Physical Exam  Vitals and nursing note reviewed.    Constitutional:       General: He is not in acute distress. Appearance: Normal appearance. He is not ill-appearing, toxic-appearing or diaphoretic. Comments: Pleasant articulate 63-year-old male who is awake alert in no acute distress oriented x3   HENT:      Head: Normocephalic and atraumatic. Right Ear: Tympanic membrane, ear canal and external ear normal. There is no impacted cerumen. Left Ear: Tympanic membrane, ear canal and external ear normal. There is no impacted cerumen. Nose: Nose normal. No congestion or rhinorrhea. Mouth/Throat:      Mouth: Mucous membranes are dry. Pharynx: Oropharynx is clear. No oropharyngeal exudate or posterior oropharyngeal erythema. Eyes:      General: No scleral icterus. Right eye: No discharge. Left eye: No discharge. Extraocular Movements: Extraocular movements intact. Conjunctiva/sclera: Conjunctivae normal.      Pupils: Pupils are equal, round, and reactive to light. Neck:      Vascular: No carotid bruit. Cardiovascular:      Rate and Rhythm: Normal rate. Rhythm irregular. Heart sounds: Normal heart sounds. No murmur heard. No friction rub. No gallop. Pulmonary:      Effort: Pulmonary effort is normal. No respiratory distress. Breath sounds: Normal breath sounds. No stridor. No wheezing, rhonchi or rales. Chest:      Chest wall: No tenderness. Abdominal:      General: Abdomen is flat. Bowel sounds are normal. There is no distension. Palpations: Abdomen is soft. There is no mass. Tenderness: There is no abdominal tenderness. There is no right CVA tenderness, left CVA tenderness, guarding or rebound. Hernia: No hernia is present. Musculoskeletal:         General: Deformity present. No swelling, tenderness or signs of injury. Cervical back: Normal range of motion and neck supple. No rigidity or tenderness. Right lower leg: No edema. Left lower leg: No edema.       Comments: Diffuse arthritic changes with nothing acute   Lymphadenopathy:      Cervical: No cervical adenopathy. Skin:     General: Skin is warm and dry. Coloration: Skin is not jaundiced or pale. Findings: No bruising, erythema, lesion or rash. Neurological:      Mental Status: He is alert and oriented to person, place, and time. Mental status is at baseline. Cranial Nerves: No cranial nerve deficit. Sensory: Sensory deficit present. Motor: No weakness. Coordination: Coordination normal.      Gait: Gait normal.      Deep Tendon Reflexes: Reflexes abnormal.      Comments: Some slight paresthesia bilateral lower extremities. Absent patella and Achilles tendon reflexes without change. Psychiatric:         Mood and Affect: Mood normal.         Behavior: Behavior normal.         Thought Content:  Thought content normal.         Judgment: Judgment normal.       Joaquim Adan, DO

## 2023-10-02 NOTE — ASSESSMENT & PLAN NOTE
Longstanding history of diabetes mellitus type 2. Although this is contraindicated especially with his age he remains on Amaryl 2 mg daily. He is watching his dietary intake states he does not eat the way he used to    Hemoglobin A1c 7.6 and we did discuss with the patient seeing endocrinologist to help with control of his blood sugars.   I did remind the patient of the importance of routine eye exams  Lab Results   Component Value Date    HGBA1C 7.6 (H) 09/25/2023

## 2023-10-02 NOTE — ASSESSMENT & PLAN NOTE
His renal function has been stable. He will continue present surveillance and follow-up with nephrology. Reinforced the importance of diet and hydration.   Check on his renal function with his next visit  Lab Results   Component Value Date    HGBA1C 7.6 (H) 09/25/2023

## 2023-10-02 NOTE — ASSESSMENT & PLAN NOTE
Lab Results   Component Value Date    EGFR 27 09/25/2023    EGFR 27 05/18/2023    EGFR 26 05/12/2023    CREATININE 2.01 (H) 09/25/2023    CREATININE 2.01 (H) 05/18/2023    CREATININE 2.31 05/12/2023   Continue to monitor his blood counts. Check a CBC with his next visit.

## 2023-10-22 DIAGNOSIS — E78.5 HYPERLIPIDEMIA, UNSPECIFIED HYPERLIPIDEMIA TYPE: ICD-10-CM

## 2023-10-22 DIAGNOSIS — I63.40 CEREBROVASCULAR ACCIDENT (CVA) DUE TO EMBOLISM OF CEREBRAL ARTERY (HCC): ICD-10-CM

## 2023-10-22 RX ORDER — ATORVASTATIN CALCIUM 40 MG/1
TABLET, FILM COATED ORAL
Qty: 90 TABLET | Refills: 10 | Status: SHIPPED | OUTPATIENT
Start: 2023-10-22

## 2023-11-02 ENCOUNTER — REMOTE DEVICE CLINIC VISIT (OUTPATIENT)
Dept: CARDIOLOGY CLINIC | Facility: CLINIC | Age: 88
End: 2023-11-02
Payer: MEDICARE

## 2023-11-02 DIAGNOSIS — Z95.818 PRESENCE OF OTHER CARDIAC IMPLANTS AND GRAFTS: Primary | ICD-10-CM

## 2023-11-02 PROCEDURE — 93298 REM INTERROG DEV EVAL SCRMS: CPT | Performed by: INTERNAL MEDICINE

## 2023-11-02 PROCEDURE — G2066 INTER DEVC REMOTE 30D: HCPCS | Performed by: INTERNAL MEDICINE

## 2023-11-02 NOTE — PROGRESS NOTES
MDT LOOP/ ACTIVE SYSTEM IS MRI CONDITIONAL   CARELINK TRANSMISSION: LOOP RECORDER. PRESENTING RHYTHM VS @ 66 BPM. BATTERY STATUS "OK." 3 TACHY EPISODES W/ EGRAMS SHOWING RVR W/ PVCs. 36 DEVICE CLASSIFIED AF EPISODES, AVAILABLE STRIPS DEMONSTRATE OVERSENSING, UNDERSENSING AND ATRIAL FIBRILLATION. AF BURDEN IS 0.8%. AF BURDEN MAYBE OVERESTIMATED DUE TO INAPPROPRIATE CLASSIFICATION OF AF. SOME STRIPS MAY NOT BE INTERPRETABLE OR AVAILABLE, CANNOT DEFINITIVELY RULE OUT ATRIAL FIBRILLATION. HX OF PAF. PT TAKES ELIQUIS. NO PATIENT ACTIVATED EPISODES. NORMAL DEVICE FUNCTION.  DL

## 2023-11-08 ENCOUNTER — TELEPHONE (OUTPATIENT)
Dept: NEPHROLOGY | Facility: CLINIC | Age: 88
End: 2023-11-08

## 2023-11-08 NOTE — TELEPHONE ENCOUNTER
Called patient reminding to please complete labwork prior to 11/13 appointment with Dr. Diane Guevara. Patient said already done labs on Monday at [facility]. Called and requested to please fax labs results at [phone].

## 2023-11-10 ENCOUNTER — DOCUMENTATION (OUTPATIENT)
Dept: NEPHROLOGY | Facility: CLINIC | Age: 88
End: 2023-11-10

## 2023-11-10 LAB
CREAT ?TM UR-SCNC: 84.6 UMOL/L
EXT GLUCOSE BLD: 91
EXT PROTEIN URINE: 43.8
EXTERNAL ALBUMIN: 3.7
EXTERNAL ANION GAP: 8
EXTERNAL BUN: 30
EXTERNAL CALCIUM: 9
EXTERNAL CHLORIDE: 106
EXTERNAL CO2: 24
EXTERNAL CREATININE: 2.07
EXTERNAL EGFR: 29
EXTERNAL PHOSPHORUS: 3.3
EXTERNAL POTASSIUM: 4.4
EXTERNAL PTH: 98.7
EXTERNAL SODIUM: 138
HCT VFR BLD AUTO: 33.3 % (ref 36.5–49.3)
HGB BLD-MCNC: 11.2 G/DL (ref 12–17)
MCH (CD4/8) (HISTORICAL): 31.6
MCHC (CD4/8) (HISTORICAL): 33.7
MCV (CD4/8) (HISTORICAL): 94
PLATELET # BLD AUTO: 174 THOUSANDS/UL (ref 149–390)
PROT/CREAT UR: 0.52 MG/G{CREAT}
RDW (CD4/8) (HISTORICAL): 14
WBC # BLD AUTO: 5.3 THOUSAND/UL

## 2023-11-13 ENCOUNTER — OFFICE VISIT (OUTPATIENT)
Dept: NEPHROLOGY | Facility: CLINIC | Age: 88
End: 2023-11-13
Payer: MEDICARE

## 2023-11-13 VITALS
WEIGHT: 178 LBS | DIASTOLIC BLOOD PRESSURE: 78 MMHG | HEIGHT: 69 IN | SYSTOLIC BLOOD PRESSURE: 139 MMHG | BODY MASS INDEX: 26.36 KG/M2

## 2023-11-13 DIAGNOSIS — I10 ESSENTIAL HYPERTENSION: ICD-10-CM

## 2023-11-13 DIAGNOSIS — N18.4 ANEMIA IN STAGE 4 CHRONIC KIDNEY DISEASE: ICD-10-CM

## 2023-11-13 DIAGNOSIS — N25.81 SECONDARY HYPERPARATHYROIDISM OF RENAL ORIGIN (HCC): ICD-10-CM

## 2023-11-13 DIAGNOSIS — N18.4 CKD STAGE 4 DUE TO TYPE 2 DIABETES MELLITUS (HCC): Primary | ICD-10-CM

## 2023-11-13 DIAGNOSIS — E11.22 CKD STAGE 4 DUE TO TYPE 2 DIABETES MELLITUS (HCC): Primary | ICD-10-CM

## 2023-11-13 DIAGNOSIS — E78.2 MIXED HYPERLIPIDEMIA: ICD-10-CM

## 2023-11-13 DIAGNOSIS — D63.1 ANEMIA IN STAGE 4 CHRONIC KIDNEY DISEASE: ICD-10-CM

## 2023-11-13 DIAGNOSIS — N18.4 BENIGN HYPERTENSION WITH CKD (CHRONIC KIDNEY DISEASE) STAGE IV (HCC): ICD-10-CM

## 2023-11-13 DIAGNOSIS — E66.3 OVERWEIGHT (BMI 25.0-29.9): Chronic | ICD-10-CM

## 2023-11-13 DIAGNOSIS — I12.9 BENIGN HYPERTENSION WITH CKD (CHRONIC KIDNEY DISEASE) STAGE IV (HCC): ICD-10-CM

## 2023-11-13 PROCEDURE — 99214 OFFICE O/P EST MOD 30 MIN: CPT | Performed by: INTERNAL MEDICINE

## 2023-11-13 NOTE — PROGRESS NOTES
OFFICE FOLLOW UP - Nephrology   Clint Nance 80 y.o. male MRN: [id number]       ASSESSMENT and PLAN:  Kelley Mckeon was seen today for follow-up and chronic kidney disease. Diagnoses and all orders for this visit:    CKD stage 4 due to type 2 diabetes mellitus ([address])  -     CBC; Future  -     Renal function panel; Future  -     PTH, intact; Future  -     Protein / creatinine ratio, urine; Future    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 ([facility])  -     CBC; Future  -     Renal function panel; Future  -     PTH, intact; Future  -     Protein / creatinine ratio, urine; Future    Essential hypertension    Secondary hyperparathyroidism of renal origin ([address])    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     Mixed hyperlipidemia    Overweight (BMI 25.0-29. 9)           This is an 55-year-old gentleman with known history of chronic kidney disease stage 4, hyperlipoidemia, diabetes who returns to the office for follow-up. 1.  Chronic kidney disease stage 4, baseline creatinine around 1.9 to 2.2 since 2018. 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, atherosclerotic disease, possible diabetes as well as age-related nephron loss. Recent blood test were reviewed with patient, renal function fairly stable for the last several years with most recent creatinine of 2.07  Once again discussed with patient about importance to follow low-salt diet, stay well-hydrated and avoid NSAIDs. Would like to see him back in the office in 6 months with repeat labs. 2. Controlled type 2 diabetes, most recent A1c 7.6% on 09/2023,   Management as per primary care doctor. 3.  Hypertension, blood pressure well controlled after recheck, no changes on his medications, advised to follow low-salt diet    4. Mild anemia suspected secondary to CKD, most recent hemoglobin 11.2, follow CBC in 6 months.     5.  Mineral bone disease, secondary hyperparathyroidism, PTH is slightly above baseline but acceptable given degree of kidney dysfunction. Plan to follow labs in 6 months    6. Hyperlipidemia, continue with statins, management as per primary doctor. Most recent lipid panel well-controlled on 1/2023        Patient Instructions   As we discussed in the office visit and after reviewing your most recent blood test your kidney function remains fairly stable for the last 5 years. You have moderate to advanced chronic kidney disease stage IV. I would like to see you back in the office in 6 months with repeat labs. Remember to follow low-salt diet. Remember to avoid NSAIDs (no ibuprofen, Motrin, Advil, Aleve, naproxen). Okay to take Tylenol or acetaminophen as needed for pain. Continue close follow-up with your primary care doctor. HPI: Anusha Rehman is a 80 y.o. male who is here for Follow-up and Chronic Kidney Disease  . Last time patient was seen in our office was on 06/2023, today she returns for 5-month follow-up    Since our last visit, no hospitalizations or ER visits    Patient today in general is doing well, denies any complaints. Patient denies any chest pain, shortness of breath or leg swelling. No abdominal pain, no nausea or diarrhea, no constipation. Appetite is stable. Denies any urinary problems no burning sensation, continues with same nocturia 1-2 per night. He does not smoke, he quit smoking on 1981. Denies NSAID use. He lost 2  pounds since last office visit    The last blood work was done on 11/06/2023, which we have reviewed together. Most recent serum creatinine 2.07 with an eGFR 29, Hgb 11.2.      ROS: All the systems were reviewed and were negative except as documented on the H&P. Allergies: Patient has no known allergies.     Medications:   Current Outpatient Medications:     acetaminophen (TYLENOL) 325 mg tablet, Take 2 tablets (650 mg total) by mouth every 6 (six) hours as needed for mild pain, Disp: , Rfl: 0    amLODIPine (NORVASC) 5 mg tablet, TAKE ONE TABLET BY MOUTH EVERY DAY, Disp: 90 tablet, Rfl: 3    atorvastatin (LIPITOR) 40 mg tablet, TAKE 1 TABLET EVERY DAY WITH DINNER, Disp: 90 tablet, Rfl: 10    Blood Glucose Monitoring Suppl (PRECISION XTRA MONITOR) MARY, by Does not apply route daily, Disp: 1 each, Rfl: 0    Cholecalciferol 25 MCG (1000 UT) capsule, Take 1 capsule by mouth daily, Disp: , Rfl:     cyanocobalamin (VITAMIN B-12) 500 mcg tablet, Take 1 tablet by mouth daily, Disp: , Rfl:     Eliquis 2.5 MG, TAKE 1 TABLET TWICE DAILY, Disp: 180 tablet, Rfl: 2    glimepiride (AMARYL) 2 mg tablet, TAKE 1 TABLET BY MOUTH DAILY WITH BREAKFAST, Disp: 90 tablet, Rfl: 3    lisinopril (ZESTRIL) 10 mg tablet, TAKE 1 TABLET TWICE DAILY, Disp: 180 tablet, Rfl: 0    magnesium oxide (MAG-OX) 400 mg, Take 400 mg by mouth daily, Disp: , Rfl:     terazosin (HYTRIN) 1 mg capsule, Take 1 capsule at bedtime, Disp: 90 capsule, Rfl: 3    CINNAMON PO, Take 1 capsule by mouth daily (Patient not taking: Reported on 11/13/2023), Disp: , Rfl:     clotrimazole-betamethasone (LOTRISONE) 1-0.05 % cream, Apply topically 2 (two) times a day (Patient not taking: Reported on 10/2/2023), Disp: 30 g, Rfl: 0    Omega-3 Fatty Acids (OMEGA-3 FISH OIL) 300 MG CAPS, Take 1 capsule by mouth daily (Patient not taking: Reported on 11/13/2023), Disp: , Rfl:     PRECISION XTRA TEST STRIPS test strip, USE TO TEST BLOOD GLUCOSE ONCE A DAY, Disp: 100 strip, Rfl: 2    Past Medical History:   Diagnosis Date    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)     Last Assessed:  5/19/17    Chronic kidney disease     Diabetes mellitus (HCC)     Hyperlipidemia     Hypertension     Osteoarthritis     Palpitations     TIA (transient ischemic attack)      Past Surgical History:   Procedure Laterality Date    APPENDECTOMY      COLONOSCOPY  2012    HEMORROIDECTOMY      TOOTH EXTRACTION  02/02/2022     Family History   Problem Relation Age of Onset    Diabetes Mother     Other Mother         Stroke syndrome    Diabetes Father     Emphysema Father reports that he has quit smoking. He has never used smokeless tobacco. He reports current alcohol use. He reports that he does not use drugs. Physical Exam:   Vitals:    11/13/23 1308 11/13/23 1314   BP: 162/70 139/78   BP Location: Left arm Left arm   Patient Position: Sitting Sitting   Cuff Size: Standard Standard   Weight: 80.7 kg (178 lb)    Height: 5' 9" (1.753 m)      Body mass index is 26.29 kg/m². General: conscious, cooperative, in not acute distress  Eyes: conjunctivae pink, anicteric sclerae  ENT: lips and mucous membranes moist  Neck: supple, no JVD  Chest: clear breath sounds bilateral, no crackles, ronchus or wheezings  CVS: distinct S1 & S2, normal rate, regular rhythm  Abdomen: soft, non-tender, non-distended, normoactive bowel sounds  Back: no CVA tenderness  Extremities: no edema of both legs  Skin: no rash  Neuro: awake, alert, oriented        Laboratory Results:  Lab Results   Component Value Date    WBC 5.30 11/06/2023    HGB 11.2 (A) 11/06/2023    HCT 33.3 (A) 11/06/2023    MCV 94 09/25/2023     11/06/2023     Lab Results   Component Value Date    SODIUM 138 11/06/2023    K 4.4 11/06/2023     11/06/2023    CO2 24 11/06/2023    BUN 30 11/06/2023    CREATININE 2.07 11/06/2023    GLUC 91 11/06/2023    CALCIUM 9 11/06/2023     Lab Results   Component Value Date    PTH 98.7 11/06/2023    CALCIUM 9 11/06/2023    PHOS 3.3 11/06/2023         Portions of the record may have been created with voice recognition software. Occasional wrong word or "sound a like" substitutions may have occurred due to the inherent limitations of voice recognition software. Read the chart carefully and recognize, using context, where substitutions have occurred. If you have any questions, please contact the dictating provider.

## 2023-11-16 ENCOUNTER — IOP CHECK (OUTPATIENT)
Dept: URBAN - METROPOLITAN AREA CLINIC 6 | Facility: CLINIC | Age: 88
End: 2023-11-16

## 2023-11-16 DIAGNOSIS — H40.023: ICD-10-CM

## 2023-11-16 PROCEDURE — 92012 INTRM OPH EXAM EST PATIENT: CPT

## 2023-11-16 ASSESSMENT — VISUAL ACUITY
OS_SC: 20/100-1
OS_PH: 20/70
OD_SC: 20/30-2

## 2023-11-16 ASSESSMENT — TONOMETRY
OD_IOP_MMHG: 17
OS_IOP_MMHG: 18

## 2023-11-20 ENCOUNTER — TELEPHONE (OUTPATIENT)
Dept: CARDIOLOGY CLINIC | Facility: CLINIC | Age: 88
End: 2023-11-20

## 2023-11-20 NOTE — TELEPHONE ENCOUNTER
Sent letter out to patient. Carm[place], can you please print and mail out to residence address. Thank you.

## 2023-11-20 NOTE — LETTER
11/20/2023      MRN: [id number]        [address] [place] [address] [address] [facility] [address]      You have been referred to our Cardiology department to be scheduled for a LOOP Recorder Explant procedure. We do not have any openings available at the [facility] to schedule this procedure but can schedule it at [address] [place] [address]. If these do not accommodate your need then I can add you to the [address] [place] [address] waitlist until an opening becomes available. Please call at [phone] or [phone] to let us know your decision. .       Thank you,   Anna Giraldo  Surgery Coordinator  [facility]   [facility]  [place]  Ph: [phone]

## 2023-11-20 NOTE — TELEPHONE ENCOUNTER
----- Message from Og Dubon sent at 11/17/2023 11:25 AM EST -----  Regarding: FW: Loop battery RRT  Please schedule pt for loop explant per . Thanks  ----- Message -----  From: Jori Viera MD  Sent: 11/17/2023  11:16 AM EST  To: Og Dubon  Subject: RE: Loop battery RRT                             Yes, tx    ----- Message -----  From: Og Dubon  Sent: 11/17/2023  11:04 AM EST  To: Jori Viera MD  Subject: Loop battery RRT                                 Please see loop alert below showing low battery. Would you like pt scheduled for loop explant? NON-BILLABLE. CARELINK TRANSMISSION: ALERT FOR LOW BATTERY. BATTERY RRT SINCE 11/16/23. TASK SENT TO  FOR POSSIBLE EXPLANT DEVICE CLASSIFIED AF EPISODES, AVAILABLE STRIPS DEMONSTRATE NO ATRIAL FIBRILLATION. INSTEAD EGM'S SHOW PACs, PVCs, OVERSENSING AND UNDERSENSING. AF BURDEN IS 0%. AF BURDEN MAYBE OVERESTIMATED DUE TO INAPPROPRIATE CLASSIFICATION OF AF. SOME STRIPS MAY NOT BE INTERPRETABLE OR AVAILABLE, CANNOT DEFINITIVELY RULE OUT ATRIAL FIBRILLATION. HX OF PAF. PT TAKES ELIQUIS.  DL

## 2023-12-04 DIAGNOSIS — I48.91 ATRIAL FIBRILLATION, UNSPECIFIED TYPE (HCC): ICD-10-CM

## 2023-12-04 RX ORDER — APIXABAN 2.5 MG/1
TABLET, FILM COATED ORAL
Qty: 180 TABLET | Refills: 3 | Status: SHIPPED | OUTPATIENT
Start: 2023-12-04

## 2023-12-29 ENCOUNTER — TELEPHONE (OUTPATIENT)
Dept: CARDIOLOGY CLINIC | Facility: CLINIC | Age: 88
End: 2023-12-29

## 2023-12-29 NOTE — TELEPHONE ENCOUNTER
----- Message from Yudy Roberts sent at 11/17/2023 11:25 AM EST -----  Regarding: FW: Loop battery RRT  Please schedule pt for loop explant per . Thanks  ----- Message -----  From: Leroy Humphries MD  Sent: 11/17/2023  11:16 AM EST  To: Yudy Guajardochristiano  Subject: RE: Loop battery RRT                             Yes, tx    ----- Message -----  From: Yudy Armando  Sent: 11/17/2023  11:04 AM EST  To: Leroy Humphries MD  Subject: Loop battery RRT                                 Please see loop alert below showing low battery. Would you like pt scheduled for loop explant?      NON-BILLABLE. CARELINK TRANSMISSION: ALERT FOR LOW BATTERY. BATTERY RRT SINCE 11/16/23. TASK SENT TO  FOR POSSIBLE EXPLANT DEVICE CLASSIFIED AF EPISODES, AVAILABLE STRIPS DEMONSTRATE NO ATRIAL FIBRILLATION. INSTEAD EGM'S SHOW PACs, PVCs, OVERSENSING AND UNDERSENSING. AF BURDEN IS 0%. AF BURDEN MAYBE OVERESTIMATED DUE TO INAPPROPRIATE CLASSIFICATION OF AF. SOME STRIPS MAY NOT BE INTERPRETABLE OR AVAILABLE, CANNOT DEFINITIVELY RULE OUT ATRIAL FIBRILLATION. HX OF PAF. PT TAKES ELIQUIS. DL

## 2023-12-29 NOTE — TELEPHONE ENCOUNTER
Patient scheduled for a loop Explant at [facility] on  1/17/2024  with Dr Olmos.    Patient aware of general instructions.     Eliquis to hold in the morning of the procedure.    Patient confirmed is able to palpate the device when pass her hand on her chest area.     Can we please have insurance check for service.

## 2023-12-29 NOTE — LETTER
CARDIAC LOOP RECORDER IMPLANT/EXPLANT INSTRUCTIONS       2023        Leroy Paredes          : 1930        MRN: [id number]   [address] [facility] [address]         Procedure Name: CARDIAC LOOP RECORDER EXPLANT    Procedure date: 2024    Location: [facility]  Address: [address] [place] [address]      You may have breakfast the morning of the procedure.    Please take your morning medication as prescribed.    The hospital will contact you one day prior to your procedure date between 4PM - 6PM to give you the time and place to report. If you do not hear from [facility] by 6PM the evening prior to your procedure, please call  [phone] ([place]).    Please notify us if you have been admitted to the hospital within the past 30 days.    Please shower before your procedure and do not use powders or lotions.     You may drive yourself to and from the hospital for the procedure.    Make a list of your medications, including doses, to bring to the hospital. Please heike which medications you took the morning of the procedure.    Leave all valuables at home.      If you have any further questions, I can be reached at [phone].      Special Instructions    ELIQUIS: DO NOT take this medicaiton in the morning of the procedure.        Thank you,   Anne Murillo  Surgery Coordinator  [facility]   [address] [place] [id number]  Ph: [phone]

## 2024-01-06 NOTE — H&P (VIEW-ONLY)
Cardiology Follow Up    Leroy Paredes  12/11/1930  [id number]  [facility]  [address]  [place] [id number] [phone]    1. Pure hypercholesterolemia        2. Essential (primary) hypertension        3. RBBB        4. Presence of cardiac device        5. CKD (chronic kidney disease), stage IV (HCC)        6. Atrial fibrillation, unspecified type (HCC)            Interval History: Patient is here for a follow-up visit.  He has HTN, HLD and TIA.  He was hospitalized at the [facility] in June 2020.  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  Carotid Doppler showed noncritical disease. ILR was recommended by Neurology at that time and was implanted July 2020. Interrogation 4/2023 showed normal device function and AFib burden is 0.1%.  He is on adjusted dose Eliquis which was started May 6, 2022 in reference to A-fib on the ILR in the setting of prior stroke.  Echocardiogram done June 2020 demonstrated preserved LV systolic function with mild LVH and mild ELIZABETH. There was mild to moderate tricuspid and pulmonic regurgitation. A lipid profile done 1/2023 demonstrated total cholesterol of 99 with an HDL of 41 and a calculated LDL of 46.  Patient was seen in the ED May 2023 after mechanical fall.  He did hit his head.  There was no evidence of bleed.  Patient saw nephrology service June 2023 in reference to CKD stage IV.  Patient's ILR is due for explantation.  Patient has had no chest pain or significant dyspnea.  His vital signs are stable today.    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)   • Overweight (BMI 25.0-29.9)   • Secondary hyperparathyroidism of renal origin (HCC)   • Embolic bilateral punctate CVA, acute as well as subacute   • Essential hypertension   • 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HCC)   • Skin tear of right elbow without complication   • Fall   • Closed fracture of one rib of left side with routine healing   • 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      Past Medical History:   Diagnosis Date   • 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)     Last Assessed:  5/19/17   • Chronic kidney disease    • Diabetes mellitus (HCC)    • Hyperlipidemia    • Hypertension    • Osteoarthritis    • Palpitations    • TIA (transient ischemic attack)      Social History     Socioeconomic History   • Marital status: Single     Spouse name: Not on file   • Number of children: Not on file   • Years of education: Not on file   • Highest education level: Not on file   Occupational History   • Not on file   Tobacco Use   • Smoking status: Former   • Smokeless tobacco: Never   Vaping Use   • Vaping status: Never Used   Substance and Sexual Activity   • Alcohol use: Yes     Comment: Social drinker   • Drug use: No   • Sexual activity: Not Currently   Other Topics Concern   • Not on file   Social History Narrative    Daily coffee consumption (2 cups/day)     Social Determinants of Health     Financial Resource Strain: Low Risk  (1/25/2023)    Overall Financial Resource Strain (CARDIA)    • Difficulty of Paying Living Expenses: Not very hard   Food Insecurity: No Food Insecurity (9/24/2020)    Hunger Vital Sign    • Worried About Running Out of Food in the Last Year: Never true    • Ran Out of Food in the Last Year: Never true   Transportation Needs: No Transportation Needs (1/25/2023)    PRAPARE - Transportation    • Lack of Transportation (Medical): No    • Lack of Transportation (Non-Medical): No   Physical Activity: Insufficiently Active (7/10/2020)    Exercise Vital Sign    • Days of Exercise per  Week: 3 days    • Minutes of Exercise per Session: 20 min   Stress: Not on file   Social Connections: Not on file   Intimate Partner Violence: Not on file   Housing Stability: Not on file      Family History   Problem Relation Age of Onset   • Diabetes Mother    • Other Mother         Stroke syndrome   • Diabetes Father    • Emphysema Father      Past Surgical History:   Procedure Laterality Date   • APPENDECTOMY     • COLONOSCOPY  2012   • HEMORROIDECTOMY     • TOOTH EXTRACTION  02/02/2022       Current Outpatient Medications:   •  acetaminophen (TYLENOL) 325 mg tablet, Take 2 tablets (650 mg total) by mouth every 6 (six) hours as needed for mild pain, Disp: , Rfl: 0  •  amLODIPine (NORVASC) 5 mg tablet, TAKE ONE TABLET BY MOUTH EVERY DAY, Disp: 90 tablet, Rfl: 3  •  atorvastatin (LIPITOR) 40 mg tablet, TAKE 1 TABLET EVERY DAY WITH DINNER, Disp: 90 tablet, Rfl: 10  •  Blood Glucose Monitoring Suppl (PRECISION XTRA MONITOR) MARY, by Does not apply route daily, Disp: 1 each, Rfl: 0  •  Cholecalciferol 25 MCG (1000 UT) capsule, Take 1 capsule by mouth daily, Disp: , Rfl:   •  cyanocobalamin (VITAMIN B-12) 500 mcg tablet, Take 1 tablet by mouth daily, Disp: , Rfl:   •  Eliquis 2.5 MG, TAKE 1 TABLET TWICE DAILY, Disp: 180 tablet, Rfl: 3  •  glimepiride (AMARYL) 2 mg tablet, TAKE 1 TABLET BY MOUTH DAILY WITH BREAKFAST, Disp: 90 tablet, Rfl: 3  •  lisinopril (ZESTRIL) 10 mg tablet, TAKE 1 TABLET TWICE DAILY, Disp: 180 tablet, Rfl: 0  •  magnesium oxide (MAG-OX) 400 mg, Take 400 mg by mouth daily, Disp: , Rfl:   •  PRECISION XTRA TEST STRIPS test strip, USE TO TEST BLOOD GLUCOSE ONCE A DAY, Disp: 100 strip, Rfl: 2  •  terazosin (HYTRIN) 1 mg capsule, Take 1 capsule at bedtime, Disp: 90 capsule, Rfl: 3  •  CINNAMON PO, Take 1 capsule by mouth daily (Patient not taking: Reported on 11/13/2023), Disp: , Rfl:   •  clotrimazole-betamethasone (LOTRISONE) 1-0.05 % cream, Apply topically 2 (two) times a day (Patient not taking:  "Reported on 10/2/2023), Disp: 30 g, Rfl: 0  •  Omega-3 Fatty Acids (OMEGA-3 FISH OIL) 300 MG CAPS, Take 1 capsule by mouth daily (Patient not taking: Reported on 11/13/2023), Disp: , Rfl:   No Known Allergies    Labs:not applicable  Imaging: No results found.    Review of Systems:  Review of Systems   All other systems reviewed and are negative.      Physical Exam:  /54 (BP Location: Left arm, Patient Position: Sitting, Cuff Size: Standard)   Pulse 88   Ht 5' 9\" (1.753 m)   Wt 79.1 kg (174 lb 6.4 oz)   SpO2 98%   BMI 25.75 kg/m²   Physical Exam  Vitals reviewed.   Constitutional:       Appearance: He is well-developed.   HENT:      Head: Normocephalic and atraumatic.   Cardiovascular:      Rate and Rhythm: Normal rate.      Heart sounds: Normal heart sounds.   Pulmonary:      Effort: Pulmonary effort is normal.      Breath sounds: Normal breath sounds.   Musculoskeletal:      Cervical back: Normal range of motion.   Skin:     General: Skin is warm and dry.   Neurological:      Mental Status: He is alert and oriented to person, place, and time.         Discussion/Summary:I will continue the patient's present medical regimen.  The patient appears well compensated.  I have asked the patient to call if there is a problem in the interim otherwise I will see the patient in six months time.  "

## 2024-01-06 NOTE — PROGRESS NOTES
Cardiology Follow Up    Leroy Paredes  12/11/1930  [id number]  [facility]  [address]  [place] [id number] [phone]    1. Pure hypercholesterolemia        2. Essential (primary) hypertension        3. RBBB        4. Presence of cardiac device        5. CKD (chronic kidney disease), stage IV (HCC)        6. Atrial fibrillation, unspecified type (HCC)            Interval History: Patient is here for a follow-up visit.  He has HTN, HLD and TIA.  He was hospitalized at the [facility] in June 2020.  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  Carotid Doppler showed noncritical disease. ILR was recommended by Neurology at that time and was implanted July 2020. Interrogation 4/2023 showed normal device function and AFib burden is 0.1%.  He is on adjusted dose Eliquis which was started May 6, 2022 in reference to A-fib on the ILR in the setting of prior stroke.  Echocardiogram done June 2020 demonstrated preserved LV systolic function with mild LVH and mild ELIZABETH. There was mild to moderate tricuspid and pulmonic regurgitation. A lipid profile done 1/2023 demonstrated total cholesterol of 99 with an HDL of 41 and a calculated LDL of 46.  Patient was seen in the ED May 2023 after mechanical fall.  He did hit his head.  There was no evidence of bleed.  Patient saw nephrology service June 2023 in reference to CKD stage IV.  Patient's ILR is due for explantation.  Patient has had no chest pain or significant dyspnea.  His vital signs are stable today.    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)   • Overweight (BMI 25.0-29.9)   • Secondary hyperparathyroidism of renal origin (HCC)   • Embolic bilateral punctate CVA, acute as well as subacute   • Essential hypertension   • 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HCC)   • Skin tear of right elbow without complication   • Fall   • Closed fracture of one rib of left side with routine healing   • 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      Past Medical History:   Diagnosis Date   • 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)     Last Assessed:  5/19/17   • Chronic kidney disease    • Diabetes mellitus (HCC)    • Hyperlipidemia    • Hypertension    • Osteoarthritis    • Palpitations    • TIA (transient ischemic attack)      Social History     Socioeconomic History   • Marital status: Single     Spouse name: Not on file   • Number of children: Not on file   • Years of education: Not on file   • Highest education level: Not on file   Occupational History   • Not on file   Tobacco Use   • Smoking status: Former   • Smokeless tobacco: Never   Vaping Use   • Vaping status: Never Used   Substance and Sexual Activity   • Alcohol use: Yes     Comment: Social drinker   • Drug use: No   • Sexual activity: Not Currently   Other Topics Concern   • Not on file   Social History Narrative    Daily coffee consumption (2 cups/day)     Social Determinants of Health     Financial Resource Strain: Low Risk  (1/25/2023)    Overall Financial Resource Strain (CARDIA)    • Difficulty of Paying Living Expenses: Not very hard   Food Insecurity: No Food Insecurity (9/24/2020)    Hunger Vital Sign    • Worried About Running Out of Food in the Last Year: Never true    • Ran Out of Food in the Last Year: Never true   Transportation Needs: No Transportation Needs (1/25/2023)    PRAPARE - Transportation    • Lack of Transportation (Medical): No    • Lack of Transportation (Non-Medical): No   Physical Activity: Insufficiently Active (7/10/2020)    Exercise Vital Sign    • Days of Exercise per  Week: 3 days    • Minutes of Exercise per Session: 20 min   Stress: Not on file   Social Connections: Not on file   Intimate Partner Violence: Not on file   Housing Stability: Not on file      Family History   Problem Relation Age of Onset   • Diabetes Mother    • Other Mother         Stroke syndrome   • Diabetes Father    • Emphysema Father      Past Surgical History:   Procedure Laterality Date   • APPENDECTOMY     • COLONOSCOPY  2012   • HEMORROIDECTOMY     • TOOTH EXTRACTION  02/02/2022       Current Outpatient Medications:   •  acetaminophen (TYLENOL) 325 mg tablet, Take 2 tablets (650 mg total) by mouth every 6 (six) hours as needed for mild pain, Disp: , Rfl: 0  •  amLODIPine (NORVASC) 5 mg tablet, TAKE ONE TABLET BY MOUTH EVERY DAY, Disp: 90 tablet, Rfl: 3  •  atorvastatin (LIPITOR) 40 mg tablet, TAKE 1 TABLET EVERY DAY WITH DINNER, Disp: 90 tablet, Rfl: 10  •  Blood Glucose Monitoring Suppl (PRECISION XTRA MONITOR) MARY, by Does not apply route daily, Disp: 1 each, Rfl: 0  •  Cholecalciferol 25 MCG (1000 UT) capsule, Take 1 capsule by mouth daily, Disp: , Rfl:   •  cyanocobalamin (VITAMIN B-12) 500 mcg tablet, Take 1 tablet by mouth daily, Disp: , Rfl:   •  Eliquis 2.5 MG, TAKE 1 TABLET TWICE DAILY, Disp: 180 tablet, Rfl: 3  •  glimepiride (AMARYL) 2 mg tablet, TAKE 1 TABLET BY MOUTH DAILY WITH BREAKFAST, Disp: 90 tablet, Rfl: 3  •  lisinopril (ZESTRIL) 10 mg tablet, TAKE 1 TABLET TWICE DAILY, Disp: 180 tablet, Rfl: 0  •  magnesium oxide (MAG-OX) 400 mg, Take 400 mg by mouth daily, Disp: , Rfl:   •  PRECISION XTRA TEST STRIPS test strip, USE TO TEST BLOOD GLUCOSE ONCE A DAY, Disp: 100 strip, Rfl: 2  •  terazosin (HYTRIN) 1 mg capsule, Take 1 capsule at bedtime, Disp: 90 capsule, Rfl: 3  •  CINNAMON PO, Take 1 capsule by mouth daily (Patient not taking: Reported on 11/13/2023), Disp: , Rfl:   •  clotrimazole-betamethasone (LOTRISONE) 1-0.05 % cream, Apply topically 2 (two) times a day (Patient not taking:  "Reported on 10/2/2023), Disp: 30 g, Rfl: 0  •  Omega-3 Fatty Acids (OMEGA-3 FISH OIL) 300 MG CAPS, Take 1 capsule by mouth daily (Patient not taking: Reported on 11/13/2023), Disp: , Rfl:   No Known Allergies    Labs:not applicable  Imaging: No results found.    Review of Systems:  Review of Systems   All other systems reviewed and are negative.      Physical Exam:  /54 (BP Location: Left arm, Patient Position: Sitting, Cuff Size: Standard)   Pulse 88   Ht 5' 9\" (1.753 m)   Wt 79.1 kg (174 lb 6.4 oz)   SpO2 98%   BMI 25.75 kg/m²   Physical Exam  Vitals reviewed.   Constitutional:       Appearance: He is well-developed.   HENT:      Head: Normocephalic and atraumatic.   Cardiovascular:      Rate and Rhythm: Normal rate.      Heart sounds: Normal heart sounds.   Pulmonary:      Effort: Pulmonary effort is normal.      Breath sounds: Normal breath sounds.   Musculoskeletal:      Cervical back: Normal range of motion.   Skin:     General: Skin is warm and dry.   Neurological:      Mental Status: He is alert and oriented to person, place, and time.         Discussion/Summary:I will continue the patient's present medical regimen.  The patient appears well compensated.  I have asked the patient to call if there is a problem in the interim otherwise I will see the patient in six months time.  "

## 2024-01-09 DIAGNOSIS — E11.9 TYPE 2 DIABETES MELLITUS WITHOUT COMPLICATION, WITHOUT LONG-TERM CURRENT USE OF INSULIN (HCC): ICD-10-CM

## 2024-01-09 RX ORDER — BLOOD SUGAR DIAGNOSTIC
STRIP MISCELLANEOUS
Qty: 100 STRIP | Refills: 2 | Status: SHIPPED | OUTPATIENT
Start: 2024-01-09

## 2024-01-16 ENCOUNTER — OFFICE VISIT (OUTPATIENT)
Dept: CARDIOLOGY CLINIC | Facility: CLINIC | Age: 89
End: 2024-01-16
Payer: MEDICARE

## 2024-01-16 VITALS
OXYGEN SATURATION: 98 % | HEART RATE: 88 BPM | HEIGHT: 69 IN | SYSTOLIC BLOOD PRESSURE: 132 MMHG | DIASTOLIC BLOOD PRESSURE: 54 MMHG | BODY MASS INDEX: 25.83 KG/M2 | WEIGHT: 174.4 LBS

## 2024-01-16 DIAGNOSIS — I10 ESSENTIAL (PRIMARY) HYPERTENSION: ICD-10-CM

## 2024-01-16 DIAGNOSIS — N18.4 CKD (CHRONIC KIDNEY DISEASE), STAGE IV (HCC): ICD-10-CM

## 2024-01-16 DIAGNOSIS — Z95.818 PRESENCE OF CARDIAC DEVICE: ICD-10-CM

## 2024-01-16 DIAGNOSIS — E78.00 PURE HYPERCHOLESTEROLEMIA: Primary | ICD-10-CM

## 2024-01-16 DIAGNOSIS — I45.10 RBBB: ICD-10-CM

## 2024-01-16 DIAGNOSIS — I48.91 ATRIAL FIBRILLATION, UNSPECIFIED TYPE (HCC): ICD-10-CM

## 2024-01-16 PROCEDURE — 99214 OFFICE O/P EST MOD 30 MIN: CPT | Performed by: INTERNAL MEDICINE

## 2024-01-17 ENCOUNTER — HOSPITAL ENCOUNTER (OUTPATIENT)
Facility: HOSPITAL | Age: 89
Setting detail: OUTPATIENT SURGERY
Discharge: HOME | End: 2024-01-17
Attending: INTERNAL MEDICINE | Admitting: INTERNAL MEDICINE
Payer: MEDICARE

## 2024-01-17 VITALS
WEIGHT: 174 LBS | OXYGEN SATURATION: 98 % | BODY MASS INDEX: 24.36 KG/M2 | DIASTOLIC BLOOD PRESSURE: 57 MMHG | HEART RATE: 100 BPM | SYSTOLIC BLOOD PRESSURE: 116 MMHG | HEIGHT: 71 IN | TEMPERATURE: 97.4 F | RESPIRATION RATE: 16 BRPM

## 2024-01-17 DIAGNOSIS — I48.91 ATRIAL FIBRILLATION, UNSPECIFIED TYPE (HCC): ICD-10-CM

## 2024-01-17 DIAGNOSIS — Z45.09 ENCOUNTER FOR LOOP RECORDER AT END OF BATTERY LIFE: Primary | ICD-10-CM

## 2024-01-17 DIAGNOSIS — Z95.818 PRESENCE OF OTHER CARDIAC IMPLANTS AND GRAFTS: ICD-10-CM

## 2024-01-17 PROCEDURE — 33286 RMVL SUBQ CAR RHYTHM MNTR: CPT | Performed by: INTERNAL MEDICINE

## 2024-01-17 RX ORDER — LIDOCAINE HYDROCHLORIDE 10 MG/ML
INJECTION, SOLUTION EPIDURAL; INFILTRATION; INTRACAUDAL; PERINEURAL CODE/TRAUMA/SEDATION MEDICATION
Status: DISCONTINUED | OUTPATIENT
Start: 2024-01-17 | End: 2024-01-17 | Stop reason: HOSPADM

## 2024-01-17 RX ORDER — LIDOCAINE HYDROCHLORIDE 10 MG/ML
INJECTION, SOLUTION EPIDURAL; INFILTRATION; INTRACAUDAL; PERINEURAL
Status: DISCONTINUED
Start: 2024-01-17 | End: 2024-01-17 | Stop reason: HOSPADM

## 2024-01-17 NOTE — INTERVAL H&P NOTE
Please see recent office visit with Dr. Humphries for full details.  Brief this patient is a pleasant 89-year-old male with prior CVA for which a [employer] loop recorder was implanted.  Subsequent device interrogations showed possible atrial fibrillation versus oversensing, however ultimately he was started on low-dose Eliquis.  Recent device interrogations have shown that he reached end of service, thus device explantation was recommended by his primary cardiologist.  He presents today to undergo that procedure.    There were no vitals filed for this visit.

## 2024-01-17 NOTE — DISCHARGE INSTR - AVS FIRST PAGE
Keep loop recorder incision dry for one week. If site is glued, then you may shower (as the glue is waterproof). If not, please keep the area covered and dry while bathing.     Do not use lotions/powders/creams on incision. Remove outer bandage 48 hours after procedure. Any sutures present will be removed at your 2 week follow up appointment. Please call the office if you notice redness, swelling, bleeding, or drainage from incision or if you develop fevers - [phone].

## 2024-01-31 ENCOUNTER — IN-CLINIC DEVICE VISIT (OUTPATIENT)
Dept: CARDIOLOGY CLINIC | Facility: CLINIC | Age: 89
End: 2024-01-31
Payer: MEDICARE

## 2024-01-31 DIAGNOSIS — Z95.818 PRESENCE OF CARDIAC DEVICE: Primary | ICD-10-CM

## 2024-01-31 PROCEDURE — 99211 OFF/OP EST MAY X REQ PHY/QHP: CPT

## 2024-01-31 NOTE — PROGRESS NOTES
Results for orders placed or performed in visit on 01/31/24   Cardiac EP device report    Narrative    MDT LOOP/ ACTIVE SYSTEM IS MRI CONDITIONAL  PT SEEN TODAY IN THE [facility] OFFICE. WOUND CHECK: INCISION CLEAN AND DRY WITH EDGES APPROXIMATED; SUTURES REMOVED; WOUND CARE AND RESTRICTIONS REVIEWED WITH PATIENT. DISCHARGED FROM DEVICE CLINIC.  PT RETURNED HIS TRANSMITTER BACK TODAY. PAS/AM

## 2024-02-15 DIAGNOSIS — N18.4 CKD (CHRONIC KIDNEY DISEASE), STAGE IV (HCC): ICD-10-CM

## 2024-02-15 RX ORDER — LISINOPRIL 10 MG/1
TABLET ORAL
Qty: 180 TABLET | Refills: 3 | Status: SHIPPED | OUTPATIENT
Start: 2024-02-15

## 2024-02-21 PROBLEM — Z00.00 HEALTHCARE MAINTENANCE: Status: RESOLVED | Noted: 2018-05-25 | Resolved: 2024-02-21

## 2024-03-07 ENCOUNTER — OFFICE VISIT (OUTPATIENT)
Dept: INTERNAL MEDICINE CLINIC | Facility: CLINIC | Age: 89
End: 2024-03-07
Payer: MEDICARE

## 2024-03-07 VITALS
BODY MASS INDEX: 24.92 KG/M2 | HEART RATE: 68 BPM | WEIGHT: 178 LBS | DIASTOLIC BLOOD PRESSURE: 70 MMHG | OXYGEN SATURATION: 97 % | RESPIRATION RATE: 16 BRPM | SYSTOLIC BLOOD PRESSURE: 126 MMHG | HEIGHT: 71 IN

## 2024-03-07 DIAGNOSIS — N25.81 SECONDARY HYPERPARATHYROIDISM OF RENAL ORIGIN (HCC): ICD-10-CM

## 2024-03-07 DIAGNOSIS — I48.91 ATRIAL FIBRILLATION, UNSPECIFIED TYPE (HCC): ICD-10-CM

## 2024-03-07 DIAGNOSIS — R00.2 PALPITATIONS: ICD-10-CM

## 2024-03-07 DIAGNOSIS — D63.1 ANEMIA IN STAGE 4 CHRONIC KIDNEY DISEASE: Primary | ICD-10-CM

## 2024-03-07 DIAGNOSIS — I10 ESSENTIAL HYPERTENSION: ICD-10-CM

## 2024-03-07 DIAGNOSIS — N18.4 CKD STAGE 4 DUE TO TYPE 2 DIABETES MELLITUS (HCC): ICD-10-CM

## 2024-03-07 DIAGNOSIS — N18.4 ANEMIA IN STAGE 4 CHRONIC KIDNEY DISEASE: Primary | ICD-10-CM

## 2024-03-07 DIAGNOSIS — D68.32 HEMORRHAGIC DISORDER DUE TO EXTRINSIC CIRCULATING ANTICOAGULANTS (HCC): ICD-10-CM

## 2024-03-07 DIAGNOSIS — I77.9 DISORDER OF CAROTID ARTERY (HCC): ICD-10-CM

## 2024-03-07 DIAGNOSIS — E78.2 MIXED HYPERLIPIDEMIA: ICD-10-CM

## 2024-03-07 DIAGNOSIS — E11.22 CKD STAGE 4 DUE TO TYPE 2 DIABETES MELLITUS (HCC): ICD-10-CM

## 2024-03-07 PROBLEM — E66.3 OVERWEIGHT (BMI 25.0-29.9): Chronic | Status: RESOLVED | Noted: 2019-01-25 | Resolved: 2024-03-07

## 2024-03-07 PROCEDURE — 99214 OFFICE O/P EST MOD 30 MIN: CPT | Performed by: STUDENT IN AN ORGANIZED HEALTH CARE EDUCATION/TRAINING PROGRAM

## 2024-03-07 PROCEDURE — G0439 PPPS, SUBSEQ VISIT: HCPCS | Performed by: STUDENT IN AN ORGANIZED HEALTH CARE EDUCATION/TRAINING PROGRAM

## 2024-03-07 NOTE — ASSESSMENT & PLAN NOTE
Lab Results   Component Value Date    HGBA1C 7.1 (H) 01/27/2024     A1C trending down from 7.6   Managed on Glimepiride - denies any symptoms of hypotensive episodes   Will recheck A1C at next follow up

## 2024-03-07 NOTE — PROGRESS NOTES
Assessment and Plan:     Problem List Items Addressed This Visit          Endocrine    CKD stage 4 due to type 2 diabetes mellitus (HCC)       Lab Results   Component Value Date    HGBA1C 7.1 (H) 01/27/2024     A1C trending down from 7.6   Managed on Glimepiride - denies any symptoms of hypotensive episodes   Will recheck A1C at next follow up            Relevant Orders    Comprehensive metabolic panel    Secondary hyperparathyroidism of renal origin (HCC)       Cardiovascular and Mediastinum    Essential hypertension     Controlled on current regimen with Lisinopril and Amlodipine  Will continue to monitor          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HCC)    A-fib (HCC)     Following with Cardiology  Managed on Eliquis 2.5 mg QD  Continue current regimen            Hematopoietic and Hemostatic    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)       Genitourinary    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  - Primary     Lab Results   Component Value Date    EGFR 24 (L) 01/27/2024    EGFR 29 (L) 11/06/2023    EGFR 29 11/06/2023    CREATININE 2.48 (H) 01/27/2024    CREATININE 2.07 (H) 11/06/2023    CREATININE 2.07 11/06/2023     Hgb stable at 11.1 on most recent lab work   Cr above baseline (~1.9 - 2.2) - continues to follow with nephrology  Will recheck CMP given recent rise            Other    Hyperlipidemia     Managed on Lipitor 40 mg  Continue current regimen         Palpitations     Recently had loop recorder replaced  Following with cardiology              Preventive health issues were discussed with patient, and age appropriate screening tests were ordered as noted in patient's After Visit Summary.  Personalized health advice and appropriate referrals for health education or preventive services given if needed, as noted in patient's After Visit Summary.     History of Present Illness:     Patient presents for a Medicare Wellness Visit    HPI   This is a very pleasant 89 y.o. male who presents  to the clinic for management of their chronic medical conditions.  Patient's medical conditions are stable unless noted otherwise above.  Patient has not had any recent hospitalizations, or medical emergencies since last visit.  Patient has no further complaints other than what is mentioned in the ROS.    Patient Care Team:  Pepito Foster DO as PCP - General  MD Pepito Gunter DO     Review of Systems:     Review of Systems   Constitutional:  Negative for chills and fever.   HENT:  Negative for congestion, rhinorrhea and sinus pressure.    Respiratory:  Negative for chest tightness, shortness of breath and wheezing.    Cardiovascular:  Negative for chest pain and palpitations.   Gastrointestinal:  Negative for abdominal pain, nausea and vomiting.   Endocrine: Negative for polyuria.   Genitourinary:  Negative for difficulty urinating, dysuria, frequency and urgency.   Musculoskeletal:  Negative for myalgias.   Neurological:  Negative for dizziness, syncope and light-headedness.   Psychiatric/Behavioral:  Negative for dysphoric mood.         Problem List:     Patient Active Problem List   Diagnosis    Controlled diabetes mellitus (HCC)    Enlarged prostate without lower urinary tract symptoms (luts)    Hyperlipidemia    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 (HCC)    Palpitations    CKD stage 4 due to type 2 diabetes mellitus (HCC)    Secondary hyperparathyroidism of renal origin (HCC)    Embolic bilateral punctate CVA, acute as well as subacute    Essential hypertension    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HCC)    Skin tear of right elbow without complication    Fall    Closed fracture of one rib of left side with routine healing    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     Encounter for loop recorder at end of battery life    A-fib (HCC)    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)      Past Medical and Surgical History:     Past Medical  History:   Diagnosis Date    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)     Last Assessed:  5/19/17    Chronic kidney disease     Diabetes mellitus (HCC)     Hyperlipidemia     Hypertension     Osteoarthritis     Palpitations     TIA (transient ischemic attack)      Past Surgical History:   Procedure Laterality Date    APPENDECTOMY      CARDIAC ELECTROPHYSIOLOGY PROCEDURE N/A 1/17/2024    Procedure: Cardiac loop recorder explant;  Surgeon: Drew Olmos MD;  Location: [facility];  Service: Cardiology    COLONOSCOPY  2012    HEMORROIDECTOMY      TOOTH EXTRACTION  02/02/2022      Family History:     Family History   Problem Relation Age of Onset    Diabetes Mother     Other Mother         Stroke syndrome    Diabetes Father     Emphysema Father       Social History:     Social History     Socioeconomic History    Marital status: Single     Spouse name: None    Number of children: None    Years of education: None    Highest education level: None   Occupational History    None   Tobacco Use    Smoking status: Former    Smokeless tobacco: Never   Vaping Use    Vaping status: Never Used   Substance and Sexual Activity    Alcohol use: Yes     Comment: Social drinker    Drug use: No    Sexual activity: Not Currently   Other Topics Concern    None   Social History Narrative    Daily coffee consumption (2 cups/day)     Social Determinants of Health     Financial Resource Strain: Low Risk  (3/7/2024)    Overall Financial Resource Strain (CARDIA)     Difficulty of Paying Living Expenses: Not very hard   Food Insecurity: No Food Insecurity (9/24/2020)    Hunger Vital Sign     Worried About Running Out of Food in the Last Year: Never true     Ran Out of Food in the Last Year: Never true   Transportation Needs: No Transportation Needs (3/7/2024)    PRAPARE - Transportation     Lack of Transportation (Medical): No     Lack of Transportation (Non-Medical): No   Physical Activity: Insufficiently Active (7/10/2020)    Exercise  Vital Sign     Days of Exercise per Week: 3 days     Minutes of Exercise per Session: 20 min   Stress: Not on file   Social Connections: Not on file   Intimate Partner Violence: Not on file   Housing Stability: Not on file      Medications and Allergies:     Current Outpatient Medications   Medication Sig Dispense Refill    acetaminophen (TYLENOL) 325 mg tablet Take 2 tablets (650 mg total) by mouth every 6 (six) hours as needed for mild pain  0    amLODIPine (NORVASC) 5 mg tablet TAKE ONE TABLET BY MOUTH EVERY DAY 90 tablet 3    atorvastatin (LIPITOR) 40 mg tablet TAKE 1 TABLET EVERY DAY WITH DINNER 90 tablet 10    Blood Glucose Monitoring Suppl (PRECISION XTRA MONITOR) MARY by Does not apply route daily 1 each 0    Cholecalciferol 25 MCG (1000 UT) capsule Take 1 capsule by mouth daily      CINNAMON PO Take 1 capsule by mouth daily      clotrimazole-betamethasone (LOTRISONE) 1-0.05 % cream Apply topically 2 (two) times a day 30 g 0    cyanocobalamin (VITAMIN B-12) 500 mcg tablet Take 1 tablet by mouth daily      Eliquis 2.5 MG TAKE 1 TABLET TWICE DAILY 180 tablet 3    glimepiride (AMARYL) 2 mg tablet TAKE 1 TABLET BY MOUTH DAILY WITH BREAKFAST 90 tablet 3    lisinopril (ZESTRIL) 10 mg tablet TAKE 1 TABLET TWICE DAILY 180 tablet 3    magnesium oxide (MAG-OX) 400 mg Take 400 mg by mouth daily      Omega-3 Fatty Acids (OMEGA-3 FISH OIL) 300 MG CAPS Take 1 capsule by mouth daily (Patient not taking: Reported on 11/13/2023)      PRECISION XTRA TEST STRIPS test strip USE TO TEST BLOOD GLUCOSE ONCE A  strip 2    terazosin (HYTRIN) 1 mg capsule Take 1 capsule at bedtime 90 capsule 3     No current facility-administered medications for this visit.     No Known Allergies   Immunizations:     Immunization History   Administered Date(s) Administered    COVID-19 MODERNA VACC 0.5 ML IM 01/27/2021, 02/22/2021, 11/22/2021, 07/23/2022    INFLUENZA 11/01/2002, 01/05/2003, 11/24/2003, 10/06/2004, 11/07/2005, 10/21/2006,  09/28/2007, 10/10/2008, 10/20/2008, 11/04/2009, 10/05/2010, 10/03/2011, 11/04/2012    Influenza Split High Dose Preservative Free IM 09/27/2013, 09/25/2014, 09/29/2015, 09/23/2016, 09/19/2017    Influenza, high dose seasonal 0.7 mL 09/25/2018, 10/15/2020, 09/20/2021, 09/26/2022, 10/02/2023    Pneumococcal 01/01/1999    Pneumococcal Conjugate 13-Valent 04/25/2019    Pneumococcal Polysaccharide PPV23 10/01/2006    TD (adult) Preservative Free 12/11/1930      Health Maintenance:     There are no preventive care reminders to display for this patient.      Topic Date Due    COVID-19 Vaccine (5 - 2023-24 season) 09/01/2023      Medicare Screening Tests and Risk Assessments:     Leroy is here for his Subsequent Wellness visit.     Health Risk Assessment:   Patient rates overall health as very good. Patient feels that their physical health rating is same. Patient is very satisfied with their life. Eyesight was rated as same. Hearing was rated as same. Patient feels that their emotional and mental health rating is slightly better. Patients states they are never, rarely angry. Patient states they are never, rarely unusually tired/fatigued. Pain experienced in the last 7 days has been none. Patient states that he has experienced no weight loss or gain in last 6 months.     Depression Screening:   PHQ-2 Score: 0      Fall Risk Screening:   In the past year, patient has experienced: no history of falling in past year      Home Safety:  Patient does not have trouble with stairs inside or outside of their home. Patient has working smoke alarms and has working carbon monoxide detector. Home safety hazards include: none.     Nutrition:   Current diet is Diabetic.     Medications:   Patient is currently taking over-the-counter supplements. OTC medications include: see medication list. Patient is able to manage medications.     Activities of Daily Living (ADLs)/Instrumental Activities of Daily Living (IADLs):   Walk and transfer into  "and out of bed and chair?: Yes  Dress and groom yourself?: Yes    Bathe or shower yourself?: Yes    Feed yourself? Yes  Do your laundry/housekeeping?: Yes  Manage your money, pay your bills and track your expenses?: Yes  Make your own meals?: Yes    Do your own shopping?: Yes    Previous Hospitalizations:   Any hospitalizations or ED visits within the last 12 months?: No      Advance Care Planning:   Living will: Yes    Durable POA for healthcare: Yes    Advanced directive: Yes      PREVENTIVE SCREENINGS      Cardiovascular Screening:    General: Screening Not Indicated and History Lipid Disorder      Diabetes Screening:     General: Screening Not Indicated and History Diabetes      Colorectal Cancer Screening:     General: Screening Not Indicated      Prostate Cancer Screening:    General: Screening Not Indicated      Abdominal Aortic Aneurysm (AAA) Screening:    Risk factors include: tobacco use        Lung Cancer Screening:     General: Screening Not Indicated    Screening, Brief Intervention, and Referral to Treatment (SBIRT)    Screening  Typical number of drinks in a day: 0  Typical number of drinks in a week: 0  Interpretation: Low risk drinking behavior.    Single Item Drug Screening:  How often have you used an illegal drug (including marijuana) or a prescription medication for non-medical reasons in the past year? never    Single Item Drug Screen Score: 0  Interpretation: Negative screen for possible drug use disorder    No results found.     Physical Exam:     /70   Pulse 68   Resp 16   Ht 5' 11\" (1.803 m)   Wt 80.7 kg (178 lb)   SpO2 97%   BMI 24.83 kg/m²     Physical Exam  Vitals and nursing note reviewed.   Constitutional:       General: He is not in acute distress.     Appearance: He is well-developed.   HENT:      Head: Normocephalic and atraumatic.   Eyes:      Conjunctiva/sclera: Conjunctivae normal.   Cardiovascular:      Rate and Rhythm: Normal rate and regular rhythm.      Heart " sounds: No murmur heard.  Pulmonary:      Effort: Pulmonary effort is normal. No respiratory distress.      Breath sounds: Normal breath sounds.   Abdominal:      Palpations: Abdomen is soft.      Tenderness: There is no abdominal tenderness.   Musculoskeletal:         General: No swelling.      Cervical back: Neck supple.   Skin:     General: Skin is warm and dry.      Capillary Refill: Capillary refill takes less than 2 seconds.   Neurological:      Mental Status: He is alert.   Psychiatric:         Mood and Affect: Mood normal.          Nereida Pratt MD

## 2024-03-07 NOTE — ASSESSMENT & PLAN NOTE
Lab Results   Component Value Date    EGFR 24 (L) 01/27/2024    EGFR 29 (L) 11/06/2023    EGFR 29 11/06/2023    CREATININE 2.48 (H) 01/27/2024    CREATININE 2.07 (H) 11/06/2023    CREATININE 2.07 11/06/2023     Hgb stable at 11.1 on most recent lab work   Cr above baseline (~1.9 - 2.2) - continues to follow with nephrology  Will recheck CMP given recent rise

## 2024-03-07 NOTE — PATIENT INSTRUCTIONS
Medicare Preventive Visit Patient Instructions  Thank you for completing your Welcome to Medicare Visit or Medicare Annual Wellness Visit today. Your next wellness visit will be due in one year (3/8/2025).  The screening/preventive services that you may require over the next 5-10 years are detailed below. Some tests may not apply to you based off risk factors and/or age. Screening tests ordered at today's visit but not completed yet may show as past due. Also, please note that scanned in results may not display below.  Preventive Screenings:  Service Recommendations Previous Testing/Comments   Colorectal Cancer Screening  Colonoscopy    Fecal Occult Blood Test (FOBT)/Fecal Immunochemical Test (FIT)  Fecal DNA/Cologuard Test  Flexible Sigmoidoscopy Age: 45-75 years old   Colonoscopy: every 10 years (May be performed more frequently if at higher risk)  OR  FOBT/FIT: every 1 year  OR  Cologuard: every 3 years  OR  Sigmoidoscopy: every 5 years  Screening may be recommended earlier than age 45 if at higher risk for colorectal cancer. Also, an individualized decision between you and your healthcare provider will decide whether screening between the ages of 76-85 would be appropriate. Colonoscopy: Not on file  FOBT/FIT: Not on file  Cologuard: Not on file  Sigmoidoscopy: Not on file    Screening Not Indicated     Prostate Cancer Screening Individualized decision between patient and health care provider in men between ages of 55-69   Medicare will cover every 12 months beginning on the day after your 50th birthday PSA: No results in last 5 years     Screening Not Indicated     Hepatitis C Screening Once for adults born between 1945 and 1965  More frequently in patients at high risk for Hepatitis C Hep C Antibody: Not on file        Diabetes Screening 1-2 times per year if you're at risk for diabetes or have pre-diabetes Fasting glucose: 122 mg/dL (9/25/2023)  A1C: 7.1 % (1/27/2024)  Screening Not Indicated  History Diabetes    Cholesterol Screening Once every 5 years if you don't have a lipid disorder. May order more often based on risk factors. Lipid panel: 01/27/2024  Screening Not Indicated  History Lipid Disorder      Other Preventive Screenings Covered by Medicare:  Abdominal Aortic Aneurysm (AAA) Screening: covered once if your at risk. You're considered to be at risk if you have a family history of AAA or a male between the age of 65-75 who smoking at least 100 cigarettes in your lifetime.  Lung Cancer Screening: covers low dose CT scan once per year if you meet all of the following conditions: (1) Age 55-77; (2) No signs or symptoms of lung cancer; (3) Current smoker or have quit smoking within the last 15 years; (4) You have a tobacco smoking history of at least 20 pack years (packs per day x number of years you smoked); (5) You get a written order from a healthcare provider.  Glaucoma Screening: covered annually if you're considered high risk: (1) You have diabetes OR (2) Family history of glaucoma OR (3)  aged 50 and older OR (4)  American aged 65 and older  Osteoporosis Screening: covered every 2 years if you meet one of the following conditions: (1) Have a vertebral abnormality; (2) On glucocorticoid therapy for more than 3 months; (3) Have primary hyperparathyroidism; (4) On osteoporosis medications and need to assess response to drug therapy.  HIV Screening: covered annually if you're between the age of 15-65. Also covered annually if you are younger than 15 and older than 65 with risk factors for HIV infection. For pregnant patients, it is covered up to 3 times per pregnancy.    Immunizations:  Immunization Recommendations   Influenza Vaccine Annual influenza vaccination during flu season is recommended for all persons aged >= 6 months who do not have contraindications   Pneumococcal Vaccine   * Pneumococcal conjugate vaccine = PCV13 (Prevnar 13), PCV15 (Vaxneuvance), PCV20 (Prevnar 20)  *  Pneumococcal polysaccharide vaccine = PPSV23 (Pneumovax) Adults 19-63 yo with certain risk factors or if 65+ yo  If never received any pneumonia vaccine: recommend Prevnar 20 (PCV20)  Give PCV20 if previously received 1 dose of PCV13 or PPSV23   Hepatitis B Vaccine 3 dose series if at intermediate or high risk (ex: diabetes, end stage renal disease, liver disease)   Respiratory syncytial virus (RSV) Vaccine - COVERED BY MEDICARE PART D  * RSVPreF3 (Arexvy) CDC recommends that adults 60 years of age and older may receive a single dose of RSV vaccine using shared clinical decision-making (SCDM)   Tetanus (Td) Vaccine - COST NOT COVERED BY MEDICARE PART B Following completion of primary series, a booster dose should be given every 10 years to maintain immunity against tetanus. Td may also be given as tetanus wound prophylaxis.   Tdap Vaccine - COST NOT COVERED BY MEDICARE PART B Recommended at least once for all adults. For pregnant patients, recommended with each pregnancy.   Shingles Vaccine (Shingrix) - COST NOT COVERED BY MEDICARE PART B  2 shot series recommended in those 19 years and older who have or will have weakened immune systems or those 50 years and older     Health Maintenance Due:  There are no preventive care reminders to display for this patient.  Immunizations Due:      Topic Date Due   • COVID-19 Vaccine (5 - 2023-24 season) 09/01/2023     Advance Directives   What are advance directives?  Advance directives are legal documents that state your wishes and plans for medical care. These plans are made ahead of time in case you lose your ability to make decisions for yourself. Advance directives can apply to any medical decision, such as the treatments you want, and if you want to donate organs.   What are the types of advance directives?  There are many types of advance directives, and each state has rules about how to use them. You may choose a combination of any of the following:  Living will:  This  is a written record of the treatment you want. You can also choose which treatments you do not want, which to limit, and which to stop at a certain time. This includes surgery, medicine, IV fluid, and tube feedings.   Durable power of  for healthcare (DPAHC):  This is a written record that states who you want to make healthcare choices for you when you are unable to make them for yourself. This person, called a proxy, is usually a family member or a friend. You may choose more than 1 proxy.  Do not resuscitate (DNR) order:  A DNR order is used in case your heart stops beating or you stop breathing. It is a request not to have certain forms of treatment, such as CPR. A DNR order may be included in other types of advance directives.  Medical directive:  This covers the care that you want if you are in a coma, near death, or unable to make decisions for yourself. You can list the treatments you want for each condition. Treatment may include pain medicine, surgery, blood transfusions, dialysis, IV or tube feedings, and a ventilator (breathing machine).  Values history:  This document has questions about your views, beliefs, and how you feel and think about life. This information can help others choose the care that you would choose.  Why are advance directives important?  An advance directive helps you control your care. Although spoken wishes may be used, it is better to have your wishes written down. Spoken wishes can be misunderstood, or not followed. Treatments may be given even if you do not want them. An advance directive may make it easier for your family to make difficult choices about your care.       © Copyright [employer] 2018 Information is for End User's use only and may not be sold, redistributed or otherwise used for commercial purposes. All illustrations and images included in CareNotes® are the copyrighted property of [employer] or [employer]

## 2024-04-15 ENCOUNTER — TELEPHONE (OUTPATIENT)
Dept: NEPHROLOGY | Facility: CLINIC | Age: 89
End: 2024-04-15

## 2024-04-15 ENCOUNTER — TELEPHONE (OUTPATIENT)
Age: 89
End: 2024-04-15

## 2024-05-06 ENCOUNTER — OFFICE VISIT (OUTPATIENT)
Dept: NEPHROLOGY | Facility: CLINIC | Age: 89
End: 2024-05-06
Payer: MEDICARE

## 2024-05-06 VITALS
SYSTOLIC BLOOD PRESSURE: 110 MMHG | WEIGHT: 165 LBS | DIASTOLIC BLOOD PRESSURE: 56 MMHG | BODY MASS INDEX: 23.1 KG/M2 | HEIGHT: 71 IN

## 2024-05-06 DIAGNOSIS — D63.1 ANEMIA IN STAGE 4 CHRONIC KIDNEY DISEASE  (HCC): ICD-10-CM

## 2024-05-06 DIAGNOSIS — E11.22 CKD STAGE 4 DUE TO TYPE 2 DIABETES MELLITUS (HCC): Primary | ICD-10-CM

## 2024-05-06 DIAGNOSIS — R80.1 PERSISTENT PROTEINURIA: ICD-10-CM

## 2024-05-06 DIAGNOSIS — I12.9 HYPERTENSIVE CHRONIC KIDNEY DISEASE WITH STAGE 1 THROUGH STAGE 4 CHRONIC KIDNEY DISEASE, OR UNSPECIFIED CHRONIC KIDNEY DISEASE: ICD-10-CM

## 2024-05-06 DIAGNOSIS — N18.4 ANEMIA IN STAGE 4 CHRONIC KIDNEY DISEASE  (HCC): ICD-10-CM

## 2024-05-06 DIAGNOSIS — N18.4 CKD STAGE 4 DUE TO TYPE 2 DIABETES MELLITUS (HCC): Primary | ICD-10-CM

## 2024-05-06 DIAGNOSIS — N25.81 SECONDARY HYPERPARATHYROIDISM OF RENAL ORIGIN (HCC): ICD-10-CM

## 2024-05-06 PROCEDURE — 99214 OFFICE O/P EST MOD 30 MIN: CPT | Performed by: PHYSICIAN ASSISTANT

## 2024-05-06 NOTE — PATIENT INSTRUCTIONS
Stop amlodipine   Continue to check blood pressure at home   Call in 1 week with home BP readings   Follow up in 6 months with Dr Esposito

## 2024-05-06 NOTE — PROGRESS NOTES
OFFICE FOLLOW UP - Nephrology   Leroy Paredes 89 y.o. male MRN: [id number]       ASSESSMENT/PLAN:  CKD stage 4  Baseline creatinine 1.9-2.2  Recent creatinine stable at 2.33  Etiology: Hypertensive nephrosclerosis, arteriolar nephrosclerosis, possible diabetic kidney disease and age-related nephron loss  Proteinuria  UPC ratio 0.47 g  Continue lisinopril 10 mg daily  Hypertension  Blood pressure low for age  Will d/c amlodipine and follow home blood pressure readings  Mild anemia  Recent hemoglobin stable 11.3  Mineral bone disease of CKD/secondary hyperparathyroidism  .8  Continue vitamin D at 1000 units daily  DM II   Last A1c 7.1    Plan:   Stop amlodipine and follow-up home blood pressure log in 1 week  Follow up in 6 months with Dr Esposito with repeat labs prior     PATIENT INSTRUCTIONS:  Patient Instructions   Stop amlodipine   Continue to check blood pressure at home   Call in 1 week with home BP readings   Follow up in 6 months with Dr Esposito     HPI: Leroy Paredes is a 89 y.o. male who is here for CKD follow-up.    Feeling very well recently.  No chest pain, shortness of breath, nausea, vomiting or diarrhea.  He continues to get around well without a walker.  Checks blood pressure and blood sugar at home and thinks they are well-controlled.    ROS:   A complete 10 point review of systems was done. Pertinent positives and negatives as noted in the HPI, otherwise the review of systems is negative.    Allergies: Patient has no known allergies.    Medications:   Current Outpatient Medications:     acetaminophen (TYLENOL) 325 mg tablet, Take 2 tablets (650 mg total) by mouth every 6 (six) hours as needed for mild pain, Disp: , Rfl: 0    atorvastatin (LIPITOR) 40 mg tablet, TAKE 1 TABLET EVERY DAY WITH DINNER, Disp: 90 tablet, Rfl: 10    Blood Glucose Monitoring Suppl (PRECISION XTRA MONITOR) MARY, by Does not apply route daily, Disp: 1 each, Rfl: 0    Cholecalciferol 25 MCG (1000 UT) capsule, Take 1  capsule by mouth daily, Disp: , Rfl:     cyanocobalamin (VITAMIN B-12) 500 mcg tablet, Take 1 tablet by mouth 2 (two) times a week, Disp: , Rfl:     Eliquis 2.5 MG, TAKE 1 TABLET TWICE DAILY, Disp: 180 tablet, Rfl: 3    glimepiride (AMARYL) 2 mg tablet, TAKE 1 TABLET BY MOUTH DAILY WITH BREAKFAST, Disp: 90 tablet, Rfl: 3    lisinopril (ZESTRIL) 10 mg tablet, TAKE 1 TABLET TWICE DAILY, Disp: 180 tablet, Rfl: 3    magnesium oxide (MAG-OX) 400 mg, Take 400 mg by mouth daily, Disp: , Rfl:     PRECISION XTRA TEST STRIPS test strip, USE TO TEST BLOOD GLUCOSE ONCE A DAY, Disp: 100 strip, Rfl: 2    terazosin (HYTRIN) 1 mg capsule, Take 1 capsule at bedtime, Disp: 90 capsule, Rfl: 3    CINNAMON PO, Take 1 capsule by mouth daily (Patient not taking: Reported on 5/6/2024), Disp: , Rfl:     clotrimazole-betamethasone (LOTRISONE) 1-0.05 % cream, Apply topically 2 (two) times a day (Patient not taking: Reported on 5/6/2024), Disp: 30 g, Rfl: 0    Omega-3 Fatty Acids (OMEGA-3 FISH OIL) 300 MG CAPS, Take 1 capsule by mouth daily (Patient not taking: Reported on 11/13/2023), Disp: , Rfl:     Past Medical History:   Diagnosis Date    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)     Last Assessed:  5/19/17    Chronic kidney disease     Diabetes mellitus (HCC)     Hyperlipidemia     Hypertension     Osteoarthritis     Palpitations     TIA (transient ischemic attack)      Past Surgical History:   Procedure Laterality Date    APPENDECTOMY      CARDIAC ELECTROPHYSIOLOGY PROCEDURE N/A 1/17/2024    Procedure: Cardiac loop recorder explant;  Surgeon: Drew Olmos MD;  Location: [facility];  Service: Cardiology    COLONOSCOPY  2012    HEMORROIDECTOMY      TOOTH EXTRACTION  02/02/2022     Family History   Problem Relation Age of Onset    Diabetes Mother     Other Mother         Stroke syndrome    Diabetes Father     Emphysema Father       reports that he has quit smoking. He has never used smokeless tobacco. He reports current alcohol  "use. He reports that he does not use drugs.      Physical Exam:   Vitals:    05/06/24 1423 05/06/24 1451   BP: 112/58 110/56   BP Location: Left arm    Patient Position: Sitting    Cuff Size: Adult    Weight: 74.8 kg (165 lb)    Height: 5' 11\" (1.803 m)      Body mass index is 23.01 kg/m².    General: no acute distress   Eyes: conjunctivae pink, anicteric sclerae  ENT: mucous membranes moist  Neck: supple, no JVD  Chest: clear to auscultation bilaterally with no wheezes, rale or rhochi  CVS: regular rate and rhythm   Abdomen: soft, non-tender, non-distended  Extremities: no lower extremity edema   Skin: no rash  Neuro: awake and alert       Lab Results:  Results for orders placed or performed in visit on 11/10/23   Renal function panel   Result Value Ref Range    ALBUMIN 3.7     EXTERNAL CALCIUM 9     EXTERNAL PHOSPHORUS 3.3     Glucose 91     BUN 30     CREATININE 2.07     SODIUM 138     POTASSIUM 4.4     CHLORIDE 106     CO2 24     ANION GAP 8     EXTERNAL EGFR 29    PTH, intact   Result Value Ref Range    PTH 98.7    CBC   Result Value Ref Range    Hemoglobin 11.2 (A) 12.0 - 17.0 g/dL    Hematocrit 33.3 (A) 36.5 - 49.3 %    WBC 5.30 Thousand/uL    Platelets 174 149 - 390 Thousands/uL    MCV (CD4/8) 94     MCH (CD4/8) 31.6     MCHC (CD4/8) 33.7     RDW (CD4/8) 14    Protein / creatinine ratio, urine   Result Value Ref Range    PROTEIN UA 43.8     EXT Creatinine Urine 84.6     EXTERNAL Ur.Prot/Creat Ratio 0.52        Results from last 7 days   Lab Units 05/03/24  1241   SODIUM mmol/L 137   POTASSIUM mmol/L 5.0   CHLORIDE mmol/L 107   CO2 mmol/L 21   BUN mg/dL 37*   CREATININE mg/dL 2.33*   CALCIUM mg/dL 8.8   PHOSPHORUS mg/dL 3.1         Portions of the record may have been created with voice recognition software. Occasional wrong word or \"sound a like\" substitutions may have occurred due to the inherent limitations of voice recognition software. Read the chart carefully and recognize, using context, where " substitutions have occurred.If you have any questions, please contact the dictating provider.

## 2024-05-08 DIAGNOSIS — N40.0 BENIGN PROSTATIC HYPERPLASIA, UNSPECIFIED WHETHER LOWER URINARY TRACT SYMPTOMS PRESENT: ICD-10-CM

## 2024-05-08 RX ORDER — TERAZOSIN 1 MG/1
CAPSULE ORAL
Qty: 90 CAPSULE | Refills: 1 | Status: SHIPPED | OUTPATIENT
Start: 2024-05-08

## 2024-05-16 ENCOUNTER — ESTABLISHED COMPREHENSIVE EXAM (OUTPATIENT)
Dept: URBAN - METROPOLITAN AREA CLINIC 6 | Facility: CLINIC | Age: 89
End: 2024-05-16

## 2024-05-16 DIAGNOSIS — Z96.1: ICD-10-CM

## 2024-05-16 DIAGNOSIS — H40.023: ICD-10-CM

## 2024-05-16 DIAGNOSIS — E11.9: ICD-10-CM

## 2024-05-16 DIAGNOSIS — H35.3132: ICD-10-CM

## 2024-05-16 PROCEDURE — 92133 CPTRZD OPH DX IMG PST SGM ON: CPT

## 2024-05-16 PROCEDURE — 92020 GONIOSCOPY: CPT

## 2024-05-16 PROCEDURE — 92202 OPSCPY EXTND ON/MAC DRAW: CPT

## 2024-05-16 PROCEDURE — 92014 COMPRE OPH EXAM EST PT 1/>: CPT

## 2024-05-16 ASSESSMENT — VISUAL ACUITY
OD_SC: 20/40
OS_SC: 20/100+1

## 2024-05-16 ASSESSMENT — TONOMETRY
OS_IOP_MMHG: 15
OS_IOP_MMHG: 18
OD_IOP_MMHG: 14
OD_IOP_MMHG: 15

## 2024-06-18 ENCOUNTER — DIAGNOSTICS ONLY (OUTPATIENT)
Dept: URBAN - METROPOLITAN AREA CLINIC 6 | Facility: CLINIC | Age: 89
End: 2024-06-18

## 2024-06-18 DIAGNOSIS — Z96.1: ICD-10-CM

## 2024-06-18 PROCEDURE — 92083 EXTENDED VISUAL FIELD XM: CPT

## 2024-07-12 ENCOUNTER — NEW PATIENT COMPREHENSIVE (OUTPATIENT)
Dept: URBAN - METROPOLITAN AREA CLINIC 6 | Facility: CLINIC | Age: 89
End: 2024-07-12

## 2024-07-12 ENCOUNTER — APPOINTMENT (OUTPATIENT)
Dept: LAB | Facility: CLINIC | Age: 89
End: 2024-07-12
Payer: MEDICARE

## 2024-07-12 DIAGNOSIS — Z00.00 HEALTHCARE MAINTENANCE: ICD-10-CM

## 2024-07-12 DIAGNOSIS — N18.4 ANEMIA IN STAGE 4 CHRONIC KIDNEY DISEASE  (HCC): ICD-10-CM

## 2024-07-12 DIAGNOSIS — E11.22 CKD STAGE 4 DUE TO TYPE 2 DIABETES MELLITUS (HCC): ICD-10-CM

## 2024-07-12 DIAGNOSIS — D63.1 ANEMIA IN STAGE 4 CHRONIC KIDNEY DISEASE  (HCC): ICD-10-CM

## 2024-07-12 DIAGNOSIS — H35.3132: ICD-10-CM

## 2024-07-12 DIAGNOSIS — E11.9: ICD-10-CM

## 2024-07-12 DIAGNOSIS — E08.21 DIABETES MELLITUS DUE TO UNDERLYING CONDITION, CONTROLLED, WITH DIABETIC NEPHROPATHY, WITH LONG-TERM CURRENT USE OF INSULIN (HCC): ICD-10-CM

## 2024-07-12 DIAGNOSIS — N18.4 CKD STAGE 4 DUE TO TYPE 2 DIABETES MELLITUS (HCC): ICD-10-CM

## 2024-07-12 DIAGNOSIS — Z79.4 DIABETES MELLITUS DUE TO UNDERLYING CONDITION, CONTROLLED, WITH DIABETIC NEPHROPATHY, WITH LONG-TERM CURRENT USE OF INSULIN (HCC): ICD-10-CM

## 2024-07-12 DIAGNOSIS — I10 ESSENTIAL HYPERTENSION: ICD-10-CM

## 2024-07-12 LAB
ALBUMIN SERPL BCG-MCNC: 3.8 G/DL (ref 3.5–5)
ALP SERPL-CCNC: 70 U/L (ref 34–104)
ALT SERPL W P-5'-P-CCNC: 11 U/L (ref 7–52)
ANION GAP SERPL CALCULATED.3IONS-SCNC: 12 MMOL/L (ref 4–13)
AST SERPL W P-5'-P-CCNC: 15 U/L (ref 13–39)
BACTERIA UR QL AUTO: ABNORMAL /HPF
BASOPHILS # BLD AUTO: 0.05 THOUSANDS/ÂΜL (ref 0–0.1)
BASOPHILS NFR BLD AUTO: 1 % (ref 0–1)
BILIRUB SERPL-MCNC: 1.08 MG/DL (ref 0.2–1)
BILIRUB UR QL STRIP: NEGATIVE
BUDDING YEAST: PRESENT
BUN SERPL-MCNC: 42 MG/DL (ref 5–25)
CALCIUM SERPL-MCNC: 9.2 MG/DL (ref 8.4–10.2)
CHLORIDE SERPL-SCNC: 108 MMOL/L (ref 96–108)
CHOLEST SERPL-MCNC: 109 MG/DL
CLARITY UR: CLEAR
CO2 SERPL-SCNC: 18 MMOL/L (ref 21–32)
COLOR UR: YELLOW
CREAT SERPL-MCNC: 2.64 MG/DL (ref 0.6–1.3)
CREAT UR-MCNC: 134.9 MG/DL
EOSINOPHIL # BLD AUTO: 0.5 THOUSAND/ÂΜL (ref 0–0.61)
EOSINOPHIL NFR BLD AUTO: 8 % (ref 0–6)
ERYTHROCYTE [DISTWIDTH] IN BLOOD BY AUTOMATED COUNT: 13.6 % (ref 11.6–15.1)
EST. AVERAGE GLUCOSE BLD GHB EST-MCNC: 148 MG/DL
GFR SERPL CREATININE-BSD FRML MDRD: 19 ML/MIN/1.73SQ M
GLUCOSE P FAST SERPL-MCNC: 104 MG/DL (ref 65–99)
GLUCOSE UR STRIP-MCNC: NEGATIVE MG/DL
HBA1C MFR BLD: 6.8 %
HCT VFR BLD AUTO: 37.7 % (ref 36.5–49.3)
HDLC SERPL-MCNC: 32 MG/DL
HGB BLD-MCNC: 12.3 G/DL (ref 12–17)
HGB UR QL STRIP.AUTO: ABNORMAL
HYALINE CASTS #/AREA URNS LPF: ABNORMAL /LPF
IMM GRANULOCYTES # BLD AUTO: 0.02 THOUSAND/UL (ref 0–0.2)
IMM GRANULOCYTES NFR BLD AUTO: 0 % (ref 0–2)
KETONES UR STRIP-MCNC: NEGATIVE MG/DL
LDLC SERPL CALC-MCNC: 60 MG/DL (ref 0–100)
LEUKOCYTE ESTERASE UR QL STRIP: ABNORMAL
LYMPHOCYTES # BLD AUTO: 1.74 THOUSANDS/ÂΜL (ref 0.6–4.47)
LYMPHOCYTES NFR BLD AUTO: 27 % (ref 14–44)
MCH RBC QN AUTO: 30.8 PG (ref 26.8–34.3)
MCHC RBC AUTO-ENTMCNC: 32.6 G/DL (ref 31.4–37.4)
MCV RBC AUTO: 95 FL (ref 82–98)
MONOCYTES # BLD AUTO: 0.61 THOUSAND/ÂΜL (ref 0.17–1.22)
MONOCYTES NFR BLD AUTO: 9 % (ref 4–12)
NEUTROPHILS # BLD AUTO: 3.55 THOUSANDS/ÂΜL (ref 1.85–7.62)
NEUTS SEG NFR BLD AUTO: 55 % (ref 43–75)
NITRITE UR QL STRIP: POSITIVE
NON-SQ EPI CELLS URNS QL MICRO: ABNORMAL /HPF
NONHDLC SERPL-MCNC: 77 MG/DL
NRBC BLD AUTO-RTO: 0 /100 WBCS
PH UR STRIP.AUTO: 5.5 [PH]
PLATELET # BLD AUTO: 156 THOUSANDS/UL (ref 149–390)
PMV BLD AUTO: 10.1 FL (ref 8.9–12.7)
POTASSIUM SERPL-SCNC: 4.4 MMOL/L (ref 3.5–5.3)
PROT SERPL-MCNC: 6.8 G/DL (ref 6.4–8.4)
PROT UR STRIP-MCNC: ABNORMAL MG/DL
PROT UR-MCNC: 57 MG/DL
PROT/CREAT UR: 0.42 MG/G{CREAT} (ref 0–0.1)
RBC # BLD AUTO: 3.99 MILLION/UL (ref 3.88–5.62)
RBC #/AREA URNS AUTO: ABNORMAL /HPF
SODIUM SERPL-SCNC: 138 MMOL/L (ref 135–147)
SP GR UR STRIP.AUTO: 1.02 (ref 1–1.03)
TRIGL SERPL-MCNC: 85 MG/DL
UROBILINOGEN UR STRIP-ACNC: <2 MG/DL
WBC # BLD AUTO: 6.47 THOUSAND/UL (ref 4.31–10.16)
WBC #/AREA URNS AUTO: ABNORMAL /HPF

## 2024-07-12 PROCEDURE — 92202 OPSCPY EXTND ON/MAC DRAW: CPT

## 2024-07-12 PROCEDURE — 92134 CPTRZ OPH DX IMG PST SGM RTA: CPT

## 2024-07-12 PROCEDURE — 80061 LIPID PANEL: CPT

## 2024-07-12 PROCEDURE — 92004 COMPRE OPH EXAM NEW PT 1/>: CPT

## 2024-07-12 PROCEDURE — 36415 COLL VENOUS BLD VENIPUNCTURE: CPT

## 2024-07-12 PROCEDURE — 80053 COMPREHEN METABOLIC PANEL: CPT

## 2024-07-12 PROCEDURE — 83036 HEMOGLOBIN GLYCOSYLATED A1C: CPT

## 2024-07-12 PROCEDURE — 85025 COMPLETE CBC W/AUTO DIFF WBC: CPT

## 2024-07-12 PROCEDURE — 81001 URINALYSIS AUTO W/SCOPE: CPT

## 2024-07-12 PROCEDURE — 84156 ASSAY OF PROTEIN URINE: CPT

## 2024-07-12 PROCEDURE — 82570 ASSAY OF URINE CREATININE: CPT

## 2024-07-12 ASSESSMENT — TONOMETRY
OS_IOP_MMHG: 18
OD_IOP_MMHG: 18

## 2024-07-12 ASSESSMENT — VISUAL ACUITY
OS_SC: 20/70
OD_SC: 20/30

## 2024-07-22 ENCOUNTER — TELEPHONE (OUTPATIENT)
Age: 89
End: 2024-07-22

## 2024-07-22 ENCOUNTER — RA CDI HCC (OUTPATIENT)
Dept: OTHER | Facility: HOSPITAL | Age: 89
End: 2024-07-22

## 2024-07-22 NOTE — TELEPHONE ENCOUNTER
Patient had called in confirming his appointment for Monday July 29th.     Patient was wondering if blood work needed to be completed for this appointment.     If orders are entered in, please advise back to patient, in order for patient to have them completed before the 29th.

## 2024-07-22 NOTE — PROGRESS NOTES
HCC coding opportunities          Chart Reviewed number of suggestions sent to Provider: 1   E11.59    Patients Insurance     Medicare Insurance: Medicare

## 2024-07-29 ENCOUNTER — OFFICE VISIT (OUTPATIENT)
Dept: INTERNAL MEDICINE CLINIC | Facility: CLINIC | Age: 89
End: 2024-07-29
Payer: MEDICARE

## 2024-07-29 VITALS
OXYGEN SATURATION: 99 % | DIASTOLIC BLOOD PRESSURE: 70 MMHG | HEIGHT: 71 IN | TEMPERATURE: 97.6 F | HEART RATE: 80 BPM | BODY MASS INDEX: 20.5 KG/M2 | WEIGHT: 146.4 LBS | SYSTOLIC BLOOD PRESSURE: 128 MMHG

## 2024-07-29 DIAGNOSIS — N18.4 CKD STAGE 4 DUE TO TYPE 2 DIABETES MELLITUS (HCC): ICD-10-CM

## 2024-07-29 DIAGNOSIS — E78.2 MIXED HYPERLIPIDEMIA: ICD-10-CM

## 2024-07-29 DIAGNOSIS — Z91.89 DRIVING SAFETY ISSUE: Primary | ICD-10-CM

## 2024-07-29 DIAGNOSIS — E11.22 CKD STAGE 4 DUE TO TYPE 2 DIABETES MELLITUS (HCC): ICD-10-CM

## 2024-07-29 DIAGNOSIS — I10 ESSENTIAL HYPERTENSION: ICD-10-CM

## 2024-07-29 DIAGNOSIS — I12.9 BENIGN HYPERTENSION WITH CKD (CHRONIC KIDNEY DISEASE) STAGE IV (HCC): ICD-10-CM

## 2024-07-29 DIAGNOSIS — N18.4 BENIGN HYPERTENSION WITH CKD (CHRONIC KIDNEY DISEASE) STAGE IV (HCC): ICD-10-CM

## 2024-07-29 DIAGNOSIS — I48.91 ATRIAL FIBRILLATION, UNSPECIFIED TYPE (HCC): ICD-10-CM

## 2024-07-29 PROCEDURE — G2211 COMPLEX E/M VISIT ADD ON: HCPCS | Performed by: INTERNAL MEDICINE

## 2024-07-29 PROCEDURE — 99214 OFFICE O/P EST MOD 30 MIN: CPT | Performed by: INTERNAL MEDICINE

## 2024-07-29 NOTE — ASSESSMENT & PLAN NOTE
Patient remains on medication for his elevated cholesterol bleeding and coronary artery disease.  He remains on atorvastatin 40 mg daily.  States that he does not watch his diet as closely as far as intake of fats and cholesterol.

## 2024-07-29 NOTE — ASSESSMENT & PLAN NOTE
As noted patient's GFR is going up his hemoglobin A1c that was performed earlier in the month is 6.8 which is acceptable    Patient is losing some weight and is lost approximately 19 pounds over the past few months    Definitely agrees that his appetite is decreased and is not wishing to have any further workup and evaluation at this point in time      Lab Results   Component Value Date    HGBA1C 6.8 (H) 07/12/2024

## 2024-07-29 NOTE — ASSESSMENT & PLAN NOTE
Has a longstanding history of hypertension.  Blood pressure is controlled.  Again renal function continues to show decline.  Has been eating less and losing weight.  Acute workup and evaluation is suggested with the patient being seen by his nephrologist hopefully in the near future.

## 2024-07-29 NOTE — ASSESSMENT & PLAN NOTE
Remains on oral anticoagulants, Eliquis.  Denies any abnormal bleeding or bruising no black stools or blood in stools.  No hematemesis or hemoptysis.  Continue to monitor CBC

## 2024-07-29 NOTE — PROGRESS NOTES
Ambulatory Visit  Name: Leroy Paredes      : 1930      MRN: [id number]  Encounter Provider: Pepito Foster DO  Encounter Date: 2024   Encounter department: [facility]    Assessment & Plan   1. 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 (HCC)  Assessment & Plan:  Lab Results   Component Value Date    EGFR 19 2024    EGFR 25 (L) 2024    EGFR 24 (L) 2024    CREATININE 2.64 (H) 2024    CREATININE 2.33 (H) 2024    CREATININE 2.48 (H) 2024   Patient has had a significant drop in his kidney function with GFR going down to 19 and creatinine going up to 2.64.  We did have a long discussion with the patient and his family regarding his wishes and he refuses definitely does not want any dialysis.  With a decline in his kidney function I feel it is appropriate that he is have further evaluation and or treatment if indicated by his nephrologist we placed a consult for him to be seen sooner by his nephrologist.  Again I discussed this with the patient and his family  Orders:  -     Ambulatory Referral to Nephrology; Future  -     CBC and differential; Future; Expected date: 10/29/2024  2. Essential hypertension  Assessment & Plan:  Has a longstanding history of hypertension.  Blood pressure is controlled.  Again renal function continues to show decline.  Has been eating less and losing weight.  Acute workup and evaluation is suggested with the patient being seen by his nephrologist hopefully in the near future.  Orders:  -     CBC and differential; Future; Expected date: 10/29/2024  3. CKD stage 4 due to type 2 diabetes mellitus (HCC)  Assessment & Plan:  As noted patient's GFR is going up his hemoglobin A1c that was performed earlier in the month is 6.8 which is acceptable    Patient is losing some weight and is lost approximately 19 pounds over the past few months    Definitely agrees that his appetite is decreased and is not  wishing to have any further workup and evaluation at this point in time      Lab Results   Component Value Date    HGBA1C 6.8 (H) 07/12/2024     4. Mixed hyperlipidemia  Assessment & Plan:  Patient remains on medication for his elevated cholesterol bleeding and coronary artery disease.  He remains on atorvastatin 40 mg daily.  States that he does not watch his diet as closely as far as intake of fats and cholesterol.  5. Atrial fibrillation, unspecified type (HCC)  Assessment & Plan:  Remains on oral anticoagulants, Eliquis.  Denies any abnormal bleeding or bruising no black stools or blood in stools.  No hematemesis or hemoptysis.  Continue to monitor CBC  Orders:  -     CBC and differential; Future; Expected date: 10/29/2024         History of Present Illness     Patient is a 89-year-old male with a history of multiple medical problems outlined previously who is here today for follow-up visit.  He is accompanied by family member.  His family is concerned about his medical condition.  He continues to show some decline in his renal function.  They are worried about his ability to drive which is understandable.      Review of Systems   Constitutional:  Positive for appetite change and unexpected weight change. Negative for activity change, chills, diaphoresis, fatigue and fever.        Some decrease in appetite.  Some significant weight loss   HENT: Negative.     Eyes: Negative.    Respiratory: Negative.     Cardiovascular: Negative.    Gastrointestinal: Negative.    Endocrine: Negative.    Genitourinary: Negative.    Musculoskeletal: Negative.    Skin: Negative.    Allergic/Immunologic: Negative.    Neurological: Negative.    Hematological: Negative.    Psychiatric/Behavioral: Negative.       Past Medical History:   Diagnosis Date    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)     Last Assessed:  5/19/17    Chronic kidney disease     Diabetes mellitus (HCC)     Hyperlipidemia     Hypertension     Osteoarthritis      Palpitations     TIA (transient ischemic attack)      Past Surgical History:   Procedure Laterality Date    APPENDECTOMY      CARDIAC ELECTROPHYSIOLOGY PROCEDURE N/A 1/17/2024    Procedure: Cardiac loop recorder explant;  Surgeon: Drew Olmos MD;  Location: [facility];  Service: Cardiology    COLONOSCOPY  2012    HEMORROIDECTOMY      TOOTH EXTRACTION  02/02/2022     Family History   Problem Relation Age of Onset    Diabetes Mother     Other Mother         Stroke syndrome    Diabetes Father     Emphysema Father      Social History     Tobacco Use    Smoking status: Former    Smokeless tobacco: Never   Vaping Use    Vaping status: Never Used   Substance and Sexual Activity    Alcohol use: Yes     Comment: Social drinker    Drug use: No    Sexual activity: Not Currently     Current Outpatient Medications on File Prior to Visit   Medication Sig    acetaminophen (TYLENOL) 325 mg tablet Take 2 tablets (650 mg total) by mouth every 6 (six) hours as needed for mild pain    atorvastatin (LIPITOR) 40 mg tablet TAKE 1 TABLET EVERY DAY WITH DINNER    Blood Glucose Monitoring Suppl (PRECISION XTRA MONITOR) MARY by Does not apply route daily    Cholecalciferol 25 MCG (1000 UT) capsule Take 1 capsule by mouth daily    cyanocobalamin (VITAMIN B-12) 500 mcg tablet Take 1 tablet by mouth 2 (two) times a week    Eliquis 2.5 MG TAKE 1 TABLET TWICE DAILY    glimepiride (AMARYL) 2 mg tablet TAKE 1 TABLET BY MOUTH DAILY WITH BREAKFAST    lisinopril (ZESTRIL) 10 mg tablet TAKE 1 TABLET TWICE DAILY    magnesium oxide (MAG-OX) 400 mg Take 400 mg by mouth daily    PRECISION XTRA TEST STRIPS test strip USE TO TEST BLOOD GLUCOSE ONCE A DAY    terazosin (HYTRIN) 1 mg capsule TAKE 1 CAPSULE AT BEDTIME    CINNAMON PO Take 1 capsule by mouth daily (Patient not taking: Reported on 5/6/2024)    clotrimazole-betamethasone (LOTRISONE) 1-0.05 % cream Apply topically 2 (two) times a day (Patient not taking: Reported on 5/6/2024)    Omega-3  "Fatty Acids (OMEGA-3 FISH OIL) 300 MG CAPS Take 1 capsule by mouth daily (Patient not taking: Reported on 11/13/2023)     No Known Allergies  Immunization History   Administered Date(s) Administered    COVID-19 MODERNA VACC 0.5 ML IM 01/27/2021, 02/22/2021, 11/22/2021, 07/23/2022    INFLUENZA 11/01/2002, 01/05/2003, 11/24/2003, 10/06/2004, 11/07/2005, 10/21/2006, 09/28/2007, 10/10/2008, 10/20/2008, 11/04/2009, 10/05/2010, 10/03/2011, 11/04/2012    Influenza Split High Dose Preservative Free IM 09/27/2013, 09/25/2014, 09/29/2015, 09/23/2016, 09/19/2017    Influenza, high dose seasonal 0.7 mL 09/25/2018, 10/15/2020, 09/20/2021, 09/26/2022, 10/02/2023    Pneumococcal 01/01/1999    Pneumococcal Conjugate 13-Valent 04/25/2019    Pneumococcal Polysaccharide PPV23 10/01/2006    TD (adult) Preservative Free 12/11/1930     Objective     /70   Pulse 80   Temp 97.6 °F (36.4 °C)   Ht 5' 11\" (1.803 m)   Wt 66.4 kg (146 lb 6.4 oz)   SpO2 99%   BMI 20.42 kg/m²     Physical Exam  Vitals and nursing note reviewed.   Constitutional:       General: He is not in acute distress.     Appearance: Normal appearance. He is normal weight. He is not ill-appearing, toxic-appearing or diaphoretic.      Comments: Pleasant articulate 89-year-old male who is awake alert in no acute distress and oriented x 3, slightly unsteady gait   HENT:      Head: Normocephalic and atraumatic.      Right Ear: Tympanic membrane, ear canal and external ear normal. There is no impacted cerumen.      Left Ear: Tympanic membrane, ear canal and external ear normal. There is no impacted cerumen.      Nose: Nose normal. No congestion or rhinorrhea.      Mouth/Throat:      Mouth: Mucous membranes are moist.      Pharynx: Oropharynx is clear. No oropharyngeal exudate or posterior oropharyngeal erythema.   Eyes:      General: No scleral icterus.        Right eye: No discharge.         Left eye: No discharge.      Extraocular Movements: Extraocular movements " intact.      Conjunctiva/sclera: Conjunctivae normal.      Pupils: Pupils are equal, round, and reactive to light.   Neck:      Vascular: No carotid bruit.   Cardiovascular:      Rate and Rhythm: Normal rate. Rhythm irregular.      Heart sounds: Normal heart sounds.      No friction rub. No gallop.   Pulmonary:      Effort: Pulmonary effort is normal. No respiratory distress.      Breath sounds: Normal breath sounds. No stridor. No wheezing, rhonchi or rales.   Chest:      Chest wall: No tenderness.   Abdominal:      General: Abdomen is flat. Bowel sounds are normal. There is no distension.      Palpations: Abdomen is soft. There is no mass.      Tenderness: There is no abdominal tenderness. There is no right CVA tenderness, left CVA tenderness, guarding or rebound.      Hernia: No hernia is present.   Musculoskeletal:         General: Deformity present. No swelling, tenderness or signs of injury.      Cervical back: Normal range of motion and neck supple. No rigidity or tenderness.      Right lower leg: No edema.      Left lower leg: No edema.      Comments: Again is noted decreased muscle tone and strength some wasting of musculature upper and lower extremities   Lymphadenopathy:      Cervical: No cervical adenopathy.   Skin:     General: Skin is warm and dry.      Coloration: Skin is not jaundiced or pale.      Findings: No bruising, erythema, lesion or rash.   Neurological:      Mental Status: He is alert and oriented to person, place, and time. Mental status is at baseline.      Cranial Nerves: No cranial nerve deficit.      Sensory: No sensory deficit.      Motor: Weakness present.      Coordination: Coordination normal.      Gait: Gait abnormal.      Deep Tendon Reflexes: Reflexes normal.      Comments: Patient does have decreased muscle tone and strength upper and lower extremities slightly off balance occasionally with gait but this can be secondary to muscle tone decrease and loss of strength.    Psychiatric:         Mood and Affect: Mood normal.         Behavior: Behavior normal.         Thought Content: Thought content normal.         Judgment: Judgment normal.

## 2024-07-29 NOTE — ASSESSMENT & PLAN NOTE
Lab Results   Component Value Date    EGFR 19 07/12/2024    EGFR 25 (L) 05/03/2024    EGFR 24 (L) 01/27/2024    CREATININE 2.64 (H) 07/12/2024    CREATININE 2.33 (H) 05/03/2024    CREATININE 2.48 (H) 01/27/2024   Patient has had a significant drop in his kidney function with GFR going down to 19 and creatinine going up to 2.64.  We did have a long discussion with the patient and his family regarding his wishes and he refuses definitely does not want any dialysis.  With a decline in his kidney function I feel it is appropriate that he is have further evaluation and or treatment if indicated by his nephrologist we placed a consult for him to be seen sooner by his nephrologist.  Again I discussed this with the patient and his family

## 2024-08-09 ENCOUNTER — ESTABLISHED COMPREHENSIVE EXAM (OUTPATIENT)
Dept: URBAN - METROPOLITAN AREA CLINIC 6 | Facility: CLINIC | Age: 89
End: 2024-08-09

## 2024-08-09 DIAGNOSIS — H35.3132: ICD-10-CM

## 2024-08-09 DIAGNOSIS — E11.9: ICD-10-CM

## 2024-08-09 PROCEDURE — 92134 CPTRZ OPH DX IMG PST SGM RTA: CPT

## 2024-08-09 PROCEDURE — 92202 OPSCPY EXTND ON/MAC DRAW: CPT

## 2024-08-09 PROCEDURE — 92014 COMPRE OPH EXAM EST PT 1/>: CPT

## 2024-08-09 ASSESSMENT — VISUAL ACUITY
OU_CC: J1+
OD_SC: 20/30-2
OS_SC: 20/100-2
OS_PH: 20/50-1

## 2024-08-09 ASSESSMENT — TONOMETRY
OS_IOP_MMHG: 22
OD_IOP_MMHG: 21

## 2024-08-24 ENCOUNTER — APPOINTMENT (EMERGENCY)
Dept: RADIOLOGY | Facility: HOSPITAL | Age: 89
DRG: 682 | End: 2024-08-24
Payer: MEDICARE

## 2024-08-24 ENCOUNTER — HOSPITAL ENCOUNTER (INPATIENT)
Facility: HOSPITAL | Age: 89
LOS: 4 days | Discharge: NURSING, REHAB OR LONG-TERM CARE | DRG: 682 | End: 2024-08-28
Attending: EMERGENCY MEDICINE | Admitting: INTERNAL MEDICINE
Payer: MEDICARE

## 2024-08-24 DIAGNOSIS — G47.00 INSOMNIA, UNSPECIFIED TYPE: ICD-10-CM

## 2024-08-24 DIAGNOSIS — F39 MOOD DISORDER (HCC): ICD-10-CM

## 2024-08-24 DIAGNOSIS — N30.00 ACUTE CYSTITIS WITHOUT HEMATURIA: ICD-10-CM
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
## 2024-08-24 DIAGNOSIS — R55 SYNCOPE: Primary | ICD-10-CM

## 2024-08-24 DIAGNOSIS — R62.7 FAILURE TO THRIVE IN ADULT: ICD-10-CM

## 2024-08-24 DIAGNOSIS — N18.9 ACUTE-ON-CHRONIC KIDNEY INJURY  (HCC): ICD-10-CM

## 2024-08-24 DIAGNOSIS — Z79.899 MEDICATION MANAGEMENT: ICD-10-CM

## 2024-08-24 DIAGNOSIS — N17.9 ACUTE-ON-CHRONIC KIDNEY INJURY  (HCC): ICD-10-CM

## 2024-08-24 DIAGNOSIS — R79.89 ELEVATED LACTIC ACID LEVEL: ICD-10-CM

## 2024-08-24 DIAGNOSIS — D64.9 ANEMIA: ICD-10-CM

## 2024-08-24 PROBLEM — E87.20 LACTIC ACIDOSIS: Status: ACTIVE | Noted: 2024-08-24

## 2024-08-24 LAB
2HR DELTA HS TROPONIN: -5 NG/L
4HR DELTA HS TROPONIN: -4 NG/L
ALBUMIN SERPL BCG-MCNC: 3.6 G/DL (ref 3.5–5)
ALP SERPL-CCNC: 74 U/L (ref 34–104)
ALT SERPL W P-5'-P-CCNC: 13 U/L (ref 7–52)
ANION GAP SERPL CALCULATED.3IONS-SCNC: 12 MMOL/L (ref 4–13)
APTT PPP: 34 SECONDS (ref 23–34)
AST SERPL W P-5'-P-CCNC: 23 U/L (ref 13–39)
BASOPHILS # BLD AUTO: 0.04 THOUSANDS/ÂΜL (ref 0–0.1)
BASOPHILS NFR BLD AUTO: 1 % (ref 0–1)
BILIRUB SERPL-MCNC: 1.05 MG/DL (ref 0.2–1)
BUN SERPL-MCNC: 45 MG/DL (ref 5–25)
CALCIUM SERPL-MCNC: 9.1 MG/DL (ref 8.4–10.2)
CARDIAC TROPONIN I PNL SERPL HS: 46 NG/L
CARDIAC TROPONIN I PNL SERPL HS: 47 NG/L
CARDIAC TROPONIN I PNL SERPL HS: 51 NG/L
CHLORIDE SERPL-SCNC: 105 MMOL/L (ref 96–108)
CO2 SERPL-SCNC: 20 MMOL/L (ref 21–32)
CREAT SERPL-MCNC: 3.19 MG/DL (ref 0.6–1.3)
EOSINOPHIL # BLD AUTO: 0.14 THOUSAND/ÂΜL (ref 0–0.61)
EOSINOPHIL NFR BLD AUTO: 3 % (ref 0–6)
ERYTHROCYTE [DISTWIDTH] IN BLOOD BY AUTOMATED COUNT: 13.8 % (ref 11.6–15.1)
GFR SERPL CREATININE-BSD FRML MDRD: 15 ML/MIN/1.73SQ M
GLUCOSE SERPL-MCNC: 152 MG/DL (ref 65–140)
GLUCOSE SERPL-MCNC: 225 MG/DL (ref 65–140)
GLUCOSE SERPL-MCNC: 230 MG/DL (ref 65–140)
HCT VFR BLD AUTO: 32.9 % (ref 36.5–49.3)
HGB BLD-MCNC: 10.5 G/DL (ref 12–17)
IMM GRANULOCYTES # BLD AUTO: 0.02 THOUSAND/UL (ref 0–0.2)
IMM GRANULOCYTES NFR BLD AUTO: 0 % (ref 0–2)
INR PPP: 1.22 (ref 0.85–1.19)
LACTATE SERPL-SCNC: 0.9 MMOL/L (ref 0.5–2)
LACTATE SERPL-SCNC: 2.3 MMOL/L (ref 0.5–2)
LYMPHOCYTES # BLD AUTO: 1.31 THOUSANDS/ÂΜL (ref 0.6–4.47)
LYMPHOCYTES NFR BLD AUTO: 26 % (ref 14–44)
MCH RBC QN AUTO: 30.4 PG (ref 26.8–34.3)
MCHC RBC AUTO-ENTMCNC: 31.9 G/DL (ref 31.4–37.4)
MCV RBC AUTO: 95 FL (ref 82–98)
MONOCYTES # BLD AUTO: 0.4 THOUSAND/ÂΜL (ref 0.17–1.22)
MONOCYTES NFR BLD AUTO: 8 % (ref 4–12)
NEUTROPHILS # BLD AUTO: 3.23 THOUSANDS/ÂΜL (ref 1.85–7.62)
NEUTS SEG NFR BLD AUTO: 62 % (ref 43–75)
NRBC BLD AUTO-RTO: 0 /100 WBCS
PLATELET # BLD AUTO: 138 THOUSANDS/UL (ref 149–390)
PMV BLD AUTO: 10.2 FL (ref 8.9–12.7)
POTASSIUM SERPL-SCNC: 4.7 MMOL/L (ref 3.5–5.3)
PROCALCITONIN SERPL-MCNC: 0.69 NG/ML
PROT SERPL-MCNC: 6.8 G/DL (ref 6.4–8.4)
PROTHROMBIN TIME: 16.1 SECONDS (ref 12.3–15)
RBC # BLD AUTO: 3.45 MILLION/UL (ref 3.88–5.62)
SODIUM SERPL-SCNC: 137 MMOL/L (ref 135–147)
WBC # BLD AUTO: 5.14 THOUSAND/UL (ref 4.31–10.16)

## 2024-08-24 PROCEDURE — 99285 EMERGENCY DEPT VISIT HI MDM: CPT | Performed by: EMERGENCY MEDICINE

## 2024-08-24 PROCEDURE — 82948 REAGENT STRIP/BLOOD GLUCOSE: CPT

## 2024-08-24 PROCEDURE — 87154 CUL TYP ID BLD PTHGN 6+ TRGT: CPT

## 2024-08-24 PROCEDURE — 84484 ASSAY OF TROPONIN QUANT: CPT

## 2024-08-24 PROCEDURE — 83605 ASSAY OF LACTIC ACID: CPT

## 2024-08-24 PROCEDURE — 80053 COMPREHEN METABOLIC PANEL: CPT

## 2024-08-24 PROCEDURE — 99223 1ST HOSP IP/OBS HIGH 75: CPT | Performed by: INTERNAL MEDICINE

## 2024-08-24 PROCEDURE — 87147 CULTURE TYPE IMMUNOLOGIC: CPT

## 2024-08-24 PROCEDURE — 96360 HYDRATION IV INFUSION INIT: CPT

## 2024-08-24 PROCEDURE — 85025 COMPLETE CBC W/AUTO DIFF WBC: CPT

## 2024-08-24 PROCEDURE — 84145 PROCALCITONIN (PCT): CPT

## 2024-08-24 PROCEDURE — 71045 X-RAY EXAM CHEST 1 VIEW: CPT

## 2024-08-24 PROCEDURE — 85730 THROMBOPLASTIN TIME PARTIAL: CPT

## 2024-08-24 PROCEDURE — 36415 COLL VENOUS BLD VENIPUNCTURE: CPT

## 2024-08-24 PROCEDURE — 85610 PROTHROMBIN TIME: CPT

## 2024-08-24 PROCEDURE — 87040 BLOOD CULTURE FOR BACTERIA: CPT

## 2024-08-24 PROCEDURE — 93005 ELECTROCARDIOGRAM TRACING: CPT

## 2024-08-24 PROCEDURE — 99285 EMERGENCY DEPT VISIT HI MDM: CPT

## 2024-08-24 PROCEDURE — 96365 THER/PROPH/DIAG IV INF INIT: CPT

## 2024-08-24 RX ORDER — ACETAMINOPHEN 325 MG/1
650 TABLET ORAL EVERY 6 HOURS PRN
Status: DISCONTINUED | OUTPATIENT
Start: 2024-08-24 | End: 2024-08-28 | Stop reason: HOSPADM

## 2024-08-24 RX ORDER — SODIUM CHLORIDE, SODIUM GLUCONATE, SODIUM ACETATE, POTASSIUM CHLORIDE, MAGNESIUM CHLORIDE, SODIUM PHOSPHATE, DIBASIC, AND POTASSIUM PHOSPHATE .53; .5; .37; .037; .03; .012; .00082 G/100ML; G/100ML; G/100ML; G/100ML; G/100ML; G/100ML; G/100ML
75 INJECTION, SOLUTION INTRAVENOUS CONTINUOUS
Status: DISCONTINUED | OUTPATIENT
Start: 2024-08-24 | End: 2024-08-26

## 2024-08-24 RX ORDER — LANOLIN ALCOHOL/MO/W.PET/CERES
400 CREAM (GRAM) TOPICAL DAILY
Status: DISCONTINUED | OUTPATIENT
Start: 2024-08-25 | End: 2024-08-28 | Stop reason: HOSPADM

## 2024-08-24 RX ORDER — ASPIRIN 81 MG/1
324 TABLET, CHEWABLE ORAL ONCE
Status: COMPLETED | OUTPATIENT
Start: 2024-08-24 | End: 2024-08-24

## 2024-08-24 RX ORDER — MAGNESIUM SULFATE HEPTAHYDRATE 40 MG/ML
2 INJECTION, SOLUTION INTRAVENOUS ONCE
Status: COMPLETED | OUTPATIENT
Start: 2024-08-24 | End: 2024-08-24

## 2024-08-24 RX ORDER — INSULIN LISPRO 100 [IU]/ML
1-5 INJECTION, SOLUTION INTRAVENOUS; SUBCUTANEOUS
Status: DISCONTINUED | OUTPATIENT
Start: 2024-08-24 | End: 2024-08-28 | Stop reason: HOSPADM

## 2024-08-24 RX ORDER — ATORVASTATIN CALCIUM 40 MG/1
40 TABLET, FILM COATED ORAL
Status: DISCONTINUED | OUTPATIENT
Start: 2024-08-24 | End: 2024-08-28 | Stop reason: HOSPADM

## 2024-08-24 RX ADMIN — MAGNESIUM SULFATE HEPTAHYDRATE 2 G: 40 INJECTION, SOLUTION INTRAVENOUS at 16:07

## 2024-08-24 RX ADMIN — SODIUM CHLORIDE 500 ML: 0.9 INJECTION, SOLUTION INTRAVENOUS at 15:15

## 2024-08-24 RX ADMIN — ASPIRIN 81 MG 324 MG: 81 TABLET ORAL at 17:22

## 2024-08-24 RX ADMIN — SODIUM CHLORIDE 500 ML: 0.9 INJECTION, SOLUTION INTRAVENOUS at 17:02

## 2024-08-24 RX ADMIN — INSULIN LISPRO 1 UNITS: 100 INJECTION, SOLUTION INTRAVENOUS; SUBCUTANEOUS at 21:44

## 2024-08-24 RX ADMIN — SODIUM CHLORIDE, SODIUM GLUCONATE, SODIUM ACETATE, POTASSIUM CHLORIDE, MAGNESIUM CHLORIDE, SODIUM PHOSPHATE, DIBASIC, AND POTASSIUM PHOSPHATE 75 ML/HR: .53; .5; .37; .037; .03; .012; .00082 INJECTION, SOLUTION INTRAVENOUS at 19:30

## 2024-08-24 RX ADMIN — ATORVASTATIN CALCIUM 40 MG: 40 TABLET, FILM COATED ORAL at 19:30

## 2024-08-24 RX ADMIN — APIXABAN 2.5 MG: 2.5 TABLET, FILM COATED ORAL at 21:44

## 2024-08-24 NOTE — ASSESSMENT & PLAN NOTE
Patient presenting with multiple presyncopal episodes over the past few months.   On day of admission, had an episode with decreased responsiveness  No head strike or trauma  No associated chest pain, shortness of breath, or palpitations   Initial trop of 51 in ED  S/p 324 mg ASA   CXR (8/24/24) unremarkable for acute cardiopulmonary processes  ECG in ED appears NSR with notable artifact    Plan:   Repeat ECG  F/U trops  Consider initiation of ACS (low) heparin drip if uptrending  Monitor on Tele  F/U Orthostatic BP  Echocardiogram ordered  Fall precautions  OT/PT eval and treat

## 2024-08-24 NOTE — ASSESSMENT & PLAN NOTE
Recent Labs     08/24/24  1555   HGB 10.5*     Hemoglobin currently at baseline    Plan:  Continue to monitor with daily CBCs

## 2024-08-24 NOTE — ASSESSMENT & PLAN NOTE
Lab Results   Component Value Date    HGBA1C 6.8 (H) 07/12/2024       Recent Labs     08/24/24  1502   POCGLU 230*       Blood Sugar Average: Last 72 hrs:  (P) 230    Home medications: Glimepiride 2 mg daily    Plan:  Hold home regimen while inpatient and resume on discharge   Diabetic diet  Insulin regimen  SSI algorithm 2  Glucose checks and Insulin correction ACHS  Goal -180 while admitted, adjusting insulin regimen as appropriate  Monitor for hypoglycemia and treat per protocol

## 2024-08-24 NOTE — ASSESSMENT & PLAN NOTE
POA  Recent Labs     08/24/24  1555 08/24/24  1830   LACTICACID 2.3* 0.9      Resolved, suspected to be reactive

## 2024-08-24 NOTE — H&P
Replaced by [facility]  H&P  Name: Leroy Paredes 89 y.o. male I MRN: [id number]  Unit/Bed#: S -01 I Date of Admission: 8/24/2024   Date of Service: 8/24/2024 I Hospital Day: 0      Assessment & Plan   * Syncope  Assessment & Plan  Patient presenting with multiple presyncopal episodes over the past few months.   On day of admission, had an episode with decreased responsiveness  No head strike or trauma  No associated chest pain, shortness of breath, or palpitations   Initial trop of 51 in ED  S/p 324 mg ASA   CXR (8/24/24) unremarkable for acute cardiopulmonary processes  ECG in ED appears NSR with notable artifact    Plan:   Repeat ECG  F/U trops  Consider initiation of ACS (low) heparin drip if uptrending  Monitor on Tele  F/U Orthostatic BP  Echocardiogram ordered  Fall precautions  OT/PT eval and treat      Acute kidney injury superimposed on chronic kidney disease  (HCC)  Assessment & Plan  Present on admission    Lab Results   Component Value Date    EGFR 15 08/24/2024    EGFR 19 07/12/2024    EGFR 25 (L) 05/03/2024    CREATININE 3.19 (H) 08/24/2024    CREATININE 2.64 (H) 07/12/2024    CREATININE 2.33 (H) 05/03/2024     Plan:    Estimated Creatinine Clearance: 13.2 mL/min (A) (by C-G formula based on SCr of 3.19 mg/dL (H)).    POA 3.19; (baseline 2.4-2.6)  Likely 2/2  Dehydration in setting of poor p.o intake    Plan:  IV Fluids 75 cc/hr.   S/P 500 cc bolus x2 in ED  Monitor BMP  Avoid hypoperfusion of the kidneys, minimize nephrotoxins  RAAS Blockers/Diuretics held: Lisinopril      A-fib (HCC)  Assessment & Plan  Rate controlled  Coagulation: Eliquis 2.5 twice daily  Patient's niece notes that he only started taking this medication twice daily last week as he previously thought it was daily     Plan: Continue  Continue Eliquis 2.5 twice daily  Monitor on telemetry      Lactic acidosis  Assessment & Plan  POA  Recent Labs     08/24/24  1555 08/24/24  1830   LACTICACID 2.3* 0.9       Resolved, suspected to be reactive      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  (HCC)  Assessment & Plan  Recent Labs     08/24/24  1555   HGB 10.5*     Hemoglobin currently at baseline    Plan:  Continue to monitor with daily CBCs    Essential hypertension  Assessment & Plan  Blood Pressure: 145/72    Plan:  Medications held: lisinopril   Monitor blood pressure  Orthostatic BP  PRN IV Labetalol and Hydralazine for SBP > 160      CKD stage 4 due to type 2 diabetes mellitus ([facility])  Assessment & Plan  Lab Results   Component Value Date    HGBA1C 6.8 (H) 07/12/2024     Recent Labs     08/24/24  1555   CREATININE 3.19*   EGFR 15     Estimated Creatinine Clearance: 13.2 mL/min (A) (by C-G formula based on SCr of 3.19 mg/dL (H)).      Currently not at baseline     See plan for acute kidney injury superimposed on chronic kidney disease      Hyperlipidemia  Assessment & Plan  Lab Results   Component Value Date    CHOLESTEROL 109 07/12/2024    TRIG 85 07/12/2024    HDL 32 (L) 07/12/2024    LDLCALC 60 07/12/2024     Home medication: atorvastatin 40 mg daily    Plan:  Continue atorvastatin 40 mg daily    Controlled diabetes mellitus ([facility])  Assessment & Plan  Lab Results   Component Value Date    HGBA1C 6.8 (H) 07/12/2024       Recent Labs     08/24/24  1502   POCGLU 230*       Blood Sugar Average: Last 72 hrs:  (P) 230    Home medications: Glimepiride 2 mg daily    Plan:  Hold home regimen while inpatient and resume on discharge   Diabetic diet  Insulin regimen  SSI algorithm 2  Glucose checks and Insulin correction ACHS  Goal -180 while admitted, adjusting insulin regimen as appropriate  Monitor for hypoglycemia and treat per protocol           VTE Pharmacologic Prophylaxis: VTE Score: 5 High Risk (Score >/= 5) - Pharmacological DVT Prophylaxis Ordered: apixaban (Eliquis). Sequential Compression Devices Ordered.  Code Status: Level 3 - DNAR and DNI   Discussion with family: Updated  (sister and niece)  at bedside.    Anticipated Length of Stay: Patient will be admitted on an observation basis with an anticipated length of stay of less than 2 midnights secondary to syncope.    Chief Complaint: syncope    History of Present Illness:  Leroy Paredes is a 89 y.o. male with a PMH of CKD, type 2 diabetes mellitus, hypertension, hyperlipidemia, anemia secondary to CKD who presents after syncopal episode. He is accompanied by his sister and his niece who is his POA.     Patient reports he has occasionally gotten tired or felt generally weak when performing his daily activities, such as bending over to grab a tool standing up from a chair, over the past several weeks.  His family reports that he has had a multiple episodes of weakness or fatigue over the past several months.  His family checks on him frequently but he lives alone and is functionally independent.  He does not use any assistive devices to help him with ambulation.  His niece states that she has been managing his meds over the last week after noting that he has not been taking them as prescribed.  His niece also notes that he has had a decreased p.o. intake over the past several months and has lost approximately 20 pounds since May.  She recalls that he has asked for assistance due to weakness several times over the past few months.    Today, he had an positive generalized weakness and fatigue associated with lightheadedness when standing up from his chair.  His nephew was able to lower him down to a chair without fall or head strike.  Transferring from the car to the wheelchair en route to the ED he had another episode of lightheadedness with decreased responsiveness.  There was no limb movements, urinary incontinence, tongue biting, or confusion after this episode.  Patient denies any recent fevers, chills, nausea, vomiting, or diarrhea.  He reports no changes in his diet or water intake.  He denies any associated shortness of breath or chest pain with his  episodes of weakness and fatigue.    On arrival to the ED, he was noted to be hypotensive, otherwise hemodynamically stable and afebrile.  He received 2x 500 cc boluses and responded well.  He was noted to have lactic acidosis and an elevated Pro-Eugenio.  Blood cultures were drawn and a UA was ordered.  No leukocytosis was present on CBC.  Initial troponin was found to be elevated to 51.  EKG did not show signs of ischemia, although there was remarkable artifact.  X-ray showed no signs of cardiopulmonary process.      Review of Systems:  Review of Systems    Past Medical and Surgical History:   Past Medical History:   Diagnosis Date    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)     Last Assessed:  5/19/17    Chronic kidney disease     Diabetes mellitus (HCC)     Hyperlipidemia     Hypertension     Osteoarthritis     Palpitations     TIA (transient ischemic attack)        Past Surgical History:   Procedure Laterality Date    APPENDECTOMY      CARDIAC ELECTROPHYSIOLOGY PROCEDURE N/A 1/17/2024    Procedure: Cardiac loop recorder explant;  Surgeon: Drew Olmos MD;  Location: [facility];  Service: Cardiology    COLONOSCOPY  2012    HEMORROIDECTOMY      TOOTH EXTRACTION  02/02/2022       Meds/Allergies:  Prior to Admission medications    Medication Sig Start Date End Date Taking? Authorizing Provider   atorvastatin (LIPITOR) 40 mg tablet TAKE 1 TABLET EVERY DAY WITH DINNER 10/22/23  Yes Pepito Foster DO   Eliquis 2.5 MG TAKE 1 TABLET TWICE DAILY 12/4/23  Yes Leroy Humphries MD   glimepiride (AMARYL) 2 mg tablet TAKE 1 TABLET BY MOUTH DAILY WITH BREAKFAST 9/4/23  Yes Pepito Foster DO   lisinopril (ZESTRIL) 10 mg tablet TAKE 1 TABLET TWICE DAILY 2/15/24  Yes Leroy Humphries MD   terazosin (HYTRIN) 1 mg capsule TAKE 1 CAPSULE AT BEDTIME 5/8/24  Yes Pepito Foster DO   acetaminophen (TYLENOL) 325 mg tablet Take 2 tablets (650 mg total) by mouth every 6 (six) hours as needed for mild pain  5/18/23   Josselin Maya PA-C   Blood Glucose Monitoring Suppl (PRECISION XTRA MONITOR) MARY by Does not apply route daily 1/16/20   Pepito Foster DO   Cholecalciferol 25 MCG (1000 UT) capsule Take 1 capsule by mouth daily    Historical Provider, MD   CINNAMON PO Take 1 capsule by mouth daily  Patient not taking: Reported on 5/6/2024    Historical Provider, MD   clotrimazole-betamethasone (LOTRISONE) 1-0.05 % cream Apply topically 2 (two) times a day  Patient not taking: Reported on 5/6/2024 6/26/20   Pepito Foster DO   cyanocobalamin (VITAMIN B-12) 500 mcg tablet Take 1 tablet by mouth 2 (two) times a week    Historical Provider, MD   magnesium oxide (MAG-OX) 400 mg Take 400 mg by mouth daily    Historical Provider, MD   Omega-3 Fatty Acids (OMEGA-3 FISH OIL) 300 MG CAPS Take 1 capsule by mouth daily  Patient not taking: Reported on 11/13/2023    Historical Provider, MD   PRECISION XTRA TEST STRIPS test strip USE TO TEST BLOOD GLUCOSE ONCE A DAY 1/9/24   Pepito Foster DO     I have reviewed home medications with patient personally.    Allergies: No Known Allergies    Social History:  Marital Status: Single   Occupation: Retired  Patient Pre-hospital Living Situation: Home  Patient Pre-hospital Level of Mobility: walks  Patient Pre-hospital Diet Restrictions: none  Substance Use History:   Social History     Substance and Sexual Activity   Alcohol Use Not Currently    Comment: Social drinker     Social History     Tobacco Use   Smoking Status Former   Smokeless Tobacco Never     Social History     Substance and Sexual Activity   Drug Use No       Family History:  Family History   Problem Relation Age of Onset    Diabetes Mother     Other Mother         Stroke syndrome    Diabetes Father     Emphysema Father        Physical Exam:     Vitals:   Blood Pressure: 145/72 (08/24/24 1901)  Pulse: 72 (08/24/24 1901)  Temperature: 97.5 °F (36.4 °C) (08/24/24 1901)  Temp Source: Axillary (08/24/24  1457)  Respirations: 16 (08/24/24 1800)  Weight - Scale: 64.3 kg (141 lb 12.1 oz) (08/24/24 1455)  SpO2: 99 % (08/24/24 1901)    Physical Exam  Vitals reviewed.   Constitutional:       General: He is not in acute distress.     Comments: Frail appearing   HENT:      Head: Normocephalic and atraumatic.      Nose: No congestion.      Mouth/Throat:      Mouth: Mucous membranes are dry.      Pharynx: Oropharynx is clear.   Eyes:      Conjunctiva/sclera: Conjunctivae normal.   Cardiovascular:      Rate and Rhythm: Normal rate and regular rhythm.      Heart sounds: Normal heart sounds. No murmur heard.  Pulmonary:      Effort: Pulmonary effort is normal. No respiratory distress.      Breath sounds: Normal breath sounds. No wheezing, rhonchi or rales.   Abdominal:      General: Bowel sounds are normal.      Palpations: Abdomen is soft.      Tenderness: There is no abdominal tenderness. There is no guarding or rebound.   Musculoskeletal:      Cervical back: Neck supple.      Right lower leg: No edema.      Left lower leg: No edema.      Comments: Small abrasion on left forearm     Lymphadenopathy:      Cervical: No cervical adenopathy.   Skin:     General: Skin is warm and dry.      Capillary Refill: Capillary refill takes 2 to 3 seconds.   Neurological:      General: No focal deficit present.      Mental Status: He is alert and oriented to person, place, and time.   Psychiatric:         Mood and Affect: Mood normal.         Behavior: Behavior normal.         Thought Content: Thought content normal.          Additional Data:     Lab Results:  Results from last 7 days   Lab Units 08/24/24  1555   WBC Thousand/uL 5.14   HEMOGLOBIN g/dL 10.5*   HEMATOCRIT % 32.9*   PLATELETS Thousands/uL 138*   SEGS PCT % 62   LYMPHO PCT % 26   MONO PCT % 8   EOS PCT % 3     Results from last 7 days   Lab Units 08/24/24  1555   SODIUM mmol/L 137   POTASSIUM mmol/L 4.7   CHLORIDE mmol/L 105   CO2 mmol/L 20*   BUN mg/dL 45*   CREATININE mg/dL  3.19*   ANION GAP mmol/L 12   CALCIUM mg/dL 9.1   ALBUMIN g/dL 3.6   TOTAL BILIRUBIN mg/dL 1.05*   ALK PHOS U/L 74   ALT U/L 13   AST U/L 23   GLUCOSE RANDOM mg/dL 225*     Results from last 7 days   Lab Units 08/24/24  1555   INR  1.22*     Results from last 7 days   Lab Units 08/24/24  1502   POC GLUCOSE mg/dl 230*     Lab Results   Component Value Date    HGBA1C 6.8 (H) 07/12/2024    HGBA1C 7.1 (H) 01/27/2024    HGBA1C 7.6 (H) 09/25/2023     Results from last 7 days   Lab Units 08/24/24  1830 08/24/24  1555   LACTIC ACID mmol/L 0.9 2.3*   PROCALCITONIN ng/ml  --  0.69*       Lines/Drains:  Invasive Devices       Peripheral Intravenous Line  Duration             Peripheral IV 08/24/24 Left Antecubital <1 day    Peripheral IV 08/24/24 Proximal;Right;Ventral (anterior) Forearm <1 day                        Imaging: Reviewed radiology reports from this admission including: chest xray  XR chest 1 view portable   ED Interpretation by Jone Cuevas MD (08/24 7870)   Per my independent interpretation: No acute cardiopulmonary process          EKG and Other Studies Reviewed on Admission:   EKG: Normal sinus rhythm with noted QT prolongation. Notable artifact. No ischemic changes identified (repeat ordered)    ** Please Note: This note has been constructed using a voice recognition system. **

## 2024-08-24 NOTE — ASSESSMENT & PLAN NOTE
Lab Results   Component Value Date    HGBA1C 6.8 (H) 07/12/2024     Recent Labs     08/24/24  1555   CREATININE 3.19*   EGFR 15     Estimated Creatinine Clearance: 13.2 mL/min (A) (by C-G formula based on SCr of 3.19 mg/dL (H)).      Currently not at baseline     See plan for acute kidney injury superimposed on chronic kidney disease

## 2024-08-24 NOTE — ASSESSMENT & PLAN NOTE
Lab Results   Component Value Date    CHOLESTEROL 109 07/12/2024    TRIG 85 07/12/2024    HDL 32 (L) 07/12/2024    LDLCALC 60 07/12/2024     Home medication: atorvastatin 40 mg daily    Plan:  Continue atorvastatin 40 mg daily

## 2024-08-24 NOTE — ASSESSMENT & PLAN NOTE
Rate controlled  Coagulation: Eliquis 2.5 twice daily  Patient's niece notes that he only started taking this medication twice daily last week as he previously thought it was daily     Plan: Continue  Continue Eliquis 2.5 twice daily  Monitor on telemetry

## 2024-08-24 NOTE — LETTER
Thank you for allowing us to participate in the care of your patient, Leroy Paredes, who was hospitalized from 8/24/2024 through 8/28/2024 with the admitting diagnosis of syncope. Patient also had acute metabolic encephalopathy.     Medication Changes:  Start taking lexapro 5 mg daily  Start taking melatonin 3 mg hs  Start taking gabapentin 100 mg bid  Take keflex for 3 more days starting 8/29 for UTI  Stop taking glimepiride    Outpatient testing recommended:  None    If you have any additional questions or would like to discuss further, please feel free to contact me.    Lisa Mercado DO  [facility], Hospitalist  [phone]

## 2024-08-24 NOTE — ASSESSMENT & PLAN NOTE
Blood Pressure: 145/72    Plan:  Medications held: lisinopril   Monitor blood pressure  Orthostatic BP  PRN IV Labetalol and Hydralazine for SBP > 160

## 2024-08-24 NOTE — ASSESSMENT & PLAN NOTE
Present on admission    Lab Results   Component Value Date    EGFR 15 08/24/2024    EGFR 19 07/12/2024    EGFR 25 (L) 05/03/2024    CREATININE 3.19 (H) 08/24/2024    CREATININE 2.64 (H) 07/12/2024    CREATININE 2.33 (H) 05/03/2024     Plan:    Estimated Creatinine Clearance: 13.2 mL/min (A) (by C-G formula based on SCr of 3.19 mg/dL (H)).    POA 3.19; (baseline 2.4-2.6)  Likely 2/2  Dehydration in setting of poor p.o intake    Plan:  IV Fluids 75 cc/hr.   S/P 500 cc bolus x2 in ED  Monitor BMP  Avoid hypoperfusion of the kidneys, minimize nephrotoxins  RAAS Blockers/Diuretics held: Lisinopril

## 2024-08-24 NOTE — ED ATTENDING ATTESTATION
8/24/2024  I, Jone Cuevas MD, saw and evaluated the patient. I have discussed the patient with the resident/non-physician practitioner and agree with the resident's/non-physician practitioner's findings, Plan of Care, and MDM as documented in the resident's/non-physician practitioner's note, except where noted. All available labs and Radiology studies were reviewed.  I was present for key portions of any procedure(s) performed by the resident/non-physician practitioner and I was immediately available to provide assistance.       At this point I agree with the current assessment done in the Emergency Department.  I have conducted an independent evaluation of this patient a history and physical is as follows:    History    Patient is a 89-year-old male, with a history significant for CKD, diabetes, hypertension, hyperlipidemia per my review the medical record, who presents to the ED today for evaluation of syncope.  Patient states that, over the last day or so, he has been fatigued.  Prior to arrival, after ambulating, he became lightheaded and sat down/leaning forward and experienced a presyncopal event.  Family convinced him to be evaluated medically and, on arrival to the emergency department, patient had a syncopal event characterized by unresponsiveness.  During both of these events, there is no associated head strike.  Patient has no history of seizure he denies tongue biting/incontinence.  The patient's family, present in room and friend collateral history, states that patient is not confused at this time.  Patient denies fever, chest pain, dyspnea, abdominal pain, dysuria, blood in stool.    Patient's niece and nephew, also patient's POA, are present in room and providing collateral history.  They state that patient is DNR/DNI and produced a POLST confirming this information.  Patient's POLST also states he is comfort measures only; however, after discussion with patient as well as his power of  , patient expressed preference for medical management but does not want surgery/invasive procedures at this time.  He is in agreement with receiving medical evaluation for the cause of his symptoms at this time.      Patient is without other concerns at this time.     ROS  Patient denies: Fever; dysphagia; vision change; chest pain; dyspnea; abdominal pain; polyuria; dysuria; rash; weakness; numbness; difficulty walking; confusion    Physical Exam    GENERAL APPEARANCE: NAD  NEURO: Patient is speaking clearly in complete sentences.  Patient is answering appropriately and able follow commands.  Patient is moving all four extremities spontaneously.  No facial droop.  Tongue midline.  HEENT: PERRL, Moist mucous membranes, external ears normal, nose normal  Neck: No cervical adenopathy  CV: RRR. No murmurs, rubs, gallops  LUNGS: Clear to auscultation: No wheezes, stridor, rhonchi, rales  GI: Abdomen non-distended. Soft. Non-tender and without rebound or guarding   : Deferred at this time  MSK: No deformity.   Skin: Warm and dry  Capillary refill: <2 seconds    Assessment/Plan/MDM  Syncope, hypotension  -This presentation is concerning for: Dehydration, anemia, NELLI, electrolyte abnormality, arrhythmia, ACS.  Patient at risk for pneumonia/UTI.  Overall very low suspicion for seizure based upon history physical exam  -Will investigate with cardiac workup, sepsis panel order set  -Will manage with fluids and further based upon workup    ED Course  ED Course as of 08/24/24 1733   Sat Aug 24, 2024   1520 ECG per my independent interpretation: Normal rate, regular rhythm, no ectopy, leftward axis, no ST elevations.  Prolonged QTc at 546. Will order magnesium    1625 Hemoglobin(!): 10.5  Low, decreased from prior         Critical Care Time  Procedures

## 2024-08-25 LAB
ANION GAP SERPL CALCULATED.3IONS-SCNC: 6 MMOL/L (ref 4–13)
ATRIAL RATE: 89 BPM
BASOPHILS # BLD AUTO: 0.02 THOUSANDS/ÂΜL (ref 0–0.1)
BASOPHILS NFR BLD AUTO: 1 % (ref 0–1)
BUN SERPL-MCNC: 39 MG/DL (ref 5–25)
CALCIUM SERPL-MCNC: 8.1 MG/DL (ref 8.4–10.2)
CHLORIDE SERPL-SCNC: 106 MMOL/L (ref 96–108)
CO2 SERPL-SCNC: 22 MMOL/L (ref 21–32)
CREAT SERPL-MCNC: 2.66 MG/DL (ref 0.6–1.3)
EOSINOPHIL # BLD AUTO: 0.17 THOUSAND/ÂΜL (ref 0–0.61)
EOSINOPHIL NFR BLD AUTO: 4 % (ref 0–6)
ERYTHROCYTE [DISTWIDTH] IN BLOOD BY AUTOMATED COUNT: 13.4 % (ref 11.6–15.1)
GFR SERPL CREATININE-BSD FRML MDRD: 19 ML/MIN/1.73SQ M
GLUCOSE SERPL-MCNC: 103 MG/DL (ref 65–140)
GLUCOSE SERPL-MCNC: 122 MG/DL (ref 65–140)
GLUCOSE SERPL-MCNC: 132 MG/DL (ref 65–140)
GLUCOSE SERPL-MCNC: 146 MG/DL (ref 65–140)
GLUCOSE SERPL-MCNC: 156 MG/DL (ref 65–140)
GLUCOSE SERPL-MCNC: 63 MG/DL (ref 65–140)
HCT VFR BLD AUTO: 29.9 % (ref 36.5–49.3)
HGB BLD-MCNC: 9.8 G/DL (ref 12–17)
IMM GRANULOCYTES # BLD AUTO: 0.03 THOUSAND/UL (ref 0–0.2)
IMM GRANULOCYTES NFR BLD AUTO: 1 % (ref 0–2)
LYMPHOCYTES # BLD AUTO: 0.93 THOUSANDS/ÂΜL (ref 0.6–4.47)
LYMPHOCYTES NFR BLD AUTO: 24 % (ref 14–44)
MCH RBC QN AUTO: 31.2 PG (ref 26.8–34.3)
MCHC RBC AUTO-ENTMCNC: 32.8 G/DL (ref 31.4–37.4)
MCV RBC AUTO: 95 FL (ref 82–98)
MONOCYTES # BLD AUTO: 0.25 THOUSAND/ÂΜL (ref 0.17–1.22)
MONOCYTES NFR BLD AUTO: 7 % (ref 4–12)
NEUTROPHILS # BLD AUTO: 2.46 THOUSANDS/ÂΜL (ref 1.85–7.62)
NEUTS SEG NFR BLD AUTO: 63 % (ref 43–75)
NRBC BLD AUTO-RTO: 0 /100 WBCS
P AXIS: 77 DEGREES
PLATELET # BLD AUTO: 128 THOUSANDS/UL (ref 149–390)
PLATELET BLD QL SMEAR: ADEQUATE
PMV BLD AUTO: 9.5 FL (ref 8.9–12.7)
POTASSIUM SERPL-SCNC: 4.3 MMOL/L (ref 3.5–5.3)
PR INTERVAL: 184 MS
PROCALCITONIN SERPL-MCNC: 0.64 NG/ML
QRS AXIS: -60 DEGREES
QRSD INTERVAL: 144 MS
QT INTERVAL: 454 MS
QTC INTERVAL: 546 MS
RBC # BLD AUTO: 3.14 MILLION/UL (ref 3.88–5.62)
RBC MORPH BLD: NORMAL
SODIUM SERPL-SCNC: 134 MMOL/L (ref 135–147)
T WAVE AXIS: 93 DEGREES
VENTRICULAR RATE: 87 BPM
WBC # BLD AUTO: 3.86 THOUSAND/UL (ref 4.31–10.16)

## 2024-08-25 PROCEDURE — 82948 REAGENT STRIP/BLOOD GLUCOSE: CPT

## 2024-08-25 PROCEDURE — 84145 PROCALCITONIN (PCT): CPT

## 2024-08-25 PROCEDURE — 80048 BASIC METABOLIC PNL TOTAL CA: CPT

## 2024-08-25 PROCEDURE — 93010 ELECTROCARDIOGRAM REPORT: CPT | Performed by: INTERNAL MEDICINE

## 2024-08-25 PROCEDURE — 85025 COMPLETE CBC W/AUTO DIFF WBC: CPT

## 2024-08-25 PROCEDURE — 99232 SBSQ HOSP IP/OBS MODERATE 35: CPT | Performed by: INTERNAL MEDICINE

## 2024-08-25 RX ORDER — SODIUM CHLORIDE 9 MG/ML
75 INJECTION, SOLUTION INTRAVENOUS CONTINUOUS
Status: DISCONTINUED | OUTPATIENT
Start: 2024-08-25 | End: 2024-08-25

## 2024-08-25 RX ADMIN — Medication 1000 UNITS: at 10:30

## 2024-08-25 RX ADMIN — Medication 400 MG: at 10:30

## 2024-08-25 RX ADMIN — SODIUM CHLORIDE 75 ML/HR: 0.9 INJECTION, SOLUTION INTRAVENOUS at 10:31

## 2024-08-25 RX ADMIN — INSULIN LISPRO 1 UNITS: 100 INJECTION, SOLUTION INTRAVENOUS; SUBCUTANEOUS at 22:46

## 2024-08-25 RX ADMIN — ATORVASTATIN CALCIUM 40 MG: 40 TABLET, FILM COATED ORAL at 17:28

## 2024-08-25 RX ADMIN — SODIUM CHLORIDE, SODIUM GLUCONATE, SODIUM ACETATE, POTASSIUM CHLORIDE, MAGNESIUM CHLORIDE, SODIUM PHOSPHATE, DIBASIC, AND POTASSIUM PHOSPHATE 75 ML/HR: .53; .5; .37; .037; .03; .012; .00082 INJECTION, SOLUTION INTRAVENOUS at 20:31

## 2024-08-25 RX ADMIN — APIXABAN 2.5 MG: 2.5 TABLET, FILM COATED ORAL at 22:46

## 2024-08-25 RX ADMIN — APIXABAN 2.5 MG: 2.5 TABLET, FILM COATED ORAL at 10:30

## 2024-08-25 NOTE — ASSESSMENT & PLAN NOTE
Blood Pressure: (!) 110/49    Plan:  Medications held: lisinopril   Monitor blood pressure  Orthostatic BP  PRN IV Labetalol and Hydralazine for SBP > 160

## 2024-08-25 NOTE — ASSESSMENT & PLAN NOTE
Lab Results   Component Value Date    HGBA1C 6.8 (H) 07/12/2024     Recent Labs     08/24/24  1555 08/25/24  1209   CREATININE 3.19* 2.66*   EGFR 15 19       Estimated Creatinine Clearance: 15.8 mL/min (A) (by C-G formula based on SCr of 2.66 mg/dL (H)).      Currently not at baseline     See plan for acute kidney injury superimposed on chronic kidney disease

## 2024-08-25 NOTE — ASSESSMENT & PLAN NOTE
Lab Results   Component Value Date    HGBA1C 6.8 (H) 07/12/2024       Recent Labs     08/24/24  2141 08/25/24  0635 08/25/24  0823 08/25/24  1131   POCGLU 152* 63* 122 146*       Blood Sugar Average: Last 72 hrs:  (P) 142.6    Home medications: Glimepiride 2 mg daily    Plan:  Hold home regimen while inpatient and resume on discharge   Diabetic diet  Insulin regimen  SSI algorithm 2  Glucose checks and Insulin correction ACHS  Goal -180 while admitted, adjusting insulin regimen as appropriate  Monitor for hypoglycemia and treat per protocol

## 2024-08-25 NOTE — ASSESSMENT & PLAN NOTE
Blood Pressure: (!) 98/42    Plan:  Medications held: lisinopril   Monitor blood pressure  Orthostatic BP  PRN IV Labetalol and Hydralazine for SBP > 160

## 2024-08-25 NOTE — ASSESSMENT & PLAN NOTE
Lab Results   Component Value Date    HGBA1C 6.8 (H) 07/12/2024       Recent Labs     08/24/24  1502 08/24/24  2141   POCGLU 230* 152*       Blood Sugar Average: Last 72 hrs:  (P) 191    Home medications: Glimepiride 2 mg daily    Plan:  Hold home regimen while inpatient and resume on discharge   Diabetic diet  Insulin regimen  SSI algorithm 2  Glucose checks and Insulin correction ACHS  Goal -180 while admitted, adjusting insulin regimen as appropriate  Monitor for hypoglycemia and treat per protocol

## 2024-08-25 NOTE — DISCHARGE SUMMARY
[facility]  Discharge- Leroy Paredes 12/11/1930, 89 y.o. male MRN: [id number]  Unit/Bed#: S -01 Encounter: [id number]  Primary Care Provider: Pepito Foster DO   Date and time admitted to hospital: 8/24/2024  2:53 PM    * Syncope  Assessment & Plan  Patient presenting with multiple presyncopal episodes over the past few months.   On day of admission, had an episode with decreased responsiveness  No head strike or trauma  No associated chest pain, shortness of breath, or palpitations   Initial trop of 51 in ED  S/p 324 mg ASA   CXR (8/24/24) unremarkable for acute cardiopulmonary processes  ECG in ED appears NSR with notable artifact  Troponins reduced, lactate normalized  Echo 8/26: EF 50%    Plan:   Fall precautions  Stop taking glimepiride   F/u with PCP      Acute metabolic encephalopathy  Assessment & Plan  - Metabolic Encephalopathy likely due to dehydration, superimposed on cognitive impairment at baseline, evidened by confusion. waxing/waning orientation requiring Geriatric consult, correction of dehydration and monitoring of neuro status.   - Encourage po intake   - Frequent reorientation as needed  - Continue home vit B12 dose, received IM vit B12 while hospitalized; can have labs rechecked with PCP    Urinary tract infection without hematuria  Assessment & Plan  - Burning sensation during urination noted on 8/26  - 8/26 UA: positive nitrites, neg leuk, 1+ prot, small blood; micro: 10-20 WBC  - Could have been contributing to encephalopathy   - Received two days of ceftriaxone, discharged with 3 more days of keflex   - F/u urine cx with PCP    Severe protein-calorie malnutrition (HCC)  Assessment & Plan  Malnutrition Findings:   Adult Malnutrition type: Chronic illness  Adult Degree of Malnutrition: Other severe protein calorie malnutrition  Malnutrition Characteristics: Muscle loss, Inadequate energy, Weight loss      Severe protein-calorie malnutrition r/t  condition/decreased PO intake, as evidenced by 14.5% wt loss x 3 months (5/6/24: 165#, 8/26/24: 141#) , severe muscle mass depletion (temples, clavicle), and intake meeting <75% of estimated needs x > 1 month. Treatment: PO diet + oral nutrition supplements.             360 Statement: Chronic/severe malnutrition r/t condition/decreased PO intake, as evidenced by 14.5% wt loss x 3 months (5/6/24: 165#, 8/26/24: 141#) , severe muscle mass depletion (temples, clavicle), and intake meeting <75% of estimated needs x > 1 month. Treatment: PO diet + oral nutrition supplements    BMI Findings:           Body mass index is 19.67 kg/m².       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  (HCC)  Assessment & Plan  Recent Labs     08/26/24  0454 08/27/24  0448 08/28/24  0859   HGB 9.6* 9.3* 10.3*     Hemoglobin currently at baseline    Plan:  F/u with PCP    Essential hypertension  Assessment & Plan  Blood Pressure: 138/58    Plan:  Continue home lisinopril      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      Currently at baseline (2.4-2.6)     See plan for acute kidney injury superimposed on chronic kidney disease      Acute  kidney injury superimposed on chronic kidney disease  (HCC)  Assessment & Plan  Present on admission    Lab Results   Component Value Date    EGFR 26 08/28/2024    EGFR 28 08/27/2024    EGFR 22 08/26/2024    CREATININE 2.12 (H) 08/28/2024    CREATININE 1.96 (H) 08/27/2024    CREATININE 2.38 (H) 08/26/2024     Plan:    Estimated Creatinine Clearance: 19.7 mL/min (A) (by C-G formula based on SCr of 2.12 mg/dL (H)).    POA 3.19; (baseline 2.4-2.6)  Likely 2/2  Dehydration in setting of poor p.o intake    Plan:  Encourage po intake  Appears to have resolved      Hyperlipidemia  Assessment & Plan  Lab Results   Component Value Date    CHOLESTEROL 109 07/12/2024    TRIG 85 07/12/2024    HDL 32 (L) 07/12/2024    LDLCALC 60 07/12/2024     Home medication: atorvastatin 40 mg daily    Plan:  Continue atorvastatin 40 mg daily    Controlled diabetes mellitus (HCC)  Assessment & Plan  Lab Results   Component Value Date    HGBA1C 6.8 (H) 07/12/2024       Recent Labs     08/27/24  2125 08/27/24  2229 08/28/24  0751 08/28/24  1138   POCGLU 73 132 92 149*       Blood Sugar Average: Last 72 hrs:  (P) 129    Home medications: Glimepiride 2 mg daily    Plan:  Stop taking home glimepiride  F/u with PCP for further evaluation and management                Medical Problems       Resolved Problems  Date Reviewed: 8/27/2024   None         Admission Date:   Admission Orders (From admission, onward)       Ordered        08/24/24 1712  INPATIENT ADMISSION  Once                            Admitting Diagnosis: Syncope [R55]  Anemia [D64.9]  Elevated lactic acid level [R79.89]  Acute-on-chronic kidney injury  (HCC) [N17.9, N18.9]    Procedures Performed:   Orders Placed This Encounter   Procedures    ED ECG Documentation Only       Summary of Hospital Course:    89 y.o. male with a PMH of CKD, type 2 diabetes mellitus, hypertension, hyperlipidemia, anemia secondary to CKD who was admitted following a syncopal episode.  Pt was hypotensive upon  admission along with elevated creatinine.  His cardiac workup was negative with unchanged EKG and unremarkable echocardiagram.   There was concern for decreased PO intake, as his niece explained patient was not taking his medications appropriately.  He appeared somewhat frail with bilateral temporal wasting.  He was started on IV fluids and a regular diet.  He tolerated his diet well as his creatinine returned to baseline.  He was consulted by gerontology for medication management as well as dietary changes/additions. Lexapro, melatonin, gabapentin were added given cognitive decline and insomnia. Glimepiride stopped on discharge to avoid hypoglycemia and prevent further syncopal episodes. Patient developed burning sensation during urination and UA was positive for UTI. Patient was started on ceftriaxone and discharged with 3 days of keflex for total of 5 days of abx. Urine cx pending. Pt was hemodynamically/vitally stable and medically cleared for discharge.      Significant Findings, Care, Treatment and Services Provided: 1/2 bcx staph epi likely contaminant, elevated Cr with improvement, anemia and low-normal B12, hyponatremia    Complications: none    Condition at Discharge: stable         Discharge instructions/Information to patient and family:   See after visit summary for information provided to patient and family.      Provisions for Follow-Up Care:  See after visit summary for information related to follow-up care and any pertinent home health orders.      PCP: Pepito Foster DO    Disposition: Home    Planned Readmission: No    Discharge Statement   I spent 60 minutes discharging the patient. This time was spent on the day of discharge. I had direct contact with the patient on the day of discharge. Additional documentation is required if more than 30 minutes were spent on discharge.     Discharge Medications:  See after visit summary for reconciled discharge medications provided to patient and family.

## 2024-08-25 NOTE — ASSESSMENT & PLAN NOTE
Patient presenting with multiple presyncopal episodes over the past few months.   On day of admission, had an episode with decreased responsiveness  No head strike or trauma  No associated chest pain, shortness of breath, or palpitations   Initial trop of 51 in ED  S/p 324 mg ASA   CXR (8/24/24) unremarkable for acute cardiopulmonary processes  ECG in ED appears NSR with notable artifact  Troponins reduced, lactate normalized    Plan:   Repeat ECG  Monitor on Tele  F/U Orthostatic BP  Echocardiogram ordered  Fall precautions  OT/PT eval and treat

## 2024-08-25 NOTE — ASSESSMENT & PLAN NOTE
Rate controlled  Coagulation: Eliquis 2.5 twice daily  Patient's niece notes that he only started taking this medication twice daily last week as he previously thought it was daily     Plan:  Continue Eliquis 2.5 twice daily  Monitor on telemetry

## 2024-08-25 NOTE — ASSESSMENT & PLAN NOTE
Patient presenting with multiple presyncopal episodes over the past few months.   On day of admission, had an episode with decreased responsiveness  No head strike or trauma  No associated chest pain, shortness of breath, or palpitations   Initial trop of 51 in ED  S/p 324 mg ASA   CXR (8/24/24) unremarkable for acute cardiopulmonary processes  ECG in ED appears NSR with notable artifact  Troponins reduced, lactate normalized    Plan:   Monitor on Tele  F/U Orthostatic BP  Echocardiogram ordered  Fall precautions  OT/PT eval and treat

## 2024-08-25 NOTE — ASSESSMENT & PLAN NOTE
Recent Labs     08/24/24  1555 08/25/24  0507   HGB 10.5* 9.8*     Hemoglobin currently at baseline    Plan:  Continue to monitor with daily CBCs

## 2024-08-25 NOTE — ASSESSMENT & PLAN NOTE
Present on admission    Lab Results   Component Value Date    EGFR 19 08/25/2024    EGFR 15 08/24/2024    EGFR 19 07/12/2024    CREATININE 2.66 (H) 08/25/2024    CREATININE 3.19 (H) 08/24/2024    CREATININE 2.64 (H) 07/12/2024     Plan:    Estimated Creatinine Clearance: 15.8 mL/min (A) (by C-G formula based on SCr of 2.66 mg/dL (H)).    POA 3.19; (baseline 2.4-2.6)  Likely 2/2  Dehydration in setting of poor p.o intake    Plan:  IV Fluids 75 cc/hr.   S/P 500 cc bolus x2 in ED  Monitor BMP  Avoid hypoperfusion of the kidneys, minimize nephrotoxins  RAAS Blockers/Diuretics held: Lisinopril

## 2024-08-25 NOTE — PROGRESS NOTES
[facility]  Progress Note  Name: Leroy Paredes I  MRN: [id number]  Unit/Bed#: S -01 I Date of Admission: 8/24/2024   Date of Service: 8/25/2024 I Hospital Day: 1    Assessment & Plan   Lactic acidosis  Assessment & Plan  POA  Recent Labs     08/24/24  1555 08/24/24  1830   LACTICACID 2.3* 0.9      Resolved, suspected to be reactive      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  (HCC)  Assessment & Plan  Recent Labs     08/24/24  1555   HGB 10.5*     Hemoglobin currently at baseline    Plan:  Continue to monitor with daily CBCs    Essential hypertension  Assessment & Plan  Blood Pressure: (!) 110/49    Plan:  Medications held: lisinopril   Monitor blood pressure  Orthostatic BP  PRN IV Labetalol and Hydralazine for SBP > 160      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      Currently not at baseline     See plan for acute kidney injury superimposed on chronic kidney disease      Acute kidney injury superimposed on chronic kidney disease  (HCC)  Assessment & Plan  Present on admission    Lab Results   Component Value Date    EGFR 15 08/24/2024    EGFR 19 07/12/2024    EGFR 25 (L) 05/03/2024    CREATININE 3.19 (H) 08/24/2024    CREATININE 2.64 (H) 07/12/2024    CREATININE 2.33 (H) 05/03/2024     Plan:    Estimated Creatinine Clearance: 13.2 mL/min (A) (by C-G formula based on SCr of 3.19 mg/dL (H)).    POA 3.19; (baseline 2.4-2.6)  Likely 2/2  Dehydration in setting of poor p.o intake    Plan:  IV Fluids 75 cc/hr.   S/P 500 cc bolus x2 in ED  Monitor BMP  Avoid hypoperfusion of the kidneys, minimize nephrotoxins  RAAS Blockers/Diuretics held: Lisinopril      Hyperlipidemia  Assessment & Plan  Lab Results   Component Value Date     CHOLESTEROL 109 07/12/2024    TRIG 85 07/12/2024    HDL 32 (L) 07/12/2024    LDLCALC 60 07/12/2024     Home medication: atorvastatin 40 mg daily    Plan:  Continue atorvastatin 40 mg daily    Controlled diabetes mellitus (HCC)  Assessment & Plan  Lab Results   Component Value Date    HGBA1C 6.8 (H) 07/12/2024       Recent Labs     08/24/24  1502 08/24/24  2141   POCGLU 230* 152*       Blood Sugar Average: Last 72 hrs:  (P) 191    Home medications: Glimepiride 2 mg daily    Plan:  Hold home regimen while inpatient and resume on discharge   Diabetic diet  Insulin regimen  SSI algorithm 2  Glucose checks and Insulin correction ACHS  Goal -180 while admitted, adjusting insulin regimen as appropriate  Monitor for hypoglycemia and treat per protocol      * Syncope  Assessment & Plan  Patient presenting with multiple presyncopal episodes over the past few months.   On day of admission, had an episode with decreased responsiveness  No head strike or trauma  No associated chest pain, shortness of breath, or palpitations   Initial trop of 51 in ED  S/p 324 mg ASA   CXR (8/24/24) unremarkable for acute cardiopulmonary processes  ECG in ED appears NSR with notable artifact  Troponins reduced, lactate normalized    Plan:   Repeat ECG  Monitor on Tele  F/U Orthostatic BP  Echocardiogram ordered  Fall precautions  OT/PT eval and treat             VTE Pharmacologic Prophylaxis: VTE Score: 5 High Risk (Score >/= 5) - Pharmacological DVT Prophylaxis Ordered: apixaban (Eliquis). Sequential Compression Devices Ordered.    Mobility:      JH-HLM Goal NOT achieved. Continue with multidisciplinary rounding and encourage appropriate mobility to improve upon JH-HLM goals.    Patient Centered Rounds: I performed bedside rounds with nursing staff today.   Discussions with Specialists or Other Care Team Provider: Gerontology, case management    Education and Discussions with Family / Patient: Updated  (niece) via  phone.    Total Time Spent on Date of Encounter in care of patient: 60 mins. This time was spent on one or more of the following: performing physical exam; counseling and coordination of care; obtaining or reviewing history; documenting in the medical record; reviewing/ordering tests, medications or procedures; communicating with other healthcare professionals and discussing with patient's family/caregivers.    Current Length of Stay: 1 day(s)  Current Patient Status: Inpatient   Certification Statement: The patient will continue to require additional inpatient hospital stay due to failure to thrive, NELLI  Discharge Plan: Anticipate discharge in 24-48 hrs to home with home services.    Code Status: Level 3 - DNAR and DNI    Subjective:   Evaluated patient at bedside, no acute events overnight.  Patient reports no symptoms and is confused as to why he is in the hospital.  He is alert and oriented to person and place, but not time.  He denies any chest pain, shortness of breath, abdominal pain, dysuria.  Discussed plan of care with patient going forward, all questions and concerns were answered at bedside.    Objective:     Vitals:   Temp (24hrs), Av.5 °F (36.4 °C), Min:97.2 °F (36.2 °C), Max:97.9 °F (36.6 °C)    Temp:  [97.2 °F (36.2 °C)-97.9 °F (36.6 °C)] 97.2 °F (36.2 °C)  HR:  [66-96] 96  Resp:  [15-19] 15  BP: ()/(42-72) 98/42  SpO2:  [96 %-100 %] 97 %  Body mass index is 19.77 kg/m².     Input and Output Summary (last 24 hours):     Intake/Output Summary (Last 24 hours) at 2024 0840  Last data filed at 2024 1831  Gross per 24 hour   Intake 1050 ml   Output --   Net 1050 ml     Physical Exam  Vitals reviewed.   Constitutional:       General: He is not in acute distress.     Comments: Frail appearing, bilateral temporal wasting noted  HENT:      Head: Normocephalic and atraumatic.      Nose: No congestion.      Mouth/Throat:      Mouth: Mucous membranes are dry.      Pharynx: Oropharynx is  clear.   Eyes:      Conjunctiva/sclera: Conjunctivae normal.   Cardiovascular:      Rate and Rhythm: Normal rate and regular rhythm.      Heart sounds: Normal heart sounds. No murmur heard.  Pulmonary:      Effort: Pulmonary effort is normal. No respiratory distress.      Breath sounds: Normal breath sounds. No wheezing, rhonchi or rales.   Abdominal:      General: Bowel sounds are normal.      Palpations: Abdomen is soft.      Tenderness: There is no abdominal tenderness. There is no guarding or rebound.   Musculoskeletal:      Cervical back: Neck supple.      Right lower leg: No edema.      Left lower leg: No edema.      Comments: Small abrasion on left forearm     Lymphadenopathy:      Cervical: No cervical adenopathy.   Skin:     General: Skin is warm and dry.      Capillary Refill: Capillary refill <2  Neurological:      General: No focal deficit present.      Mental Status: He is alert and oriented to person, place, but not time (date is July 1994).   Psychiatric:         Mood and Affect: Mood normal.         Behavior: Behavior normal.         Thought Content: Thought content normal.     Additional Data:     Labs:  Results from last 7 days   Lab Units 08/25/24  0507   WBC Thousand/uL 3.86*   HEMOGLOBIN g/dL 9.8*   HEMATOCRIT % 29.9*   PLATELETS Thousands/uL 128*   SEGS PCT % 63   LYMPHO PCT % 24   MONO PCT % 7   EOS PCT % 4     Results from last 7 days   Lab Units 08/24/24  1555   SODIUM mmol/L 137   POTASSIUM mmol/L 4.7   CHLORIDE mmol/L 105   CO2 mmol/L 20*   BUN mg/dL 45*   CREATININE mg/dL 3.19*   ANION GAP mmol/L 12   CALCIUM mg/dL 9.1   ALBUMIN g/dL 3.6   TOTAL BILIRUBIN mg/dL 1.05*   ALK PHOS U/L 74   ALT U/L 13   AST U/L 23   GLUCOSE RANDOM mg/dL 225*     Results from last 7 days   Lab Units 08/24/24  1555   INR  1.22*     Results from last 7 days   Lab Units 08/25/24  0823 08/25/24  0635 08/24/24  2141 08/24/24  1502   POC GLUCOSE mg/dl 122 63* 152* 230*         Results from last 7 days   Lab Units  08/25/24  0507 08/24/24  1830 08/24/24  1555   LACTIC ACID mmol/L  --  0.9 2.3*   PROCALCITONIN ng/ml 0.64*  --  0.69*       Lines/Drains:  Invasive Devices       Peripheral Intravenous Line  Duration             Peripheral IV 08/24/24 Left Antecubital <1 day    Peripheral IV 08/24/24 Proximal;Right;Ventral (anterior) Forearm <1 day                      Telemetry:  Telemetry Orders (From admission, onward)               24 Hour Telemetry Monitoring  Continuous x 24 Hours (Telem)        Question:  Reason for 24 Hour Telemetry  Answer:  Syncope suspected to be cardiac in origin                     Telemetry Reviewed: Normal Sinus Rhythm  Indication for Continued Telemetry Use: Syncope             Imaging: Reviewed radiology reports from this admission including: chest xray    Recent Cultures (last 7 days):   Results from last 7 days   Lab Units 08/24/24  1555   BLOOD CULTURE  Received in Microbiology Lab. Culture in Progress.  Received in Microbiology Lab. Culture in Progress.       Last 24 Hours Medication List:   Current Facility-Administered Medications   Medication Dose Route Frequency Provider Last Rate    acetaminophen  650 mg Oral Q6H PRN Shasha Trotter DO      apixaban  2.5 mg Oral Q12H GUERRERO Shasha Trotter DO      atorvastatin  40 mg Oral Daily With Dinner Shasha Trotter DO      Cholecalciferol  1,000 Units Oral Daily Shasha Trotter DO      insulin lispro  1-5 Units Subcutaneous 4x Daily (AC & HS) Shasha Trotter DO      magnesium Oxide  400 mg Oral Daily Shasha Trotter DO      multi-electrolyte  75 mL/hr Intravenous Continuous Shasha Trotter DO 75 mL/hr (08/24/24 1930)    sodium chloride  75 mL/hr Intravenous Continuous Ehsan Devine MD          Today, Patient Was Seen By: Ehsan Devine MD    **Please Note: This note may have been constructed using a voice recognition system.**

## 2024-08-26 ENCOUNTER — APPOINTMENT (INPATIENT)
Dept: NON INVASIVE DIAGNOSTICS | Facility: HOSPITAL | Age: 89
DRG: 682 | End: 2024-08-26
Payer: MEDICARE

## 2024-08-26 ENCOUNTER — TELEPHONE (OUTPATIENT)
Age: 89
End: 2024-08-26

## 2024-08-26 PROBLEM — R26.2 AMBULATORY DYSFUNCTION: Status: ACTIVE | Noted: 2024-08-26

## 2024-08-26 PROBLEM — G47.00 INSOMNIA: Status: ACTIVE | Noted: 2024-08-26

## 2024-08-26 PROBLEM — E43 SEVERE PROTEIN-CALORIE MALNUTRITION (HCC): Status: ACTIVE | Noted: 2024-08-26

## 2024-08-26 PROBLEM — G93.40 ACUTE ENCEPHALOPATHY: Status: ACTIVE | Noted: 2024-08-26

## 2024-08-26 LAB
ALBUMIN SERPL BCG-MCNC: 3.2 G/DL (ref 3.5–5)
ALP SERPL-CCNC: 69 U/L (ref 34–104)
ALT SERPL W P-5'-P-CCNC: 16 U/L (ref 7–52)
ANION GAP SERPL CALCULATED.3IONS-SCNC: 8 MMOL/L (ref 4–13)
AORTIC ROOT: 2.9 CM
APICAL FOUR CHAMBER EJECTION FRACTION: 43 %
ASCENDING AORTA: 3.5 CM
AST SERPL W P-5'-P-CCNC: 22 U/L (ref 13–39)
BACTERIA UR QL AUTO: ABNORMAL /HPF
BASOPHILS # BLD AUTO: 0.02 THOUSANDS/ÂΜL (ref 0–0.1)
BASOPHILS NFR BLD AUTO: 0 % (ref 0–1)
BILIRUB SERPL-MCNC: 0.58 MG/DL (ref 0.2–1)
BILIRUB UR QL STRIP: NEGATIVE
BSA FOR ECHO PROCEDURE: 1.82 M2
BUN SERPL-MCNC: 39 MG/DL (ref 5–25)
CALCIUM ALBUM COR SERPL-MCNC: 8.5 MG/DL (ref 8.3–10.1)
CALCIUM SERPL-MCNC: 7.9 MG/DL (ref 8.4–10.2)
CHLORIDE SERPL-SCNC: 104 MMOL/L (ref 96–108)
CLARITY UR: CLEAR
CO2 SERPL-SCNC: 21 MMOL/L (ref 21–32)
COLOR UR: ABNORMAL
CREAT SERPL-MCNC: 2.38 MG/DL (ref 0.6–1.3)
E WAVE DECELERATION TIME: 198 MS
E/A RATIO: 0.65
EOSINOPHIL # BLD AUTO: 0.29 THOUSAND/ÂΜL (ref 0–0.61)
EOSINOPHIL NFR BLD AUTO: 5 % (ref 0–6)
ERYTHROCYTE [DISTWIDTH] IN BLOOD BY AUTOMATED COUNT: 13.4 % (ref 11.6–15.1)
FRACTIONAL SHORTENING: 28 (ref 28–44)
GFR SERPL CREATININE-BSD FRML MDRD: 22 ML/MIN/1.73SQ M
GLUCOSE SERPL-MCNC: 126 MG/DL (ref 65–140)
GLUCOSE SERPL-MCNC: 150 MG/DL (ref 65–140)
GLUCOSE SERPL-MCNC: 151 MG/DL (ref 65–140)
GLUCOSE SERPL-MCNC: 166 MG/DL (ref 65–140)
GLUCOSE SERPL-MCNC: 198 MG/DL (ref 65–140)
GLUCOSE SERPL-MCNC: 98 MG/DL (ref 65–140)
GLUCOSE UR STRIP-MCNC: ABNORMAL MG/DL
HCT VFR BLD AUTO: 29.1 % (ref 36.5–49.3)
HGB BLD-MCNC: 9.6 G/DL (ref 12–17)
HGB UR QL STRIP.AUTO: ABNORMAL
IMM GRANULOCYTES # BLD AUTO: 0.02 THOUSAND/UL (ref 0–0.2)
IMM GRANULOCYTES NFR BLD AUTO: 0 % (ref 0–2)
INTERVENTRICULAR SEPTUM IN DIASTOLE (PARASTERNAL SHORT AXIS VIEW): 1 CM
INTERVENTRICULAR SEPTUM: 1 CM (ref 0.6–1.1)
KETONES UR STRIP-MCNC: NEGATIVE MG/DL
LAAS-AP2: 21.3 CM2
LAAS-AP4: 25.4 CM2
LEFT ATRIUM SIZE: 3.6 CM
LEFT ATRIUM VOLUME (MOD BIPLANE): 75 ML
LEFT ATRIUM VOLUME INDEX (MOD BIPLANE): 41.2 ML/M2
LEFT INTERNAL DIMENSION IN SYSTOLE: 3.3 CM (ref 2.1–4)
LEFT VENTRICULAR INTERNAL DIMENSION IN DIASTOLE: 4.6 CM (ref 3.5–6)
LEFT VENTRICULAR POSTERIOR WALL IN END DIASTOLE: 1 CM
LEFT VENTRICULAR STROKE VOLUME: 56 ML
LEUKOCYTE ESTERASE UR QL STRIP: NEGATIVE
LVSV (TEICH): 56 ML
LYMPHOCYTES # BLD AUTO: 1.44 THOUSANDS/ÂΜL (ref 0.6–4.47)
LYMPHOCYTES NFR BLD AUTO: 25 % (ref 14–44)
MCH RBC QN AUTO: 31.1 PG (ref 26.8–34.3)
MCHC RBC AUTO-ENTMCNC: 33 G/DL (ref 31.4–37.4)
MCV RBC AUTO: 94 FL (ref 82–98)
MONOCYTES # BLD AUTO: 0.44 THOUSAND/ÂΜL (ref 0.17–1.22)
MONOCYTES NFR BLD AUTO: 8 % (ref 4–12)
MV E'TISSUE VEL-SEP: 5 CM/S
MV PEAK A VEL: 0.84 M/S
MV PEAK E VEL: 55 CM/S
MV STENOSIS PRESSURE HALF TIME: 57 MS
MV VALVE AREA P 1/2 METHOD: 3.86
NEUTROPHILS # BLD AUTO: 3.47 THOUSANDS/ÂΜL (ref 1.85–7.62)
NEUTS SEG NFR BLD AUTO: 62 % (ref 43–75)
NITRITE UR QL STRIP: POSITIVE
NON-SQ EPI CELLS URNS QL MICRO: ABNORMAL /HPF
NRBC BLD AUTO-RTO: 0 /100 WBCS
PH UR STRIP.AUTO: 5.5 [PH]
PLATELET # BLD AUTO: 117 THOUSANDS/UL (ref 149–390)
PMV BLD AUTO: 9.6 FL (ref 8.9–12.7)
POTASSIUM SERPL-SCNC: 4.6 MMOL/L (ref 3.5–5.3)
PROT SERPL-MCNC: 5.9 G/DL (ref 6.4–8.4)
PROT UR STRIP-MCNC: ABNORMAL MG/DL
RA PRESSURE ESTIMATED: 3 MMHG
RBC # BLD AUTO: 3.09 MILLION/UL (ref 3.88–5.62)
RBC #/AREA URNS AUTO: ABNORMAL /HPF
RIGHT ATRIAL 2D VOLUME: 76 ML
RIGHT ATRIUM AREA SYSTOLE A4C: 23.3 CM2
RIGHT VENTRICLE ID DIMENSION: 4.2 CM
RV PSP: 34 MMHG
SL CV LEFT ATRIUM LENGTH A2C: 5.7 CM
SL CV LV EF: 50
SL CV PED ECHO LEFT VENTRICLE DIASTOLIC VOLUME (MOD BIPLANE) 2D: 98 ML
SL CV PED ECHO LEFT VENTRICLE SYSTOLIC VOLUME (MOD BIPLANE) 2D: 43 ML
SODIUM SERPL-SCNC: 133 MMOL/L (ref 135–147)
SP GR UR STRIP.AUTO: 1.01 (ref 1–1.03)
TR MAX PG: 31 MMHG
TR PEAK VELOCITY: 2.8 M/S
TRICUSPID ANNULAR PLANE SYSTOLIC EXCURSION: 2.3 CM
TRICUSPID VALVE PEAK REGURGITATION VELOCITY: 2.8 M/S
TSH SERPL DL<=0.05 MIU/L-ACNC: 1.52 UIU/ML (ref 0.45–4.5)
UROBILINOGEN UR STRIP-ACNC: <2 MG/DL
VIT B12 SERPL-MCNC: 295 PG/ML (ref 180–914)
WBC # BLD AUTO: 5.68 THOUSAND/UL (ref 4.31–10.16)
WBC #/AREA URNS AUTO: ABNORMAL /HPF

## 2024-08-26 PROCEDURE — 97129 THER IVNTJ 1ST 15 MIN: CPT

## 2024-08-26 PROCEDURE — 84443 ASSAY THYROID STIM HORMONE: CPT | Performed by: FAMILY MEDICINE

## 2024-08-26 PROCEDURE — 97116 GAIT TRAINING THERAPY: CPT

## 2024-08-26 PROCEDURE — 85025 COMPLETE CBC W/AUTO DIFF WBC: CPT

## 2024-08-26 PROCEDURE — 93005 ELECTROCARDIOGRAM TRACING: CPT

## 2024-08-26 PROCEDURE — 81001 URINALYSIS AUTO W/SCOPE: CPT

## 2024-08-26 PROCEDURE — 97167 OT EVAL HIGH COMPLEX 60 MIN: CPT

## 2024-08-26 PROCEDURE — 82607 VITAMIN B-12: CPT | Performed by: FAMILY MEDICINE

## 2024-08-26 PROCEDURE — 82948 REAGENT STRIP/BLOOD GLUCOSE: CPT

## 2024-08-26 PROCEDURE — 80053 COMPREHEN METABOLIC PANEL: CPT

## 2024-08-26 PROCEDURE — 93306 TTE W/DOPPLER COMPLETE: CPT | Performed by: INTERNAL MEDICINE

## 2024-08-26 PROCEDURE — 97163 PT EVAL HIGH COMPLEX 45 MIN: CPT

## 2024-08-26 PROCEDURE — 99223 1ST HOSP IP/OBS HIGH 75: CPT | Performed by: FAMILY MEDICINE

## 2024-08-26 PROCEDURE — 87086 URINE CULTURE/COLONY COUNT: CPT

## 2024-08-26 PROCEDURE — 99232 SBSQ HOSP IP/OBS MODERATE 35: CPT | Performed by: INTERNAL MEDICINE

## 2024-08-26 PROCEDURE — C8929 TTE W OR WO FOL WCON,DOPPLER: HCPCS

## 2024-08-26 RX ORDER — SODIUM CHLORIDE 9 MG/ML
75 INJECTION, SOLUTION INTRAVENOUS CONTINUOUS
Status: DISCONTINUED | OUTPATIENT
Start: 2024-08-26 | End: 2024-08-26

## 2024-08-26 RX ORDER — LANOLIN ALCOHOL/MO/W.PET/CERES
3 CREAM (GRAM) TOPICAL
Status: DISCONTINUED | OUTPATIENT
Start: 2024-08-26 | End: 2024-08-28 | Stop reason: HOSPADM

## 2024-08-26 RX ORDER — ESCITALOPRAM OXALATE 10 MG/1
5 TABLET ORAL DAILY
Status: DISCONTINUED | OUTPATIENT
Start: 2024-08-27 | End: 2024-08-28 | Stop reason: HOSPADM

## 2024-08-26 RX ORDER — QUETIAPINE FUMARATE 25 MG/1
12.5 TABLET, FILM COATED ORAL EVERY 12 HOURS PRN
Status: DISCONTINUED | OUTPATIENT
Start: 2024-08-26 | End: 2024-08-27

## 2024-08-26 RX ADMIN — Medication 400 MG: at 09:49

## 2024-08-26 RX ADMIN — Medication 1000 UNITS: at 09:49

## 2024-08-26 RX ADMIN — INSULIN LISPRO 1 UNITS: 100 INJECTION, SOLUTION INTRAVENOUS; SUBCUTANEOUS at 17:30

## 2024-08-26 RX ADMIN — APIXABAN 2.5 MG: 2.5 TABLET, FILM COATED ORAL at 21:38

## 2024-08-26 RX ADMIN — SODIUM CHLORIDE 75 ML/HR: 0.9 INJECTION, SOLUTION INTRAVENOUS at 09:53

## 2024-08-26 RX ADMIN — APIXABAN 2.5 MG: 2.5 TABLET, FILM COATED ORAL at 09:49

## 2024-08-26 RX ADMIN — ATORVASTATIN CALCIUM 40 MG: 40 TABLET, FILM COATED ORAL at 17:28

## 2024-08-26 RX ADMIN — Medication 3 MG: at 21:38

## 2024-08-26 RX ADMIN — INSULIN LISPRO 1 UNITS: 100 INJECTION, SOLUTION INTRAVENOUS; SUBCUTANEOUS at 21:38

## 2024-08-26 RX ADMIN — PERFLUTREN 0.8 ML/MIN: 6.52 INJECTION, SUSPENSION INTRAVENOUS at 08:45

## 2024-08-26 RX ADMIN — SODIUM CHLORIDE 75 ML/HR: 0.9 INJECTION, SOLUTION INTRAVENOUS at 06:38

## 2024-08-26 NOTE — PLAN OF CARE
Problem: OCCUPATIONAL THERAPY ADULT  Goal: Performs self-care activities at highest level of function for planned discharge setting.  See evaluation for individualized goals.  Description: Treatment Interventions: ADL retraining, Functional transfer training, Cognitive reorientation, Patient/family training, Equipment evaluation/education, Compensatory technique education, Continued evaluation, Activityengagement          See flowsheet documentation for full assessment, interventions and recommendations.   Note: Limitation: Decreased ADL status, Decreased Safe judgement during ADL, Decreased cognition, Decreased self-care trans, Decreased high-level ADLs (balance, fxnl mobility, act teri, and fxnl reach, attention to task, safety awareness, insight, orientation, and problem solving)  Prognosis: Good  Assessment: Pt is a 89 y.o. male seen for OT evaluation s/p admit to [facility] on 8/24/2024 w/Syncope. Prior to admission, pt was living alone in a 2 level house, (I) with ADLs, (I) with IADLs, no AD vs SPC for mobility, (+) falls, (+) . Personal and environmental factors affecting patient at time of evaluation include advanced age, current habits and behavioral patterns, limited social support, inaccessible home environment, difficulty completing ADLs, and difficulty completing IADLs. Personal factors supporting patient at time of evaluation include (I) PLOF, supportive local niece, family, and attitude towards recovery. Based upon this evaluation, pt is functioning below baseline due to significant cognitive deficits impacting ability to safely live alone, impaired balance, strength and current need for physical A to perform ADLs/mobility. Pt will benefit from continued skilled inpatient OT to maximize safety, level of independence and overall performance in ADLs, functional transfers, functional mobility to return to functional baseline/highest level of function.     Rehab Resource Intensity Level,  OT: (S) II (Moderate Resource Intensity) (vs level III w/ 24 hr care, assist for ADLs/mobility, medication management and transportation. Chat sent to [facility] for [employer] form completion.)     BEBO Duran, OTR/L  [place] License [id number]  [place] License [id number]

## 2024-08-26 NOTE — CASE MANAGEMENT
Case Management Assessment & Discharge Planning Note    Patient name Leroy Paredes  Location S /S -01 MRN [id number]  : 1930 Date 2024       Current Admission Date: 2024  Current Admission Diagnosis:Syncope   Patient Active Problem List    Diagnosis Date Noted Date Diagnosed    Insomnia 2024     Acute encephalopathy 2024     Ambulatory dysfunction 2024     Severe protein-calorie malnutrition (HCC) 2024     Syncope 2024     Hyperlactatemia 2024     A-fib ([facility]) 2024     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[facility]) 2024     Encounter for loop recorder at end of battery life 2024     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  ([facility]) 2023     Closed fracture of one rib of left side with routine healing 09/15/2022     Fall 09/10/2022     Skin tear of right elbow without complication 2022     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[facility]) 2021     Embolic bilateral punctate CVA, acute as well as subacute 2020     Essential hypertension 2020     Secondary hyperparathyroidism of renal origin ([facility]) 2019     Acute kidney injury superimposed on chronic kidney disease  ([facility]) 2018     CKD stage 4 due to type 2 diabetes mellitus ([facility]) 2018     Palpitations 2018     Enlarged prostate without lower urinary tract symptoms (luts) 2013     Controlled diabetes mellitus ([facility]) 2012     Hyperlipidemia 2012     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 ([facility]) 2012       LOS (days): 2  Geometric Mean LOS (GMLOS) (days): 3.1  Days to GMLOS:1.1     OBJECTIVE:    Risk of Unplanned Readmission Score: 20.35         Current admission status: Inpatient       Preferred Pharmacy:   [id number] [place] - [address]  [facility] [address]  Phone: [phone] Fax: [phone]    Primary Care Provider: Pepito  DO Cristian    Primary Insurance: MEDICARE  Secondary Insurance: BLUE CROSS    ASSESSMENT:  Active Health Care Proxies    There are no active Health Care Proxies on file.                      Patient Information  Mental Status: Alert, Confused  During Assessment patient was accompanied by: Not accompanied during assessment  Assessment information provided by:: Other - please comment (Niece)  Primary Caregiver: Self  Support Systems: Family members  County of Residence: [place]  What city do you live in?: [place]  Home entry access options. Select all that apply.: Stairs  Type of Current Residence: 2 story home  Upon entering residence, is there a bedroom on the main floor (no further steps)?: No  A bedroom is located on the following floor levels of residence (select all that apply):: 2nd Floor  Upon entering residence, is there a bathroom on the main floor (no further steps)?: Yes  Number of steps to 2nd floor from main floor: One Flight  Living Arrangements: Lives Alone    Activities of Daily Living Prior to Admission  Functional Status: Assistance  Completes ADLs independently?: No  Level of ADL dependence: Assistance  Ambulates independently?: Yes  Does patient use assisted devices?: No  Does patient currently own DME?: Yes  What DME does the patient currently own?: Walker  Does patient have a history of Outpatient Therapy (PT/OT)?: No  Does the patient have a history of Short-Term Rehab?: No  Does patient have a history of HHC?: No  Does patient currently have HHC?: No         Patient Information Continued  Income Source: Pension/care home  Does patient have prescription coverage?: Yes  Does patient receive dialysis treatments?: No  Does patient have a history of substance abuse?: No  Does patient have a history of Mental Health Diagnosis?: No         Means of Transportation  Means of Transport to Appts:: Family transport      Social Determinants of Health (SDOH)      Flowsheet Row Most Recent Value    Housing Stability    In the last 12 months, was there a time when you were not able to pay the mortgage or rent on time? N   At any time in the past 12 months, were you homeless or living in a shelter (including now)? N   Transportation Needs    In the past 12 months, has lack of transportation kept you from medical appointments or from getting medications? no   In the past 12 months, has lack of transportation kept you from meetings, work, or from getting things needed for daily living? No   Food Insecurity    Within the past 12 months, you worried that your food would run out before you got the money to buy more. Never true   Within the past 12 months, the food you bought just didn't last and you didn't have money to get more. Never true   Utilities    In the past 12 months has the electric, gas, oil, or water company threatened to shut off services in your home? No            DISCHARGE DETAILS:    Discharge planning discussed with:: Patients vane Orozco  Freedom of Choice: Yes  Comments - Freedom of Choice: Cm spoke with patients nilaura to discuss dc planning. Family is in agreement with patient not being able to return home. They are in agreement with blanket referral for STR. Family anticipates transition to LTC or atleast needing assistance with finding assisted living facility if needed. Cm placed blanket referral into [facility].  CM contacted family/caregiver?: Yes  Were Treatment Team discharge recommendations reviewed with patient/caregiver?: Yes  Did patient/caregiver verbalize understanding of patient care needs?: N/A- going to facility  Were patient/caregiver advised of the risks associated with not following Treatment Team discharge recommendations?: Yes    Contacts  Patient Contacts: Michele reynolds  Relationship to Patient:: Family  Contact Method: Phone  Phone Number: see face sheet  Reason/Outcome: Discharge Planning, Emergency Contact    Requested Home Health Care         Is the patient  interested in HHC at discharge?: No    DME Referral Provided  Referral made for DME?: No    Other Referral/Resources/Interventions Provided:  Interventions: Short Term Rehab  Referral Comments: STR- blanket referral in [facility]. Need choice    Would you like to participate in our [facility] service program?  : No - Declined

## 2024-08-26 NOTE — MALNUTRITION/BMI
This medical record reflects one or more clinical indicators suggestive of malnutrition.    Malnutrition Findings:   Adult Malnutrition type: Chronic illness  Adult Degree of Malnutrition: Other severe protein calorie malnutrition  Malnutrition Characteristics: Muscle loss, Inadequate energy, Weight loss              360 Statement: Chronic/severe malnutrition r/t condition/decreased PO intake, as evidenced by 14.5% wt loss x 3 months (5/6/24: 165#, 8/26/24: 141#) , severe muscle mass depletion (temples, clavicle), and intake meeting <75% of estimated needs x > 1 month. Treatment: PO diet + oral nutrition supplements       Body mass index is 19.67 kg/m².     See Nutrition note dated 8/26/2024 for additional details.  Completed nutrition assessment is viewable in the nutrition documentation.

## 2024-08-26 NOTE — CONSULTS
Consultation - Geriatric Medicine   Leroy Paredes 89 y.o. male MRN: [id number]  Unit/Bed#: S -01 Encounter: [id number]      Assessment & Plan  (Plan was discussed with the attending)     Acute encephalopathy  Assessment & Plan  Assessment:  Patient originally presented with Syncope.   AAO x 2 to self and place upon my evaluation but he can waxe and wane.  He lives by himself and ADL independent.   States that he does miss his medications sometimes.   Mini cognitive exam: Scored 3/5   CT head 5/2023:   Mild cerebral volume loss.   Patchy areas of low-attenuation in supratentorial white matter, nonspecific but likely representing chronic microvascular ischemic changes.  Prolonged Qtc 546  Attempted to call both the niece and nephew twice with no response.   Per assessment he has cognitive impairment.     Plan:  Check B12 and TSH  Check bladder scan and UA  Would avoid medications that prolong Qtc  Need to figure out baseline from family.   Patient apparently needs help with medications at home.   Monitor orthostatics   Patient is at risk of hospital delirium.   Initiate delirium precautions.   Maintain normal sleep/wake cycle  Minimize overnight interruptions, group overnight vitals/labs/nursing checks as possible  Dim lights, close blinds and turn off tv to minimize stimulation and encourage sleep environment in evenings  Avoid medications which may precipitate or worsen delirium such as tramadol, benzodiazepine, anticholinergics, and antihistaminics    Controlled diabetes mellitus (HCC)  Assessment & Plan  Lab Results   Component Value Date    HGBA1C 6.8 (H) 07/12/2024       Recent Labs     08/25/24  2054 08/26/24  0215 08/26/24  0745 08/26/24  1210   POCGLU 156* 151* 98 166*       Blood Sugar Average: Last 72 hrs:  (P) 138.7    Home Regimen: Amaryl 2 mg.   Given his last A1C is optimal and well controlled compared to age would consider discontinuing Amaryl on discharge as it may be contributing to  hypoglycemia episodes for which the patient is not aware.     Ambulatory dysfunction  Assessment & Plan  States that he uses cane sometimes.   Active with ADL  Recommend no driving.   PT/OT     Insomnia  Assessment & Plan  Patient states that he stays up till 2 AM most days of the week.   Reports that he is sleeping fine in the hospital.  Start Melatonin 3 mg daily at 8 PM.     * Syncope  Assessment & Plan  Presented with syncope and decrease responsiveness on 8/24  No head strike or trauma.   Was found to be dry and hypotensive which improved with IV fluids.   Management per primary team.                       History of Present Illness   Physician Requesting Consult: Sheng Bustillos MD  Reason for Consult / Principal Problem: Agitation   History obtained from patient.       HPI: Leroy Paredes is a 89 y.o. year old male with PMHx Diabetes type 2, hypertension, hyperlipidemia, A-fib, anemia of chronic disease, CKD stage IV, HFpEF who presents with syncope.  Patient was feeling lightheaded and dizziness, had a syncopal episode and was found to be hypotensive and dry upon admission to which he improved with IV fluids. On my evaluation today, patient is AAO x 2 to self and place but can wax and wane.  Short-term memory loss is noticed as he can repeat the same story multiple times not knowing that he already mentioned it. States that he uses a cane sometimes. He takes care of his own medications but reports that he can miss some dosage sometimes.  Denies any falls in the past.  Mentions that he goes to sleep very late as he watches TV until 2 AM and wake up at 1 PM the next day. Denied constipation stating that he has 1 BM/day.  However, he had not had a bowel movement this admission.  enied any pain.  Reported that he has 2 beers/week.  Patient is very adamant that he is independent and can live on his own and takes care of his ADL.  He has no children and have never had a wife.  However, he has a nephew and  niece that check on him every once in a while.    Home Medications: Tylenol 650 mg every 6 hours as needed, Lipitor 40 mg, vitamin D 1000 units, Eliquis 2.5 mg twice daily, Amaryl 2 mg, lisinopril 10 mg, magnesium, terazosin 1 mg    Inpatient consult to Gerontology  Consult performed by: Bee Durbin MD  Consult ordered by: Ehsan Devine MD          Review of Systems   Constitutional:  Negative for chills and fever.   HENT:  Negative for ear pain and sore throat.    Eyes:  Negative for pain and visual disturbance.   Respiratory:  Negative for cough and shortness of breath.    Cardiovascular:  Negative for chest pain and palpitations.   Gastrointestinal:  Negative for abdominal pain and vomiting.   Genitourinary:  Negative for dysuria and hematuria.   Musculoskeletal:  Negative for arthralgias and back pain.   Skin:  Negative for color change and rash.   Neurological:  Negative for seizures and syncope.   Psychiatric/Behavioral:  Positive for sleep disturbance.    All other systems reviewed and are negative.          Historical Information   Past Medical History:   Diagnosis Date    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)     Last Assessed:  5/19/17    Chronic kidney disease     Diabetes mellitus (HCC)     Hyperlipidemia     Hypertension     Osteoarthritis     Palpitations     TIA (transient ischemic attack)      Past Surgical History:   Procedure Laterality Date    APPENDECTOMY      CARDIAC ELECTROPHYSIOLOGY PROCEDURE N/A 1/17/2024    Procedure: Cardiac loop recorder explant;  Surgeon: Drew Olmos MD;  Location: [facility];  Service: Cardiology    COLONOSCOPY  2012    HEMORROIDECTOMY      TOOTH EXTRACTION  02/02/2022     Social History   Social History     Substance and Sexual Activity   Alcohol Use Not Currently    Comment: Social drinker     Social History     Substance and Sexual Activity   Drug Use No     Social History     Tobacco Use   Smoking Status Former   Smokeless Tobacco Never     Family  History:   Family History   Problem Relation Age of Onset    Diabetes Mother     Other Mother         Stroke syndrome    Diabetes Father     Emphysema Father        Meds/Allergies   all current active meds have been reviewed    No Known Allergies    Objective     Intake/Output Summary (Last 24 hours) at 8/26/2024 1536  Last data filed at 8/26/2024 0916  Gross per 24 hour   Intake 480 ml   Output 1100 ml   Net -620 ml     Invasive Devices       Peripheral Intravenous Line  Duration             Peripheral IV 08/24/24 Left Antecubital 2 days    Peripheral IV 08/24/24 Proximal;Right;Ventral (anterior) Forearm 2 days                    Physical Exam  Vitals and nursing note reviewed.   Constitutional:       General: He is not in acute distress.     Appearance: He is well-developed.   HENT:      Head: Normocephalic and atraumatic.   Eyes:      Conjunctiva/sclera: Conjunctivae normal.   Cardiovascular:      Rate and Rhythm: Normal rate and regular rhythm.      Heart sounds: No murmur heard.  Pulmonary:      Effort: Pulmonary effort is normal. No respiratory distress.      Breath sounds: Normal breath sounds.   Abdominal:      Palpations: Abdomen is soft.      Tenderness: There is no abdominal tenderness.   Musculoskeletal:         General: No swelling.      Cervical back: Neck supple.      Right lower leg: No edema.      Left lower leg: No edema.   Skin:     General: Skin is warm and dry.      Capillary Refill: Capillary refill takes less than 2 seconds.   Neurological:      Mental Status: He is alert.      Comments: AAO x2 to self and place  Was able to recall 1/3 objects. Draw a clock.      Psychiatric:         Mood and Affect: Mood normal.         Lab Results:   I have personally reviewed pertinent lab results including the following:  -    I have personally reviewed the following imaging study reports in PACS:  -      Therapies:   PT: Level II   OT: Level II     VTE Prophylaxis: Eliquis     Code Status: Level 3 - DNAR  and DNI  Advance Directive and Living Will:      Power of : Yes  POLST:      Family and Social Support: None  No data recorded    Goals of Care: Treatment directed.    Thank you for allowing me to participate in your patients' care. Please do not hesitate to call with any additional questions.  Bee Durbin MD

## 2024-08-26 NOTE — ASSESSMENT & PLAN NOTE
Patient states that he stays up till 2 AM most days of the week.   He reported sleeping better last night.   Continue Melatonin 3 mg at Bedtime. Give Melatonin at 8 PM.

## 2024-08-26 NOTE — ASSESSMENT & PLAN NOTE
"Assessment:  Patient originally presented with Syncope.   AAO x 2 to self and place upon my evaluation but he can waxe and wane.  He lives by himself and ADL independent.   States that he does miss his medications sometimes.   Mini cognitive exam: Scored 3/5   CT head 5/2023:   Mild cerebral volume loss.   Patchy areas of low-attenuation in supratentorial white matter, nonspecific but likely representing chronic microvascular ischemic changes.  Prolonged Qtc 546  B12 this admission 295  Normal TSH   Had an extensive discussion with Niece \" Pam\" about the patient's baseline as below.   Patient was more calm today.   Per assessment, has cognitive impairment and needs assisted living.     Plan:  Start Lexapro 5 mg   Continue Melatonin 3 mg at bedtime.   Gabapentin 100 mg Q12H PRN   Would avoid medications that prolong Qtc  Patient apparently needs help with medications at home.   Fit drive test on discharge.   Needs to follow with Geriatrics outpatient   Patient is at risk of hospital delirium.   Initiate delirium precautions.   Maintain normal sleep/wake cycle  Minimize overnight interruptions, group overnight vitals/labs/nursing checks as possible  Dim lights, close blinds and turn off tv to minimize stimulation and encourage sleep environment in evenings  Avoid medications which may precipitate or worsen delirium such as tramadol, benzodiazepine, anticholinergics, and antihistaminics  "

## 2024-08-26 NOTE — PLAN OF CARE
Problem: PAIN - ADULT  Goal: Verbalizes/displays adequate comfort level or baseline comfort level  Description: Interventions:  - Encourage patient to monitor pain and request assistance  - Assess pain using appropriate pain scale  - Administer analgesics based on type and severity of pain and evaluate response  - Implement non-pharmacological measures as appropriate and evaluate response  - Consider cultural and social influences on pain and pain management  - Notify physician/advanced practitioner if interventions unsuccessful or patient reports new pain  Outcome: Progressing     Problem: INFECTION - ADULT  Goal: Absence or prevention of progression during hospitalization  Description: INTERVENTIONS:  - Assess and monitor for signs and symptoms of infection  - Monitor lab/diagnostic results  - Monitor all insertion sites, i.e. indwelling lines, tubes, and drains  - Monitor endotracheal if appropriate and nasal secretions for changes in amount and color  - [place] appropriate cooling/warming therapies per order  - Administer medications as ordered  - Instruct and encourage patient and family to use good hand hygiene technique  - Identify and instruct in appropriate isolation precautions for identified infection/condition  Outcome: Progressing  Goal: Absence of fever/infection during neutropenic period  Description: INTERVENTIONS:  - Monitor WBC    Outcome: Progressing     Problem: SAFETY ADULT  Goal: Patient will remain free of falls  Description: INTERVENTIONS:  - Educate patient/family on patient safety including physical limitations  - Instruct patient to call for assistance with activity   - Consult OT/PT to assist with strengthening/mobility   - Keep Call bell within reach  - Keep bed low and locked with side rails adjusted as appropriate  - Keep care items and personal belongings within reach  - Initiate and maintain comfort rounds  - Make Fall Risk Sign visible to staff  - Offer Toileting every 2 Hours,  in advance of need  - Initiate/Maintain alarm  - Obtain necessary fall risk management equipment:   - Apply yellow socks and bracelet for high fall risk patients  - Consider moving patient to room near nurses station  Outcome: Progressing  Goal: Maintain or return to baseline ADL function  Description: INTERVENTIONS:  -  Assess patient's ability to carry out ADLs; assess patient's baseline for ADL function and identify physical deficits which impact ability to perform ADLs (bathing, care of mouth/teeth, toileting, grooming, dressing, etc.)  - Assess/evaluate cause of self-care deficits   - Assess range of motion  - Assess patient's mobility; develop plan if impaired  - Assess patient's need for assistive devices and provide as appropriate  - Encourage maximum independence but intervene and supervise when necessary  - Involve family in performance of ADLs  - Assess for home care needs following discharge   - Consider OT consult to assist with ADL evaluation and planning for discharge  - Provide patient education as appropriate  Outcome: Progressing  Goal: Maintains/Returns to pre admission functional level  Description: INTERVENTIONS:  - Perform AM-PAC 6 Click Basic Mobility/ Daily Activity assessment daily.  - Set and communicate daily mobility goal to care team and patient/family/caregiver.   - Collaborate with rehabilitation services on mobility goals if consulted  - Perform Range of Motion 2 times a day.  - Reposition patient every 2 hours.  - Dangle patient 2 times a day  - Stand patient 2 times a day  - Ambulate patient 2 times a day  - Out of bed to chair 2 times a day   - Out of bed for meals 2 times a day  - Out of bed for toileting  - Record patient progress and toleration of activity level   Outcome: Progressing     Problem: DISCHARGE PLANNING  Goal: Discharge to home or other facility with appropriate resources  Description: INTERVENTIONS:  - Identify barriers to discharge w/patient and caregiver  -  Arrange for needed discharge resources and transportation as appropriate  - Identify discharge learning needs (meds, wound care, etc.)  - Arrange for interpretive services to assist at discharge as needed  - Refer to Case Management Department for coordinating discharge planning if the patient needs post-hospital services based on physician/advanced practitioner order or complex needs related to functional status, cognitive ability, or social support system  Outcome: Progressing     Problem: Knowledge Deficit  Goal: Patient/family/caregiver demonstrates understanding of disease process, treatment plan, medications, and discharge instructions  Description: Complete learning assessment and assess knowledge base.  Interventions:  - Provide teaching at level of understanding  - Provide teaching via preferred learning methods  Outcome: Progressing     Problem: CARDIOVASCULAR - ADULT  Goal: Maintains optimal cardiac output and hemodynamic stability  Description: INTERVENTIONS:  - Monitor I/O, vital signs and rhythm  - Monitor for S/S and trends of decreased cardiac output  - Administer and titrate ordered vasoactive medications to optimize hemodynamic stability  - Assess quality of pulses, skin color and temperature  - Assess for signs of decreased coronary artery perfusion  - Instruct patient to report change in severity of symptoms  Outcome: Progressing  Goal: Absence of cardiac dysrhythmias or at baseline rhythm  Description: INTERVENTIONS:  - Continuous cardiac monitoring, vital signs, obtain 12 lead EKG if ordered  - Administer antiarrhythmic and heart rate control medications as ordered  - Monitor electrolytes and administer replacement therapy as ordered  Outcome: Progressing     Problem: SKIN/TISSUE INTEGRITY - ADULT  Goal: Skin Integrity remains intact(Skin Breakdown Prevention)  Description: Assess:  -Perform Gianluca assessment   -Clean and moisturize skin   -Inspect skin when repositioning, toileting, and  assisting with ADLS  -Assess under medical devices   -Assess extremities for adequate circulation and sensation     Bed Management:  -Have minimal linens on bed & keep smooth, unwrinkled  -Change linens as needed when moist or perspiring  -Avoid sitting or lying in one position for more than 2 hours while in bed  -Keep HOB at 30degrees     Toileting:  -Offer bedside commode  -Assess for incontinence   -Use incontinent care products after each incontinent episode    Activity:  -Mobilize patient 2 times a day  -Encourage activity and walks on unit  -Encourage or provide ROM exercises   -Turn and reposition patient every 2 Hours  -Use appropriate equipment to lift or move patient in bed  -Instruct/ Assist with weight shifting when out of bed in chair  -Consider limitation of chair time 2 hour intervals    Skin Care:  -Avoid use of baby powder, tape, friction and shearing, hot water or constrictive clothing  -Relieve pressure over bony prominences  -Do not massage red bony areas    Next Steps:  -Teach patient strategies to minimize risks   -Consider consults to  interdisciplinary teams   Outcome: Progressing  Goal: Incision(s), wounds(s) or drain site(s) healing without S/S of infection  Description: INTERVENTIONS  - Assess and document dressing, incision, wound bed, drain sites and surrounding tissue  - Provide patient and family education  - Perform skin care/dressing changes utcome: Progressing     Problem: HEMATOLOGIC - ADULT  Goal: Maintains hematologic stability  Description: INTERVENTIONS  - Assess for signs and symptoms of bleeding or hemorrhage  - Monitor labs  - Administer supportive blood products/factors as ordered and appropriate  Outcome: Progressing     Problem: MUSCULOSKELETAL - ADULT  Goal: Maintain or return mobility to safest level of function  Description: INTERVENTIONS:  - Assess patient's ability to carry out ADLs; assess patient's baseline for ADL function and identify physical deficits which  impact ability to perform ADLs (bathing, care of mouth/teeth, toileting, grooming, dressing, etc.)  - Assess/evaluate cause of self-care deficits   - Assess range of motion  - Assess patient's mobility  - Assess patient's need for assistive devices and provide as appropriate  - Encourage maximum independence but intervene and supervise when necessary  - Involve family in performance of ADLs  - Assess for home care needs following discharge   - Consider OT consult to assist with ADL evaluation and planning for discharge  - Provide patient education as appropriate  Outcome: Progressing     Problem: Prexisting or High Potential for Compromised Skin Integrity  Goal: Skin integrity is maintained or improved  Description: INTERVENTIONS:  - Identify patients at risk for skin breakdown  - Assess and monitor skin integrity  - Assess and monitor nutrition and hydration status  - Monitor labs   - Assess for incontinence   - Turn and reposition patient  - Assist with mobility/ambulation  - Relieve pressure over bony prominences  - Avoid friction and shearing  - Provide appropriate hygiene as needed including keeping skin clean and dry  - Evaluate need for skin moisturizer/barrier cream  - Collaborate with interdisciplinary team   - Patient/family teaching  - Consider wound care consult   Outcome: Progressing     Problem: Nutrition/Hydration-ADULT  Goal: Nutrient/Hydration intake appropriate for improving, restoring or maintaining nutritional needs  Description: Monitor and assess patient's nutrition/hydration status for malnutrition. Collaborate with interdisciplinary team and initiate plan and interventions as ordered.  Monitor patient's weight and dietary intake as ordered or per policy. Utilize nutrition screening tool and intervene as necessary. Determine patient's food preferences and provide high-protein, high-caloric foods as appropriate.     INTERVENTIONS:  - Monitor oral intake, urinary output, labs, and treatment  plans  - Assess nutrition and hydration status and recommend course of action  - Evaluate amount of meals eaten  - Assist patient with eating if necessary   - Allow adequate time for meals  - Recommend/ encourage appropriate diets, oral nutritional supplements, and vitamin/mineral supplements  - Order, calculate, and assess calorie counts as needed  - Recommend, monitor, and adjust tube feedings and TPN/PPN based on assessed needs  - Assess need for intravenous fluids  - Provide specific nutrition/hydration education as appropriate  - Include patient/family/caregiver in decisions related to nutrition  Outcome: Progressing

## 2024-08-26 NOTE — PLAN OF CARE
Problem: PHYSICAL THERAPY ADULT  Goal: Performs mobility at highest level of function for planned discharge setting.  See evaluation for individualized goals.  Description: Treatment/Interventions: Functional transfer training, LE strengthening/ROM, Endurance training, Elevations, Therapeutic exercise, Cognitive reorientation, Patient/family training, Equipment eval/education, Bed mobility, Gait training, Compensatory technique education          See flowsheet documentation for full assessment, interventions and recommendations.  8/26/2024 1356 by Tatyana Sun PT  Note: Prognosis: Fair  Problem List: Impaired balance, Decreased mobility, Decreased cognition, Impaired judgement, Decreased safety awareness, Decreased strength  Assessment: Leroy Paredes is a 89 y.o. Male who presents to [facility] on [facility]/24[facility]24 due to syncope and diagnosis of syncope. Orders for PT eval and treat received, w/ activity orders of up and OOB as tolerated. Comorbidities affecting pt's functional mobility at time of evaluation include: DM, CKD, HTN, a-fib, CVA. Personal factors affecting DC include: lives in 2 story house, stairs to enter home, inability to navigate level surfaces w/o external assistance, decreased cognition, limited home support, positive fall history, and limited insight into impairments. At baseline, pt mobilizes independently w/ cane/walking stick prn, and w/ 1-4 fall(s) in the previous 6 months. Upon evaluation, pt presents w/ the following deficits: impaired strength, impaired balance, impaired cognition, decreased safety awareness, and gait deviations. Pt currently requires  min Ax1 for transfers, min-max Ax1 w/ no AD for ambulation. Pt's clinical presentation is unstable/unpredictable due to need for increased assistance w/ functional mobility compared to baseline, need for input for mobility technique, need for input for task focus, need to input for safety awareness, recent h/o falls, ongoing medical management. From a  PT/mobility standpoint given the above findings, DC recommendation is level: II (Moderate Rehab Resource Intensity). During current admission, pt will benefit from continued skilled inpatient PT in the acute care setting in order to address the above deficits and to maximize function and mobility prior to DC from acute care.  Barriers to Discharge: Inaccessible home environment, Decreased caregiver support (pt lives home alone, bedroom and bathroom are on the 2nd floor)     Rehab Resource Intensity Level, PT: II (Moderate Resource Intensity)    See flowsheet documentation for full assessment.

## 2024-08-26 NOTE — QUICK NOTE
Nursing reporting pt is waking up confused and disoriented repeatedly. Went to assess pt at bedside and he is currently awake and fully oriented. He demonstrates no focal neurologic findings and is without complaint, but expresses he does not recall how he got to the hospital. Provided redirection and reassurance. Pt would like his family to know how he is doing, will pass along to day team.

## 2024-08-26 NOTE — PROGRESS NOTES
Patient noted to have increased confusion and forgetfulness when he woke up from sleep. Patient repeatedly asking to go upstairs to sleep. Patient asking who brought him here, and why did he come here. Patient currently alert and oriented x 1 (person) .  Redirection semi effective. Vs wnl. Bs wnl. Emotional support provided. Milton made aware and at bedside to assess pt.

## 2024-08-26 NOTE — PLAN OF CARE
Problem: PAIN - ADULT  Goal: Verbalizes/displays adequate comfort level or baseline comfort level  Description: Interventions:  - Encourage patient to monitor pain and request assistance  - Assess pain using appropriate pain scale  - Administer analgesics based on type and severity of pain and evaluate response  - Implement non-pharmacological measures as appropriate and evaluate response  - Consider cultural and social influences on pain and pain management  - Notify physician/advanced practitioner if interventions unsuccessful or patient reports new pain  Outcome: Progressing     Problem: INFECTION - ADULT  Goal: Absence or prevention of progression during hospitalization  Description: INTERVENTIONS:  - Assess and monitor for signs and symptoms of infection  - Monitor lab/diagnostic results  - Monitor all insertion sites, i.e. indwelling lines, tubes, and drains  - Monitor endotracheal if appropriate and nasal secretions for changes in amount and color  - [place] appropriate cooling/warming therapies per order  - Administer medications as ordered  - Instruct and encourage patient and family to use good hand hygiene technique  - Identify and instruct in appropriate isolation precautions for identified infection/condition  Outcome: Progressing  Goal: Absence of fever/infection during neutropenic period  Description: INTERVENTIONS:  - Monitor WBC    Outcome: Progressing     Problem: INFECTION - ADULT  Goal: Absence of fever/infection during neutropenic period  Description: INTERVENTIONS:  - Monitor WBC    Outcome: Progressing     Problem: SAFETY ADULT  Goal: Patient will remain free of falls  Description: INTERVENTIONS:  - Educate patient/family on patient safety including physical limitations  - Instruct patient to call for assistance with activity   - Consult OT/PT to assist with strengthening/mobility   - Keep Call bell within reach  - Keep bed low and locked with side rails adjusted as appropriate  - Keep  care items and personal belongings within reach  - Initiate and maintain comfort rounds  - Make Fall Risk Sign visible to staff  - Offer Toileting every  Hours, in advance of need  - Initiate/Maintain alarm  - Obtain necessary fall risk management equipment:   - Apply yellow socks and bracelet for high fall risk patients  - Consider moving patient to room near nurses station  Outcome: Progressing  Goal: Maintain or return to baseline ADL function  Description: INTERVENTIONS:  -  Assess patient's ability to carry out ADLs; assess patient's baseline for ADL function and identify physical deficits which impact ability to perform ADLs (bathing, care of mouth/teeth, toileting, grooming, dressing, etc.)  - Assess/evaluate cause of self-care deficits   - Assess range of motion  - Assess patient's mobility; develop plan if impaired  - Assess patient's need for assistive devices and provide as appropriate  - Encourage maximum independence but intervene and supervise when necessary  - Involve family in performance of ADLs  - Assess for home care needs following discharge   - Consider OT consult to assist with ADL evaluation and planning for discharge  - Provide patient education as appropriate  Outcome: Progressing  Goal: Maintains/Returns to pre admission functional level  Description: INTERVENTIONS:  - Perform AM-PAC 6 Click Basic Mobility/ Daily Activity assessment daily.  - Set and communicate daily mobility goal to care team and patient/family/caregiver.   - Collaborate with rehabilitation services on mobility goals if consulted  - Perform Range of Motion  times a day.  - Reposition patient every  hours.  - Dangle patient  times a day  - Stand patient  times a day  - Ambulate patient  times a day  - Out of bed to chair  times a day   - Out of bed for meals  times a day  - Out of bed for toileting  - Record patient progress and toleration of activity level   Outcome: Progressing     Problem: DISCHARGE PLANNING  Goal:  Discharge to home or other facility with appropriate resources  Description: INTERVENTIONS:  - Identify barriers to discharge w/patient and caregiver  - Arrange for needed discharge resources and transportation as appropriate  - Identify discharge learning needs (meds, wound care, etc.)  - Arrange for interpretive services to assist at discharge as needed  - Refer to Case Management Department for coordinating discharge planning if the patient needs post-hospital services based on physician/advanced practitioner order or complex needs related to functional status, cognitive ability, or social support system  Outcome: Progressing
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
## 2024-08-26 NOTE — ASSESSMENT & PLAN NOTE
States that he uses cane sometimes.   Active with [facility]  Recommend no driving.   Pending rehab placement.

## 2024-08-26 NOTE — OCCUPATIONAL THERAPY NOTE
Occupational Therapy Evaluation/Treatment     Patient Name: Leroy Paredes  Today's Date: 2024  Problem List  Principal Problem:    Syncope  Active Problems:    Controlled diabetes mellitus (HCC)    Hyperlipidemia    Acute kidney injury superimposed on chronic kidney disease  (HCC)    CKD stage 4 due to type 2 diabetes mellitus (HCC)    Essential hypertension    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  (HCC)    A-fib (HCC)    Hyperlactatemia    Past Medical History  Past Medical History:   Diagnosis Date    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)     Last Assessed:  17    Chronic kidney disease     Diabetes mellitus (HCC)     Hyperlipidemia     Hypertension     Osteoarthritis     Palpitations     TIA (transient ischemic attack)      Past Surgical History  Past Surgical History:   Procedure Laterality Date    APPENDECTOMY      CARDIAC ELECTROPHYSIOLOGY PROCEDURE N/A 2024    Procedure: Cardiac loop recorder explant;  Surgeon: Drew Olmos MD;  Location: [facility];  Service: Cardiology    COLONOSCOPY  2012    HEMORROIDECTOMY      TOOTH EXTRACTION  2022       Patient's identity confirmed via 2 patient identifiers (full name and ) at start of session        24 1232   OT Last Visit   OT Visit Date 24   Note Type   Note type Evaluation  (and Treatment)   Pain Assessment   Pain Assessment Tool 0-10   Pain Score No Pain   Restrictions/Precautions   Weight Bearing Precautions Per Order No   Other Precautions Cognitive;Chair Alarm;Bed Alarm;Impulsive;Fall Risk   Home Living   Type of Home House   Home Layout Two level;1/2 bath on main level;Bed/bath upstairs;Stairs to enter with rails  (3 ROHAN; FFOS to second floor)   Bathroom Shower/Tub Tub/shower unit   Bathroom Toilet Standard   Bathroom Equipment Grab bars in shower;Shower chair  (does not use shower chair baseline)   Bathroom Accessibility Accessible   Home Equipment Walker;Cane  (walking stick)   Prior Function   Level  "of [place] Independent with ADLs;Independent with functional mobility;Independent with IADLS   Lives With (S)  Alone   Receives Help From Family  (local sister, niece)   IADLs Independent with driving;Independent with meal prep;Independent with medication management  (reports driving 20-30 miles/day; family sometimes assists w/ organizing pill box but then reports on the weeks they don't organize it it's hard for him, admits to missing medications)   Falls in the last 6 months 1 to 4  (1 per pt when cutting the hedges)   Vocational Retired  ()   Comments Pt is a questionable historian. Reports he is fully (I) PTA, will sometimes use SPC vs walking stick but states \"It rarely touches the ground, I don't want people knowing I need it\". Reports walking 2 miles/day for exercise.   Lifestyle   Autonomy Pt lives alone in a 2 level house and is fully (I) PTA, no AD vs SPC, (+) falls, (+)    Reciprocal Relationships Supportive local sister, niece   Service to Others Retired,    Intrinsic Gratification Pt enjoys walking 2 miles/day for exercise. Yard work   General   Additional Pertinent History Pt admitted due to x 2 episodes of syncope. Per EMR, pt also noted to have episodes of weakness and has been noted to miss medications. PMH includes: T2DM, HLD, CKD, HTN, A-fib, hx of CVA   Family/Caregiver Present No   Subjective   Subjective \"It's a silent 'K', like how girls should be\" (when explaining pronunciation of last name); \"9th grade is the year it all happens\"   ADL   Where Assessed Chair   Eating Assistance 7  Independent   Grooming Assistance 6  Modified Independent   UB Bathing Assistance 5  Supervision/Setup   LB Bathing Assistance 4  Minimal Assistance   UB Dressing Assistance 5  Supervision/Setup   LB Dressing Assistance 4  Minimal Assistance   Toileting Assistance  4  Minimal Assistance   Bed Mobility   Additional Comments Pt received in recliner upon arrival, returned at end of session " "  Transfers   Sit to Stand 4  Minimal assistance   Additional items Assist x 1;Armrests;Increased time required;Verbal cues   Stand to Sit 4  Minimal assistance   Additional items Assist x 1;Armrests;Increased time required;Verbal cues   Additional Comments Poorly controlled descent, pt states \"Move it\" when therapist attempted to assist pt, unsafe proximity to sitting surface   Functional Mobility   Functional Mobility 4  Minimal assistance   Additional Comments Initially trialed household distance w/ no AD, pt grossly unsteady, multiple LOB, reaching for furniture to steady. Episodes of tripping over feet. Second trial, pt initially refusing use of RW, agreeable w/ extensive encouragement. Continues to require min A x 1, cues for use of RW.   Additional items Rolling walker   Balance   Static Sitting Fair +   Dynamic Sitting Fair -   Static Standing Poor +   Dynamic Standing Poor   Activity Tolerance   Activity Tolerance Other (Comment)  (cognition, impulsivity)   Medical Staff Made Aware Care coordinated w/ PT ARTHUR Joe, [facility] residents Dr. Lisa Mercado, Dr. Oneal Laguna   Nurse Made Aware yes, RN Jackie weinstein to see pt and updated   RUE Assessment   RUE Assessment WFL   LUE Assessment   LUE Assessment WFL   Hand Function   Gross Motor Coordination Functional   Fine Motor Coordination Functional   Sensation   Light Touch No apparent deficits   Vision-Basic Assessment   Current Vision Other (Comment)  (pt highly inconsistent, reports he wears glasses for reading but not when reading the newspaper, hints at sending paperwork to [facility])   Cognition   Overall Cognitive Status (S)  Impaired   Arousal/Participation Alert;Cooperative   Attention Attends with cues to redirect   Orientation Level Oriented to person;Disoriented to place;Disoriented to time;Disoriented to situation  (Pt reported date as end of May, June 2054, then stated \"2053, next year is going to be 2055\". When corrected to 2024 pt stated, " "\"well I don't say it the way you do\". When asked for reason for hospital admission pt stated, \"oh I just stopped by\".)   Memory Decreased short term memory;Decreased recall of recent events;Decreased recall of precautions   Following Commands Follows one step commands with increased time or repetition   Comments (S)  Pleasantly confused, HIGHLY tangential. Reports he still drives and tells a story of people he sees driving after drinking 2 beers stating \"I see guys driving I hope they hit their own kid, I mean god bless the kid but still hit your own kid\". VERY poor insight, safety awareness, problem solving and attention to task. Highly inconsistent historian. See ACLS below.   Cognition Assessment Tools (S)  ACLS   Score (S)  3.4   Assessment   Limitation Decreased ADL status;Decreased Safe judgement during ADL;Decreased cognition;Decreased self-care trans;Decreased high-level ADLs  (balance, fxnl mobility, act teri, and fxnl reach, attention to task, safety awareness, insight, orientation, and problem solving)   Prognosis Good   Assessment Pt is a 89 y.o. male seen for OT evaluation s/p admit to [facility] on 8/24/2024 w/Syncope. Prior to admission, pt was living alone in a 2 level house, (I) with ADLs, (I) with IADLs, no AD vs SPC for mobility, (+) falls, (+) . Personal and environmental factors affecting patient at time of evaluation include advanced age, current habits and behavioral patterns, limited social support, inaccessible home environment, difficulty completing ADLs, and difficulty completing IADLs. Personal factors supporting patient at time of evaluation include (I) PLOF, supportive local niece, family, and attitude towards recovery. Based upon this evaluation, pt is functioning below baseline due to significant cognitive deficits impacting ability to safely live alone, impaired balance, strength and current need for physical A to perform ADLs/mobility. Pt will benefit from continued " skilled inpatient OT to maximize safety, level of independence and overall performance in ADLs, functional transfers, functional mobility to return to functional baseline/highest level of function.   Goals   Patient Goals to leave   LTG Time Frame 10-14   Long Term Goal #1 see goals below   Plan   Treatment Interventions ADL retraining;Functional transfer training;Cognitive reorientation;Patient/family training;Equipment evaluation/education;Compensatory technique education;Continued evaluation;Activityengagement   Goal Expiration Date 09/05/24   OT Treatment Day 0   OT Frequency 3-5x/wk   Discharge Recommendation   Rehab Resource Intensity Level, OT (S)  II (Moderate Resource Intensity)  (vs level III w/ 24 hr care, assist for ADLs/mobility, medication management and transportation. Chat sent to [facility] for [employer] form completion.)   AM-PAC Daily Activity Inpatient   Lower Body Dressing 3   Bathing 2   Toileting 3   Upper Body Dressing 3   Grooming 3   Eating 4   Daily Activity Raw Score 18   Daily Activity Standardized Score (Calc for Raw Score >=11) 38.66   AM-PAC Applied Cognition Inpatient   Following a Speech/Presentation 2   Understanding Ordinary Conversation 3   Taking Medications 1   Remembering Where Things Are Placed or Put Away 2   Remembering List of 4-5 Errands 1   Taking Care of Complicated Tasks 1   Applied Cognition Raw Score 10   Applied Cognition Standardized Score 24.98   Gallo Cognitive Level   Gallo Cognitive Level Score (S)  3.4   Gallo Cognitive Score Recommendation (S)  24 hour assistance   Gallo Cognitive Level Comments (S)  Pt completed assessment in quiet, distraction free environment. Pt able to complete x 3 running stitches w/o difficulty. Pt able to complete x 1 whipstitch followed by running stitch on back of leather, unable to recognize error and required VC to fix mistake. Unable to recognize error and continued making cross in back errors. Agreeable to demonstration after x 2  "additional trials to \"fix\" error. Pt remains unable to correctly make additional whipstitch, creating cross in back and twisted lace errors w/o recognition. Pt declined continuation of assessment.     3.4    Administered Gallo Cognitive Level Screen (ACLS).  Pt scored 3.4/6.0 indicating 24 hour care is recommended to sequence through routine steps of toileting, bathing, grooming and dressing.        Behavior:  Speaks without considering comprehension of listener.  May be distractible.  Grooming:  Spontaneously sustains actions of combing, brushing, shaving with electric razor or applying makeup.  Uses too much or too little toothpaste/makeup.  Looks at objects but fails to note effects.  Restrict access to harmful objects.  Dressing:  Selects items laid out and begins to don garments.  May pick several items and be unable to decide.  May quit before finished or don several layers.  May not pay attention to condition of garments (cleanliness or need for repair).  Bathing:  Picks up washcloth, soap, towel and wipes easy to reach body parts.  May wash in one spot, forget to use soap, may not remove all dirt or quit before task is complete.  Patient should not be left alone during bathing tasks.  Walking and exercising:  Ambulates within 2 or 3 familiar rooms to access desirable activities.  Can alter ambulation pace but is easily distracted.  May be impulsive when changing positions.  Has difficulty walking and talking at the same time.  Is at risk for falls.  Eating:  May anticipate meal times based on familiar signs (activity in kitchen).  Uses all utensils except knife.  Rate may be rushed and may eat strongly preferred items only.  Failure to observe manners may alienate others.  Check food and beverage temperature.  Cannot follow dietary restrictions.  Toileting:  Recognizes need to void and goes to familiar bathrooms.  May not close door while in bathroom.  Dons and doffs clothing slowly, may need help with " unusual fasteners.  Wipes but does not check results or wipes repeatedly using excess toilet paper.  May forget to wash hands.  May leave zipper open.  May need reminders to toilet in order to avoid accidents.  Medications:  May recognize medications by color or shape when it is given daily but does not note amounts or time of day.  Does not understand purpose of medications or side effects, may mistake for candy.  Prescription bottles need to be child proof.  Store medications in secure location away from patient. Pre-measured medications should be handed to the patient.   Use of adaptive devices: May be able to propel a wheelchair but cannot get around furniture and may get lost if allowed outside.  May not be able to utilize adaptive devices in safe manner.  May not be able to learn use of new adaptive device.  Housekeeping:  No awareness of need for housekeeping.  May  cloth and begin action of cleaning.  Does not note results and may quit when distracted.  Meal Preparation:  Does not plan for food.  May eat from refrigerator.  May be able to replicate repetitive actions for simple meal prep with supervision.  May be impulsive and require frequent redirection.  Restrict access to sharp tools and hot objects/surfaces.  Spending money:  May recognize local currency.  May hand money to another person in a familiar exchange situation; however, may not attend to amount given/received.  May not understand money is owed for services.  May loose money.  Should have assistance for all finances.  Shopping:  Follows a guide to shopping areas.  Looks in windows or at displays with no intent to purchase.  May take items without paying.  May fail to notice price or if they have enough money to pay.  Do not leave alone in shopping areas.  Laundry:  May not recognize clothing is dirty.  Has no awareness of methods of doing laundry.  May do repetitive actions but may not be able to sequence through steps of  task.  Traveling:  May be able to sit for 30 minutes in a car.  May not be aware of destination.  May recognize features on a familiar route.  May attempt to enter or leave car before it is fully stopped.  Use child safety locks.  Telephone:  Able to  phone when it rings and say “hello”.  May be able to call for another person or hang up when phone is not for them.  Not able to take messages.  May dial 1 or 2 known numbers, but may call for no reason.  May forget to hang up .  Driving:  Should not operate a motor vehicle     Additional Treatment Session   Start Time 1220   End Time 1232   Treatment Assessment Pt seen for additional treatment session to continue cognitive assessment and assist w/ safe DC management.   Additional Treatment Day 1   End of Consult   Patient Position at End of Consult Bedside chair;Bed/Chair alarm activated;All needs within reach   Nurse Communication Nurse aware of consult     GOALS:    *Goals established to promote patient goal of to leave:      *Patient will perform grooming tasks standing at sink with (S) in order to increase overall independence     *UB ADL with (I) for inc'd independence with self care    *LB ADL with (I) using AE prn for inc'd independence with self care    *Toileting with (I) for clothing management and hygiene to increase hygiene/thoroughness in order to reduce caregiver burden    *ADL transfers with (I) for inc'd independence with ADLs/purposeful tasks    *Patient will increase functional mobility to and from bathroom with rolling walker independently to increase independence with toileting    *Patient will engage in ongoing cognitive assessment to assist with safe discharge planning/recommendations.     *Patient will orient self x 4 with minimal verbal cues to increase overall awareness and promote safety with ADL/IADL tasks.     *Pt will consistently follow multi step directions during ADL performance w/ % accuracy to max I and safety w/  ADL performance    *Pt will attend to treatment task or activity for 10 minutes without need for redirection to improve activity engagement within 10 days.     The patient's raw score on the -PAC Daily Activity Inpatient Short Form is 18. A raw score of less than 19 suggests the patient may benefit from discharge to post-acute rehabilitation services. Please also refer to the recommendation of the Occupational Therapist for safe discharge planning.     Pt seen as a co-eval with PT due to the patient's co-morbidities and clinically unstable presentation indicated by chart review. During session, pt benefited from two skilled therapists due to extensive V required due to cognition/impulsivity and pt's decreased activity tolerance.      Monik Calixto, BEBO, OTR/L    [place] License [id number]  [place] License [id number]

## 2024-08-26 NOTE — PROGRESS NOTES
[facility]  Progress Note  Name: Leroy Paredes I  MRN: [id number]  Unit/Bed#: S -01 I Date of Admission: 8/24/2024   Date of Service: 8/26/2024 I Hospital Day: 2    Assessment & Plan   Lactic acidosis  Assessment & Plan  POA  Recent Labs     08/24/24  1555 08/24/24  1830   LACTICACID 2.3* 0.9      Resolved, suspected to be reactive      A-fib (Formerly [facility])  Assessment & Plan  Rate controlled  Coagulation: Eliquis 2.5 twice daily  Patient's niece notes that he only started taking this medication twice daily last week as he previously thought it was daily     Plan:  Continue Eliquis 2.5 twice daily  Monitor on telemetry      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  (Formerly [facility])  Assessment & Plan  Recent Labs     08/24/24  1555 08/25/24  0507   HGB 10.5* 9.8*     Hemoglobin currently at baseline    Plan:  Continue to monitor with daily CBCs    Essential hypertension  Assessment & Plan  Blood Pressure: (!) 98/42    Plan:  Medications held: lisinopril   Monitor blood pressure  Orthostatic BP  PRN IV Labetalol and Hydralazine for SBP > 160      CKD stage 4 due to type 2 diabetes mellitus (Formerly [facility])  Assessment & Plan  Lab Results   Component Value Date    HGBA1C 6.8 (H) 07/12/2024     Recent Labs     08/24/24  1555 08/25/24  1209   CREATININE 3.19* 2.66*   EGFR 15 19       Estimated Creatinine Clearance: 15.8 mL/min (A) (by C-G formula based on SCr of 2.66 mg/dL (H)).      Currently not at baseline     See plan for acute kidney injury superimposed on chronic kidney disease      Acute kidney injury superimposed on chronic kidney disease  (HCC)  Assessment & Plan  Present on admission    Lab Results   Component Value Date    EGFR 19 08/25/2024    EGFR 15 08/24/2024    EGFR 19 07/12/2024    CREATININE 2.66 (H) 08/25/2024    CREATININE 3.19 (H) 08/24/2024    CREATININE 2.64 (H) 07/12/2024     Plan:    Estimated Creatinine Clearance: 15.8 mL/min (A) (by C-G formula based on SCr of 2.66 mg/dL (H)).    POA 3.19;  (baseline 2.4-2.6)  Likely 2/2  Dehydration in setting of poor p.o intake    Plan:  IV Fluids 75 cc/hr.   S/P 500 cc bolus x2 in ED  Monitor BMP  Avoid hypoperfusion of the kidneys, minimize nephrotoxins  RAAS Blockers/Diuretics held: Lisinopril      Hyperlipidemia  Assessment & Plan  Lab Results   Component Value Date    CHOLESTEROL 109 07/12/2024    TRIG 85 07/12/2024    HDL 32 (L) 07/12/2024    LDLCALC 60 07/12/2024     Home medication: atorvastatin 40 mg daily    Plan:  Continue atorvastatin 40 mg daily    Controlled diabetes mellitus (HCC)  Assessment & Plan  Lab Results   Component Value Date    HGBA1C 6.8 (H) 07/12/2024       Recent Labs     08/24/24  2141 08/25/24  0635 08/25/24  0823 08/25/24  1131   POCGLU 152* 63* 122 146*       Blood Sugar Average: Last 72 hrs:  (P) 142.6    Home medications: Glimepiride 2 mg daily    Plan:  Hold home regimen while inpatient and resume on discharge   Diabetic diet  Insulin regimen  SSI algorithm 2  Glucose checks and Insulin correction ACHS  Goal -180 while admitted, adjusting insulin regimen as appropriate  Monitor for hypoglycemia and treat per protocol      * Syncope  Assessment & Plan  Patient presenting with multiple presyncopal episodes over the past few months.   On day of admission, had an episode with decreased responsiveness  No head strike or trauma  No associated chest pain, shortness of breath, or palpitations   Initial trop of 51 in ED  S/p 324 mg ASA   CXR (8/24/24) unremarkable for acute cardiopulmonary processes  ECG in ED appears NSR with notable artifact  Troponins reduced, lactate normalized    Plan:   Monitor on Tele  F/U Orthostatic BP  Echocardiogram ordered  Fall precautions  OT/PT eval and treat             VTE Pharmacologic Prophylaxis: VTE Score: 5 High Risk (Score >/= 5) - Pharmacological DVT Prophylaxis Ordered: enoxaparin (Lovenox). Sequential Compression Devices Ordered.    Mobility:      -[facility] Goal achieved. Continue to encourage  appropriate mobility.    Patient Centered Rounds: I performed bedside rounds with nursing staff today.   Discussions with Specialists or Other Care Team Provider: Attending, gerontology    Education and Discussions with Family / Patient: Updated  (niece) via phone.    Total Time Spent on Date of Encounter in care of patient: 45 mins. This time was spent on one or more of the following: performing physical exam; counseling and coordination of care; obtaining or reviewing history; documenting in the medical record; reviewing/ordering tests, medications or procedures; communicating with other healthcare professionals and discussing with patient's family/caregivers.    Current Length of Stay: 2 day(s)  Current Patient Status: Inpatient   Certification Statement: The patient will continue to require additional inpatient hospital stay due to syncope   Discharge Plan: Anticipate discharge in 48 hrs to rehab facility.    Code Status: Level 3 - DNAR and DNI    Subjective:   Patient seen and examined at bedside.  Patient agitated at time of exam, stating that he wished to leave and go up stairs that he was only here for short time.  Patient demonstrated poor insight into his condition, stating that he had driven himself to the hospital and not been brought by ambulance.  Family called and updated the situation, requested to proceed with plan for subacute rehab facility placement.    Objective:     Vitals:   Temp (24hrs), Av.9 °F (36.6 °C), Min:97.2 °F (36.2 °C), Max:98.3 °F (36.8 °C)    Temp:  [97.2 °F (36.2 °C)-98.3 °F (36.8 °C)] 97.8 °F (36.6 °C)  HR:  [71-96] 92  Resp:  [18] 18  BP: ()/(42-76) 161/76  SpO2:  [95 %-98 %] 95 %  Body mass index is 19.77 kg/m².     Input and Output Summary (last 24 hours):     Intake/Output Summary (Last 24 hours) at 2024 0762  Last data filed at 2024 0603  Gross per 24 hour   Intake 1040 ml   Output 1650 ml   Net -610 ml       Physical Exam:   Physical  Exam  Vitals reviewed.   Constitutional:       General: He is not in acute distress.     Comments: Frail appearing   HENT:      Head: Normocephalic and atraumatic.      Nose: No congestion.      Mouth/Throat:      Mouth: Mucous membranes are dry.      Pharynx: Oropharynx is clear.   Eyes:      Conjunctiva/sclera: Conjunctivae normal.   Cardiovascular:      Rate and Rhythm: Normal rate and regular rhythm.      Heart sounds: Normal heart sounds. No murmur heard.  Pulmonary:      Effort: Pulmonary effort is normal. No respiratory distress.      Breath sounds: Normal breath sounds. No wheezing, rhonchi or rales.   Abdominal:      General: Bowel sounds are normal.      Palpations: Abdomen is soft.      Tenderness: There is no abdominal tenderness. There is no guarding or rebound.   Musculoskeletal:      Cervical back: Neck supple.      Right lower leg: No edema.      Left lower leg: No edema.      Comments: Small abrasion on left forearm     Lymphadenopathy:      Cervical: No cervical adenopathy.   Skin:     General: Skin is warm and dry.      Capillary Refill: Capillary refill takes 2 to 3 seconds.   Neurological:      Mental Status: He is alert. Mental status is at baseline.   Psychiatric:      Comments: Agitated affect          Additional Data:     Labs:  Results from last 7 days   Lab Units 08/26/24  0454   WBC Thousand/uL 5.68   HEMOGLOBIN g/dL 9.6*   HEMATOCRIT % 29.1*   PLATELETS Thousands/uL 117*   SEGS PCT % 62   LYMPHO PCT % 25   MONO PCT % 8   EOS PCT % 5     Results from last 7 days   Lab Units 08/26/24  0454   SODIUM mmol/L 133*   POTASSIUM mmol/L 4.6   CHLORIDE mmol/L 104   CO2 mmol/L 21   BUN mg/dL 39*   CREATININE mg/dL 2.38*   ANION GAP mmol/L 8   CALCIUM mg/dL 7.9*   ALBUMIN g/dL 3.2*   TOTAL BILIRUBIN mg/dL 0.58   ALK PHOS U/L 69   ALT U/L 16   AST U/L 22   GLUCOSE RANDOM mg/dL 126     Results from last 7 days   Lab Units 08/24/24  1555   INR  1.22*     Results from last 7 days   Lab Units  24  0215 24  2054 24  1654 24  1131 24  0823 24  0635 24  2141 24  1502   POC GLUCOSE mg/dl 151* 156* 103 146* 122 63* 152* 230*         Results from last 7 days   Lab Units 24  0507 24  1830 24  1555   LACTIC ACID mmol/L  --  0.9 2.3*   PROCALCITONIN ng/ml 0.64*  --  0.69*       Lines/Drains:  Invasive Devices       Peripheral Intravenous Line  Duration             Peripheral IV 24 Left Antecubital 1 day    Peripheral IV 24 Proximal;Right;Ventral (anterior) Forearm 1 day                      Telemetry:  Telemetry Orders (From admission, onward)               24 Hour Telemetry Monitoring  Continuous x 24 Hours (Telem)           Question:  Reason for 24 Hour Telemetry  Answer:  Syncope suspected to be cardiac in origin                     Telemetry Reviewed: Normal Sinus Rhythm  Indication for Continued Telemetry Use: No indication for continued use. Will discontinue.              Imaging: Reviewed radiology reports from this admission including: xray(s)    Recent Cultures (last 7 days):   Results from last 7 days   Lab Units 24  1555   BLOOD CULTURE  No Growth at 24 hrs.   GRAM STAIN RESULT  Gram positive cocci in clusters*       Last 24 Hours Medication List:   Current Facility-Administered Medications   Medication Dose Route Frequency Provider Last Rate    acetaminophen  650 mg Oral Q6H PRN Shasha Trotter DO      apixaban  2.5 mg Oral Q12H GUERRERO Shasha Trotter DO      atorvastatin  40 mg Oral Daily With Dinner Shasha Trotter DO      Cholecalciferol  1,000 Units Oral Daily Shasha Trotter DO      insulin lispro  1-5 Units Subcutaneous 4x Daily (AC & HS) Shasha Trotter DO      magnesium Oxide  400 mg Oral Daily Shasha Trotter DO      sodium chloride  75 mL/hr Intravenous Continuous Oneal Laguna MD 75 mL/hr (24 0638)        Today, Patient Was Seen By: Lisa Mercado DO    **Please  Note: This note may have been constructed using a voice recognition system.**

## 2024-08-26 NOTE — ASSESSMENT & PLAN NOTE
Presented with syncope and decrease responsiveness on 8/24  No head strike or trauma.   Was found to be dry and hypotensive which improved with IV fluids.   Management per primary team.

## 2024-08-26 NOTE — ASSESSMENT & PLAN NOTE
Lab Results   Component Value Date    HGBA1C 6.8 (H) 07/12/2024       Recent Labs     08/25/24 2054 08/26/24  0215 08/26/24  0745 08/26/24  1210   POCGLU 156* 151* 98 166*       Blood Sugar Average: Last 72 hrs:  (P) 138.7    Home Regimen: Amaryl 2 mg.   Given his last A1C is optimal and well controlled compared to age would consider discontinuing Amaryl on discharge as it may be contributing to hypoglycemia episodes for which the patient is not aware.

## 2024-08-27 PROBLEM — N39.0 UTI (URINARY TRACT INFECTION): Status: ACTIVE | Noted: 2024-08-27

## 2024-08-27 PROBLEM — G93.41 ACUTE METABOLIC ENCEPHALOPATHY: Status: ACTIVE | Noted: 2024-08-26

## 2024-08-27 LAB
ALBUMIN SERPL BCG-MCNC: 3.1 G/DL (ref 3.5–5)
ALP SERPL-CCNC: 58 U/L (ref 34–104)
ALT SERPL W P-5'-P-CCNC: 13 U/L (ref 7–52)
ANION GAP SERPL CALCULATED.3IONS-SCNC: 6 MMOL/L (ref 4–13)
AST SERPL W P-5'-P-CCNC: 19 U/L (ref 13–39)
BASOPHILS # BLD AUTO: 0.02 THOUSANDS/ÂΜL (ref 0–0.1)
BASOPHILS NFR BLD AUTO: 0 % (ref 0–1)
BILIRUB SERPL-MCNC: 0.61 MG/DL (ref 0.2–1)
BUN SERPL-MCNC: 40 MG/DL (ref 5–25)
CALCIUM ALBUM COR SERPL-MCNC: 8.8 MG/DL (ref 8.3–10.1)
CALCIUM SERPL-MCNC: 8.1 MG/DL (ref 8.4–10.2)
CHLORIDE SERPL-SCNC: 107 MMOL/L (ref 96–108)
CO2 SERPL-SCNC: 21 MMOL/L (ref 21–32)
CREAT SERPL-MCNC: 1.96 MG/DL (ref 0.6–1.3)
EOSINOPHIL # BLD AUTO: 0.38 THOUSAND/ÂΜL (ref 0–0.61)
EOSINOPHIL NFR BLD AUTO: 8 % (ref 0–6)
ERYTHROCYTE [DISTWIDTH] IN BLOOD BY AUTOMATED COUNT: 13.6 % (ref 11.6–15.1)
GFR SERPL CREATININE-BSD FRML MDRD: 28 ML/MIN/1.73SQ M
GLUCOSE SERPL-MCNC: 102 MG/DL (ref 65–140)
GLUCOSE SERPL-MCNC: 132 MG/DL (ref 65–140)
GLUCOSE SERPL-MCNC: 138 MG/DL (ref 65–140)
GLUCOSE SERPL-MCNC: 154 MG/DL (ref 65–140)
GLUCOSE SERPL-MCNC: 73 MG/DL (ref 65–140)
GLUCOSE SERPL-MCNC: 83 MG/DL (ref 65–140)
HCT VFR BLD AUTO: 28.4 % (ref 36.5–49.3)
HGB BLD-MCNC: 9.3 G/DL (ref 12–17)
IMM GRANULOCYTES # BLD AUTO: 0.03 THOUSAND/UL (ref 0–0.2)
IMM GRANULOCYTES NFR BLD AUTO: 1 % (ref 0–2)
LYMPHOCYTES # BLD AUTO: 0.98 THOUSANDS/ÂΜL (ref 0.6–4.47)
LYMPHOCYTES NFR BLD AUTO: 19 % (ref 14–44)
MCH RBC QN AUTO: 30.6 PG (ref 26.8–34.3)
MCHC RBC AUTO-ENTMCNC: 32.7 G/DL (ref 31.4–37.4)
MCV RBC AUTO: 93 FL (ref 82–98)
MONOCYTES # BLD AUTO: 0.43 THOUSAND/ÂΜL (ref 0.17–1.22)
MONOCYTES NFR BLD AUTO: 9 % (ref 4–12)
NEUTROPHILS # BLD AUTO: 3.24 THOUSANDS/ÂΜL (ref 1.85–7.62)
NEUTS SEG NFR BLD AUTO: 63 % (ref 43–75)
NRBC BLD AUTO-RTO: 0 /100 WBCS
PLATELET # BLD AUTO: 112 THOUSANDS/UL (ref 149–390)
PMV BLD AUTO: 9.6 FL (ref 8.9–12.7)
POTASSIUM SERPL-SCNC: 4.8 MMOL/L (ref 3.5–5.3)
PROT SERPL-MCNC: 5.7 G/DL (ref 6.4–8.4)
RBC # BLD AUTO: 3.04 MILLION/UL (ref 3.88–5.62)
SODIUM SERPL-SCNC: 134 MMOL/L (ref 135–147)
WBC # BLD AUTO: 5.08 THOUSAND/UL (ref 4.31–10.16)

## 2024-08-27 PROCEDURE — 80053 COMPREHEN METABOLIC PANEL: CPT

## 2024-08-27 PROCEDURE — 99232 SBSQ HOSP IP/OBS MODERATE 35: CPT | Performed by: INTERNAL MEDICINE

## 2024-08-27 PROCEDURE — 99233 SBSQ HOSP IP/OBS HIGH 50: CPT | Performed by: FAMILY MEDICINE

## 2024-08-27 PROCEDURE — 82948 REAGENT STRIP/BLOOD GLUCOSE: CPT

## 2024-08-27 PROCEDURE — 85025 COMPLETE CBC W/AUTO DIFF WBC: CPT

## 2024-08-27 RX ORDER — GABAPENTIN 100 MG/1
100 CAPSULE ORAL EVERY 12 HOURS PRN
Status: DISCONTINUED | OUTPATIENT
Start: 2024-08-27 | End: 2024-08-28 | Stop reason: HOSPADM

## 2024-08-27 RX ORDER — CYANOCOBALAMIN 1000 UG/ML
1000 INJECTION, SOLUTION INTRAMUSCULAR; SUBCUTANEOUS ONCE
Status: COMPLETED | OUTPATIENT
Start: 2024-08-27 | End: 2024-08-27

## 2024-08-27 RX ADMIN — APIXABAN 2.5 MG: 2.5 TABLET, FILM COATED ORAL at 10:22

## 2024-08-27 RX ADMIN — Medication 400 MG: at 10:22

## 2024-08-27 RX ADMIN — INSULIN LISPRO 1 UNITS: 100 INJECTION, SOLUTION INTRAVENOUS; SUBCUTANEOUS at 17:20

## 2024-08-27 RX ADMIN — CEFTRIAXONE SODIUM 1000 MG: 10 INJECTION, POWDER, FOR SOLUTION INTRAVENOUS at 11:54

## 2024-08-27 RX ADMIN — CYANOCOBALAMIN 1000 MCG: 1000 INJECTION, SOLUTION INTRAMUSCULAR; SUBCUTANEOUS at 14:55

## 2024-08-27 RX ADMIN — Medication 1000 UNITS: at 10:22

## 2024-08-27 RX ADMIN — ESCITALOPRAM OXALATE 5 MG: 10 TABLET ORAL at 10:21

## 2024-08-27 RX ADMIN — APIXABAN 2.5 MG: 2.5 TABLET, FILM COATED ORAL at 21:44

## 2024-08-27 RX ADMIN — Medication 3 MG: at 21:44

## 2024-08-27 RX ADMIN — ATORVASTATIN CALCIUM 40 MG: 40 TABLET, FILM COATED ORAL at 17:22

## 2024-08-27 NOTE — ASSESSMENT & PLAN NOTE
Lab Results   Component Value Date    HGBA1C 6.8 (H) 07/12/2024     Recent Labs     08/25/24  1209 08/26/24  0454 08/27/24  0448   CREATININE 2.66* 2.38* 1.96*   EGFR 19 22 28       Estimated Creatinine Clearance: 21.3 mL/min (A) (by C-G formula based on SCr of 1.96 mg/dL (H)).      Currently at baseline (2.4-2.6)     See plan for acute kidney injury superimposed on chronic kidney disease

## 2024-08-27 NOTE — ASSESSMENT & PLAN NOTE
Patient presenting with multiple presyncopal episodes over the past few months.   On day of admission, had an episode with decreased responsiveness  No head strike or trauma  No associated chest pain, shortness of breath, or palpitations   Initial trop of 51 in ED  S/p 324 mg ASA   CXR (8/24/24) unremarkable for acute cardiopulmonary processes  ECG in ED appears NSR with notable artifact  Troponins reduced, lactate normalized  Echo 8/26: EF 50%    Plan:   F/U Orthostatic BP  Fall precautions  OT/PT eval and treat

## 2024-08-27 NOTE — ASSESSMENT & PLAN NOTE
Lab Results   Component Value Date    EGFR 28 08/27/2024    EGFR 22 08/26/2024    EGFR 19 08/25/2024    CREATININE 1.96 (H) 08/27/2024    CREATININE 2.38 (H) 08/26/2024    CREATININE 2.66 (H) 08/25/2024

## 2024-08-27 NOTE — ASSESSMENT & PLAN NOTE
Blood Pressure: 109/65    Plan:  Medications held: lisinopril   Monitor blood pressure  Orthostatic BP  PRN IV Labetalol and Hydralazine for SBP > 160

## 2024-08-27 NOTE — ASSESSMENT & PLAN NOTE
Lab Results   Component Value Date    HGBA1C 6.8 (H) 07/12/2024       Recent Labs     08/27/24  2125 08/27/24  2229 08/28/24  0751 08/28/24  1138   POCGLU 73 132 92 149*       Blood Sugar Average: Last 72 hrs:  (P) 129    Home medications: Glimepiride 2 mg daily    Plan:  Stop taking home glimepiride  F/u with PCP for further evaluation and management

## 2024-08-27 NOTE — PROGRESS NOTES
"Former[facility]  Progress Note - Geriatric Medicine   Leroy Paredes 89 y.o. male MRN: [id number]  Unit/Bed#: S -01 Encounter: [id number]    Assessment & Plan      Acute metabolic encephalopathy  Assessment & Plan  Assessment:  Patient originally presented with Syncope.   AAO x 2 to self and place upon my evaluation but he can waxe and wane.  He lives by himself and ADL independent.   States that he does miss his medications sometimes.   Mini cognitive exam: Scored 3/5   CT head 5/2023:   Mild cerebral volume loss.   Patchy areas of low-attenuation in supratentorial white matter, nonspecific but likely representing chronic microvascular ischemic changes.  Prolonged Qtc 546  B12 this admission 295  Normal TSH   Had an extensive discussion with Niece \" Pam\" about the patient's baseline as below.   Patient was more calm today.   Per assessment, has cognitive impairment and needs assisted living.     Plan:  Start Lexapro 5 mg   Continue Melatonin 3 mg at bedtime.   Gabapentin 100 mg Q12H PRN   Would avoid medications that prolong Qtc  Patient apparently needs help with medications at home.   Fit drive test on discharge.   Needs to follow with Geriatrics outpatient   Patient is at risk of hospital delirium.   Initiate delirium precautions.   Maintain normal sleep/wake cycle  Minimize overnight interruptions, group overnight vitals/labs/nursing checks as possible  Dim lights, close blinds and turn off tv to minimize stimulation and encourage sleep environment in evenings  Avoid medications which may precipitate or worsen delirium such as tramadol, benzodiazepine, anticholinergics, and antihistaminics    Controlled diabetes mellitus (HCC)  Assessment & Plan  Lab Results   Component Value Date    HGBA1C 6.8 (H) 07/12/2024       Recent Labs     08/25/24  2054 08/26/24  0215 08/26/24  0745 08/26/24  1210   POCGLU 156* 151* 98 166*       Blood Sugar Average: Last 72 hrs:  (P) 138.7    Home " Regimen: Amaryl 2 mg.   Given his last A1C is optimal and well controlled compared to age would consider discontinuing Amaryl on discharge as it may be contributing to hypoglycemia episodes for which the patient is not aware.     UTI (urinary tract infection)  Assessment & Plan  Assessment:  Reports dysuria but not frequency or flank pain.   No fever or leukocytosis.   UA with signs of UTI    Plan:  Treatment with ceftriaxone as per primary team.     Severe protein-calorie malnutrition (HCC)  Assessment & Plan  Malnutrition Findings:   Adult Malnutrition type: Chronic illness  Adult Degree of Malnutrition: Other severe protein calorie malnutrition  Malnutrition Characteristics: Muscle loss, Inadequate energy, Weight loss                  360 Statement: Chronic/severe malnutrition r/t condition/decreased PO intake, as evidenced by 14.5% wt loss x 3 months (5/6/24: 165#, 8/26/24: 141#) , severe muscle mass depletion (temples, clavicle), and intake meeting <75% of estimated needs x > 1 month. Treatment: PO diet + oral nutrition supplements    BMI Findings:           Body mass index is 19.67 kg/m².       Ambulatory dysfunction  Assessment & Plan  States that he uses cane sometimes.   Active with ADL  Recommend no driving.   Pending rehab placement.     Insomnia  Assessment & Plan  Patient states that he stays up till 2 AM most days of the week.   He reported sleeping better last night.   Continue Melatonin 3 mg at Bedtime. Give Melatonin at 8 PM.     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  (HCC)  Assessment & Plan  Lab Results   Component Value Date    EGFR 28 08/27/2024    EGFR 22 08/26/2024    EGFR 19 08/25/2024    CREATININE 1.96 (H) 08/27/2024    CREATININE 2.38 (H) 08/26/2024    CREATININE 2.66 (H) 08/25/2024       CKD stage 4 due to type 2 diabetes mellitus (HCC)  Assessment & Plan  Lab Results   Component Value Date    HGBA1C 6.8 (H) 07/12/2024       Recent Labs     08/26/24  1701 08/26/24  2029 08/27/24  0741  "08/27/24  1128   POCGLU 198* 150* 102 138       Blood Sugar Average: Last 72 hrs:  (P) 141.1900699696879763      Acute kidney injury superimposed on chronic kidney disease  (HCC)  Assessment & Plan  Lab Results   Component Value Date    EGFR 28 08/27/2024    EGFR 22 08/26/2024    EGFR 19 08/25/2024    CREATININE 1.96 (H) 08/27/2024    CREATININE 2.38 (H) 08/26/2024    CREATININE 2.66 (H) 08/25/2024       * Syncope  Assessment & Plan  Presented with syncope and decrease responsiveness on 8/24  No head strike or trauma.   Was found to be dry and hypotensive which improved with IV fluids.   Management per primary team.                  Subjective:   Patient was seen and examined this morning. Reports that he slept well last night from around 11 PM up till breakfast time around 8:30 AM. Complained of dysuria but denied pain or fever. Stated he had a BM yesterday night. When asked about his ability to move around the house given that its two stairs. He said the he has been using a bottle to pass urine as the bathroom is on the second floor. He is agreeable on being placed in a rehab.     I talked to the niece \"Pam\"- Stated that his cognitive function has been worsening specially short-term memory in the past few months and he had few falls too. She is his main caregiver and checks on him once daily as she is retired. Stated that he is stubborn when it comes to help, he refused a visiting nurse and up until recently he allowed her to help with his medications as he was missing them.   She also had concerns regarding his sleep pattern, he sleeps until 3 PM and misses breakfast and morning medications. She is worried that he is still driving even though its short distance around the house, she is been trying to drive him most of the time in the past 8 months. She does agree that he needs assisted living after the rehab and he shouldn't be carrying a driving license.       Review of Systems   Constitutional:  Negative " "for chills and fever.   HENT:  Negative for ear pain and sore throat.    Eyes:  Negative for pain and visual disturbance.   Respiratory:  Negative for cough and shortness of breath.    Cardiovascular:  Negative for chest pain and palpitations.   Gastrointestinal:  Negative for abdominal pain, constipation and vomiting.   Genitourinary:  Positive for dysuria. Negative for hematuria.   Musculoskeletal:  Negative for arthralgias and back pain.   Skin:  Negative for color change and rash.   Neurological:  Negative for seizures and syncope.   All other systems reviewed and are negative.        Objective:     Vitals: Blood pressure 109/65, pulse 66, temperature 98.4 °F (36.9 °C), resp. rate 17, height 5' 11\" (1.803 m), weight 64 kg (141 lb), SpO2 96%.,Body mass index is 19.67 kg/m².      Intake/Output Summary (Last 24 hours) at 8/27/2024 1408  Last data filed at 8/27/2024 0900  Gross per 24 hour   Intake 240 ml   Output 550 ml   Net -310 ml       Current Medications: Reviewed    Physical Exam:   Physical Exam  Vitals and nursing note reviewed.   Constitutional:       General: He is not in acute distress.     Appearance: He is well-developed. He is cachectic.   HENT:      Head: Normocephalic and atraumatic.   Eyes:      Conjunctiva/sclera: Conjunctivae normal.   Cardiovascular:      Rate and Rhythm: Normal rate and regular rhythm.      Heart sounds: No murmur heard.  Pulmonary:      Effort: Pulmonary effort is normal. No respiratory distress.      Breath sounds: Normal breath sounds.   Abdominal:      Palpations: Abdomen is soft.      Tenderness: There is no abdominal tenderness.   Musculoskeletal:         General: No swelling.      Cervical back: Neck supple.   Skin:     General: Skin is warm and dry.      Capillary Refill: Capillary refill takes less than 2 seconds.   Psychiatric:         Mood and Affect: Mood normal.          Invasive Devices       Peripheral Intravenous Line  Duration             Peripheral IV 08/24/24 " Left Antecubital 2 days    Peripheral IV 08/24/24 Proximal;Right;Ventral (anterior) Forearm 2 days                    Lab, Imaging and other studies: I have personally reviewed pertinent reports.

## 2024-08-27 NOTE — ASSESSMENT & PLAN NOTE
Present on admission    Lab Results   Component Value Date    EGFR 28 08/27/2024    EGFR 22 08/26/2024    EGFR 19 08/25/2024    CREATININE 1.96 (H) 08/27/2024    CREATININE 2.38 (H) 08/26/2024    CREATININE 2.66 (H) 08/25/2024     Plan:    Estimated Creatinine Clearance: 21.3 mL/min (A) (by C-G formula based on SCr of 1.96 mg/dL (H)).    POA 3.19; (baseline 2.4-2.6)  Likely 2/2  Dehydration in setting of poor p.o intake    Plan:  Discontinued IV Fluids 75 cc/hr, encourage po intake  S/P 500 cc bolus x2 in ED  Monitor BMP  Avoid hypoperfusion of the kidneys, minimize nephrotoxins  RAAS Blockers/Diuretics held: Lisinopril

## 2024-08-27 NOTE — ASSESSMENT & PLAN NOTE
Patient presenting with multiple presyncopal episodes over the past few months.   On day of admission, had an episode with decreased responsiveness  No head strike or trauma  No associated chest pain, shortness of breath, or palpitations   Initial trop of 51 in ED  S/p 324 mg ASA   CXR (8/24/24) unremarkable for acute cardiopulmonary processes  ECG in ED appears NSR with notable artifact  Troponins reduced, lactate normalized  Echo 8/26: EF 50%    Plan:   Fall precautions  Stop taking glimepiride   F/u with PCP

## 2024-08-27 NOTE — ASSESSMENT & PLAN NOTE
- Metabolic Encephalopathy likely due to dehydration, superimposed on cognitive impairment at baseline, evidened by confusion. waxing/waning orientation requiring Geriatric consult, correction of dehydration and monitoring of neuro status.   - Encourage po intake   - Frequent reorientation as needed  - Continue home vit B12 dose, received IM vit B12 while hospitalized; can have labs rechecked with PCP

## 2024-08-27 NOTE — ASSESSMENT & PLAN NOTE
Lab Results   Component Value Date    HGBA1C 6.8 (H) 07/12/2024       Recent Labs     08/26/24  1701 08/26/24 2029 08/27/24  0741 08/27/24  1128   POCGLU 198* 150* 102 138         Blood Sugar Average: Last 72 hrs:  (P) 141.8022980763241226    Home medications: Glimepiride 2 mg daily    Plan:  Hold home regimen while inpatient and resume on discharge   Diabetic diet  Insulin regimen  SSI algorithm 2  Glucose checks and Insulin correction ACHS  Goal -180 while admitted, adjusting insulin regimen as appropriate  Monitor for hypoglycemia and treat per protocol

## 2024-08-27 NOTE — WOUND OSTOMY CARE
"Consult Note - Wound   Leroy Paredes 89 y.o. male MRN: [id number]  Unit/Bed#: S -01 Encounter: [id number]        History and Present Illness:  Patient is a 89 yo male that was [place] [facility] of syncope. Patient has a PMH of CKD, type 2 diabetes mellitus, hypertension, hyperlipidemia, anemia. Patient is alert and oriented times three and agreeable to assessment. Patient is assist of one for turning and repositioning. Patient is continent of bowel and bladder. On assessment, patient is in bed, pillows in place for offloading.    Wound Care was consulted for buttocks wound.     Assessment Findings:   B/L heels are dry intact and nereyda with no skin loss or wounds present. Recommend preventative Hydraguard Cream and proper offloading/ repositioning.        POA Right Buttocks Stage 2 Pressure Injury - round shaped area of partial thickness skin loss. Wound bed is 100% pink non blanchable tissue. No drainage appreciated. Betsy wound is hyperpigmented, blanchable and intact. Suspect etiology of pressure and friction. Patient endorses that wound has been present \"about a week\", presence of hyperpigmentation suggests longer.   Sacrum and left buttocks is dry, intact, pink in color and blanches. No skin loss or wounds present. Recommend preventative hydragaurd to area.     No induration, fluctuance, odor, warmth/temperature differences, redness, or purulence noted to the above noted wounds and skin areas assessed. New dressings applied per orders listed below. Patient tolerated well- no s/s of non-verbal pain or discomfort observed during the encounter. Bedside nurse aware of plan of care. See flow sheets for more detailed assessment findings.      Orders listed below and wound care will continue to follow, call or Secure Chat with questions.     Skin care plans:  1-Hydraguard to sacrum, Left buttock and bilateral heels BID and PRN  2-Elevate heels to offload pressure.  3-Ehob cushion in chair when out " of bed.  4-Moisturize skin daily with skin nourishing cream.  5-Turn/reposition q2h for pressure re-distribution on skin.  6- R Buttock: irrigate with NSS. Pat dry. Cover with silicone foam dressing. Change every other day    Wounds:    Wound 08/25/24 Pressure Injury Buttocks Right (Active)   Wound Image   08/27/24 1012   Wound Description Fragile;Pink 08/27/24 1012   Pressure Injury Stage 2 08/27/24 1012   Betsy-wound Assessment Hyperpigmented;Dry;Intact 08/27/24 1012   Wound Length (cm) 1 cm 08/27/24 1012   Wound Width (cm) 1 cm 08/27/24 1012   Wound Depth (cm) 0.1 cm 08/27/24 1012   Wound Surface Area (cm^2) 1 cm^2 08/27/24 1012   Wound Volume (cm^3) 0.1 cm^3 08/27/24 1012   Calculated Wound Volume (cm^3) 0.1 cm^3 08/27/24 1012   Drainage Amount None 08/27/24 1012   Non-staged Wound Description Partial thickness 08/27/24 1012   Treatments Cleansed;Site care 08/27/24 1012   Dressing Foam, Silicon (eg. Allevyn, etc) 08/27/24 1012   Wound packed? No 08/27/24 1012   Packing- # removed 0 08/27/24 1012   Packing- # inserted 0 08/27/24 1012   Dressing Changed New 08/27/24 1012   Patient Tolerance Tolerated well 08/27/24 1012   Dressing Status Clean;Dry;Intact 08/27/24 1012               Melva Jennings RN, BSN, CCRN

## 2024-08-27 NOTE — DISCHARGE INSTR - OTHER ORDERS
Skin care plans:  1-Hydraguard to sacrum, Left buttock and bilateral heels BID and PRN  2-Elevate heels to offload pressure.  3-Ehob cushion in chair when out of bed.  4-Moisturize skin daily with skin nourishing cream.  5-Turn/reposition q2h for pressure re-distribution on skin.  6- R Buttock: irrigate with NSS. Pat dry. Cover with silicone foam dressing. Change every other day

## 2024-08-27 NOTE — ASSESSMENT & PLAN NOTE
- Metabolic Encephalopathy likely due to dehydration, superimposed on cognitive impairment at baseline, evidened by confusion. waxing/waning orientation requiring Geriatric consult, correction of dehydration and monitoring of neuro status.   - Encourage po intake   - Replete electrolytes prn   - Frequent reorientation to prevent hospital delirium   - Vitamin B12 low normal, ordered IM vit B12

## 2024-08-27 NOTE — PROGRESS NOTES
[facility]  Progress Note  Name: Leroy Paredes I  MRN: [id number]  Unit/Bed#: S -01 I Date of Admission: 8/24/2024   Date of Service: 8/27/2024 I Hospital Day: 3    Assessment & Plan   * Syncope  Assessment & Plan  Patient presenting with multiple presyncopal episodes over the past few months.   On day of admission, had an episode with decreased responsiveness  No head strike or trauma  No associated chest pain, shortness of breath, or palpitations   Initial trop of 51 in ED  S/p 324 mg ASA   CXR (8/24/24) unremarkable for acute cardiopulmonary processes  ECG in ED appears NSR with notable artifact  Troponins reduced, lactate normalized  Echo 8/26: EF 50%    Plan:   F/U Orthostatic BP  Fall precautions  OT/PT eval and treat      Acute metabolic encephalopathy  Assessment & Plan  - Metabolic Encephalopathy likely due to dehydration, superimposed on cognitive impairment at baseline, evidened by confusion. waxing/waning orientation requiring Geriatric consult, correction of dehydration and monitoring of neuro status.   - Encourage po intake   - Replete electrolytes prn   - Frequent reorientation to prevent hospital delirium   - Vitamin B12 low normal, ordered IM vit B12    Urinary tract infection without hematuria  Assessment & Plan  - Burning sensation during urination noted on 8/26  - 8/26 UA: positive nitrites, neg leuk, 1+ prot, small blood; micro: 10-20 WBC  - Could be contributing to encephalopathy   - Continue ceftriaxone IV, consider switching to po   - Urinary retention protocol     Severe protein-calorie malnutrition (HCC)  Assessment & Plan  Malnutrition Findings:   Adult Malnutrition type: Chronic illness  Adult Degree of Malnutrition: Other severe protein calorie malnutrition  Malnutrition Characteristics: Muscle loss, Inadequate energy, Weight loss      Severe protein-calorie malnutrition r/t condition/decreased PO intake, as evidenced by 14.5% wt loss x 3 months (5/6/24:  165#, 8/26/24: 141#) , severe muscle mass depletion (temples, clavicle), and intake meeting <75% of estimated needs x > 1 month. Treatment: PO diet + oral nutrition supplements.             360 Statement: Chronic/severe malnutrition r/t condition/decreased PO intake, as evidenced by 14.5% wt loss x 3 months (5/6/24: 165#, 8/26/24: 141#) , severe muscle mass depletion (temples, clavicle), and intake meeting <75% of estimated needs x > 1 month. Treatment: PO diet + oral nutrition supplements    BMI Findings:           Body mass index is 19.67 kg/m².       Ambulatory dysfunction  Assessment & Plan  - PT/OT    Insomnia  Assessment & Plan  - Melatonin 3 mg hs  - Gabapentin 100 mg q12 prn  - Sleep hygiene techniques     Hyperlactatemia  Assessment & Plan  POA  Recent Labs     08/24/24  1555 08/24/24  1830   LACTICACID 2.3* 0.9        Resolved, suspected to be reactive      A-fib ([facility])  Assessment & Plan  Rate controlled  Coagulation: Eliquis 2.5 twice daily  Patient's niece notes that he only started taking this medication twice daily last week as he previously thought it was daily     Plan:  Continue Eliquis 2.5 twice daily  Monitor on telemetry      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  (HCC)  Assessment & Plan  Recent Labs     08/25/24  0507 08/26/24  0454 08/27/24  0448   HGB 9.8* 9.6* 9.3*       Hemoglobin currently at baseline    Plan:  Continue to monitor with daily CBCs    Essential hypertension  Assessment & Plan  Blood Pressure: 109/65    Plan:  Medications held: lisinopril   Monitor blood pressure  Orthostatic BP  PRN IV Labetalol and Hydralazine for SBP > 160      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      Currently at baseline (2.4-2.6)     See plan for acute kidney injury superimposed on chronic kidney disease      Acute kidney injury superimposed on chronic kidney disease  (HCC)  Assessment & Plan  Present on admission    Lab Results   Component Value Date    EGFR 28 08/27/2024    EGFR 22 08/26/2024    EGFR 19 08/25/2024    CREATININE 1.96 (H) 08/27/2024    CREATININE 2.38 (H) 08/26/2024    CREATININE 2.66 (H) 08/25/2024     Plan:    Estimated Creatinine Clearance: 21.3 mL/min (A) (by C-G formula based on SCr of 1.96 mg/dL (H)).    POA 3.19; (baseline 2.4-2.6)  Likely 2/2  Dehydration in setting of poor p.o intake    Plan:  Discontinued IV Fluids 75 cc/hr, encourage po intake  S/P 500 cc bolus x2 in ED  Monitor BMP  Avoid hypoperfusion of the kidneys, minimize nephrotoxins  RAAS Blockers/Diuretics held: Lisinopril      Hyperlipidemia  Assessment & Plan  Lab Results   Component Value Date    CHOLESTEROL 109 07/12/2024    TRIG 85 07/12/2024    HDL 32 (L) 07/12/2024    LDLCALC 60 07/12/2024     Home medication: atorvastatin 40 mg daily    Plan:  Continue atorvastatin 40 mg daily    Controlled diabetes mellitus (HCC)  Assessment & Plan  Lab Results   Component Value Date    HGBA1C 6.8 (H) 07/12/2024       Recent Labs     08/26/24  1701 08/26/24  2029 08/27/24  0741 08/27/24  1128   POCGLU 198* 150* 102 138         Blood Sugar Average: Last 72 hrs:  (P) 141.9429013204859483    Home medications: Glimepiride 2 mg daily    Plan:  Hold home regimen while inpatient and resume on discharge   Diabetic diet  Insulin regimen  SSI algorithm 2  Glucose checks and Insulin correction ACHS  Goal -180 while admitted, adjusting insulin regimen as appropriate  Monitor for hypoglycemia and treat per protocol             VTE Pharmacologic Prophylaxis: VTE Score: 5 High Risk (Score >/= 5) - Pharmacological DVT Prophylaxis Ordered: apixaban (Eliquis). Sequential Compression Devices Ordered.    Mobility:   Basic Mobility  Inpatient Raw Score: 14  JH-HLM Goal: 4: Move to chair/commode  JH-HLM Achieved: 8: Walk 250 feet ot more  JH-HLM Goal achieved. Continue to encourage appropriate mobility.    Patient Centered Rounds: I performed bedside rounds with nursing staff today.   Discussions with Specialists or Other Care Team Provider: case management, geriatrics, nutrition    Education and Discussions with Family / Patient: Updated  (niece) via phone.    Total Time Spent on Date of Encounter in care of patient: 45 mins. This time was spent on one or more of the following: performing physical exam; counseling and coordination of care; obtaining or reviewing history; documenting in the medical record; reviewing/ordering tests, medications or procedures; communicating with other healthcare professionals and discussing with patient's family/caregivers.    Current Length of Stay: 3 day(s)  Current Patient Status: Inpatient   Certification Statement: The patient will continue to require additional inpatient hospital stay due to placement  Discharge Plan: Anticipate discharge in 24-48 hrs to rehab facility.    Code Status: Level 3 - DNAR and DNI    Subjective:   Patient was seen and examined at bedside this a.m.  Patient reports that he slept well and like to go back to sleep.  Patient no longer having burning sensation during urination.  No other complaints at this time.    Objective:     Vitals:   Temp (24hrs), Av.2 °F (36.8 °C), Min:97.7 °F (36.5 °C), Max:98.4 °F (36.9 °C)    Temp:  [97.7 °F (36.5 °C)-98.4 °F (36.9 °C)] 98.4 °F (36.9 °C)  HR:  [66-83] 66  Resp:  [17-18] 17  BP: (101-116)/(41-65) 109/65  SpO2:  [96 %-99 %] 96 %  Body mass index is 19.67 kg/m².     Input and Output Summary (last 24 hours):     Intake/Output Summary (Last 24 hours) at 2024 1435  Last data filed at 2024 0900  Gross per 24 hour   Intake 240 ml   Output 550 ml   Net -310 ml       Physical Exam:   Physical Exam  Vitals and nursing note  reviewed.   Constitutional:       General: He is not in acute distress.     Appearance: Normal appearance. He is well-developed.   HENT:      Head: Normocephalic and atraumatic.      Right Ear: External ear normal.      Left Ear: External ear normal.      Nose: Nose normal.      Mouth/Throat:      Pharynx: Oropharynx is clear.   Eyes:      Extraocular Movements: Extraocular movements intact.      Conjunctiva/sclera: Conjunctivae normal.      Pupils: Pupils are equal, round, and reactive to light.   Cardiovascular:      Rate and Rhythm: Normal rate and regular rhythm.      Pulses: Normal pulses.      Heart sounds: Normal heart sounds. No murmur heard.  Pulmonary:      Effort: Pulmonary effort is normal. No respiratory distress.      Breath sounds: Normal breath sounds.   Abdominal:      General: Abdomen is flat.      Palpations: Abdomen is soft.      Tenderness: There is no abdominal tenderness.   Musculoskeletal:         General: No swelling.      Cervical back: Normal range of motion and neck supple.      Right lower leg: No edema.      Left lower leg: No edema.   Skin:     General: Skin is warm and dry.      Capillary Refill: Capillary refill takes less than 2 seconds.   Neurological:      Mental Status: He is alert. He is disoriented.   Psychiatric:         Mood and Affect: Mood normal.         Behavior: Behavior normal.          Additional Data:     Labs:  Results from last 7 days   Lab Units 08/27/24  0448   WBC Thousand/uL 5.08   HEMOGLOBIN g/dL 9.3*   HEMATOCRIT % 28.4*   PLATELETS Thousands/uL 112*   SEGS PCT % 63   LYMPHO PCT % 19   MONO PCT % 9   EOS PCT % 8*     Results from last 7 days   Lab Units 08/27/24  0448   SODIUM mmol/L 134*   POTASSIUM mmol/L 4.8   CHLORIDE mmol/L 107   CO2 mmol/L 21   BUN mg/dL 40*   CREATININE mg/dL 1.96*   ANION GAP mmol/L 6   CALCIUM mg/dL 8.1*   ALBUMIN g/dL 3.1*   TOTAL BILIRUBIN mg/dL 0.61   ALK PHOS U/L 58   ALT U/L 13   AST U/L 19   GLUCOSE RANDOM mg/dL 83     Results  from last 7 days   Lab Units 24  1555   INR  1.22*     Results from last 7 days   Lab Units 24  1128 24  0741 24  2029 24  1701 24  1210 24  0745 24  0215 24  2054 24  1654 24  1131 24  0823 24  0635   POC GLUCOSE mg/dl 138 102 150* 198* 166* 98 151* 156* 103 146* 122 63*         Results from last 7 days   Lab Units 24  0507 24  1830 24  1555   LACTIC ACID mmol/L  --  0.9 2.3*   PROCALCITONIN ng/ml 0.64*  --  0.69*       Lines/Drains:  Invasive Devices       Peripheral Intravenous Line  Duration             Peripheral IV 24 Left Antecubital 2 days    Peripheral IV 24 Proximal;Right;Ventral (anterior) Forearm 2 days                      Telemetry:  Telemetry Orders (From admission, onward)               24 Hour Telemetry Monitoring  Continuous x 24 Hours (Telem)           Question:  Reason for 24 Hour Telemetry  Answer:  Syncope suspected to be cardiac in origin                       Imaging: Reviewed radiology reports from this admission including: chest xray    Recent Cultures (last 7 days):   Results from last 7 days   Lab Units 24  1555   BLOOD CULTURE  Staphylococcus epidermidis*  No Growth at 48 hrs.   GRAM STAIN RESULT  Gram positive cocci in clusters*       Last 24 Hours Medication List:   Current Facility-Administered Medications   Medication Dose Route Frequency Provider Last Rate    acetaminophen  650 mg Oral Q6H PRN Shasha Trotter DO      apixaban  2.5 mg Oral Q12H [facility] Shasha Trotter DO      atorvastatin  40 mg Oral Daily With Dinner Shasha Trotter DO      cefTRIAXone  1,000 mg Intravenous Q24H Lisa Sostorecz, DO 1,000 mg (24 1154)    Cholecalciferol  1,000 Units Oral Daily Shasha Trotter DO      cyanocobalamin  1,000 mcg Intramuscular Once Oneal Laguan MD      escitalopram  5 mg Oral Daily Jayla Ngo MD      gabapentin  100 mg Oral Q12H PRN Oneal F  MD Luz Elena      insulin lispro  1-5 Units Subcutaneous 4x Daily (AC & HS) Shasha Trotter DO      magnesium Oxide  400 mg Oral Daily Shasha Trotter DO      melatonin  3 mg Oral HS Bee Durbin MD          Today, Patient Was Seen By: Lisa Mercado DO    **Please Note: This note may have been constructed using a voice recognition system.**

## 2024-08-27 NOTE — PLAN OF CARE
Problem: PAIN - ADULT  Goal: Verbalizes/displays adequate comfort level or baseline comfort level  Description: Interventions:  - Encourage patient to monitor pain and request assistance  - Assess pain using appropriate pain scale  - Administer analgesics based on type and severity of pain and evaluate response  - Implement non-pharmacological measures as appropriate and evaluate response  - Consider cultural and social influences on pain and pain management  - Notify physician/advanced practitioner if interventions unsuccessful or patient reports new pain  Outcome: Progressing     Problem: INFECTION - ADULT  Goal: Absence or prevention of progression during hospitalization  Description: INTERVENTIONS:  - Assess and monitor for signs and symptoms of infection  - Monitor lab/diagnostic results  - Monitor all insertion sites, i.e. indwelling lines, tubes, and drains  - Monitor endotracheal if appropriate and nasal secretions for changes in amount and color  - [place] appropriate cooling/warming therapies per order  - Administer medications as ordered  - Instruct and encourage patient and family to use good hand hygiene technique  - Identify and instruct in appropriate isolation precautions for identified infection/condition  Outcome: Progressing  Goal: Absence of fever/infection during neutropenic period  Description: INTERVENTIONS:  - Monitor WBC    Outcome: Progressing     Problem: SAFETY ADULT  Goal: Patient will remain free of falls  Description: INTERVENTIONS:  - Educate patient/family on patient safety including physical limitations  - Instruct patient to call for assistance with activity   - Consult OT/PT to assist with strengthening/mobility   - Keep Call bell within reach  - Keep bed low and locked with side rails adjusted as appropriate  - Keep care items and personal belongings within reach  - Initiate and maintain comfort rounds  - Make Fall Risk Sign visible to staff  - Offer Toileting every 2 Hours,  in advance of need  - Initiate/Maintain bed alarm    - Apply yellow socks and bracelet for high fall risk patients  - Consider moving patient to room near nurses station  Outcome: Progressing  Goal: Maintain or return to baseline ADL function  Description: INTERVENTIONS:  -  Assess patient's ability to carry out ADLs; assess patient's baseline for ADL function and identify physical deficits which impact ability to perform ADLs (bathing, care of mouth/teeth, toileting, grooming, dressing, etc.)  - Assess/evaluate cause of self-care deficits   - Assess range of motion  - Assess patient's mobility; develop plan if impaired  - Assess patient's need for assistive devices and provide as appropriate  - Encourage maximum independence but intervene and supervise when necessary  - Involve family in performance of ADLs  - Assess for home care needs following discharge   - Consider OT consult to assist with ADL evaluation and planning for discharge  - Provide patient education as appropriate  Outcome: Progressing  Goal: Maintains/Returns to pre admission functional level  Description: INTERVENTIONS:  - Perform AM-PAC 6 Click Basic Mobility/ Daily Activity assessment daily.  - Set and communicate daily mobility goal to care team and patient/family/caregiver.   - Collaborate with rehabilitation services on mobility goals if consulted  - Perform Range of Motion 3 times a day.  - Reposition patient every 2 hours.  - Dangle patient 3 times a day  - Stand patient 3 times a day  - Ambulate patient 3 times a day  - Out of bed to chair 3 times a day   - Out of bed for meals 3 times a day  - Out of bed for toileting  - Record patient progress and toleration of activity level   Outcome: Progressing     Problem: DISCHARGE PLANNING  Goal: Discharge to home or other facility with appropriate resources  Description: INTERVENTIONS:  - Identify barriers to discharge w/patient and caregiver  - Arrange for needed discharge resources and  transportation as appropriate  - Identify discharge learning needs (meds, wound care, etc.)  - Arrange for interpretive services to assist at discharge as needed  - Refer to Case Management Department for coordinating discharge planning if the patient needs post-hospital services based on physician/advanced practitioner order or complex needs related to functional status, cognitive ability, or social support system  Outcome: Progressing     Problem: Knowledge Deficit  Goal: Patient/family/caregiver demonstrates understanding of disease process, treatment plan, medications, and discharge instructions  Description: Complete learning assessment and assess knowledge base.  Interventions:  - Provide teaching at level of understanding  - Provide teaching via preferred learning methods  Outcome: Progressing     Problem: CARDIOVASCULAR - ADULT  Goal: Maintains optimal cardiac output and hemodynamic stability  Description: INTERVENTIONS:  - Monitor I/O, vital signs and rhythm  - Monitor for S/S and trends of decreased cardiac output  - Administer and titrate ordered vasoactive medications to optimize hemodynamic stability  - Assess quality of pulses, skin color and temperature  - Assess for signs of decreased coronary artery perfusion  - Instruct patient to report change in severity of symptoms  Outcome: Progressing  Goal: Absence of cardiac dysrhythmias or at baseline rhythm  Description: INTERVENTIONS:  - Continuous cardiac monitoring, vital signs, obtain 12 lead EKG if ordered  - Administer antiarrhythmic and heart rate control medications as ordered  - Monitor electrolytes and administer replacement therapy as ordered  Outcome: Progressing       Goal: Incision(s), wounds(s) or drain site(s) healing without S/S of infection  Description: INTERVENTIONS  - Assess and document dressing, incision, wound bed, drain sites and surrounding tissue  - Provide patient and family education    Outcome: Progressing     Problem:  HEMATOLOGIC - ADULT  Goal: Maintains hematologic stability  Description: INTERVENTIONS  - Assess for signs and symptoms of bleeding or hemorrhage  - Monitor labs  - Administer supportive blood products/factors as ordered and appropriate  Outcome: Progressing     Problem: MUSCULOSKELETAL - ADULT  Goal: Maintain or return mobility to safest level of function  Description: INTERVENTIONS:  - Assess patient's ability to carry out ADLs; assess patient's baseline for ADL function and identify physical deficits which impact ability to perform ADLs (bathing, care of mouth/teeth, toileting, grooming, dressing, etc.)  - Assess/evaluate cause of self-care deficits   - Assess range of motion  - Assess patient's mobility  - Assess patient's need for assistive devices and provide as appropriate  - Encourage maximum independence but intervene and supervise when necessary  - Involve family in performance of ADLs  - Assess for home care needs following discharge   - Consider OT consult to assist with ADL evaluation and planning for discharge  - Provide patient education as appropriate  Outcome: Progressing     Problem: Prexisting or High Potential for Compromised Skin Integrity  Goal: Skin integrity is maintained or improved  Description: INTERVENTIONS:  - Identify patients at risk for skin breakdown  - Assess and monitor skin integrity  - Assess and monitor nutrition and hydration status  - Monitor labs   - Assess for incontinence   - Turn and reposition patient  - Assist with mobility/ambulation  - Relieve pressure over bony prominences  - Avoid friction and shearing  - Provide appropriate hygiene as needed including keeping skin clean and dry  - Evaluate need for skin moisturizer/barrier cream  - Collaborate with interdisciplinary team   - Patient/family teaching  - Consider wound care consult   Outcome: Progressing     Problem: Nutrition/Hydration-ADULT  Goal: Nutrient/Hydration intake appropriate for improving, restoring or  maintaining nutritional needs  Description: Monitor and assess patient's nutrition/hydration status for malnutrition. Collaborate with interdisciplinary team and initiate plan and interventions as ordered.  Monitor patient's weight and dietary intake as ordered or per policy. Utilize nutrition screening tool and intervene as necessary. Determine patient's food preferences and provide high-protein, high-caloric foods as appropriate.     INTERVENTIONS:  - Monitor oral intake, urinary output, labs, and treatment plans  - Assess nutrition and hydration status and recommend course of action  - Evaluate amount of meals eaten  - Assist patient with eating if necessary   - Allow adequate time for meals  - Recommend/ encourage appropriate diets, oral nutritional supplements, and vitamin/mineral supplements  - Order, calculate, and assess calorie counts as needed  - Recommend, monitor, and adjust tube feedings and TPN/PPN based on assessed needs  - Assess need for intravenous fluids  - Provide specific nutrition/hydration education as appropriate  - Include patient/family/caregiver in decisions related to nutrition  Outcome: Progressing

## 2024-08-27 NOTE — ASSESSMENT & PLAN NOTE
- Burning sensation during urination noted on 8/26  - 8/26 UA: positive nitrites, neg leuk, 1+ prot, small blood; micro: 10-20 WBC  - Could have been contributing to encephalopathy   - Received two days of ceftriaxone, discharged with 3 more days of keflex   - F/u urine cx with PCP

## 2024-08-27 NOTE — ASSESSMENT & PLAN NOTE
Assessment:  Reports dysuria but not frequency or flank pain.   No fever or leukocytosis.   UA with signs of UTI    Plan:  Treatment with ceftriaxone as per primary team.

## 2024-08-27 NOTE — ASSESSMENT & PLAN NOTE
Rate controlled  Coagulation: Eliquis 2.5 twice daily  Patient's niece notes that he only started taking this medication twice daily last week as he previously thought it was daily     Plan:  Continue Eliquis 2.5 twice daily

## 2024-08-27 NOTE — ASSESSMENT & PLAN NOTE
Recent Labs     08/26/24  0454 08/27/24  0448 08/28/24  0859   HGB 9.6* 9.3* 10.3*     Hemoglobin currently at baseline    Plan:  F/u with PCP

## 2024-08-27 NOTE — ASSESSMENT & PLAN NOTE
Malnutrition Findings:   Adult Malnutrition type: Chronic illness  Adult Degree of Malnutrition: Other severe protein calorie malnutrition  Malnutrition Characteristics: Muscle loss, Inadequate energy, Weight loss      Severe protein-calorie malnutrition r/t condition/decreased PO intake, as evidenced by 14.5% wt loss x 3 months (5/6/24: 165#, 8/26/24: 141#) , severe muscle mass depletion (temples, clavicle), and intake meeting <75% of estimated needs x > 1 month. Treatment: PO diet + oral nutrition supplements.             360 Statement: Chronic/severe malnutrition r/t condition/decreased PO intake, as evidenced by 14.5% wt loss x 3 months (5/6/24: 165#, 8/26/24: 141#) , severe muscle mass depletion (temples, clavicle), and intake meeting <75% of estimated needs x > 1 month. Treatment: PO diet + oral nutrition supplements    BMI Findings:           Body mass index is 19.67 kg/m².

## 2024-08-27 NOTE — ASSESSMENT & PLAN NOTE
Recent Labs     08/25/24  0507 08/26/24  0454 08/27/24  0448   HGB 9.8* 9.6* 9.3*       Hemoglobin currently at baseline    Plan:  Continue to monitor with daily CBCs

## 2024-08-27 NOTE — ASSESSMENT & PLAN NOTE
Present on admission    Lab Results   Component Value Date    EGFR 26 08/28/2024    EGFR 28 08/27/2024    EGFR 22 08/26/2024    CREATININE 2.12 (H) 08/28/2024    CREATININE 1.96 (H) 08/27/2024    CREATININE 2.38 (H) 08/26/2024     Plan:    Estimated Creatinine Clearance: 19.7 mL/min (A) (by C-G formula based on SCr of 2.12 mg/dL (H)).    POA 3.19; (baseline 2.4-2.6)  Likely 2/2  Dehydration in setting of poor p.o intake    Plan:  Encourage po intake  Appears to have resolved

## 2024-08-27 NOTE — ASSESSMENT & PLAN NOTE
Lab Results   Component Value Date    HGBA1C 6.8 (H) 07/12/2024     Recent Labs     08/26/24  0454 08/27/24  0448 08/28/24  0859   CREATININE 2.38* 1.96* 2.12*   EGFR 22 28 26     Estimated Creatinine Clearance: 19.7 mL/min (A) (by C-G formula based on SCr of 2.12 mg/dL (H)).      Currently at baseline (2.4-2.6)     See plan for acute kidney injury superimposed on chronic kidney disease

## 2024-08-27 NOTE — ASSESSMENT & PLAN NOTE
Lab Results   Component Value Date    HGBA1C 6.8 (H) 07/12/2024       Recent Labs     08/26/24  1701 08/26/24 2029 08/27/24  0741 08/27/24  1128   POCGLU 198* 150* 102 138       Blood Sugar Average: Last 72 hrs:  (P) [id number]

## 2024-08-27 NOTE — ASSESSMENT & PLAN NOTE
Malnutrition Findings:   Adult Malnutrition type: Chronic illness  Adult Degree of Malnutrition: Other severe protein calorie malnutrition  Malnutrition Characteristics: Muscle loss, Inadequate energy, Weight loss                  360 Statement: Chronic/severe malnutrition r/t condition/decreased PO intake, as evidenced by 14.5% wt loss x 3 months (5/6/24: 165#, 8/26/24: 141#) , severe muscle mass depletion (temples, clavicle), and intake meeting <75% of estimated needs x > 1 month. Treatment: PO diet + oral nutrition supplements    BMI Findings:           Body mass index is 19.67 kg/m².

## 2024-08-28 ENCOUNTER — TELEPHONE (OUTPATIENT)
Age: 89
End: 2024-08-28

## 2024-08-28 VITALS
WEIGHT: 141 LBS | RESPIRATION RATE: 16 BRPM | HEART RATE: 64 BPM | SYSTOLIC BLOOD PRESSURE: 138 MMHG | TEMPERATURE: 97.5 F | BODY MASS INDEX: 19.74 KG/M2 | DIASTOLIC BLOOD PRESSURE: 58 MMHG | HEIGHT: 71 IN | OXYGEN SATURATION: 98 %

## 2024-08-28 LAB
ANION GAP SERPL CALCULATED.3IONS-SCNC: 3 MMOL/L (ref 4–13)
BACTERIA BLD CULT: ABNORMAL
BACTERIA UR CULT: NORMAL
BUN SERPL-MCNC: 40 MG/DL (ref 5–25)
CALCIUM SERPL-MCNC: 8.2 MG/DL (ref 8.4–10.2)
CHLORIDE SERPL-SCNC: 106 MMOL/L (ref 96–108)
CO2 SERPL-SCNC: 25 MMOL/L (ref 21–32)
CREAT SERPL-MCNC: 2.12 MG/DL (ref 0.6–1.3)
ERYTHROCYTE [DISTWIDTH] IN BLOOD BY AUTOMATED COUNT: 13.6 % (ref 11.6–15.1)
GFR SERPL CREATININE-BSD FRML MDRD: 26 ML/MIN/1.73SQ M
GLUCOSE SERPL-MCNC: 126 MG/DL (ref 65–140)
GLUCOSE SERPL-MCNC: 149 MG/DL (ref 65–140)
GLUCOSE SERPL-MCNC: 92 MG/DL (ref 65–140)
GRAM STN SPEC: ABNORMAL
HCT VFR BLD AUTO: 31.7 % (ref 36.5–49.3)
HGB BLD-MCNC: 10.3 G/DL (ref 12–17)
MCH RBC QN AUTO: 31 PG (ref 26.8–34.3)
MCHC RBC AUTO-ENTMCNC: 32.5 G/DL (ref 31.4–37.4)
MCV RBC AUTO: 96 FL (ref 82–98)
PLATELET # BLD AUTO: 133 THOUSANDS/UL (ref 149–390)
PMV BLD AUTO: 9.5 FL (ref 8.9–12.7)
POTASSIUM SERPL-SCNC: 4.9 MMOL/L (ref 3.5–5.3)
RBC # BLD AUTO: 3.32 MILLION/UL (ref 3.88–5.62)
S EPIDERMIDIS DNA BLD POS QL NAA+NON-PRB: DETECTED
SARS-COV-2 RNA RESP QL NAA+PROBE: NEGATIVE
SODIUM SERPL-SCNC: 134 MMOL/L (ref 135–147)
WBC # BLD AUTO: 4.98 THOUSAND/UL (ref 4.31–10.16)

## 2024-08-28 PROCEDURE — 85027 COMPLETE CBC AUTOMATED: CPT

## 2024-08-28 PROCEDURE — 80048 BASIC METABOLIC PNL TOTAL CA: CPT

## 2024-08-28 PROCEDURE — 82948 REAGENT STRIP/BLOOD GLUCOSE: CPT

## 2024-08-28 PROCEDURE — 99239 HOSP IP/OBS DSCHRG MGMT >30: CPT | Performed by: INTERNAL MEDICINE

## 2024-08-28 PROCEDURE — 87635 SARS-COV-2 COVID-19 AMP PRB: CPT

## 2024-08-28 PROCEDURE — 99233 SBSQ HOSP IP/OBS HIGH 50: CPT | Performed by: FAMILY MEDICINE

## 2024-08-28 RX ORDER — ESCITALOPRAM OXALATE 5 MG/1
5 TABLET ORAL DAILY
Qty: 30 TABLET | Refills: 0
Start: 2024-08-29

## 2024-08-28 RX ORDER — GABAPENTIN 100 MG/1
100 CAPSULE ORAL
Qty: 30 CAPSULE | Refills: 0
Start: 2024-08-28

## 2024-08-28 RX ORDER — CEPHALEXIN 500 MG/1
500 CAPSULE ORAL EVERY 6 HOURS SCHEDULED
Qty: 12 CAPSULE | Refills: 0
Start: 2024-08-29 | End: 2024-09-01

## 2024-08-28 RX ORDER — LANOLIN ALCOHOL/MO/W.PET/CERES
3 CREAM (GRAM) TOPICAL
Qty: 30 TABLET | Refills: 0
Start: 2024-08-28

## 2024-08-28 RX ADMIN — Medication 400 MG: at 09:04

## 2024-08-28 RX ADMIN — CEFTRIAXONE SODIUM 1000 MG: 10 INJECTION, POWDER, FOR SOLUTION INTRAVENOUS at 09:04

## 2024-08-28 RX ADMIN — APIXABAN 2.5 MG: 2.5 TABLET, FILM COATED ORAL at 09:04

## 2024-08-28 RX ADMIN — ESCITALOPRAM OXALATE 5 MG: 10 TABLET ORAL at 09:04

## 2024-08-28 RX ADMIN — Medication 1000 UNITS: at 09:04

## 2024-08-28 NOTE — TELEPHONE ENCOUNTER
Pt's niece and POA called to reschedule appt on 9/4, pt is currently admitted at [facility] and may be transferred to a rehab facility for PT afterwards. She just wanted to make Dr. Foster aware and that they've already done some labwork if he wanted to take a look at the progress.

## 2024-08-28 NOTE — TELEPHONE ENCOUNTER
Pt gail called to reschedule appt for tomorrow til this October.     Gail is asking Dr. Esposito to have a look at pt labs  from admittance. They would like to know if he has any advice. Pt is due to be transferred to a rehab then assisted living.     Please advise.

## 2024-08-28 NOTE — PLAN OF CARE
Problem: PAIN - ADULT  Goal: Verbalizes/displays adequate comfort level or baseline comfort level  Description: Interventions:  - Encourage patient to monitor pain and request assistance  - Assess pain using appropriate pain scale  - Administer analgesics based on type and severity of pain and evaluate response  - Implement non-pharmacological measures as appropriate and evaluate response  - Consider cultural and social influences on pain and pain management  - Notify physician/advanced practitioner if interventions unsuccessful or patient reports new pain  Outcome: Progressing     Problem: INFECTION - ADULT  Goal: Absence or prevention of progression during hospitalization  Description: INTERVENTIONS:  - Assess and monitor for signs and symptoms of infection  - Monitor lab/diagnostic results  - Monitor all insertion sites, i.e. indwelling lines, tubes, and drains  - Monitor endotracheal if appropriate and nasal secretions for changes in amount and color  - [place] appropriate cooling/warming therapies per order  - Administer medications as ordered  - Instruct and encourage patient and family to use good hand hygiene technique  - Identify and instruct in appropriate isolation precautions for identified infection/condition  Outcome: Progressing  Goal: Absence of fever/infection during neutropenic period  Description: INTERVENTIONS:  - Monitor WBC    Outcome: Progressing     Problem: SAFETY ADULT  Goal: Patient will remain free of falls  Description: INTERVENTIONS:  - Educate patient/family on patient safety including physical limitations  - Instruct patient to call for assistance with activity   - Consult OT/PT to assist with strengthening/mobility   - Keep Call bell within reach  - Keep bed low and locked with side rails adjusted as appropriate  - Keep care items and personal belongings within reach  - Initiate and maintain comfort rounds  - Make Fall Risk Sign visible to staff  - Offer Toileting every 2 Hours,  in advance of need  - Initiate/Maintain bed alarm    - Apply yellow socks and bracelet for high fall risk patients  - Consider moving patient to room near nurses station  Outcome: Progressing  Goal: Maintain or return to baseline ADL function  Description: INTERVENTIONS:  -  Assess patient's ability to carry out ADLs; assess patient's baseline for ADL function and identify physical deficits which impact ability to perform ADLs (bathing, care of mouth/teeth, toileting, grooming, dressing, etc.)  - Assess/evaluate cause of self-care deficits   - Assess range of motion  - Assess patient's mobility; develop plan if impaired  - Assess patient's need for assistive devices and provide as appropriate  - Encourage maximum independence but intervene and supervise when necessary  - Involve family in performance of ADLs  - Assess for home care needs following discharge   - Consider OT consult to assist with ADL evaluation and planning for discharge  - Provide patient education as appropriate  Outcome: Progressing  Goal: Maintains/Returns to pre admission functional level  Description: INTERVENTIONS:  - Perform AM-PAC 6 Click Basic Mobility/ Daily Activity assessment daily.  - Set and communicate daily mobility goal to care team and patient/family/caregiver.   - Collaborate with rehabilitation services on mobility goals if consulted  - Perform Range of Motion 3 times a day.  - Reposition patient every 2 hours.  - Dangle patient 3 times a day  - Stand patient 3 times a day  - Ambulate patient 3 times a day  - Out of bed to chair 3 times a day   - Out of bed for meals 3 times a day  - Out of bed for toileting  - Record patient progress and toleration of activity level   Outcome: Progressing     Problem: DISCHARGE PLANNING  Goal: Discharge to home or other facility with appropriate resources  Description: INTERVENTIONS:  - Identify barriers to discharge w/patient and caregiver  - Arrange for needed discharge resources and  transportation as appropriate  - Identify discharge learning needs (meds, wound care, etc.)  - Arrange for interpretive services to assist at discharge as needed  - Refer to Case Management Department for coordinating discharge planning if the patient needs post-hospital services based on physician/advanced practitioner order or complex needs related to functional status, cognitive ability, or social support system  Outcome: Progressing     Problem: Knowledge Deficit  Goal: Patient/family/caregiver demonstrates understanding of disease process, treatment plan, medications, and discharge instructions  Description: Complete learning assessment and assess knowledge base.  Interventions:  - Provide teaching at level of understanding  - Provide teaching via preferred learning methods  Outcome: Progressing     Problem: CARDIOVASCULAR - ADULT  Goal: Maintains optimal cardiac output and hemodynamic stability  Description: INTERVENTIONS:  - Monitor I/O, vital signs and rhythm  - Monitor for S/S and trends of decreased cardiac output  - Administer and titrate ordered vasoactive medications to optimize hemodynamic stability  - Assess quality of pulses, skin color and temperature  - Assess for signs of decreased coronary artery perfusion  - Instruct patient to report change in severity of symptoms  Outcome: Progressing  Goal: Absence of cardiac dysrhythmias or at baseline rhythm  Description: INTERVENTIONS:  - Continuous cardiac monitoring, vital signs, obtain 12 lead EKG if ordered  - Administer antiarrhythmic and heart rate control medications as ordered  - Monitor electrolytes and administer replacement therapy as ordered  Outcome: Progressing     Problem: SKIN/TISSUE INTEGRITY - ADULT  Goal: Skin Integrity remains intact(Skin Breakdown Prevention)  Description: Assess:  -Perform Gianluca assessment every 8  -Clean and moisturize skin every 8  -Inspect skin when repositioning, toileting, and assisting with ADLS  -Assess  extremities for adequate circulation and sensation     Bed Management:  -Have minimal linens on bed & keep smooth, unwrinkled  -Change linens as needed when moist or perspiring  -Avoid sitting or lying in one position for more than 2 hours while in bed  -Keep HOB at 30 degrees       Skin Care:  -Avoid use of baby powder, tape, friction and shearing, hot water or constrictive clothing  -Relieve pressure over bony prominences using pillow and wedge   -Do not massage red bony areas    Outcome: Progressing  GOutcome: Progressing     Problem: HEMATOLOGIC - ADULT  Goal: Maintains hematologic stability  Description: INTERVENTIONS  - Assess for signs and symptoms of bleeding or hemorrhage  - Monitor labs  - Administer supportive blood products/factors as ordered and appropriate  Outcome: Progressing     Problem: MUSCULOSKELETAL - ADULT  Goal: Maintain or return mobility to safest level of function  Description: INTERVENTIONS:  - Assess patient's ability to carry out ADLs; assess patient's baseline for ADL function and identify physical deficits which impact ability to perform ADLs (bathing, care of mouth/teeth, toileting, grooming, dressing, etc.)  - Assess/evaluate cause of self-care deficits   - Assess range of motion  - Assess patient's mobility  - Assess patient's need for assistive devices and provide as appropriate  - Encourage maximum independence but intervene and supervise when necessary  - Involve family in performance of ADLs  - Assess for home care needs following discharge   - Consider OT consult to assist with ADL evaluation and planning for discharge  - Provide patient education as appropriate  Outcome: Progressing     Problem: Prexisting or High Potential for Compromised Skin Integrity  Goal: Skin integrity is maintained or improved  Description: INTERVENTIONS:  - Identify patients at risk for skin breakdown  - Assess and monitor skin integrity  - Assess and monitor nutrition and hydration status  - Monitor  labs   - Assess for incontinence   - Turn and reposition patient  - Assist with mobility/ambulation  - Relieve pressure over bony prominences  - Avoid friction and shearing  - Provide appropriate hygiene as needed including keeping skin clean and dry  - Evaluate need for skin moisturizer/barrier cream  - Collaborate with interdisciplinary team   - Patient/family teaching  - Consider wound care consult   Outcome: Progressing     Problem: Nutrition/Hydration-ADULT  Goal: Nutrient/Hydration intake appropriate for improving, restoring or maintaining nutritional needs  Description: Monitor and assess patient's nutrition/hydration status for malnutrition. Collaborate with interdisciplinary team and initiate plan and interventions as ordered.  Monitor patient's weight and dietary intake as ordered or per policy. Utilize nutrition screening tool and intervene as necessary. Determine patient's food preferences and provide high-protein, high-caloric foods as appropriate.     INTERVENTIONS:  - Monitor oral intake, urinary output, labs, and treatment plans  - Assess nutrition and hydration status and recommend course of action  - Evaluate amount of meals eaten  - Assist patient with eating if necessary   - Allow adequate time for meals  - Recommend/ encourage appropriate diets, oral nutritional supplements, and vitamin/mineral supplements  - Order, calculate, and assess calorie counts as needed  - Recommend, monitor, and adjust tube feedings and TPN/PPN based on assessed needs  - Assess need for intravenous fluids  - Provide specific nutrition/hydration education as appropriate  - Include patient/family/caregiver in decisions related to nutrition  Outcome: Progressing

## 2024-08-28 NOTE — ASSESSMENT & PLAN NOTE
Lab Results   Component Value Date    HGBA1C 6.8 (H) 07/12/2024       Recent Labs     08/27/24  2125 08/27/24  2229 08/28/24  0751 08/28/24  1138   POCGLU 73 132 92 149*       Blood Sugar Average: Last 72 hrs:  (P) 129    Home Regimen: Amaryl 2 mg.   Given his last A1C is optimal and well controlled compared to age would consider discontinuing Amaryl on discharge as it may be contributing to hypoglycemia episodes for which the patient is not aware.

## 2024-08-28 NOTE — PLAN OF CARE
Problem: PAIN - ADULT  Goal: Verbalizes/displays adequate comfort level or baseline comfort level  Description: Interventions:  - Encourage patient to monitor pain and request assistance  - Assess pain using appropriate pain scale  - Administer analgesics based on type and severity of pain and evaluate response  - Implement non-pharmacological measures as appropriate and evaluate response  - Consider cultural and social influences on pain and pain management  - Notify physician/advanced practitioner if interventions unsuccessful or patient reports new pain  Outcome: Progressing     Problem: INFECTION - ADULT  Goal: Absence or prevention of progression during hospitalization  Description: INTERVENTIONS:  - Assess and monitor for signs and symptoms of infection  - Monitor lab/diagnostic results  - Monitor all insertion sites, i.e. indwelling lines, tubes, and drains  - Monitor endotracheal if appropriate and nasal secretions for changes in amount and color  - [place] appropriate cooling/warming therapies per order  - Administer medications as ordered  - Instruct and encourage patient and family to use good hand hygiene technique  - Identify and instruct in appropriate isolation precautions for identified infection/condition  Outcome: Progressing  Goal: Absence of fever/infection during neutropenic period  Description: INTERVENTIONS:  - Monitor WBC    Outcome: Progressing     Problem: SAFETY ADULT  Goal: Patient will remain free of falls  Description: INTERVENTIONS:  - Educate patient/family on patient safety including physical limitations  - Instruct patient to call for assistance with activity   - Consult OT/PT to assist with strengthening/mobility   - Keep Call bell within reach  - Keep bed low and locked with side rails adjusted as appropriate  - Keep care items and personal belongings within reach  - Initiate and maintain comfort rounds  - Make Fall Risk Sign visible to staff  - Offer Toileting every  Hours,  in advance of need  - Initiate/Maintain alarm  - Obtain necessary fall risk management equipment:   - Apply yellow socks and bracelet for high fall risk patients  - Consider moving patient to room near nurses station  Outcome: Progressing  Goal: Maintain or return to baseline ADL function  Description: INTERVENTIONS:  -  Assess patient's ability to carry out ADLs; assess patient's baseline for ADL function and identify physical deficits which impact ability to perform ADLs (bathing, care of mouth/teeth, toileting, grooming, dressing, etc.)  - Assess/evaluate cause of self-care deficits   - Assess range of motion  - Assess patient's mobility; develop plan if impaired  - Assess patient's need for assistive devices and provide as appropriate  - Encourage maximum independence but intervene and supervise when necessary  - Involve family in performance of ADLs  - Assess for home care needs following discharge   - Consider OT consult to assist with ADL evaluation and planning for discharge  - Provide patient education as appropriate  Outcome: Progressing  Goal: Maintains/Returns to pre admission functional level  Description: INTERVENTIONS:  - Perform AM-PAC 6 Click Basic Mobility/ Daily Activity assessment daily.  - Set and communicate daily mobility goal to care team and patient/family/caregiver.   - Collaborate with rehabilitation services on mobility goals if consulted  - Perform Range of Motion  times a day.  - Reposition patient every  hours.  - Dangle patient  times a day  - Stand patient  times a day  - Ambulate patient  times a day  - Out of bed to chair  times a day   - Out of bed for meals  times a day  - Out of bed for toileting  - Record patient progress and toleration of activity level   Outcome: Progressing     Problem: DISCHARGE PLANNING  Goal: Discharge to home or other facility with appropriate resources  Description: INTERVENTIONS:  - Identify barriers to discharge w/patient and caregiver  - Arrange for  needed discharge resources and transportation as appropriate  - Identify discharge learning needs (meds, wound care, etc.)  - Arrange for interpretive services to assist at discharge as needed  - Refer to Case Management Department for coordinating discharge planning if the patient needs post-hospital services based on physician/advanced practitioner order or complex needs related to functional status, cognitive ability, or social support system  Outcome: Progressing     Problem: Knowledge Deficit  Goal: Patient/family/caregiver demonstrates understanding of disease process, treatment plan, medications, and discharge instructions  Description: Complete learning assessment and assess knowledge base.  Interventions:  - Provide teaching at level of understanding  - Provide teaching via preferred learning methods  Outcome: Progressing     Problem: CARDIOVASCULAR - ADULT  Goal: Maintains optimal cardiac output and hemodynamic stability  Description: INTERVENTIONS:  - Monitor I/O, vital signs and rhythm  - Monitor for S/S and trends of decreased cardiac output  - Administer and titrate ordered vasoactive medications to optimize hemodynamic stability  - Assess quality of pulses, skin color and temperature  - Assess for signs of decreased coronary artery perfusion  - Instruct patient to report change in severity of symptoms  Outcome: Progressing  Goal: Absence of cardiac dysrhythmias or at baseline rhythm  Description: INTERVENTIONS:  - Continuous cardiac monitoring, vital signs, obtain 12 lead EKG if ordered  - Administer antiarrhythmic and heart rate control medications as ordered  - Monitor electrolytes and administer replacement therapy as ordered  Outcome: Progressing     Problem: SKIN/TISSUE INTEGRITY - ADULT  Goal: Skin Integrity remains intact(Skin Breakdown Prevention)  Description: Assess:  -Perform Gianluca assessment every  -Clean and moisturize skin every  -Inspect skin when repositioning, toileting, and  assisting with ADLS  -Assess under medical devices such as  every   -Assess extremities for adequate circulation and sensation     Bed Management:  -Have minimal linens on bed & keep smooth, unwrinkled  -Change linens as needed when moist or perspiring  -Avoid sitting or lying in one position for more than  hours while in bed  -Keep HOB at degrees     Toileting:  -Offer bedside commode  -Assess for incontinence every   -Use incontinent care products after each incontinent episode such as     Activity:  -Mobilize patient  times a day  -Encourage activity and walks on unit  -Encourage or provide ROM exercises   -Turn and reposition patient every  Hours  -Use appropriate equipment to lift or move patient in bed  -Instruct/ Assist with weight shifting every  when out of bed in chair  -Consider limitation of chair time  hour intervals    Skin Care:  -Avoid use of baby powder, tape, friction and shearing, hot water or constrictive clothing  -Relieve pressure over bony prominences using   -Do not massage red bony areas    Next Steps:  -Teach patient strategies to minimize risks such as    -Consider consults to  interdisciplinary teams such as   Outcome: Progressing  Goal: Incision(s), wounds(s) or drain site(s) healing without S/S of infection  Description: INTERVENTIONS  - Assess and document dressing, incision, wound bed, drain sites and surrounding tissue  - Provide patient and family education  - Perform skin care/dressing changes every   Outcome: Progressing     Problem: HEMATOLOGIC - ADULT  Goal: Maintains hematologic stability  Description: INTERVENTIONS  - Assess for signs and symptoms of bleeding or hemorrhage  - Monitor labs  - Administer supportive blood products/factors as ordered and appropriate  Outcome: Progressing     Problem: MUSCULOSKELETAL - ADULT  Goal: Maintain or return mobility to safest level of function  Description: INTERVENTIONS:  - Assess patient's ability to carry out ADLs; assess patient's  baseline for ADL function and identify physical deficits which impact ability to perform ADLs (bathing, care of mouth/teeth, toileting, grooming, dressing, etc.)  - Assess/evaluate cause of self-care deficits   - Assess range of motion  - Assess patient's mobility  - Assess patient's need for assistive devices and provide as appropriate  - Encourage maximum independence but intervene and supervise when necessary  - Involve family in performance of ADLs  - Assess for home care needs following discharge   - Consider OT consult to assist with ADL evaluation and planning for discharge  - Provide patient education as appropriate  Outcome: Progressing     Problem: Prexisting or High Potential for Compromised Skin Integrity  Goal: Skin integrity is maintained or improved  Description: INTERVENTIONS:  - Identify patients at risk for skin breakdown  - Assess and monitor skin integrity  - Assess and monitor nutrition and hydration status  - Monitor labs   - Assess for incontinence   - Turn and reposition patient  - Assist with mobility/ambulation  - Relieve pressure over bony prominences  - Avoid friction and shearing  - Provide appropriate hygiene as needed including keeping skin clean and dry  - Evaluate need for skin moisturizer/barrier cream  - Collaborate with interdisciplinary team   - Patient/family teaching  - Consider wound care consult   Outcome: Progressing     Problem: Nutrition/Hydration-ADULT  Goal: Nutrient/Hydration intake appropriate for improving, restoring or maintaining nutritional needs  Description: Monitor and assess patient's nutrition/hydration status for malnutrition. Collaborate with interdisciplinary team and initiate plan and interventions as ordered.  Monitor patient's weight and dietary intake as ordered or per policy. Utilize nutrition screening tool and intervene as necessary. Determine patient's food preferences and provide high-protein, high-caloric foods as appropriate.      INTERVENTIONS:  - Monitor oral intake, urinary output, labs, and treatment plans  - Assess nutrition and hydration status and recommend course of action  - Evaluate amount of meals eaten  - Assist patient with eating if necessary   - Allow adequate time for meals  - Recommend/ encourage appropriate diets, oral nutritional supplements, and vitamin/mineral supplements  - Order, calculate, and assess calorie counts as needed  - Recommend, monitor, and adjust tube feedings and TPN/PPN based on assessed needs  - Assess need for intravenous fluids  - Provide specific nutrition/hydration education as appropriate  - Include patient/family/caregiver in decisions related to nutrition  Outcome: Progressing

## 2024-08-28 NOTE — ASSESSMENT & PLAN NOTE
Lab Results   Component Value Date    HGBA1C 6.8 (H) 07/12/2024       Recent Labs     08/27/24  2125 08/27/24  2229 08/28/24  0751 08/28/24  1138   POCGLU 73 132 92 149*       Blood Sugar Average: Last 72 hrs:  (P) 129    Creatinine stable.  Will avoid nephrotoxic medication.  Encourage po hydration.  Will monitor BMP.

## 2024-08-28 NOTE — ASSESSMENT & PLAN NOTE
Lab Results   Component Value Date    EGFR 26 08/28/2024    EGFR 28 08/27/2024    EGFR 22 08/26/2024    CREATININE 2.12 (H) 08/28/2024    CREATININE 1.96 (H) 08/27/2024    CREATININE 2.38 (H) 08/26/2024

## 2024-08-28 NOTE — NURSING NOTE
Report called to Trung Marin.  All questions answered.  Pt is in stable condition for discharge.

## 2024-08-28 NOTE — PROGRESS NOTES
"Progress Note - Geriatric Medicine   Leroy KRISTA Kenneygwenjerrell 89 y.o. male MRN: [id number]  Unit/Bed#: S -01 Encounter: [id number]      Assessment/Plan:  Urinary tract infection without hematuria  Assessment & Plan  Assessment:  Reports dysuria but not frequency or flank pain.   No fever or leukocytosis.   UA with signs of UTI, follow-up with urine culture results    Plan:  Treatment with ceftriaxone as per primary team.     Severe protein-calorie malnutrition (HCC)  Assessment & Plan    Body mass index is 19.67 kg/m².   Provide protein supplements  Consult nutrition      Ambulatory dysfunction  Assessment & Plan  States that he uses cane sometimes.   Active with ADL, reports recent falls  Monitor orthostatic vital signs  Continue PT OT  Recommend fit to drive test at discharge  Pending rehab placement.     Acute metabolic encephalopathy  Assessment & Plan  Patient seems to be at baseline today, no behaviors reported for the past 24 hours  Patient originally presented with Syncope.   AAO x 2 to self and place upon my evaluation but he can waxe and wane.  He lives by himself and ADL independent.   States that he does miss his medications sometimes.   Mini cognitive exam: Scored 3/5   CT head 5/2023:   Mild cerebral volume loss.   Patchy areas of low-attenuation in supratentorial white matter, nonspecific but likely representing chronic microvascular ischemic changes.  Prolonged Qtc 546  B12 this admission 295  Normal TSH   Had an extensive discussion with Uli \" Pam\" about the patient's baseline as below.   Patient was more calm today.   Per assessment, has cognitive impairment and needs assisted living.     Plan:  Continue Lexapro 5 mg   Continue Melatonin 3 mg at bedtime.   Gabapentin 100 mg Q12H PRN   Would avoid medications that prolong Qtc  Patient will need help with medication management at discharge   Fit drive test on discharge.   follow up with Geriatrics outpatient   Patient is at risk of hospital " delirium.   Initiate delirium precautions.   Maintain normal sleep/wake cycle  Minimize overnight interruptions, group overnight vitals/labs/nursing checks as possible  Dim lights, close blinds and turn off tv to minimize stimulation and encourage sleep environment in evenings  Avoid medications which may precipitate or worsen delirium such as tramadol, benzodiazepine, anticholinergics, and antihistaminics    Insomnia  Assessment & Plan  Patient states that he stays up till 2 AM most days of the week.   He reported sleeping better last night.   Continue Melatonin 3 mg at Bedtime. Give Melatonin at 8 PM.     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  ([facility])  Assessment & Plan  Lab Results   Component Value Date    EGFR 26 08/28/2024    EGFR 28 08/27/2024    EGFR 22 08/26/2024    CREATININE 2.12 (H) 08/28/2024    CREATININE 1.96 (H) 08/27/2024    CREATININE 2.38 (H) 08/26/2024       Hb stable, today 10.3  Continue to monitor CBC    CKD stage 4 due to type 2 diabetes mellitus ([facility])  Assessment & Plan  Lab Results   Component Value Date    HGBA1C 6.8 (H) 07/12/2024       Recent Labs     08/27/24 2125 08/27/24 2229 08/28/24  0751 08/28/24  1138   POCGLU 73 132 92 149*       Blood Sugar Average: Last 72 hrs:  (P) 129    Creatinine stable.  Will avoid nephrotoxic medication.  Encourage po hydration.  Will monitor BMP.     Acute kidney injury superimposed on chronic kidney disease  ([facility])  Assessment & Plan  Lab Results   Component Value Date    EGFR 26 08/28/2024    EGFR 28 08/27/2024    EGFR 22 08/26/2024    CREATININE 2.12 (H) 08/28/2024    CREATININE 1.96 (H) 08/27/2024    CREATININE 2.38 (H) 08/26/2024       Controlled diabetes mellitus ([facility])  Assessment & Plan  Lab Results   Component Value Date    HGBA1C 6.8 (H) 07/12/2024       Recent Labs     08/27/24 2125 08/27/24 2229 08/28/24  0751 08/28/24  1138  "  POCGLU 73 132 92 149*       Blood Sugar Average: Last 72 hrs:  (P) 129    Home Regimen: Amaryl 2 mg.   Given his last A1C is optimal and well controlled compared to age would consider discontinuing Amaryl on discharge as it may be contributing to hypoglycemia episodes for which the patient is not aware.     * Syncope  Assessment & Plan  Presented with syncope and decrease responsiveness on 8/24  No head strike or trauma.   Was found to be dry and hypotensive which improved with IV fluids.   Management per primary team.                Subjective:   Patient seen and examined at bedside for geriatric follow-up.  The time of encounter patient is pleasant and answered all my questions.  States he slept well overnight.  Denies any pain.  Appetite is fair.    Review of Systems   Constitutional:  Positive for appetite change. Negative for chills, fatigue and fever.   HENT:  Negative for congestion, rhinorrhea and sore throat.    Respiratory:  Negative for cough, shortness of breath and wheezing.    Cardiovascular:  Negative for chest pain.   Gastrointestinal:  Positive for constipation. Negative for abdominal pain and nausea.   Genitourinary:  Negative for dysuria and hematuria.   Musculoskeletal:  Negative for gait problem.   Skin:  Negative for rash and wound.   Allergic/Immunologic: Negative for environmental allergies.   Neurological:  Negative for dizziness and syncope.   Hematological:  Does not bruise/bleed easily.   Psychiatric/Behavioral:  Negative for behavioral problems and sleep disturbance.          Objective:     Vitals: Blood pressure 138/58, pulse 64, temperature 97.5 °F (36.4 °C), resp. rate 16, height 5' 11\" (1.803 m), weight 64 kg (141 lb), SpO2 98%.,Body mass index is 19.67 kg/m².      Intake/Output Summary (Last 24 hours) at 8/28/2024 1156  Last data filed at 8/28/2024 0931  Gross per 24 hour   Intake 960 ml   Output 725 ml   Net 235 ml       Current Medications: Reviewed    Physical Exam:   Physical " Exam  Vitals and nursing note reviewed.   Constitutional:       General: He is not in acute distress.     Appearance: He is well-developed.   HENT:      Head: Normocephalic and atraumatic.   Eyes:      Conjunctiva/sclera: Conjunctivae normal.   Cardiovascular:      Rate and Rhythm: Normal rate and regular rhythm.      Heart sounds: No murmur heard.  Pulmonary:      Effort: Pulmonary effort is normal. No respiratory distress.      Breath sounds: Normal breath sounds.   Abdominal:      Palpations: Abdomen is soft.      Tenderness: There is no abdominal tenderness.   Musculoskeletal:         General: No swelling.      Cervical back: Neck supple.      Right lower leg: No edema.      Left lower leg: No edema.   Skin:     General: Skin is warm and dry.      Capillary Refill: Capillary refill takes less than 2 seconds.   Neurological:      Mental Status: He is alert.   Psychiatric:         Mood and Affect: Mood normal.          Invasive Devices       Peripheral Intravenous Line  Duration             Peripheral IV 08/24/24 Proximal;Right;Ventral (anterior) Forearm 3 days    Peripheral IV 08/28/24 Distal;Dorsal (posterior);Left Forearm <1 day                    Lab, Imaging and other studies: I have personally reviewed pertinent reports.

## 2024-08-28 NOTE — CASE MANAGEMENT
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[facility]) 2024     Encounter for loop recorder at end of battery life 2024     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  ([facility]) 2023     Closed fracture of one rib of left side with routine healing 09/15/2022     Fall 09/10/2022     Skin tear of right elbow without complication 2022     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     Embolic bilateral punctate CVA, acute as well as subacute 2020     Essential hypertension 2020     Secondary hyperparathyroidism of renal origin ([facility]) 2019     Acute kidney injury superimposed on chronic kidney disease  ([facility]) 2018     CKD stage 4 due to type 2 diabetes mellitus ([facility]) 2018     Palpitations 2018     Enlarged prostate without lower urinary tract symptoms (luts) 2013     Controlled diabetes mellitus ([facility]) 2012     Hyperlipidemia 2012     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 ([facility]) 2012       LOS (days): 4  Geometric Mean LOS (GMLOS) (days): 3.1  Days to GMLOS:-0.6     OBJECTIVE:  Risk of Unplanned Readmission Score: 16.61         Current admission status: Inpatient   Preferred Pharmacy:   [id number] - [place] - [address]  [address] [place] [address]  Phone: [phone] Fax:  [phone]    Primary Care Provider: Pepito Foster DO    Primary Insurance: MEDICARE  Secondary Insurance: BLUE CROSS    DISCHARGE DETAILS:      Accepting Facility Name, City & State : [facility]  Receiving Facility/Agency Phone Number: [phone]  Facility/Agency Fax Number: [phone]       CM met w/ Pt at bedside to discuss his accepting rehab choices. Pt has selected [facility].  TC to Pts Pam reynolds who is also in agreement with [facility].    WC Van requested in Roundtrip.     RN and SLIM provider aware.

## 2024-08-28 NOTE — ASSESSMENT & PLAN NOTE
Assessment:  Reports dysuria but not frequency or flank pain.   No fever or leukocytosis.   UA with signs of UTI, follow-up with urine culture results    Plan:  Treatment with ceftriaxone as per primary team.

## 2024-08-28 NOTE — DISCHARGE INSTR - AVS FIRST PAGE
Dear Leroy Paredes,     It was our pleasure to care for you here at [facility].  It is our hope that we were always able to exceed the expected standards for your care during your stay.  You were hospitalized due to syncope.  You were cared for on the 3rd floor by Lisa Mercado DO under the service of Marjorie Sainz MD with the [facility] who covers for your primary care physician (PCP), Pepito Foster DO, while you were hospitalized.  If you have any questions or concerns related to this hospitalization, you may contact us at .  For follow up as well as any medication refills, we recommend that you follow up with your primary care physician.  A registered nurse will reach out to you by phone within a few days after your discharge to answer any additional questions that you may have after going home.  However, at this time we provide for you here, the most important instructions / recommendations at discharge:     Notable Medication Adjustments -   Start lexapro 5 mg daily  Start gabapentin 100 mg at night  Start melatonin 3 mg at night   Take keflex for 3 more days starting on 8/29  Stop taking glimepiride  Testing Required after Discharge -   None  Important follow up information -   Please follow up with your PCP within one week of discharge  Please follow up with geriatric medicine doctor outpatient    Follow up with your PCP regarding urine culture results that are currently still pending  Other Instructions -   None  Please review this entire after visit summary as additional general instructions including medication list, appointments, activity, diet, any pertinent wound care, and other additional recommendations from your care team that may be provided for you.      Sincerely,     Lisa Mercado DO

## 2024-08-28 NOTE — ASSESSMENT & PLAN NOTE
"Patient seems to be at baseline today, no behaviors reported for the past 24 hours  Patient originally presented with Syncope.   AAO x 2 to self and place upon my evaluation but he can waxe and wane.  He lives by himself and ADL independent.   States that he does miss his medications sometimes.   Mini cognitive exam: Scored 3/5   CT head 5/2023:   Mild cerebral volume loss.   Patchy areas of low-attenuation in supratentorial white matter, nonspecific but likely representing chronic microvascular ischemic changes.  Prolonged Qtc 546  B12 this admission 295  Normal TSH   Had an extensive discussion with Niece \" Pam\" about the patient's baseline as below.   Patient was more calm today.   Per assessment, has cognitive impairment and needs assisted living.     Plan:  Continue Lexapro 5 mg   Continue Melatonin 3 mg at bedtime.   Gabapentin 100 mg Q12H PRN   Would avoid medications that prolong Qtc  Patient will need help with medication management at discharge   Fit drive test on discharge.   follow up with Geriatrics outpatient   Patient is at risk of hospital delirium.   Initiate delirium precautions.   Maintain normal sleep/wake cycle  Minimize overnight interruptions, group overnight vitals/labs/nursing checks as possible  Dim lights, close blinds and turn off tv to minimize stimulation and encourage sleep environment in evenings  Avoid medications which may precipitate or worsen delirium such as tramadol, benzodiazepine, anticholinergics, and antihistaminics  " on the discharge service for the patient. I have reviewed and made amendments to the documentation where necessary.

## 2024-08-28 NOTE — ASSESSMENT & PLAN NOTE
States that he uses cane sometimes.   Active with ADL, reports recent falls  Monitor orthostatic vital signs  Continue PT OT  Recommend fit to drive test at discharge  Pending rehab placement.

## 2024-08-28 NOTE — ASSESSMENT & PLAN NOTE
Lab Results   Component Value Date    EGFR 26 08/28/2024    EGFR 28 08/27/2024    EGFR 22 08/26/2024    CREATININE 2.12 (H) 08/28/2024    CREATININE 1.96 (H) 08/27/2024    CREATININE 2.38 (H) 08/26/2024       Hb stable, today 10.3  Continue to monitor CBC

## 2024-08-29 ENCOUNTER — PATIENT OUTREACH (OUTPATIENT)
Dept: CASE MANAGEMENT | Facility: OTHER | Age: 89
End: 2024-08-29

## 2024-08-29 NOTE — PROGRESS NOTES
Outpatient Care Management [facility]. Discharged 8/28/24 to [facility]. Email sent to facility to inform them the patient is on the [facility] Pathway and I will be following them during their skilled stay.  This Admin Coordinator will continue to monitor via chart review.

## 2024-08-30 LAB — BACTERIA BLD CULT: NORMAL

## 2024-09-04 LAB
ATRIAL RATE: 71 BPM
ATRIAL RATE: 71 BPM
P AXIS: 77 DEGREES
P AXIS: 88 DEGREES
PR INTERVAL: 190 MS
PR INTERVAL: 204 MS
QRS AXIS: -61 DEGREES
QRS AXIS: -62 DEGREES
QRSD INTERVAL: 128 MS
QRSD INTERVAL: 132 MS
QT INTERVAL: 436 MS
QT INTERVAL: 450 MS
QTC INTERVAL: 473 MS
QTC INTERVAL: 489 MS
T WAVE AXIS: -36 DEGREES
T WAVE AXIS: -42 DEGREES
VENTRICULAR RATE: 71 BPM
VENTRICULAR RATE: 71 BPM

## 2024-09-04 PROCEDURE — 93010 ELECTROCARDIOGRAM REPORT: CPT | Performed by: INTERNAL MEDICINE

## 2024-09-05 ENCOUNTER — PATIENT OUTREACH (OUTPATIENT)
Dept: CASE MANAGEMENT | Facility: OTHER | Age: 89
End: 2024-09-05

## 2024-09-12 ENCOUNTER — PATIENT OUTREACH (OUTPATIENT)
Dept: CASE MANAGEMENT | Facility: OTHER | Age: 89
End: 2024-09-12

## 2024-09-12 NOTE — PROGRESS NOTES
Chart review completed.  Email sent to facility requesting update on patient.   This care manager assistant will continue to monitor via chart review throughout SNF/STR Surveillance episode.   Update received the patient has a TDD of 9/18/24 to Home.

## 2024-09-18 ENCOUNTER — PATIENT OUTREACH (OUTPATIENT)
Dept: CASE MANAGEMENT | Facility: OTHER | Age: 89
End: 2024-09-18

## 2024-09-18 DIAGNOSIS — I48.91 ATRIAL FIBRILLATION, UNSPECIFIED TYPE (HCC): ICD-10-CM

## 2024-09-18 NOTE — PROGRESS NOTES
Update obtained from Trung Marin the patient Discharged 9/18/24 to [facility]. I have removed myself from the care team, updated the Care Coordination note, and closed the episode.

## 2024-09-19 RX ORDER — APIXABAN 2.5 MG/1
TABLET, FILM COATED ORAL
Qty: 60 TABLET | Refills: 2 | Status: SHIPPED | OUTPATIENT
Start: 2024-09-19

## 2024-09-19 NOTE — TELEPHONE ENCOUNTER
Patient needs an appointment. Please contact the patient to schedule an appointment. Last office visit: 01/16/24

## 2024-09-23 ENCOUNTER — TELEPHONE (OUTPATIENT)
Dept: NEPHROLOGY | Facility: CLINIC | Age: 89
End: 2024-09-23

## 2024-09-23 NOTE — TELEPHONE ENCOUNTER
Left voicemail for the patient reminding to please complete blawork prior to 10/2 appointment with Dr. Esposito. Advised patient to call back with any questions or concerns.

## 2024-09-26 PROBLEM — N39.0 URINARY TRACT INFECTION WITHOUT HEMATURIA: Status: RESOLVED | Noted: 2024-08-27 | Resolved: 2024-09-26

## 2024-09-27 ENCOUNTER — APPOINTMENT (OUTPATIENT)
Dept: LAB | Facility: CLINIC | Age: 89
End: 2024-09-27
Payer: MEDICARE

## 2024-09-27 DIAGNOSIS — I48.91 ATRIAL FIBRILLATION, UNSPECIFIED TYPE (HCC): ICD-10-CM

## 2024-09-27 DIAGNOSIS — I10 ESSENTIAL HYPERTENSION: ICD-10-CM

## 2024-09-27 DIAGNOSIS — E11.22 CKD STAGE 4 DUE TO TYPE 2 DIABETES MELLITUS (HCC): ICD-10-CM

## 2024-09-27 DIAGNOSIS — I12.9 BENIGN HYPERTENSION WITH CKD (CHRONIC KIDNEY DISEASE) STAGE IV (HCC): ICD-10-CM

## 2024-09-27 DIAGNOSIS — N18.4 CKD STAGE 4 DUE TO TYPE 2 DIABETES MELLITUS (HCC): ICD-10-CM

## 2024-09-27 DIAGNOSIS — N18.4 BENIGN HYPERTENSION WITH CKD (CHRONIC KIDNEY DISEASE) STAGE IV (HCC): ICD-10-CM

## 2024-09-27 LAB
25(OH)D3 SERPL-MCNC: 20.3 NG/ML (ref 30–100)
ANION GAP SERPL CALCULATED.3IONS-SCNC: 8 MMOL/L (ref 4–13)
BASOPHILS # BLD AUTO: 0.04 THOUSANDS/ΜL (ref 0–0.1)
BASOPHILS NFR BLD AUTO: 1 % (ref 0–1)
BUN SERPL-MCNC: 25 MG/DL (ref 5–25)
CALCIUM SERPL-MCNC: 8.2 MG/DL (ref 8.4–10.2)
CHLORIDE SERPL-SCNC: 108 MMOL/L (ref 96–108)
CO2 SERPL-SCNC: 23 MMOL/L (ref 21–32)
CREAT SERPL-MCNC: 1.92 MG/DL (ref 0.6–1.3)
EOSINOPHIL # BLD AUTO: 0.55 THOUSAND/ΜL (ref 0–0.61)
EOSINOPHIL NFR BLD AUTO: 10 % (ref 0–6)
ERYTHROCYTE [DISTWIDTH] IN BLOOD BY AUTOMATED COUNT: 15.2 % (ref 11.6–15.1)
GFR SERPL CREATININE-BSD FRML MDRD: 29 ML/MIN/1.73SQ M
GLUCOSE P FAST SERPL-MCNC: 151 MG/DL (ref 65–99)
HCT VFR BLD AUTO: 29.1 % (ref 36.5–49.3)
HGB BLD-MCNC: 9.2 G/DL (ref 12–17)
IMM GRANULOCYTES # BLD AUTO: 0.03 THOUSAND/UL (ref 0–0.2)
IMM GRANULOCYTES NFR BLD AUTO: 1 % (ref 0–2)
LYMPHOCYTES # BLD AUTO: 1.76 THOUSANDS/ΜL (ref 0.6–4.47)
LYMPHOCYTES NFR BLD AUTO: 32 % (ref 14–44)
MCH RBC QN AUTO: 31.1 PG (ref 26.8–34.3)
MCHC RBC AUTO-ENTMCNC: 31.6 G/DL (ref 31.4–37.4)
MCV RBC AUTO: 98 FL (ref 82–98)
MONOCYTES # BLD AUTO: 0.49 THOUSAND/ΜL (ref 0.17–1.22)
MONOCYTES NFR BLD AUTO: 9 % (ref 4–12)
NEUTROPHILS # BLD AUTO: 2.68 THOUSANDS/ΜL (ref 1.85–7.62)
NEUTS SEG NFR BLD AUTO: 47 % (ref 43–75)
NRBC BLD AUTO-RTO: 0 /100 WBCS
PHOSPHATE SERPL-MCNC: 2.6 MG/DL (ref 2.3–4.1)
PLATELET # BLD AUTO: 149 THOUSANDS/UL (ref 149–390)
PMV BLD AUTO: 9.8 FL (ref 8.9–12.7)
POTASSIUM SERPL-SCNC: 4.1 MMOL/L (ref 3.5–5.3)
PTH-INTACT SERPL-MCNC: 111.6 PG/ML (ref 12–88)
RBC # BLD AUTO: 2.96 MILLION/UL (ref 3.88–5.62)
SODIUM SERPL-SCNC: 139 MMOL/L (ref 135–147)
WBC # BLD AUTO: 5.55 THOUSAND/UL (ref 4.31–10.16)

## 2024-09-27 PROCEDURE — 84100 ASSAY OF PHOSPHORUS: CPT

## 2024-09-27 PROCEDURE — 82306 VITAMIN D 25 HYDROXY: CPT

## 2024-09-27 PROCEDURE — 83970 ASSAY OF PARATHORMONE: CPT

## 2024-09-27 PROCEDURE — 36415 COLL VENOUS BLD VENIPUNCTURE: CPT

## 2024-09-27 PROCEDURE — 80048 BASIC METABOLIC PNL TOTAL CA: CPT

## 2024-09-27 PROCEDURE — 85025 COMPLETE CBC W/AUTO DIFF WBC: CPT

## 2024-10-02 ENCOUNTER — OFFICE VISIT (OUTPATIENT)
Dept: NEPHROLOGY | Facility: CLINIC | Age: 89
End: 2024-10-02
Payer: MEDICARE

## 2024-10-02 VITALS
OXYGEN SATURATION: 95 % | HEART RATE: 73 BPM | DIASTOLIC BLOOD PRESSURE: 60 MMHG | SYSTOLIC BLOOD PRESSURE: 128 MMHG | WEIGHT: 170 LBS | HEIGHT: 71 IN | BODY MASS INDEX: 23.8 KG/M2

## 2024-10-02 DIAGNOSIS — I12.9 BENIGN HYPERTENSION WITH CKD (CHRONIC KIDNEY DISEASE) STAGE IV (HCC): ICD-10-CM

## 2024-10-02 DIAGNOSIS — I10 ESSENTIAL HYPERTENSION: ICD-10-CM

## 2024-10-02 DIAGNOSIS — D63.1 ANEMIA IN STAGE 4 CHRONIC KIDNEY DISEASE  (HCC): ICD-10-CM

## 2024-10-02 DIAGNOSIS — N18.4 BENIGN HYPERTENSION WITH CKD (CHRONIC KIDNEY DISEASE) STAGE IV (HCC): ICD-10-CM

## 2024-10-02 DIAGNOSIS — N40.0 BENIGN PROSTATIC HYPERPLASIA, UNSPECIFIED WHETHER LOWER URINARY TRACT SYMPTOMS PRESENT: ICD-10-CM

## 2024-10-02 DIAGNOSIS — E11.22 CKD STAGE 4 DUE TO TYPE 2 DIABETES MELLITUS (HCC): Primary | ICD-10-CM

## 2024-10-02 DIAGNOSIS — N18.4 ANEMIA IN STAGE 4 CHRONIC KIDNEY DISEASE  (HCC): ICD-10-CM

## 2024-10-02 DIAGNOSIS — N18.4 CKD STAGE 4 DUE TO TYPE 2 DIABETES MELLITUS (HCC): Primary | ICD-10-CM

## 2024-10-02 DIAGNOSIS — E78.2 MIXED HYPERLIPIDEMIA: ICD-10-CM

## 2024-10-02 DIAGNOSIS — N25.81 SECONDARY HYPERPARATHYROIDISM OF RENAL ORIGIN (HCC): ICD-10-CM

## 2024-10-02 PROCEDURE — 99214 OFFICE O/P EST MOD 30 MIN: CPT | Performed by: INTERNAL MEDICINE

## 2024-10-02 PROCEDURE — G2211 COMPLEX E/M VISIT ADD ON: HCPCS | Performed by: INTERNAL MEDICINE

## 2024-10-02 RX ORDER — SODIUM PHOSPHATE,MONO-DIBASIC 19G-7G/118
1 ENEMA (ML) RECTAL AS NEEDED
COMMUNITY

## 2024-10-02 RX ORDER — TERAZOSIN 1 MG/1
CAPSULE ORAL
Qty: 90 CAPSULE | Refills: 1 | Status: SHIPPED | OUTPATIENT
Start: 2024-10-02

## 2024-10-02 RX ORDER — BISACODYL 10 MG
10 SUPPOSITORY, RECTAL RECTAL DAILY
COMMUNITY

## 2024-10-02 RX ORDER — HYDRALAZINE HYDROCHLORIDE 10 MG/1
10 TABLET, FILM COATED ORAL DAILY
COMMUNITY

## 2024-10-02 RX ORDER — BETAMETHASONE VALERATE 1.2 MG/G
CREAM TOPICAL AS NEEDED
COMMUNITY

## 2024-10-02 NOTE — PROGRESS NOTES
OFFICE FOLLOW UP - Nephrology   Leroy Paredes 89 y.o. male MRN: [id number]       ASSESSMENT and PLAN:  Leroy was seen today for follow-up and chronic kidney disease.    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    1.  Chronic kidney disease stage 4, baseline creatinine around 1.9 to 2.2 since 2018.  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, atherosclerotic disease, possible diabetes as well as age-related nephron loss.  Recent blood and urine test were reviewed with patient and with his niece, kidney function is stable with a more recent creatinine of 1.92 with an estimated GFR of 29  Discussed about importance to avoid NSAIDs, follow low-salt diet, keep having good blood pressure as well as good blood sugar control and stay well-hydrated.  I would like to see him back in the office in 6 months with repeat labs.  Recommend to avoid milk of magnesia given advanced CKD.    2. Controlled type 2 diabetes, most recent A1c 6.8% on 7/2024,   Management as per primary care doctor.    3.  Hypertension, pressure today well-controlled, noted lisinopril was discontinued, follow low-salt diet, continue rest of the other medications    4.  Mild anemia suspected secondary to CKD, most recent hemoglobin 9.2, follow CBC in 6 months.    5.  Mineral bone disease, secondary hyperparathyroidism, recent PTH mildly elevated at 811 but acceptable for degree  of kidney function, follow labs in 6 months    6. Hyperlipidemia, continue with statins, management as per primary doctor.  Most recent lipid panel well-controlled on 7/2024        Patient Instructions   As we discussed in the office visit after reviewing most recent blood test your kidney function remains stable with a more recent creatinine of 1.9.  Follow low-sodium diet less than 2 g sodium a day.  Try to keep your legs elevated for every couple of hours for 10 to 15 minutes.  Recommend to avoid milk of magnesia regularly given advanced CKD.  No changes in your other medications.  Avoid NSAIDs (no ibuprofen, Motrin, Advil, Aleve, naproxen).  Okay to take Tylenol or acetaminophen as needed for pain.  Continue close follow-up with your primary care doctor.  I would like to see you back in the office in 6 months with repeat labs.        HPI: Leroy Paredes is a 89 y.o. male who is here for Follow-up and Chronic Kidney Disease  .  Last time patient was seen in our office was on 5/2024 by one of our advanced practitioners    Patient today returns to the office for CKD follow-up, today he presents with his niece Pam (POSOU[place])    Since last office visit he was hospitalized in August 2024 after syncopal episode.    Patient today in general is doing well, currently living at [facility].  Patient denies any chest pain, shortness of breath or leg swelling.   No abdominal pain, no nausea or diarrhea, no constipation.  Appetite is stable.  Denies any urinary problems no burning sensation, continues with same nocturia 1-2 per night.  He does not smoke, he quit smoking on 1981.  Denies NSAID use.  Medication list was reviewed    The last blood work was done on 9/27/2024, which we have reviewed together. Most recent serum creatinine 1.92 with an eGFR 29, Hgb 9.2  .    ROS: All the systems were reviewed and were negative except as documented on the H&P.          Allergies: Patient has no known allergies.    Medications:    Current Outpatient Medications:     acetaminophen (TYLENOL) 325 mg tablet, Take 2 tablets (650 mg total) by mouth every 6 (six) hours as needed for mild pain, Disp: , Rfl: 0    apixaban (Eliquis) 2.5 mg, TAKE ONE TABLET BY MOUTH TWO TIMES PER DAY *PATIENT SUPPLY, Disp: 60 tablet, Rfl: 2    atorvastatin (LIPITOR) 40 mg tablet, TAKE 1 TABLET EVERY DAY WITH DINNER, Disp: 90 tablet, Rfl: 10    bisacodyl ([facility] Bisacodyl) 10 mg suppository, Insert 10 mg into the rectum daily, Disp: , Rfl:     Blood Glucose Monitoring Suppl (PRECISION XTRA MONITOR) MARY, by Does not apply route daily, Disp: 1 each, Rfl: 0    Cholecalciferol 25 MCG (1000 UT) capsule, Take 1 capsule by mouth daily, Disp: , Rfl:     cyanocobalamin (VITAMIN B-12) 500 mcg tablet, Take 1 tablet by mouth 2 (two) times a week, Disp: , Rfl:     escitalopram (LEXAPRO) 5 mg tablet, Take 1 tablet (5 mg total) by mouth daily, Disp: 30 tablet, Rfl: 0    gabapentin (NEURONTIN) 100 mg capsule, Take 1 capsule (100 mg total) by mouth daily at bedtime, Disp: 30 capsule, Rfl: 0    hydrALAZINE (APRESOLINE) 10 mg tablet, Take 10 mg by mouth daily, Disp: , Rfl:     magnesium hydroxide (Milk of Magnesia) 400 mg/5 mL oral suspension, Take by mouth as needed for constipation, Disp: , Rfl:     magnesium oxide (MAG-OX) 400 mg, Take 400 mg by mouth daily, Disp: , Rfl:     melatonin 3 mg, Take 1 tablet (3 mg total) by mouth daily at bedtime, Disp: 30 tablet, Rfl: 0    PRECISION XTRA TEST STRIPS test strip, USE TO TEST BLOOD GLUCOSE ONCE A DAY, Disp: 100 strip, Rfl: 2    sodium phosphate-biphosphate (FLEET) 7-19 g 118 mL enema, Insert 1 enema into the rectum as needed, Disp: , Rfl:     terazosin (HYTRIN) 1 mg capsule, TAKE 1 CAPSULE AT BEDTIME, Disp: 90 capsule, Rfl: 1    Past Medical History:   Diagnosis Date    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)     Last Assessed:  5/19/17    Chronic kidney disease     Diabetes mellitus (HCC)     Hyperlipidemia     Hypertension     Osteoarthritis  "    Palpitations     TIA (transient ischemic attack)      Past Surgical History:   Procedure Laterality Date    APPENDECTOMY      CARDIAC ELECTROPHYSIOLOGY PROCEDURE N/A 1/17/2024    Procedure: Cardiac loop recorder explant;  Surgeon: Drew Olmos MD;  Location: [facility];  Service: Cardiology    COLONOSCOPY  2012    HEMORROIDECTOMY      TOOTH EXTRACTION  02/02/2022     Family History   Problem Relation Age of Onset    Diabetes Mother     Other Mother         Stroke syndrome    Diabetes Father     Emphysema Father       reports that he has quit smoking. He has never used smokeless tobacco. He reports that he does not currently use alcohol. He reports that he does not use drugs.      Physical Exam:   Vitals:    10/02/24 0946   BP: 128/60   BP Location: Left arm   Patient Position: Sitting   Cuff Size: Adult   Pulse: 73   SpO2: 95%   Weight: 77.1 kg (170 lb)   Height: 5' 11\" (1.803 m)     Body mass index is 23.71 kg/m².    General: conscious, cooperative, in not acute distress  Eyes: conjunctivae pink, anicteric sclerae  ENT: lips and mucous membranes moist  Neck: supple, no JVD  Chest: clear breath sounds bilateral, no crackles, ronchus or wheezings  CVS: distinct S1 & S2, normal rate, regular rhythm  Abdomen: soft, non-tender, non-distended, normoactive bowel sounds  Back: no CVA tenderness  Extremities: mild edema of both legs  Skin: no rash  Neuro: awake, alert, oriented        Laboratory Results:  Lab Results   Component Value Date    WBC 5.55 09/27/2024    HGB 9.2 (L) 09/27/2024    HCT 29.1 (L) 09/27/2024    MCV 98 09/27/2024     09/27/2024     Lab Results   Component Value Date    SODIUM 139 09/27/2024    K 4.1 09/27/2024     09/27/2024    CO2 23 09/27/2024    BUN 25 09/27/2024    CREATININE 1.92 (H) 09/27/2024    GLUC 126 08/28/2024    CALCIUM 8.2 (L) 09/27/2024     Lab Results   Component Value Date    .6 (H) 09/27/2024    CALCIUM 8.2 (L) 09/27/2024    PHOS 2.6 09/27/2024 " "        Portions of the record may have been created with voice recognition software. Occasional wrong word or \"sound a like\" substitutions may have occurred due to the inherent limitations of voice recognition software. Read the chart carefully and recognize, using context, where substitutions have occurred.If you have any questions, please contact the dictating provider.  "

## 2024-10-10 DIAGNOSIS — I48.91 ATRIAL FIBRILLATION, UNSPECIFIED TYPE (HCC): ICD-10-CM

## 2024-10-11 RX ORDER — APIXABAN 2.5 MG/1
TABLET, FILM COATED ORAL
Qty: 60 TABLET | Refills: 2 | Status: SHIPPED | OUTPATIENT
Start: 2024-10-11

## 2024-10-15 ENCOUNTER — TELEPHONE (OUTPATIENT)
Dept: NEPHROLOGY | Facility: CLINIC | Age: 89
End: 2024-10-15

## 2024-10-15 NOTE — TELEPHONE ENCOUNTER
[phone] and spoke with Morris regarding a follow up with Dr. Esposito from recall.   I scheduled in [place]  Tuesday 4/15 at 10:30 Am with Dr. Esposito

## 2024-10-16 DIAGNOSIS — G47.00 INSOMNIA, UNSPECIFIED TYPE: ICD-10-CM

## 2024-10-16 DIAGNOSIS — Z00.00 HEALTHCARE MAINTENANCE: Primary | ICD-10-CM

## 2024-10-16 DIAGNOSIS — F39 MOOD DISORDER (HCC): ICD-10-CM

## 2024-10-17 ENCOUNTER — OFFICE VISIT (OUTPATIENT)
Age: 89
End: 2024-10-17
Payer: MEDICARE

## 2024-10-17 VITALS
SYSTOLIC BLOOD PRESSURE: 138 MMHG | HEIGHT: 71 IN | DIASTOLIC BLOOD PRESSURE: 58 MMHG | RESPIRATION RATE: 16 BRPM | OXYGEN SATURATION: 97 % | TEMPERATURE: 97.3 F | HEART RATE: 75 BPM | WEIGHT: 169.4 LBS | BODY MASS INDEX: 23.72 KG/M2

## 2024-10-17 DIAGNOSIS — F51.01 PRIMARY INSOMNIA: ICD-10-CM

## 2024-10-17 DIAGNOSIS — E08.21 DIABETES MELLITUS DUE TO UNDERLYING CONDITION, CONTROLLED, WITH DIABETIC NEPHROPATHY, WITH LONG-TERM CURRENT USE OF INSULIN (HCC): ICD-10-CM

## 2024-10-17 DIAGNOSIS — Z79.4 DIABETES MELLITUS DUE TO UNDERLYING CONDITION, CONTROLLED, WITH DIABETIC NEPHROPATHY, WITH LONG-TERM CURRENT USE OF INSULIN (HCC): ICD-10-CM

## 2024-10-17 DIAGNOSIS — I48.91 ATRIAL FIBRILLATION, UNSPECIFIED TYPE (HCC): ICD-10-CM

## 2024-10-17 DIAGNOSIS — N18.4 BENIGN HYPERTENSION WITH CKD (CHRONIC KIDNEY DISEASE) STAGE IV (HCC): ICD-10-CM

## 2024-10-17 DIAGNOSIS — I10 ESSENTIAL HYPERTENSION: ICD-10-CM

## 2024-10-17 DIAGNOSIS — G93.41 ACUTE METABOLIC ENCEPHALOPATHY: Primary | ICD-10-CM

## 2024-10-17 DIAGNOSIS — E78.2 MIXED HYPERLIPIDEMIA: ICD-10-CM

## 2024-10-17 DIAGNOSIS — E43 SEVERE PROTEIN-CALORIE MALNUTRITION (HCC): ICD-10-CM

## 2024-10-17 DIAGNOSIS — I12.9 BENIGN HYPERTENSION WITH CKD (CHRONIC KIDNEY DISEASE) STAGE IV (HCC): ICD-10-CM

## 2024-10-17 DIAGNOSIS — R55 SYNCOPE, UNSPECIFIED SYNCOPE TYPE: ICD-10-CM

## 2024-10-17 PROCEDURE — 99214 OFFICE O/P EST MOD 30 MIN: CPT | Performed by: INTERNAL MEDICINE

## 2024-10-17 PROCEDURE — G2211 COMPLEX E/M VISIT ADD ON: HCPCS | Performed by: INTERNAL MEDICINE

## 2024-10-17 RX ORDER — CYANOCOBALAMIN (VITAMIN B-12) 500 MCG
500 TABLET ORAL 2 TIMES WEEKLY
Qty: 60 TABLET | Refills: 1 | Status: SHIPPED | OUTPATIENT
Start: 2024-10-17

## 2024-10-17 RX ORDER — GABAPENTIN 100 MG/1
100 CAPSULE ORAL
Qty: 30 CAPSULE | Refills: 1 | Status: SHIPPED | OUTPATIENT
Start: 2024-10-17

## 2024-10-17 RX ORDER — MIRTAZAPINE 15 MG/1
15 TABLET, FILM COATED ORAL
Qty: 30 TABLET | Refills: 5 | Status: SHIPPED | OUTPATIENT
Start: 2024-10-17

## 2024-10-17 RX ORDER — LANOLIN ALCOHOL/MO/W.PET/CERES
3 CREAM (GRAM) TOPICAL
Qty: 30 TABLET | Refills: 1 | Status: SHIPPED | OUTPATIENT
Start: 2024-10-17

## 2024-10-17 RX ORDER — ESCITALOPRAM OXALATE 5 MG/1
5 TABLET ORAL DAILY
Qty: 30 TABLET | Refills: 1 | Status: SHIPPED | OUTPATIENT
Start: 2024-10-17

## 2024-10-17 RX ORDER — LANOLIN ALCOHOL/MO/W.PET/CERES
400 CREAM (GRAM) TOPICAL DAILY
Qty: 30 TABLET | Refills: 1 | Status: SHIPPED | OUTPATIENT
Start: 2024-10-17

## 2024-10-17 RX ORDER — CHOLECALCIFEROL (VITAMIN D3) 25 MCG
1000 CAPSULE ORAL DAILY
Qty: 30 CAPSULE | Refills: 1 | Status: SHIPPED | OUTPATIENT
Start: 2024-10-17

## 2024-10-17 NOTE — ASSESSMENT & PLAN NOTE
Lab Results   Component Value Date    EGFR 29 09/27/2024    EGFR 26 08/28/2024    EGFR 28 08/27/2024    CREATININE 1.92 (H) 09/27/2024    CREATININE 2.12 (H) 08/28/2024    CREATININE 1.96 (H) 08/27/2024   With hospitalization adequate hydration and nutrition his GFR did shows some slight improvement going up from 26-29.  Does have an appointment for follow-up with nephrology.  Check kidney function now    Orders:    Comprehensive metabolic panel; Future    CBC and differential; Future

## 2024-10-17 NOTE — ASSESSMENT & PLAN NOTE
Blood pressure is showing adequate control.  Again because it is some problems with hypotension when he was in the rehab facility lisinopril was stopped.  Patient will continue present surveillance and modification of treatment in the future if needed.

## 2024-10-17 NOTE — ASSESSMENT & PLAN NOTE
Patient's blood sugars had been controlled with previous treatment.  With the patient now getting adequate nutrition and has noted a substantial weight gain of 29 pounds we will check a fasting blood sugar hemoglobin A1c and initiate treatment in the future if needed.  We would love to have it under control with diet alone and hopefully will not need to be back on any medication  Lab Results   Component Value Date    HGBA1C 6.8 (H) 07/12/2024       Orders:    Hemoglobin A1C; Future

## 2024-10-17 NOTE — ASSESSMENT & PLAN NOTE
Patient because of his insomnia was given melatonin which did not seem to help.  Was given a prescription for Remeron to take at nighttime which we feel will help not only with his insomnia but his his sundowning

## 2024-10-17 NOTE — ASSESSMENT & PLAN NOTE
Has some clearing mentally but again with the patient's advanced age she is having some difficulty especially at nighttime and now in a new facility which may cause further difficulties with memory.    Orders:    mirtazapine (REMERON) 15 mg tablet; Take 1 tablet (15 mg total) by mouth daily at bedtime    mirtazapine (REMERON) 15 mg tablet; Take 1 tablet (15 mg total) by mouth daily at bedtime

## 2024-10-17 NOTE — PROGRESS NOTES
Ambulatory Visit  Name: Leroy Paredes      : 1930      MRN: [id number]  Encounter Provider: Pepito Foster DO  Encounter Date: 10/17/2024   Encounter department: [facility] INTERNAL MEDICINE    Assessment & Plan  Severe protein-calorie malnutrition (HCC)  Patient is getting adequate nutrition and weight has gone up.  Denies any problems with his appetite.  No bowel difficulties.    Orders:    Comprehensive metabolic panel; Future    Mixed hyperlipidemia  With a history of diabetes, hyperlipidemia, coronary artery disease remains on Lipitor 40 mg daily.         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  Does have an appointment for follow-up with nephrology.  Check kidney function now    Orders:    Comprehensive metabolic panel; Future    CBC and differential; Future    Acute metabolic encephalopathy  Has some clearing mentally but again with the patient's advanced age she is having some difficulty especially at nighttime and now in a new facility which may cause further difficulties with memory.    Orders:    mirtazapine (REMERON) 15 mg tablet; Take 1 tablet (15 mg total) by mouth daily at bedtime    mirtazapine (REMERON) 15 mg tablet; Take 1 tablet (15 mg total) by mouth daily at bedtime    Diabetes mellitus due to underlying condition, controlled, with diabetic nephropathy, with long-term current use of insulin ([facility])  Patient's blood sugars had been controlled with previous treatment.  With the patient now getting adequate nutrition and has noted a substantial weight gain of 29 pounds we will check a fasting blood sugar hemoglobin A1c and initiate  treatment in the future if needed.  We would love to have it under control with diet alone and hopefully will not need to be back on any medication  Lab Results   Component Value Date    HGBA1C 6.8 (H) 07/12/2024       Orders:    Hemoglobin A1C; Future    Syncope, unspecified syncope type  Patient was admitted to the hospital on August 24, 2024 with episode of syncope.  Patient did have full workup and evaluation in the ER and with hospital stay.  [place] that his syncopal episode was multifactorial including orthostatic hypotension from dehydration and malnutrition, metabolic encephalopathy which was felt to be secondary to recurrent episodes of hypoglycemia with the patient on glimepiride to control his blood sugar readings, urinary tract infection which most likely was causative.  Patient has had a significant decline in his status both physically and mentally over the last year.  Upon discharge was placed in a rehab facility and has just recently been discharged to a chronic care facility.  Patient is presenting today accompanied by his family.  Patient is awake alert walking with a walker.  With his rehab was having problems still with some hypotension and has been taken off his lisinopril.  They have been working on his nutrition and is now receiving 3 meals a day.  His weight has gone from a low at discharge from the hospital 141 now up to 169.  Apparently patient has been having some sundowning with confusion making calls to his family.  Still desires to go home but is unable to live independently at this time.         Primary insomnia  Patient because of his insomnia was given melatonin which did not seem to help.  Was given a prescription for Remeron to take at nighttime which we feel will help not only with his insomnia but his his sundowning         Essential hypertension  Blood pressure is showing adequate control.  Again because it is some problems with hypotension when he was in the rehab facility  lisinopril was stopped.  Patient will continue present surveillance and modification of treatment in the future if needed.         Atrial fibrillation, unspecified type (HCC)  Continue to follow-up with cardiologist.  Has appointment next week.              History of Present Illness     89-year-old male with a history of multiple medical problems outlined previously who is here today for evaluation tentative transition of care visit.  Was admitted to the hospital the end of August with syncopal episodes fall metabolic encephalopathy.  Was stabilized and transferred to rehab facility now being transferred to his assisted living for most likely chronic care.  He did have some changes with medication during his hospitalization and rehab and we did review his meds at this time.  Patient's apparently having some problems with confusion at nighttime, sundowning.  Presenting today with family members.  Is awake alert walking with a walker      Review of Systems   Constitutional:  Positive for activity change. Negative for appetite change, chills, diaphoresis, fatigue, fever and unexpected weight change.        Is continuing with physical therapy at his new facility.  Again has difficulty with ambulation walking with a walker crap   HENT: Negative.     Eyes: Negative.    Respiratory: Negative.          Denies any shortness of breath with exertion   Cardiovascular: Negative.    Gastrointestinal: Negative.    Endocrine: Negative.    Genitourinary: Negative.         No new urinary complaints.  Was diagnosed with infection when he was in rehab facility, hospital.  Completed antibiotic treatment.  Patient has signs and symptoms of BPH and these have not changed   Musculoskeletal: Negative.         Some diffuse arthritic changes but nothing acute   Skin: Negative.    Allergic/Immunologic: Negative.    Neurological:  Positive for syncope and weakness. Negative for dizziness, tremors, seizures, facial asymmetry, speech difficulty,  light-headedness, numbness and headaches.        Admits to some weakness bilateral lower extremities.   Hematological: Negative.    Psychiatric/Behavioral:  Positive for agitation, dysphoric mood and sleep disturbance. Negative for behavioral problems, confusion, decreased concentration and self-injury. The patient is not nervous/anxious and is not hyperactive.         Family has had some telephone calls with the agitation at nighttime with the patient.  Some sleep disturbance and admits to some depression over his medical condition     Past Medical History:   Diagnosis Date    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)     Last Assessed:  5/19/17    Chronic kidney disease     Diabetes mellitus (HCC)     Hyperlipidemia     Hypertension     Osteoarthritis     Palpitations     TIA (transient ischemic attack)      Past Surgical History:   Procedure Laterality Date    APPENDECTOMY      CARDIAC ELECTROPHYSIOLOGY PROCEDURE N/A 1/17/2024    Procedure: Cardiac loop recorder explant;  Surgeon: Drew Olmos MD;  Location: [facility];  Service: Cardiology    COLONOSCOPY  2012    HEMORROIDECTOMY      TOOTH EXTRACTION  02/02/2022     Family History   Problem Relation Age of Onset    Diabetes Mother     Other Mother         Stroke syndrome    Diabetes Father     Emphysema Father      Social History     Tobacco Use    Smoking status: Former    Smokeless tobacco: Never   Vaping Use    Vaping status: Never Used   Substance and Sexual Activity    Alcohol use: Not Currently     Comment: Social drinker    Drug use: No    Sexual activity: Not Currently     Current Outpatient Medications on File Prior to Visit   Medication Sig    acetaminophen (TYLENOL) 325 mg tablet Take 2 tablets (650 mg total) by mouth every 6 (six) hours as needed for mild pain    apixaban (Eliquis) 2.5 mg TAKE ONE TABLET BY MOUTH TWO TIMES PER DAY *PATIENT SUPPLY    atorvastatin (LIPITOR) 40 mg tablet TAKE 1 TABLET EVERY DAY WITH DINNER    bisacodyl ([facility]  "Bisacodyl) 10 mg suppository Insert 10 mg into the rectum daily    Blood Glucose Monitoring Suppl (PRECISION XTRA MONITOR) MARY by Does not apply route daily    Cholecalciferol 25 MCG (1000 UT) capsule Take 1 capsule by mouth daily    cyanocobalamin (VITAMIN B-12) 500 mcg tablet Take 1 tablet by mouth 2 (two) times a week    escitalopram (LEXAPRO) 5 mg tablet Take 1 tablet (5 mg total) by mouth daily    gabapentin (NEURONTIN) 100 mg capsule Take 1 capsule (100 mg total) by mouth daily at bedtime    hydrALAZINE (APRESOLINE) 10 mg tablet Take 10 mg by mouth daily    magnesium hydroxide (Milk of Magnesia) 400 mg/5 mL oral suspension Take by mouth as needed for constipation    magnesium oxide (MAG-OX) 400 mg Take 400 mg by mouth daily    melatonin 3 mg Take 1 tablet (3 mg total) by mouth daily at bedtime    PRECISION XTRA TEST STRIPS test strip USE TO TEST BLOOD GLUCOSE ONCE A DAY    sodium phosphate-biphosphate (FLEET) 7-19 g 118 mL enema Insert 1 enema into the rectum as needed    terazosin (HYTRIN) 1 mg capsule TAKE 1 CAPSULE AT BEDTIME     No Known Allergies  Immunization History   Administered Date(s) Administered    COVID-19 MODERNA VACC 0.5 ML IM 01/27/2021, 02/22/2021, 11/22/2021, 07/23/2022    INFLUENZA 11/01/2002, 01/05/2003, 11/24/2003, 10/06/2004, 11/07/2005, 10/21/2006, 09/28/2007, 10/10/2008, 10/20/2008, 11/04/2009, 10/05/2010, 10/03/2011, 11/04/2012    Influenza Split High Dose Preservative Free IM 09/27/2013, 09/25/2014, 09/29/2015, 09/23/2016, 09/19/2017    Influenza, high dose seasonal 0.7 mL 09/25/2018, 10/15/2020, 09/20/2021, 09/26/2022, 10/02/2023    Pneumococcal 01/01/1999    Pneumococcal Conjugate 13-Valent 04/25/2019    Pneumococcal Polysaccharide PPV23 10/01/2006    TD (adult) Preservative Free 12/11/1930     Objective     /58   Pulse 75   Temp (!) 97.3 °F (36.3 °C)   Resp 16   Ht 5' 11\" (1.803 m)   Wt 76.8 kg (169 lb 6.4 oz)   SpO2 97%   BMI 23.63 kg/m²     Physical Exam  Vitals " and nursing note reviewed.   Constitutional:       General: He is not in acute distress.     Appearance: He is normal weight. He is not ill-appearing, toxic-appearing or diaphoretic.      Comments: Pleasant 89-year-old male who is awake alert accompanied by family members.  Walking with a walker   HENT:      Head: Normocephalic and atraumatic.      Right Ear: Tympanic membrane, ear canal and external ear normal. There is no impacted cerumen.      Left Ear: Tympanic membrane, ear canal and external ear normal. There is no impacted cerumen.      Nose: Nose normal. No congestion or rhinorrhea.      Mouth/Throat:      Mouth: Mucous membranes are moist.      Pharynx: Oropharynx is clear. No oropharyngeal exudate or posterior oropharyngeal erythema.   Eyes:      General: No scleral icterus.        Right eye: No discharge.         Left eye: No discharge.      Extraocular Movements: Extraocular movements intact.      Conjunctiva/sclera: Conjunctivae normal.      Pupils: Pupils are equal, round, and reactive to light.   Neck:      Vascular: No carotid bruit.      Comments: Some decreased range of motion actively and passively at the cervical spine but no pain with movement.  Cardiovascular:      Rate and Rhythm: Normal rate. Rhythm irregular.      Heart sounds: No murmur heard.     No friction rub. No gallop.   Pulmonary:      Effort: Pulmonary effort is normal. No respiratory distress.      Breath sounds: No stridor. No wheezing, rhonchi or rales.      Comments: Some decreased breath sounds anteriorly and posteriorly with no rales rhonchi or wheezes appreciated  Chest:      Chest wall: No tenderness.   Abdominal:      General: Abdomen is flat. Bowel sounds are normal. There is no distension.      Palpations: Abdomen is soft. There is no mass.      Tenderness: There is no abdominal tenderness. There is no right CVA tenderness, left CVA tenderness, guarding or rebound.      Hernia: No hernia is present.   Musculoskeletal:          General: Deformity present. No swelling, tenderness or signs of injury.      Cervical back: No rigidity or tenderness.      Right lower leg: No edema.      Left lower leg: No edema.      Comments: Diffuse arthritic changes is noted previously with no acute lesions.  Patient has decreased muscle tone and strength to both upper and lower extremities.  Walking with a walker   Lymphadenopathy:      Cervical: No cervical adenopathy.   Skin:     General: Skin is warm and dry.      Coloration: Skin is not jaundiced or pale.      Findings: No bruising, erythema, lesion or rash.   Neurological:      Mental Status: He is alert. Mental status is at baseline.      Cranial Nerves: No cranial nerve deficit.      Sensory: No sensory deficit.      Motor: Weakness present.      Gait: Gait abnormal.      Deep Tendon Reflexes: Reflexes abnormal.      Comments: Patient does have some disorientation as to time and date.  This is an appreciable decline from previously.  We feel some of this may be from change in his living circumstances and with him being admitted to a healthcare facility that is new to him this may cause some acute confusion   Psychiatric:         Mood and Affect: Mood normal.

## 2024-10-17 NOTE — ASSESSMENT & PLAN NOTE
Patient was admitted to the hospital on August 24, 2024 with episode of syncope.  Patient did have full workup and evaluation in the ER and with hospital stay.  [place] that his syncopal episode was multifactorial including orthostatic hypotension from dehydration and malnutrition, metabolic encephalopathy which was felt to be secondary to recurrent episodes of hypoglycemia with the patient on glimepiride to control his blood sugar readings, urinary tract infection which most likely was causative.  Patient has had a significant decline in his status both physically and mentally over the last year.  Upon discharge was placed in a rehab facility and has just recently been discharged to a chronic care facility.  Patient is presenting today accompanied by his family.  Patient is awake alert walking with a walker.  With his rehab was having problems still with some hypotension and has been taken off his lisinopril.  They have been working on his nutrition and is now receiving 3 meals a day.  His weight has gone from a low at discharge from the hospital 141 now up to 169.  Apparently patient has been having some sundowning with confusion making calls to his family.  Still desires to go home but is unable to live independently at this time.

## 2024-10-17 NOTE — ASSESSMENT & PLAN NOTE
With a history of diabetes, hyperlipidemia, coronary artery disease remains on Lipitor 40 mg daily.

## 2024-10-23 DIAGNOSIS — I63.40 CEREBROVASCULAR ACCIDENT (CVA) DUE TO EMBOLISM OF CEREBRAL ARTERY (HCC): ICD-10-CM

## 2024-10-23 DIAGNOSIS — E78.5 HYPERLIPIDEMIA, UNSPECIFIED HYPERLIPIDEMIA TYPE: ICD-10-CM

## 2024-10-23 RX ORDER — ATORVASTATIN CALCIUM 40 MG/1
TABLET, FILM COATED ORAL
Qty: 90 TABLET | Refills: 1 | Status: SHIPPED | OUTPATIENT
Start: 2024-10-23

## 2024-10-25 ENCOUNTER — OFFICE VISIT (OUTPATIENT)
Dept: CARDIOLOGY CLINIC | Facility: CLINIC | Age: 89
End: 2024-10-25
Payer: MEDICARE

## 2024-10-25 VITALS
HEIGHT: 71 IN | DIASTOLIC BLOOD PRESSURE: 46 MMHG | BODY MASS INDEX: 24.23 KG/M2 | WEIGHT: 173.1 LBS | OXYGEN SATURATION: 97 % | HEART RATE: 81 BPM | SYSTOLIC BLOOD PRESSURE: 128 MMHG

## 2024-10-25 DIAGNOSIS — R55 SYNCOPE, UNSPECIFIED SYNCOPE TYPE: ICD-10-CM

## 2024-10-25 DIAGNOSIS — I10 ESSENTIAL HYPERTENSION: ICD-10-CM

## 2024-10-25 DIAGNOSIS — I48.91 ATRIAL FIBRILLATION, UNSPECIFIED TYPE (HCC): ICD-10-CM

## 2024-10-25 DIAGNOSIS — I50.32 CHRONIC HEART FAILURE WITH PRESERVED EJECTION FRACTION (HCC): Primary | ICD-10-CM

## 2024-10-25 DIAGNOSIS — R00.2 PALPITATIONS: ICD-10-CM

## 2024-10-25 DIAGNOSIS — E78.2 MIXED HYPERLIPIDEMIA: ICD-10-CM

## 2024-10-25 DIAGNOSIS — N18.4 BENIGN HYPERTENSION WITH CKD (CHRONIC KIDNEY DISEASE) STAGE IV (HCC): ICD-10-CM

## 2024-10-25 DIAGNOSIS — I12.9 BENIGN HYPERTENSION WITH CKD (CHRONIC KIDNEY DISEASE) STAGE IV (HCC): ICD-10-CM

## 2024-10-25 PROCEDURE — 99214 OFFICE O/P EST MOD 30 MIN: CPT

## 2024-10-25 RX ORDER — LISINOPRIL 5 MG/1
5 TABLET ORAL DAILY
Qty: 60 TABLET | Refills: 3 | Status: SHIPPED | OUTPATIENT
Start: 2024-10-25

## 2024-10-25 NOTE — PROGRESS NOTES
General Cardiology Note  Leroy Paredes  12/11/1930  [id number]  [facility] [place]  [address]  [place] [address] [phone]    Referring Provider - No ref. provider found  Chief Complaint   Patient presents with    Follow-up     Pt denies cardiac complaints.     Assessment & Plan  Chronic heart failure with preserved ejection fraction (HCC)  Wt Readings from Last 3 Encounters:   10/25/24 78.5 kg (173 lb 1.6 oz)   10/17/24 76.8 kg (169 lb 6.4 oz)   10/02/24 77.1 kg (170 lb)   Most recent echo August 2024 LVEF 50%  He presents today with bilateral pitting leg edema, mild JVD. Denies any SOB  Restart lisinopril as below  Essential hypertension  BP low today 128/46.  Per review of rehab vitals, as low  80/49 up to 175/90,  mostly elevated readings in the 130's-140's/70  He has stage 4 CKD and following with nephrology  Stop the hydralazine, he has been on 10 mg Q 6 h with hold for SBP <160 , likely leading to sudden drops in blood pressure. He is also on Hytrin for BPH  Restart his lisinopril 5 mg daily.  recommend uptitrating lisinopril dose for BP goal 135/80. Can consider adding other antihypertensive agents as tolerated in the near future. Repeat BMP in 2 weeks  Syncope, unspecified syncope type  No recurrence since recent admission  Denies lightheadedness, or dizziness  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 ([facility])  Lab Results   Component Value Date    EGFR 29 09/27/2024    EGFR 26 08/28/2024    EGFR 28 08/27/2024    CREATININE 1.92 (H) 09/27/2024    CREATININE 2.12 (H) 08/28/2024    CREATININE 1.96 (H) 08/27/2024   Following with nephrology  Mixed hyperlipidemia  Lipid panel: HDL 32. LDL 60  Continue on atorvastatin 40 mg daily  Palpitations  Denies any palpitations  Atrial fibrillation, unspecified type (HCC)  Denies any palpitations  Continue on Eliquis 2.5 mg twice daily  Testing  August 2024 echocardiogram: LVEF 50%.  G1 DD. left Atrium  moderately dilated.  Right atrium dilated. RV mildly dilated.  Mild to moderate TR and pulmonic regurgitation.  PASP 34 mmHg  8/26/2024 EKG: Normal sinus rhythm. Right bundle branch block. Left anterior fascicular block    Will RTO in 3 months or sooner if necessary. Will call with any concerns.     Interval History: 89 y.o.  male  with PMH as below presents for 6 months follow-up. Follows with Dr. Dr. Humphries  Loop recorder with mild Afib burden was removed in January 2024  He was admitted to the hospital on August 24, 2024 with episode of syncope., SBP was in the 80's.  His workup revealed that his syncopal episode was multifactorial including orthostatic hypotension from dehydration and malnutrition, metabolic encephalopathy which was felt to be secondary to recurrent episodes of hypoglycemia with the patient on glimepiride, and urinary tract infection.  He was discharged to rehab.  With his rehab, he was having problems still with some hypotension and has been taken off his lisinopril     Family is present during visit. Patient is Now at [facility]. Reports no acute symptoms.  Only reports occasional slight dyspnea on exertion and leg swelling.  He uses a walker and has been following with PT/OT. Report that he has gained 30 lbs since August and has been feeling better.    Patient Active Problem List    Diagnosis Date Noted    Chronic heart failure with preserved ejection fraction (HCC) 10/25/2024    Insomnia 08/26/2024    Acute metabolic encephalopathy 08/26/2024    Ambulatory dysfunction 08/26/2024    Severe protein-calorie malnutrition ([facility]) 08/26/2024    Syncope 08/24/2024    Hyperlactatemia 08/24/2024    A-fib ([facility]) 03/07/2024    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[facility]) 03/07/2024    Encounter for loop recorder at end of battery life 01/17/2024    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  ([facility]) 06/12/2023    Closed fracture of one rib of left side with routine  healing 09/15/2022    Fall 09/10/2022    Skin tear of right elbow without complication 01/21/2022    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[facility]) 05/20/2021    Embolic bilateral punctate CVA, 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/26/2020    Essential hypertension 06/26/2020    Secondary hyperparathyroidism of renal origin ([facility]) 04/25/2019    Acute kidney injury superimposed on chronic kidney disease  ([facility]) 09/27/2018    CKD stage 4 due to type 2 diabetes mellitus ([facility]) 09/27/2018    Palpitations 09/25/2018    Enlarged prostate without lower urinary tract symptoms (luts) 03/07/2013    Controlled diabetes mellitus ([facility]) 08/09/2012    Hyperlipidemia 08/09/2012    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 ([facility]) 08/09/2012     Past Medical History:   Diagnosis Date    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)     Last Assessed:  5/19/17    Chronic kidney disease     Diabetes mellitus ([facility])     Hyperlipidemia     Hypertension     Osteoarthritis     Palpitations     TIA (transient ischemic attack)      Social History     Tobacco Use    Smoking status: Former    Smokeless tobacco: Never   Vaping Use    Vaping status: Never Used   Substance Use Topics    Alcohol use: Not Currently     Comment: Social drinker    Drug use: No      Family History   Problem Relation Age of Onset    Diabetes Mother     Other Mother         Stroke syndrome    Diabetes Father     Emphysema Father      Past Surgical History:   Procedure Laterality Date    APPENDECTOMY      CARDIAC ELECTROPHYSIOLOGY PROCEDURE N/A 1/17/2024    Procedure: Cardiac loop recorder explant;  Surgeon: Drew Olmos MD;  Location: [facility];  Service: Cardiology    COLONOSCOPY  2012    HEMORROIDECTOMY      TOOTH EXTRACTION  02/02/2022       Current Outpatient Medications:     acetaminophen (TYLENOL) 325 mg tablet, Take 2 tablets (650 mg total) by mouth every 6 (six) hours as needed for mild pain, Disp: , Rfl: 0    apixaban (Eliquis) 2.5 mg, TAKE ONE TABLET BY MOUTH  TWO TIMES PER DAY *PATIENT SUPPLY, Disp: 60 tablet, Rfl: 2    atorvastatin (LIPITOR) 40 mg tablet, TAKE 1 TABLET EVERY DAY WITH DINNER, Disp: 90 tablet, Rfl: 1    bisacodyl ([facility] Bisacodyl) 10 mg suppository, Insert 10 mg into the rectum daily, Disp: , Rfl:     Blood Glucose Monitoring Suppl (PRECISION XTRA MONITOR) MARY, by Does not apply route daily, Disp: 1 each, Rfl: 0    Cholecalciferol (D3 High Potency) 25 MCG (1000 UT) capsule, TAKE ONE CAPSULE BY MOUTH DAILY, Disp: 30 capsule, Rfl: 1    escitalopram (LEXAPRO) 5 mg tablet, TAKE ONE TABLET BY MOUTH DAILY, Disp: 30 tablet, Rfl: 1    gabapentin (NEURONTIN) 100 mg capsule, TAKE ONE CAPSULE BY MOUTH EVERY NIGHT AT BEDTIME, Disp: 30 capsule, Rfl: 1    lisinopril (ZESTRIL) 5 mg tablet, Take 1 tablet (5 mg total) by mouth daily, Disp: 60 tablet, Rfl: 3    magnesium hydroxide (Milk of Magnesia) 400 mg/5 mL oral suspension, Take by mouth as needed for constipation, Disp: , Rfl:     magnesium Oxide (MAG-OX) 400 mg TABS, TAKE ONE TABLET BY MOUTH DAILY, Disp: 30 tablet, Rfl: 1    magnesium oxide (MAG-OX) 400 mg, Take 400 mg by mouth daily, Disp: , Rfl:     melatonin 3 mg, TAKE ONE TABLET BY MOUTH EVERY NIGHT AT BEDTIME, Disp: 30 tablet, Rfl: 1    mirtazapine (REMERON) 15 mg tablet, Take 1 tablet (15 mg total) by mouth daily at bedtime, Disp: 30 tablet, Rfl: 5    mirtazapine (REMERON) 15 mg tablet, Take 1 tablet (15 mg total) by mouth daily at bedtime, Disp: 30 tablet, Rfl: 5    PRECISION XTRA TEST STRIPS test strip, USE TO TEST BLOOD GLUCOSE ONCE A DAY, Disp: 100 strip, Rfl: 2    sodium phosphate-biphosphate (FLEET) 7-19 g 118 mL enema, Insert 1 enema into the rectum as needed, Disp: , Rfl:     terazosin (HYTRIN) 1 mg capsule, TAKE 1 CAPSULE AT BEDTIME, Disp: 90 capsule, Rfl: 1    vitamin B-12 (CYANOCOBALAMIN) 500 MCG TABS, Take 1 tablet (500 mcg total) by mouth 2 (two) times a week, Disp: 60 tablet, Rfl: 1    No Known Allergies    Vitals:    10/25/24 0844 10/25/24 0918  "  BP: (!) 134/44 (!) 128/46   BP Location: Right arm Right arm   Patient Position: Sitting    Cuff Size: Standard    Pulse: 81    SpO2: 97%    Weight: 78.5 kg (173 lb 1.6 oz)    Height: 5' 11\" (1.803 m)         Vitals:    10/25/24 0844   Weight: 78.5 kg (173 lb 1.6 oz)      Height: 5' 11\" (180.3 cm)   Body mass index is 24.14 kg/m².    Labs:     Chemistry        Component Value Date/Time     01/04/2016 0928    K 4.1 09/27/2024 0813    K 5.0 05/03/2024 1241     09/27/2024 0813     05/03/2024 1241    CO2 23 09/27/2024 0813    CO2 21 05/03/2024 1241    BUN 25 09/27/2024 0813    BUN 37 (H) 05/03/2024 1241    CREATININE 1.92 (H) 09/27/2024 0813    CREATININE 2.33 (H) 05/03/2024 1241        Component Value Date/Time    CALCIUM 8.2 (L) 09/27/2024 0813    CALCIUM 8.8 05/03/2024 1241    ALKPHOS 58 08/27/2024 0448    ALKPHOS 69 01/27/2024 1019    AST 19 08/27/2024 0448    AST 12 01/27/2024 1019    ALT 13 08/27/2024 0448    ALT 9 01/27/2024 1019    BILITOT 0.91 01/04/2016 0928          Lab Results   Component Value Date    HGBA1C 6.8 (H) 07/12/2024      Lab Results   Component Value Date    CHOL 117 01/04/2016    CHOL 119 (L) 01/23/2015     Lab Results   Component Value Date    HDL 32 (L) 07/12/2024    HDL 41 01/04/2023    HDL 36 (L) 12/02/2021     Lab Results   Component Value Date    LDLCALC 60 07/12/2024    LDLCALC 46 01/04/2023    LDLCALC 42 12/02/2021     Lab Results   Component Value Date    TRIG 85 07/12/2024    TRIG 60 01/04/2023    TRIG 56 12/02/2021     No results found for: \"CHOLHDL\"    Imaging: No results found.      Review of Systems   Constitutional: Negative for chills, diaphoresis, fever and malaise/fatigue.   Cardiovascular:  Positive for dyspnea on exertion (slight) and leg swelling. Negative for chest pain, orthopnea, palpitations and syncope.   Respiratory:  Negative for cough and shortness of breath.    Gastrointestinal:  Negative for abdominal pain, nausea and vomiting.   Neurological:  " Negative for dizziness, headaches and light-headedness.   Psychiatric/Behavioral:  Negative for altered mental status.    All other systems reviewed and are negative.    Except as noted in HPI, is otherwise reviewed in detail and a 12 point review of systems is negative.    Physical Exam  Vitals reviewed.   Constitutional:       General: He is not in acute distress.     Appearance: Normal appearance. He is not diaphoretic.   HENT:      Head: Normocephalic and atraumatic.   Eyes:      General: No scleral icterus.     Extraocular Movements: Extraocular movements intact.      Pupils: Pupils are equal, round, and reactive to light.   Neck:      Vascular: JVD (very mild) present.   Cardiovascular:      Rate and Rhythm: Normal rate and regular rhythm.      Pulses: Normal pulses.           Radial pulses are 2+ on the right side and 2+ on the left side.      Heart sounds: Normal heart sounds, S1 normal and S2 normal.   Pulmonary:      Effort: Pulmonary effort is normal. No tachypnea or respiratory distress.      Breath sounds: Normal breath sounds. No wheezing or rales.   Abdominal:      General: Bowel sounds are normal. There is no distension.      Palpations: Abdomen is soft.   Musculoskeletal:      Right lower leg: Edema (pitting) present.      Left lower leg: Edema (pitting) present.   Skin:     General: Skin is warm and dry.      Capillary Refill: Capillary refill takes less than 2 seconds.   Neurological:      Mental Status: He is alert and oriented to person, place, and time.      Gait: Gait abnormal (uses a walker).   Psychiatric:         Mood and Affect: Mood normal.         Behavior: Behavior normal.          **Please Note: This note may have been constructed using a voice recognition system**     Thank you for the opportunity to participate in the care of this patient.   KONSTANTIN Kim

## 2024-10-25 NOTE — ASSESSMENT & PLAN NOTE
Wt Readings from Last 3 Encounters:   10/25/24 78.5 kg (173 lb 1.6 oz)   10/17/24 76.8 kg (169 lb 6.4 oz)   10/02/24 77.1 kg (170 lb)   Most recent echo August 2024 LVEF 50%  He presents today with bilateral pitting leg edema, mild JVD. Denies any SOB  Restart lisinopril as below

## 2024-10-25 NOTE — ASSESSMENT & PLAN NOTE
BP low today 128/46.  Per review of rehab vitals, as low  80/49 up to 175/90,  mostly elevated readings in the 130's-140's/70  He has stage 4 CKD and following with nephrology  Stop the hydralazine, he has been on 10 mg Q 6 h with hold for SBP <160 , likely leading to sudden drops in blood pressure. He is also on Hytrin for BPH  Restart his lisinopril 5 mg daily.  recommend uptitrating lisinopril dose for BP goal 135/80. Can consider adding other antihypertensive agents as tolerated in the near future. Repeat BMP in 2 weeks

## 2024-10-25 NOTE — PATIENT INSTRUCTIONS
Stop the hydralazine  Restart lisinopril 5 mg daily. Recommend uptitrating the dose until Goal /80  Repeat BMP in 2 weeks  Continue heart healthy diet by limiting saturated fat and added sugars while increasing fiber and healthy fats.      Follow-up in 3 months or earlier if any concerns  Please call the office if you have any questions

## 2024-10-25 NOTE — ASSESSMENT & PLAN NOTE
Lab Results   Component Value Date    EGFR 29 09/27/2024    EGFR 26 08/28/2024    EGFR 28 08/27/2024    CREATININE 1.92 (H) 09/27/2024    CREATININE 2.12 (H) 08/28/2024    CREATININE 1.96 (H) 08/27/2024   Following with nephrology

## 2024-10-28 ENCOUNTER — TELEPHONE (OUTPATIENT)
Age: 89
End: 2024-10-28

## 2024-10-28 NOTE — TELEPHONE ENCOUNTER
Caller: Mari from [facility]    Doctor/Office: Yasmine Lieberman/Dr. Humphries    CB#: [phone]      What needs to be faxed: BMP order that was created on 10/25/2024 by KONSTANTIN Kim    ATTN to:     Fax#: [phone]

## 2024-10-30 ENCOUNTER — APPOINTMENT (EMERGENCY)
Dept: RADIOLOGY | Facility: HOSPITAL | Age: 89
DRG: 184 | End: 2024-10-30
Payer: MEDICARE

## 2024-10-30 ENCOUNTER — HOSPITAL ENCOUNTER (INPATIENT)
Facility: HOSPITAL | Age: 89
LOS: 1 days | Discharge: NURSING, REHAB OR LONG-TERM CARE | DRG: 184 | End: 2024-10-31
Attending: EMERGENCY MEDICINE | Admitting: EMERGENCY MEDICINE
Payer: MEDICARE

## 2024-10-30 DIAGNOSIS — S22.42XA CLOSED FRACTURE OF MULTIPLE RIBS OF LEFT SIDE, INITIAL ENCOUNTER: ICD-10-CM
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
## 2024-10-30 DIAGNOSIS — S36.00XA INJURY OF SPLEEN, INITIAL ENCOUNTER: ICD-10-CM

## 2024-10-30 LAB
ABO GROUP BLD: NORMAL
ANION GAP SERPL CALCULATED.3IONS-SCNC: 5 MMOL/L (ref 4–13)
ATRIAL RATE: 71 BPM
BASOPHILS # BLD AUTO: 0.04 THOUSANDS/ÂΜL (ref 0–0.1)
BASOPHILS NFR BLD AUTO: 1 % (ref 0–1)
BLD GP AB SCN SERPL QL: NEGATIVE
BUN SERPL-MCNC: 29 MG/DL (ref 5–25)
CALCIUM SERPL-MCNC: 8.4 MG/DL (ref 8.4–10.2)
CHLORIDE SERPL-SCNC: 107 MMOL/L (ref 96–108)
CO2 SERPL-SCNC: 26 MMOL/L (ref 21–32)
CREAT SERPL-MCNC: 2.04 MG/DL (ref 0.6–1.3)
EOSINOPHIL # BLD AUTO: 0.55 THOUSAND/ÂΜL (ref 0–0.61)
EOSINOPHIL NFR BLD AUTO: 8 % (ref 0–6)
ERYTHROCYTE [DISTWIDTH] IN BLOOD BY AUTOMATED COUNT: 14.3 % (ref 11.6–15.1)
GFR SERPL CREATININE-BSD FRML MDRD: 27 ML/MIN/1.73SQ M
GLUCOSE SERPL-MCNC: 283 MG/DL (ref 65–140)
HCT VFR BLD AUTO: 29.4 % (ref 36.5–49.3)
HCT VFR BLD AUTO: 31.4 % (ref 36.5–49.3)
HGB BLD-MCNC: 9.4 G/DL (ref 12–17)
HGB BLD-MCNC: 9.7 G/DL (ref 12–17)
IMM GRANULOCYTES # BLD AUTO: 0.04 THOUSAND/UL (ref 0–0.2)
IMM GRANULOCYTES NFR BLD AUTO: 1 % (ref 0–2)
LYMPHOCYTES # BLD AUTO: 1.23 THOUSANDS/ÂΜL (ref 0.6–4.47)
LYMPHOCYTES NFR BLD AUTO: 18 % (ref 14–44)
MCH RBC QN AUTO: 31.3 PG (ref 26.8–34.3)
MCHC RBC AUTO-ENTMCNC: 32 G/DL (ref 31.4–37.4)
MCV RBC AUTO: 98 FL (ref 82–98)
MONOCYTES # BLD AUTO: 0.88 THOUSAND/ÂΜL (ref 0.17–1.22)
MONOCYTES NFR BLD AUTO: 13 % (ref 4–12)
NEUTROPHILS # BLD AUTO: 4.11 THOUSANDS/ÂΜL (ref 1.85–7.62)
NEUTS SEG NFR BLD AUTO: 59 % (ref 43–75)
NRBC BLD AUTO-RTO: 0 /100 WBCS
P AXIS: 27 DEGREES
PLATELET # BLD AUTO: 164 THOUSANDS/UL (ref 149–390)
PMV BLD AUTO: 9.4 FL (ref 8.9–12.7)
POTASSIUM SERPL-SCNC: 4.3 MMOL/L (ref 3.5–5.3)
PR INTERVAL: 216 MS
QRS AXIS: -43 DEGREES
QRSD INTERVAL: 136 MS
QT INTERVAL: 444 MS
QTC INTERVAL: 482 MS
RBC # BLD AUTO: 3 MILLION/UL (ref 3.88–5.62)
RH BLD: POSITIVE
SODIUM SERPL-SCNC: 138 MMOL/L (ref 135–147)
SPECIMEN EXPIRATION DATE: NORMAL
T WAVE AXIS: 81 DEGREES
VENTRICULAR RATE: 71 BPM
WBC # BLD AUTO: 6.85 THOUSAND/UL (ref 4.31–10.16)

## 2024-10-30 PROCEDURE — 86900 BLOOD TYPING SEROLOGIC ABO: CPT

## 2024-10-30 PROCEDURE — 85025 COMPLETE CBC W/AUTO DIFF WBC: CPT

## 2024-10-30 PROCEDURE — 99223 1ST HOSP IP/OBS HIGH 75: CPT | Performed by: EMERGENCY MEDICINE

## 2024-10-30 PROCEDURE — 80048 BASIC METABOLIC PNL TOTAL CA: CPT

## 2024-10-30 PROCEDURE — 99285 EMERGENCY DEPT VISIT HI MDM: CPT

## 2024-10-30 PROCEDURE — 93005 ELECTROCARDIOGRAM TRACING: CPT

## 2024-10-30 PROCEDURE — 86901 BLOOD TYPING SEROLOGIC RH(D): CPT

## 2024-10-30 PROCEDURE — 36415 COLL VENOUS BLD VENIPUNCTURE: CPT

## 2024-10-30 PROCEDURE — 85018 HEMOGLOBIN: CPT

## 2024-10-30 PROCEDURE — 99285 EMERGENCY DEPT VISIT HI MDM: CPT | Performed by: EMERGENCY MEDICINE

## 2024-10-30 PROCEDURE — 85014 HEMATOCRIT: CPT

## 2024-10-30 PROCEDURE — 86850 RBC ANTIBODY SCREEN: CPT

## 2024-10-30 PROCEDURE — 74176 CT ABD & PELVIS W/O CONTRAST: CPT

## 2024-10-30 PROCEDURE — 87081 CULTURE SCREEN ONLY: CPT | Performed by: EMERGENCY MEDICINE

## 2024-10-30 PROCEDURE — 72125 CT NECK SPINE W/O DYE: CPT

## 2024-10-30 PROCEDURE — 93010 ELECTROCARDIOGRAM REPORT: CPT | Performed by: INTERNAL MEDICINE

## 2024-10-30 PROCEDURE — 70450 CT HEAD/BRAIN W/O DYE: CPT

## 2024-10-30 PROCEDURE — 71250 CT THORAX DX C-: CPT

## 2024-10-30 PROCEDURE — 94760 N-INVAS EAR/PLS OXIMETRY 1: CPT

## 2024-10-30 RX ORDER — ACETAMINOPHEN 325 MG/1
975 TABLET ORAL EVERY 6 HOURS SCHEDULED
Status: DISCONTINUED | OUTPATIENT
Start: 2024-10-30 | End: 2024-10-31 | Stop reason: HOSPADM

## 2024-10-30 RX ORDER — ATORVASTATIN CALCIUM 40 MG/1
40 TABLET, FILM COATED ORAL
Status: DISCONTINUED | OUTPATIENT
Start: 2024-10-30 | End: 2024-10-31 | Stop reason: HOSPADM

## 2024-10-30 RX ORDER — ESCITALOPRAM OXALATE 5 MG/1
5 TABLET ORAL DAILY
Status: DISCONTINUED | OUTPATIENT
Start: 2024-10-31 | End: 2024-10-31 | Stop reason: HOSPADM

## 2024-10-30 RX ORDER — ACETAMINOPHEN 325 MG/1
650 TABLET ORAL ONCE
Status: COMPLETED | OUTPATIENT
Start: 2024-10-30 | End: 2024-10-30

## 2024-10-30 RX ORDER — MIRTAZAPINE 15 MG/1
15 TABLET, FILM COATED ORAL
Status: DISCONTINUED | OUTPATIENT
Start: 2024-10-30 | End: 2024-10-31 | Stop reason: HOSPADM

## 2024-10-30 RX ORDER — LABETALOL HYDROCHLORIDE 5 MG/ML
10 INJECTION, SOLUTION INTRAVENOUS EVERY 6 HOURS PRN
Status: DISCONTINUED | OUTPATIENT
Start: 2024-10-30 | End: 2024-10-31 | Stop reason: HOSPADM

## 2024-10-30 RX ORDER — GABAPENTIN 100 MG/1
100 CAPSULE ORAL
Status: DISCONTINUED | OUTPATIENT
Start: 2024-10-30 | End: 2024-10-31 | Stop reason: HOSPADM

## 2024-10-30 RX ADMIN — ATORVASTATIN CALCIUM 40 MG: 40 TABLET, FILM COATED ORAL at 20:00

## 2024-10-30 RX ADMIN — LABETALOL HYDROCHLORIDE 10 MG: 5 INJECTION, SOLUTION INTRAVENOUS at 22:50

## 2024-10-30 RX ADMIN — Medication 3 MG: at 21:20

## 2024-10-30 RX ADMIN — GABAPENTIN 100 MG: 100 CAPSULE ORAL at 21:20

## 2024-10-30 RX ADMIN — MIRTAZAPINE 15 MG: 15 TABLET, FILM COATED ORAL at 21:20

## 2024-10-30 RX ADMIN — ACETAMINOPHEN 650 MG: 325 TABLET, FILM COATED ORAL at 08:08

## 2024-10-30 NOTE — ED NOTES
Pt states that he fell yesterday and got caught between sofa and the wall.  Pt having left sided rib pain with movement or palpation   Pt denies hitting his head     Ashtyn Calderon RN  10/30/24 [id number]

## 2024-10-30 NOTE — H&P
H&P - Trauma   Name: Leroy Paredes 89 y.o. male I MRN: [id number]  Unit/Bed#: ED 19 I Date of Admission: 10/30/2024   Date of Service: 10/30/2024 I Hospital Day: 0     Assessment & Plan  Fall, initial encounter  - Status post fall with the below noted injuries in the setting of bifascicular block, possible cardiogenic syncope.  - Fall precautions.  - Geriatric Medicine consultation for evaluation, medication review and recommendations.  - PT and OT evaluation and treatment as indicated.  - Case Management consultation for disposition planning.  - device interrogation for possible syncopal element, monitor on telemertry  Closed fracture of multiple ribs of left side, initial encounter  - Multiple left-sided rib fractures (5-6), present on admission.  - Continue rib fracture protocol.  - Continue to encourage incentive spirometer use and adequate pulmonary hygiene.  Currently pulling 1500 mL on I.S.  - Appreciate APS evaluation and recommendations.  - Continue multimodal analgesic regimen.  - Supplemental oxygen via nasal cannula as needed to maintain saturations greater than or equal to 94%.  - Repeat chest x-ray ordered   - PT and OT evaluation and treatment as indicated.  - Outpatient follow-up in the trauma clinic for re-evaluation in approximately 2 weeks.    Injury of spleen, initial encounter  - Trace perisplenic fluid concerning for injury, present on admission.  - CT scan of the abdomen on 10/30 reviewed.  - Given mild symptoms, underwhelming CAT scan, reassuring physical exam, good vital signs, did not reverse w/ PCP, or repeat scan with contrast.  - Continue to monitor abdominal exam.  - Continue to monitor hemoglobin.  No evidence of active or ongoing bleeding at this time.  Transfusions only as indicated.  - Continue multimodal analgesic regimen.  Appreciate APS evaluation and recommendations.  - Continue to encourage incentive spirometer use and adequate pulmonary hygiene.  Currently pulling 1500 mL on  I.S..  - May resume light activity as tolerated while following solid organ injury precautions:  Avoid lifting greater than 10 pounds, any strenuous activities and/or exercise, and contact sports until cleared by the trauma service.  Avoid driving and crowded places until cleared by the trauma service.  - PT and OT evaluation and treatment as indicated.  - Outpatient follow-up in the trauma clinic for re-evaluation in approximately 2 weeks.    Trauma Alert: Evaluation; trauma team notified at 1430 via text   Model of Arrival: Self    Trauma Team: Attending Celeste and Residents Aravind  Consultants:     None     History of Present Illness   Chief Complaint: rib pain  Mechanism:Fall     Leroy Paredes is a 89 y.o. male who presents worsening rib pain after a fall 2 days ago.  Patient has a past medical history of chronic kidney disease, anemia, multiple conduction abnormalities in his heart for which he follows up with cardiology for, history of loop recorder placement.  Reported to emergency room provider that he fell at home and landed between the wall and his couch, however reports to me that he fell getting out of his car, unclear exact history of fall.  Patient additionally has history of syncope.  Currently patient feels generally well aside from pain on deep inspiration along his left side.  Patient does not have any abdominal pain, is having bowel movements, nausea, vomit chest pain, shortness of breath or other complaints on review of systems.    Review of Systems  I have reviewed the patient's PMH, PSH, Social History, Family History, Meds, and Allergies  Immunization History   Administered Date(s) Administered    COVID-19 MODERNA VACC 0.5 ML IM 01/27/2021, 02/22/2021, 11/22/2021, 07/23/2022    INFLUENZA 11/01/2002, 01/05/2003, 11/24/2003, 10/06/2004, 11/07/2005, 10/21/2006, 09/28/2007, 10/10/2008, 10/20/2008, 11/04/2009, 10/05/2010, 10/03/2011, 11/04/2012    Influenza Split High Dose Preservative Free IM  09/27/2013, 09/25/2014, 09/29/2015, 09/23/2016, 09/19/2017    Influenza, high dose seasonal 0.7 mL 09/25/2018, 10/15/2020, 09/20/2021, 09/26/2022, 10/02/2023    Pneumococcal 01/01/1999    Pneumococcal Conjugate 13-Valent 04/25/2019    Pneumococcal Polysaccharide PPV23 10/01/2006    TD (adult) Preservative Free 12/11/1930       1. Before the illness or injury that brought you to the Emergency, did you need someone to help you on a regular basis? 1=Yes   2. Since the illness or injury that brought you to the Emergency, have you needed more help than usual to take care of yourself? 0=No   3. Have you been hospitalized for one or more nights during the past 6 months (excluding a stay in the Emergency Department)? 1=Yes   4. In general, do you see well? 0=Yes   5. In general, do you have serious problems with your memory? 0=No   6. Do you take more than three different medications everyday? 1=Yes   TOTAL   3     Did you order a geriatric consult if the score was 2 or greater?: yes       Objective :  Temp:  [98.2 °F (36.8 °C)] 98.2 °F (36.8 °C)  HR:  [70-76] 70  BP: (163-179)/() 163/102  Resp:  [18] 18  SpO2:  [97 %-99 %] 97 %  O2 Device: Nasal cannula    Initial Vitals:   Temperature: 98.2 °F (36.8 °C) (10/30/24 0742)  Pulse: 76 (10/30/24 0742)  Respirations: 18 (10/30/24 0742)  Blood Pressure: (!) 179/94 (10/30/24 0742)    Primary Survey:   Airway:        Status: patent;        Pre-hospital Interventions: none        Hospital Interventions: none  Breathing:        Pre-hospital Interventions: none       Effort: normal       Right breath sounds: normal       Left breath sounds: normal  Circulation:        Rhythm: regular       Rate: regular   Right Pulses Left Pulses    R radial: 2+  R femoral: 2+  R pedal: 2+  R carotid: 2+  R popliteal: 2+ L radial: 2+  L femoral: 2+  L pedal: 2+  L carotid: 2+  L popliteal: 2+   Disability:        GCS: Eye: 4; Verbal: 5 Motor: 6 Total: 15       Right Pupil: round;  reactive          Left Pupil:  round;  reactive      R Motor Strength L Motor Strength    R : 5/5  R dorsiflex: 5/5  R plantarflex: 5/5 L : 5/5  L dorsiflex: 5/5  L plantarflex: 5/5        Sensory:  No sensory deficit  Exposure:       Completed: Yes      Secondary Survey:  Physical Exam  Constitutional:       General: He is not in acute distress.     Appearance: He is not ill-appearing.   HENT:      Head: Normocephalic and atraumatic.      Nose: Nose normal.      Mouth/Throat:      Mouth: Mucous membranes are moist.      Pharynx: Oropharynx is clear.   Eyes:      Extraocular Movements: Extraocular movements intact.      Conjunctiva/sclera: Conjunctivae normal.      Pupils: Pupils are equal, round, and reactive to light.   Cardiovascular:      Rate and Rhythm: Normal rate and regular rhythm.      Pulses: Normal pulses.   Pulmonary:      Effort: Pulmonary effort is normal.      Breath sounds: Normal breath sounds. No rhonchi.   Abdominal:      General: Abdomen is flat.      Palpations: Abdomen is soft.   Musculoskeletal:         General: Normal range of motion.      Cervical back: Normal range of motion and neck supple. No rigidity.   Skin:     General: Skin is warm and dry.      Capillary Refill: Capillary refill takes less than 2 seconds.   Neurological:      General: No focal deficit present.      Mental Status: He is alert and oriented to person, place, and time.             Lab Results: I have reviewed the following results:  Recent Labs     10/30/24  0805   WBC 6.85   HGB 9.4*   HCT 29.4*      SODIUM 138   K 4.3      CO2 26   BUN 29*   CREATININE 2.04*   GLUC 283*       Imaging Results: I have personally reviewed pertinent images saved in PACS. CT scan findings (and other pertinent positive findings on images) were discussed with radiology. My interpretation of the images/reports are as follows:  Chest Xray(s): N/A   FAST exam(s): N/A   CT Scan(s): positive for acute findings: Trace perisplenic fluid,  left-sided rib fractures   Additional Xray(s): N/A     Other Studies: Other Study Results Review: EKG was reviewed.

## 2024-10-30 NOTE — ASSESSMENT & PLAN NOTE
- Status post fall with the below noted injuries in the setting of bifascicular block, possible cardiogenic syncope.  - Fall precautions.  - Geriatric Medicine consultation for evaluation, medication review and recommendations.  - PT and OT evaluation and treatment as indicated.  - Case Management consultation for disposition planning.  - device interrogation for possible syncopal element, monitor on telemertry

## 2024-10-30 NOTE — ASSESSMENT & PLAN NOTE
- Trace perisplenic fluid concerning for injury, present on admission.  - CT scan of the abdomen on 10/30 reviewed.  - Given mild symptoms, underwhelming CAT scan, reassuring physical exam, good vital signs, did not reverse w/ PCP, or repeat scan with contrast.  - Continue to monitor abdominal exam.  - Continue to monitor hemoglobin.  No evidence of active or ongoing bleeding at this time.  Transfusions only as indicated.  - Continue multimodal analgesic regimen.  Appreciate APS evaluation and recommendations.  - Continue to encourage incentive spirometer use and adequate pulmonary hygiene.  Currently pulling 1500 mL on I.S..  - May resume light activity as tolerated while following solid organ injury precautions:  Avoid lifting greater than 10 pounds, any strenuous activities and/or exercise, and contact sports until cleared by the trauma service.  Avoid driving and crowded places until cleared by the trauma service.  - PT and OT evaluation and treatment as indicated.  - Outpatient follow-up in the trauma clinic for re-evaluation in approximately 2 weeks.

## 2024-10-30 NOTE — ED PROVIDER NOTES
Time reflects when diagnosis was documented in both MDM as applicable and the Disposition within this note       Time User Action Codes Description Comment    10/30/2024  2:32 PM Prudencio Elizabeth [W19.XXXA] Fall, initial encounter     10/30/2024  2:32 PM Prudencio Elizabeth [S22.42XA] Closed fracture of multiple ribs of left side, initial encounter     10/30/2024  2:32 PM Prudencio Elizabeth [S36.00XA] Injury of spleen, initial encounter           ED Disposition       ED Disposition   Admit    Condition   Stable    Date/Time   Wed Oct 30, 2024  2:59 PM    Comment   Case was discussed with Trauma and the patient's admission status was agreed to be Admission Status: inpatient status to the service of Dr. Alonso .               Assessment & Plan       Medical Decision Making  89-year-old male presents emergency department with fall and left-sided rib pain    DDx: Traumatic injury such as rib fracture, intra-abdominal fluid, splenic injury, renal injury    Plan: BMP, CBC, CT head, cervical spine, chest abdomen and pelvis, pain medication    See ED course for further discussion    CT scan concerning for free fluid around the spleen.  No reversal given for Eliquis at this time.  Patient hemodynamically stable with chronic anemia.  Will admit patient to trauma service due to concomitant rib fractures.    Amount and/or Complexity of Data Reviewed  Labs: ordered. Decision-making details documented in ED Course.  Radiology: ordered. Decision-making details documented in ED Course.    Risk  OTC drugs.  Decision regarding hospitalization.        ED Course as of 10/30/24 1602   Wed Oct 30, 2024   0752 Blood Pressure(!): 179/94   0752 Temperature: 98.2 °F (36.8 °C)   0752 Temp Source: Oral   0752 Pulse: 76   0752 Respirations: 18   0752 SpO2: 99 %   0834 WBC: 6.85   0834 Hemoglobin(!): 9.4  Chronically anemic    0834 Platelet Count: 164   0919 Sodium: 138   0919 Potassium: 4.3   0919 Chloride: 107   0919 BUN(!): 29   0919  Creatinine(!): 2.04  baseline   0919 GLUCOSE(!): 283   1047 CT spine cervical without contrast  IMPRESSION:     No cervical spine fracture or traumatic malalignment.                 Workstation performed: [id number]     1047 CT head without contrast  IMPRESSION:     No acute intracranial abnormality.     Similar chronic paranasal sinus disease.                 Workstation performed: [id number]     1406 CT chest abdomen pelvis wo contrast  CHEST     1. Possible nondisplaced fractures involving the anterolateral aspects of ribs 5 and 6 at the costochondral junction. Correlation with point tenderness is recommended. Otherwise, no CT evidence of acute traumatic sequelae within the chest. Additional   left-sided rib fractures were present on prior study of May 2023.     2. Cardiomegaly with trace pericardial fluid and trace bilateral pleural effusions which are new from most recent CT of May 2023.     3. Peripheral reticular opacities and bibasilar honeycombing suggestive of interstitial lung disease. Reticular opacities appear slightly worsened as compared to prior CT of 2023, however, this worsened appearance may be due in part to small volume of   pleural fluid mentioned above.     4. Stable 3 mm nodule within the left upper lobe.     5. Small amount of fluid is noted within the mid to upper esophagus. This finding is nonspecific but can suggest underlying reflux.     ABDOMEN/PELVIS     1. No CT evidence of acute traumatic sequelae within the abdomen or pelvis within the confines of a noncontrast exam. There is trace free fluid along the posterior aspect of the spleen without gross evidence of associated parenchymal abnormality.     The study was marked in EPIC for immediate notification.                 Workstation performed: [id number]        1407 On eliquis   1424 Trauma will eval          Medications   acetaminophen (TYLENOL) tablet 650 mg (650 mg Oral Given 10/30/24 0808)       ED Risk Strat Scores                    Identification of Seniors at Risk      Flowsheet Row Most Recent Value   (ISAR) Identification of Seniors at Risk    Before the illness or injury that brought you to the Emergency, did you need someone to help you on a regular basis? 0 Filed at: 10/30/2024 0746   In the last 24 hours, have you needed more help than usual? 0 Filed at: 10/30/2024 0746   Have you been hospitalized for one or more nights during the past 6 months? 0 Filed at: 10/30/2024 0746   In general, do you see well? 0 Filed at: 10/30/2024 0746   In general, do you have serious problems with your memory? 0 Filed at: 10/30/2024 0746   Do you take more than three different medications every day? 1 Filed at: 10/30/2024 0746   ISAR Score 1 Filed at: 10/30/2024 0746                SBIRT 22yo+      Flowsheet Row Most Recent Value   Initial Alcohol Screen: US AUDIT-C     1. How often do you have a drink containing alcohol? 2 Filed at: 10/30/2024 0750   2. How many drinks containing alcohol do you have on a typical day you are drinking?  0 Filed at: 10/30/2024 0750   3a. Male UNDER 65: How often do you have five or more drinks on one occasion? 0 Filed at: 10/30/2024 0750   3b. FEMALE Any Age, or MALE 65+: How often do you have 4 or more drinks on one occassion? 0 Filed at: 10/30/2024 0750   Audit-C Score 2 Filed at: 10/30/2024 0750   [place]: How many times in the past year have you...    Used an illegal drug or used a prescription medication for non-medical reasons? Never Filed at: 10/30/2024 0750                            History of Present Illness       Chief Complaint   Patient presents with    Fall     Pt had a fall yesterday afternoon with no head strike. Pt c/o left rib pain. Pain worse with moving.       Past Medical History:   Diagnosis Date    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)     Last Assessed:  5/19/17    Chronic kidney disease     Diabetes mellitus (HCC)     Hyperlipidemia     Hypertension     Osteoarthritis     Palpitations      TIA (transient ischemic attack)       Past Surgical History:   Procedure Laterality Date    APPENDECTOMY      CARDIAC ELECTROPHYSIOLOGY PROCEDURE N/A 1/17/2024    Procedure: Cardiac loop recorder explant;  Surgeon: Drew Olmos MD;  Location: [facility];  Service: Cardiology    COLONOSCOPY  2012    HEMORROIDECTOMY      TOOTH EXTRACTION  02/02/2022      Family History   Problem Relation Age of Onset    Diabetes Mother     Other Mother         Stroke syndrome    Diabetes Father     Emphysema Father       Social History     Tobacco Use    Smoking status: Former    Smokeless tobacco: Never   Vaping Use    Vaping status: Never Used   Substance Use Topics    Alcohol use: Not Currently     Comment: Social drinker    Drug use: No      E-Cigarette/Vaping    E-Cigarette Use Never User       E-Cigarette/Vaping Substances    Nicotine No     THC No     CBD No     Flavoring No     Other No     Unknown No       I have reviewed and agree with the history as documented.     89-year-old male with past medical history of anemia, chronic kidney disease, hyperlipidemia, diabetes, palpitations, presents to the emergency department after a fall a few days ago.  He states he does not know why he fell however he landed on his left side between the wall and his couch.  He is complaining of point pain over the left lower rib cage overlying his left flank.  He is concerned because the pain is slowly been worsening.        Review of Systems   Musculoskeletal:         L rib pain   All other systems reviewed and are negative.          Objective       ED Triage Vitals [10/30/24 0742]   Temperature Pulse Blood Pressure Respirations SpO2 Patient Position - Orthostatic VS   98.2 °F (36.8 °C) 76 (!) 179/94 18 99 % --      Temp Source Heart Rate Source BP Location FiO2 (%) Pain Score    Oral Monitor -- -- 6      Vitals      Date and Time Temp Pulse SpO2 Resp BP Pain Score FACES Pain Rating User   10/30/24 1524 -- -- -- 18 -- -- -- AF    10/30/24 1000 -- 70 97 % 18 163/102 -- -- BG   10/30/24 0947 -- -- -- -- -- 4 -- BG   10/30/24 0900 -- 70 97 % 18 173/72 5 -- BG   10/30/24 0808 -- -- -- -- -- 2 -- BG   10/30/24 0742 98.2 °F (36.8 °C) 76 99 % 18 179/94 6 -- BG            Physical Exam  Vitals and nursing note reviewed.   Constitutional:       General: He is not in acute distress.     Appearance: He is well-developed.   HENT:      Head: Normocephalic and atraumatic.   Eyes:      Conjunctiva/sclera: Conjunctivae normal.   Cardiovascular:      Rate and Rhythm: Normal rate and regular rhythm.      Heart sounds: No murmur heard.  Pulmonary:      Effort: Pulmonary effort is normal. No respiratory distress.      Breath sounds: Normal breath sounds.   Abdominal:      Palpations: Abdomen is soft.      Tenderness: There is no abdominal tenderness.       Musculoskeletal:         General: No swelling.      Cervical back: Neck supple.      Comments: Able to move all extremities.  Ambulates with walker at baseline   Skin:     General: Skin is warm and dry.      Capillary Refill: Capillary refill takes less than 2 seconds.   Neurological:      Mental Status: He is alert.   Psychiatric:         Mood and Affect: Mood normal.         Results Reviewed       Procedure Component Value Units Date/Time    Hemoglobin and hematocrit, blood [[id number]] Collected: 10/30/24 1554    Lab Status: In process Specimen: Blood from Arm, Left Updated: 10/30/24 1600    Basic metabolic panel [[id number]]  (Abnormal) Collected: 10/30/24 0805    Lab Status: Final result Specimen: Blood from Arm, Left Updated: 10/30/24 0907     Sodium 138 mmol/L      Potassium 4.3 mmol/L      Chloride 107 mmol/L      CO2 26 mmol/L      ANION GAP 5 mmol/L      BUN 29 mg/dL      Creatinine 2.04 mg/dL      Glucose 283 mg/dL      Calcium 8.4 mg/dL      eGFR 27 ml/min/1.73sq m     Narrative:      National Kid[employer] guidelines for Chronic Kidney Disease (CKD):     Stage 1 with normal or high  GFR (GFR > 90 mL/min/1.73 square meters)    Stage 2 Mild CKD (GFR = 60-89 mL/min/1.73 square meters)    Stage 3A Moderate CKD (GFR = 45-59 mL/min/1.73 square meters)    Stage 3B Moderate CKD (GFR = 30-44 mL/min/1.73 square meters)    Stage 4 Severe CKD (GFR = 15-29 mL/min/1.73 square meters)    Stage 5 End Stage CKD (GFR <15 mL/min/1.73 square meters)  Note: GFR calculation is accurate only with a steady state creatinine    CBC and differential [[id number]]  (Abnormal) Collected: 10/30/24 0805    Lab Status: Final result Specimen: Blood from Arm, Left Updated: 10/30/24 0818     WBC 6.85 Thousand/uL      RBC 3.00 Million/uL      Hemoglobin 9.4 g/dL      Hematocrit 29.4 %      MCV 98 fL      MCH 31.3 pg      MCHC 32.0 g/dL      RDW 14.3 %      MPV 9.4 fL      Platelets 164 Thousands/uL      nRBC 0 /100 WBCs      Segmented % 59 %      Immature Grans % 1 %      Lymphocytes % 18 %      Monocytes % 13 %      Eosinophils Relative 8 %      Basophils Relative 1 %      Absolute Neutrophils 4.11 Thousands/µL      Absolute Immature Grans 0.04 Thousand/uL      Absolute Lymphocytes 1.23 Thousands/µL      Absolute Monocytes 0.88 Thousand/µL      Eosinophils Absolute 0.55 Thousand/µL      Basophils Absolute 0.04 Thousands/µL             CT chest abdomen pelvis wo contrast   Final Interpretation by Dangelo Méndez DO (10/30 8847)      CHEST      1. Possible nondisplaced fractures involving the anterolateral aspects of ribs 5 and 6 at the costochondral junction. Correlation with point tenderness is recommended. Otherwise, no CT evidence of acute traumatic sequelae within the chest. Additional    left-sided rib fractures were present on prior study of May 2023.      2. Cardiomegaly with trace pericardial fluid and trace bilateral pleural effusions which are new from most recent CT of May 2023.      3. Peripheral reticular opacities and bibasilar honeycombing suggestive of interstitial lung disease. Reticular opacities appear  slightly worsened as compared to prior CT of 2023, however, this worsened appearance may be due in part to small volume of    pleural fluid mentioned above.      4. Stable 3 mm nodule within the left upper lobe.      5. Small amount of fluid is noted within the mid to upper esophagus. This finding is nonspecific but can suggest underlying reflux.      ABDOMEN/PELVIS      1. No CT evidence of acute traumatic sequelae within the abdomen or pelvis within the confines of a noncontrast exam. There is trace free fluid along the posterior aspect of the spleen without gross evidence of associated parenchymal abnormality.      The study was marked in EPIC for immediate notification.                  Workstation performed: [id number]         CT head without contrast   Final Interpretation by Samuel Batista MD (10/30 1035)      No acute intracranial abnormality.      Similar chronic paranasal sinus disease.                  Workstation performed: [id number]         CT spine cervical without contrast   Final Interpretation by Samuel Batista MD (10/30 1038)      No cervical spine fracture or traumatic malalignment.                  Workstation performed: [id number]         XR chest portable    (Results Pending)       ECG 12 Lead Documentation Only    Date/Time: 10/30/2024 8:20 AM    Performed by: Prudencio Elizabeth DO  Authorized by: Prudencio Elizabeth DO    Indications / Diagnosis:  Fall  ECG reviewed by me, the ED Provider: yes    Patient location:  ED  Previous ECG:     Previous ECG:  Unavailable    Comparison to cardiac monitor: Yes    Interpretation:     Interpretation: normal    Rate:     ECG rate:  71    ECG rate assessment: normal    Rhythm:     Rhythm: sinus rhythm    Ectopy:     Ectopy: none    QRS:     QRS axis:  Left    QRS intervals:  Normal  Conduction:     Conduction: abnormal      Abnormal conduction: 1st degree    ST segments:     ST segments:  Normal  T waves:     T waves: normal         ED Medication and Procedure Management   Prior to Admission Medications   Prescriptions Last Dose Informant Patient Reported? Taking?   Blood Glucose Monitoring Suppl (PRECISION XTRA MONITOR) MARY  Self No No   Sig: by Does not apply route daily   Cholecalciferol (D3 High Potency) 25 MCG (1000 UT) capsule  Self No No   Sig: TAKE ONE CAPSULE BY MOUTH DAILY   PRECISION XTRA TEST STRIPS test strip  Self No No   Sig: USE TO TEST BLOOD GLUCOSE ONCE A DAY   acetaminophen (TYLENOL) 325 mg tablet  Self No No   Sig: Take 2 tablets (650 mg total) by mouth every 6 (six) hours as needed for mild pain   apixaban (Eliquis) 2.5 mg  Self No No   Sig: TAKE ONE TABLET BY MOUTH TWO TIMES PER DAY *PATIENT SUPPLY   atorvastatin (LIPITOR) 40 mg tablet  Self No No   Sig: TAKE 1 TABLET EVERY DAY WITH DINNER   bisacodyl ([facility] Bisacodyl) 10 mg suppository  Self Yes No   Sig: Insert 10 mg into the rectum daily   escitalopram (LEXAPRO) 5 mg tablet  Self No No   Sig: TAKE ONE TABLET BY MOUTH DAILY   gabapentin (NEURONTIN) 100 mg capsule  Self No No   Sig: TAKE ONE CAPSULE BY MOUTH EVERY NIGHT AT BEDTIME   lisinopril (ZESTRIL) 5 mg tablet   No No   Sig: Take 1 tablet (5 mg total) by mouth daily   magnesium Oxide (MAG-OX) 400 mg TABS  Self No No   Sig: TAKE ONE TABLET BY MOUTH DAILY   magnesium hydroxide (Milk of Magnesia) 400 mg/5 mL oral suspension  Self Yes No   Sig: Take by mouth as needed for constipation   magnesium oxide (MAG-OX) 400 mg  Self Yes No   Sig: Take 400 mg by mouth daily   melatonin 3 mg  Self No No   Sig: TAKE ONE TABLET BY MOUTH EVERY NIGHT AT BEDTIME   mirtazapine (REMERON) 15 mg tablet  Self No No   Sig: Take 1 tablet (15 mg total) by mouth daily at bedtime   mirtazapine (REMERON) 15 mg tablet  Self No No   Sig: Take 1 tablet (15 mg total) by mouth daily at bedtime   sodium phosphate-biphosphate (FLEET) 7-19 g 118 mL enema  Self Yes No   Sig: Insert 1 enema into the rectum as needed   terazosin (HYTRIN) 1 mg capsule   Self No No   Sig: TAKE 1 CAPSULE AT BEDTIME   vitamin B-12 (CYANOCOBALAMIN) 500 MCG TABS  Self No No   Sig: Take 1 tablet (500 mcg total) by mouth 2 (two) times a week      Facility-Administered Medications: None     Patient's Medications   Discharge Prescriptions    No medications on file     No discharge procedures on file.  ED SEPSIS DOCUMENTATION   Time reflects when diagnosis was documented in both MDM as applicable and the Disposition within this note       Time User Action Codes Description Comment    10/30/2024  2:32 PM Prudencio Elizabeth [W19.XXXA] Fall, initial encounter     10/30/2024  2:32 PM Prudencio Elizabeth [S22.42XA] Closed fracture of multiple ribs of left side, initial encounter     10/30/2024  2:32 PM Prudencio Elizabeth [S36.00XA] Injury of spleen, initial encounter                  Prudencio Elizabeth DO  10/30/24 1600

## 2024-10-30 NOTE — ED NOTES
Awaiting results for cat scan   Pt states that he is hungry and has not eaten since last night     Ashtyn Calderon RN  10/30/24 6498

## 2024-10-30 NOTE — QUICK NOTE
Serial abdominal exam    Abdomen is soft, nontender, moderately distended. He does not have any rebound tenderness, guarding, or signs of peritonitis at this time.  Will continue serial abdominal exams.

## 2024-10-30 NOTE — ASSESSMENT & PLAN NOTE
- Multiple left-sided rib fractures (5-6), present on admission.  - Continue rib fracture protocol.  - Continue to encourage incentive spirometer use and adequate pulmonary hygiene.  Currently pulling 1500 mL on I.S.  - Appreciate APS evaluation and recommendations.  - Continue multimodal analgesic regimen.  - Supplemental oxygen via nasal cannula as needed to maintain saturations greater than or equal to 94%.  - Repeat chest x-ray ordered   - PT and OT evaluation and treatment as indicated.  - Outpatient follow-up in the trauma clinic for re-evaluation in approximately 2 weeks.

## 2024-10-30 NOTE — Clinical Note
Case was discussed with Trauma and the patient's admission status was agreed to be Admission Status: inpatient status to the service of Dr. mijares .

## 2024-10-30 NOTE — ED ATTENDING ATTESTATION
10/30/2024  I, Miller Espana MD, saw and evaluated the patient. I have discussed the patient with the resident/non-physician practitioner and agree with the resident's/non-physician practitioner's findings, Plan of Care, and MDM as documented in the resident's/non-physician practitioner's note, except where noted. All available labs and Radiology studies were reviewed.  I was present for key portions of any procedure(s) performed by the resident/non-physician practitioner and I was immediately available to provide assistance.       At this point I agree with the current assessment done in the Emergency Department.  I have conducted an independent evaluation of this patient a history and physical is as follows:    ED Course     89-year-old male presenting to the emergency department from assisted living for evaluation of left lower chest wall pain.  Patient is a poor historian and is not clear as to how he fell, but fell sometime yesterday evening.  Patient denied head strike and loss of consciousness.  Patient is presenting to the emergency department for evaluation of left lower lateral chest wall pain.  No shortness of breath.    On examination head is normocephalic and atraumatic.  Eyes lashes normal.  Neck is nontender to palpation in the midline cervical spine.  Lungs are clear bilaterally.  The patient has tenderness to palpation over the posterior lateral left lower chest wall.  No crepitance or subcutaneous emphysema is appreciated.  Abdomen is nondistended, soft with some mild left upper quadrant abdominal tenderness.  Extremities are unremarkable.  GCS is 15.  Motor is 5 out of 5 bilateral upper and lower extremities.    MEDICAL DECISION MAKING    Number and Complexity of Problems  Differential diagnosis: Rib fracture, pneumothorax, splenic injury.    Medical Decision Making Data  External documents reviewed: Reviewed last cardiology outpatient office visit from 10/25/2024.  My EKG interpretation:  Sinus rhythm.  Right bundle branch block pattern.  My CT interpretation: Left lower rib fractures.    CT chest abdomen pelvis wo contrast   Final Result      CHEST      1. Possible nondisplaced fractures involving the anterolateral aspects of ribs 5 and 6 at the costochondral junction. Correlation with point tenderness is recommended. Otherwise, no CT evidence of acute traumatic sequelae within the chest. Additional    left-sided rib fractures were present on prior study of May 2023.      2. Cardiomegaly with trace pericardial fluid and trace bilateral pleural effusions which are new from most recent CT of May 2023.      3. Peripheral reticular opacities and bibasilar honeycombing suggestive of interstitial lung disease. Reticular opacities appear slightly worsened as compared to prior CT of 2023, however, this worsened appearance may be due in part to small volume of    pleural fluid mentioned above.      4. Stable 3 mm nodule within the left upper lobe.      5. Small amount of fluid is noted within the mid to upper esophagus. This finding is nonspecific but can suggest underlying reflux.      ABDOMEN/PELVIS      1. No CT evidence of acute traumatic sequelae within the abdomen or pelvis within the confines of a noncontrast exam. There is trace free fluid along the posterior aspect of the spleen without gross evidence of associated parenchymal abnormality.      The study was marked in EPIC for immediate notification.                  Workstation performed: [id number]         CT head without contrast   Final Result      No acute intracranial abnormality.      Similar chronic paranasal sinus disease.                  Workstation performed: [id number]         CT spine cervical without contrast   Final Result      No cervical spine fracture or traumatic malalignment.                  Workstation performed: [id number]         XR chest portable    (Results Pending)       Labs Reviewed   BASIC METABOLIC PANEL - Abnormal        Result Value Ref Range Status    Sodium 138  135 - 147 mmol/L Final    Potassium 4.3  3.5 - 5.3 mmol/L Final    Chloride 107  96 - 108 mmol/L Final    CO2 26  21 - 32 mmol/L Final    ANION GAP 5  4 - 13 mmol/L Final    BUN 29 (*) 5 - 25 mg/dL Final    Creatinine 2.04 (*) 0.60 - 1.30 mg/dL Final    Comment: Standardized to IDMS reference method    Glucose 283 (*) 65 - 140 mg/dL Final    Comment: If the patient is fasting, the ADA then defines impaired fasting glucose as > 100 mg/dL and diabetes as > or equal to 123 mg/dL.    Calcium 8.4  8.4 - 10.2 mg/dL Final    eGFR 27  ml/min/1.73sq m Final    Narrative:     National Kidney Disease Foundation guidelines for Chronic Kidney Disease (CKD):     Stage 1 with normal or high GFR (GFR > 90 mL/min/1.73 square meters)    Stage 2 Mild CKD (GFR = 60-89 mL/min/1.73 square meters)    Stage 3A Moderate CKD (GFR = 45-59 mL/min/1.73 square meters)    Stage 3B Moderate CKD (GFR = 30-44 mL/min/1.73 square meters)    Stage 4 Severe CKD (GFR = 15-29 mL/min/1.73 square meters)    Stage 5 End Stage CKD (GFR <15 mL/min/1.73 square meters)  Note: GFR calculation is accurate only with a steady state creatinine   CBC AND DIFFERENTIAL - Abnormal    WBC 6.85  4.31 - 10.16 Thousand/uL Final    RBC 3.00 (*) 3.88 - 5.62 Million/uL Final    Hemoglobin 9.4 (*) 12.0 - 17.0 g/dL Final    Hematocrit 29.4 (*) 36.5 - 49.3 % Final    MCV 98  82 - 98 fL Final    MCH 31.3  26.8 - 34.3 pg Final    MCHC 32.0  31.4 - 37.4 g/dL Final    RDW 14.3  11.6 - 15.1 % Final    MPV 9.4  8.9 - 12.7 fL Final    Platelets 164  149 - 390 Thousands/uL Final    nRBC 0  /100 WBCs Final    Segmented % 59  43 - 75 % Final    Immature Grans % 1  0 - 2 % Final    Lymphocytes % 18  14 - 44 % Final    Monocytes % 13 (*) 4 - 12 % Final    Eosinophils Relative 8 (*) 0 - 6 % Final    Basophils Relative 1  0 - 1 % Final    Absolute Neutrophils 4.11  1.85 - 7.62 Thousands/µL Final    Absolute Immature Grans 0.04  0.00 - 0.20  Thousand/uL Final    Absolute Lymphocytes 1.23  0.60 - 4.47 Thousands/µL Final    Absolute Monocytes 0.88  0.17 - 1.22 Thousand/µL Final    Eosinophils Absolute 0.55  0.00 - 0.61 Thousand/µL Final    Basophils Absolute 0.04  0.00 - 0.10 Thousands/µL Final   HEMOGLOBIN AND HEMATOCRIT, BLOOD   TYPE AND SCREEN       Labs reviewed by me are significant for:  Creatinine of 2 is near the patient's baseline.  Discussed case with: Trauma surgery.  Considered admission for: Rib fracture.    Treatment and Disposition  ED course: Patient remained hemodynamically stable in the emergency department.  CT chest demonstrated multiple rib fractures.  Given the patient's age and frailty, recommended admission to trauma for continued evaluation for complications from rib fracture.  Patient agreeable with plan.  Shared decision making: Patient agreeable to admission.  Code status: Level 3 DNR.              Critical Care Time  Procedures

## 2024-10-30 NOTE — RESPIRATORY THERAPY NOTE
"Airway Clearance Protocol         10/30/24 1524   Respiratory Protocol   Protocol Initiated? Yes   Protocol Selection Airway Clearance   Language Barrier? No   Medical & Social History Reviewed? Yes   Diagnostic Studies Reviewed? Yes   Physical Assessment Performed? Yes   Airway Clearance Plan Incentive Spirometer   Respiratory Assessment   Assessment Type During-treatment   General Appearance Awake;Alert   Respiratory Pattern Shallow   Chest Assessment Chest expansion symmetrical   Bilateral Breath Sounds Diminished;Clear   L Breath Sounds Diminished   Cough None   Resp Comments 89 y/o who fell in his own driveway this AM and eventually drove himself into our ED for assessment re: L sided chest pain. \" Multiple left-sided rib fractures (5-6), present on admission.\"  Easily pulled 1500 ml's on his try with the incentive spirometer for the ED MD's and for RT at this hour.  Being told pt will be admitted. Non smoker, no pulm hx or pulm home meds.  Radiology findings this day: \"Possible nondisplaced fractures  involving the anterolateral aspects of ribs 5 and 6 at the costochondral junction. Correlation with point tenderness is recommended. Otherwise, no CT evidence of acute traumatic sequelae within the chest. Additional   left-sided rib fractures were present on prior study of May 2023.\"  Being told pt will be admitted.  Will continue to follow with ACP at this time.   O2 Device room air   Additional Assessments   Respirations 18       "

## 2024-10-31 ENCOUNTER — HOSPITAL ENCOUNTER (INPATIENT)
Facility: HOSPITAL | Age: 89
LOS: 3 days | Discharge: NURSING, REHAB OR LONG-TERM CARE | DRG: 884 | End: 2024-11-05
Attending: EMERGENCY MEDICINE | Admitting: INTERNAL MEDICINE
Payer: MEDICARE

## 2024-10-31 ENCOUNTER — APPOINTMENT (INPATIENT)
Dept: RADIOLOGY | Facility: HOSPITAL | Age: 89
DRG: 184 | End: 2024-10-31
Payer: MEDICARE

## 2024-10-31 VITALS
TEMPERATURE: 98.2 F | HEIGHT: 71 IN | HEART RATE: 69 BPM | DIASTOLIC BLOOD PRESSURE: 68 MMHG | WEIGHT: 192 LBS | RESPIRATION RATE: 18 BRPM | SYSTOLIC BLOOD PRESSURE: 166 MMHG | BODY MASS INDEX: 26.88 KG/M2 | OXYGEN SATURATION: 97 %

## 2024-10-31 DIAGNOSIS — N18.9 ACUTE KIDNEY INJURY SUPERIMPOSED ON CHRONIC KIDNEY DISEASE  (HCC): ICD-10-CM
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
## 2024-10-31 DIAGNOSIS — R45.1 AGITATION: ICD-10-CM

## 2024-10-31 DIAGNOSIS — F03.90 DEMENTIA (HCC): ICD-10-CM

## 2024-10-31 DIAGNOSIS — N17.9 ACUTE KIDNEY INJURY SUPERIMPOSED ON CHRONIC KIDNEY DISEASE  (HCC): ICD-10-CM

## 2024-10-31 LAB
ANION GAP SERPL CALCULATED.3IONS-SCNC: 6 MMOL/L (ref 4–13)
APTT PPP: 32 SECONDS (ref 23–34)
BUN SERPL-MCNC: 28 MG/DL (ref 5–25)
CALCIUM SERPL-MCNC: 8.5 MG/DL (ref 8.4–10.2)
CHLORIDE SERPL-SCNC: 105 MMOL/L (ref 96–108)
CO2 SERPL-SCNC: 26 MMOL/L (ref 21–32)
CREAT SERPL-MCNC: 1.75 MG/DL (ref 0.6–1.3)
ERYTHROCYTE [DISTWIDTH] IN BLOOD BY AUTOMATED COUNT: 14.1 % (ref 11.6–15.1)
GFR SERPL CREATININE-BSD FRML MDRD: 32 ML/MIN/1.73SQ M
GLUCOSE SERPL-MCNC: 227 MG/DL (ref 65–140)
HCT VFR BLD AUTO: 30.1 % (ref 36.5–49.3)
HCT VFR BLD AUTO: 31 % (ref 36.5–49.3)
HCT VFR BLD AUTO: 31.8 % (ref 36.5–49.3)
HGB BLD-MCNC: 10.2 G/DL (ref 12–17)
HGB BLD-MCNC: 9.6 G/DL (ref 12–17)
HGB BLD-MCNC: 9.7 G/DL (ref 12–17)
INR PPP: 1.26 (ref 0.85–1.19)
MCH RBC QN AUTO: 30.8 PG (ref 26.8–34.3)
MCHC RBC AUTO-ENTMCNC: 31.3 G/DL (ref 31.4–37.4)
MCV RBC AUTO: 98 FL (ref 82–98)
PLATELET # BLD AUTO: 169 THOUSANDS/UL (ref 149–390)
PMV BLD AUTO: 9.7 FL (ref 8.9–12.7)
POTASSIUM SERPL-SCNC: 4.3 MMOL/L (ref 3.5–5.3)
PROTHROMBIN TIME: 16 SECONDS (ref 12.3–15)
RBC # BLD AUTO: 3.15 MILLION/UL (ref 3.88–5.62)
SODIUM SERPL-SCNC: 137 MMOL/L (ref 135–147)
WBC # BLD AUTO: 6.45 THOUSAND/UL (ref 4.31–10.16)

## 2024-10-31 PROCEDURE — 85610 PROTHROMBIN TIME: CPT

## 2024-10-31 PROCEDURE — 85018 HEMOGLOBIN: CPT

## 2024-10-31 PROCEDURE — 71045 X-RAY EXAM CHEST 1 VIEW: CPT

## 2024-10-31 PROCEDURE — 85014 HEMATOCRIT: CPT

## 2024-10-31 PROCEDURE — NC001 PR NO CHARGE: Performed by: EMERGENCY MEDICINE

## 2024-10-31 PROCEDURE — 99223 1ST HOSP IP/OBS HIGH 75: CPT | Performed by: INTERNAL MEDICINE

## 2024-10-31 PROCEDURE — 85027 COMPLETE CBC AUTOMATED: CPT

## 2024-10-31 PROCEDURE — 97167 OT EVAL HIGH COMPLEX 60 MIN: CPT

## 2024-10-31 PROCEDURE — 97163 PT EVAL HIGH COMPLEX 45 MIN: CPT

## 2024-10-31 PROCEDURE — 99285 EMERGENCY DEPT VISIT HI MDM: CPT | Performed by: EMERGENCY MEDICINE

## 2024-10-31 PROCEDURE — 85730 THROMBOPLASTIN TIME PARTIAL: CPT

## 2024-10-31 PROCEDURE — 99284 EMERGENCY DEPT VISIT MOD MDM: CPT

## 2024-10-31 PROCEDURE — 80048 BASIC METABOLIC PNL TOTAL CA: CPT

## 2024-10-31 PROCEDURE — 99238 HOSP IP/OBS DSCHRG MGMT 30/<: CPT | Performed by: NURSE PRACTITIONER

## 2024-10-31 RX ADMIN — ESCITALOPRAM 5 MG: 5 TABLET, FILM COATED ORAL at 10:41

## 2024-10-31 RX ADMIN — ACETAMINOPHEN 975 MG: 325 TABLET, FILM COATED ORAL at 12:18

## 2024-10-31 RX ADMIN — LABETALOL HYDROCHLORIDE 10 MG: 5 INJECTION, SOLUTION INTRAVENOUS at 10:43

## 2024-10-31 NOTE — PROGRESS NOTES
Progress Note - Trauma   Name: Leroy Paredes 89 y.o. male I MRN: [id number]  Unit/Bed#: [facility] 633-01 I Date of Admission: 10/30/2024   Date of Service: 10/31/2024 I Hospital Day: 1    Assessment & Plan  Fall  - Status post fall with the below noted injuries in the setting of bifascicular block, possible cardiogenic syncope.  - Fall precautions.  - Geriatric Medicine consultation for evaluation, medication review and recommendations.  - PT and OT evaluation and treatment as indicated.  - Case Management consultation for disposition planning.  - device interrogation for possible syncopal element, monitor on telemertry  Fracture of multiple ribs of left side  - Multiple left-sided rib fractures (5-6), present on admission.  - Continue rib fracture protocol.  - Continue to encourage incentive spirometer use and adequate pulmonary hygiene.  Currently pulling 1500 mL on I.S.  - Appreciate APS evaluation and recommendations.  - Continue multimodal analgesic regimen.  - Supplemental oxygen via nasal cannula as needed to maintain saturations greater than or equal to 94%.  - Repeat chest x-ray ordered   - PT and OT evaluation and treatment as indicated.  - Outpatient follow-up in the trauma clinic for re-evaluation in approximately 2 weeks.    Spleen injury, initial encounter  - Trace perisplenic fluid concerning for injury, present on admission.  - CT scan of the abdomen on 10/30 reviewed.  - Given mild symptoms, underwhelming CAT scan, reassuring physical exam, good vital signs, did not reverse w/ PCP, or repeat scan with contrast.  - Continue to monitor abdominal exam.  - Continue to monitor hemoglobin.  No evidence of active or ongoing bleeding at this time.  Transfusions only as indicated.  - Continue multimodal analgesic regimen.  Appreciate APS evaluation and recommendations.  - Continue to encourage incentive spirometer use and adequate pulmonary hygiene.  Currently pulling 1500 mL on I.S..  - May resume light activity  as tolerated while following solid organ injury precautions:  Avoid lifting greater than 10 pounds, any strenuous activities and/or exercise, and contact sports until cleared by the trauma service.  Avoid driving and crowded places until cleared by the trauma service.  - PT and OT evaluation and treatment as indicated.  - Outpatient follow-up in the trauma clinic for re-evaluation in approximately 2 weeks.      VTE Prophylaxis: Reason for no pharmacologic prophylaxis evaluating for ongoing bleeding      Disposition: PT/OT to determine however family would like patient to return to [facility] with rehab thru  Trauma service will follow.    TRAUMA TERTIARY SURVEY  Summary of Diagnosed Injuries: Left sided 5/6 rib fractures, Trace pericardial and B/L pleural effusions, trace free fluid along posterior aspect of the spleen    Transfer from: Home at [facility]    Mechanism of Injury:Fall     Chief Complaint: Left sided rib pain    24 Hour Events : Admitted to trauma team. Placed on multimodal pain control. APS consulted. Performed Serial  abdominal assessments for possible intraabdominal injury which remained benign.   Subjective : Endorses some Left lower quadrant, lateral abdominal pain with palpation below ribs. Otherwise denies HA, Dizziness, Lightheadedness, CP, SOB. Denies N/V, tolerating a regular diet.     Objective :  Temp:  [97.4 °F (36.3 °C)-98.7 °F (37.1 °C)] 98.1 °F (36.7 °C)  HR:  [62-81] 70  BP: (136-199)/() 188/82  Resp:  [18-22] 22  SpO2:  [94 %-99 %] 96 %  O2 Device: None (Room air)    I/O         10/29 0701  10/30 0700 10/30 0701  10/31 0700 10/31 0701  11/01 0700    Urine (mL/kg/hr)  800     Total Output  800     Net  -800            Unmeasured Urine Occurrence  3 x             Physical Exam  Constitutional:       General: He is not in acute distress.     Appearance: Normal appearance. He is not toxic-appearing.   HENT:      Head: Atraumatic.   Eyes:      Pupils: Pupils are equal, round,  and reactive to light.   Cardiovascular:      Rate and Rhythm: Normal rate and regular rhythm.      Pulses: Normal pulses.      Heart sounds: Normal heart sounds.   Pulmonary:      Effort: Pulmonary effort is normal.      Breath sounds: Normal breath sounds.   Abdominal:      General: Abdomen is flat. Bowel sounds are normal. There is no distension.      Palpations: Abdomen is soft.      Tenderness: There is no abdominal tenderness.   Musculoskeletal:         General: Normal range of motion.      Cervical back: Normal range of motion.   Skin:     General: Skin is warm.      Capillary Refill: Capillary refill takes less than 2 seconds.   Neurological:      General: No focal deficit present.      Mental Status: He is alert and oriented to person, place, and time.   Psychiatric:         Behavior: Behavior normal.          1. Before the illness or injury that brought you to the Emergency, did you need someone to help you on a regular basis? 1=Yes   2. Since the illness or injury that brought you to the Emergency, have you needed more help than usual to take care of yourself? 0=No   3. Have you been hospitalized for one or more nights during the past 6 months (excluding a stay in the Emergency Department)? 0=No   4. In general, do you see well? 0=Yes   5. In general, do you have serious problems with your memory? 0=No   6. Do you take more than three different medications everyday? 1=Yes   TOTAL   3     Did you order a geriatric consult if the score was 2 or greater?: yes     PIC Score  PIC Pain Score: 3 (10/31/2024  3:59 AM)  PIC Incentive Spirometry Score: 4 (10/31/2024  3:59 AM)  PIC Cough Description: 3 (10/31/2024  3:59 AM)  PIC Total Score: 10 (10/31/2024  3:59 AM)       If the Total PIC Score </=5, did you consult APS and evaluate patient for further intervention?: yes      Pain:    Incentive Spirometry  Cough  3 = Controlled  4 = Above goal volume 3 = Strong  2 = Moderate  3 = Goal to alert volume 2 = Weak  1 =  Severe  2 = Below alert volume 1 = Absent     1 = Unable to perform IS          Lab Results: I have reviewed the following results:  Recent Labs     10/31/24  0543   WBC 6.45   HGB 9.7*   HCT 31.0*      SODIUM 137   K 4.3      CO2 26   BUN 28*   CREATININE 1.75*   GLUC 227*   PTT 32   INR 1.26*       Imaging Results Review: No pertinent imaging studies reviewed.  Other Study Results Review: No additional pertinent studies reviewed.

## 2024-10-31 NOTE — DISCHARGE SUMMARY
Discharge Summary - Trauma   Name: Leroy Paredes 89 y.o. male I MRN: [id number]  [facility] I Date of Admission: 10/30/2024   Date of Service: 10/31/2024 I Hospital Day: 1          Admission Date: 10/30/2024 0733  Discharge Date: 10/31/24  Admitting Diagnosis: Injury of spleen, initial encounter [S36.00XA]  Closed fracture of multiple ribs of left side, initial encounter [S22.42XA]  Other injury of unspecified body region, initial encounter [T14.8XXA]  Discharge Diagnosis:   Medical Problems       Resolved Problems  Date Reviewed: 10/25/2024   None         HPI: Patient s/p fall sustained left sided 5/6 rib fractures. Also seen on CT trace pericardial, pleural effusions trace B/L and trace perisplenic fluid.     Procedures Performed:   Orders Placed This Encounter   Procedures    ED ECG Documentation Only       Summary of Hospital Course:  The patient was admitted to the trauma service. Monitored for intraabdominal injury with serial abd exams, remained stable. Patient was placed on multimodal pain control but did not require narcotics. Patient had minimal rib pain. Was pulling 1750 on IS. Doing well. PT/OT and geriatrics was consulted. PT/OT recommended patient to go home with outpatient PT/OT. Geriatrics made recommendations. The patient initially had elevated BP, we were holding his ACEi and Diuretic. He may resume these on discharge. He will be discharged to his facility with rehab at [facility] outpatient. He can follow up with his PCP for his rib fractures.     Significant Findings, Care, Treatment and Services Provided: as above    Complications: none    Condition at Discharge: good       Discharge instructions/Information to patient and family:   See After Visit Summary (AVS) for information provided to patient and family.      Provisions for Follow-Up Care:  See after visit summary for information related to follow-up care and any pertinent home health orders.      PCP: Pepito Foster,  DO    Disposition: Assisted living/personal care at [facility]    Planned Readmission: No     Discharge Medications:  See after visit summary for reconciled discharge medications provided to patient and family.      Discharge Statement:  I have spent a total time of 30 minutes in caring for this patient on the day of the visit/encounter. .

## 2024-10-31 NOTE — DISCHARGE INSTR - AVS FIRST PAGE
HOLD ELIQUIS X 3 DAYS THEN RESUME    Trauma Discharge Instructions:    Please follow-up as instructed. If you need a follow-up appointment, please call the office when you leave to schedule an appointment.    Activity:  - PT and OT evaluation and treatment as indicated.  - You may resume activity as tolerated.  - Walking and normal light activities are encouraged.  - Normal daily activities including climbing steps are okay.  - No driving until no longer using pain medications.    Diet:    - You may resume your normal diet.    Medications:  - You should continue your current medication regimen after discharge unless otherwise instructed. Please refer to your discharge medication list for further details.  - Please take the pain medications as directed.  - You are encouraged to use non-narcotic pain medications first and whenever possible. Reserve the use of narcotic pain medication for moderate to severe pain not controlled by non-narcotic medications.  - No driving while taking narcotic pain medications.  - You may become constipated, especially if taking pain medications. You may take any over the counter stool softeners or laxatives as needed. Examples: Milk of Magnesia, Colace, Senna.    Additional Instructions:  - May shower daily.  - If you have any questions or concerns after discharge please call the office.  - Call office or return to ER if fever greater than 101, chills, persistent nausea/vomiting, worsening/uncontrollable pain, develop productive cough, increasing shortness of breath, difficulty breathing, and/or increasing redness or purulent/foul smelling drainage from incision(s).

## 2024-10-31 NOTE — PROGRESS NOTES
Pastoral Care Progress Note               10/31/24 1500   Clinical Encounter Type   Visited With Patient  (Father Anirudh)   Lutheran Encounters   Lutheran Needs Prayer  ([place])

## 2024-10-31 NOTE — PLAN OF CARE
Problem: OCCUPATIONAL THERAPY ADULT  Goal: Performs self-care activities at highest level of function for planned discharge setting.  See evaluation for individualized goals.  Description: Treatment Interventions: ADL retraining, Functional transfer training, Endurance training, Cognitive reorientation, Patient/family training, Equipment evaluation/education, Compensatory technique education, Energy conservation, Activityengagement          See flowsheet documentation for full assessment, interventions and recommendations.   Note: Limitation: Decreased ADL status, Decreased Safe judgement during ADL, Decreased endurance, Decreased self-care trans, Decreased high-level ADLs  Prognosis: Good  Assessment: Pt is a 89 y.o. male admitted to [facility] on 10/30/24. Pt s/p fall 2 days ago with multiple L sided rib fractures (5-6) and spleen injury. Pt  has a past medical history of 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), Chronic kidney disease, Diabetes mellitus (HCC), Hyperlipidemia, Hypertension, Osteoarthritis, Palpitations, and TIA (transient ischemic attack). PTA, pt was staying at [facility]. At baseline, pt was independent with functional mobility and received assistance with completion of ADLs and IADLs. Pt currently presents with impairments including, difficulty performing ADLS, difficulty performing IADLS, health management, and environment activity tolerance, endurance, standing balance/tolerance, sitting balance/tolerance, safety, judgement, sequencing, task initiation, and task termination. These impairments, as well as pt's fatigue, pain, and risk for falls, limit pt's ability to safely engage in all baseline areas of occupation, including grooming, bathing, dressing, toileting, functional mobility/transfers, community mobility, social participation, and leisure activities. From OT standpoint, recommend Level III resources. OT will continue to follow to address the below stated goals.     Rehab Resource  Intensity Level, OT: III (Minimum Resource Intensity)

## 2024-10-31 NOTE — PHYSICAL THERAPY NOTE
PHYSICAL THERAPY EVALUATION  NAME:  Leroy Paredes  DATE: 10/31/24    AGE:   89 y.o.  Mrn:   [id number]  ADMIT DX:  Injury of spleen, initial encounter [S36.00XA]  Closed fracture of multiple ribs of left side, initial encounter [S22.42XA]  Other injury of unspecified body region, initial encounter [T14.8XXA]    Past Medical History:   Diagnosis Date    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)     Last Assessed:  5/19/17    Chronic kidney disease     Diabetes mellitus (HCC)     Hyperlipidemia     Hypertension     Osteoarthritis     Palpitations     TIA (transient ischemic attack)        Past Surgical History:   Procedure Laterality Date    APPENDECTOMY      CARDIAC ELECTROPHYSIOLOGY PROCEDURE N/A 1/17/2024    Procedure: Cardiac loop recorder explant;  Surgeon: Drew Olmos MD;  [facility];  Service: Cardiology    COLONOSCOPY  2012    HEMORROIDECTOMY      TOOTH EXTRACTION  02/02/2022       Length Of Stay: 1    PHYSICAL THERAPY EVALUATION:       10/31/24 1119   Note Type   Note type Evaluation   Pain Assessment   Pain Assessment Tool 0-10   Pain Score 5   Pain Location/Orientation Orientation: Left;Location: Rib Cage   Pain Onset/Description Onset: Ongoing;Frequency: Constant/Continuous;Descriptor: Aching   Effect of Pain on Daily Activities increased pain with activity   Patient's Stated Pain Goal No pain   Hospital Pain Intervention(s) Ambulation/increased activity;Repositioned   Restrictions/Precautions   Weight Bearing Precautions Per Order No   Other Precautions Chair Alarm;Cognitive;Bed Alarm;Multiple lines;Fall Risk;Pain   Home Living   Type of Home SNF  ([facility])   Home Layout One level   Home Equipment Walker   Additional Comments Pt poor historian. Per CM Pt resides at [facility]   Prior Function   Level of [place] Independent with functional mobility   Lives With Facility staff   Receives Help From Personal care attendant   Falls in the last 6 months 5 to 10   Comments Per  CM Pt utilizes a RW for mobility PTA   General   Family/Caregiver Present No   Cognition   Overall Cognitive Status Impaired   Arousal/Participation Alert   Orientation Level Oriented to person;Oriented to place;Disoriented to time;Disoriented to situation   Memory Decreased recall of precautions;Decreased recall of recent events   Following Commands Follows one step commands with increased time or repetition   RUE Assessment   RUE Assessment WFL   LUE Assessment   LUE Assessment WFL   RLE Assessment   RLE Assessment WFL   Strength RLE   RLE Overall Strength 4-/5   LLE Assessment   LLE Assessment WFL   Strength LLE   LLE Overall Strength 4-/5   Bed Mobility   Supine to Sit 3  Moderate assistance   Additional items Assist x 1;Increased time required;Verbal cues   Transfers   Sit to Stand 4  Minimal assistance   Additional items Assist x 1;Increased time required;Verbal cues   Stand to Sit 4  Minimal assistance   Additional items Assist x 1;Increased time required;Verbal cues   Ambulation/Elevation   Gait pattern Excessively slow;Short stride;Foward flexed;Inconsistent nela   Gait Assistance 4  Minimal assist   Additional items Assist x 1   Assistive Device Rolling walker   Distance 60ft x 2   Balance   Static Sitting Fair   Static Standing Fair -   Ambulatory Poor +   Endurance Deficit   Endurance Deficit Yes   Endurance Deficit Description fatigue   Activity Tolerance   Activity Tolerance Patient limited by fatigue   Medical Staff Made Aware Rangel OT; ARLEY Gonzalez student; OT present for co evaluation due to pts current medical presentation   Nurse Made Aware Pt appropriate to be seen and mobilize per nsg   Assessment   Prognosis Good   Problem List Decreased strength;Decreased endurance;Decreased mobility;Impaired balance;Decreased cognition;Impaired judgement;Decreased safety awareness   Assessment Pt is 89 y.o. male seen for PT evaluation s/p admit to [facility] on 10/30/2024. Two pt identifiers were  "used to confirm. Pt presented s/p fall.  Pt was admitted with a primary dx of: closed fx of multiple ribs on L side, injury of spleen.  PT now consulted for assessment of mobility and d/c needs. Pt with Up in chair orders.  Pts current co morbidities affecting treatment include:  has a past medical history of 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), Chronic kidney disease, Diabetes mellitus (HCC), Hyperlipidemia, Hypertension, Osteoarthritis, Palpitations, and TIA (transient ischemic attack). . Pts current clinical presentation is Unstable/ Unpredictable (high complexity) due to Ongoing medical management for primary dx, Decreased activity tolerance compared to baseline, Fall risk, Cog status, Continuous pulse oximetry monitoring   .  Upon evaluation, pt currently is requiring Mod Ax1 for bed mobility; Min Ax1 for transfers and Min Ax1 for ambulation w/ RW. Pt presents at PT eval functioning below baseline and currently w/ overall mobility deficits 2* to: BLE weakness, impaired balance, decreased endurance, gait deviations, decreased safety awareness, impaired judgement, fall risk, decreased cognition. Pt currently at a fall risk 2* to impairments listed above.  Based on the aforementioned PT evaluation, pt will continue to benefit from skilled Acute PT interventions to address stated impairments; to maximize functional mobility; for ongoing pt/ family training; and DME needs. At conclusion of PT session pt returned back in chair and chair alarm engaged with phone and call bell within reach. Pt denies any further questions at this time. PT is currently recommending Level III resource intensity . PT will continue to follow during hospital stay.   Goals   Patient Goals \" to go for a walk\"   [facility] Expiration Date 11/14/24   Short Term Goal #1 In 14 days pt will complete: 1) Bed mobility skills with mod I to increase safety and independence as well as decrease caregiver burden. 2) Functional transfers with mod I to promote " increased independence, safety, and QOL. 3) Ambulate 200' using least restrictive AD with mod I without LOB and stable vitals so that pt can negotiate previous living environment safely and promote independence with functional mobility and return to PLOF. 4) Stair training up/ down 1 step/s using rail/s with mod I so that pt can enter/negotiate previous living environment safely and decrease fall risk. 5) Improve balance grades by 1/2 grade to increase safety with all mobility and decrease fall risk.  6) Improve BLE strength by 1/2 grade to help increase overall functional mobility and decrease fall risk.   Plan   Treatment/Interventions Functional transfer training;LE strengthening/ROM;Elevations;Therapeutic exercise;Endurance training;Equipment eval/education;Patient/family training;Bed mobility;Gait training;Spoke to nursing;OT   PT Frequency 3-5x/wk   Discharge Recommendation   Rehab Resource Intensity Level, PT III (Minimum Resource Intensity)   Equipment Recommended Walker   Walker Package Recommended Wheeled walker   AM-PAC Basic Mobility Inpatient   Turning in Flat Bed Without Bedrails 3   Lying on Back to Sitting on Edge of Flat Bed Without Bedrails 2   Moving Bed to Chair 3   Standing Up From Chair Using Arms 3   Walk in Room 3   Climb 3-5 Stairs With Railing 2   Basic Mobility Inpatient Raw Score 16   Basic Mobility Standardized Score 38.32   [facility] Highest Level Of Mobility   -HLM Goal 5: Stand one or more mins   JH-HLM Achieved 7: Walk 25 feet or more   Modified [place] Scale   Modified [place] Scale 4   Portions of the documentation may have been created using voice recognition software.Occasional wrong word or sound alike substitutions may have occurred due to the inherent limitations of the voice recognition software. Read the chart carefully and recognize, using context, where substitutions have occurred.    Wong Tinsley, PT, DPT

## 2024-10-31 NOTE — PLAN OF CARE
Problem: Potential for Falls  Goal: Patient will remain free of falls  Description: INTERVENTIONS:  - Educate patient/family on patient safety including physical limitations  - Instruct patient to call for assistance with activity   - Consult OT/PT to assist with strengthening/mobility   - Keep Call bell within reach  - Keep bed low and locked with side rails adjusted as appropriate  - Keep care items and personal belongings within reach  - Initiate and maintain comfort rounds  - Make Fall Risk Sign visible to staff  - Offer Toileting every 2 Hours, in advance of need  - Initiate/Maintain bed alarm  - Obtain necessary fall risk management equipment: bed alarm   - Apply yellow socks and bracelet for high fall risk patients  - Consider moving patient to room near nurses station  Outcome: Progressing     Problem: Prexisting or High Potential for Compromised Skin Integrity  Goal: Skin integrity is maintained or improved  Description: INTERVENTIONS:  - Identify patients at risk for skin breakdown  - Assess and monitor skin integrity  - Assess and monitor nutrition and hydration status  - Monitor labs   - Assess for incontinence   - Turn and reposition patient  - Assist with mobility/ambulation  - Relieve pressure over bony prominences  - Avoid friction and shearing  - Provide appropriate hygiene as needed including keeping skin clean and dry  - Evaluate need for skin moisturizer/barrier cream  - Collaborate with interdisciplinary team   - Patient/family teaching  - Consider wound care consult   Outcome: Progressing     Problem: PAIN - ADULT  Goal: Verbalizes/displays adequate comfort level or baseline comfort level  Description: Interventions:  - Encourage patient to monitor pain and request assistance  - Assess pain using appropriate pain scale  - Administer analgesics based on type and severity of pain and evaluate response  - Implement non-pharmacological measures as appropriate and evaluate response  - Consider  cultural and social influences on pain and pain management  - Notify physician/advanced practitioner if interventions unsuccessful or patient reports new pain  Outcome: Progressing     Problem: INFECTION - ADULT  Goal: Absence or prevention of progression during hospitalization  Description: INTERVENTIONS:  - Assess and monitor for signs and symptoms of infection  - Monitor lab/diagnostic results  - Monitor all insertion sites, i.e. indwelling lines, tubes, and drains  - Monitor endotracheal if appropriate and nasal secretions for changes in amount and color  - [place] appropriate cooling/warming therapies per order  - Administer medications as ordered  - Instruct and encourage patient and family to use good hand hygiene technique  - Identify and instruct in appropriate isolation precautions for identified infection/condition  Outcome: Progressing  Goal: Absence of fever/infection during neutropenic period  Description: INTERVENTIONS:  - Monitor WBC    Outcome: Progressing     Problem: SAFETY ADULT  Goal: Patient will remain free of falls  Description: INTERVENTIONS:  - Educate patient/family on patient safety including physical limitations  - Instruct patient to call for assistance with activity   - Consult OT/PT to assist with strengthening/mobility   - Keep Call bell within reach  - Keep bed low and locked with side rails adjusted as appropriate  - Keep care items and personal belongings within reach  - Initiate and maintain comfort rounds  - Make Fall Risk Sign visible to staff  - Offer Toileting every 2  Hours, in advance of need  - Initiate/Maintain bed alarm  - Obtain necessary fall risk management equipment: bed alarm  - Apply yellow socks and bracelet for high fall risk patients  - Consider moving patient to room near nurses station  Outcome: Progressing  Goal: Maintain or return to baseline ADL function  Description: INTERVENTIONS:  -  Assess patient's ability to carry out ADLs; assess patient's baseline  for ADL function and identify physical deficits which impact ability to perform ADLs (bathing, care of mouth/teeth, toileting, grooming, dressing, etc.)  - Assess/evaluate cause of self-care deficits   - Assess range of motion  - Assess patient's mobility; develop plan if impaired  - Assess patient's need for assistive devices and provide as appropriate  - Encourage maximum independence but intervene and supervise when necessary  - Involve family in performance of ADLs  - Assess for home care needs following discharge   - Consider OT consult to assist with ADL evaluation and planning for discharge  - Provide patient education as appropriate  Outcome: Progressing  Goal: Maintains/Returns to pre admission functional level  Description: INTERVENTIONS:  - Perform AM-PAC 6 Click Basic Mobility/ Daily Activity assessment daily.  - Set and communicate daily mobility goal to care team and patient/family/caregiver.   - Collaborate with rehabilitation services on mobility goals if consulted  - Perform Range of Motion 3 times a day.  - Reposition patient every 2 hours.  - Dangle patient 3 times a day  - Stand patient 3 times a day  - Ambulate patient 3 times a day  - Out of bed to chair 3 times a day   - Out of bed for meals 3 times a day  - Out of bed for toileting  - Record patient progress and toleration of activity level   Outcome: Progressing     Problem: DISCHARGE PLANNING  Goal: Discharge to home or other facility with appropriate resources  Description: INTERVENTIONS:  - Identify barriers to discharge w/patient and caregiver  - Arrange for needed discharge resources and transportation as appropriate  - Identify discharge learning needs (meds, wound care, etc.)  - Arrange for interpretive services to assist at discharge as needed  - Refer to Case Management Department for coordinating discharge planning if the patient needs post-hospital services based on physician/advanced practitioner order or complex needs related to  functional status, cognitive ability, or social support system  Outcome: Progressing     Problem: Knowledge Deficit  Goal: Patient/family/caregiver demonstrates understanding of disease process, treatment plan, medications, and discharge instructions  Description: Complete learning assessment and assess knowledge base.  Interventions:  - Provide teaching at level of understanding  - Provide teaching via preferred learning methods  Outcome: Progressing     Problem: RESPIRATORY - ADULT  Goal: Achieves optimal ventilation and oxygenation  Description: INTERVENTIONS:  - Assess for changes in respiratory status  - Assess for changes in mentation and behavior  - Position to facilitate oxygenation and minimize respiratory effort  - Oxygen administered by appropriate delivery if ordered  - Initiate smoking cessation education as indicated  - Encourage broncho-pulmonary hygiene including cough, deep breathe, Incentive Spirometry  - Assess the need for suctioning and aspirate as needed  - Assess and instruct to report SOB or any respiratory difficulty  - Respiratory Therapy support as indicated  Outcome: Progressing

## 2024-10-31 NOTE — QUICK NOTE
Serial abdominal exam     Abdomen is soft, nontender, moderately distended. He does not have any rebound tenderness, guarding, or signs of peritonitis at this time.  Will continue serial abdominal exams. No change from previous exam

## 2024-10-31 NOTE — OCCUPATIONAL THERAPY NOTE
Occupational Therapy Evaluation     Patient Name: Leroy Paredes  Today's Date: 10/31/2024  Problem List  Active Problems:    Fall    Fracture of multiple ribs of left side    Spleen injury, initial encounter    Past Medical History  Past Medical History:   Diagnosis Date    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)     Last Assessed:  5/19/17    Chronic kidney disease     Diabetes mellitus (HCC)     Hyperlipidemia     Hypertension     Osteoarthritis     Palpitations     TIA (transient ischemic attack)      Past Surgical History  Past Surgical History:   Procedure Laterality Date    APPENDECTOMY      CARDIAC ELECTROPHYSIOLOGY PROCEDURE N/A 1/17/2024    Procedure: Cardiac loop recorder explant;  Surgeon: Drew Olmos MD;  [facility];  Service: Cardiology    COLONOSCOPY  2012    HEMORROIDECTOMY      TOOTH EXTRACTION  02/02/2022           10/31/24 1120   OT Last Visit   OT Visit Date 10/31/24   Note Type   Note type Evaluation   Pain Assessment   Pain Assessment Tool 0-10   Pain Score 5   Hospital Pain Intervention(s) Repositioned;Ambulation/increased activity   Restrictions/Precautions   Weight Bearing Precautions Per Order No   Other Precautions Cognitive;Chair Alarm;Bed Alarm;Multiple lines;Fall Risk;Pain   Home Living   Type of Home SNF  ([facility])   Home Layout One level   Home Equipment Walker   Additional Comments Pt is a poor historian. Per CM the pt resides at [facility] Function   Level of [place] Independent with functional mobility;Needs assistance with ADLs;Needs assistance with IADLS   Lives With Facility staff   Receives Help From Personal care attendant   IADLs Family/Friend/Other provides transportation;Family/Friend/Other provides meals;Family/Friend/Other provides medication management   Falls in the last 6 months 5 to 10   Comments PTA the pt uses a RW for mobility per CM   Lifestyle   Autonomy Pt reports being independent with functional mobility. Facility  staff assists with ADLs and IADLs   Reciprocal Relationships Pt is cared for by facility staff. Pt reports that his sister and her children are in the area.   Service to Others Pt is retired   Intrinsic Gratification Pt reported that he used to enjoy deer hunting   Subjective   Subjective Pt offers no significant information   ADL   Eating Assistance 6  Modified independent   Grooming Assistance 5  Supervision/Setup   UB Bathing Assistance 3  Moderate Assistance   LB Bathing Assistance 3  Moderate Assistance   UB Dressing Assistance 3  Moderate Assistance   LB Dressing Assistance 3  Moderate Assistance   Toileting Assistance  4  Minimal Assistance   Bed Mobility   Supine to Sit 3  Moderate assistance   Additional items Assist x 1;Increased time required;Verbal cues   Transfers   Sit to Stand 4  Minimal assistance   Additional items Assist x 1;Increased time required;Verbal cues   Stand to Sit 4  Minimal assistance   Additional items Assist x 1;Increased time required;Verbal cues   Functional Mobility   Functional Mobility 4  Minimal assistance   Balance   Static Sitting Fair   Static Standing Fair -   Ambulatory Poor +   Activity Tolerance   Activity Tolerance Patient limited by fatigue   Medical Staff Made Aware PT present for co-evaluation   Nurse Made Aware Nursing cleared pt for therapy   RUE Assessment   RUE Assessment WFL   LUE Assessment   LUE Assessment WFL   Hand Function   Gross Motor Coordination Functional   Fine Motor Coordination Functional   Cognition   Overall Cognitive Status Impaired   Orientation Level Oriented to person;Oriented to place;Disoriented to time;Disoriented to situation   Memory Decreased recall of precautions;Decreased recall of recent events   Following Commands Follows one step commands with increased time or repetition   Assessment   Limitation Decreased ADL status;Decreased Safe judgement during ADL;Decreased endurance;Decreased self-care trans;Decreased high-level ADLs    Prognosis Good   Assessment Pt is a 89 y.o. male admitted to [facility] on 10/30/24. Pt s/p fall 2 days ago with multiple L sided rib fractures (5-6) and spleen injury. Pt  has a past medical history of 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), Chronic kidney disease, Diabetes mellitus (HCC), Hyperlipidemia, Hypertension, Osteoarthritis, Palpitations, and TIA (transient ischemic attack). PTA, pt was staying at [facility]. At baseline, pt was independent with functional mobility and received assistance with completion of ADLs and IADLs. Pt currently presents with impairments including, difficulty performing ADLS, difficulty performing IADLS, health management, and environment activity tolerance, endurance, standing balance/tolerance, sitting balance/tolerance, safety, judgement, sequencing, task initiation, and task termination. These impairments, as well as pt's fatigue, pain, and risk for falls, limit pt's ability to safely engage in all baseline areas of occupation, including grooming, bathing, dressing, toileting, functional mobility/transfers, community mobility, social participation, and leisure activities. From OT standpoint, recommend Level III resources. OT will continue to follow to address the below stated goals.   Goals   Patient Goals to get better   Short Term Goal #1 1. Pt will complete UB/LB ADLs with S after setup using adaptive device and DME PRN. 2. Pt will complete toileting with S 3. Pt will increase activity tolerance to 20 to 25 min for participation in ADLs and leisure activities. 4. Pt will be S with bed mobility. 5. Pt will be S with functional mob/transfers to and from all surfaces with fair+ to good balance and safety. 6. Will assess DME needs. 7. Assist with safe discharge recommendations. 8. Pt will have G carryover of energy conservation techniques during completion of ADL tasks. 9. Pt will engage in ongoing functional/formal cognitive assessments to assist with safe d/c planning and  increase safety during functional tasks. 10. Pt will follow multi-step commands 75% of the time.   Plan   Treatment Interventions ADL retraining;Functional transfer training;Endurance training;Cognitive reorientation;Patient/family training;Equipment evaluation/education;Compensatory technique education;Energy conservation;Activityengagement   Goal Expiration Date 11/14/24   OT Frequency 2-3x/wk   Discharge Recommendation   Rehab Resource Intensity Level, OT III (Minimum Resource Intensity)   AM-PAC Daily Activity Inpatient   Lower Body Dressing 2   Bathing 2   Toileting 3   Upper Body Dressing 2   Grooming 3   Eating 4   Daily Activity Raw Score 16   Daily Activity Standardized Score (Calc for Raw Score >=11) 35.96   End of Consult   Education Provided Yes   Patient Position at End of Consult Bedside chair;Bed/Chair alarm activated;All needs within reach   Nurse Communication Nurse aware of consult     MORRIS Moody

## 2024-10-31 NOTE — CONSULTS
Consultation - Acute Pain   Name: Leroy Paredes 89 y.o. male I MRN: [id number]  [facility] I Date of Admission: 10/30/2024   Date of Service: 10/31/2024 I Hospital Day: 1   Consult Acute Pain Service (>/= 66 y/o with at Least 1 Rib Fracture)  Consult performed by: Irineo Rothman MD  Consult ordered by: Leroy Nazario MD        Physician Requesting Evaluation: Gallo Alonso MD   Reason for Evaluation / Principal Problem: Posttraumatic pain management    Assessment & Plan  Fracture of multiple ribs of left side  CT C/A/P 10/30/2024- nondisplaced fractures involving the anterolateral aspects of ribs 5 and 6 at the costochondral junction.     Saturating well on room air.  Denies left-sided rib pain or pain with inspiration.  Currently pulling 1500 mL on incentive spirometry  Has not taken any pain medication in last 24 hours    Plan  Tylenol 975 mg every 6 hours scheduled  Gabapentin 100 mg nightly  Oxycodone 2.5 mg every 4 hours as needed for moderate/severe pain  Encourage incentive spirometer use adequate pulmonary hygiene  Spleen injury, initial encounter  See plan as above    APS will sign off at this time. Thank you for the consult. All opioids and other analgesics to be written at discretion of primary team. Please contact Acute Pain Service - via SecureChat from [phone] with additional questions or concerns. See [facility] or [facility] for additional contacts and after hours information.    History of Present Illness    HPI: Leroy Paredes is a 89 y.o. year old male with past medical history of hypertension, A-fib, HFpEF, prediabetes, CKD4, ambulatory dysfunction who presents with multiple left-sided rib fractures after a fall 2 days ago.  APS consulted for posttraumatic pain management.    Upon evaluation patient was resting comfortably in bed, no acute distress.  Denies left-sided rib pain, but reports minimal left upper quadrant abdominal pain.  Tolerating p.o. intake.  Denies nausea, vomiting,  chest pain, shortness of breath.    Current pain location(s): Pain Score: 0  Pain Location/Orientation: Orientation: Left, Location: Rib Cage  Pain Scale: Pain Assessment Tool: 0-10  Rib Fracture Evaluation:  Chest tube: none  Respiratory Co-morbidities: COPD/Emphysema  SpO2:   SPO2 RA Rest      Flowsheet Row ED to Hosp-Admission (Current) from 10/30/2024 in [facility] 6   SpO2 96 %   SpO2 Activity At Rest   O2 Device None (Room air)   O2 Flow Rate --                                                                             Incentive Spirometer: Incentive Spirometry Achieved (mL): 1500 mL   Platelet Count:   Results from last 7 days   Lab Units 10/31/24  0543   PLATELETS Thousands/uL 169     Coags:   Results from last 7 days   Lab Units 10/31/24  0543   INR  1.26*   PROTIME seconds 16.0*     Home anticoagulants: Eliquis 2.5 BID   VTE covered by:    None         Review of Systems   Constitutional:  Negative for chills and fever.   HENT:  Negative for ear pain and sore throat.    Eyes:  Negative for pain and visual disturbance.   Respiratory:  Negative for cough and shortness of breath.    Cardiovascular:  Negative for chest pain and palpitations.   Gastrointestinal:  Negative for abdominal pain and vomiting.   Genitourinary:  Negative for dysuria and hematuria.   Musculoskeletal:  Negative for arthralgias and back pain.   Skin:  Negative for color change and rash.   Neurological:  Negative for seizures and syncope.   All other systems reviewed and are negative.    I have reviewed the patient's PMH, PSH, Social History, Family History, Meds, and Allergies    Objective :  Temp:  [97.4 °F (36.3 °C)-98.7 °F (37.1 °C)] 98.1 °F (36.7 °C)  HR:  [62-81] 70  BP: (136-199)/() 188/82  Resp:  [18-22] 22  SpO2:  [94 %-99 %] 96 %  O2 Device: None (Room air)    Physical Exam  Vitals and nursing note reviewed.   Constitutional:       General: He is not in acute distress.     Appearance: He is  well-developed.   HENT:      Head: Normocephalic and atraumatic.      Mouth/Throat:      Mouth: Mucous membranes are dry.   Eyes:      Conjunctiva/sclera: Conjunctivae normal.   Cardiovascular:      Rate and Rhythm: Normal rate.      Pulses: Normal pulses.   Pulmonary:      Effort: Pulmonary effort is normal. No respiratory distress.   Abdominal:      General: There is no distension.      Palpations: Abdomen is soft.      Tenderness: There is no abdominal tenderness.   Musculoskeletal:         General: No swelling.      Cervical back: Neck supple.   Skin:     General: Skin is warm and dry.      Capillary Refill: Capillary refill takes less than 2 seconds.   Neurological:      Mental Status: He is alert.   Psychiatric:         Mood and Affect: Mood normal.         Lab Results: I have reviewed the following results:  Estimated Creatinine Clearance: 28.1 mL/min (A) (by C-G formula based on SCr of 1.75 mg/dL (H)).  Lab Results   Component Value Date    WBC 6.45 10/31/2024    WBC 5.30 11/06/2023    HGB 10.2 (L) 10/31/2024    HGB 12.1 01/04/2016    HCT 31.8 (L) 10/31/2024    HCT 36.8 01/04/2016     10/31/2024     01/04/2016         Component Value Date/Time     01/04/2016 0928    K 4.3 10/31/2024 0543    K 5.0 05/03/2024 1241     10/31/2024 0543     05/03/2024 1241    CO2 26 10/31/2024 0543    CO2 21 05/03/2024 1241    BUN 28 (H) 10/31/2024 0543    BUN 37 (H) 05/03/2024 1241    CREATININE 1.75 (H) 10/31/2024 0543    CREATININE 2.33 (H) 05/03/2024 1241         Component Value Date/Time    CALCIUM 8.5 10/31/2024 0543    CALCIUM 8.8 05/03/2024 1241    ALKPHOS 58 08/27/2024 0448    ALKPHOS 69 01/27/2024 1019    AST 19 08/27/2024 0448    AST 12 01/27/2024 1019    ALT 13 08/27/2024 0448    ALT 9 01/27/2024 1019    BILITOT 0.91 01/04/2016 0928    TP 5.7 (L) 08/27/2024 0448    TP 6.5 01/27/2024 1019    ALB 3.1 (L) 08/27/2024 0448    ALB 3.8 05/03/2024 1241       Imaging Results Review: I reviewed  radiology reports from this admission including: chest xray, CT chest, CT head, and CT C-spine.  Other Study Results Review: EKG was reviewed.

## 2024-10-31 NOTE — CONSULTS
Consultation - Geriatric Medicine   Leroy KRISTA Kenneygwenjerrell 89 y.o. male MRN: [id number]  Unit/Bed#: [facility] 633-01 Encounter: [id number]      Assessment & Plan     Ambulatory dysfunction with fall  -Reportedly mechanical fall at assisted living day prior to admission  -(-) head strike (-) loss of consciousness  -injuries as outlined below  -Requires use of walker for ambulation at baseline  -hx recurrent falls with at least one additional in past year  -Remains high risk future falls due to age, hx of fall, deconditioning/debility and unfamiliar environment   -encourage good body mechanics and assist with all transfers  -keep personal items and call bell close to prevent reaching  -maintain environment free of fall hazards  -encourage appropriate footwear and adequate lighting at all times when out of bed  -consider mental fall risk assessment and personal fall alert system if returning to prior care setting  -PT and OT pending    Multiple left sided rib fractures (5 and 6)  -S/p fall as outlined above  -Noted on CT chest/abdomen/pelvis on admission  -Currently saturating well on room air, monitor exercise closely  -Continue acute multimodal pain control  -Encourage aggressive pulmonary toilet and ISS  -Follow-up chest x-ray pending    Injury of spleen  -Trace perisplenic fluid reported on CT chest abdomen pelvis on admission  -Trending hemoglobin  -Serial abdomen exams    Acute pain due to trauma   -Recommend pain control per Geriatric pain protocol:  Tylenol 975mg Q8H scheduled  Roxicodone 2.5mg Q4H PRN moderate pain  Roxicodone 5mg Q4H PRN severe pain  Dilaudid 0.2mg Q4H PRN  -Consider adjuncts such as lidocaine patch topically to appropriate areas  -encourage addition of non-pharmacologic pain treatment including ice and frequent repositioning  -recommend  bowel regimen to prevent constipation due to increased risk with acute pain and opiate pain medications    Cognitive impairment  -Oriented but forgetful at  baseline  -Mostly independent with ADLs requires assist with IADLs  -No prior cognitive testing on record for review however suspect at least mild cognitive impairment at baseline, already has comprehensive geriatric assessment scheduled as outpatient  -Recently moved to assisted living facility  -CTH obtained at admission imaging personally viewed, moderate to severe diffuse chronic microangiopathic changes noted most densely appreciated right temporal area  -TSH WNL 1.519, last B12 low at 295, recommend supplementing to goal at least 400  -At risk age and cardiovascular needed acceleration of underlying cognitive impairment, continue secondary risk factor modifications  -Underlying cognitive impairment significant increases risk developing to them during hospitalization, continue strict delirium precautions  -Encourage use of sensitive/devices such as corrective lenses to reduce risk of uncorrected center impairment from continuing to isolation, confusion, encephalopathy and more precipitous cognitive decline    Insomnia   -Symptoms reportedly well-controlled with home Lexapro and mirtazapine regimen  -Encourage establishment of consistent sleep/wake times and bedtime routine  -Minimize stimulating activities/agents in afternoon/early evening to reduce risk disruption of normal circadian rhythm  -Could consider addition of low-dose melatonin at bedtime as needed if symptoms exacerbated during hospitalization    Afib  -Rates well-controlled off medications, maintained on Eliquis chronically as outpatient  -Continue close outpatient follow-up with PCP and Cardiology for ongoing management    CKD-IV  -Baseline creatinine around 1.9-2.2  -Continue optimization of hemodynamics -lisinopril recently trialed off as outpatient however due to hypertension was resumed by Cardiology at recent appointment (10/25)  -Avoid nephrotoxins and renally dose all medications  -Follows regularly with Dr. Esposito (Nephrology), continue  close outpatient follow-up for ongoing management    Hypertensive urgency  -BP markedly elevated to 199/82 earlier in admission  -Likely multifactorial including acute pain, stressors of traumatic injuries and awaiting restart of home medication regimen  -Continue optimization of hemodynamics and avoid rapid and drastic fluctuations in BP to reduce risk hypoperfusion injury    Impaired Vision  -recommend use of corrective lenses at all appropriate times  -encourage adequate lighting and encourage use of assistance with ambulation  -keep personal belongings close to person to avoid reaching  -encourage appropriate footwear at all times  -Consider large font for pain details provided to patient    Impaired mastication  -Requires use of dentures  -ensure meal consistency appropriate for abilities  -continue aspiration precautions    Deconditioning/debility/frailty   -Clinical frailty scale stage V, mild to moderately frail, progressive  -Multifactorial including age, amatory fracture with recurrent falls, CKD 4, A-fib and multiple additional chronic medical comorbidities now with fall and acute medic injury superimposed in elderly individual with limited physiologic and metabolic reserves  -Continue optimization chronic medical conditions and address acute metabolic derangements as arise  -Encourage well-balanced nutritional intake  -Monitor for and treat any underlying anxiety/mood/depression symptoms as may impact patient response to therapies as well as overall sense of wellbeing and quality of life  -Continue psychosocial supports with patient and caregivers  -Continue to ensure that treatments and interventions align with patient's wishes and goals of care    Delirium precautions  -Patient is high risk of delirium due to age, fall, ptotic injuries, acute pain, hospitalization, underlying cognitive impairment and history of encephalopathy during prior hospitalizations  -Initiate delirium precautions  -maintain  normal sleep/wake cycle  -minimize overnight interruptions, group overnight vitals/labs/nursing checks as possible  -dim lights, close blinds and turn off tv to minimize stimulation and encourage sleep environment in evenings  -ensure that pain is well controlled   -monitor for fecal and urinary retention which may precipitate delirium  -encourage early mobilization and ambulation with assist as cleared to safely do self  -provide frequent reorientation and redirection as indicated and appropriate  -encourage family and friends at the bedside to help calm patient if anxious as well as provide familiarity and reassurance  -Minimize use of medications which may precipitate or worsen delirium such as tramadol, benzodiazepine, anticholinergics, and benadryl as possible  -encourage hydration and nutrition   -redirect unwanted behaviors as first line    Home medication review     Eliquis 2.5 Mg twice daily  Lipitor 40 Mg daily  Lexapro 5 Mg daily  Gabapentin 100 Mg at bedtime  Lisinopril 5 Mg daily   Mirtazapine 15 Mg at bedtime  Terazosin 1 Mg HS    Care coordination: Rounded with [place] (RN)    History of Present Illness   Physician Requesting Consult: Gallo Alonso MD  Reason for Consult / Principal Problem: Fall  Hx and PE limited by: N/A  Additional history obtained from: Chart review and patient evaluation, records obtained from [facility]    HPI: Leroy VELASCO Pablitoelvira is a 89 y.o. year old male with BPH, DM-II with diabetic retinopathy, hypertension, CKD 4, carotid artery disease, hypertension, secondary hyperparathyroidism of renal origin, atrial fibrillation, chronic heart failure preserved ejection fraction, insomnia, hyperlipidemia, severe protein comminution, and ambulatory dysfunction who is admitted to trauma service with fall found to have multiple left-sided rib fractures, he is being seen in consultation by Geriatrics for high risk developing delirium during hospitalization.  Leroy is seen and examined  at bedside where he is lying resting comfortably, he explains that he sustained a mechanical fall in his assisted living facility the day prior to presentation at which time he notes he was leaning over to get something and fell between the couch and the table striking left side of his body.  He denied head strike or loss of consciousness and reports that he was able to get up on his own, he did not seek immediate medical attention as he did not feel that he had any significant injuries however the next day he notes severe left-sided chest wall pain prompting presentation to the ED for further evaluation at which time he was found to have multiple left-sided rib fractures.  He reports since admission pain has been well-controlled and he denies shortness of breath or other acute complaints at this time.    Prior to admission Leroy residing at [facility].  At baseline he is reportedly oriented but forgetful with episodes of confusion and sundowning and does endorse some short-term forgetfulness at baseline.  He reports he mostly independent with ADLs and requires assist with IADLs.  He reports use of walker for ambulation at baseline and endorses history of recurrent falls with at least 1 additional in the past year.  He reports use of glasses for reading as well as upper and lower dentures, does not use hearing aids.    Inpatient consult to Gerontology  Consult performed by: Lisa Mueller DO  Consult ordered by: Leroy Nazario MD      Review of Systems   Constitutional: Negative.  Negative for chills and fever.   HENT:  Positive for dental problem (Wears upper and lower denture).    Eyes: Negative.    Respiratory: Negative.  Negative for cough and shortness of breath.    Cardiovascular: Negative.  Negative for chest pain.   Gastrointestinal: Negative.    Endocrine: Negative.    Genitourinary: Negative.    Musculoskeletal:  Positive for gait problem (Uses walker).        Left-sided rib  cage pain   Skin: Negative.    Allergic/Immunologic: Negative.    Neurological:  Negative for dizziness, light-headedness and numbness.   Hematological: Negative.    Psychiatric/Behavioral: Negative.  Negative for sleep disturbance.    All other systems reviewed and are negative.    Historical Information   Past Medical History:   Diagnosis Date    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)     Last Assessed:  5/19/17    Chronic kidney disease     Diabetes mellitus (HCC)     Hyperlipidemia     Hypertension     Osteoarthritis     Palpitations     TIA (transient ischemic attack)      Past Surgical History:   Procedure Laterality Date    APPENDECTOMY      CARDIAC ELECTROPHYSIOLOGY PROCEDURE N/A 1/17/2024    Procedure: Cardiac loop recorder explant;  Surgeon: Drew Olmos MD;  Location: [facility];  Service: Cardiology    COLONOSCOPY  2012    HEMORROIDECTOMY      TOOTH EXTRACTION  02/02/2022     Social History   Social History     Substance and Sexual Activity   Alcohol Use Not Currently    Comment: Social drinker     Social History     Substance and Sexual Activity   Drug Use No     Social History     Tobacco Use   Smoking Status Former   Smokeless Tobacco Never     Family History:   Family History   Problem Relation Age of Onset    Diabetes Mother     Other Mother         Stroke syndrome    Diabetes Father     Emphysema Father      Meds/Allergies   all current active meds have been reviewed    No Known Allergies    Objective     Intake/Output Summary (Last 24 hours) at 10/31/2024 0634  Last data filed at 10/31/2024 0601  Gross per 24 hour   Intake --   Output 800 ml   Net -800 ml     Invasive Devices       Peripheral Intravenous Line  Duration             Peripheral IV 10/30/24 Left;Ventral (anterior) Forearm <1 day                  Physical Exam  Vitals and nursing note reviewed.   Constitutional:       General: He is not in acute distress.     Appearance: He is not toxic-appearing.   HENT:      Head:  Normocephalic and atraumatic.      Nose: Nose normal.      Mouth/Throat:      Mouth: Mucous membranes are dry.      Comments: Edentulous, dentures not in place at time of eval  Eyes:      General: No scleral icterus.        Right eye: No discharge.         Left eye: No discharge.      Conjunctiva/sclera: Conjunctivae normal.      Comments: Not wearing glasses   Neck:      Comments: Phonation normal  Cardiovascular:      Rate and Rhythm: Normal rate and regular rhythm.   Pulmonary:      Effort: Pulmonary effort is normal. No respiratory distress.      Breath sounds: No wheezing.      Comments: Saturating well on room air  Abdominal:      General: Bowel sounds are normal. There is no distension.      Palpations: Abdomen is soft.      Tenderness: There is no abdominal tenderness.   Musculoskeletal:      Cervical back: Neck supple.      Comments: Reduced overall muscle mass   Skin:     General: Skin is warm and dry.      Comments: Thin and friable   Neurological:      Mental Status: He is alert.      Comments: Awake and alert, oriented to person place and situation, answers questions appropriately but forgetful   Psychiatric:         Mood and Affect: Mood normal.         Behavior: Behavior normal.      Comments: Polite pleasant cooperative easily engaged       Lab Results:     I have personally reviewed pertinent lab results including the following:    10/30/24-CBC, BMP, H&H  10/31/24-CBC, BMP, PT/INR, PTT    I have personally reviewed the following imaging study reports in PACS:    10/30/24- CTH, CT C-spine, CT chest abdomen pelvis    Therapies:   PT: pending   OT: pending     VTE Prophylaxis: Sequential compression device (Venodyne)     Code Status: Level 3 - DNAR and DNI  Advance Directive and Living Will:      Power of : Yes  POLST:      Family and Social Support:   Living Arrangements: Lives Alone  Support Systems: Family members  Assistance Needed: to be assessed  Type of Current Residence: Private  residence  Current Home Care Services: No    Goals of Care: recovery from acute injuries

## 2024-10-31 NOTE — PLAN OF CARE
Problem: PHYSICAL THERAPY ADULT  Goal: Performs mobility at highest level of function for planned discharge setting.  See evaluation for individualized goals.  Description: Treatment/Interventions: Functional transfer training, LE strengthening/ROM, Elevations, Therapeutic exercise, Endurance training, Equipment eval/education, Patient/family training, Bed mobility, Gait training, Spoke to nursing, OT  Equipment Recommended: Walker       See flowsheet documentation for full assessment, interventions and recommendations.  Note: Prognosis: Good  Problem List: Decreased strength, Decreased endurance, Decreased mobility, Impaired balance, Decreased cognition, Impaired judgement, Decreased safety awareness  Assessment: Pt is 89 y.o. male seen for PT evaluation s/p admit to [facility] on 10/30/2024. Two pt identifiers were used to confirm. Pt presented s/p fall.  Pt was admitted with a primary dx of: closed fx of multiple ribs on L side, injury of spleen.  PT now consulted for assessment of mobility and d/c needs. Pt with Up in chair orders.  Pts current co morbidities affecting treatment include:  has a past medical history of 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), Chronic kidney disease, Diabetes mellitus (HCC), Hyperlipidemia, Hypertension, Osteoarthritis, Palpitations, and TIA (transient ischemic attack). . Pts current clinical presentation is Unstable/ Unpredictable (high complexity) due to Ongoing medical management for primary dx, Decreased activity tolerance compared to baseline, Fall risk, Cog status, Continuous pulse oximetry monitoring   .  Upon evaluation, pt currently is requiring Mod Ax1 for bed mobility; Min Ax1 for transfers and Min Ax1 for ambulation w/ RW. Pt presents at PT eval functioning below baseline and currently w/ overall mobility deficits 2* to: BLE weakness, impaired balance, decreased endurance, gait deviations, decreased safety awareness, impaired judgement, fall risk, decreased  cognition. Pt currently at a fall risk 2* to impairments listed above.  Based on the aforementioned PT evaluation, pt will continue to benefit from skilled Acute PT interventions to address stated impairments; to maximize functional mobility; for ongoing pt/ family training; and DME needs. At conclusion of PT session pt returned back in chair and chair alarm engaged with phone and call bell within reach. Pt denies any further questions at this time. PT is currently recommending Level III resource intensity . PT will continue to follow during hospital stay.        Rehab Resource Intensity Level, PT: III (Minimum Resource Intensity)    See flowsheet documentation for full assessment.

## 2024-10-31 NOTE — ASSESSMENT & PLAN NOTE
CT C/A/P 10/30/2024- nondisplaced fractures involving the anterolateral aspects of ribs 5 and 6 at the costochondral junction.     Saturating well on room air.  Denies left-sided rib pain or pain with inspiration.  Currently pulling 1500 mL on incentive spirometry  Has not taken any pain medication in last 24 hours    Plan  Tylenol 975 mg every 6 hours scheduled  Gabapentin 100 mg nightly  Oxycodone 2.5 mg every 4 hours as needed for moderate/severe pain  Encourage incentive spirometer use adequate pulmonary hygiene

## 2024-10-31 NOTE — PLAN OF CARE
Problem: Potential for Falls  Goal: Patient will remain free of falls  Description: INTERVENTIONS:  - Educate patient/family on patient safety including physical limitations  - Instruct patient to call for assistance with activity   - Consult OT/PT to assist with strengthening/mobility   - Keep Call bell within reach  - Keep bed low and locked with side rails adjusted as appropriate  - Keep care items and personal belongings within reach  - Initiate and maintain comfort rounds  - Make Fall Risk Sign visible to staff  - Offer Toileting every 2 Hours, in advance of need  - Initiate/Maintain bed/chair alarm  - Obtain necessary fall risk management equipment:   - Apply yellow socks and bracelet for high fall risk patients  - Consider moving patient to room near nurses station  Outcome: Progressing     Problem: Prexisting or High Potential for Compromised Skin Integrity  Goal: Skin integrity is maintained or improved  Description: INTERVENTIONS:  - Identify patients at risk for skin breakdown  - Assess and monitor skin integrity  - Assess and monitor nutrition and hydration status  - Monitor labs   - Assess for incontinence   - Turn and reposition patient  - Assist with mobility/ambulation  - Relieve pressure over bony prominences  - Avoid friction and shearing  - Provide appropriate hygiene as needed including keeping skin clean and dry  - Evaluate need for skin moisturizer/barrier cream  - Collaborate with interdisciplinary team   - Patient/family teaching  - Consider wound care consult   Outcome: Progressing     Problem: PAIN - ADULT  Goal: Verbalizes/displays adequate comfort level or baseline comfort level  Description: Interventions:  - Encourage patient to monitor pain and request assistance  - Assess pain using appropriate pain scale  - Administer analgesics based on type and severity of pain and evaluate response  - Implement non-pharmacological measures as appropriate and evaluate response  - Consider  cultural and social influences on pain and pain management  - Notify physician/advanced practitioner if interventions unsuccessful or patient reports new pain  Outcome: Progressing     Problem: INFECTION - ADULT  Goal: Absence or prevention of progression during hospitalization  Description: INTERVENTIONS:  - Assess and monitor for signs and symptoms of infection  - Monitor lab/diagnostic results  - Monitor all insertion sites, i.e. indwelling lines, tubes, and drains  - Monitor endotracheal if appropriate and nasal secretions for changes in amount and color  - Merrill appropriate cooling/warming therapies per order  - Administer medications as ordered  - Instruct and encourage patient and family to use good hand hygiene technique  - Identify and instruct in appropriate isolation precautions for identified infection/condition  Outcome: Progressing  Goal: Absence of fever/infection during neutropenic period  Description: INTERVENTIONS:  - Monitor WBC    Outcome: Progressing     Problem: SAFETY ADULT  Goal: Patient will remain free of falls  Description: INTERVENTIONS:  - Educate patient/family on patient safety including physical limitations  - Instruct patient to call for assistance with activity   - Consult OT/PT to assist with strengthening/mobility   - Keep Call bell within reach  - Keep bed low and locked with side rails adjusted as appropriate  - Keep care items and personal belongings within reach  - Initiate and maintain comfort rounds  - Make Fall Risk Sign visible to staff  - Offer Toileting every 2 Hours, in advance of need  - Initiate/Maintain bed/chair alarm  - Obtain necessary fall risk management equipment:   - Apply yellow socks and bracelet for high fall risk patients  - Consider moving patient to room near nurses station  Outcome: Progressing  Goal: Maintain or return to baseline ADL function  Description: INTERVENTIONS:  -  Assess patient's ability to carry out ADLs; assess patient's baseline for  ADL function and identify physical deficits which impact ability to perform ADLs (bathing, care of mouth/teeth, toileting, grooming, dressing, etc.)  - Assess/evaluate cause of self-care deficits   - Assess range of motion  - Assess patient's mobility; develop plan if impaired  - Assess patient's need for assistive devices and provide as appropriate  - Encourage maximum independence but intervene and supervise when necessary  - Involve family in performance of ADLs  - Assess for home care needs following discharge   - Consider OT consult to assist with ADL evaluation and planning for discharge  - Provide patient education as appropriate  Outcome: Progressing  Goal: Maintains/Returns to pre admission functional level  Description: INTERVENTIONS:  - Perform AM-PAC 6 Click Basic Mobility/ Daily Activity assessment daily.  - Set and communicate daily mobility goal to care team and patient/family/caregiver.   - Collaborate with rehabilitation services on mobility goals if consulted  - Perform Range of Motion 3 times a day.  - Reposition patient every 2 hours.  - Dangle patient 3 times a day  - Stand patient 3 times a day  - Ambulate patient 3 times a day  - Out of bed to chair 3 times a day   - Out of bed for meals 3 times a day  - Out of bed for toileting  - Record patient progress and toleration of activity level   Outcome: Progressing     Problem: DISCHARGE PLANNING  Goal: Discharge to home or other facility with appropriate resources  Description: INTERVENTIONS:  - Identify barriers to discharge w/patient and caregiver  - Arrange for needed discharge resources and transportation as appropriate  - Identify discharge learning needs (meds, wound care, etc.)  - Arrange for interpretive services to assist at discharge as needed  - Refer to Case Management Department for coordinating discharge planning if the patient needs post-hospital services based on physician/advanced practitioner order or complex needs related to  functional status, cognitive ability, or social support system  Outcome: Progressing     Problem: Knowledge Deficit  Goal: Patient/family/caregiver demonstrates understanding of disease process, treatment plan, medications, and discharge instructions  Description: Complete learning assessment and assess knowledge base.  Interventions:  - Provide teaching at level of understanding  - Provide teaching via preferred learning methods  Outcome: Progressing     Problem: RESPIRATORY - ADULT  Goal: Achieves optimal ventilation and oxygenation  Description: INTERVENTIONS:  - Assess for changes in respiratory status  - Assess for changes in mentation and behavior  - Position to facilitate oxygenation and minimize respiratory effort  - Oxygen administered by appropriate delivery if ordered  - Initiate smoking cessation education as indicated  - Encourage broncho-pulmonary hygiene including cough, deep breathe, Incentive Spirometry  - Assess the need for suctioning and aspirate as needed  - Assess and instruct to report SOB or any respiratory difficulty  - Respiratory Therapy support as indicated  Outcome: Progressing

## 2024-10-31 NOTE — CASE MANAGEMENT
Case Management Assessment & Discharge Planning Note    Patient name Leroy Paredes  Location [facility] 633-01 MRN [id number]  : 1930 Date 10/31/2024       Current Admission Date: 10/30/2024  Current Admission Diagnosis:Fracture of multiple ribs of left side   Patient Active Problem List    Diagnosis Date Noted Date Diagnosed    Fracture of multiple ribs of left side 10/30/2024     Spleen injury, initial encounter 10/30/2024     Chronic heart failure with preserved ejection fraction (HCC) 10/25/2024     Insomnia 2024     Acute metabolic encephalopathy 2024     Ambulatory dysfunction 2024     Severe protein-calorie malnutrition ([facility]) 2024     Syncope 2024     Hyperlactatemia 2024     A-fib ([facility]) 2024     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[facility]) 2024     Encounter for loop recorder at end of battery life 2024     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  ([facility]) 2023     Closed fracture of one rib of left side with routine healing 09/15/2022     Fall 09/10/2022     Skin tear of right elbow without complication 2022     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[facility]) 2021     Embolic bilateral punctate CVA, acute as well as subacute 2020     Essential hypertension 2020     Secondary hyperparathyroidism of renal origin ([facility]) 2019     Acute kidney injury superimposed on chronic kidney disease  ([facility]) 2018     CKD stage 4 due to type 2 diabetes mellitus ([facility]) 2018     Palpitations 2018     Enlarged prostate without lower urinary tract symptoms (luts) 2013     Controlled diabetes mellitus ([facility]) 2012     Hyperlipidemia 2012     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 ([facility]) 2012       LOS (days): 1  Geometric Mean LOS (GMLOS) (days): 3  Days to GMLOS:2.2     OBJECTIVE:    Risk of Unplanned Readmission Score: 19.12         Current admission  status: Inpatient       Preferred Pharmacy:   [facility] - [place] - [address]  [address]  [place] [address]  Phone: [phone] Fax: [phone]    [facility] - [place] - [address]  [address]  [place] [address]  Phone: [phone] Fax: [phone]    [facility] - [place], [place] - [address]  [address] [address]  Phone: [phone] Fax: [phone]    Primary Care Provider: Pepito Foster DO    Primary Insurance: MEDICARE  Secondary Insurance: BLUE CROSS    ASSESSMENT:  Active Health Care Proxies    There are no active Health Care Proxies on file.       Advance Directives  Does patient have a Health Care POA?: Yes  Primary Contact: Pam Mosley (Niece) [phone]  Readmission Root Cause  30 Day Readmission: No    Patient Information  Admitted from:: Facility ([facility])  Mental Status: Alert, Confused  During Assessment patient was accompanied by: Not accompanied during assessment  Assessment information provided by:: Patient, Other - please comment (Niece)  Primary Caregiver: Self  Support Systems: Self, Family members, Home care staff  County of Residence: [place]  What [facility] do you live in?: [place]  Home entry access options. Select all that apply.: No steps to enter home  Type of Current Residence: Facility ([facility])  Upon entering residence, is there a bedroom on the main floor (no further steps)?: Yes  Upon entering residence, is there a bathroom on the main floor (no further steps)?: Yes  Living Arrangements: Lives Alone  Is patient a ?: No    Activities of Daily Living Prior to Admission  Functional Status: Independent  Completes ADLs independently?: Yes  Ambulates independently?: Yes  Does patient use assisted devices?: Yes  Assisted Devices (DME) used: Walker  Does patient currently own DME?: Yes  What DME does the patient currently own?: Walker  Does patient have a history of  Outpatient Therapy (PT/OT)?: Yes  Does the patient have a history of Short-Term Rehab?: Yes  Does patient have a history of HHC?: Yes  Does patient currently have HHC?: Yes    Current Home Health Care  Type of Current Home Care Services: Home PT, Home OT  Home Health Agency Name:: Other ([facility])  Current Home Health Follow-Up Provider:: PCP    Patient Information Continued  Income Source: Pension/jail  Does patient have prescription coverage?: Yes  Does patient receive dialysis treatments?: No  Does patient have a history of substance abuse?: No  Does patient have a history of Mental Health Diagnosis?: No    Means of Transportation  Means of Transport to Appts:: Family transport    DISCHARGE DETAILS:    Discharge planning discussed with:: Pam Mosley ([facility]) [phone]  Freedom of Choice: Yes     CM contacted family/caregiver?: Yes  Were Treatment Team discharge recommendations reviewed with patient/caregiver?: Yes  Did patient/caregiver verbalize understanding of patient care needs?: N/A- going to facility  Were patient/caregiver advised of the risks associated with not following Treatment Team discharge recommendations?: Yes    Contacts  Patient Contacts: Pam Mosley (laura) [phone]  Relationship to Patient:: Family  Contact Method: Phone  Phone Number: [phone]  Reason/Outcome: Continuity of Care, Emergency Contact, Discharge Planning    Requested Home Health Care         Is the patient interested in HHC at discharge?: No  Home Health Agency Name:: Other ([facility])    DME Referral Provided  Referral made for DME?: No    Other Referral/Resources/Interventions Provided:  Interventions: Assisted Living    Treatment Team Recommendation: Assisted Living  Discharge Destination Plan:: Assisted Living     CM spoke to pt's niece to discuss the role of CM  Pt resides at [facility] and has been there for roughly 2 months.   Pt walks with a walker. Pt has assistance for showering  and meds. They offer to assist with other ADLs but pt refuses. Pt's had 6-7 falls in the last 6 months. Pt's been to [facility] in the past.  Pt's family would like a return to [facility] when medically stable with a resumption of PT/OT through [facility]      CM reviewed d/c planning process including the following: identifying help at home, patient preference for d/c planning needs, Discharge Lounge, [facility] Meds to Bed program, availability of treatment team to discuss questions or concerns patient and/or family may have regarding understanding medications and recognizing signs and symptoms once discharged.  CM also encouraged patient to follow up with all recommended appointments after discharge. Patient advised of importance for patient and family to participate in managing patient’s medical well being.

## 2024-10-31 NOTE — CASE MANAGEMENT
Case Management Discharge Planning Note    Patient name Leroy Paredes  [facility] MRN [id number]  : 1930 Date 10/31/2024       Current Admission Date: 10/30/2024  Current Admission Diagnosis:Fracture of multiple ribs of left side   Patient Active Problem List    Diagnosis Date Noted Date Diagnosed    Fracture of multiple ribs of left side 10/30/2024     Spleen injury, initial encounter 10/30/2024     Chronic heart failure with preserved ejection fraction (HCC) 10/25/2024     Insomnia 2024     Acute metabolic encephalopathy 2024     Ambulatory dysfunction 2024     Severe protein-calorie malnutrition ([facility]) 2024     Syncope 2024     Hyperlactatemia 2024     A-fib ([facility]) 2024     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[facility]) 2024     Encounter for loop recorder at end of battery life 2024     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  ([facility]) 2023     Closed fracture of one rib of left side with routine healing 09/15/2022     Fall 09/10/2022     Skin tear of right elbow without complication 2022     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[facility]) 2021     Embolic bilateral punctate CVA, acute as well as subacute 2020     Essential hypertension 2020     Secondary hyperparathyroidism of renal origin ([facility]) 2019     Acute kidney injury superimposed on chronic kidney disease  ([facility]) 2018     CKD stage 4 due to type 2 diabetes mellitus ([facility]) 2018     Palpitations 2018     Enlarged prostate without lower urinary tract symptoms (luts) 2013     Controlled diabetes mellitus ([facility]) 2012     Hyperlipidemia 2012     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 ([facility]) 2012       LOS (days): 1  Geometric Mean LOS (GMLOS) (days): 3  Days to GMLOS:2     OBJECTIVE:  Risk of Unplanned Readmission Score: 19.16         Current admission status: Inpatient    Preferred Pharmacy:   [facility] - [place] - [address]  [address]  [place] [address]  Phone: [phone] Fax: [phone]    [facility] [place] - [address]  [address] [place] [address]  Phone: [phone] Fax: [phone]    [facility] - [place], [place] - [address]  [address] [address]  Phone: [phone] Fax: [phone]    Primary Care Provider: Pepito Foster DO    Primary Insurance: MEDICARE  Secondary Insurance: BLUE CROSS    DISCHARGE DETAILS:    CM spoke to [facility] liaison  Pt can return there today with home therapy orders and a new DME form.   CM will fax the form to [facility]  Pt's family aware of the pt's d/c which will occur after 5650

## 2024-11-01 PROBLEM — Z87.81 HISTORY OF RIB FRACTURE: Status: ACTIVE | Noted: 2024-11-01

## 2024-11-01 PROBLEM — F03.C0 SEVERE DEMENTIA WITHOUT BEHAVIORAL DISTURBANCE, PSYCHOTIC DISTURBANCE, MOOD DISTURBANCE, OR ANXIETY (HCC): Status: RESOLVED | Noted: 2024-11-01 | Resolved: 2024-11-01

## 2024-11-01 PROBLEM — F03.911 DEMENTIA WITH AGITATION (HCC): Status: ACTIVE | Noted: 2024-11-01

## 2024-11-01 PROBLEM — F03.C0 SEVERE DEMENTIA WITHOUT BEHAVIORAL DISTURBANCE, PSYCHOTIC DISTURBANCE, MOOD DISTURBANCE, OR ANXIETY (HCC): Status: ACTIVE | Noted: 2024-11-01

## 2024-11-01 LAB
GLUCOSE SERPL-MCNC: 157 MG/DL (ref 65–140)
GLUCOSE SERPL-MCNC: 215 MG/DL (ref 65–140)
GLUCOSE SERPL-MCNC: 256 MG/DL (ref 65–140)
MRSA NOSE QL CULT: NORMAL

## 2024-11-01 PROCEDURE — 82948 REAGENT STRIP/BLOOD GLUCOSE: CPT

## 2024-11-01 PROCEDURE — 99223 1ST HOSP IP/OBS HIGH 75: CPT | Performed by: INTERNAL MEDICINE

## 2024-11-01 PROCEDURE — 99215 OFFICE O/P EST HI 40 MIN: CPT | Performed by: INTERNAL MEDICINE

## 2024-11-01 PROCEDURE — 99222 1ST HOSP IP/OBS MODERATE 55: CPT | Performed by: ANESTHESIOLOGY

## 2024-11-01 PROCEDURE — RECHECK: Performed by: PHYSICIAN ASSISTANT

## 2024-11-01 RX ORDER — WATER 10 ML/10ML
INJECTION INTRAMUSCULAR; INTRAVENOUS; SUBCUTANEOUS
Status: COMPLETED
Start: 2024-11-01 | End: 2024-11-01

## 2024-11-01 RX ORDER — QUETIAPINE FUMARATE 25 MG/1
25 TABLET, FILM COATED ORAL
Status: DISCONTINUED | OUTPATIENT
Start: 2024-11-01 | End: 2024-11-05 | Stop reason: HOSPADM

## 2024-11-01 RX ORDER — BISACODYL 10 MG
10 SUPPOSITORY, RECTAL RECTAL DAILY
Status: DISCONTINUED | OUTPATIENT
Start: 2024-11-01 | End: 2024-11-05 | Stop reason: HOSPADM

## 2024-11-01 RX ORDER — ACETAMINOPHEN 325 MG/1
650 TABLET ORAL EVERY 6 HOURS PRN
Status: DISCONTINUED | OUTPATIENT
Start: 2024-11-01 | End: 2024-11-05 | Stop reason: HOSPADM

## 2024-11-01 RX ORDER — GABAPENTIN 100 MG/1
100 CAPSULE ORAL
Status: DISCONTINUED | OUTPATIENT
Start: 2024-11-01 | End: 2024-11-05 | Stop reason: HOSPADM

## 2024-11-01 RX ORDER — TERAZOSIN 1 MG/1
1 CAPSULE ORAL
Status: DISCONTINUED | OUTPATIENT
Start: 2024-11-01 | End: 2024-11-05 | Stop reason: HOSPADM

## 2024-11-01 RX ORDER — OLANZAPINE 10 MG/2ML
10 INJECTION, POWDER, FOR SOLUTION INTRAMUSCULAR ONCE
Status: COMPLETED | OUTPATIENT
Start: 2024-11-01 | End: 2024-11-01

## 2024-11-01 RX ORDER — INSULIN LISPRO 100 [IU]/ML
1-5 INJECTION, SOLUTION INTRAVENOUS; SUBCUTANEOUS
Status: DISCONTINUED | OUTPATIENT
Start: 2024-11-01 | End: 2024-11-05 | Stop reason: HOSPADM

## 2024-11-01 RX ORDER — MIRTAZAPINE 15 MG/1
15 TABLET, FILM COATED ORAL
Status: DISCONTINUED | OUTPATIENT
Start: 2024-11-01 | End: 2024-11-01

## 2024-11-01 RX ORDER — ESCITALOPRAM OXALATE 5 MG/1
5 TABLET ORAL DAILY
Status: DISCONTINUED | OUTPATIENT
Start: 2024-11-01 | End: 2024-11-05 | Stop reason: HOSPADM

## 2024-11-01 RX ORDER — ATORVASTATIN CALCIUM 40 MG/1
40 TABLET, FILM COATED ORAL
Status: DISCONTINUED | OUTPATIENT
Start: 2024-11-01 | End: 2024-11-05 | Stop reason: HOSPADM

## 2024-11-01 RX ORDER — OLANZAPINE 10 MG/2ML
5 INJECTION, POWDER, FOR SOLUTION INTRAMUSCULAR
Status: DISCONTINUED | OUTPATIENT
Start: 2024-11-01 | End: 2024-11-05 | Stop reason: HOSPADM

## 2024-11-01 RX ORDER — LISINOPRIL 5 MG/1
5 TABLET ORAL DAILY
Status: DISCONTINUED | OUTPATIENT
Start: 2024-11-01 | End: 2024-11-05 | Stop reason: HOSPADM

## 2024-11-01 RX ORDER — LANOLIN ALCOHOL/MO/W.PET/CERES
400 CREAM (GRAM) TOPICAL DAILY
Status: DISCONTINUED | OUTPATIENT
Start: 2024-11-01 | End: 2024-11-05 | Stop reason: HOSPADM

## 2024-11-01 RX ORDER — MIRTAZAPINE 15 MG/1
15 TABLET, FILM COATED ORAL
Status: DISCONTINUED | OUTPATIENT
Start: 2024-11-01 | End: 2024-11-05 | Stop reason: HOSPADM

## 2024-11-01 RX ADMIN — MIRTAZAPINE 15 MG: 15 TABLET, FILM COATED ORAL at 21:12

## 2024-11-01 RX ADMIN — WATER 10 ML: 1 INJECTION INTRAMUSCULAR; INTRAVENOUS; SUBCUTANEOUS at 02:21

## 2024-11-01 RX ADMIN — OLANZAPINE 5 MG: 10 INJECTION, POWDER, FOR SOLUTION INTRAMUSCULAR at 12:13

## 2024-11-01 RX ADMIN — INSULIN LISPRO 1 UNITS: 100 INJECTION, SOLUTION INTRAVENOUS; SUBCUTANEOUS at 09:43

## 2024-11-01 RX ADMIN — WATER 1 ML: 1 INJECTION INTRAMUSCULAR; INTRAVENOUS; SUBCUTANEOUS at 19:40

## 2024-11-01 RX ADMIN — QUETIAPINE 25 MG: 25 TABLET ORAL at 19:41

## 2024-11-01 RX ADMIN — OLANZAPINE 5 MG: 10 INJECTION, POWDER, FOR SOLUTION INTRAMUSCULAR at 19:40

## 2024-11-01 RX ADMIN — SODIUM CHLORIDE 500 ML: 0.9 INJECTION, SOLUTION INTRAVENOUS at 18:06

## 2024-11-01 RX ADMIN — WATER 10 ML: 1 INJECTION INTRAMUSCULAR; INTRAVENOUS; SUBCUTANEOUS at 12:13

## 2024-11-01 RX ADMIN — INSULIN LISPRO 2 UNITS: 100 INJECTION, SOLUTION INTRAVENOUS; SUBCUTANEOUS at 18:20

## 2024-11-01 RX ADMIN — WATER 1 ML: 1 INJECTION INTRAMUSCULAR; INTRAVENOUS; SUBCUTANEOUS at 23:45

## 2024-11-01 RX ADMIN — WATER 10 ML: 1 INJECTION INTRAMUSCULAR; INTRAVENOUS; SUBCUTANEOUS at 14:42

## 2024-11-01 RX ADMIN — GABAPENTIN 100 MG: 100 CAPSULE ORAL at 21:11

## 2024-11-01 RX ADMIN — OLANZAPINE 5 MG: 10 INJECTION, POWDER, FOR SOLUTION INTRAMUSCULAR at 23:45

## 2024-11-01 RX ADMIN — TERAZOSIN HYDROCHLORIDE 1 MG: 1 CAPSULE ORAL at 21:11

## 2024-11-01 RX ADMIN — ATORVASTATIN CALCIUM 40 MG: 40 TABLET, FILM COATED ORAL at 18:06

## 2024-11-01 RX ADMIN — OLANZAPINE 5 MG: 10 INJECTION, POWDER, FOR SOLUTION INTRAMUSCULAR at 14:37

## 2024-11-01 RX ADMIN — APIXABAN 2.5 MG: 2.5 TABLET, FILM COATED ORAL at 18:06

## 2024-11-01 RX ADMIN — Medication 3 MG: at 21:11

## 2024-11-01 RX ADMIN — OLANZAPINE 10 MG: 10 INJECTION, POWDER, FOR SOLUTION INTRAMUSCULAR at 02:21

## 2024-11-01 NOTE — ED NOTES
Pt was upset and trying to get up and leave, was not redirectable. Tried to walk pt for comfort and redirection without success. Pt told me that he was leaving and he would sleep in the bushes if need be but he isnt staying here. Pt continued to try and get up and leave. Security was called, pt remained un-redirectable. IM Zyprexa was given due to non redirectable behavior.       Lisa Banks RN  11/01/24 2838

## 2024-11-01 NOTE — ASSESSMENT & PLAN NOTE
Lab Results   Component Value Date    HGBA1C 6.8 (H) 07/12/2024     Recent Labs     11/01/24  0740   POCGLU 215*     Blood Sugar Average: Last 72 hrs:  (P) 215  Will put on sliding scale insulin with meals

## 2024-11-01 NOTE — ASSESSMENT & PLAN NOTE
Previously hospitalized and discharged earlier on the day on 10/31 after being on the trauma service for a fall in which she sustained left-sided fifth and sixth rib fractures  In the ER, patient denies any current rib pain or chest pain and breathing appears to be comfortable  Continue incentive spirometry

## 2024-11-01 NOTE — CONSULTS
Consultation - Geriatric Medicine   Lreoy Paredes 89 y.o. male MRN: [id number]  Unit/Bed#: Z6HA Encounter: [id number]      Assessment & Plan     Cognitive impairment with agitated behaviors  -oriented but forgetful at baseline with recent acceleration of cognitive decline   -reportedly mostly independent with ADLs, dependent for iADLs   -no prior cognitive testing on record for review however at least mild cognitive impairment suspected at baseline, has comprehensive geriatric assessment scheduled as o/p   -recently moved to assisted living due to increasing care needs however attempting to elope from facility prompting hospital admission and would likely benefit from transition to secured unit on return to facility   -required IM Zyprexa overnight for agitation, consider starting Seroquel 25mg daily in evenings for better baseline control of behaviors and can continue Zypexa as PRN for use during hospitalization if all other non-pharmacologic interventions have proved insufficient in managing behaviors, monitor QTc with use  -Remains at high risk of progression of underlying cog impairment, continue secondary risk factor modifications  -Underlying cognitive impairment increases risk of developing delirium during hospitalization, continue strict delirium precautions  -Continue psychosocial supports of patient and caregivers    Ambulatory function with fall  -Reportedly mechanical fall at assisted living earlier in the week   -recently admitted to Trauma with below noted injuries   -Utilizes walker for ambulation at baseline, encourage use at all appropriate times  -Endorses history of recurrent falls with at least one additional in past year   -Remains high risk future falls, continue strict fall precautions and assist with transfers  -PT/OT pending     Multiple left-sided rib fractures (5 and 6)   -s/p fall as outlined above   -noted on CT chest abd pelvis 10/30/24  -currently saturating well on room air, monitor  resp status closely  -continue acute multimodal pain control per geriatric pain protocol   -continue to encourage aggressive pulmonary toilet and ISS    Insomnia  -Symptoms reportedly well-controlled with home Lexapro and mirtazapine regimen, continue home dosing  -Encourage establishment of consistent sleep/wake times and bedtime routine  -At risk exacerbation during hospitalization, consider addition of low-dose melatonin at bedtime    Atrial fibrillation  -Rates well-controlled off medications as outpatient, maintained on Eliquis for anticoagulation  -Patient follow-up with PCP and Cardiology for ongoing management    CKD-IV  -Baseline creatinine appears to be around 1.9-2.2  -Continue optimization of hemodynamics   -Avoid nephrotoxins and renally dose all medications  -Continue close outpatient follow-up with Nephrology for ongoing management    Impaired Vision  -recommend use of corrective lenses at all appropriate times  -encourage adequate lighting and encourage use of assistance with ambulation  -keep personal belongings close to person to avoid reaching  -encourage appropriate footwear at all times  -consider large font for printed materials provided to patient     Impaired mastication  -Requires use of dentures- encourage use all appropriate times   -ensure meal consistency appropriate for abilities  -continue aspiration precautions    Deconditioning/debility/frailty  -Clinical frailty scale stage V, mild to moderately frail, progressive  -Multifactorial including age, amatory dysfunction, cognitive impairment and multitude of additional chronic medical comorbidities of the individual with limited physiologic and metabolic reserves  -Continue optimization chronic medical conditions and address acute metabolic derangements as arise  -Encourage well-balanced additional intake, consider nutritional supplements between meals if oral intake is poor  -Ensure underlying anxiety/mood/depression symptoms are  well-controlled as may impact patient response to therapies as well as overall sense of wellbeing and quality of life  -Continue to ensure treatments and interventions align with patient's wishes and goals of care  -Continue psychosocial supports of patient and caregivers    Delirium precautions  -Patient is high risk of delirium due to age, cognitive impairment, acute pain, unfamiliar hospital environment, multiple recent setting changes   -Initiate delirium precautions  -maintain normal sleep/wake cycle  -minimize overnight interruptions, group overnight vitals/labs/nursing checks as possible  -dim lights, close blinds and turn off tv to minimize stimulation and encourage sleep environment in evenings  -ensure that pain is well controlled   -monitor for fecal and urinary retention which may precipitate delirium  -encourage early mobilization and ambulation with assist as cleared to safely do so  -provide frequent reorientation and redirection as indicated and appropriate  -avoid medications which may precipitate or worsen delirium such as tramadol, benzodiazepine, anticholinergics, and benadryl  -encourage hydration and nutrition   -redirect unwanted behaviors as first line    Home medication review    Eliquis 2.5 Mg twice daily  Lipitor 40 Mg daily  Lexapro 5 Mg daily  Gabapentin 100 Mg at bedtime  Lisinopril 5 Mg daily  Mirtazapine 15 Mg at bedtime  Terazosin 1 Mg at bedtime    Care coordination: rounded with Lisa (RN) in ED     History of Present Illness   Physician Requesting Consult: Jael Pedersen DO  Reason for Consult / Principal Problem: Cognitive impairment with behavioral disturbance  Hx and PE limited by: N/A  Additional history obtained from: Chart review and patient evaluation    HPI: Leroy Paredes is a 89 y.o. year old male with insomnia, hypertension, CAD, DM-II, chronic heart failure with preserved ejection fraction, atrial fibrillation, hyperlipidemia, severe protein calorie malnutrition, and  cognitive impairment who was admitted to the medical service with agitated behavior following elopement from assisted living facility where he had just returned following hospitalization from 10/30/2024-10/31/2024 at which time he was admitted with fall and multiple left-sided rib fractures.  On return to facility he was reportedly more confused than baseline and agitated attempting to leave facility requiring assist of EMS and police to bring to the ED. initially in the ED he was reportedly calm and cooperative however throughout the night again became agitated attempting to elope and required pharmacologic management as well as continuous direct observation due to high elopement risk. He is being seen in consultation by Geriatrics for cognitive impairment with behaviors. Leroy is seen and examined at bedside in the ED where he is sitting resting comfortably, he is pleasantly confused calm and cooperative at time of evaluation and offers no acute complaints.     Prior to current admission Leroy was residing at [facility] where he recently moved to due to underlying progressive cognitive impairment. He is typically oriented but forgetful at baseline requiring increasing assist with ADLs and iADLs and has history of sundowning and agitated behaviors in evenings. He requires use of walker for ambulation at baseline and has history of falls recently admitted with fall and rib fractures as above. He requires use of glasses and dentures (upper and lower), does not use hearing aid.     Inpatient consult to Gerontology  Consult performed by: Lisa Mueller DO  Consult ordered by: Levar Perry DO        Review of Systems   Constitutional: Negative.    HENT: Negative.     Eyes: Negative.    Respiratory: Negative.     Cardiovascular: Negative.    Gastrointestinal: Negative.    Genitourinary: Negative.    Musculoskeletal: Negative.    Skin: Negative.    Neurological: Negative.    Hematological:  Negative.    Psychiatric/Behavioral: Negative.     All other systems reviewed and are negative.    Historical Information   Past Medical History:   Diagnosis Date    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)     Last Assessed:  5/19/17    Chronic kidney disease     Diabetes mellitus (HCC)     Hyperlipidemia     Hypertension     Osteoarthritis     Palpitations     TIA (transient ischemic attack)      Past Surgical History:   Procedure Laterality Date    APPENDECTOMY      CARDIAC ELECTROPHYSIOLOGY PROCEDURE N/A 1/17/2024    Procedure: Cardiac loop recorder explant;  Surgeon: Drew Olmos MD;  Location: [facility];  Service: Cardiology    COLONOSCOPY  2012    HEMORROIDECTOMY      TOOTH EXTRACTION  02/02/2022     Social History   Social History     Substance and Sexual Activity   Alcohol Use Not Currently    Comment: Social drinker     Social History     Substance and Sexual Activity   Drug Use No     Social History     Tobacco Use   Smoking Status Former   Smokeless Tobacco Never     Family History:   Family History   Problem Relation Age of Onset    Diabetes Mother     Other Mother         Stroke syndrome    Diabetes Father     Emphysema Father      Meds/Allergies   all current active meds have been reviewed    No Known Allergies    Objective   No intake or output data in the 24 hours ending 11/01/24 0757  Invasive Devices       None                 Physical Exam  Vitals and nursing note reviewed.   Constitutional:       General: He is not in acute distress.     Appearance: Normal appearance. He is not toxic-appearing.   HENT:      Head: Normocephalic and atraumatic.      Nose: Nose normal.      Mouth/Throat:      Mouth: Mucous membranes are moist.      Comments: Dentures in place  Eyes:      General: No scleral icterus.        Right eye: No discharge.         Left eye: No discharge.      Conjunctiva/sclera: Conjunctivae normal.   Neck:      Comments: Phonation norm   Cardiovascular:      Rate and Rhythm:  Normal rate and regular rhythm.      Pulses: Normal pulses.      Heart sounds: Murmur heard.   Pulmonary:      Effort: Pulmonary effort is normal. No respiratory distress.      Breath sounds: No wheezing.      Comments: Saturating well on room air   Abdominal:      General: Bowel sounds are normal. There is no distension.      Palpations: Abdomen is soft.      Tenderness: There is no abdominal tenderness.   Musculoskeletal:      Cervical back: Neck supple.      Right lower leg: No edema.      Left lower leg: No edema.      Comments: Reduced overall muscle mass    Skin:     General: Skin is warm and dry.   Neurological:      Mental Status: He is alert.      Comments: Awake and alert pleasantly confused    Psychiatric:      Comments: Calm and cooperative at time eval        Lab Results:     I have personally reviewed pertinent lab results including the following:    Results from last 7 days   Lab Units 10/31/24  0810 10/31/24  0543 10/31/24  0019 10/30/24  1554 10/30/24  0805   WBC Thousand/uL  --  6.45  --   --  6.85   HEMOGLOBIN g/dL 10.2* 9.7* 9.6*   < > 9.4*   HEMATOCRIT % 31.8* 31.0* 30.1*   < > 29.4*   PLATELETS Thousands/uL  --  169  --   --  164   SEGS PCT %  --   --   --   --  59   MONO PCT %  --   --   --   --  13*   EOS PCT %  --   --   --   --  8*    < > = values in this interval not displayed.     Results from last 7 days   Lab Units 10/31/24  0543 10/30/24  0805   POTASSIUM mmol/L 4.3 4.3   CHLORIDE mmol/L 105 107   CO2 mmol/L 26 26   BUN mg/dL 28* 29*   CREATININE mg/dL 1.75* 2.04*   CALCIUM mg/dL 8.5 8.4     I have personally reviewed the following imaging study reports in PACS:    10/30/24- CTH   10/31/24-chest x-ray    Therapies:   PT: pending   OT: pending     VTE Prophylaxis: Eliquis     Code Status: Level 3 - DNAR and DNI  Advance Directive and Living Will:      Power of : Yes  POLST:      Family and Social Support:   Living Arrangements: Lives Alone  Support Systems: Self; Family members;  Home care staff  Type of Current Residence: Facility ([facility])  Type of Current Home Care Services: Home PT; Home OT  Discharge planning discussed with:: Pam Mosley ([facility]) [phone]  Freedom of Choice: Yes    Goals of Care: safe d/c plan

## 2024-11-01 NOTE — QUICK NOTE
Patient seen for agitation s/p control team currently requiring soft wrist restraints and IM Zyprexa 5mg.    Will start Seroquel 25mg daily at 8pm for better control of behaviors, if tolerates and requires daytime coverage over weekend could consider addition of 12.5mg daily in morning with close monitoring to avoid oversedation. Monitor QTc with repeat dosing.     If no significant improvement with addition of Seroquel could consider low dose Depakote sprinkles 125mg BID as alternative to Seroquel.     Discussed with Melva ([facility]) and nursing on unit.

## 2024-11-01 NOTE — QUICK NOTE
-With physical 1:1 present and IM zyprexa 10mg given earlier patient has remained in chair and has calmed down a bit but do feel that if we try to draw morning labs on him this will rile patient up again as he is easily irritable. As such, have d/c'd his morning labs to avoid agitating patient further.

## 2024-11-01 NOTE — ED NOTES
Pt was ambulatory to the bathroom with a walker. Pt has an unsteady gait, needs an assist x1 ambulating with a walker.     Yousif Amato  10/31/24 0150

## 2024-11-01 NOTE — ASSESSMENT & PLAN NOTE
Patient presented to the ER via EMS after he eloped from [facility] assisted living; patient reportedly became very agitated when medical staff at [facility] tried to redirect patient back to the facility for which police were needed to help physically calm patient down and patient was brought to the ER  Patient's niece reports that patient has dementia and that it has become more pronounced in the last 2 months.  She reports that patient has become dependent with several ADLs over this time.  Patient's niece reports that she spoke with staff at [facility] regarding potentially switching patient over to the locked dementia unit but that this transition will need a day or two before it can be completed.  As patient has proven he is an elopement risk and unsafe to be in assisted living, patient to be admitted to the hospital on observation status for placement.   Order continuous observation with physical person present given patient's high elopement risk  Geriatrics consulted and appreciate their recommendations.  Continue Lexapro and Remeron  Consider adding Seroquel  Continue PRN IM Zyprexa for agitation  Delirium protocol

## 2024-11-01 NOTE — ASSESSMENT & PLAN NOTE
Patient presented to the ER via EMS after he eloped from [facility] assisted living; patient reportedly became very agitated when medical staff at [facility] tried to redirect patient back to the facility for which police were needed to help physically calm patient down and patient was brought to the ER  Patient's niece as well as nephew present with him in the ER and initially patient appeared calm sitting with them; he denied any complaints or pain.  Patient did though become agitated later on in the evening when patient's family needed to leave and patient tried to elope again from the ER requiring IM Zyprexa to be given   Patient's niece reports that patient has dementia and that it has become more pronounced in the last 2 months.  She reports that patient has become dependent with several ADLs over this time.  Patient's niece reports that she spoke with staff at [facility] regarding potentially switching patient over to the locked dementia unit but that this transition will need a day or two before it can be completed.  As patient has proven he is an elopement risk and unsafe to be in assisted living, patient to be admitted to the hospital on observation status for placement.   Basic labs ordered for the morning  Order continuous observation with physical person present given patient's high elopement risk  We will continue patient's PTA Lexapro as well as Remeron  Consult geriatrics as well as case management and PT OT  We will put on IM Zyprexa for agitation  Delirium protocol

## 2024-11-01 NOTE — H&P
"H&P - Hospitalist   Name: Leroy Paredes 89 y.o. male I MRN: [id number]  Unit/Bed#: Z6HA I Date of Admission: 10/31/2024   Date of Service: 11/1/2024 I Hospital Day: 0     Assessment & Plan  Dementia with agitation (HCC)  Patient presented to the ER via EMS after he eloped from [facility] assisted living; patient reportedly became very agitated when medical staff at [facility] tried to redirect patient back to the facility for which police were needed to help physically calm patient down and patient was brought to the ER  Patient's niece as well as nephew present with him in the ER and initially patient appeared calm sitting with them; he denied any complaints or pain.  Patient did though become agitated later on in the evening when patient's family needed to leave and patient tried to elope again from the ER requiring IM Zyprexa to be given   Patient's niece reports that patient has dementia and that it has become more pronounced in the last 2 months.  She reports that patient has become dependent with several ADLs over this time.  Patient's niece reports that she spoke with staff at [facility] regarding potentially switching patient over to the locked dementia unit but that this transition will need a day or two before it can be completed.  As patient has proven he is an elopement risk and unsafe to be in assisted living, patient to be admitted to the hospital on observation status for placement.   Basic labs ordered for the morning  Order continuous observation with physical person present given patient's high elopement risk  We will continue patient's PTA Lexapro as well as Remeron  Consult geriatrics as well as case management and PT OT  We will put on IM Zyprexa for agitation  Delirium protocol   Controlled diabetes mellitus (HCC)  Lab Results   Component Value Date    HGBA1C 6.8 (H) 07/12/2024       No results for input(s): \"POCGLU\" in the last 72 hours.    Blood Sugar Average: Last 72 hrs:    Will " put on sliding scale insulin with meals  Essential hypertension  Continue PTA lisinopril and terazosin  A-fib ([facility])  Continue PTA Eliquis 2.5 mg twice daily  History of rib fracture  Previously hospitalized and discharged earlier on the day on 10/31 after being on the trauma service for a fall in which she sustained left-sided fifth and sixth rib fractures  In the ER, patient denies any current rib pain or chest pain and breathing appears to be comfortable  Continue incentive spirometry      VTE Pharmacologic Prophylaxis: VTE Score: 4 Moderate Risk (Score 3-4) - Pharmacological DVT Prophylaxis Ordered: apixaban (Eliquis).  Code Status: Level 3 - DNAR and DNI   Discussion with family: Updated  (nephew and niece) at bedside.    Anticipated Length of Stay: Patient will be admitted on an observation basis with an anticipated length of stay of less than 2 midnights secondary to dementia, ambulatory dysfunction.    History of Present Illness   Chief Complaint: Dementia, elopement risk, ambulatory dysfunction    Leroy Paredes is a 89 y.o. male with a PMH of dementia, A-fib, diabetes who presented to [facility] ER on the evening of 10/30 with dementia and elopement from his assisted living facility at [facility] last night. Patient presented to the ER via EMS after he eloped from [facility].  Patient reportedly walked out of the facility and became very agitated.  Despite [facility] staff trying to redirect patient, his agitation hindered this and the police as well as EMS were needed to help bring patient to the ER. Patient's niece as well as nephew were present with him in the ER and initially patient appeared calm sitting with them; he denied any complaints or pain.  Patient did though become agitated later on in the evening when patient's family needed to leave and patient tried to elope again from the ER requiring IM Zyprexa to be given. Patient's niece reports that patient's dementia  has become more pronounced in the last 2 months.  She reports that patient has become dependent with several ADLs over this time.  Patient's niece further reports that she spoke with staff at [facility] regarding potentially switching patient over to the locked dementia unit but that this transition will need a day or two before it can be completed.  As patient has proven he is an elopement risk and unsafe to be in assisted living, patient to be admitted to the hospital on observation status for placement.     Will note that patient was hospitalized and discharged from the trauma service earlier in the day back to [facility] after a fall in which she sustained left-sided rib fractures for which his pain was controlled on discharge as well as breathing normally.    Review of Systems   Unable to perform ROS: Dementia       Historical Information   Past Medical History:   Diagnosis Date    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)     Last Assessed:  5/19/17    Chronic kidney disease     Diabetes mellitus (HCC)     Hyperlipidemia     Hypertension     Osteoarthritis     Palpitations     TIA (transient ischemic attack)      Past Surgical History:   Procedure Laterality Date    APPENDECTOMY      CARDIAC ELECTROPHYSIOLOGY PROCEDURE N/A 1/17/2024    Procedure: Cardiac loop recorder explant;  Surgeon: Drew Olmos MD;  Location: [facility];  Service: Cardiology    COLONOSCOPY  2012    HEMORROIDECTOMY      TOOTH EXTRACTION  02/02/2022     Social History     Tobacco Use    Smoking status: Former    Smokeless tobacco: Never   Vaping Use    Vaping status: Never Used   Substance and Sexual Activity    Alcohol use: Not Currently     Comment: Social drinker    Drug use: No    Sexual activity: Not Currently     E-Cigarette/Vaping    E-Cigarette Use Never User      E-Cigarette/Vaping Substances    Nicotine No     THC No     CBD No     Flavoring No     Other No     Unknown No      Family history  non-contributory  Social History:  Marital Status: Single       Meds/Allergies   I have reviewed home medications with patient family member.  Prior to Admission medications    Medication Sig Start Date End Date Taking? Authorizing Provider   acetaminophen (TYLENOL) 325 mg tablet Take 2 tablets (650 mg total) by mouth every 6 (six) hours as needed for mild pain 5/18/23   Josselin Maya PA-C   apixaban (Eliquis) 2.5 mg TAKE ONE TABLET BY MOUTH TWO TIMES PER DAY *PATIENT SUPPLY 10/11/24   Leroy Humphries MD   atorvastatin (LIPITOR) 40 mg tablet TAKE 1 TABLET EVERY DAY WITH DINNER 10/23/24   Pepito Foster DO   bisacodyl ([facility] Bisacodyl) 10 mg suppository Insert 10 mg into the rectum daily    Historical Provider, MD   Blood Glucose Monitoring Suppl (PRECISION XTRA MONITOR) MARY by Does not apply route daily 1/16/20   Pepito Foster DO   Cholecalciferol (D3 High Potency) 25 MCG (1000 UT) capsule TAKE ONE CAPSULE BY MOUTH DAILY 10/17/24   Pepito Foster DO   escitalopram (LEXAPRO) 5 mg tablet TAKE ONE TABLET BY MOUTH DAILY 10/17/24   Pepito Foster DO   gabapentin (NEURONTIN) 100 mg capsule TAKE ONE CAPSULE BY MOUTH EVERY NIGHT AT BEDTIME 10/17/24   Pepito Foster DO   lisinopril (ZESTRIL) 5 mg tablet Take 1 tablet (5 mg total) by mouth daily 10/25/24   KONSTANTIN Kim   magnesium oxide (MAG-OX) 400 mg Take 400 mg by mouth daily    Historical Provider, MD   melatonin 3 mg TAKE ONE TABLET BY MOUTH EVERY NIGHT AT BEDTIME 10/17/24   Pepito Foster DO   mirtazapine (REMERON) 15 mg tablet Take 1 tablet (15 mg total) by mouth daily at bedtime 10/17/24   Pepito Foster DO   PRECISION XTRA TEST STRIPS test strip USE TO TEST BLOOD GLUCOSE ONCE A DAY 1/9/24   Pepito Foster DO   terazosin (HYTRIN) 1 mg capsule TAKE 1 CAPSULE AT BEDTIME 10/2/24   Pepito Foster DO   vitamin B-12 (CYANOCOBALAMIN) 500 MCG TABS Take 1 tablet (500 mcg total) by mouth 2 (two) times a week 10/17/24   Pepito Foster DO      No Known Allergies    Objective :  Temp:  [98.1 °F (36.7 °C)-98.7 °F (37.1 °C)] 98.7 °F (37.1 °C)  HR:  [] 100  BP: (166-212)/(68-89) 212/89  Resp:  [18-22] 18  SpO2:  [96 %-97 %] 96 %  O2 Device: None (Room air)    Physical Exam  Constitutional:       General: He is not in acute distress.  HENT:      Right Ear: External ear normal.      Left Ear: External ear normal.      Nose: No congestion.      Mouth/Throat:      Pharynx: Oropharynx is clear.   Eyes:      Extraocular Movements: Extraocular movements intact.      Pupils: Pupils are equal, round, and reactive to light.   Cardiovascular:      Rate and Rhythm: Normal rate.      Heart sounds: No murmur heard.  Pulmonary:      Effort: No respiratory distress.      Breath sounds: No wheezing.   Abdominal:      Tenderness: There is no abdominal tenderness.   Musculoskeletal:      Comments: Denies any Left sided rib tenderness on exam; Denies any c, t, or l spine tenderness; Able to get up and walk without endorsing pain    Skin:     General: Skin is warm and dry.   Neurological:      General: No focal deficit present.      Mental Status: He is alert. Mental status is at baseline.      Comments: Known dementia                  Lab Results: I have reviewed the following results:  Results from last 7 days   Lab Units 10/31/24  0810 10/31/24  0543 10/30/24  1554 10/30/24  0805   WBC Thousand/uL  --  6.45  --  6.85   HEMOGLOBIN g/dL 10.2* 9.7*   < > 9.4*   HEMATOCRIT % 31.8* 31.0*   < > 29.4*   PLATELETS Thousands/uL  --  169  --  164   SEGS PCT %  --   --   --  59   LYMPHO PCT %  --   --   --  18   MONO PCT %  --   --   --  13*   EOS PCT %  --   --   --  8*    < > = values in this interval not displayed.     Results from last 7 days   Lab Units 10/31/24  0543   SODIUM mmol/L 137   POTASSIUM mmol/L 4.3   CHLORIDE mmol/L 105   CO2 mmol/L 26   BUN mg/dL 28*   CREATININE mg/dL 1.75*   ANION GAP mmol/L 6   CALCIUM mg/dL 8.5   GLUCOSE RANDOM mg/dL 227*     Results from  last 7 days   Lab Units 10/31/24  0543   INR  1.26*         Lab Results   Component Value Date    HGBA1C 6.8 (H) 07/12/2024    HGBA1C 7.1 (H) 01/27/2024    HGBA1C 7.6 (H) 09/25/2023             Administrative Statements   I have spent a total time of 75 minutes in caring for this patient on the day of the visit/encounter including Diagnostic results and Prognosis.    ** Please Note: This note has been constructed using a voice recognition system. **

## 2024-11-01 NOTE — ASSESSMENT & PLAN NOTE
Previously hospitalized and discharged earlier on the day on 10/31 after being on the trauma service for a fall in which she sustained left-sided fifth and sixth rib fractures  Encourage IS if able

## 2024-11-01 NOTE — RESTORATIVE TECHNICIAN NOTE
Restorative Technician Note      Patient Name: Leroy Paredes     Restorative Tech Visit Date: 11/01/24  Note Type: Mobility  Patient Position Upon Consult: Other (Comment) (Ambulation with nursing staff)  Activity Performed: Ambulated  Assistive Device: Roller walker  Patient Position at End of Consult: Bedside chair; All needs within reach; Bed/Chair alarm activated; Other (comment) (With 1:1)  Nurse Communication: Nurse aware of consult, application of brace    Davion Ramos, Restorative Technician

## 2024-11-01 NOTE — ASSESSMENT & PLAN NOTE
"Lab Results   Component Value Date    HGBA1C 6.8 (H) 07/12/2024       No results for input(s): \"POCGLU\" in the last 72 hours.    Blood Sugar Average: Last 72 hrs:    Will put on sliding scale insulin with meals  "

## 2024-11-01 NOTE — CASE MANAGEMENT
Case Management Assessment    Patient name Leroy Paredes  Location [facility] MRN [id number]  : 1930 Date 2024       Current Admission Date: 10/31/2024  Current Admission Diagnosis:Dementia with agitation ([facility])   Patient Active Problem List    Diagnosis Date Noted Date Diagnosed    Dementia with agitation ([facility]) 2024     History of rib fracture 2024     Fracture of multiple ribs of left side 10/30/2024     Spleen injury, initial encounter 10/30/2024     Chronic heart failure with preserved ejection fraction ([facility]) 10/25/2024     Insomnia 2024     Acute metabolic encephalopathy 2024     Ambulatory dysfunction 2024     Severe protein-calorie malnutrition ([facility]) 2024     Syncope 2024     Hyperlactatemia 2024     A-fib ([facility]) 2024     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[facility]) 2024     Encounter for loop recorder at end of battery life 2024     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  (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) 2021     Embolic bilateral punctate CVA, acute as well as subacute 2020     Essential hypertension 2020     Secondary hyperparathyroidism of renal origin ([facility]) 2019     Acute kidney injury superimposed on chronic kidney disease  ([facility]) 2018     CKD stage 4 due to type 2 diabetes mellitus ([facility]) 2018     Palpitations 2018     Enlarged prostate without lower urinary tract symptoms (luts) 2013     Controlled diabetes mellitus ([facility]) 2012     Hyperlipidemia 2012     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 ([facility]) 2012       LOS (days): 0  Geometric Mean LOS (GMLOS) (days):   Days to GMLOS:     OBJECTIVE:              Current admission  status: Observation       Preferred Pharmacy:   [facility] - [place] - [address]  [address]  [place] [address]  Phone: [phone] Fax: [phone]    [facility] - [place] - [address]  [address]  [place] [address]  Phone: [phone] Fax: [phone]    [facility] - [place], [place] - [address]  [address] [address]  Phone: [phone] Fax: [phone]    Primary Care Provider: Pepito Foster,     Primary Insurance: MEDICARE  Secondary Insurance: BLUE CROSS    ASSESSMENT:  Active Health Care Proxies    There are no active Health Care Proxies on file.                 Readmission Root Cause  30 Day Readmission: Yes  During your hospital stay, did someone (provider, nurse, ) explain your care to you in a way you could understand?: Yes  Did you feel medically stable to leave the hospital?: Yes  Were you able to pay for your medication at the pharmacy?: Yes  Did you have reliable transportation to take you to your appointments?: Yes  During previous admission, was a post-acute recommendation made?: Yes  What post-acute resources were offered?: Other (Return to [facility] with PT/OT)  Patient was readmitted due to: eloped from assisted living facility  Action Plan: geriatrics consult    Patient Information  Admitted from:: Facility ([facility])  Mental Status: Confused                                   Pt is a 30 day readmit, please refer to CM assessment completed on 10/31.  Pt resides at [facility].  Pt was discharged on 10/31 back to his assisted living facility, pt then eloped from his facility later in the evening and was brought back to the ED.  Pt is confused.  Pt ambulates with a walker.  Pt does require assistance for some ADL's.  Pt has been to [facility] for STR in the past.  CM will continue to follow for discharge planning needs.

## 2024-11-01 NOTE — PROGRESS NOTES
Progress Note - Hospitalist   Name: Leroy Paredes 89 y.o. male I MRN: [id number]  Unit/Bed#: Z6HA I Date of Admission: 10/31/2024   Date of Service: 11/1/2024 I Hospital Day: 0      THIS IS A POST-ADMIT FOLLOW UP  Assessment & Plan  Dementia with agitation (HCC)  Patient presented to the ER via EMS after he eloped from [facility] assisted living; patient reportedly became very agitated when medical staff at [facility] tried to redirect patient back to the facility for which police were needed to help physically calm patient down and patient was brought to the ER  Patient's niece reports that patient has dementia and that it has become more pronounced in the last 2 months.  She reports that patient has become dependent with several ADLs over this time.  Patient's niece reports that she spoke with staff at [facility] regarding potentially switching patient over to the locked dementia unit but that this transition will need a day or two before it can be completed.  As patient has proven he is an elopement risk and unsafe to be in assisted living, patient to be admitted to the hospital on observation status for placement.   Order continuous observation with physical person present given patient's high elopement risk  Geriatrics consulted and appreciate their recommendations.  Continue Lexapro and Remeron  Consider adding Seroquel  Continue PRN IM Zyprexa for agitation  Delirium protocol   Controlled diabetes mellitus (HCC)  Lab Results   Component Value Date    HGBA1C 6.8 (H) 07/12/2024     Recent Labs     11/01/24  0740   POCGLU 215*     Blood Sugar Average: Last 72 hrs:  (P) 215  Will put on sliding scale insulin with meals  Essential hypertension  Continue PTA lisinopril and terazosin  A-fib (HCC)  Continue PTA Eliquis 2.5 mg twice daily  History of rib fracture  Previously hospitalized and discharged earlier on the day on 10/31 after being on the trauma service for a fall in which she sustained left-sided  fifth and sixth rib fractures  Encourage IS if able

## 2024-11-02 LAB
GLUCOSE SERPL-MCNC: 145 MG/DL (ref 65–140)
GLUCOSE SERPL-MCNC: 149 MG/DL (ref 65–140)
GLUCOSE SERPL-MCNC: 183 MG/DL (ref 65–140)
GLUCOSE SERPL-MCNC: 290 MG/DL (ref 65–140)

## 2024-11-02 PROCEDURE — 99233 SBSQ HOSP IP/OBS HIGH 50: CPT | Performed by: INTERNAL MEDICINE

## 2024-11-02 PROCEDURE — 82948 REAGENT STRIP/BLOOD GLUCOSE: CPT

## 2024-11-02 PROCEDURE — 97163 PT EVAL HIGH COMPLEX 45 MIN: CPT

## 2024-11-02 RX ADMIN — INSULIN LISPRO 1 UNITS: 100 INJECTION, SOLUTION INTRAVENOUS; SUBCUTANEOUS at 17:19

## 2024-11-02 RX ADMIN — GABAPENTIN 100 MG: 100 CAPSULE ORAL at 21:00

## 2024-11-02 RX ADMIN — ATORVASTATIN CALCIUM 40 MG: 40 TABLET, FILM COATED ORAL at 17:15

## 2024-11-02 RX ADMIN — MAGNESIUM OXIDE TAB 400 MG (241.3 MG ELEMENTAL MG) 400 MG: 400 (241.3 MG) TAB at 10:14

## 2024-11-02 RX ADMIN — Medication 3 MG: at 21:00

## 2024-11-02 RX ADMIN — MIRTAZAPINE 15 MG: 15 TABLET, FILM COATED ORAL at 21:00

## 2024-11-02 RX ADMIN — APIXABAN 2.5 MG: 2.5 TABLET, FILM COATED ORAL at 17:15

## 2024-11-02 RX ADMIN — ESCITALOPRAM OXALATE 5 MG: 5 TABLET, FILM COATED ORAL at 10:14

## 2024-11-02 RX ADMIN — TERAZOSIN HYDROCHLORIDE 1 MG: 1 CAPSULE ORAL at 21:01

## 2024-11-02 RX ADMIN — QUETIAPINE 25 MG: 25 TABLET ORAL at 21:00

## 2024-11-02 RX ADMIN — APIXABAN 2.5 MG: 2.5 TABLET, FILM COATED ORAL at 10:14

## 2024-11-02 RX ADMIN — LISINOPRIL 5 MG: 5 TABLET ORAL at 10:14

## 2024-11-02 NOTE — ASSESSMENT & PLAN NOTE
Patient presented to the ER via EMS after he eloped from [facility] assisted living; patient reportedly became very agitated when medical staff at [facility] tried to redirect patient back to the facility for which police were needed to help physically calm patient down and patient was brought to the ER  Patient's niece reports that patient has dementia and that it has become more pronounced in the last 2 months.  She reports that patient has become dependent with several ADLs over this time.  Patient's niece reports that she spoke with staff at [facility] regarding potentially switching patient over to the locked dementia unit but that this transition will need a day or two before it can be completed.  As patient has proven he is an elopement risk and unsafe to be in assisted living, patient to be admitted to the hospital on observation status for placement.   Order continuous observation with physical person present given patient's high elopement risk  Geriatrics consulted and appreciate their recommendations.  Continue Lexapro and Remeron  Consider adding Seroquel  Continue PRN IM Zyprexa for agitation  Delirium protocol

## 2024-11-02 NOTE — PHYSICAL THERAPY NOTE
Physical Therapy Evaluation     Patient's Name: Leroy Paredes    Admitting Diagnosis  Dementia (HCC) [F03.90]  Acute kidney injury superimposed on chronic kidney disease  (HCC) [N17.9, N18.9]    Problem List  Patient Active Problem List   Diagnosis    Controlled diabetes mellitus (HCC)    Enlarged prostate without lower urinary tract symptoms (luts)    Hyperlipidemia    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 (HCC)    Palpitations    Acute kidney injury superimposed on chronic kidney disease  (HCC)    CKD stage 4 due to type 2 diabetes mellitus (HCC)    Secondary hyperparathyroidism of renal origin (HCC)    Embolic bilateral punctate CVA, acute as well as subacute    Essential hypertension    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HCC)    Skin tear of right elbow without complication    Fall    Closed fracture of one rib of left side with routine healing    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  (HCC)    Encounter for loop recorder at end of battery life    A-fib (HCC)    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)    Syncope    Hyperlactatemia    Insomnia    Acute metabolic encephalopathy    Ambulatory dysfunction    Severe protein-calorie malnutrition (HCC)    Chronic heart failure with preserved ejection fraction (HCC)    Fracture of multiple ribs of left side    Spleen injury, initial encounter    Dementia with agitation (HCC)    History of rib fracture       Past Medical History  Past Medical History:   Diagnosis Date    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)     Last Assessed:  5/19/17    Chronic kidney disease     Diabetes mellitus (HCC)     Hyperlipidemia     Hypertension     Osteoarthritis     Palpitations     TIA (transient ischemic attack)        Past Surgical History  Past Surgical History:   Procedure Laterality Date    APPENDECTOMY      CARDIAC ELECTROPHYSIOLOGY PROCEDURE N/A 1/17/2024    Procedure: Cardiac loop recorder explant;  Surgeon: Drew Olmos MD;   Location:  CARDIAC CATH LAB;  Service: Cardiology    COLONOSCOPY  2012    HEMORROIDECTOMY      TOOTH EXTRACTION  02/02/2022 11/02/24 1006   PT Last Visit   PT Visit Date 11/02/24   Note Type   Note type Evaluation   Pain Assessment   Pain Assessment Tool FLACC   Pain Rating: FLACC (Rest) - Face 0   Pain Rating: FLACC (Rest) - Legs 0   Pain Rating: FLACC (Rest) - Activity 0   Pain Rating: FLACC (Rest) - Cry 0   Pain Rating: FLACC (Rest) - Consolability 0   Score: FLACC (Rest) 0   Pain Rating: FLACC (Activity) - Face 1   Pain Rating: FLACC (Activity) - Legs 2   Pain Rating: FLACC (Activity) - Activity 2   Pain Rating: FLACC (Activity) - Cry 1   Pain Rating: FLACC (Activity) - Consolability 2   Score: FLACC (Activity) 8   Restrictions/Precautions   Weight Bearing Precautions Per Order No   Other Precautions Restraints;Cognitive;1:1;Multiple lines;Fall Risk   Home Living   Type of Home SNF  ([facility])   Home Layout One level   Home Equipment Walker   Additional Comments Pt is a poor historian. Per EMR, pt [facility] at [facility].   Prior Function   Level of Emer[place] Independent with functional mobility;Needs assistance with ADLs;Needs assistance with IADLS   Lives With Facility staff   Receives Help From Personal care attendant   IADLs Family/Friend/Other provides transportation;Family/Friend/Other provides meals;Family/Friend/Other provides medication management   Falls in the last 6 months 5 to 10   Comments Per EMR, PTA pt utilized    General   Additional Pertinent History Pt recently discharge from hospital on 10/30/24 with level III resource intensity. Pt then eloped and was brought to the ED as he was agitated. Per nursing and restorative pt was up and ambulating in the halls yesterday with the RW.   Family/Caregiver Present No   Cognition   Overall Cognitive Status Impaired   Arousal/Participation Uncooperative   Orientation Level Oriented to person;Disoriented to place;Disoriented to  time;Disoriented to situation   RLE Assessment   RLE Assessment WFL  (grossly 4-/5)   LLE Assessment   LLE Assessment WFL  (grossly 4-/5)   Bed Mobility   Rolling R 3  Moderate assistance   Additional items Assist x 1;Increased time required;Verbal cues;LE management   Rolling L 3  Moderate assistance   Additional items Assist x 1;Increased time required;Verbal cues;LE management   Supine to Sit 2  Maximal assistance   Additional items Assist x 1;Increased time required;Verbal cues   Sit to Supine 2  Maximal assistance   Additional items Assist x 1;Increased time required;Verbal cues;LE management   Additional Comments Pt become very agitated during today's evaluation. Pt began to kick and became verbal. Further mobility and evaluation held at this time.   Balance   Static Sitting Poor +   Dynamic Sitting Poor   Activity Tolerance   Activity Tolerance Treatment limited secondary to agitation   Nurse Made Aware RN cleared and updated and assited with transfers   Assessment   Prognosis Fair   Problem List Decreased strength;Decreased endurance;Decreased mobility;Impaired balance;Decreased cognition;Impaired judgement;Decreased safety awareness   Assessment Pt seen for PT evaluation. Pt with active PT eval/treat orders. Pt is a 89 y.o. male who was admitted to [facility] on 10/31/24. Pt's current dx/ problem list include: dementia with agitation. Comorbidities affecting pt's physical performance at time of assessment are as follows:  has a past medical history of 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), Chronic kidney disease, Diabetes mellitus (HCC), Hyperlipidemia, Hypertension, Osteoarthritis, Palpitations, and TIA (transient ischemic attack). Personal factors affecting pt at time of initial examination include: past experience, inability to perform IADLs, inability to perform ADLs, inability to ambulate household distances. Due to acute medical issues, ongoing medical workup for primary dx; pain, fall  "risk, decreased activity tolerance compared to baseline, increased assistance needed from caregiver at current time, multiple lines, decline in overall functional mobility status; health management issues. At baseline, pt resided at [facility] and was independent with mobility. During today's evaluation, patient became very agitated and a complete functional assessment was not complete. Upon initial examination, pt is requiring mod A x 1 for rolling and max Ax1 for supine to sit. PT to continue to follow and assess functional transfers and ambulation as appropriate and able. Per nursing and restorative, patient was up and walking the unit yesterday with RW. Currently, pt presents functioning below baseline and with overall mobility deficits 2* to: decreased LE strength/AROM; limited flexibility;  generalized weakness/ deconditioning; decreased endurance; decreased activity tolerance; impaired balance. Pt would benefit from continued skilled PT services while in hospital to address remaining limitations and maximize functional potential. Patient as a fair to poor prognosis secondary to underlying cognitive condition. Recommend level III resource intensity. Please also refer to the recommendation of the Physical Therapist for safe discharge planning.   Goals   Patient Goals \"to get out\"   STG Expiration Date 11/16/24   Short Term Goal #1 STG 1. Pt will be able to perform bed mobility with mod I in order to improve overall functional mobility and assist in safe d/c. STG 2. Pt will be able to perform functional transfer with mod I in order to improve overall functional mobility and assist in safe d/c. STG 3. Pt will be able to ambulate at least 200 feet with least restrictive device with mod I A in order to improve overall functional mobility and assist in safe d/c. STG 4. Pt will improve sitting/standing static/dynamic balance 1 grade in order to improve functional mobility and assist in safe d/c. STG 5. Pt " will improve LE strength by one grade in order to improve functional mobility and assist in safe d/c   Plan   Treatment/Interventions Functional transfer training;LE strengthening/ROM;Elevations;Therapeutic exercise;Endurance training;Equipment eval/education;Patient/family training;Bed mobility;Gait training;Spoke to nursing;OT   PT Frequency 2-3x/wk   Discharge Recommendation   Rehab Resource Intensity Level, PT III (Minimum Resource Intensity)  (Anticipate as patient was up and ambulating yesterday with RN and restorative when he as not agitated.)   Equipment Recommended Walker   Walker Package Recommended Wheeled walker  (pt owns)   AM-PAC Basic Mobility Inpatient   Turning in Flat Bed Without Bedrails 2   Lying on Back to Sitting on Edge of Flat Bed Without Bedrails 2   Moving Bed to Chair 2   Standing Up From Chair Using Arms 2   Walk in Room 2   Climb 3-5 Stairs With Railing 2   Basic Mobility Inpatient Raw Score 12   Basic Mobility Standardized Score 32.23   [facility] Highest Level Of Mobility   -HLM Goal 4: Move to chair/commode   -HLM Achieved 3: Sit at edge of bed         Talita Millan, PT

## 2024-11-02 NOTE — PLAN OF CARE
Problem: PHYSICAL THERAPY ADULT  Goal: Performs mobility at highest level of function for planned discharge setting.  See evaluation for individualized goals.  Description: Treatment/Interventions: Functional transfer training, LE strengthening/ROM, Elevations, Therapeutic exercise, Endurance training, Equipment eval/education, Patient/family training, Bed mobility, Gait training, Spoke to nursing, OT  Equipment Recommended: Walker       See flowsheet documentation for full assessment, interventions and recommendations.  Note: Prognosis: Fair  Problem List: Decreased strength, Decreased endurance, Decreased mobility, Impaired balance, Decreased cognition, Impaired judgement, Decreased safety awareness  Assessment: Pt seen for PT evaluation. Pt with active PT eval/treat orders. Pt is a 89 y.o. male who was admitted to [facility] on 10/31/24. Pt's current dx/ problem list include: dementia with agitation. Comorbidities affecting pt's physical performance at time of assessment are as follows:  has a past medical history of 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), Chronic kidney disease, Diabetes mellitus (HCC), Hyperlipidemia, Hypertension, Osteoarthritis, Palpitations, and TIA (transient ischemic attack). Personal factors affecting pt at time of initial examination include: past experience, inability to perform IADLs, inability to perform ADLs, inability to ambulate household distances. Due to acute medical issues, ongoing medical workup for primary dx; pain, fall risk, decreased activity tolerance compared to baseline, increased assistance needed from caregiver at current time, multiple lines, decline in overall functional mobility status; health management issues. At baseline, pt resided at [facility] and was independent with mobility. During today's evaluation, patient became very agitated and a complete functional assessment was not complete. Upon initial examination, pt is requiring mod A x 1  for rolling and max Ax1 for supine to sit. PT to continue to follow and assess functional transfers and ambulation as appropriate and able. Per nursing and restorative, patient was up and walking the unit yesterday with RW. Currently, pt presents functioning below baseline and with overall mobility deficits 2* to: decreased LE strength/AROM; limited flexibility;  generalized weakness/ deconditioning; decreased endurance; decreased activity tolerance; impaired balance. Pt would benefit from continued skilled PT services while in hospital to address remaining limitations and maximize functional potential. Patient as a fair to poor prognosis secondary to underlying cognitive condition. Recommend level III resource intensity. Please also refer to the recommendation of the Physical Therapist for safe discharge planning.        Rehab Resource Intensity Level, PT: III (Minimum Resource Intensity) (Anticipate as patient was up and ambulating yesterday with RN and restorative when he as not agitated.)    See flowsheet documentation for full assessment.

## 2024-11-02 NOTE — ASSESSMENT & PLAN NOTE
Lab Results   Component Value Date    HGBA1C 6.8 (H) 07/12/2024     Recent Labs     11/01/24  0740 11/01/24  1806 11/01/24 2111 11/02/24  0723   POCGLU 215* 256* 157* 145*     Blood Sugar Average: Last 72 hrs:  (P) 193.25  Will put on sliding scale insulin with meals

## 2024-11-02 NOTE — PROGRESS NOTES
Progress Note - Hospitalist   Name: Leroy Paredes 89 y.o. male I MRN: [id number]  Unit/Bed#: [facility] 901-01 I Date of Admission: 10/31/2024   Date of Service: 11/2/2024 I Hospital Day: 0     Assessment & Plan  Dementia with agitation (HCC)  Patient presented to the ER via EMS after he eloped from [facility] assisted living; patient reportedly became very agitated when medical staff at [facility] tried to redirect patient back to the facility for which police were needed to help physically calm patient down and patient was brought to the ER  Patient's niece reports that patient has dementia and that it has become more pronounced in the last 2 months.  She reports that patient has become dependent with several ADLs over this time.  Patient's niece reports that she spoke with staff at [facility] regarding potentially switching patient over to the locked dementia unit but that this transition will need a day or two before it can be completed.  As patient has proven he is an elopement risk and unsafe to be in assisted living, patient to be admitted to the hospital on observation status for placement.   Order continuous observation with physical person present given patient's high elopement risk  Geriatrics consulted and appreciate their recommendations.  Continue Lexapro and Remeron  Consider adding Seroquel  Continue PRN IM Zyprexa for agitation  Delirium protocol   Controlled diabetes mellitus (HCC)  Lab Results   Component Value Date    HGBA1C 6.8 (H) 07/12/2024     Recent Labs     11/01/24  0740 11/01/24  1806 11/01/24  2111 11/02/24  0723   POCGLU 215* 256* 157* 145*     Blood Sugar Average: Last 72 hrs:  (P) 193.25  Will put on sliding scale insulin with meals  Essential hypertension  Continue PTA lisinopril and terazosin  A-fib (HCC)  Continue PTA Eliquis 2.5 mg twice daily  History of rib fracture  Previously hospitalized and discharged earlier on the day on 10/31 after being on the trauma service for a  fall in which she sustained left-sided fifth and sixth rib fractures  Encourage IS if able  VTE Pharmacologic Prophylaxis:   Pharmacologic: Apixaban (Eliquis)  Mechanical VTE Prophylaxis in Place: No    Patient Centered Rounds: I have performed bedside rounds with nursing staff today.    Time Spent for Care: 1 hour.  More than 50% of total time spent on counseling and coordination of care as described above.    Current Length of Stay: 0 day(s)    Current Patient Status: Observation     Code Status: Level 3 - DNAR and DNI      Subjective:   nad    Objective:     Vitals:   Temp (24hrs), Av °F (36.7 °C), Min:97.3 °F (36.3 °C), Max:98.5 °F (36.9 °C)    Temp:  [97.3 °F (36.3 °C)-98.5 °F (36.9 °C)] 97.3 °F (36.3 °C)  HR:  [92] 92  Resp:  [16-20] 17  BP: ()/(48-99) 167/77  Body mass index is 26.78 kg/m².     Input and Output Summary (last 24 hours):       Intake/Output Summary (Last 24 hours) at 2024 0842  Last data filed at 2024 0021  Gross per 24 hour   Intake 920 ml   Output 958 ml   Net -38 ml       Physical Exam:     Physical Exam  Constitutional:       Appearance: Normal appearance.   HENT:      Head: Normocephalic and atraumatic.   Cardiovascular:      Rate and Rhythm: Normal rate and regular rhythm.   Pulmonary:      Effort: Pulmonary effort is normal.   Abdominal:      General: Abdomen is flat.      Palpations: Abdomen is soft.   Neurological:      General: No focal deficit present.      Mental Status: He is alert and oriented to person, place, and time.   Psychiatric:         Mood and Affect: Mood normal.         Additional Data:     Labs:    Results from last 7 days   Lab Units 10/31/24  0810 10/31/24  0543 10/30/24  1554 10/30/24  0805   WBC Thousand/uL  --  6.45  --  6.85   HEMOGLOBIN g/dL 10.2* 9.7*   < > 9.4*   HEMATOCRIT % 31.8* 31.0*   < > 29.4*   PLATELETS Thousands/uL  --  169  --  164   SEGS PCT %  --   --   --  59   LYMPHO PCT %  --   --   --  18   MONO PCT %  --   --   --  13*    EOS PCT %  --   --   --  8*    < > = values in this interval not displayed.     Results from last 7 days   Lab Units 10/31/24  0543   POTASSIUM mmol/L 4.3   CHLORIDE mmol/L 105   CO2 mmol/L 26   BUN mg/dL 28*   CREATININE mg/dL 1.75*   CALCIUM mg/dL 8.5     Results from last 7 days   Lab Units 10/31/24  0543   INR  1.26*       * I Have Reviewed All Lab Data Listed Above.  * Additional Pertinent Lab Tests Reviewed: All Labs Within Last 24 Hours Reviewed        Recent Cultures (last 7 days):           Last 24 Hours Medication List:   Current Facility-Administered Medications   Medication Dose Route Frequency Provider Last Rate    acetaminophen  650 mg Oral Q6H PRN Levar Perry, DO      apixaban  2.5 mg Oral BID Levar Perry, DO      atorvastatin  40 mg Oral Daily With Dinner Levar Perry, DO      bisacodyl  10 mg Rectal Daily Levar Perry DO      [START ON 11/4/2024] vitamin B-12  500 mcg Oral Once per day on Monday Thursday Levar Perry, DO      escitalopram  5 mg Oral Daily Levar Perry, DO      gabapentin  100 mg Oral HS Levar Perry, DO      insulin lispro  1-5 Units Subcutaneous TID AC Levar Perry, DO      lisinopril  5 mg Oral Daily Levar Perry, DO      magnesium Oxide  400 mg Oral Daily Levar Perry, DO      melatonin  3 mg Oral HS Levar Perry, DO      mirtazapine  15 mg Oral HS Levar Perry, DO      OLANZapine  5 mg Intramuscular Q4H PRN Max 6/day Levar Perry, DO      QUEtiapine  25 mg Oral HS Lisa Mueller, DO      terazosin  1 mg Oral HS Levar Perry DO          Today, Patient Was Seen By: Jael Pedersen DO    ** Please Note: Dictation voice to text software may have been used in the creation of this document. **

## 2024-11-03 LAB
GLUCOSE SERPL-MCNC: 185 MG/DL (ref 65–140)
GLUCOSE SERPL-MCNC: 193 MG/DL (ref 65–140)
GLUCOSE SERPL-MCNC: 216 MG/DL (ref 65–140)
GLUCOSE SERPL-MCNC: 247 MG/DL (ref 65–140)

## 2024-11-03 PROCEDURE — 82948 REAGENT STRIP/BLOOD GLUCOSE: CPT

## 2024-11-03 PROCEDURE — 99233 SBSQ HOSP IP/OBS HIGH 50: CPT | Performed by: INTERNAL MEDICINE

## 2024-11-03 RX ADMIN — INSULIN LISPRO 1 UNITS: 100 INJECTION, SOLUTION INTRAVENOUS; SUBCUTANEOUS at 12:15

## 2024-11-03 RX ADMIN — APIXABAN 2.5 MG: 2.5 TABLET, FILM COATED ORAL at 08:56

## 2024-11-03 RX ADMIN — TERAZOSIN HYDROCHLORIDE 1 MG: 1 CAPSULE ORAL at 22:23

## 2024-11-03 RX ADMIN — QUETIAPINE 25 MG: 25 TABLET ORAL at 21:11

## 2024-11-03 RX ADMIN — Medication 3 MG: at 21:11

## 2024-11-03 RX ADMIN — APIXABAN 2.5 MG: 2.5 TABLET, FILM COATED ORAL at 17:40

## 2024-11-03 RX ADMIN — LISINOPRIL 5 MG: 5 TABLET ORAL at 08:56

## 2024-11-03 RX ADMIN — GABAPENTIN 100 MG: 100 CAPSULE ORAL at 21:11

## 2024-11-03 RX ADMIN — ESCITALOPRAM OXALATE 5 MG: 5 TABLET, FILM COATED ORAL at 08:56

## 2024-11-03 RX ADMIN — INSULIN LISPRO 1 UNITS: 100 INJECTION, SOLUTION INTRAVENOUS; SUBCUTANEOUS at 08:59

## 2024-11-03 RX ADMIN — INSULIN LISPRO 1 UNITS: 100 INJECTION, SOLUTION INTRAVENOUS; SUBCUTANEOUS at 17:40

## 2024-11-03 RX ADMIN — MAGNESIUM OXIDE TAB 400 MG (241.3 MG ELEMENTAL MG) 400 MG: 400 (241.3 MG) TAB at 08:56

## 2024-11-03 RX ADMIN — ATORVASTATIN CALCIUM 40 MG: 40 TABLET, FILM COATED ORAL at 17:40

## 2024-11-03 RX ADMIN — MIRTAZAPINE 15 MG: 15 TABLET, FILM COATED ORAL at 21:11

## 2024-11-03 NOTE — CASE MANAGEMENT
Case Management Discharge Planning Note    Patient name Leroy Paredes  Location [facility] MRN [id number]  : 1930 Date 11/3/2024       Current Admission Date: 10/31/2024  Current Admission Diagnosis:Dementia with agitation (HCC)   Patient Active Problem List    Diagnosis Date Noted Date Diagnosed    Dementia with agitation ([facility]) 2024     History of rib fracture 2024     Fracture of multiple ribs of left side 10/30/2024     Spleen injury, initial encounter 10/30/2024     Chronic heart failure with preserved ejection fraction ([facility]) 10/25/2024     Insomnia 2024     Acute metabolic encephalopathy 2024     Ambulatory dysfunction 2024     Severe protein-calorie malnutrition ([facility]) 2024     Syncope 2024     Hyperlactatemia 2024     A-fib ([facility]) 2024     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[facility]) 2024     Encounter for loop recorder at end of battery life 2024     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  ([facility]) 2023     Closed fracture of one rib of left side with routine healing 09/15/2022     Fall 09/10/2022     Skin tear of right elbow without complication 2022     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[facility]) 2021     Embolic bilateral punctate CVA, acute as well as subacute 2020     Essential hypertension 2020     Secondary hyperparathyroidism of renal origin ([facility]) 2019     Acute kidney injury superimposed on chronic kidney disease  ([facility]) 2018     CKD stage 4 due to type 2 diabetes mellitus ([facility]) 2018     Palpitations 2018     Enlarged prostate without lower urinary tract symptoms (luts) 2013     Controlled diabetes mellitus ([facility]) 2012     Hyperlipidemia 2012     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 ([facility]) 2012       LOS (days): 1  Geometric Mean LOS (GMLOS) (days):   Days to GMLOS:     OBJECTIVE:  Risk of Unplanned  Readmission Score: 21.53         Current admission status: Inpatient   Preferred Pharmacy:   [id number] - [place] - [address]  [address]  [place] [address]  Phone: [phone] Fax: [phone]    [facility] - [place] - [address]  [address] [place] [address]  Phone: [phone] Fax: [phone]    [facility] - [place], [place] - [address]  [address] [address]  Phone: [phone] Fax: [phone]    Primary Care Provider: Pepito Foster,     Primary Insurance: MEDICARE  Secondary Insurance: BLUE CROSS    DISCHARGE DETAILS:    Discharge planning discussed with:: Pt's gail Orozco  Freedom of Choice: Yes  Comments - Freedom of Choice: Pt to return to [facility] pending bed at secured unit  CM contacted family/caregiver?: Yes  Were Treatment Team discharge recommendations reviewed with patient/caregiver?: Yes  Did patient/caregiver verbalize understanding of patient care needs?: N/A- going to facility  Were patient/caregiver advised of the risks associated with not following Treatment Team discharge recommendations?: Yes    Contacts  Patient Contacts: Pam Mosley (Niece) [phone]  Relationship to Patient:: Family  Contact Method: Phone  Phone Number: [phone]  Reason/Outcome: Continuity of Care, Emergency Contact, Discharge Planning    Requested Home Health Care         Is the patient interested in [facility] at discharge?: No    DME Referral Provided  Referral made for DME?: No    Other Referral/Resources/Interventions Provided:  Interventions: Assisted Living  Referral Comments: CM spoke with pt's gail Orozco regarding pt, she is currently working with [facility] to get pt a bed on their secured unit.         Treatment Team Recommendation: Assisted Living  Discharge Destination Plan:: Assisted Living

## 2024-11-03 NOTE — PLAN OF CARE
Problem: Potential for Falls  Goal: Patient will remain free of falls  Description: INTERVENTIONS:  - Educate patient/family on patient safety including physical limitations  - Instruct patient to call for assistance with activity   - Consult OT/PT to assist with strengthening/mobility   - Keep Call bell within reach  - Keep bed low and locked with side rails adjusted as appropriate  - Keep care items and personal belongings within reach  - Initiate and maintain comfort rounds  - Make Fall Risk Sign visible to staff  - Offer Toileting every  Hours, in advance of need  - Initiate/Maintain bed alarm  - Obtain necessary fall risk management equipment:   - Apply yellow socks and bracelet for high fall risk patients  - Consider moving patient to room near nurses station  Outcome: Progressing     Problem: Prexisting or High Potential for Compromised Skin Integrity  Goal: Skin integrity is maintained or improved  Description: INTERVENTIONS:  - Identify patients at risk for skin breakdown  - Assess and monitor skin integrity  - Assess and monitor nutrition and hydration status  - Monitor labs   - Assess for incontinence   - Turn and reposition patient  - Assist with mobility/ambulation  - Relieve pressure over bony prominences  - Avoid friction and shearing  - Provide appropriate hygiene as needed including keeping skin clean and dry  - Evaluate need for skin moisturizer/barrier cream  - Collaborate with interdisciplinary team   - Patient/family teaching  - Consider wound care consult   Outcome: Progressing

## 2024-11-03 NOTE — ASSESSMENT & PLAN NOTE
Lab Results   Component Value Date    HGBA1C 6.8 (H) 07/12/2024     Recent Labs     11/02/24  1126 11/02/24  1648 11/02/24 2039 11/03/24  0731   POCGLU 149* 183* 290* 185*     Blood Sugar Average: Last 72 hrs:  (P) 197.5  Will put on sliding scale insulin with meals

## 2024-11-03 NOTE — ASSESSMENT & PLAN NOTE
Patient presented to the ER via EMS after he eloped from [facility] assisted living; patient reportedly became very agitated when medical staff at [facility] tried to redirect patient back to the facility for which police were needed to help physically calm patient down and patient was brought to the ER  Patient's niece reports that patient has dementia and that it has become more pronounced in the last 2 months.  She reports that patient has become dependent with several ADLs over this time.  Patient's niece reports that she spoke with staff at [facility] regarding potentially switching patient over to the locked dementia unit but that this transition will need a day or two before it can be completed.  As patient has proven he is an elopement risk and unsafe to be in assisted living, patient to be admitted to the hospital on observation status for placement.   Order continuous observation with physical person present given patient's high elopement risk  Geriatrics consulted and appreciate their recommendations.  Continue Lexapro and Remeron  Consider adding Seroquel  Continue PRN IM Zyprexa for agitation  Delirium protocol

## 2024-11-03 NOTE — ED ATTENDING ATTESTATION
10/31/2024  I, Willis Shelton MD, saw and evaluated the patient. I have discussed the patient with the resident/non-physician practitioner and agree with the resident's/non-physician practitioner's findings, Plan of Care, and MDM as documented in the resident's/non-physician practitioner's note, except where noted. All available labs and Radiology studies were reviewed.  I was present for key portions of any procedure(s) performed by the resident/non-physician practitioner and I was immediately available to provide assistance.       At this point I agree with the current assessment done in the Emergency Department.  I have conducted an independent evaluation of this patient a history and physical is as follows:    ED Course     Patient with history of dementia with recent hospital admission status post fall to trauma service eloped from assisted living facility today.    Upon discussion with family plan to admit patient to hospital for further evaluation and management possible placement to dementia unit.    Case discussed with medicine service will mid to medicine.    Critical Care Time  Procedures

## 2024-11-04 LAB
GLUCOSE SERPL-MCNC: 188 MG/DL (ref 65–140)
GLUCOSE SERPL-MCNC: 204 MG/DL (ref 65–140)
GLUCOSE SERPL-MCNC: 226 MG/DL (ref 65–140)
GLUCOSE SERPL-MCNC: 288 MG/DL (ref 65–140)

## 2024-11-04 PROCEDURE — 87081 CULTURE SCREEN ONLY: CPT | Performed by: INTERNAL MEDICINE

## 2024-11-04 PROCEDURE — 82948 REAGENT STRIP/BLOOD GLUCOSE: CPT

## 2024-11-04 PROCEDURE — 99233 SBSQ HOSP IP/OBS HIGH 50: CPT | Performed by: INTERNAL MEDICINE

## 2024-11-04 PROCEDURE — 99232 SBSQ HOSP IP/OBS MODERATE 35: CPT | Performed by: INTERNAL MEDICINE

## 2024-11-04 PROCEDURE — 97167 OT EVAL HIGH COMPLEX 60 MIN: CPT

## 2024-11-04 RX ADMIN — MAGNESIUM OXIDE TAB 400 MG (241.3 MG ELEMENTAL MG) 400 MG: 400 (241.3 MG) TAB at 09:23

## 2024-11-04 RX ADMIN — ATORVASTATIN CALCIUM 40 MG: 40 TABLET, FILM COATED ORAL at 17:17

## 2024-11-04 RX ADMIN — ESCITALOPRAM OXALATE 5 MG: 5 TABLET, FILM COATED ORAL at 09:23

## 2024-11-04 RX ADMIN — APIXABAN 2.5 MG: 2.5 TABLET, FILM COATED ORAL at 09:24

## 2024-11-04 RX ADMIN — GABAPENTIN 100 MG: 100 CAPSULE ORAL at 21:23

## 2024-11-04 RX ADMIN — INSULIN LISPRO 1 UNITS: 100 INJECTION, SOLUTION INTRAVENOUS; SUBCUTANEOUS at 09:26

## 2024-11-04 RX ADMIN — INSULIN LISPRO 1 UNITS: 100 INJECTION, SOLUTION INTRAVENOUS; SUBCUTANEOUS at 17:17

## 2024-11-04 RX ADMIN — CYANOCOBALAMIN TAB 500 MCG 500 MCG: 500 TAB at 09:24

## 2024-11-04 RX ADMIN — MIRTAZAPINE 15 MG: 15 TABLET, FILM COATED ORAL at 21:23

## 2024-11-04 RX ADMIN — TERAZOSIN HYDROCHLORIDE 1 MG: 1 CAPSULE ORAL at 21:23

## 2024-11-04 RX ADMIN — QUETIAPINE 25 MG: 25 TABLET ORAL at 21:23

## 2024-11-04 RX ADMIN — LISINOPRIL 5 MG: 5 TABLET ORAL at 09:23

## 2024-11-04 RX ADMIN — INSULIN LISPRO 2 UNITS: 100 INJECTION, SOLUTION INTRAVENOUS; SUBCUTANEOUS at 11:30

## 2024-11-04 RX ADMIN — APIXABAN 2.5 MG: 2.5 TABLET, FILM COATED ORAL at 17:17

## 2024-11-04 RX ADMIN — Medication 3 MG: at 21:23

## 2024-11-04 NOTE — CASE MANAGEMENT
Case Management Discharge Planning Note    Patient name Leroy Paredes  Location [facility] 901/[facility] 901-01 MRN [id number]  : 1930 Date 2024       Current Admission Date: 10/31/2024  Current Admission Diagnosis:Dementia with agitation (HCC)   Patient Active Problem List    Diagnosis Date Noted Date Diagnosed    Dementia with agitation ([facility]) 2024     History of rib fracture 2024     Fracture of multiple ribs of left side 10/30/2024     Spleen injury, initial encounter 10/30/2024     Chronic heart failure with preserved ejection fraction ([facility]) 10/25/2024     Insomnia 2024     Acute metabolic encephalopathy 2024     Ambulatory dysfunction 2024     Severe protein-calorie malnutrition ([facility]) 2024     Syncope 2024     Hyperlactatemia 2024     A-fib ([facility]) 2024     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[facility]) 2024     Encounter for loop recorder at end of battery life 2024     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  ([facility]) 2023     Closed fracture of one rib of left side with routine healing 09/15/2022     Fall 09/10/2022     Skin tear of right elbow without complication 2022     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[facility]) 2021     Embolic bilateral punctate CVA, acute as well as subacute 2020     Essential hypertension 2020     Secondary hyperparathyroidism of renal origin ([facility]) 2019     Acute kidney injury superimposed on chronic kidney disease  ([facility]) 2018     CKD stage 4 due to type 2 diabetes mellitus ([facility]) 2018     Palpitations 2018     Enlarged prostate without lower urinary tract symptoms (luts) 2013     Controlled diabetes mellitus ([facility]) 2012     Hyperlipidemia 2012     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 ([facility]) 2012       LOS (days): 2  Geometric Mean LOS (GMLOS) (days): 4.7  Days to GMLOS:2.4     OBJECTIVE:  Risk of  Unplanned Readmission Score: 21.88         Current admission status: Inpatient   Preferred Pharmacy:   [facility] - [place] - [address]  [address]  [place] [address]  Phone: [phone] Fax: [phone]    [facility] - [place] - [address]  [address] [place] [address]  Phone: [phone] Fax: [phone]    [facility] - [place], [place] - [address]  [address] [address]  Phone: [phone] Fax: [phone]    Primary Care Provider: Pepito Foster DO    Primary Insurance: MEDICARE  Secondary Insurance: BLUE CROSS    DISCHARGE DETAILS:    Discharge planning discussed with:: Pt's gail Pam  Freedom of Choice: Yes  Comments - Freedom of Choice: Agreeable to return to [facility] tomorrow  CM contacted family/caregiver?: Yes  Were Treatment Team discharge recommendations reviewed with patient/caregiver?: Yes  Did patient/caregiver verbalize understanding of patient care needs?: N/A- going to facility  Were patient/caregiver advised of the risks associated with not following Treatment Team discharge recommendations?: Yes    Contacts  Patient Contacts: Pam Mosely (Niece) [phone]  Relationship to Patient:: Family  Contact Method: Phone  Phone Number: [phone]  Reason/Outcome: Continuity of Care, Emergency Contact, Discharge Planning       Treatment Team Recommendation: Other ([facility] secure unit)  Discharge Destination Plan:: Other ([facility] secure unit)  Transport at Discharge : [facility]         Family notified:: CM spoke to Pam to confirm she is agreeable to d/c back to [facility] tomorrow.  Pam was agreeable to plan for d/c tomorrow.  Pam mentioned to this CM that Aggie told her pt will need to come back with evening meds sent with him.  Please follow up with Helovelyy tomorrow to confirm if medications are needed.

## 2024-11-04 NOTE — ASSESSMENT & PLAN NOTE
Lab Results   Component Value Date    HGBA1C 6.8 (H) 07/12/2024     Recent Labs     11/03/24  1124 11/03/24  1605 11/03/24 2034 11/04/24  0711   POCGLU 216* 193* 247* 188*     Blood Sugar Average: Last 72 hrs:  (P) 202  Will put on sliding scale insulin with meals

## 2024-11-04 NOTE — CASE MANAGEMENT
Case Management Discharge Planning Note    Patient name Leroy Paredes  Location [facility] 901/[facility] 901-01 MRN [id number]  : 1930 Date 2024       Current Admission Date: 10/31/2024  Current Admission Diagnosis:Dementia with agitation (HCC)   Patient Active Problem List    Diagnosis Date Noted Date Diagnosed    Dementia with agitation ([facility]) 2024     History of rib fracture 2024     Fracture of multiple ribs of left side 10/30/2024     Spleen injury, initial encounter 10/30/2024     Chronic heart failure with preserved ejection fraction ([facility]) 10/25/2024     Insomnia 2024     Acute metabolic encephalopathy 2024     Ambulatory dysfunction 2024     Severe protein-calorie malnutrition ([facility]) 2024     Syncope 2024     Hyperlactatemia 2024     A-fib ([facility]) 2024     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[facility]) 2024     Encounter for loop recorder at end of battery life 2024     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  ([facility]) 2023     Closed fracture of one rib of left side with routine healing 09/15/2022     Fall 09/10/2022     Skin tear of right elbow without complication 2022     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[facility]) 2021     Embolic bilateral punctate CVA, acute as well as subacute 2020     Essential hypertension 2020     Secondary hyperparathyroidism of renal origin ([facility]) 2019     Acute kidney injury superimposed on chronic kidney disease  ([facility]) 2018     CKD stage 4 due to type 2 diabetes mellitus ([facility]) 2018     Palpitations 2018     Enlarged prostate without lower urinary tract symptoms (luts) 2013     Controlled diabetes mellitus ([facility]) 2012     Hyperlipidemia 2012     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 ([facility]) 2012       LOS (days): 2  Geometric Mean LOS (GMLOS) (days): 4.7  Days to GMLOS:2.4     OBJECTIVE:  Risk of  Unplanned Readmission Score: 21.88         Current admission status: Inpatient   Preferred Pharmacy:   [facility] - [place] - [address]  [address]  [place] [address]  Phone: [phone] Fax: [phone]    [facility] - [place] - [address]  [address] [place] [address]  Phone: [phone] Fax: [phone]    [facility] - [place], [place] - [address]  [address] [address]  Phone: [phone] Fax: [phone]    Primary Care Provider: Pepito Foster DO    Primary Insurance: MEDICARE  Secondary Insurance: BLUE CROSS    DISCHARGE DETAILS:                                              Additional Comments: CM spoke to [facility] via phone. Charly stated there will be a room in the secure unit for pt tomorrow. [facility] faxed New DME and Cognitive screen form for CM to fill out and fax back to [facility] tomorrow in the AM. Charly also stated pt can remain in the virtual 1:1 and return to [facility] as long as pt is not being combative. CM to complete forms and fax back to [facility] tomorrow in the AM. Pt can DC tomorrow in the afternoon. CM will continue to follow as needed.

## 2024-11-04 NOTE — ASSESSMENT & PLAN NOTE
Patient presented to the ER via EMS after he eloped from [facility] assisted living; patient reportedly became very agitated when medical staff at [facility] tried to redirect patient back to the facility for which police were needed to help physically calm patient down and patient was brought to the ER  Patient's niece reports that patient has dementia and that it has become more pronounced in the last 2 months.  She reports that patient has become dependent with several ADLs over this time.  Patient's niece reports that she spoke with staff at [facility] regarding potentially switching patient over to the locked dementia unit but that this transition will need a day or two before it can be completed.  As patient has proven he is an elopement risk and unsafe to be in assisted living, patient to be admitted to the hospital on observation status for placement.   Order continuous observation with physical person present given patient's high elopement risk  Geriatrics consulted and appreciate their recommendations.  Continue Lexapro and Remeron  Consider adding Seroquel  Continue PRN IM Zyprexa for agitation  Delirium protocol

## 2024-11-04 NOTE — PROGRESS NOTES
Progress Note - Hospitalist   Name: Leroy Paredes 89 y.o. male I MRN: [id number]  Unit/Bed#: [facility] 901-01 I Date of Admission: 10/31/2024   Date of Service: 11/4/2024 I Hospital Day: 2     Assessment & Plan  Dementia with agitation (HCC)  Patient presented to the ER via EMS after he eloped from [facility] assisted living; patient reportedly became very agitated when medical staff at [facility] tried to redirect patient back to the facility for which police were needed to help physically calm patient down and patient was brought to the ER  Patient's niece reports that patient has dementia and that it has become more pronounced in the last 2 months.  She reports that patient has become dependent with several ADLs over this time.  Patient's niece reports that she spoke with staff at [facility] regarding potentially switching patient over to the locked dementia unit but that this transition will need a day or two before it can be completed.  As patient has proven he is an elopement risk and unsafe to be in assisted living, patient to be admitted to the hospital on observation status for placement.   Order continuous observation with physical person present given patient's high elopement risk  Geriatrics consulted and appreciate their recommendations.  Continue Lexapro and Remeron  Consider adding Seroquel  Continue PRN IM Zyprexa for agitation  Delirium protocol   Controlled diabetes mellitus (HCC)  Lab Results   Component Value Date    HGBA1C 6.8 (H) 07/12/2024     Recent Labs     11/03/24  1124 11/03/24  1605 11/03/24  2034 11/04/24  0711   POCGLU 216* 193* 247* 188*     Blood Sugar Average: Last 72 hrs:  (P) 202  Will put on sliding scale insulin with meals  Essential hypertension  Continue PTA lisinopril and terazosin  A-fib (HCC)  Continue PTA Eliquis 2.5 mg twice daily  History of rib fracture  Previously hospitalized and discharged earlier on the day on 10/31 after being on the trauma service for a fall  in which she sustained left-sided fifth and sixth rib fractures  Encourage IS if able  VTE Pharmacologic Prophylaxis:   Pharmacologic: Apixaban (Eliquis)  Mechanical VTE Prophylaxis in Place: No    Patient Centered Rounds: I have performed bedside rounds with nursing staff today.        Time Spent for Care: 1 hour.  More than 50% of total time spent on counseling and coordination of care as described above.    Current Length of Stay: 2 day(s)    Current Patient Status: Inpatient       Code Status: Level 3 - DNAR and DNI      Subjective:   nad    Objective:     Vitals:   Temp (24hrs), Av.8 °F (36.6 °C), Min:97.8 °F (36.6 °C), Max:97.8 °F (36.6 °C)    Temp:  [97.8 °F (36.6 °C)] 97.8 °F (36.6 °C)  HR:  [66-79] 66  Resp:  [16-18] 18  BP: (136-162)/(63-88) 144/65  SpO2:  [97 %] 97 %  Body mass index is 26.78 kg/m².     Input and Output Summary (last 24 hours):       Intake/Output Summary (Last 24 hours) at 2024 0901  Last data filed at 2024 0630  Gross per 24 hour   Intake 360 ml   Output 800 ml   Net -440 ml       Physical Exam:     Physical Exam  Constitutional:       Appearance: Normal appearance.   HENT:      Head: Normocephalic and atraumatic.   Cardiovascular:      Rate and Rhythm: Normal rate and regular rhythm.   Pulmonary:      Effort: Pulmonary effort is normal.      Breath sounds: Normal breath sounds.   Abdominal:      General: Abdomen is flat.   Musculoskeletal:         General: No swelling. Normal range of motion.   Skin:     General: Skin is warm and dry.   Neurological:      General: No focal deficit present.      Mental Status: He is alert.         Additional Data:     Labs:    Results from last 7 days   Lab Units 10/31/24  0810 10/31/24  0543 10/30/24  1554 10/30/24  0805   WBC Thousand/uL  --  6.45  --  6.85   HEMOGLOBIN g/dL 10.2* 9.7*   < > 9.4*   HEMATOCRIT % 31.8* 31.0*   < > 29.4*   PLATELETS Thousands/uL  --  169  --  164   SEGS PCT %  --   --   --  59   LYMPHO PCT %  --   --   --   18   MONO PCT %  --   --   --  13*   EOS PCT %  --   --   --  8*    < > = values in this interval not displayed.     Results from last 7 days   Lab Units 10/31/24  0543   POTASSIUM mmol/L 4.3   CHLORIDE mmol/L 105   CO2 mmol/L 26   BUN mg/dL 28*   CREATININE mg/dL 1.75*   CALCIUM mg/dL 8.5     Results from last 7 days   Lab Units 10/31/24  0543   INR  1.26*       *     Recent Cultures (last 7 days):           Last 24 Hours Medication List:   Current Facility-Administered Medications   Medication Dose Route Frequency Provider Last Rate    acetaminophen  650 mg Oral Q6H PRN Levar Perry, DO      apixaban  2.5 mg Oral BID Levar Perry, DO      atorvastatin  40 mg Oral Daily With Dinner Levar Perry, DO      bisacodyl  10 mg Rectal Daily Levar Perry, DO      vitamin B-12  500 mcg Oral Once per day on Monday Thursday Levar Perry, DO      escitalopram  5 mg Oral Daily Levar Perry, DO      gabapentin  100 mg Oral HS Levar Perry, DO      insulin lispro  1-5 Units Subcutaneous TID AC Levar Perry, DO      lisinopril  5 mg Oral Daily Levar Perry, DO      magnesium Oxide  400 mg Oral Daily Levar Perry, DO      melatonin  3 mg Oral HS Levar Perry, DO      mirtazapine  15 mg Oral HS Levar Perry, DO      OLANZapine  5 mg Intramuscular Q4H PRN Max 6/day Levar Perry, DO      QUEtiapine  25 mg Oral HS Lisa Melvinjerrell, DO      terazosin  1 mg Oral HS Levar Perry, DO          Today, Patient Was Seen By: Jael Pedersen DO    ** Please Note: Dictation voice to text software may have been used in the creation of this document. **

## 2024-11-04 NOTE — PROGRESS NOTES
Progress Note - Geriatric Medicine   Leroy Paredes 89 y.o. male MRN: [id number]  [facility] Encounter: [id number]      Assessment/Plan:    Cognitive impairment with agitated behaviors  -oriented but forgetful at baseline with recent acceleration of cognitive decline   -reportedly mostly independent with ADLs, dependent for iADLs   -no prior cognitive testing on record for review however at least mild cognitive impairment suspected at baseline, has comprehensive geriatric assessment scheduled as o/p   -recently moved to assisted living due to increasing care needs however attempting to elope from facility prompting hospital admission and would likely benefit from transition to secured unit on return to facility   -required IM Zyprexa repeatedly on initial presentation as well as on arrival to inpatient floor as appears to have exacerbation of behaviors with setting changes otherwise appears to be very well controlled now calm and cooperative no agitated events reported over weekend  -continue Seroquel 25mg daily at 20:00 hours  -continue Zypexa as PRN for use during hospitalization if all other non-pharmacologic interventions have proved insufficient in managing behaviors, monitor QTc with use, had not required ANY doses over weekend   -Remains at high risk of progression of underlying cog impairment, continue secondary risk factor modifications  -Underlying cognitive impairment increases risk of developing delirium during hospitalization, continue strict delirium precautions  -ontinue psychosocial supports of patient and caregivers  -anticipate return to prior facility in secure unit per CM documentation      Ambulatory function with fall  -Reportedly mechanical fall at assisted living earlier in the week   -recently admitted to Trauma with below noted injuries since d/c back to independent living   -Utilizes walker for ambulation at baseline, encourage use at all appropriate times  -Endorses history of  recurrent falls with at least one additional in past year   -Remains high risk future falls, continue strict fall precautions and assist with transfers  -PT/OT following       Multiple left-sided rib fractures (5 and 6)   -s/p fall as outlined above   -noted on CT chest abd pelvis 10/30/24  -currently saturating well on room air, monitor resp status closely  -continue acute multimodal pain control per geriatric pain protocol   -continue to encourage aggressive pulmonary toilet and ISS     Insomnia  -Symptoms reportedly well-controlled with home Lexapro and mirtazapine regimen, continue home dosing  -Encourage establishment of consistent sleep/wake times and bedtime routine  -At risk exacerbation during hospitalization, consider addition of low-dose melatonin at bedtime     Atrial fibrillation  -Rates well-controlled off medications as outpatient, maintained on Eliquis for anticoagulation  -Patient follow-up with PCP and Cardiology for ongoing management     CKD-IV  -Baseline creatinine appears to be around 1.9-2.2  -Continue optimization of hemodynamics   -Avoid nephrotoxins and renally dose all medications  -Continue close outpatient follow-up with Nephrology for ongoing management     Impaired Vision  -recommend use of corrective lenses at all appropriate times  -encourage adequate lighting and encourage use of assistance with ambulation  -keep personal belongings close to person to avoid reaching  -encourage appropriate footwear at all times  -consider large font for printed materials provided to patient      Impaired mastication  -Requires use of dentures- encourage use all appropriate times   -ensure meal consistency appropriate for abilities  -continue aspiration precautions     Deconditioning/debility/frailty  -Clinical frailty scale stage V, mild to moderately frail, progressive  -Multifactorial including age, amatory dysfunction, cognitive impairment and multitude of additional chronic medical comorbidities  of the individual with limited physiologic and metabolic reserves  -Continue optimization chronic medical conditions and address acute metabolic derangements as arise  -Encourage well-balanced additional intake, consider nutritional supplements between meals if oral intake is poor  -Ensure underlying anxiety/mood/depression symptoms are well-controlled as may impact patient response to therapies as well as overall sense of wellbeing and quality of life  -Continue to ensure treatments and interventions align with patient's wishes and goals of care  -Continue psychosocial supports of patient and caregivers     Delirium precautions  -high risk of delirium due to age, cognitive impairment, acute pain, unfamiliar hospital environment, multiple recent setting changes and with setting change now known trigger for patient   -cont strict delirium precautions  -maintain normal sleep/wake cycle  -minimize overnight interruptions, group overnight vitals/labs/nursing checks as medically appropriate  -dim lights, close blinds and turn off tv to minimize stimulation and encourage sleep environment in evenings  -ensure that pain is well controlled - recommend wesley pain protocol   -monitor for fecal and urinary retention which may precipitate delirium  -encourage early mobilization and ambulation with assist as cleared to safely do so  -provide frequent reorientation and redirection as indicated and appropriate  -avoid medications which may precipitate or worsen delirium such as tramadol, benzodiazepine, anticholinergics, and benadryl as possible   -encourage hydration and nutrition   -redirect unwanted behaviors as first line and encourage calm quiet env to reduce risk overstimulation     Care coordination: rounded with Pam (DENNY)    Subjective:     Leroy is seen and examined at bedside where he is sitting resting comfortably, he reports soreness in his right neck and lower back which he reports is somewhat chronic and not  "bothersome enough that he would like to take any medications for it at this time. He reports appetite is good and that he has been sleeping well, he offers no other acute complaints. Appears to be tolerating medication regimen well. No acute events reported overnight.     Review of Systems   Constitutional: Negative.  Negative for chills and fever.   HENT: Negative.     Eyes: Negative.    Respiratory: Negative.  Negative for shortness of breath.    Cardiovascular: Negative.  Negative for chest pain and palpitations.   Gastrointestinal: Negative.    Genitourinary: Negative.    Musculoskeletal:  Positive for back pain (right lower and right upper neck, currently tolerable).   Skin: Negative.    Neurological: Negative.    Hematological: Negative.    Psychiatric/Behavioral: Negative.  Negative for sleep disturbance.    All other systems reviewed and are negative.    Objective:     Vitals: Blood pressure 144/65, pulse 66, temperature 97.8 °F (36.6 °C), temperature source Oral, resp. rate 18, height 5' 11\" (1.803 m), weight 87.1 kg (192 lb), SpO2 97%.,Body mass index is 26.78 kg/m².      Intake/Output Summary (Last 24 hours) at 11/4/2024 1342  Last data filed at 11/4/2024 0630  Gross per 24 hour   Intake 240 ml   Output 800 ml   Net -560 ml     Current Medications: Reviewed    Physical Exam:   Physical Exam  Vitals and nursing note reviewed.   Constitutional:       General: He is not in acute distress.     Appearance: Normal appearance. He is not toxic-appearing.   HENT:      Head: Normocephalic.      Nose: Nose normal.      Mouth/Throat:      Mouth: Mucous membranes are dry.   Eyes:      General: No scleral icterus.        Right eye: No discharge.         Left eye: No discharge.      Conjunctiva/sclera: Conjunctivae normal.   Neck:      Comments: Phonation norm   Cardiovascular:      Rate and Rhythm: Normal rate and regular rhythm.   Pulmonary:      Effort: Pulmonary effort is normal. No respiratory distress.      Breath " sounds: No wheezing.      Comments: Saturating well on room air  Abdominal:      General: Bowel sounds are normal. There is no distension.      Palpations: Abdomen is soft.      Tenderness: There is no abdominal tenderness.   Musculoskeletal:      Cervical back: Neck supple.      Right lower leg: No edema.      Left lower leg: No edema.      Comments: Reduced overall muscle mass    Skin:     General: Skin is warm and dry.   Neurological:      Mental Status: He is alert.      Comments: Awake alert and oriented to person and place    Psychiatric:         Mood and Affect: Mood normal.      Comments: Pleasant calm cooperative         Invasive Devices       Peripheral Intravenous Line  Duration             Peripheral IV 11/03/24 Left;Ventral (anterior) Forearm <1 day                  Lab Results:     I have personally reviewed pertinent lab results including the following:    Results from last 7 days   Lab Units 10/31/24  0810 10/31/24  0543 10/31/24  0019 10/30/24  1554 10/30/24  0805   WBC Thousand/uL  --  6.45  --   --  6.85   HEMOGLOBIN g/dL 10.2* 9.7* 9.6*   < > 9.4*   HEMATOCRIT % 31.8* 31.0* 30.1*   < > 29.4*   PLATELETS Thousands/uL  --  169  --   --  164   SEGS PCT %  --   --   --   --  59   MONO PCT %  --   --   --   --  13*   EOS PCT %  --   --   --   --  8*    < > = values in this interval not displayed.     Results from last 7 days   Lab Units 10/31/24  0543 10/30/24  0805   POTASSIUM mmol/L 4.3 4.3   CHLORIDE mmol/L 105 107   CO2 mmol/L 26 26   BUN mg/dL 28* 29*   CREATININE mg/dL 1.75* 2.04*   CALCIUM mg/dL 8.5 8.4     I have personally reviewed the following imaging study reports in PACS:    No new imaging overnight

## 2024-11-04 NOTE — PLAN OF CARE
Problem: OCCUPATIONAL THERAPY ADULT  Goal: Performs self-care activities at highest level of function for planned discharge setting.  See evaluation for individualized goals.  Description: Treatment Interventions: ADL retraining, Functional transfer training, UE strengthening/ROM, Cognitive reorientation, Endurance training, Patient/family training, Equipment evaluation/education, Compensatory technique education, Continued evaluation, Energy conservation, Activityengagement          See flowsheet documentation for full assessment, interventions and recommendations.   Outcome: Progressing  Note: Limitation: Decreased ADL status, Decreased Safe judgement during ADL, Decreased cognition, Decreased endurance, Decreased self-care trans, Decreased high-level ADLs  Prognosis: Good  Assessment: Pt is a 89 y/o male that was admitted to [facility] 10/31/2024 with dementia with agitation. Pt  has a past medical history of 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), Chronic kidney disease, Diabetes mellitus (HCC), Hyperlipidemia, Hypertension, Osteoarthritis, Palpitations, and TIA (transient ischemic attack). Pt lives at [facility] with a raised toilet with grab bars and walk in shower with grab bars and shower chair. Pt reports using rw for functional mobility at baseline. Prior to admission pt (I) functional mobility, pt requires assistance for ADLs and IADLs. Pt currently requires MIN A to complete UB ADLs, LB ADLs, functional transfers, and ambulate household distance functional mobility with rw. Pt limited by decreased ADL status, functional transfers, functional mobility, and activity tolerance. Pt supine in bed at begning of session, pt seated in bedside chair at end of session with alarm set and items within reach.The patient's raw score on the -PAC Daily Activity Inpatient Short Form is 19. A raw score of greater than or equal to 19 suggests the patient may benefit from discharge to home. Please refer to the  recommendation of the Occupational Therapist for safe discharge planning.  Recommend Level III minimum intensity OT services  at d/c to maximize pt function.     Rehab Resource Intensity Level, OT: III (Minimum Resource Intensity) (return to [facility] with OT)
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
## 2024-11-04 NOTE — OCCUPATIONAL THERAPY NOTE
Occupational Therapy Evaluation     Patient Name: Leroy Paredes  Today's Date: 11/4/2024  Problem List  Principal Problem:    Dementia with agitation (HCC)  Active Problems:    Controlled diabetes mellitus (HCC)    Essential hypertension    A-fib (HCC)    History of rib fracture    Past Medical History  Past Medical History:   Diagnosis Date    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)     Last Assessed:  5/19/17    Chronic kidney disease     Diabetes mellitus (HCC)     Hyperlipidemia     Hypertension     Osteoarthritis     Palpitations     TIA (transient ischemic attack)      Past Surgical History  Past Surgical History:   Procedure Laterality Date    APPENDECTOMY      CARDIAC ELECTROPHYSIOLOGY PROCEDURE N/A 1/17/2024    Procedure: Cardiac loop recorder explant;  Surgeon: Drew Olmos MD;  [facility];  Service: Cardiology    COLONOSCOPY  2012    HEMORROIDECTOMY      TOOTH EXTRACTION  02/02/2022 11/04/24 0833   OT Last Visit   OT Visit Date 11/04/24   Note Type   Note type Evaluation   Pain Assessment   Pain Assessment Tool 0-10   Pain Score No Pain   Restrictions/Precautions   Weight Bearing Precautions Per Order No   Other Precautions Cognitive;Chair Alarm;Bed Alarm;1:1;Fall Risk  (virtual 1:1)   Home Living   Type of Home SNF  ([facility])   Home Layout One level  (0 ROHAN)   Bathroom Shower/Tub Walk-in shower   Bathroom Toilet Raised   Bathroom Equipment Grab bars in shower;Shower chair;Grab bars around toilet   Home Equipment Walker  (pt reports using rw as needed at baseline)   Additional Comments Pt is a ? historian, per EMR pt lives at [facility]   Prior Function   Level of [place] Independent with functional mobility;Needs assistance with ADLs;Needs assistance with IADLS   Lives With Facility staff   Receives Help From Personal care attendant   IADLs Family/Friend/Other provides transportation;Family/Friend/Other provides meals;Family/Friend/Other provides medication management   Falls  in the last 6 months 5 to 10  (per EMR)   Vocational Retired   Comments Pt ? historian, information obtained from pt and EMR   Lifestyle   Autonomy Pt reports (I) with functional mobility using rw. Pt requires assistance for ADLs and IADLs. Pt -  and retired   Reciprocal Relationships Facility staff   Service to Others retired   ADL   Where Assessed Edge of bed   Eating Assistance 6  Modified independent   Grooming Assistance 5  Supervision/Setup   UB Bathing Assistance 4  Minimal Assistance   LB Bathing Assistance 4  Minimal Assistance   UB Dressing Assistance 4  Minimal Assistance   LB Dressing Assistance 4  Minimal Assistance   Toileting Assistance  4  Minimal Assistance   Functional Assistance 4  Minimal Assistance   Bed Mobility   Supine to Sit 4  Minimal assistance   Additional items Assist x 1;HOB elevated;Increased time required;Verbal cues   Transfers   Sit to Stand 4  Minimal assistance   Additional items Assist x 1;Increased time required;Verbal cues   Stand to Sit 4  Minimal assistance   Additional items Assist x 1;Increased time required;Verbal cues   Additional Comments with rw   Functional Mobility   Functional Mobility 4  Minimal assistance   Additional Comments Pt requires MIN Ax1 to ambulate household distance functional mobility with rw   Additional items Rolling walker   Balance   Static Sitting Fair   Dynamic Sitting Fair -   Static Standing Fair -   Dynamic Standing Poor +   Ambulatory Poor +   Activity Tolerance   Activity Tolerance Patient tolerated treatment well   Nurse Made Aware RN Cleared   RUE Assessment   RUE Assessment WFL   LUE Assessment   LUE Assessment WFL   Hand Function   Gross Motor Coordination Functional   Fine Motor Coordination Functional   Cognition   Overall Cognitive Status Impaired   Arousal/Participation Alert;Responsive;Cooperative   Attention Attends with cues to redirect   Orientation Level Oriented to person;Oriented to place;Oriented to time;Disoriented to  situation  (month and year only)   Memory Decreased recall of precautions;Decreased recall of recent events;Decreased short term memory;Decreased recall of biographical information   Following Commands Follows one step commands with increased time or repetition   Comments Pt agreeable to therapy. Pt with hx of dementia, requires cues for safety and redirection to task   Assessment   Limitation Decreased ADL status;Decreased Safe judgement during ADL;Decreased cognition;Decreased endurance;Decreased self-care trans;Decreased high-level ADLs   Prognosis Good   Assessment Pt is a 89 y/o male that was admitted to [facility] 10/31/2024 with dementia with agitation. Pt  has a past medical history of 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), Chronic kidney disease, Diabetes mellitus (HCC), Hyperlipidemia, Hypertension, Osteoarthritis, Palpitations, and TIA (transient ischemic attack). Pt lives at [facility] with a raised toilet with grab bars and walk in shower with grab bars and shower chair. Pt reports using rw for functional mobility at baseline. Prior to admission pt (I) functional mobility, pt requires assistance for ADLs and IADLs. Pt currently requires MIN A to complete UB ADLs, LB ADLs, functional transfers, and ambulate household distance functional mobility with rw. Pt limited by decreased ADL status, functional transfers, functional mobility, and activity tolerance. Pt supine in bed at begning of session, pt seated in bedside chair at end of session with alarm set and items within reach.The patient's raw score on the -PAC Daily Activity Inpatient Short Form is 19. A raw score of greater than or equal to 19 suggests the patient may benefit from discharge to home. Please refer to the recommendation of the Occupational Therapist for safe discharge planning.  Recommend Level III minimum intensity OT services  at d/c to maximize pt function.   Goals   Patient Goals to get OOB   LTG Time Frame 10-14   Plan    Treatment Interventions ADL retraining;Functional transfer training;UE strengthening/ROM;Cognitive reorientation;Endurance training;Patient/family training;Equipment evaluation/education;Compensatory technique education;Continued evaluation;Energy conservation;Activityengagement   Goal Expiration Date 11/18/24   OT Frequency 2-3x/wk   Discharge Recommendation   Rehab Resource Intensity Level, OT III (Minimum Resource Intensity)  (return to [facility] with OT)   AM-PAC Daily Activity Inpatient   Lower Body Dressing 3   Bathing 3   Toileting 3   Upper Body Dressing 3   Grooming 3   Eating 4   Daily Activity Raw Score 19   Daily Activity Standardized Score (Calc for Raw Score >=11) 40.22   AM-PAC Applied Cognition Inpatient   Following a Speech/Presentation 2   Understanding Ordinary Conversation 3   Taking Medications 2   Remembering Where Things Are Placed or Put Away 3   Remembering List of 4-5 Errands 2   Taking Care of Complicated Tasks 1   Applied Cognition Raw Score 13   Applied Cognition Standardized Score 30.46   End of Consult   Education Provided Yes   Patient Position at End of Consult Bedside chair;Bed/Chair alarm activated;All needs within reach  (virtual 1:1 present)   Nurse Communication Nurse aware of consult     Goals:    Pt will complete functional transfers with MOD IND and appropriate AD to maximize pt safety.    Pt will complete bed mobility with MOD IND  to maximize pt safety.    Pt will complete grooming tasks with MOD IND to maximize pt independence.    Pt will complete LB ADLs with supervision  to maximize pt independence.    Pt will complete UB ADLs with MOD IND to maximize pt independence.    Pt will complete toileting with MOD IND to maximize pt independence.    Pt will complete functional household distance mobility with MOD IND and appropriate AD to maximize pt safety.    Pt will complete simulated IADL tasks with MIN A to maximize pt independence.     Pt will be able to tolerate 30 minutes of  functional activity during therapy session.    Pt will follow multistep directions with increased time or repeating directions 2X to maximize pt safety.     Pt will participate in continued cognitive evaluation for safe discharge planning.      BECKY Pardo, OTR/L.

## 2024-11-05 VITALS
DIASTOLIC BLOOD PRESSURE: 61 MMHG | WEIGHT: 192 LBS | OXYGEN SATURATION: 98 % | HEART RATE: 75 BPM | SYSTOLIC BLOOD PRESSURE: 136 MMHG | RESPIRATION RATE: 17 BRPM | BODY MASS INDEX: 26.88 KG/M2 | TEMPERATURE: 97.4 F | HEIGHT: 71 IN

## 2024-11-05 PROBLEM — R45.1 AGITATION: Status: ACTIVE | Noted: 2024-11-05

## 2024-11-05 PROBLEM — R41.89 COGNITIVE IMPAIRMENT: Status: ACTIVE | Noted: 2024-11-01

## 2024-11-05 LAB
ANION GAP SERPL CALCULATED.3IONS-SCNC: 7 MMOL/L (ref 4–13)
BASOPHILS # BLD AUTO: 0.04 THOUSANDS/ÂΜL (ref 0–0.1)
BASOPHILS NFR BLD AUTO: 1 % (ref 0–1)
BUN SERPL-MCNC: 47 MG/DL (ref 5–25)
CALCIUM SERPL-MCNC: 8.1 MG/DL (ref 8.4–10.2)
CHLORIDE SERPL-SCNC: 106 MMOL/L (ref 96–108)
CO2 SERPL-SCNC: 24 MMOL/L (ref 21–32)
CREAT SERPL-MCNC: 2.21 MG/DL (ref 0.6–1.3)
EOSINOPHIL # BLD AUTO: 0.58 THOUSAND/ÂΜL (ref 0–0.61)
EOSINOPHIL NFR BLD AUTO: 11 % (ref 0–6)
ERYTHROCYTE [DISTWIDTH] IN BLOOD BY AUTOMATED COUNT: 13.7 % (ref 11.6–15.1)
GFR SERPL CREATININE-BSD FRML MDRD: 24 ML/MIN/1.73SQ M
GLUCOSE SERPL-MCNC: 179 MG/DL (ref 65–140)
GLUCOSE SERPL-MCNC: 206 MG/DL (ref 65–140)
GLUCOSE SERPL-MCNC: 213 MG/DL (ref 65–140)
HCT VFR BLD AUTO: 28.6 % (ref 36.5–49.3)
HGB BLD-MCNC: 9.1 G/DL (ref 12–17)
IMM GRANULOCYTES # BLD AUTO: 0.02 THOUSAND/UL (ref 0–0.2)
IMM GRANULOCYTES NFR BLD AUTO: 0 % (ref 0–2)
LYMPHOCYTES # BLD AUTO: 1.09 THOUSANDS/ÂΜL (ref 0.6–4.47)
LYMPHOCYTES NFR BLD AUTO: 21 % (ref 14–44)
MCH RBC QN AUTO: 30.5 PG (ref 26.8–34.3)
MCHC RBC AUTO-ENTMCNC: 31.8 G/DL (ref 31.4–37.4)
MCV RBC AUTO: 96 FL (ref 82–98)
MONOCYTES # BLD AUTO: 0.7 THOUSAND/ÂΜL (ref 0.17–1.22)
MONOCYTES NFR BLD AUTO: 14 % (ref 4–12)
MRSA NOSE QL CULT: NORMAL
NEUTROPHILS # BLD AUTO: 2.68 THOUSANDS/ÂΜL (ref 1.85–7.62)
NEUTS SEG NFR BLD AUTO: 53 % (ref 43–75)
NRBC BLD AUTO-RTO: 0 /100 WBCS
PLATELET # BLD AUTO: 180 THOUSANDS/UL (ref 149–390)
PMV BLD AUTO: 9.1 FL (ref 8.9–12.7)
POTASSIUM SERPL-SCNC: 4.7 MMOL/L (ref 3.5–5.3)
RBC # BLD AUTO: 2.98 MILLION/UL (ref 3.88–5.62)
SARS-COV-2 RNA RESP QL NAA+PROBE: NEGATIVE
SODIUM SERPL-SCNC: 137 MMOL/L (ref 135–147)
WBC # BLD AUTO: 5.11 THOUSAND/UL (ref 4.31–10.16)

## 2024-11-05 PROCEDURE — 80048 BASIC METABOLIC PNL TOTAL CA: CPT | Performed by: INTERNAL MEDICINE

## 2024-11-05 PROCEDURE — 99239 HOSP IP/OBS DSCHRG MGMT >30: CPT | Performed by: INTERNAL MEDICINE

## 2024-11-05 PROCEDURE — 85025 COMPLETE CBC W/AUTO DIFF WBC: CPT | Performed by: INTERNAL MEDICINE

## 2024-11-05 PROCEDURE — 87635 SARS-COV-2 COVID-19 AMP PRB: CPT | Performed by: INTERNAL MEDICINE

## 2024-11-05 PROCEDURE — 82948 REAGENT STRIP/BLOOD GLUCOSE: CPT

## 2024-11-05 RX ORDER — QUETIAPINE FUMARATE 25 MG/1
25 TABLET, FILM COATED ORAL
Start: 2024-11-05 | End: 2025-04-28

## 2024-11-05 RX ADMIN — APIXABAN 2.5 MG: 2.5 TABLET, FILM COATED ORAL at 09:15

## 2024-11-05 RX ADMIN — INSULIN LISPRO 1 UNITS: 100 INJECTION, SOLUTION INTRAVENOUS; SUBCUTANEOUS at 09:16

## 2024-11-05 RX ADMIN — LISINOPRIL 5 MG: 5 TABLET ORAL at 09:15

## 2024-11-05 RX ADMIN — INSULIN LISPRO 1 UNITS: 100 INJECTION, SOLUTION INTRAVENOUS; SUBCUTANEOUS at 11:50

## 2024-11-05 RX ADMIN — MAGNESIUM OXIDE TAB 400 MG (241.3 MG ELEMENTAL MG) 400 MG: 400 (241.3 MG) TAB at 09:15

## 2024-11-05 RX ADMIN — ESCITALOPRAM OXALATE 5 MG: 5 TABLET, FILM COATED ORAL at 09:15

## 2024-11-05 NOTE — CASE MANAGEMENT
Case Management Discharge Planning Note    Patient name Leroy Paredes  Location [facility] 901/[facility] 901-01 MRN [id number]  : 1930 Date 2024       Current Admission Date: 10/31/2024  Current Admission Diagnosis:Dementia with agitation (HCC)   Patient Active Problem List    Diagnosis Date Noted Date Diagnosed    Dementia with agitation ([facility]) 2024     History of rib fracture 2024     Fracture of multiple ribs of left side 10/30/2024     Spleen injury, initial encounter 10/30/2024     Chronic heart failure with preserved ejection fraction ([facility]) 10/25/2024     Insomnia 2024     Acute metabolic encephalopathy 2024     Ambulatory dysfunction 2024     Severe protein-calorie malnutrition ([facility]) 2024     Syncope 2024     Hyperlactatemia 2024     A-fib ([facility]) 2024     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[facility]) 2024     Encounter for loop recorder at end of battery life 2024     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  ([facility]) 2023     Closed fracture of one rib of left side with routine healing 09/15/2022     Fall 09/10/2022     Skin tear of right elbow without complication 2022     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[facility]) 2021     Embolic bilateral punctate CVA, acute as well as subacute 2020     Essential hypertension 2020     Secondary hyperparathyroidism of renal origin ([facility]) 2019     Acute kidney injury superimposed on chronic kidney disease  ([facility]) 2018     CKD stage 4 due to type 2 diabetes mellitus ([facility]) 2018     Palpitations 2018     Enlarged prostate without lower urinary tract symptoms (luts) 2013     Controlled diabetes mellitus ([facility]) 2012     Hyperlipidemia 2012     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 ([facility]) 2012       LOS (days): 3  Geometric Mean LOS (GMLOS) (days): 4.7  Days to GMLOS:1.6     OBJECTIVE:  Risk of  Unplanned Readmission Score: 31.82         Current admission status: Inpatient   Preferred Pharmacy:   [facility] - [place] - [address]  [address]  [place] [address]  Phone: [phone] Fax: [phone]    [facility] - [place] - [address]  [address] [place] [address]  Phone: [phone] Fax: [phone]    [facility] - [place], [place] - [address]  [address] [address]  Phone: [phone] Fax: [phone]    Primary Care Provider: Pepito Foster DO    Primary Insurance: MEDICARE  Secondary Insurance: BLUE CROSS    DISCHARGE DETAILS:                             Treatment Team Recommendation: Facility Return ([facility] - secure unit)  Discharge Destination Plan:: Facility Return ([facility] - Secure Unit)  Transport at Discharge : [facility]     Number/Name of Dispatcher: Roundtrip  Transported by (Company and Unit #): [id number]  ETA of Transport (Date): 11/05/24  ETA of Transport (Time): 1400                       Additional Comments: Pt is scheduled to DC today to [facility] via [facility] @ 2:00 pm. Pt will be moved to Secure unit. CM faxed over completed DME, cognitive screen form, and requested clinical to [facility]. CM will continue to follow as needed.

## 2024-11-05 NOTE — PROGRESS NOTES
Pastoral Care Progress Note               11/05/24 1500   Clinical Encounter Type   Visited With Patient  (Father Anirudh)   Sabianist Encounters   Sabianist Needs Prayer  (Father Anirudh offered the patient a blessing)

## 2024-11-05 NOTE — PLAN OF CARE
Problem: Potential for Falls  Goal: Patient will remain free of falls  Description: INTERVENTIONS:  - Educate patient/family on patient safety including physical limitations  - Instruct patient to call for assistance with activity   - Consult OT/PT to assist with strengthening/mobility   - Keep Call bell within reach  - Keep bed low and locked with side rails adjusted as appropriate  - Keep care items and personal belongings within reach  - Initiate and maintain comfort rounds  - Make Fall Risk Sign visible to staff  - Offer Toileting every 2 Hours, in advance of need  - Initiate/Maintain alarm  - Obtain necessary fall risk management equipment:   - Apply yellow socks and bracelet for high fall risk patients  - Consider moving patient to room near nurses station  Outcome: Progressing     Problem: Prexisting or High Potential for Compromised Skin Integrity  Goal: Skin integrity is maintained or improved  Description: INTERVENTIONS:  - Identify patients at risk for skin breakdown  - Assess and monitor skin integrity  - Assess and monitor nutrition and hydration status  - Monitor labs   - Assess for incontinence   - Turn and reposition patient  - Assist with mobility/ambulation  - Relieve pressure over bony prominences  - Avoid friction and shearing  - Provide appropriate hygiene as needed including keeping skin clean and dry  - Evaluate need for skin moisturizer/barrier cream  - Collaborate with interdisciplinary team   - Patient/family teaching  - Consider wound care consult   Outcome: Progressing     Problem: DISCHARGE PLANNING  Goal: Discharge to home or other facility with appropriate resources  Description: INTERVENTIONS:  - Identify barriers to discharge w/patient and caregiver  - Arrange for needed discharge resources and transportation as appropriate  - Identify discharge learning needs (meds, wound care, etc.)  - Arrange for interpretive services to assist at discharge as needed  - Refer to Case Management  Department for coordinating discharge planning if the patient needs post-hospital services based on physician/advanced practitioner order or complex needs related to functional status, cognitive ability, or social support system  Outcome: Progressing

## 2024-11-05 NOTE — PLAN OF CARE
Problem: DISCHARGE PLANNING  Goal: Discharge to home or other facility with appropriate resources  Description: INTERVENTIONS:  - Identify barriers to discharge w/patient and caregiver  - Arrange for needed discharge resources and transportation as appropriate  - Identify discharge learning needs (meds, wound care, etc.)  - Arrange for interpretive services to assist at discharge as needed  - Refer to Case Management Department for coordinating discharge planning if the patient needs post-hospital services based on physician/advanced practitioner order or complex needs related to functional status, cognitive ability, or social support system  Outcome: Progressing     Problem: Prexisting or High Potential for Compromised Skin Integrity  Goal: Skin integrity is maintained or improved  Description: INTERVENTIONS:  - Identify patients at risk for skin breakdown  - Assess and monitor skin integrity  - Assess and monitor nutrition and hydration status  - Monitor labs   - Assess for incontinence   - Turn and reposition patient  - Assist with mobility/ambulation  - Relieve pressure over bony prominences  - Avoid friction and shearing  - Provide appropriate hygiene as needed including keeping skin clean and dry  - Evaluate need for skin moisturizer/barrier cream  - Collaborate with interdisciplinary team   - Patient/family teaching  - Consider wound care consult   Outcome: Progressing

## 2024-11-05 NOTE — DISCHARGE SUMMARY
"Discharge Summary - Hospitalist   Name: Leroy Paredes 89 y.o. male I MRN: [id number]  [facility] I Date of Admission: 10/31/2024   Date of Service: 11/5/2024 I Hospital Day: 3       Discharging Physician / Practitioner: Tamara Hernandez MD  PCP: Pepito Foster DO  Admission Date:   Admission Orders (From admission, onward)       Ordered        11/02/24 0945  INPATIENT ADMISSION  Once            10/31/24 2310  Place in Observation  Once                          Discharge Date: 11/05/24    Principal discharge diagnoses:  Cognitive impairment with agitated behaviors    Secondary diagnoses:  Atrial fibrillation  CKD-IV  Multiple left-sided rib fractures  Insomnia  Type 2 diabetes  Hypertension    Consultations During Hospital Stay:  Geriatrics  Acute pain    Procedures Performed:   Chest Xray - No radiographically evident acute rib fractures. Trace bilateral pleural effusions. Some increased opacity right lung base could represent atelectasis or infiltrate superimposed on a background of interstitial fibrotic changes. Left sixth rib fracture with callus formation is noted.    Hospital Course:   Leroy Paredes is a 89 y.o. male patient who originally presented to the hospital on 10/31/2024 due to agitation, attempted elopement from his assisted living facility.   He was evaluated by geriatrics and begun on Seroquel 25 mg at 2000 with improvement. He is being discharged to his prior facility to a secure unit.     Condition at Discharge: stable    Discharge Day Visit / Exam:   Subjective:    Vitals: Blood Pressure: 136/61 (11/05/24 0710)  Pulse: 75 (11/04/24 2122)  Temperature: 98.2 °F (36.8 °C) (11/04/24 2122)  Temp Source: Oral (11/04/24 2122)  Respirations: 17 (11/05/24 0710)  Height: 5' 11\" (180.3 cm) (11/01/24 1726)  Weight - Scale: 87.1 kg (192 lb) (11/01/24 1726)  SpO2: 98 % (11/04/24 2122)  Physical Exam  Vitals reviewed.   HENT:      Head: Normocephalic.      Mouth/Throat:      Mouth: Mucous " membranes are moist.   Eyes:      Extraocular Movements: Extraocular movements intact.   Cardiovascular:      Rate and Rhythm: Normal rate and regular rhythm.   Pulmonary:      Effort: Pulmonary effort is normal.      Breath sounds: Normal breath sounds.   Abdominal:      General: Bowel sounds are normal. There is no distension.      Palpations: Abdomen is soft.      Tenderness: There is no abdominal tenderness.   Musculoskeletal:         General: No swelling.      Cervical back: Neck supple.   Skin:     General: Skin is warm and dry.   Neurological:      General: No focal deficit present.      Mental Status: He is alert.   Psychiatric:         Mood and Affect: Mood normal.          Discussion with Family: Updated  (niece) via phone.    Discharge instructions/Information to patient and family:   See after visit summary for information provided to patient and family.      Provisions for Follow-Up Care:  See after visit summary for information related to follow-up care and any pertinent home health orders.      Mobility at time of Discharge:   Basic Mobility Inpatient Raw Score: 17  JH-HLM Goal: 5: Stand one or more mins  JH-HLM Achieved: 4: Move to chair/commode  HLM Goal NOT achieved. Continue to encourage mobility in post discharge setting.     Disposition:   Assisted Living Facility at [facility]    Planned Readmission: No    Discharge Medications:  See after visit summary for reconciled discharge medications provided to patient and/or family.      Administrative Statements   Discharge Statement:  I have spent a total time of 40 minutes in caring for this patient on the day of the visit/encounter. >30 minutes of time was spent on: Patient and family education, Impressions, Counseling / Coordination of care, Documenting in the medical record, Reviewing / ordering tests, medicine, procedures  , and Communicating with other healthcare professionals .    **Please Note: This note may have been  constructed using a voice recognition system**

## 2024-11-08 ENCOUNTER — APPOINTMENT (OUTPATIENT)
Dept: LAB | Facility: CLINIC | Age: 89
End: 2024-11-08
Payer: MEDICARE

## 2024-11-08 DIAGNOSIS — E11.22 CKD STAGE 4 DUE TO TYPE 2 DIABETES MELLITUS (HCC): ICD-10-CM

## 2024-11-08 DIAGNOSIS — N18.4 BENIGN HYPERTENSION WITH CKD (CHRONIC KIDNEY DISEASE) STAGE IV (HCC): ICD-10-CM

## 2024-11-08 DIAGNOSIS — N18.4 CKD STAGE 4 DUE TO TYPE 2 DIABETES MELLITUS (HCC): ICD-10-CM

## 2024-11-08 DIAGNOSIS — I10 ESSENTIAL HYPERTENSION: ICD-10-CM

## 2024-11-08 DIAGNOSIS — I12.9 BENIGN HYPERTENSION WITH CKD (CHRONIC KIDNEY DISEASE) STAGE IV (HCC): ICD-10-CM

## 2024-11-08 DIAGNOSIS — E08.21 DIABETES MELLITUS DUE TO UNDERLYING CONDITION, CONTROLLED, WITH DIABETIC NEPHROPATHY, WITH LONG-TERM CURRENT USE OF INSULIN (HCC): ICD-10-CM

## 2024-11-08 DIAGNOSIS — Z79.4 DIABETES MELLITUS DUE TO UNDERLYING CONDITION, CONTROLLED, WITH DIABETIC NEPHROPATHY, WITH LONG-TERM CURRENT USE OF INSULIN (HCC): ICD-10-CM

## 2024-11-08 DIAGNOSIS — E43 SEVERE PROTEIN-CALORIE MALNUTRITION (HCC): ICD-10-CM

## 2024-11-08 LAB
ALBUMIN SERPL BCG-MCNC: 3.6 G/DL (ref 3.5–5)
ALP SERPL-CCNC: 105 U/L (ref 34–104)
ALT SERPL W P-5'-P-CCNC: 18 U/L (ref 7–52)
ANION GAP SERPL CALCULATED.3IONS-SCNC: 9 MMOL/L (ref 4–13)
AST SERPL W P-5'-P-CCNC: 19 U/L (ref 13–39)
BASOPHILS # BLD AUTO: 0.06 THOUSANDS/ÂΜL (ref 0–0.1)
BASOPHILS NFR BLD AUTO: 1 % (ref 0–1)
BILIRUB SERPL-MCNC: 0.47 MG/DL (ref 0.2–1)
BUN SERPL-MCNC: 37 MG/DL (ref 5–25)
CALCIUM SERPL-MCNC: 8.5 MG/DL (ref 8.4–10.2)
CHLORIDE SERPL-SCNC: 105 MMOL/L (ref 96–108)
CO2 SERPL-SCNC: 24 MMOL/L (ref 21–32)
CREAT SERPL-MCNC: 2.13 MG/DL (ref 0.6–1.3)
EOSINOPHIL # BLD AUTO: 0.73 THOUSAND/ÂΜL (ref 0–0.61)
EOSINOPHIL NFR BLD AUTO: 11 % (ref 0–6)
ERYTHROCYTE [DISTWIDTH] IN BLOOD BY AUTOMATED COUNT: 13.6 % (ref 11.6–15.1)
EST. AVERAGE GLUCOSE BLD GHB EST-MCNC: 180 MG/DL
GFR SERPL CREATININE-BSD FRML MDRD: 25 ML/MIN/1.73SQ M
GLUCOSE P FAST SERPL-MCNC: 251 MG/DL (ref 65–99)
HBA1C MFR BLD: 7.9 %
HCT VFR BLD AUTO: 33.1 % (ref 36.5–49.3)
HGB BLD-MCNC: 10.2 G/DL (ref 12–17)
IMM GRANULOCYTES # BLD AUTO: 0.03 THOUSAND/UL (ref 0–0.2)
IMM GRANULOCYTES NFR BLD AUTO: 0 % (ref 0–2)
LYMPHOCYTES # BLD AUTO: 2.09 THOUSANDS/ÂΜL (ref 0.6–4.47)
LYMPHOCYTES NFR BLD AUTO: 30 % (ref 14–44)
MCH RBC QN AUTO: 30.4 PG (ref 26.8–34.3)
MCHC RBC AUTO-ENTMCNC: 30.8 G/DL (ref 31.4–37.4)
MCV RBC AUTO: 99 FL (ref 82–98)
MONOCYTES # BLD AUTO: 0.67 THOUSAND/ÂΜL (ref 0.17–1.22)
MONOCYTES NFR BLD AUTO: 10 % (ref 4–12)
NEUTROPHILS # BLD AUTO: 3.32 THOUSANDS/ÂΜL (ref 1.85–7.62)
NEUTS SEG NFR BLD AUTO: 48 % (ref 43–75)
NRBC BLD AUTO-RTO: 0 /100 WBCS
PLATELET # BLD AUTO: 210 THOUSANDS/UL (ref 149–390)
PMV BLD AUTO: 9.2 FL (ref 8.9–12.7)
POTASSIUM SERPL-SCNC: 4.3 MMOL/L (ref 3.5–5.3)
PROT SERPL-MCNC: 6.5 G/DL (ref 6.4–8.4)
RBC # BLD AUTO: 3.35 MILLION/UL (ref 3.88–5.62)
SODIUM SERPL-SCNC: 138 MMOL/L (ref 135–147)
WBC # BLD AUTO: 6.9 THOUSAND/UL (ref 4.31–10.16)

## 2024-11-08 PROCEDURE — 83036 HEMOGLOBIN GLYCOSYLATED A1C: CPT

## 2024-11-08 PROCEDURE — 80053 COMPREHEN METABOLIC PANEL: CPT

## 2024-11-08 PROCEDURE — 85025 COMPLETE CBC W/AUTO DIFF WBC: CPT

## 2024-11-08 PROCEDURE — 36415 COLL VENOUS BLD VENIPUNCTURE: CPT

## 2024-11-13 ENCOUNTER — RA CDI HCC (OUTPATIENT)
Dept: OTHER | Facility: HOSPITAL | Age: 89
End: 2024-11-13

## 2024-11-13 NOTE — ASSESSMENT & PLAN NOTE
Patient is getting adequate nutrition and weight has gone up.  Denies any problems with his appetite.  No bowel difficulties.    Orders:    Comprehensive metabolic panel; Future     [FreeTextEntry2] : New Patient- neck and lower back pain

## 2024-11-15 DIAGNOSIS — I10 ESSENTIAL HYPERTENSION: ICD-10-CM

## 2024-11-18 ENCOUNTER — IOP CHECK (OUTPATIENT)
Dept: URBAN - METROPOLITAN AREA CLINIC 6 | Facility: CLINIC | Age: 89
End: 2024-11-18

## 2024-11-18 DIAGNOSIS — H40.023: ICD-10-CM

## 2024-11-18 PROCEDURE — 92012 INTRM OPH EXAM EST PATIENT: CPT

## 2024-11-18 PROCEDURE — 92083 EXTENDED VISUAL FIELD XM: CPT

## 2024-11-18 RX ORDER — LISINOPRIL 5 MG/1
5 TABLET ORAL DAILY
Qty: 90 TABLET | Refills: 1 | Status: SHIPPED | OUTPATIENT
Start: 2024-11-18

## 2024-11-18 ASSESSMENT — TONOMETRY
OS_IOP_MMHG: 18
OD_IOP_MMHG: 17
OS_IOP_MMHG: 18
OD_IOP_MMHG: 19

## 2024-11-18 ASSESSMENT — VISUAL ACUITY
OD_SC: 20/40
OS_SC: 20/200
OS_PH: 20/50

## 2024-11-20 ENCOUNTER — OFFICE VISIT (OUTPATIENT)
Age: 89
End: 2024-11-20
Payer: MEDICARE

## 2024-11-20 ENCOUNTER — TELEPHONE (OUTPATIENT)
Age: 89
End: 2024-11-20

## 2024-11-20 VITALS
SYSTOLIC BLOOD PRESSURE: 148 MMHG | OXYGEN SATURATION: 95 % | BODY MASS INDEX: 23.83 KG/M2 | HEART RATE: 83 BPM | RESPIRATION RATE: 18 BRPM | DIASTOLIC BLOOD PRESSURE: 58 MMHG | TEMPERATURE: 97.4 F | WEIGHT: 170.2 LBS | HEIGHT: 71 IN

## 2024-11-20 DIAGNOSIS — N18.4 CKD STAGE 4 DUE TO TYPE 2 DIABETES MELLITUS (HCC): Primary | ICD-10-CM

## 2024-11-20 DIAGNOSIS — E43 SEVERE PROTEIN-CALORIE MALNUTRITION (HCC): ICD-10-CM

## 2024-11-20 DIAGNOSIS — E11.9 TYPE 2 DIABETES MELLITUS WITHOUT COMPLICATION, WITHOUT LONG-TERM CURRENT USE OF INSULIN (HCC): ICD-10-CM

## 2024-11-20 DIAGNOSIS — E78.2 MIXED HYPERLIPIDEMIA: ICD-10-CM

## 2024-11-20 DIAGNOSIS — I12.9 BENIGN HYPERTENSION WITH CKD (CHRONIC KIDNEY DISEASE) STAGE IV (HCC): ICD-10-CM

## 2024-11-20 DIAGNOSIS — I10 ESSENTIAL HYPERTENSION: ICD-10-CM

## 2024-11-20 DIAGNOSIS — F51.01 PRIMARY INSOMNIA: ICD-10-CM

## 2024-11-20 DIAGNOSIS — E11.22 CKD STAGE 4 DUE TO TYPE 2 DIABETES MELLITUS (HCC): Primary | ICD-10-CM

## 2024-11-20 DIAGNOSIS — R41.89 COGNITIVE IMPAIRMENT: ICD-10-CM

## 2024-11-20 DIAGNOSIS — I48.91 ATRIAL FIBRILLATION, UNSPECIFIED TYPE (HCC): ICD-10-CM

## 2024-11-20 DIAGNOSIS — N18.4 BENIGN HYPERTENSION WITH CKD (CHRONIC KIDNEY DISEASE) STAGE IV (HCC): ICD-10-CM

## 2024-11-20 PROCEDURE — 99214 OFFICE O/P EST MOD 30 MIN: CPT | Performed by: INTERNAL MEDICINE

## 2024-11-20 PROCEDURE — G2211 COMPLEX E/M VISIT ADD ON: HCPCS | Performed by: INTERNAL MEDICINE

## 2024-11-20 NOTE — ASSESSMENT & PLAN NOTE
Patient over the last year has had a significant decline in his cognitive status.  This was noted mostly acutely by his family members.  Patient is now no longer living at home and is in a safe environment.  He is still adjusting to the new circumstances both physically and socially.  Patient was evaluated today.  Has difficulties as far as knowing time and date.  Knows the holidays are coming up.  States he is living at home but as noted is living in a personal care facility

## 2024-11-20 NOTE — ASSESSMENT & PLAN NOTE
Patient taking Remeron at night which is helping him with his sleep disorder.  Patient is taking the medication as directed but he is not cognizant of the fact that he is on this medication

## 2024-11-20 NOTE — ASSESSMENT & PLAN NOTE
History of hyperlipidemia.  In order to keep his cholesterol under control he remains on atorvastatin at 40 mg daily.

## 2024-11-20 NOTE — TELEPHONE ENCOUNTER
Jose E bonilla is faxing a PT and OC therapy paperwork for Dr to sign. Please look out for that. Thank you

## 2024-11-20 NOTE — ASSESSMENT & PLAN NOTE
Continues to follow-up with nephrology.  They have made no recent changes of medication and has a order for extensive labs to be performed in the next few months and follow-up with their office.  Lab Results   Component Value Date    HGBA1C 7.9 (H) 11/08/2024       Orders:    Basic metabolic panel; Future

## 2024-11-20 NOTE — ASSESSMENT & PLAN NOTE
Lab Results   Component Value Date    EGFR 25 11/08/2024    EGFR 24 11/05/2024    EGFR 32 10/31/2024    CREATININE 2.13 (H) 11/08/2024    CREATININE 2.21 (H) 11/05/2024    CREATININE 1.75 (H) 10/31/2024       Orders:    Basic metabolic panel; Future

## 2024-11-20 NOTE — PROGRESS NOTES
Name: Leroy Paredes      : 1930      MRN: [id number]  Encounter Provider: Pepito Foster DO  Encounter Date: 2024   Encounter department: [facility] INTERNAL MEDICINE    Assessment & Plan  CKD stage 4 due to type 2 diabetes mellitus (HCC)  Continues to follow-up with nephrology.  They have made no recent changes of medication and has a order for extensive labs to be performed in the next few months and follow-up with their office.  Lab Results   Component Value Date    HGBA1C 7.9 (H) 2024       Orders:    Basic metabolic panel; Future    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 (HCC)  Lab Results   Component Value Date    EGFR 25 2024    EGFR 24 2024    EGFR 32 10/31/2024    CREATININE 2.13 (H) 2024    CREATININE 2.21 (H) 2024    CREATININE 1.75 (H) 10/31/2024       Orders:    Basic metabolic panel; Future    Type 2 diabetes mellitus without complication, without long-term current use of insulin (HCC)    Lab Results   Component Value Date    HGBA1C 7.9 (H) 2024       Orders:    Hemoglobin A1C; Future    Severe protein-calorie malnutrition (HCC)  When patient was living alone was not getting adequate nutrition and patient with hospitalization was noted to have a severe protein calorie malnutrition.  Patient is on a diet at a personal care facility and is getting adequate nutrition.  We will continue to follow his weight and his routine labs and hopefully patient will remain stable.         Essential hypertension  Patient continues to have his blood pressure monitor at the personal care facility where he resides.  Blood pressure today is slightly elevated from baseline but otherwise it has been showing adequate control with present treatment.  His nephrologist continues to keep an eye on his kidney function which has been stable and actually did improve with patient now getting adequate hydration.  Will continue to monitor this and  along with nephrology will make adjustments with medication in the future if needed.         Primary insomnia  Patient taking Remeron at night which is helping him with his sleep disorder.  Patient is taking the medication as directed but he is not cognizant of the fact that he is on this medication         Mixed hyperlipidemia  History of hyperlipidemia.  In order to keep his cholesterol under control he remains on atorvastatin at 40 mg daily.         Cognitive impairment  Patient over the last year has had a significant decline in his cognitive status.  This was noted mostly acutely by his family members.  Patient is now no longer living at home and is in a safe environment.  He is still adjusting to the new circumstances both physically and socially.  Patient was evaluated today.  Has difficulties as far as knowing time and date.  Knows the holidays are coming up.  States he is living at home but as noted is living in a personal care facility         Atrial fibrillation, unspecified type (HCC)  Patient remains on oral anticoagulants.  Heart rate is controlled today and patient has some ectopy but not severe on auscultation today in the office.  Will continue to follow-up with cardiology              History of Present Illness     Patient is a 89-year-old male history of multiple medical problems who is here today for follow-up visit.  Since his last visit in our office patient has been evaluated treated during hospitalization and now is in a personal care facility.  Patient is accompanied by his family today for the visit.  He is awake alert in no acute distress.  Patient is getting adequate nutrition and assistance with all activities of daily living.  Patient is used to living on his own alone and is not used to the care that he is receiving but he is slowly adapting.      Review of Systems   Constitutional: Negative.    HENT: Negative.     Eyes: Negative.    Respiratory: Negative.     Cardiovascular: Negative.     Gastrointestinal: Negative.    Endocrine: Negative.    Genitourinary: Negative.    Musculoskeletal: Negative.         Some generalized arthritic aches and pains but he states there is nothing acute.  No recent accident or injury   Skin: Negative.    Allergic/Immunologic: Negative.    Neurological: Negative.    Hematological: Negative.    Psychiatric/Behavioral: Negative.       Past Medical History:   Diagnosis Date    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)     Last Assessed:  5/19/17    Chronic kidney disease     Diabetes mellitus (HCC)     Hyperlipidemia     Hypertension     Osteoarthritis     Palpitations     TIA (transient ischemic attack)      Past Surgical History:   Procedure Laterality Date    APPENDECTOMY      CARDIAC ELECTROPHYSIOLOGY PROCEDURE N/A 1/17/2024    Procedure: Cardiac loop recorder explant;  Surgeon: Drew Olmos MD;  [facility]: [facility];  Service: Cardiology    COLONOSCOPY  2012    HEMORROIDECTOMY      TOOTH EXTRACTION  02/02/2022     Family History   Problem Relation Age of Onset    Diabetes Mother     Other Mother         Stroke syndrome    Diabetes Father     Emphysema Father      Social History     Tobacco Use    Smoking status: Former    Smokeless tobacco: Never   Vaping Use    Vaping status: Never Used   Substance and Sexual Activity    Alcohol use: Not Currently     Comment: Social drinker    Drug use: No    Sexual activity: Not Currently     Current Outpatient Medications on File Prior to Visit   Medication Sig    acetaminophen (TYLENOL) 325 mg tablet Take 2 tablets (650 mg total) by mouth every 6 (six) hours as needed for mild pain    apixaban (Eliquis) 2.5 mg TAKE ONE TABLET BY MOUTH TWO TIMES PER DAY *PATIENT SUPPLY    atorvastatin (LIPITOR) 40 mg tablet TAKE 1 TABLET EVERY DAY WITH DINNER    bisacodyl ([facility] Bisacodyl) 10 mg suppository Insert 10 mg into the rectum daily    Blood Glucose Monitoring Suppl (PRECISION XTRA MONITOR) MARY by Does not apply route daily     "Cholecalciferol (D3 High Potency) 25 MCG (1000 UT) capsule TAKE ONE CAPSULE BY MOUTH DAILY    escitalopram (LEXAPRO) 5 mg tablet TAKE ONE TABLET BY MOUTH DAILY    gabapentin (NEURONTIN) 100 mg capsule TAKE ONE CAPSULE BY MOUTH EVERY NIGHT AT BEDTIME    lisinopril (ZESTRIL) 5 mg tablet TAKE ONE TABLET BY MOUTH DAILY    magnesium oxide (MAG-OX) 400 mg Take 400 mg by mouth daily    melatonin 3 mg TAKE ONE TABLET BY MOUTH EVERY NIGHT AT BEDTIME    mirtazapine (REMERON) 15 mg tablet Take 1 tablet (15 mg total) by mouth daily at bedtime    PRECISION XTRA TEST STRIPS test strip USE TO TEST BLOOD GLUCOSE ONCE A DAY    QUEtiapine (SEROquel) 25 mg tablet Take 1 tablet (25 mg total) by mouth daily at bedtime    terazosin (HYTRIN) 1 mg capsule TAKE 1 CAPSULE AT BEDTIME    vitamin B-12 (CYANOCOBALAMIN) 500 MCG TABS Take 1 tablet (500 mcg total) by mouth 2 (two) times a week     No Known Allergies  Immunization History   Administered Date(s) Administered    COVID-19 MODERNA VACC 0.5 ML IM 01/27/2021, 02/22/2021, 11/22/2021, 07/23/2022    INFLUENZA 11/01/2002, 01/05/2003, 11/24/2003, 10/06/2004, 11/07/2005, 10/21/2006, 09/28/2007, 10/10/2008, 10/20/2008, 11/04/2009, 10/05/2010, 10/03/2011, 11/04/2012    Influenza Split High Dose Preservative Free IM 09/27/2013, 09/25/2014, 09/29/2015, 09/23/2016, 09/19/2017    Influenza, high dose seasonal 0.7 mL 09/25/2018, 10/15/2020, 09/20/2021, 09/26/2022, 10/02/2023    Pneumococcal 01/01/1999    Pneumococcal Conjugate 13-Valent 04/25/2019    Pneumococcal Polysaccharide PPV23 10/01/2006    TD (adult) Preservative Free 12/11/1930     Objective   /58   Pulse 83   Temp (!) 97.4 °F (36.3 °C)   Resp 18   Ht 5' 11\" (1.803 m)   Wt 77.2 kg (170 lb 3.2 oz)   SpO2 95%   BMI 23.74 kg/m²     Physical Exam  Vitals and nursing note reviewed.   Constitutional:       General: He is not in acute distress.     Appearance: Normal appearance. He is normal weight. He is not ill-appearing, " toxic-appearing or diaphoretic.      Comments: Patient is a cheerful mildly demented 89-year-old male who is awake alert in no acute distress.  Accompanied by family.   HENT:      Head: Normocephalic and atraumatic.      Right Ear: Tympanic membrane, ear canal and external ear normal. There is no impacted cerumen.      Left Ear: Tympanic membrane, ear canal and external ear normal. There is no impacted cerumen.      Nose: Nose normal. No congestion or rhinorrhea.      Mouth/Throat:      Mouth: Mucous membranes are moist.      Pharynx: Oropharynx is clear. No oropharyngeal exudate or posterior oropharyngeal erythema.   Eyes:      General: No scleral icterus.        Right eye: No discharge.         Left eye: No discharge.      Extraocular Movements: Extraocular movements intact.      Conjunctiva/sclera: Conjunctivae normal.      Pupils: Pupils are equal, round, and reactive to light.      Comments: Was seen evaluated by ophthalmology on Monday   Neck:      Vascular: No carotid bruit.   Cardiovascular:      Rate and Rhythm: Normal rate. Rhythm irregular.      Heart sounds: Normal heart sounds. No murmur heard.     No friction rub. No gallop.      Comments: Occasional ectopy on auscultation.  Pulmonary:      Effort: Pulmonary effort is normal. No respiratory distress.      Breath sounds: No stridor. No wheezing, rhonchi or rales.      Comments: Mildly decreased breath sounds anteriorly and posteriorly but no rales rhonchi or wheezes could be appreciated on exam  Chest:      Chest wall: No tenderness.   Abdominal:      General: Abdomen is flat. Bowel sounds are normal. There is no distension.      Palpations: Abdomen is soft. There is no mass.      Tenderness: There is no abdominal tenderness. There is no right CVA tenderness, left CVA tenderness, guarding or rebound.      Hernia: No hernia is present.   Musculoskeletal:         General: Deformity present. No swelling, tenderness or signs of injury.      Cervical back:  Normal range of motion and neck supple. No rigidity or tenderness.      Right lower leg: No edema.      Left lower leg: No edema.   Lymphadenopathy:      Cervical: No cervical adenopathy.   Skin:     General: Skin is warm and dry.      Coloration: Skin is not jaundiced or pale.      Findings: No bruising, erythema, lesion or rash.   Neurological:      Mental Status: He is alert. Mental status is at baseline.      Cranial Nerves: No cranial nerve deficit.      Sensory: No sensory deficit.      Motor: Weakness present.      Coordination: Coordination normal.      Gait: Gait abnormal.      Deep Tendon Reflexes: Reflexes normal.      Comments: Walking with a walker, some weakness to bilateral lower extremities.   Psychiatric:         Mood and Affect: Mood normal.      Comments: Patient has a relatively cheerful mood.  Patient has some disorientation to time and date thought processes are sometimes erratic.  Patient still thinks he is living at home

## 2024-11-20 NOTE — ASSESSMENT & PLAN NOTE
Patient continues to have his blood pressure monitor at the [facility] facility where he resides.  Blood pressure today is slightly elevated from baseline but otherwise it has been showing adequate control with present treatment.  His nephrologist continues to keep an eye on his kidney function which has been stable and actually did improve with patient now getting adequate hydration.  Will continue to monitor this and along with nephrology will make adjustments with medication in the future if needed.

## 2024-11-20 NOTE — ASSESSMENT & PLAN NOTE
Patient remains on oral anticoagulants.  Heart rate is controlled today and patient has some ectopy but not severe on auscultation today in the office.  Will continue to follow-up with cardiology

## 2024-11-20 NOTE — ASSESSMENT & PLAN NOTE
When patient was living alone was not getting adequate nutrition and patient with hospitalization was noted to have a severe protein calorie malnutrition.  Patient is on a diet at a personal care facility and is getting adequate nutrition.  We will continue to follow his weight and his routine labs and hopefully patient will remain stable.

## 2024-11-22 DIAGNOSIS — I48.91 ATRIAL FIBRILLATION, UNSPECIFIED TYPE (HCC): ICD-10-CM

## 2024-11-22 RX ORDER — APIXABAN 2.5 MG/1
TABLET, FILM COATED ORAL
Qty: 60 TABLET | Refills: 2 | Status: SHIPPED | OUTPATIENT
Start: 2024-11-22

## 2024-12-03 ENCOUNTER — APPOINTMENT (OUTPATIENT)
Dept: LAB | Facility: CLINIC | Age: 89
End: 2024-12-03
Payer: MEDICARE

## 2024-12-03 DIAGNOSIS — E11.9 TYPE 2 DIABETES MELLITUS WITHOUT COMPLICATION, WITHOUT LONG-TERM CURRENT USE OF INSULIN (HCC): ICD-10-CM

## 2024-12-03 DIAGNOSIS — E11.22 CKD STAGE 4 DUE TO TYPE 2 DIABETES MELLITUS (HCC): ICD-10-CM

## 2024-12-03 DIAGNOSIS — N18.4 CKD STAGE 4 DUE TO TYPE 2 DIABETES MELLITUS (HCC): ICD-10-CM

## 2024-12-03 DIAGNOSIS — N18.4 BENIGN HYPERTENSION WITH CKD (CHRONIC KIDNEY DISEASE) STAGE IV (HCC): ICD-10-CM

## 2024-12-03 DIAGNOSIS — I12.9 BENIGN HYPERTENSION WITH CKD (CHRONIC KIDNEY DISEASE) STAGE IV (HCC): ICD-10-CM

## 2024-12-03 LAB
ANION GAP SERPL CALCULATED.3IONS-SCNC: 7 MMOL/L (ref 4–13)
BUN SERPL-MCNC: 34 MG/DL (ref 5–25)
CALCIUM SERPL-MCNC: 8.7 MG/DL (ref 8.4–10.2)
CHLORIDE SERPL-SCNC: 100 MMOL/L (ref 96–108)
CO2 SERPL-SCNC: 26 MMOL/L (ref 21–32)
CREAT SERPL-MCNC: 2.23 MG/DL (ref 0.6–1.3)
EST. AVERAGE GLUCOSE BLD GHB EST-MCNC: 200 MG/DL
GFR SERPL CREATININE-BSD FRML MDRD: 24 ML/MIN/1.73SQ M
GLUCOSE SERPL-MCNC: 286 MG/DL (ref 65–140)
HBA1C MFR BLD: 8.6 %
POTASSIUM SERPL-SCNC: 5 MMOL/L (ref 3.5–5.3)
SODIUM SERPL-SCNC: 133 MMOL/L (ref 135–147)

## 2024-12-03 PROCEDURE — 83036 HEMOGLOBIN GLYCOSYLATED A1C: CPT

## 2024-12-03 PROCEDURE — 80048 BASIC METABOLIC PNL TOTAL CA: CPT

## 2024-12-03 PROCEDURE — 36415 COLL VENOUS BLD VENIPUNCTURE: CPT

## 2024-12-04 DIAGNOSIS — E08.21 DIABETES MELLITUS DUE TO UNDERLYING CONDITION, CONTROLLED, WITH DIABETIC NEPHROPATHY, WITH LONG-TERM CURRENT USE OF INSULIN (HCC): Primary | ICD-10-CM

## 2024-12-04 DIAGNOSIS — Z79.4 DIABETES MELLITUS DUE TO UNDERLYING CONDITION, CONTROLLED, WITH DIABETIC NEPHROPATHY, WITH LONG-TERM CURRENT USE OF INSULIN (HCC): Primary | ICD-10-CM

## 2024-12-04 RX ORDER — NATEGLINIDE 60 MG/1
60 TABLET ORAL
Qty: 90 TABLET | Refills: 5 | Status: SHIPPED | OUTPATIENT
Start: 2024-12-04

## 2024-12-04 NOTE — ASSESSMENT & PLAN NOTE
The patient is getting adequate nutrition his sugar levels as elevated.  Who placed the patient on medication specifically Starlix 3 times a day with food.  Hopefully this will help keep his sugar under control and the hopes are not having any hypoglycemia.  The order was sent to his prescription plan through his nursing home.  Lab Results   Component Value Date    HGBA1C 8.6 (H) 12/03/2024

## 2024-12-12 DIAGNOSIS — R41.0 CONFUSION: ICD-10-CM

## 2024-12-12 DIAGNOSIS — R39.9 UTI SYMPTOMS: Primary | ICD-10-CM

## 2024-12-12 DIAGNOSIS — Z00.00 HEALTHCARE MAINTENANCE: ICD-10-CM

## 2024-12-12 DIAGNOSIS — F39 MOOD DISORDER (HCC): ICD-10-CM

## 2024-12-12 DIAGNOSIS — G47.00 INSOMNIA, UNSPECIFIED TYPE: ICD-10-CM

## 2024-12-13 RX ORDER — LANOLIN ALCOHOL/MO/W.PET/CERES
400 CREAM (GRAM) TOPICAL DAILY
Qty: 30 TABLET | Refills: 5 | Status: SHIPPED | OUTPATIENT
Start: 2024-12-13

## 2024-12-13 RX ORDER — GABAPENTIN 100 MG/1
100 CAPSULE ORAL
Qty: 30 CAPSULE | Refills: 5 | Status: SHIPPED | OUTPATIENT
Start: 2024-12-13

## 2024-12-13 RX ORDER — ESCITALOPRAM OXALATE 5 MG/1
5 TABLET ORAL DAILY
Qty: 30 TABLET | Refills: 5 | Status: SHIPPED | OUTPATIENT
Start: 2024-12-13

## 2024-12-13 RX ORDER — CHOLECALCIFEROL (VITAMIN D3) 25 MCG
1000 CAPSULE ORAL DAILY
Qty: 30 CAPSULE | Refills: 5 | Status: SHIPPED | OUTPATIENT
Start: 2024-12-13

## 2024-12-18 ENCOUNTER — APPOINTMENT (OUTPATIENT)
Dept: LAB | Facility: CLINIC | Age: 89
End: 2024-12-18
Payer: MEDICARE

## 2024-12-18 DIAGNOSIS — R39.9 UTI SYMPTOMS: ICD-10-CM

## 2024-12-18 DIAGNOSIS — R41.0 CONFUSION: ICD-10-CM

## 2024-12-18 LAB
BACTERIA UR QL AUTO: ABNORMAL /HPF
BILIRUB UR QL STRIP: NEGATIVE
CLARITY UR: CLEAR
COLOR UR: ABNORMAL
GLUCOSE UR STRIP-MCNC: NEGATIVE MG/DL
HGB UR QL STRIP.AUTO: NEGATIVE
KETONES UR STRIP-MCNC: NEGATIVE MG/DL
LEUKOCYTE ESTERASE UR QL STRIP: ABNORMAL
NITRITE UR QL STRIP: POSITIVE
NON-SQ EPI CELLS URNS QL MICRO: ABNORMAL /HPF
PH UR STRIP.AUTO: 6 [PH]
PROT UR STRIP-MCNC: ABNORMAL MG/DL
RBC #/AREA URNS AUTO: ABNORMAL /HPF
SP GR UR STRIP.AUTO: 1.01 (ref 1–1.03)
UROBILINOGEN UR STRIP-ACNC: <2 MG/DL
WBC #/AREA URNS AUTO: ABNORMAL /HPF

## 2024-12-18 PROCEDURE — 87086 URINE CULTURE/COLONY COUNT: CPT

## 2024-12-18 PROCEDURE — 87186 SC STD MICRODIL/AGAR DIL: CPT

## 2024-12-18 PROCEDURE — 81001 URINALYSIS AUTO W/SCOPE: CPT

## 2024-12-18 PROCEDURE — 87147 CULTURE TYPE IMMUNOLOGIC: CPT

## 2024-12-18 PROCEDURE — 87077 CULTURE AEROBIC IDENTIFY: CPT

## 2024-12-20 LAB — BACTERIA UR CULT: ABNORMAL

## 2024-12-30 DIAGNOSIS — N30.00 ACUTE CYSTITIS WITHOUT HEMATURIA: Primary | ICD-10-CM

## 2024-12-30 RX ORDER — CEPHALEXIN 500 MG/1
500 CAPSULE ORAL 2 TIMES DAILY
Qty: 14 CAPSULE | Refills: 0 | Status: SHIPPED | OUTPATIENT
Start: 2024-12-30 | End: 2025-01-06

## 2025-01-17 ENCOUNTER — TELEPHONE (OUTPATIENT)
Age: OVER 89
End: 2025-01-17

## 2025-01-17 DIAGNOSIS — E78.2 MIXED HYPERLIPIDEMIA: ICD-10-CM

## 2025-01-17 DIAGNOSIS — Z00.00 HEALTHCARE MAINTENANCE: Primary | ICD-10-CM

## 2025-01-17 PROBLEM — I48.91 A-FIB (HCC): Status: RESOLVED | Noted: 2024-03-07 | Resolved: 2025-01-17

## 2025-01-17 PROBLEM — G93.41 ACUTE METABOLIC ENCEPHALOPATHY: Status: RESOLVED | Noted: 2024-08-26 | Resolved: 2025-01-17

## 2025-01-23 NOTE — PROGRESS NOTES
Cardiology Follow Up    Leroy Paredes  12/11/1930  [id number]  [facility] [place]  [address]  [place] [address] [phone]    Assessment & Plan  Chronic heart failure with preserved ejection fraction (HCC)  Wt Readings from Last 3 Encounters:   01/27/25 78.6 kg (173 lb 4.8 oz)   11/20/24 77.2 kg (170 lb 3.2 oz)   11/01/24 87.1 kg (192 lb)   NYHA class III stage C  LVEF 50% reduced from LVEF 55% by TTE on 6/29/20, conservative management   On physical exam euvolemic compensated  Blood pressure heart rate controlled  Not on diuretics  Continue on lisinopril 5 mg daily  2 g sodium diet   monitor for signs and symptoms of heart failure    Atrial fibrillation, unspecified type (HCC)  [id number]   On Eliquis 2.5 mg twice daily for stroke prevention  Hx of loop removed   Denies difficulties with bleeding.  Not on AV cat blocker  Essential hypertension  RUE sitting 110/60  Well-controlled on lisinopril 5 mg daily,  DASH diet  Mixed hyperlipidemia  7/12/24  TG 85 HDL 32 LDL 60  Continue on Lipitor 40 mg daily, heart healthy diet continue to walk daily  CKD stage 4 due to type 2 diabetes mellitus (HCC)    Lab Results   Component Value Date    HGBA1C 8.6 (H) 12/03/2024   CKD IV baseline creat 2.13 - 2.23   Followed by Dr Christian       Interval History:   Mr Leroy Paredes presents to our office for a 3 month follow up visit.  Leroy is accompanied by his son and wife.  He denies dyspnea with minimal or moderate exertion chest pain palpitations lightheadedness or dizziness.  He is eating and drinking well and walking around the facility.  Cording to his wife he will undergo a COVID vaccination and flu vaccination in the near future.    Medical History   Primary Cardiologist Dr Humphries   Resides at [facility] [phone]  Chronic HFpEF LVEF 50%  Hypertension  Hyperlipidemia 7/12/24  TG 85 HDL 32 LDL 60  Atrial  fibrillation [id number]   On Eliquis 2.5 mg twice daily for stroke prevention  Hx of loop removed   Syncope sencondary to orthostatic hypotension, dehydration, malnutrition  CKD IV baseline creat 2.13 - 2.23   Hx of CVA    8/26/24 TTE LVEF 50% grade 1 abnormal relaxation.  Right ventricle cavity size mildly dilated.  Left atrium moderately dilated.  Right atrium dilated.  Mild to moderate TR.  Mild to moderate pulm valve regurgitation.  Aortic root exhibited mild fibrocalcific changes.  No pericardial effusion.    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[facility])    Skin tear of right elbow without complication    Fall    Closed fracture of one rib of left side with routine healing    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  (HCC)    Encounter for loop recorder at end of battery life    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)    Syncope    Hyperlactatemia    Insomnia    Ambulatory dysfunction    Severe protein-calorie malnutrition (HCC)    Chronic heart failure with preserved ejection fraction (HCC)    Fracture of multiple ribs of left side    Spleen injury, initial encounter    Cognitive impairment    History of rib fracture    Agitation    Acute cystitis without hematuria     Past Medical History:   Diagnosis Date    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)     Last Assessed:  5/19/17    Chronic kidney disease     Diabetes mellitus (HCC)     Hyperlipidemia     Hypertension     Osteoarthritis     Palpitations     TIA (transient  ischemic attack)      Social History     Socioeconomic History    Marital status: Single     Spouse name: Not on file    Number of children: Not on file    Years of education: Not on file    Highest education level: Not on file   Occupational History    Not on file   Tobacco Use    Smoking status: Former    Smokeless tobacco: Never   Vaping Use    Vaping status: Never Used   Substance and Sexual Activity    Alcohol use: Not Currently     Comment: Social drinker    Drug use: No    Sexual activity: Not Currently   Other Topics Concern    Not on file   Social History Narrative    Daily coffee consumption (2 cups/day)     Social Drivers of Health     Financial Resource Strain: Low Risk  (3/7/2024)    Overall Financial Resource Strain (CARDIA)     Difficulty of Paying Living Expenses: Not very hard   Food Insecurity: Patient Unable To Answer (11/1/2024)    Nursing - Inadequate Food Risk Classification     Worried About Running Out of Food in the Last Year: Never true     Ran Out of Food in the Last Year: Never true     Ran Out of Food in the Last Year: Patient unable to answer   Transportation Needs: Patient Unable To Answer (11/1/2024)    Nursing - Transportation Risk Classification     Lack of Transportation: Not on file     Lack of Transportation: Patient unable to answer   Physical Activity: Insufficiently Active (7/10/2020)    Exercise Vital Sign     Days of Exercise per Week: 3 days     Minutes of Exercise per Session: 20 min   Stress: Not on file   Social Connections: Not on file   Intimate Partner Violence: Patient Unable To Answer (11/1/2024)    Nursing IPS     Feels Physically and Emotionally Safe: Not on file     Physically Hurt by Someone: Not on file     Humiliated or Emotionally Abused by Someone: Not on file     Physically Hurt by Someone: Patient unable to answer     Hurt or Threatened by Someone: Patient unable to answer   Housing Stability: Patient Unable To Answer (11/1/2024)    Nursing: Inadequate  Housing Risk Classification     Has Housing: Not on file     Worried About Losing Housing: Not on file     Unable to Get Utilities: Not on file     Unable to Pay for Housing in the Last Year: Patient unable to answer     Has Housin      Family History   Problem Relation Age of Onset    Diabetes Mother     Other Mother         Stroke syndrome    Diabetes Father     Emphysema Father      Past Surgical History:   Procedure Laterality Date    APPENDECTOMY      CARDIAC ELECTROPHYSIOLOGY PROCEDURE N/A 2024    Procedure: Cardiac loop recorder explant;  Surgeon: Drew Olmos MD;  Location: [facility];  Service: Cardiology    COLONOSCOPY  2012    HEMORROIDECTOMY      TOOTH EXTRACTION  2022       Current Outpatient Medications:     acetaminophen (TYLENOL) 325 mg tablet, Take 2 tablets (650 mg total) by mouth every 6 (six) hours as needed for mild pain, Disp: , Rfl: 0    atorvastatin (LIPITOR) 40 mg tablet, TAKE 1 TABLET EVERY DAY WITH DINNER, Disp: 90 tablet, Rfl: 1    bisacodyl ([facility]S Bisacodyl) 10 mg suppository, Insert 10 mg into the rectum daily, Disp: , Rfl:     Blood Glucose Monitoring Suppl (PRECISION XTRA MONITOR) MARY, by Does not apply route daily, Disp: 1 each, Rfl: 0    Cholecalciferol (D3 High Potency) 25 MCG (1000 UT) capsule, TAKE ONE CAPSULE BY MOUTH DAILY, Disp: 30 capsule, Rfl: 5    Eliquis 2.5 MG, TAKE ONE TABLET BY MOUTH TWO TIMES PER DAY *PATIENT SUPPLY, Disp: 60 tablet, Rfl: 2    escitalopram (LEXAPRO) 5 mg tablet, TAKE ONE TABLET BY MOUTH DAILY, Disp: 30 tablet, Rfl: 5    gabapentin (NEURONTIN) 100 mg capsule, TAKE ONE CAPSULE BY MOUTH EVERY NIGHT AT BEDTIME, Disp: 30 capsule, Rfl: 5    lisinopril (ZESTRIL) 5 mg tablet, TAKE ONE TABLET BY MOUTH DAILY, Disp: 90 tablet, Rfl: 1    magnesium Oxide (MAG-OX) 400 mg TABS, TAKE ONE TABLET BY MOUTH DAILY, Disp: 30 tablet, Rfl: 5    magnesium oxide (MAG-OX) 400 mg, Take 400 mg by mouth daily, Disp: , Rfl:     melatonin 3 mg, TAKE ONE  TABLET BY MOUTH EVERY NIGHT AT BEDTIME, Disp: 30 tablet, Rfl: 5    mirtazapine (REMERON) 15 mg tablet, Take 1 tablet (15 mg total) by mouth daily at bedtime, Disp: 30 tablet, Rfl: 5    nateglinide (STARLIX) 60 mg tablet, Take 1 tablet (60 mg total) by mouth 3 (three) times a day before meals, Disp: 90 tablet, Rfl: 5    PRECISION XTRA TEST STRIPS test strip, USE TO TEST BLOOD GLUCOSE ONCE A DAY, Disp: 100 strip, Rfl: 2    QUEtiapine (SEROquel) 25 mg tablet, Take 1 tablet (25 mg total) by mouth daily at bedtime, Disp: , Rfl:     terazosin (HYTRIN) 1 mg capsule, TAKE 1 CAPSULE AT BEDTIME, Disp: 90 capsule, Rfl: 1    vitamin B-12 (CYANOCOBALAMIN) 500 MCG TABS, Take 1 tablet (500 mcg total) by mouth 2 (two) times a week, Disp: 60 tablet, Rfl: 1  No Known Allergies    Labs:  Appointment on 12/18/2024   Component Date Value    Color, UA 12/18/2024 Light Yellow     Clarity, UA 12/18/2024 Clear     Specific Gravity, UA 12/18/2024 1.012     pH, UA 12/18/2024 6.0     Leukocytes, UA 12/18/2024 Small (A)     Nitrite, UA 12/18/2024 Positive (A)     Protein, UA 12/18/2024 30 (1+) (A)     Glucose, UA 12/18/2024 Negative     Ketones, UA 12/18/2024 Negative     Urobilinogen, UA 12/18/2024 <2.0     Bilirubin, UA 12/18/2024 Negative     Occult Blood, UA 12/18/2024 Negative     RBC, UA 12/18/2024 1-2     WBC, UA 12/18/2024 10-20 (A)     Epithelial Cells 12/18/2024 Occasional     Bacteria, UA 12/18/2024 Occasional     Urine Culture 12/18/2024 50,000-59,000 cfu/ml Staphylococcus capitis (A)    Appointment on 12/03/2024   Component Date Value    Sodium 12/03/2024 133 (L)     Potassium 12/03/2024 5.0     Chloride 12/03/2024 100     CO2 12/03/2024 26     ANION GAP 12/03/2024 7     BUN 12/03/2024 34 (H)     Creatinine 12/03/2024 2.23 (H)     Glucose 12/03/2024 286 (H)     Calcium 12/03/2024 8.7     eGFR 12/03/2024 24     Hemoglobin A1C 12/03/2024 8.6 (H)     EAG 12/03/2024 200      Imaging: No results found.    Review of Systems:  Review of  Systems   Musculoskeletal:  Positive for arthralgias, gait problem and myalgias.        Using a walker to assist with ambulation    All other systems reviewed and are negative.      Physical Exam:  Physical Exam  Vitals reviewed.   Constitutional:       Appearance: Normal appearance.   HENT:      Head: Normocephalic.   Cardiovascular:      Rate and Rhythm: Normal rate and regular rhythm.      Pulses: Normal pulses.      Heart sounds: Normal heart sounds.   Pulmonary:      Effort: Pulmonary effort is normal.      Breath sounds: Normal breath sounds.   Musculoskeletal:         General: Normal range of motion.      Cervical back: Normal range of motion and neck supple.      Right lower leg: No edema.      Left lower leg: No edema.   Skin:     General: Skin is warm and dry.      Capillary Refill: Capillary refill takes less than 2 seconds.   Neurological:      General: No focal deficit present.      Mental Status: He is alert and oriented to person, place, and time.   Psychiatric:         Mood and Affect: Mood normal.         Behavior: Behavior normal.

## 2025-01-27 ENCOUNTER — OFFICE VISIT (OUTPATIENT)
Dept: CARDIOLOGY CLINIC | Facility: CLINIC | Age: OVER 89
End: 2025-01-27
Payer: MEDICARE

## 2025-01-27 VITALS
HEART RATE: 86 BPM | WEIGHT: 173.3 LBS | DIASTOLIC BLOOD PRESSURE: 60 MMHG | OXYGEN SATURATION: 92 % | BODY MASS INDEX: 24.26 KG/M2 | HEIGHT: 71 IN | SYSTOLIC BLOOD PRESSURE: 146 MMHG

## 2025-01-27 DIAGNOSIS — E78.2 MIXED HYPERLIPIDEMIA: ICD-10-CM

## 2025-01-27 DIAGNOSIS — N18.4 CKD STAGE 4 DUE TO TYPE 2 DIABETES MELLITUS (HCC): ICD-10-CM

## 2025-01-27 DIAGNOSIS — Z79.4 DIABETES MELLITUS DUE TO UNDERLYING CONDITION, CONTROLLED, WITH DIABETIC NEPHROPATHY, WITH LONG-TERM CURRENT USE OF INSULIN (HCC): ICD-10-CM

## 2025-01-27 DIAGNOSIS — I50.32 CHRONIC HEART FAILURE WITH PRESERVED EJECTION FRACTION (HCC): Primary | ICD-10-CM

## 2025-01-27 DIAGNOSIS — I48.91 ATRIAL FIBRILLATION, UNSPECIFIED TYPE (HCC): ICD-10-CM

## 2025-01-27 DIAGNOSIS — I10 ESSENTIAL HYPERTENSION: ICD-10-CM

## 2025-01-27 DIAGNOSIS — E11.22 CKD STAGE 4 DUE TO TYPE 2 DIABETES MELLITUS (HCC): ICD-10-CM

## 2025-01-27 DIAGNOSIS — E08.21 DIABETES MELLITUS DUE TO UNDERLYING CONDITION, CONTROLLED, WITH DIABETIC NEPHROPATHY, WITH LONG-TERM CURRENT USE OF INSULIN (HCC): ICD-10-CM

## 2025-01-27 PROCEDURE — 99214 OFFICE O/P EST MOD 30 MIN: CPT | Performed by: NURSE PRACTITIONER

## 2025-01-27 RX ORDER — NATEGLINIDE 60 MG/1
60 TABLET ORAL
Qty: 90 TABLET | Refills: 0 | Status: SHIPPED | OUTPATIENT
Start: 2025-01-27

## 2025-01-27 NOTE — ASSESSMENT & PLAN NOTE
Lab Results   Component Value Date    HGBA1C 8.6 (H) 12/03/2024   CKD IV baseline creat 2.13 - 2.23   Followed by Dr Christian

## 2025-01-27 NOTE — ASSESSMENT & PLAN NOTE
7/12/24  TG 85 HDL 32 LDL 60  Continue on Lipitor 40 mg daily, heart healthy diet continue to walk daily

## 2025-01-27 NOTE — ASSESSMENT & PLAN NOTE
Wt Readings from Last 3 Encounters:   01/27/25 78.6 kg (173 lb 4.8 oz)   11/20/24 77.2 kg (170 lb 3.2 oz)   11/01/24 87.1 kg (192 lb)   NYHA class III stage C  LVEF 50% reduced from LVEF 55% by TTE on 6/29/20, conservative management   On physical exam euvolemic compensated  Blood pressure heart rate controlled  Not on diuretics  Continue on lisinopril 5 mg daily  2 g sodium diet   monitor for signs and symptoms of heart failure

## 2025-01-29 PROBLEM — N30.00 ACUTE CYSTITIS WITHOUT HEMATURIA: Status: RESOLVED | Noted: 2024-12-30 | Resolved: 2025-01-29

## 2025-02-03 ENCOUNTER — APPOINTMENT (EMERGENCY)
Dept: CT IMAGING | Facility: HOSPITAL | Age: OVER 89
DRG: 872 | End: 2025-02-03
Payer: MEDICARE

## 2025-02-03 ENCOUNTER — HOSPITAL ENCOUNTER (INPATIENT)
Facility: HOSPITAL | Age: OVER 89
LOS: 2 days | Discharge: NURSING, REHAB OR LONG-TERM CARE | DRG: 872 | End: 2025-02-06
Attending: SURGERY | Admitting: STUDENT IN AN ORGANIZED HEALTH CARE EDUCATION/TRAINING PROGRAM
Payer: MEDICARE

## 2025-02-03 ENCOUNTER — APPOINTMENT (EMERGENCY)
Dept: RADIOLOGY | Facility: HOSPITAL | Age: OVER 89
DRG: 872 | End: 2025-02-03
Payer: MEDICARE

## 2025-02-03 ENCOUNTER — TELEPHONE (OUTPATIENT)
Age: OVER 89
End: 2025-02-03

## 2025-02-03 DIAGNOSIS — A08.4 GASTROENTERITIS AND COLITIS, VIRAL: Primary | ICD-10-CM
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
## 2025-02-03 DIAGNOSIS — L03.113 CELLULITIS OF RIGHT UPPER EXTREMITY: ICD-10-CM

## 2025-02-03 DIAGNOSIS — A41.9 SEPSIS, DUE TO UNSPECIFIED ORGANISM, UNSPECIFIED WHETHER ACUTE ORGAN DYSFUNCTION PRESENT (HCC): ICD-10-CM

## 2025-02-03 PROBLEM — R29.6 MULTIPLE FALLS: Status: ACTIVE | Noted: 2025-02-03

## 2025-02-03 PROBLEM — F03.90 DEMENTIA (HCC): Status: ACTIVE | Noted: 2025-02-03

## 2025-02-03 PROBLEM — E87.5 HYPERKALEMIA: Status: ACTIVE | Noted: 2025-02-03

## 2025-02-03 PROBLEM — E11.9 TYPE 2 DIABETES MELLITUS (HCC): Status: ACTIVE | Noted: 2025-02-03

## 2025-02-03 LAB
ANION GAP SERPL CALCULATED.3IONS-SCNC: 7 MMOL/L (ref 4–13)
APTT PPP: 33 SECONDS (ref 23–34)
ATRIAL RATE: 77 BPM
BACTERIA UR QL AUTO: ABNORMAL /HPF
BASOPHILS # BLD AUTO: 0.04 THOUSANDS/ΜL (ref 0–0.1)
BASOPHILS NFR BLD AUTO: 0 % (ref 0–1)
BILIRUB UR QL STRIP: NEGATIVE
BUN SERPL-MCNC: 42 MG/DL (ref 5–25)
CALCIUM SERPL-MCNC: 9.2 MG/DL (ref 8.4–10.2)
CHLORIDE SERPL-SCNC: 104 MMOL/L (ref 96–108)
CK SERPL-CCNC: 164 U/L (ref 39–308)
CLARITY UR: CLEAR
CO2 SERPL-SCNC: 22 MMOL/L (ref 21–32)
COLOR UR: ABNORMAL
CREAT SERPL-MCNC: 2.3 MG/DL (ref 0.6–1.3)
EOSINOPHIL # BLD AUTO: 0.01 THOUSAND/ΜL (ref 0–0.61)
EOSINOPHIL NFR BLD AUTO: 0 % (ref 0–6)
ERYTHROCYTE [DISTWIDTH] IN BLOOD BY AUTOMATED COUNT: 15.3 % (ref 11.6–15.1)
FLUAV RNA RESP QL NAA+PROBE: NEGATIVE
FLUBV RNA RESP QL NAA+PROBE: NEGATIVE
GFR SERPL CREATININE-BSD FRML MDRD: 23 ML/MIN/1.73SQ M
GLUCOSE SERPL-MCNC: 197 MG/DL (ref 65–140)
GLUCOSE SERPL-MCNC: 257 MG/DL (ref 65–140)
GLUCOSE UR STRIP-MCNC: ABNORMAL MG/DL
HCT VFR BLD AUTO: 31.3 % (ref 36.5–49.3)
HGB BLD-MCNC: 10.7 G/DL (ref 12–17)
HGB UR QL STRIP.AUTO: ABNORMAL
HOLD SPECIMEN: NORMAL
IMM GRANULOCYTES # BLD AUTO: 0.07 THOUSAND/UL (ref 0–0.2)
IMM GRANULOCYTES NFR BLD AUTO: 1 % (ref 0–2)
INR PPP: 1.3 (ref 0.85–1.19)
KETONES UR STRIP-MCNC: NEGATIVE MG/DL
LACTATE SERPL-SCNC: 1.6 MMOL/L (ref 0.5–2)
LEUKOCYTE ESTERASE UR QL STRIP: NEGATIVE
LYMPHOCYTES # BLD AUTO: 0.86 THOUSANDS/ΜL (ref 0.6–4.47)
LYMPHOCYTES NFR BLD AUTO: 6 % (ref 14–44)
MCH RBC QN AUTO: 31.4 PG (ref 26.8–34.3)
MCHC RBC AUTO-ENTMCNC: 34.2 G/DL (ref 31.4–37.4)
MCV RBC AUTO: 92 FL (ref 82–98)
MONOCYTES # BLD AUTO: 1.07 THOUSAND/ΜL (ref 0.17–1.22)
MONOCYTES NFR BLD AUTO: 8 % (ref 4–12)
NEUTROPHILS # BLD AUTO: 11.88 THOUSANDS/ΜL (ref 1.85–7.62)
NEUTS SEG NFR BLD AUTO: 85 % (ref 43–75)
NITRITE UR QL STRIP: NEGATIVE
NON-SQ EPI CELLS URNS QL MICRO: ABNORMAL /HPF
NRBC BLD AUTO-RTO: 0 /100 WBCS
P AXIS: 101 DEGREES
PH UR STRIP.AUTO: 6.5 [PH]
PLATELET # BLD AUTO: 200 THOUSANDS/UL (ref 149–390)
PMV BLD AUTO: 9.5 FL (ref 8.9–12.7)
POTASSIUM SERPL-SCNC: 5 MMOL/L (ref 3.5–5.3)
POTASSIUM SERPL-SCNC: 5.7 MMOL/L (ref 3.5–5.3)
PR INTERVAL: 216 MS
PROCALCITONIN SERPL-MCNC: 0.19 NG/ML
PROT UR STRIP-MCNC: ABNORMAL MG/DL
PROTHROMBIN TIME: 16.9 SECONDS (ref 12.3–15)
QRS AXIS: -61 DEGREES
QRSD INTERVAL: 132 MS
QT INTERVAL: 428 MS
QTC INTERVAL: 484 MS
RBC # BLD AUTO: 3.41 MILLION/UL (ref 3.88–5.62)
RBC #/AREA URNS AUTO: ABNORMAL /HPF
RSV RNA RESP QL NAA+PROBE: NEGATIVE
SARS-COV-2 RNA RESP QL NAA+PROBE: NEGATIVE
SODIUM SERPL-SCNC: 133 MMOL/L (ref 135–147)
SP GR UR STRIP.AUTO: 1.01 (ref 1–1.03)
T WAVE AXIS: 5 DEGREES
UROBILINOGEN UR STRIP-ACNC: <2 MG/DL
VENTRICULAR RATE: 77 BPM
WBC # BLD AUTO: 13.93 THOUSAND/UL (ref 4.31–10.16)
WBC #/AREA URNS AUTO: ABNORMAL /HPF

## 2025-02-03 PROCEDURE — 82330 ASSAY OF CALCIUM: CPT

## 2025-02-03 PROCEDURE — 81001 URINALYSIS AUTO W/SCOPE: CPT | Performed by: NURSE PRACTITIONER

## 2025-02-03 PROCEDURE — EDAIR PR ED AIR: Performed by: EMERGENCY MEDICINE

## 2025-02-03 PROCEDURE — 84132 ASSAY OF SERUM POTASSIUM: CPT

## 2025-02-03 PROCEDURE — 87147 CULTURE TYPE IMMUNOLOGIC: CPT | Performed by: NURSE PRACTITIONER

## 2025-02-03 PROCEDURE — 82948 REAGENT STRIP/BLOOD GLUCOSE: CPT

## 2025-02-03 PROCEDURE — 73030 X-RAY EXAM OF SHOULDER: CPT

## 2025-02-03 PROCEDURE — 84132 ASSAY OF SERUM POTASSIUM: CPT | Performed by: NURSE PRACTITIONER

## 2025-02-03 PROCEDURE — 72125 CT NECK SPINE W/O DYE: CPT

## 2025-02-03 PROCEDURE — 0241U HB NFCT DS VIR RESP RNA 4 TRGT: CPT | Performed by: NURSE PRACTITIONER

## 2025-02-03 PROCEDURE — 85730 THROMBOPLASTIN TIME PARTIAL: CPT | Performed by: NURSE PRACTITIONER

## 2025-02-03 PROCEDURE — 82947 ASSAY GLUCOSE BLOOD QUANT: CPT

## 2025-02-03 PROCEDURE — 85014 HEMATOCRIT: CPT

## 2025-02-03 PROCEDURE — 96374 THER/PROPH/DIAG INJ IV PUSH: CPT

## 2025-02-03 PROCEDURE — 70450 CT HEAD/BRAIN W/O DYE: CPT

## 2025-02-03 PROCEDURE — 93308 TTE F-UP OR LMTD: CPT | Performed by: SURGERY

## 2025-02-03 PROCEDURE — 84145 PROCALCITONIN (PCT): CPT | Performed by: NURSE PRACTITIONER

## 2025-02-03 PROCEDURE — 71045 X-RAY EXAM CHEST 1 VIEW: CPT

## 2025-02-03 PROCEDURE — 93010 ELECTROCARDIOGRAM REPORT: CPT | Performed by: INTERNAL MEDICINE

## 2025-02-03 PROCEDURE — 85610 PROTHROMBIN TIME: CPT | Performed by: NURSE PRACTITIONER

## 2025-02-03 PROCEDURE — 87070 CULTURE OTHR SPECIMN AEROBIC: CPT | Performed by: NURSE PRACTITIONER

## 2025-02-03 PROCEDURE — 85025 COMPLETE CBC W/AUTO DIFF WBC: CPT | Performed by: SURGERY

## 2025-02-03 PROCEDURE — 84295 ASSAY OF SERUM SODIUM: CPT

## 2025-02-03 PROCEDURE — 82550 ASSAY OF CK (CPK): CPT | Performed by: SURGERY

## 2025-02-03 PROCEDURE — 87081 CULTURE SCREEN ONLY: CPT | Performed by: NURSE PRACTITIONER

## 2025-02-03 PROCEDURE — 83735 ASSAY OF MAGNESIUM: CPT | Performed by: NURSE PRACTITIONER

## 2025-02-03 PROCEDURE — 83605 ASSAY OF LACTIC ACID: CPT | Performed by: SURGERY

## 2025-02-03 PROCEDURE — 87040 BLOOD CULTURE FOR BACTERIA: CPT | Performed by: SURGERY

## 2025-02-03 PROCEDURE — 80048 BASIC METABOLIC PNL TOTAL CA: CPT | Performed by: SURGERY

## 2025-02-03 PROCEDURE — 99285 EMERGENCY DEPT VISIT HI MDM: CPT | Performed by: SURGERY

## 2025-02-03 PROCEDURE — 36415 COLL VENOUS BLD VENIPUNCTURE: CPT | Performed by: SURGERY

## 2025-02-03 PROCEDURE — 87205 SMEAR GRAM STAIN: CPT | Performed by: NURSE PRACTITIONER

## 2025-02-03 PROCEDURE — 93005 ELECTROCARDIOGRAM TRACING: CPT

## 2025-02-03 PROCEDURE — 82803 BLOOD GASES ANY COMBINATION: CPT

## 2025-02-03 PROCEDURE — 99222 1ST HOSP IP/OBS MODERATE 55: CPT | Performed by: NURSE PRACTITIONER

## 2025-02-03 PROCEDURE — 99285 EMERGENCY DEPT VISIT HI MDM: CPT

## 2025-02-03 PROCEDURE — 76705 ECHO EXAM OF ABDOMEN: CPT | Performed by: SURGERY

## 2025-02-03 RX ORDER — ESCITALOPRAM OXALATE 10 MG/1
5 TABLET ORAL DAILY
Status: DISCONTINUED | OUTPATIENT
Start: 2025-02-04 | End: 2025-02-06 | Stop reason: HOSPADM

## 2025-02-03 RX ORDER — LANOLIN ALCOHOL/MO/W.PET/CERES
400 CREAM (GRAM) TOPICAL DAILY
Status: DISCONTINUED | OUTPATIENT
Start: 2025-02-04 | End: 2025-02-06 | Stop reason: HOSPADM

## 2025-02-03 RX ORDER — INSULIN LISPRO 100 [IU]/ML
1-5 INJECTION, SOLUTION INTRAVENOUS; SUBCUTANEOUS
Status: DISCONTINUED | OUTPATIENT
Start: 2025-02-03 | End: 2025-02-06 | Stop reason: HOSPADM

## 2025-02-03 RX ORDER — ACETAMINOPHEN 325 MG/1
650 TABLET ORAL EVERY 6 HOURS PRN
Status: DISCONTINUED | OUTPATIENT
Start: 2025-02-03 | End: 2025-02-06 | Stop reason: HOSPADM

## 2025-02-03 RX ORDER — ATORVASTATIN CALCIUM 40 MG/1
40 TABLET, FILM COATED ORAL
Status: DISCONTINUED | OUTPATIENT
Start: 2025-02-04 | End: 2025-02-06 | Stop reason: HOSPADM

## 2025-02-03 RX ORDER — GABAPENTIN 100 MG/1
100 CAPSULE ORAL
Status: DISCONTINUED | OUTPATIENT
Start: 2025-02-04 | End: 2025-02-06 | Stop reason: HOSPADM

## 2025-02-03 RX ORDER — INSULIN LISPRO 100 [IU]/ML
1-6 INJECTION, SOLUTION INTRAVENOUS; SUBCUTANEOUS
Status: DISCONTINUED | OUTPATIENT
Start: 2025-02-04 | End: 2025-02-06 | Stop reason: HOSPADM

## 2025-02-03 RX ORDER — TERAZOSIN 1 MG/1
1 CAPSULE ORAL
Status: DISCONTINUED | OUTPATIENT
Start: 2025-02-04 | End: 2025-02-06 | Stop reason: HOSPADM

## 2025-02-03 RX ORDER — BISACODYL 10 MG
10 SUPPOSITORY, RECTAL RECTAL DAILY
Status: DISCONTINUED | OUTPATIENT
Start: 2025-02-04 | End: 2025-02-06 | Stop reason: HOSPADM

## 2025-02-03 RX ORDER — LOPERAMIDE HYDROCHLORIDE 2 MG/1
2 TABLET ORAL 4 TIMES DAILY PRN
Qty: 30 TABLET | Refills: 0 | Status: SHIPPED | OUTPATIENT
Start: 2025-02-03 | End: 2025-02-09

## 2025-02-03 RX ORDER — MIRTAZAPINE 15 MG/1
15 TABLET, FILM COATED ORAL
Status: DISCONTINUED | OUTPATIENT
Start: 2025-02-04 | End: 2025-02-06 | Stop reason: HOSPADM

## 2025-02-03 RX ORDER — ONDANSETRON 4 MG/1
4 TABLET, FILM COATED ORAL EVERY 8 HOURS PRN
Qty: 20 TABLET | Refills: 0 | Status: SHIPPED | OUTPATIENT
Start: 2025-02-03 | End: 2025-02-09

## 2025-02-03 RX ORDER — ACETAMINOPHEN 10 MG/ML
1000 INJECTION, SOLUTION INTRAVENOUS ONCE
Status: COMPLETED | OUTPATIENT
Start: 2025-02-03 | End: 2025-02-03

## 2025-02-03 RX ORDER — CLINDAMYCIN PHOSPHATE 900 MG/50ML
900 INJECTION, SOLUTION INTRAVENOUS ONCE
Status: COMPLETED | OUTPATIENT
Start: 2025-02-03 | End: 2025-02-03

## 2025-02-03 RX ORDER — LISINOPRIL 5 MG/1
5 TABLET ORAL DAILY
Status: DISCONTINUED | OUTPATIENT
Start: 2025-02-04 | End: 2025-02-06 | Stop reason: HOSPADM

## 2025-02-03 RX ORDER — QUETIAPINE FUMARATE 25 MG/1
25 TABLET, FILM COATED ORAL
Status: DISCONTINUED | OUTPATIENT
Start: 2025-02-04 | End: 2025-02-06 | Stop reason: HOSPADM

## 2025-02-03 RX ADMIN — VANCOMYCIN HYDROCHLORIDE 2000 MG: 5 INJECTION, POWDER, LYOPHILIZED, FOR SOLUTION INTRAVENOUS at 23:14

## 2025-02-03 RX ADMIN — CLINDAMYCIN IN 5 PERCENT DEXTROSE 900 MG: 18 INJECTION, SOLUTION INTRAVENOUS at 20:45

## 2025-02-03 RX ADMIN — INSULIN LISPRO 1 UNITS: 100 INJECTION, SOLUTION INTRAVENOUS; SUBCUTANEOUS at 22:59

## 2025-02-03 RX ADMIN — ACETAMINOPHEN 1000 MG: 1000 INJECTION, SOLUTION INTRAVENOUS at 19:45

## 2025-02-03 NOTE — TELEPHONE ENCOUNTER
Juani Khan faxed a request for medication for N/V and loose stools for Pt.    Please advise Thank you

## 2025-02-04 DIAGNOSIS — K59.03 DRUG-INDUCED CONSTIPATION: Primary | ICD-10-CM

## 2025-02-04 LAB
ANION GAP SERPL CALCULATED.3IONS-SCNC: 8 MMOL/L (ref 4–13)
BASOPHILS # BLD AUTO: 0.04 THOUSANDS/ΜL (ref 0–0.1)
BASOPHILS NFR BLD AUTO: 0 % (ref 0–1)
BUN SERPL-MCNC: 43 MG/DL (ref 5–25)
CALCIUM SERPL-MCNC: 8.8 MG/DL (ref 8.4–10.2)
CHLORIDE SERPL-SCNC: 106 MMOL/L (ref 96–108)
CO2 SERPL-SCNC: 21 MMOL/L (ref 21–32)
CREAT SERPL-MCNC: 2.27 MG/DL (ref 0.6–1.3)
CRP SERPL QL: 56 MG/L
EOSINOPHIL # BLD AUTO: 0.2 THOUSAND/ΜL (ref 0–0.61)
EOSINOPHIL NFR BLD AUTO: 2 % (ref 0–6)
ERYTHROCYTE [DISTWIDTH] IN BLOOD BY AUTOMATED COUNT: 15 % (ref 11.6–15.1)
ERYTHROCYTE [SEDIMENTATION RATE] IN BLOOD: 55 MM/HOUR (ref 0–19)
GFR SERPL CREATININE-BSD FRML MDRD: 23 ML/MIN/1.73SQ M
GLUCOSE P FAST SERPL-MCNC: 183 MG/DL (ref 65–99)
GLUCOSE SERPL-MCNC: 160 MG/DL (ref 65–140)
GLUCOSE SERPL-MCNC: 175 MG/DL (ref 65–140)
GLUCOSE SERPL-MCNC: 183 MG/DL (ref 65–140)
GLUCOSE SERPL-MCNC: 200 MG/DL (ref 65–140)
GLUCOSE SERPL-MCNC: 203 MG/DL (ref 65–140)
HCT VFR BLD AUTO: 30.3 % (ref 36.5–49.3)
HGB BLD-MCNC: 9.8 G/DL (ref 12–17)
HOLD SPECIMEN: NORMAL
IMM GRANULOCYTES # BLD AUTO: 0.05 THOUSAND/UL (ref 0–0.2)
IMM GRANULOCYTES NFR BLD AUTO: 1 % (ref 0–2)
LYMPHOCYTES # BLD AUTO: 1.21 THOUSANDS/ΜL (ref 0.6–4.47)
LYMPHOCYTES NFR BLD AUTO: 12 % (ref 14–44)
MAGNESIUM SERPL-MCNC: 1.7 MG/DL (ref 1.9–2.7)
MCH RBC QN AUTO: 30.4 PG (ref 26.8–34.3)
MCHC RBC AUTO-ENTMCNC: 32.3 G/DL (ref 31.4–37.4)
MCV RBC AUTO: 94 FL (ref 82–98)
MONOCYTES # BLD AUTO: 1.02 THOUSAND/ΜL (ref 0.17–1.22)
MONOCYTES NFR BLD AUTO: 10 % (ref 4–12)
NEUTROPHILS # BLD AUTO: 7.56 THOUSANDS/ΜL (ref 1.85–7.62)
NEUTS SEG NFR BLD AUTO: 75 % (ref 43–75)
NRBC BLD AUTO-RTO: 0 /100 WBCS
PLATELET # BLD AUTO: 162 THOUSANDS/UL (ref 149–390)
PMV BLD AUTO: 9.1 FL (ref 8.9–12.7)
POTASSIUM SERPL-SCNC: 5.2 MMOL/L (ref 3.5–5.3)
PROCALCITONIN SERPL-MCNC: 0.28 NG/ML
RBC # BLD AUTO: 3.22 MILLION/UL (ref 3.88–5.62)
SODIUM SERPL-SCNC: 135 MMOL/L (ref 135–147)
WBC # BLD AUTO: 10.08 THOUSAND/UL (ref 4.31–10.16)

## 2025-02-04 PROCEDURE — 97167 OT EVAL HIGH COMPLEX 60 MIN: CPT

## 2025-02-04 PROCEDURE — 36415 COLL VENOUS BLD VENIPUNCTURE: CPT | Performed by: NURSE PRACTITIONER

## 2025-02-04 PROCEDURE — 86140 C-REACTIVE PROTEIN: CPT | Performed by: STUDENT IN AN ORGANIZED HEALTH CARE EDUCATION/TRAINING PROGRAM

## 2025-02-04 PROCEDURE — 85652 RBC SED RATE AUTOMATED: CPT | Performed by: STUDENT IN AN ORGANIZED HEALTH CARE EDUCATION/TRAINING PROGRAM

## 2025-02-04 PROCEDURE — 80048 BASIC METABOLIC PNL TOTAL CA: CPT | Performed by: NURSE PRACTITIONER

## 2025-02-04 PROCEDURE — 85025 COMPLETE CBC W/AUTO DIFF WBC: CPT | Performed by: NURSE PRACTITIONER

## 2025-02-04 PROCEDURE — 97163 PT EVAL HIGH COMPLEX 45 MIN: CPT

## 2025-02-04 PROCEDURE — 84145 PROCALCITONIN (PCT): CPT | Performed by: NURSE PRACTITIONER

## 2025-02-04 PROCEDURE — 99232 SBSQ HOSP IP/OBS MODERATE 35: CPT | Performed by: SURGERY

## 2025-02-04 PROCEDURE — 82948 REAGENT STRIP/BLOOD GLUCOSE: CPT

## 2025-02-04 PROCEDURE — 99232 SBSQ HOSP IP/OBS MODERATE 35: CPT | Performed by: STUDENT IN AN ORGANIZED HEALTH CARE EDUCATION/TRAINING PROGRAM

## 2025-02-04 RX ORDER — OXYCODONE HYDROCHLORIDE 5 MG/1
5 TABLET ORAL EVERY 8 HOURS PRN
Refills: 0 | Status: DISCONTINUED | OUTPATIENT
Start: 2025-02-04 | End: 2025-02-06 | Stop reason: HOSPADM

## 2025-02-04 RX ADMIN — Medication 1000 UNITS: at 08:18

## 2025-02-04 RX ADMIN — MIRTAZAPINE 15 MG: 15 TABLET, FILM COATED ORAL at 21:42

## 2025-02-04 RX ADMIN — INSULIN LISPRO 2 UNITS: 100 INJECTION, SOLUTION INTRAVENOUS; SUBCUTANEOUS at 18:44

## 2025-02-04 RX ADMIN — INSULIN LISPRO 1 UNITS: 100 INJECTION, SOLUTION INTRAVENOUS; SUBCUTANEOUS at 08:49

## 2025-02-04 RX ADMIN — ESCITALOPRAM 5 MG: 5 TABLET, FILM COATED ORAL at 08:50

## 2025-02-04 RX ADMIN — QUETIAPINE 25 MG: 25 TABLET ORAL at 21:30

## 2025-02-04 RX ADMIN — TERAZOSIN HYDROCHLORIDE 1 MG: 1 CAPSULE ORAL at 21:42

## 2025-02-04 RX ADMIN — GABAPENTIN 100 MG: 100 CAPSULE ORAL at 21:29

## 2025-02-04 RX ADMIN — LISINOPRIL 5 MG: 5 TABLET ORAL at 08:18

## 2025-02-04 RX ADMIN — INSULIN LISPRO 1 UNITS: 100 INJECTION, SOLUTION INTRAVENOUS; SUBCUTANEOUS at 13:25

## 2025-02-04 RX ADMIN — INSULIN LISPRO 1 UNITS: 100 INJECTION, SOLUTION INTRAVENOUS; SUBCUTANEOUS at 21:35

## 2025-02-04 RX ADMIN — APIXABAN 2.5 MG: 2.5 TABLET, FILM COATED ORAL at 18:46

## 2025-02-04 RX ADMIN — APIXABAN 2.5 MG: 2.5 TABLET, FILM COATED ORAL at 08:18

## 2025-02-04 RX ADMIN — ATORVASTATIN CALCIUM 40 MG: 40 TABLET, FILM COATED ORAL at 16:38

## 2025-02-04 RX ADMIN — MELATONIN 3 MG: 3 TAB ORAL at 21:29

## 2025-02-04 RX ADMIN — Medication 400 MG: at 08:18

## 2025-02-04 NOTE — ASSESSMENT & PLAN NOTE
Lab Results   Component Value Date    HGBA1C 8.6 (H) 12/03/2024     Resume starlix on dc  Accucheck, ssi while hospitalized

## 2025-02-04 NOTE — PROCEDURES
POC FAST US    Date/Time: 2/3/2025 7:36 PM    Performed by: Zay Chan PA-C  Authorized by: Zay Chan PA-C    Patient location:  Trauma  Procedure details:     Exam Type:  Diagnostic    Indications: blunt abdominal trauma and blunt chest trauma      Assess for:  Intra-abdominal fluid and pericardial effusion    Technique: FAST      Views obtained:  Heart - Pericardial sac, LUQ - Splenorenal space, Suprapubic - Pouch of Paul and RUQ - Lackey's Pouch    Image quality: diagnostic      Image availability:  Images available in PACS and video obtained  FAST Findings:     RUQ (Hepatorenal) free fluid: absent      LUQ (Splenorenal) free fluid: absent      Suprapubic free fluid: absent      Cardiac wall motion: identified      Pericardial effusion: absent    Interpretation:     Impressions: negative

## 2025-02-04 NOTE — H&P
H&P - Hospitalist   Name: Leroy Paredes 89 y.o. male I MRN: [id number]  Unit/Bed#: ED-05 I Date of Admission: 2/3/2025   Date of Service: 2/3/2025 I Hospital Day: 0     Assessment & Plan  Cellulitis of right upper extremity  Generalized excoriation.  Right shoulder appears cellulitic-see media tab.    Recently completed keflex for UTI, unsure if this is cause of pruritus and excoriation   Continue vancomycin   Will send wound culture, though has already received first dose of antibiotics  Sepsis ([facility])  Tachycardia, leukocytosis.  Lactate normal.   Suspected source: RUE cellulitis  Will also check UA     Plan   Antibiotics: clindamycin in ED.  Recently had keflex and unsure if patient developed skin rash following keflex.   Will continue with vancomycin for now, consult pharmacy  Procalcitonin pending  CKD stage 4 due to type 2 diabetes mellitus ([facility])  Creatinine stable and at baseline.  Family reports good oral intake.   Monitor BMP  Essential hypertension  BP elevated on arrival, improving without intervention  Continue lisinopril   Multiple falls  Unwitnessed fall at [facility] on [facility].  Trauma level b - appreciate trauma.  Fortunately no traumatic injuries.   Pt ot fall precautions  Ambulates with walker.  Does need encouraging to use walker per family.  Hyperkalemia  Potassium 5.7  Repeat potassium at midnight  Low potassium diet  Dementia ([facility])  Family notes poor short term memory.  Mental status currently at baseline.   Continue home medications  Remeron 15 mg hs  Seroquel 25 mg hs  Lexapro 5 mg qd   Type 2 diabetes mellitus ([facility])  Lab Results   Component Value Date    HGBA1C 8.6 (H) 12/03/2024     Resume starlix on dc  Accucheck, ssi while hospitalized   Chronic heart failure with preserved ejection fraction (HCC)  Wt Readings from Last 3 Encounters:   02/03/25 82.5 kg (181 lb 14.1 oz)   01/27/25 78.6 kg (173 lb 4.8 oz)   11/20/24 77.2 kg (170 lb 3.2 oz)                 VTE Pharmacologic Prophylaxis:  VTE Score: 5 High Risk (Score >/= 5) - Pharmacological DVT Prophylaxis Ordered: apixaban (Eliquis). Sequential Compression Devices Ordered.  Code Status: Level 3 - DNAR and DNI   Discussion with family: Updated  (niece) at bedside.    Anticipated Length of Stay: Patient will be admitted on an observation basis with an anticipated length of stay of less than 2 midnights secondary to IV abx.    History of Present Illness   Chief Complaint: fall    Leroy Paredes is a 89 y.o. male with a PMH of dementia, ambulatory dysfunction, fall, embolic CVA, enlarged prostate, CKD, hypertension, heart failure, type 2 diabetes, heart failure with preserved EF who presents due to unwitnessed fall patient is on Eliquis for atrial fibrillation.  Patient was trauma level B and evaluated by the trauma team.  There were no acute injuries on workup.  He was found to have cellulitis of the right shoulder.  He meets sepsis criteria.  Patient was started on clindamycin.  Patient was noted to have generalized excoriation over his body.  He recently completed a course of Keflex for UTI and it is unclear if he developed rash due to this.  Will continue with vancomycin for now.    Patient is resident at [facility].  Ambulates with walker at baseline but family notes he does require frequent reminders to use his walker.    Review of Systems   Unable to perform ROS: Dementia       Historical Information   Past Medical History:   Diagnosis Date    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)     Last Assessed:  5/19/17    Chronic kidney disease     Diabetes mellitus (HCC)     Hyperlipidemia     Hypertension     Osteoarthritis     Palpitations     TIA (transient ischemic attack)      Past Surgical History:   Procedure Laterality Date    APPENDECTOMY      CARDIAC ELECTROPHYSIOLOGY PROCEDURE N/A 1/17/2024    Procedure: Cardiac loop recorder explant;  Surgeon: Drew Olmos MD;  Location: [facility];  Service: Cardiology     COLONOSCOPY  2012    HEMORROIDECTOMY      TOOTH EXTRACTION  02/02/2022     Social History     Tobacco Use    Smoking status: Former    Smokeless tobacco: Never   Vaping Use    Vaping status: Never Used   Substance and Sexual Activity    Alcohol use: Not Currently     Comment: Social drinker    Drug use: No    Sexual activity: Not Currently     E-Cigarette/Vaping    E-Cigarette Use Never User      E-Cigarette/Vaping Substances    Nicotine No     THC No     CBD No     Flavoring No     Other No     Unknown No      Family History   Problem Relation Age of Onset    Diabetes Mother     Other Mother         Stroke syndrome    Diabetes Father     Emphysema Father      Social History:  Marital Status: Single   Occupation:   Patient Pre-hospital Living Situation: [place]  Patient Pre-hospital Level of Mobility: walks with walker  Patient Pre-hospital Diet Restrictions:     Meds/Allergies   I have reviewed home medications with patient family member.  Prior to Admission medications    Medication Sig Start Date End Date Taking? Authorizing Provider   acetaminophen (TYLENOL) 325 mg tablet Take 2 tablets (650 mg total) by mouth every 6 (six) hours as needed for mild pain 5/18/23  Yes Josselin Maya PA-C   apixaban (Eliquis) 2.5 mg Take 1 tablet (2.5 mg total) by mouth 2 (two) times a day 1/28/25  Yes Leroy Humphries MD   atorvastatin (LIPITOR) 40 mg tablet TAKE 1 TABLET EVERY DAY WITH DINNER 10/23/24  Yes Pepito Foster DO   bisacodyl ([facility] Bisacodyl) 10 mg suppository Insert 10 mg into the rectum daily   Yes Historical Provider, MD   Cholecalciferol (D3 High Potency) 25 MCG (1000 UT) capsule TAKE ONE CAPSULE BY MOUTH DAILY 12/13/24  Yes Pepito Foster DO   escitalopram (LEXAPRO) 5 mg tablet TAKE ONE TABLET BY MOUTH DAILY 12/13/24  Yes Pepito Foster DO   gabapentin (NEURONTIN) 100 mg capsule TAKE ONE CAPSULE BY MOUTH EVERY NIGHT AT BEDTIME 12/13/24  Yes Pepito Foster DO   lisinopril (ZESTRIL) 5 mg  tablet TAKE ONE TABLET BY MOUTH DAILY 11/18/24  Yes KONSTANTIN Kim   magnesium Oxide (MAG-OX) 400 mg TABS TAKE ONE TABLET BY MOUTH DAILY 12/13/24  Yes Pepito Foster DO   melatonin 3 mg TAKE ONE TABLET BY MOUTH EVERY NIGHT AT BEDTIME 12/13/24  Yes Pepito Foster DO   mirtazapine (REMERON) 15 mg tablet Take 1 tablet (15 mg total) by mouth daily at bedtime 10/17/24  Yes Pepito Foster DO   nateglinide (STARLIX) 60 mg tablet Take 1 tablet (60 mg total) by mouth 3 (three) times a day before meals 1/27/25  Yes Pepito Foster DO   QUEtiapine (SEROquel) 25 mg tablet Take 1 tablet (25 mg total) by mouth daily at bedtime 11/5/24  Yes Tamara Hernandez MD   terazosin (HYTRIN) 1 mg capsule TAKE 1 CAPSULE AT BEDTIME 10/2/24  Yes Pepito Foster DO   vitamin B-12 (CYANOCOBALAMIN) 500 MCG TABS Take 1 tablet (500 mcg total) by mouth 2 (two) times a week 10/17/24  Yes Pepito Foster DO   Blood Glucose Monitoring Suppl (PRECISION XTRA MONITOR) MARY by Does not apply route daily 1/16/20   Pepito Foster DO   loperamide (IMODIUM A-D) 2 MG tablet Take 1 tablet (2 mg total) by mouth 4 (four) times a day as needed for diarrhea  Patient not taking: Reported on 2/3/2025 2/3/25   Pepito Foster DO   ondansetron (ZOFRAN) 4 mg tablet Take 1 tablet (4 mg total) by mouth every 8 (eight) hours as needed for nausea or vomiting  Patient not taking: Reported on 2/3/2025 2/3/25   Pepito Foster DO   PRECISION XTRA TEST STRIPS test strip USE TO TEST BLOOD GLUCOSE ONCE A DAY  Patient not taking: Reported on 2/3/2025 1/9/24   Pepito Foster DO     No Known Allergies    Objective :  Temp:  [98.1 °F (36.7 °C)] 98.1 °F (36.7 °C)  HR:  [] 76  BP: (122-208)/(58-91) 124/64  Resp:  [16-18] 18  SpO2:  [93 %-95 %] 93 %  O2 Device: None (Room air)    Physical Exam  Constitutional:       General: He is not in acute distress.     Appearance: Normal appearance. He is not ill-appearing or toxic-appearing.   HENT:      Head: Normocephalic  and atraumatic.      Right Ear: External ear normal.      Left Ear: External ear normal.      Nose: Nose normal.      Mouth/Throat:      Mouth: Mucous membranes are dry.      Pharynx: Oropharynx is clear.   Eyes:      Extraocular Movements: Extraocular movements intact.      Conjunctiva/sclera: Conjunctivae normal.   Cardiovascular:      Rate and Rhythm: Normal rate. Rhythm irregular.      Pulses: Normal pulses.      Heart sounds: Normal heart sounds.   Pulmonary:      Effort: Pulmonary effort is normal.      Breath sounds: Normal breath sounds.   Abdominal:      General: Bowel sounds are normal.      Palpations: Abdomen is soft.   Musculoskeletal:         General: Normal range of motion.      Cervical back: Normal range of motion.      Right lower leg: No edema.      Left lower leg: No edema.   Skin:     General: Skin is warm.      Capillary Refill: Capillary refill takes less than 2 seconds.      Comments: Generalized excoriation   Neurological:      General: No focal deficit present.      Mental Status: He is alert and oriented to person, place, and time.   Psychiatric:         Mood and Affect: Mood normal.         Behavior: Behavior normal.          Lines/Drains:            Lab Results: I have reviewed the following results:  Results from last 7 days   Lab Units 02/03/25 1907   WBC Thousand/uL 13.93*   HEMOGLOBIN g/dL 10.7*   HEMATOCRIT % 31.3*   PLATELETS Thousands/uL 200   SEGS PCT % 85*   LYMPHO PCT % 6*   MONO PCT % 8   EOS PCT % 0     Results from last 7 days   Lab Units 02/03/25 1907   SODIUM mmol/L 133*   POTASSIUM mmol/L 5.7*   CHLORIDE mmol/L 104   CO2 mmol/L 22   BUN mg/dL 42*   CREATININE mg/dL 2.30*   ANION GAP mmol/L 7   CALCIUM mg/dL 9.2   GLUCOSE RANDOM mg/dL 257*     Results from last 7 days   Lab Units 02/03/25  1907   INR  1.30*         Lab Results   Component Value Date    HGBA1C 8.6 (H) 12/03/2024    HGBA1C 7.9 (H) 11/08/2024    HGBA1C 6.8 (H) 07/12/2024     Results from last 7 days   Lab  Units 02/03/25  1925   LACTIC ACID mmol/L 1.6       Imaging Results Review: I reviewed radiology reports from this admission including: chest xray, CT head, and CT C-spine.  Other Study Results Review: EKG was reviewed.  EKG was personally reviewed and my interpretation is: Personally Reviewed. NSR. HR 77, first-degree AV block, right bundle branch block, left anterior fascicular block..    Administrative Statements   I have spent a total time of   minutes in caring for this patient on the day of the visit/encounter including Diagnostic results, Prognosis, Risks and benefits of tx options, Instructions for management, Patient and family education, Importance of tx compliance, Risk factor reductions, Impressions, Counseling / Coordination of care, Documenting in the medical record, Reviewing / ordering tests, medicine, procedures  , Obtaining or reviewing history  , and Communicating with other healthcare professionals .    ** Please Note: This note has been constructed using a voice recognition system. **

## 2025-02-04 NOTE — ASSESSMENT & PLAN NOTE
Generalized excoriation.  Right shoulder appears cellulitic-see media tab.    Recently completed keflex for UTI, unsure if this is cause of pruritus and excoriation   Continue vancomycin   Will send wound culture, though has already received first dose of antibiotics

## 2025-02-04 NOTE — PROGRESS NOTES
Progress Note - Hospitalist   Name: Leroy Paredes 89 y.o. male I MRN: [id number]  Unit/Bed#: ED-05 I Date of Admission: 2/3/2025   Date of Service: 2/4/2025 I Hospital Day: 0    Assessment & Plan  Cellulitis of right upper extremity  Generalized excoriation.  Right shoulder appears cellulitic-see media tab.    Recently completed keflex for UTI, unsure if this is cause of pruritus and excoriation   Continue vancomycin   Will send wound culture, though has already received first dose of antibiotics  F/U MRSA   Sepsis ([facility])  Tachycardia, leukocytosis.  Lactate normal.   Suspected source: RUE cellulitis  Will also check UA     Plan   Antibiotics: clindamycin in ED.  Recently had keflex and unsure if patient developed skin rash following keflex.   Will continue with vancomycin for now, consult pharmacy  Procalcitonin pending  CKD stage 4 due to type 2 diabetes mellitus ([facility])  Creatinine stable and at baseline.  Family reports good oral intake.   Monitor BMP  Essential hypertension  BP elevated on arrival, improving without intervention  Continue lisinopril   Multiple falls  Unwitnessed fall at [facility] on [facility].  Trauma level b - appreciate trauma.  Fortunately no traumatic injuries.   Pt ot fall precautions  Ambulates with walker.  Does need encouraging to use walker per family.  Hyperkalemia  Potassium 5.7  Repeat potassium at midnight  Low potassium diet  Dementia ([facility])  Family notes poor short term memory.  Mental status currently at baseline.   Continue home medications  Remeron 15 mg hs  Seroquel 25 mg hs  Lexapro 5 mg qd   Type 2 diabetes mellitus (HCC)  Lab Results   Component Value Date    HGBA1C 8.6 (H) 12/03/2024     Resume starlix on dc  Accucheck, ssi while hospitalized   Chronic heart failure with preserved ejection fraction (HCC)  Wt Readings from Last 3 Encounters:   02/03/25 82.5 kg (181 lb 14.1 oz)   01/27/25 78.6 kg (173 lb 4.8 oz)   11/20/24 77.2 kg (170 lb 3.2 oz)         NYHA class III stage  C  LVEF 50% reduced from LVEF 55% by TTE on 6/29/20, conservative management   On physical exam euvolemic compensated  Blood pressure heart rate controlled  Not on diuretics  Continue on lisinopril 5 mg daily  2 g sodium diet   Euvolemic       VTE Pharmacologic Prophylaxis: VTE Score: 5 Moderate Risk (Score 3-4) - Pharmacological DVT Prophylaxis Ordered: apixaban (Eliquis).    Mobility:   Basic Mobility Inpatient Raw Score: 15  JH-HLM Goal: 4: Move to chair/commode  JH-HLM Achieved: 7: Walk 25 feet or more  JH-HLM Goal achieved. Continue to encourage appropriate mobility.    Patient Centered Rounds: I performed bedside rounds with nursing staff today.   Discussions with Specialists or Other Care Team Provider: case management    Education and Discussions with Family / Patient: Attempted to update  (niece) via phone. Left voicemail.     Current Length of Stay: 0 day(s)  Current Patient Status: Inpatient   Certification Statement: The patient will continue to require additional inpatient hospital stay due to cellultis  Discharge Plan: Anticipate discharge in 24-48 hrs to home.    Code Status: Level 3 - DNAR and DNI    Subjective   Pt states he's feeling well, patient states redness in right shoulder improving.     Objective :  Temp:  [98.1 °F (36.7 °C)] 98.1 °F (36.7 °C)  HR:  [] 65  BP: (106-208)/(51-91) 173/77  Resp:  [16-18] 18  SpO2:  [93 %-96 %] 95 %  O2 Device: None (Room air)    Body mass index is 25.37 kg/m².     Input and Output Summary (last 24 hours):   No intake or output data in the 24 hours ending 02/04/25 1259    Physical Exam  Vitals and nursing note reviewed.   Constitutional:       General: He is not in acute distress.     Appearance: He is well-developed.   HENT:      Head: Normocephalic and atraumatic.   Eyes:      Conjunctiva/sclera: Conjunctivae normal.   Cardiovascular:      Rate and Rhythm: Normal rate and regular rhythm.      Heart sounds: No murmur heard.  Pulmonary:       Effort: Pulmonary effort is normal. No respiratory distress.      Breath sounds: Normal breath sounds.   Abdominal:      Palpations: Abdomen is soft.      Tenderness: There is no abdominal tenderness.   Musculoskeletal:         General: No swelling.   Skin:     Comments: Small area of erythema right shoulder   Neurological:      Mental Status: He is alert. Mental status is at baseline.   Psychiatric:         Mood and Affect: Mood normal.           Lines/Drains:  Lines/Drains/Airways       Active Status       Name Placement date Placement time Site Days    External Urinary Catheter Medium 02/04/25  0121  -- less than 1                            Lab Results: I have reviewed the following results:   Results from last 7 days   Lab Units 02/04/25  0516   WBC Thousand/uL 10.08   HEMOGLOBIN g/dL 9.8*   HEMATOCRIT % 30.3*   PLATELETS Thousands/uL 162   SEGS PCT % 75   LYMPHO PCT % 12*   MONO PCT % 10   EOS PCT % 2     Results from last 7 days   Lab Units 02/04/25  0516   SODIUM mmol/L 135   POTASSIUM mmol/L 5.2   CHLORIDE mmol/L 106   CO2 mmol/L 21   BUN mg/dL 43*   CREATININE mg/dL 2.27*   ANION GAP mmol/L 8   CALCIUM mg/dL 8.8   GLUCOSE RANDOM mg/dL 183*     Results from last 7 days   Lab Units 02/03/25  1907   INR  1.30*     Results from last 7 days   Lab Units 02/04/25  1225 02/04/25  0726 02/03/25  2247   POC GLUCOSE mg/dl 160* 175* 197*         Results from last 7 days   Lab Units 02/04/25  0516 02/03/25  1925 02/03/25  1907   LACTIC ACID mmol/L  --  1.6  --    PROCALCITONIN ng/ml 0.28*  --  0.19       Recent Cultures (last 7 days):   Results from last 7 days   Lab Units 02/03/25  2308 02/03/25  2040 02/03/25  2028   BLOOD CULTURE   --  Received in Microbiology Lab. Culture in Progress. Received in Microbiology Lab. Culture in Progress.   GRAM STAIN RESULT  No Polys or Bacteria seen  --   --        Imaging Results Review: I reviewed radiology reports from this admission including: shoulder xrays.  Other Study Results  Review: EKG was reviewed.     Last 24 Hours Medication List:     Current Facility-Administered Medications:     acetaminophen (TYLENOL) tablet 650 mg, Q6H PRN    apixaban (ELIQUIS) tablet 2.5 mg, BID    atorvastatin (LIPITOR) tablet 40 mg, Daily With Dinner    bisacodyl (DULCOLAX) rectal suppository 10 mg, Daily    Cholecalciferol (VITAMIN D3) tablet 1,000 Units, Daily    [START ON 2/6/2025] cyanocobalamin (VITAMIN B-12) tablet 500 mcg, Once per day on Monday Thursday    escitalopram (LEXAPRO) tablet 5 mg, Daily    gabapentin (NEURONTIN) capsule 100 mg, HS    insulin lispro (HumALOG/ADMELOG) 100 units/mL subcutaneous injection 1-5 Units, HS    insulin lispro (HumALOG/ADMELOG) 100 units/mL subcutaneous injection 1-6 Units, TID AC **AND** Fingerstick Glucose (POCT), TID AC    lisinopril (ZESTRIL) tablet 5 mg, Daily    magnesium Oxide (MAG-OX) tablet 400 mg, Daily    melatonin tablet 3 mg, HS    mirtazapine (REMERON) tablet 15 mg, HS    QUEtiapine (SEROquel) tablet 25 mg, HS    terazosin (HYTRIN) capsule 1 mg, HS    [START ON 2/5/2025] vancomycin 750 mg in sodium chloride 0.9% 250 mL, Daily PRN    Administrative Statements   Today, Patient Was Seen By: Zuleika Marmolejo DO      **Please Note: This note may have been constructed using a voice recognition system.**

## 2025-02-04 NOTE — PROGRESS NOTES
"Progress Note - Trauma   Name: Leroy Paredes 89 y.o. male I MRN: [id number]  Unit/Bed#: ED-05 I Date of Admission: 2/3/2025   Date of Service: 2/4/2025 I Hospital Day: 0    Assessment & Plan  Multiple falls  - [facility] falls at [facility]  - Unknown head strike, unknown LOC, takes eliquis daily  - GCS 14, disoriented to time. Otherwise fully neurovascularly intact  - No evidence of trauma on exam  - CT head, C-spine negative  - XR R shoulder completed due to abscess/ cellulitis on shoulder  - No additional injuries identified on tertiary trauma survey      PLAN:  - No further imaging warranted  - Trauma service signing off  - All further workup per Internal medicine    CKD stage 4 due to type 2 diabetes mellitus (HCC)  Lab Results   Component Value Date    HGBA1C 8.6 (H) 12/03/2024       Recent Labs     02/03/25  2247   POCGLU 197*       Blood Sugar Average: Last 72 hrs:  (P) 197    Essential hypertension    Chronic heart failure with preserved ejection fraction (HCC)  Wt Readings from Last 3 Encounters:   02/03/25 82.5 kg (181 lb 14.1 oz)   01/27/25 78.6 kg (173 lb 4.8 oz)   11/20/24 77.2 kg (170 lb 3.2 oz)             Hyperkalemia    Sepsis (HCC)    Dementia (HCC)    Type 2 diabetes mellitus (HCC)  Lab Results   Component Value Date    HGBA1C 8.6 (H) 12/03/2024       Recent Labs     02/03/25  2247   POCGLU 197*       Blood Sugar Average: Last 72 hrs:  (P) 197    Cellulitis of right upper extremity      VTE Prophylaxis: VTE covered by:  apixaban, Oral         Disposition: Trauma signing off Please contact the SecureChat role,\"AN Trauma AP\", with any questions/concerns.    TRAUMA TERTIARY SURVEY  Summary of Diagnosed Injuries: none    Transfer from: none    Mechanism of Injury:Fall     Chief Complaint: \"I'm fine\"    24 Hour Events : Admitted to internal medicine  Subjective : Patient reports he feels fine, denies any pain. He was resting in bed comfortably and sleeping. Nursing reports no issues    Objective " :  Temp:  [98.1 °F (36.7 °C)] 98.1 °F (36.7 °C)  HR:  [] 69  BP: (106-208)/(51-91) 167/60  Resp:  [16-18] 18  SpO2:  [93 %-95 %] 95 %  O2 Device: None (Room air)    I/O         02/02 0701 02/03 0700 02/03 0701 02/04 0700          Unmeasured Urine Occurrence  1 x          Lines/Drains/Airways       Active Status       Name Placement date Placement time Site Days    External Urinary Catheter Medium 02/04/25  0121  -- less than 1                  Physical Exam   GENERAL APPEARANCE: Patient in no acute distress.  HEENT: NCAT; PERRL, EOMs intact; Mucous membranes moist  CV: Regular rate and rhythm; no murmur/gallops/rubs appreciated.  CHEST / LUNGS: Clear to auscultation; no wheezes/rales/rhonci.  ABD: NABS; soft; non-distended; non-tender.  : Voiding  EXT: RUE ROM intact, proximal cellulitis; +2 pulses bilaterally upper & lower extremities; no edema.  NEURO: GCS 15; no focal neurologic deficits; neurovascularly intact.  SKIN: Warm, dry and well perfused; no rash; no jaundice.        Lab Results: I have reviewed the following results:  Recent Labs     02/03/25  1907 02/03/25  1925 02/03/25  2308   WBC 13.93*  --   --    HGB 10.7*  --   --    HCT 31.3*  --   --      --   --    SODIUM 133*  --   --    K 5.7*  --  5.0     --   --    CO2 22  --   --    BUN 42*  --   --    CREATININE 2.30*  --   --    GLUC 257*  --   --    MG  --   --  1.7*   PTT 33  --   --    INR 1.30*  --   --    LACTICACID  --  1.6  --        Imaging Results Review: No pertinent imaging studies reviewed.  Other Study Results Review: No additional pertinent studies reviewed.

## 2025-02-04 NOTE — ED PROVIDER NOTES
Emergency Department Airway Evaluation and Management Form    History  Obtained from: EMS/Patient  Patient has no known allergies.  Chief Complaint   Patient presents with    Weakness - Generalized    Fall     Patient arrives via ems from [facility] with generalized weakness and multiple falls on eliquis     HPI    89-year-old male sent from nursing facility for evaluation of multiple falls that were unwitnessed.  Takes Eliquis.  Patient is breathing comfortably on room air with normal saturation.  Further management per trauma team.    Past Medical History:   Diagnosis Date    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)     Last Assessed:  5/19/17    Chronic kidney disease     Diabetes mellitus (HCC)     Hyperlipidemia     Hypertension     Osteoarthritis     Palpitations     TIA (transient ischemic attack)      Past Surgical History:   Procedure Laterality Date    APPENDECTOMY      CARDIAC ELECTROPHYSIOLOGY PROCEDURE N/A 1/17/2024    Procedure: Cardiac loop recorder explant;  Surgeon: Drew Olmos MD;  [facility]: [facility];  Service: Cardiology    COLONOSCOPY  2012    HEMORROIDECTOMY      TOOTH EXTRACTION  02/02/2022     Family History   Problem Relation Age of Onset    Diabetes Mother     Other Mother         Stroke syndrome    Diabetes Father     Emphysema Father      Social History     Tobacco Use    Smoking status: Former    Smokeless tobacco: Never   Vaping Use    Vaping status: Never Used   Substance Use Topics    Alcohol use: Not Currently     Comment: Social drinker    Drug use: No     I have reviewed and agree with the history as documented.    Review of Systems    Physical Exam  BP (!) 208/91   Pulse 104   Temp 98.1 °F (36.7 °C) (Oral)   Resp 18   Wt 82.5 kg (181 lb 14.1 oz)   SpO2 95%   BMI 25.37 kg/m²     Physical Exam    ED Medications  Medications - No data to display    Intubation  Procedures    Notes      Final Diagnosis  Final diagnoses:   None       ED Provider  Electronically  Signed by     Reinaldo Ray MD  02/03/25 8264

## 2025-02-04 NOTE — ED NOTES
Patient boosted and repositioned. New pad and bed sheet placed on stretcher. PT resting comfortably.     Justine Humphries RN  02/04/25 [id number]

## 2025-02-04 NOTE — ASSESSMENT & PLAN NOTE
Family notes poor short term memory.  Mental status currently at baseline.   Continue home medications  Remeron 15 mg hs  Seroquel 25 mg hs  Lexapro 5 mg qd

## 2025-02-04 NOTE — ASSESSMENT & PLAN NOTE
Unwitnessed fall at [facility] on [facility].  Trauma level b - appreciate trauma.  Fortunately no traumatic injuries.   Pt ot fall precautions  Ambulates with walker.  Does need encouraging to use walker per family.

## 2025-02-04 NOTE — ASSESSMENT & PLAN NOTE
- Multiple unwitnessed falls at SNF  - Unknown head strike, unknown LOC, takes eliquis daily  - GCS 14, disoriented to time. Otherwise fully neurovascularly intact  - No evidence of trauma on exam  - CT head, C-spine negative  - XR R shoulder completed due to abscess/ cellulitis on shoulder  - No additional injuries identified on tertiary trauma survey      PLAN:  - No further imaging warranted  - Trauma service signing off  - All further workup per Internal medicine

## 2025-02-04 NOTE — H&P
H&P - Trauma   Name: Leroy Paredes 89 y.o. male I MRN: [id number]  Unit/Bed#: ED-05 I Date of Admission: 2/3/2025   Date of Service: 2/3/2025 I Hospital Day: 0     Assessment & Plan  Multiple falls  - Multiple unwitnessed falls at SNF  - Unknown head strike, unknown LOC, takes eliquis daily  - GCS 14, disoriented to time. Otherwise fully neurovascularly intact  - No evidence of trauma on exam  - CT head, C-spine negative  - XR R shoulder completed due to abscess/ cellulitis on shoulder  - Multiple areas of excoriation on abdomen, bilateral upper and lower extremities  - Tylenol IV for chronic back pain  - Clindamycin 900 mg IV one time dose for cellulitis  - Leukocytosis, NELLI  - Updated family at bedside    Cervical Collar Clearance:    The patient had a CT scan of the cervical spine demonstrating no acute injury. On exam, the patient had no midline point tenderness or paresthesias/numbness/weakness in the extremities. The patient had full range of motion (was then able to flex, extend, and rotate head laterally) without pain. There were no distracting injuries and the patient was not intoxicated.      The patient's cervical spine was cleared radiologically and clinically. Cervical collar removed at this time.     Zay Chan PA-C  2/3/2025 8:40 PM       Trauma Alert: Level B   Model of Arrival: Ambulance    Trauma Team: Attending Miguel and MELANY Chan  Consultants: none     History of Present Illness   Chief Complaint: Multiple falls  Mechanism:Fall     Leroy Paredes is a 89 y.o. male who presents after multiple falls and generalized weakness. Unwitnessed falls, unknown head strike or LOC, takes eliquis daily. No evidence of trauma on exam. Reports intermittent chronic lower back pain    Review of Systems   Constitutional:  Negative for chills and fever.   HENT:  Negative for facial swelling.    Eyes:  Negative for photophobia.   Respiratory:  Negative for shortness of breath.    Cardiovascular:  Negative  for chest pain.   Gastrointestinal:  Negative for abdominal pain and nausea.   Musculoskeletal:  Negative for back pain and neck pain.   Skin:  Positive for color change and rash. Negative for wound.   Neurological:  Negative for dizziness, syncope and headaches.   Psychiatric/Behavioral:  Negative for confusion.      I have reviewed the patient's PMH, PSH, Social History, Family History, Meds, and Allergies  Immunization History   Administered Date(s) Administered    COVID-19 MODERNA VACC 0.5 ML IM 01/27/2021, 02/22/2021, 11/22/2021, 07/23/2022    COVID-19 Pfizer mRNA vacc PF naresh-sucrose 12 yr and older (Comirnaty) 01/27/2025    INFLUENZA 11/01/2002, 01/05/2003, 11/24/2003, 10/06/2004, 11/07/2005, 10/21/2006, 09/28/2007, 10/10/2008, 10/20/2008, 11/04/2009, 10/05/2010, 10/03/2011, 11/04/2012    Influenza Split High Dose Preservative Free IM 09/27/2013, 09/25/2014, 09/29/2015, 09/23/2016, 09/19/2017    Influenza, high dose seasonal 0.7 mL 09/25/2018, 10/15/2020, 09/20/2021, 09/26/2022, 10/02/2023    Pneumococcal 01/01/1999    Pneumococcal Conjugate 13-Valent 04/25/2019    Pneumococcal Polysaccharide PPV23 10/01/2006    TD (adult) Preservative Free 12/11/1930     Last Tetanus: unknown    1. Before the illness or injury that brought you to the Emergency, did you need someone to help you on a regular basis? 1=Yes   2. Since the illness or injury that brought you to the Emergency, have you needed more help than usual to take care of yourself? 1=Yes   3. Have you been hospitalized for one or more nights during the past 6 months (excluding a stay in the Emergency Department)? 0=No   4. In general, do you see well? 1=No   5. In general, do you have serious problems with your memory? 1=Yes   6. Do you take more than three different medications everyday? 1=Yes   TOTAL   5     Did you order a geriatric consult if the score was 2 or greater?: n/a       Objective :  Temp:  [98.1 °F (36.7 °C)] 98.1 °F (36.7 °C)  HR:  []  79  BP: (122-208)/(58-91) 123/59  Resp:  [16-18] 18  SpO2:  [94 %-95 %] 95 %  O2 Device: None (Room air)    Initial Vitals:   Temperature: 98.1 °F (36.7 °C) (02/03/25 1859)  Pulse: 104 (02/03/25 1859)  Respirations: 18 (02/03/25 1859)  Blood Pressure: (!) 208/91 (02/03/25 1859)    Primary Survey:   Airway:        Status: patent;        Pre-hospital Interventions: none        Hospital Interventions: none  Breathing:        Pre-hospital Interventions: none       Effort: normal       Right breath sounds: normal       Left breath sounds: normal  Circulation:        Rhythm: regular       Rate: regular   Right Pulses Left Pulses    R radial: 2+    R pedal: 2+     L radial: 2+    L pedal: 2+       Disability:        GCS: Eye: 4; Verbal: 4 Motor: 6 Total: 14       Right Pupil: round;  reactive         Left Pupil:  round;  reactive      R Motor Strength L Motor Strength    R : 5/5  R dorsiflex: 5/5  R plantarflex: 5/5 L : 5/5  L dorsiflex: 5/5  L plantarflex: 5/5        Sensory:  No sensory deficit  Exposure:       Completed: Yes      Secondary Survey:  Physical Exam  Vitals reviewed.   Constitutional:       General: He is not in acute distress.     Appearance: Normal appearance.   HENT:      Head: Normocephalic and atraumatic.      Right Ear: External ear normal.      Left Ear: External ear normal.      Nose: Nose normal.      Mouth/Throat:      Mouth: Mucous membranes are moist.      Pharynx: Oropharynx is clear.   Eyes:      Extraocular Movements: Extraocular movements intact.      Conjunctiva/sclera: Conjunctivae normal.      Pupils: Pupils are equal, round, and reactive to light.   Cardiovascular:      Rate and Rhythm: Normal rate and regular rhythm.      Pulses: Normal pulses.      Heart sounds: Normal heart sounds.   Pulmonary:      Effort: Pulmonary effort is normal. No respiratory distress.      Breath sounds: Normal breath sounds.   Chest:      Chest wall: No tenderness.   Abdominal:      General: Abdomen  is flat. Bowel sounds are normal. There is no distension.      Palpations: Abdomen is soft.      Tenderness: There is no abdominal tenderness. There is no guarding.   Musculoskeletal:         General: No swelling, tenderness, deformity or signs of injury. Normal range of motion.      Cervical back: No tenderness.   Skin:     General: Skin is warm and dry.      Capillary Refill: Capillary refill takes less than 2 seconds.      Findings: Erythema, lesion and rash present. No bruising.      Comments: Areas of excoriation on abdomen, chest, right flank, bilateral upper and lower extremities  Right upper arm/ deltoid area with 2 areas with cellulitic changes and purulent material expressed  Neurovascularly intact   Neurological:      General: No focal deficit present.      Mental Status: He is alert. Mental status is at baseline. He is disoriented.      Sensory: No sensory deficit.      Motor: No weakness.   Psychiatric:         Mood and Affect: Mood normal.         Behavior: Behavior normal.             Lab Results: I have reviewed the following results:  Recent Labs     02/03/25  1907 02/03/25  1925   WBC 13.93*  --    HGB 10.7*  --    HCT 31.3*  --      --    SODIUM 133*  --    K 5.7*  --      --    CO2 22  --    BUN 42*  --    CREATININE 2.30*  --    GLUC 257*  --    LACTICACID  --  1.6       Imaging Results: I have personally reviewed pertinent images saved in PACS. CT scan findings (and other pertinent positive findings on images) were discussed with radiology. My interpretation of the images/reports are as follows:  Chest Xray(s): negative for acute findings   FAST exam(s): negative for acute findings   CT Scan(s): negative for acute findings   Additional Xray(s): R shoulder XR pending     Other Studies: Other Study Results Review: No additional pertinent studies reviewed.

## 2025-02-04 NOTE — ASSESSMENT & PLAN NOTE
Unwitnessed fall at [facility] [place] [facility].  Trauma level b - appreciate trauma.  Fortunately no traumatic injuries.   Pt ot fall precautions  Ambulates with walker.  Does need encouraging to use walker per family.

## 2025-02-04 NOTE — OCCUPATIONAL THERAPY NOTE
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)    Essential hypertension    Chronic heart failure with preserved ejection fraction (HCC)    Multiple falls    Hyperkalemia    Sepsis (HCC)    Dementia (HCC)    Type 2 diabetes mellitus (HCC)    Past Medical History  Past Medical History:   Diagnosis Date    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)     Last Assessed:  5/19/17    Chronic kidney disease     Diabetes mellitus (HCC)     Hyperlipidemia     Hypertension     Osteoarthritis     Palpitations     TIA (transient ischemic attack)      Past Surgical History  Past Surgical History:   Procedure Laterality Date    APPENDECTOMY      CARDIAC ELECTROPHYSIOLOGY PROCEDURE N/A 1/17/2024    Procedure: Cardiac loop recorder explant;  Surgeon: Drew Olmos MD;  Location: [facility];  Service: Cardiology    COLONOSCOPY  2012    HEMORROIDECTOMY      TOOTH EXTRACTION  02/02/2022 02/04/25 1032   OT Last Visit   OT Visit Date 02/04/25   Note Type   Note type Evaluation   Pain Assessment   Pain Assessment Tool 0-10   Pain Score No Pain   Restrictions/Precautions   Weight Bearing Precautions Per Order No   Other Precautions Bed Alarm;Multiple lines;Fall Risk;Pain   Home Living   Type of Home Assisted living  ([facility])   Home Layout One level   Home Equipment Walker   Additional Comments use of RW at baseline - per chart pt frequently forgets to use   Prior Function   Level of [place] Needs assistance with ADLs   Lives With Facility staff   Receives Help From Personal care attendant   IADLs Family/Friend/Other provides transportation;Family/Friend/Other provides meals;Family/Friend/Other provides medication management   Falls in the last 6 months   (pt denies - however per chart hx of multiple falls + reason  "for current admission)   Vocational Retired   Comments Pt is a a POOR historian - reports that he still lives alone and is just \"visiting\" [facility] [place] operated by [facility] for a few days. Has been living there for several months at least   Lifestyle   Autonomy PTA pt living at [facility], pt requiring (A) with ADLs and IADLs, use of RW at baseline   Reciprocal Relationships supportive staff   Service to Others retired    Intrinsic Gratification pt reports that he likes to go hunting   General   Additional Pertinent History Admit due to weakness + multiple falls. Found to have cellulitis of RUE + sepsis. PMH: CKD, HTN, dementia, DM II   Family/Caregiver Present No   Subjective   Subjective \"It was about 11 years ago - so just about 1970 that I fell off that rope\" \"I think this was before the 4th world war happened...what you don't remember that war?\"   ADL   Eating Assistance 5  Supervision/Setup   Grooming Assistance 5  Supervision/Setup   UB Bathing Assistance 4  Minimal Assistance   LB Bathing Assistance 3  Moderate Assistance   UB Dressing Assistance 4  Minimal Assistance   LB Dressing Assistance 3  Moderate Assistance   LB Dressing Deficit Don/doff R sock;Don/doff L sock;Increased time to complete;Supervision/safety;Verbal cueing   Toileting Assistance  2  Maximal Assistance   Toileting Deficit Perineal hygiene   Bed Mobility   Supine to Sit 4  Minimal assistance   Additional items Assist x 1;Increased time required;Verbal cues   Sit to Supine 4  Minimal assistance   Additional items Assist x 2;Increased time required;Verbal cues;LE management  (requiring increased (A) due to elevated height of stretcher and need for B knee block)   Transfers   Sit to Stand 4  Minimal assistance   Additional items Assist x 1;Increased time required;Verbal cues   Stand to Sit 4  Minimal assistance   Additional items Assist x 1;Increased time required;Verbal cues   Additional Comments use of RW, cuing for hand placement   Functional " Mobility   Functional Mobility 4  Minimal assistance   Additional Comments Ax1, functional household distance, requiring cuing for obstacle navigation   Additional items Rolling walker   Balance   Static Sitting Fair +   Dynamic Sitting Fair   Static Standing Poor +   Dynamic Standing Poor +   Ambulatory Poor +   Activity Tolerance   Activity Tolerance Other (Comment)  (limited by cognition)   Medical Staff Made Aware PT Melissa   Nurse Made Aware DENNY SHAFFER Assessment   RUE Assessment WFL   LUE Assessment   LUE Assessment WFL   Hand Function   Gross Motor Coordination Functional   Fine Motor Coordination Functional   Cognition   Overall Cognitive Status (S)  Impaired   Arousal/Participation Alert;Cooperative   Attention Attends with cues to redirect   Orientation Level Oriented to person;Disoriented to place;Disoriented to time;Disoriented to situation   Memory Decreased short term memory;Decreased recall of recent events;Decreased recall of precautions;Decreased recall of biographical information   Following Commands Follows one step commands with increased time or repetition   Comments Highly tangential, confabulation of stories - requiring extensive cuing for re-orientation   Assessment   Limitation Decreased ADL status;Decreased Safe judgement during ADL;Decreased cognition;Decreased endurance;Decreased self-care trans;Decreased high-level ADLs  (impaired balance, fxnl mobility, act teri, fxnl reach, standing teri, strength, attention to task, direction following, safety awareness, insight, pacing, orientation, problem solving, learning new tasks, appropriate responses)   Prognosis Good   Assessment Pt is a 89 y.o. male seen for OT evaluation s/p admission to [facility] on 2/3/2025 due to weakness + multiple falls. Diagnosed with Cellulitis of right upper extremity. Personal and env factors supporting pt at time of IE include supportive facility staff and [facility]. Personal and env factors inhibiting engagement in  "occupations include advanced age, lifestyle patterns, difficulty completing ADLs, and difficulty completing IADLs. Performance deficits that affect the pt’s occupational performance can be seen above. Due to pt's current functional limitations and medical complications pt is functioning below baseline. Pt would benefit from continued skilled OT treatment in order to maximize safety, independence and overall performance with ADLs, functional mobility, functional transfers, and cognition in order to achieve highest level of function.   Goals   Patient Goals \"I just wanted to sleep\"   LTG Time Frame 10-14   Long Term Goal see goals listed below   Plan   Treatment Interventions ADL retraining;Functional transfer training;Endurance training;Patient/family training;Equipment evaluation/education;Compensatory technique education;Cognitive reorientation;Energy conservation;Activityengagement   Goal Expiration Date 02/14/25   OT Treatment Day 0   OT Frequency 2-3x/wk   Discharge Recommendation   Rehab Resource Intensity Level, OT III (Minimum Resource Intensity)  (recommend continued (A) + (S) with ADLs and IADLs)   AM-PAC Daily Activity Inpatient   Lower Body Dressing 2   Bathing 3   Toileting 2   Upper Body Dressing 3   Grooming 3   Eating 3   Daily Activity Raw Score 16   Daily Activity Standardized Score (Calc for Raw Score >=11) 35.96   AM-PAC Applied Cognition Inpatient   Following a Speech/Presentation 2   Understanding Ordinary Conversation 2   Taking Medications 1   Remembering Where Things Are Placed or Put Away 1   Remembering List of 4-5 Errands 1   Taking Care of Complicated Tasks 1   Applied Cognition Raw Score 8   Applied Cognition Standardized Score 19.32   End of Consult   Patient Position at End of Consult Supine;Bed/Chair alarm activated;All needs within reach  (bed alarm placed on stretcher and intact)        GOALS:      -Patient will perform grooming tasks standing at sink with overall Supervision in " order to increase overall independence    -Patient will be Supervision with UB dressing using AE and AD as needed in order to increase (I) with ADLs    -Patient will be Supervision with UB bathing using AE and AD as needed in order to increase (I) with ADLs    -Patient will be Min A  with LB dressing with use of AE and AD as needed in order to increase (I) with ADLs    -Patient will be Min A  with LB bathing with use of AE and AD as needed in order to increase (I) with ADLs    -Patient will complete toileting w/ Min A  w/ G hygiene/thoroughness in order to reduce caregiver burden    -Patient will demonstrate Supervision with bed mobility for ability to manage own comfort and initiate OOB tasks.     -Patient will perform functional transfers with Supervision to/from all surfaces using DME as needed in order to increase (I) with functional tasks    -Patient will be Supervision with functional mobility to/from bathroom for increased independence with toileting tasks    -Patient will tolerate therapeutic activities for greater than 30 min, in order to increase tolerance for functional activities.     -Patient’s caregivers will be independent with providing appropriate cognitive cues in order to facilitate patient’s independence during functional tasks.        The patient's raw score on the -PAC Daily Activity Inpatient Short Form is 16. A raw score of less than 19 suggests the patient may benefit from discharge to post-acute rehabilitation services. HOWEVER please refer to the recommendation of the Occupational Therapist for safe discharge planning.    This session, pt required and most appropriately benefited from skilled OT/PT co-eval due to extensive physical assistance of SKILLED therapists, significant regression from functional baseline, and decreased activity tolerance. OT and PT goals were addressed separately as seen in documentation.     Juanita Chaidez MS, OTR/L

## 2025-02-04 NOTE — ASSESSMENT & PLAN NOTE
Lab Results   Component Value Date    HGBA1C 8.6 (H) 12/03/2024       Recent Labs     02/03/25  2247   POCGLU 197*       Blood Sugar Average: Last 72 hrs:  (P) 197

## 2025-02-04 NOTE — ED NOTES
Spoke with [facility] nurse about admission status of patient.      Dionne Mena, DENNY  02/03/25 [id number]

## 2025-02-04 NOTE — ASSESSMENT & PLAN NOTE
Wt Readings from Last 3 Encounters:   02/03/25 82.5 kg (181 lb 14.1 oz)   01/27/25 78.6 kg (173 lb 4.8 oz)   11/20/24 77.2 kg (170 lb 3.2 oz)

## 2025-02-04 NOTE — ASSESSMENT & PLAN NOTE
Generalized excoriation.  Right shoulder appears cellulitic-see media tab.    Recently completed keflex for UTI, unsure if this is cause of pruritus and excoriation   Continue vancomycin   Will send wound culture, though has already received first dose of antibiotics  F/U MRSA

## 2025-02-04 NOTE — ASSESSMENT & PLAN NOTE
Tachycardia, leukocytosis.  Lactate normal.   Suspected source: RUE cellulitis  Will also check UA     Plan   Antibiotics: clindamycin in ED.  Recently had keflex and unsure if patient developed skin rash following keflex.   Will continue with vancomycin for now, consult pharmacy  Procalcitonin pending

## 2025-02-04 NOTE — PROGRESS NOTES
Leroy Paredes is a 89 y.o. male who is currently ordered Vancomycin IV with management by the Pharmacy Consult service.  Relevant clinical data and objective / subjective history reviewed.  Vancomycin Assessment:  Indication and Goal AUC/Trough: Soft tissue (goal -600, trough >10)  Clinical Status:  new start  Micro:   pending  Renal Function:  SCr: 2.3 mg/dL  CrCl: 20.9 mL/min  Renal replacement: Not on dialysis  Days of Therapy: 1  Current Dose: 2000mg (25mg/kg) loading dose given  Vancomycin Plan:  New Dosinmg (10mg/kg) pulse-dosed PRN for random vanco level <15mcg/ml  Estimated AUC: n/a for pulse dosing  Estimated Trough: 10-15 mcg/mL  Next Level: 2/5 @ 0600  Renal Function Monitoring: Daily BMP and UOP  Pharmacy will continue to follow closely for s/sx of nephrotoxicity, infusion reactions and appropriateness of therapy.  BMP and CBC will be ordered per protocol. We will continue to follow the patient’s culture results and clinical progress daily.    Amy Dow, Pharmacist

## 2025-02-04 NOTE — PHYSICAL THERAPY NOTE
"PHYSICAL THERAPY EVALUATION  NAME:  Leroy Paredes  DATE: 02/04/25    AGE:   89 y.o.  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)    Essential hypertension    Chronic heart failure with preserved ejection fraction (HCC)    Multiple falls    Hyperkalemia    Sepsis (HCC)    Dementia (HCC)    Type 2 diabetes mellitus (HCC)      Vitals:    02/03/25 2300 02/04/25 0515 02/04/25 0700 02/04/25 0800   BP: 128/60 167/60 (!) 173/77    BP Location: Left arm Right arm Right arm    Pulse: 75 69 65    Resp: 18 18     Temp:       TempSrc:   Oral    SpO2: 95% 95% 96% 95%   Weight:           Length Of Stay: 0  Performed at least 2 patient identifiers during session: Name and Epic photo  PHYSICAL THERAPY EVALUATION :    02/04/25 1012   PT Last Visit   PT Visit Date 02/04/25   Note Type   Note type Evaluation   Pain Assessment   Pain Assessment Tool 0-10   Pain Score No Pain   Restrictions/Precautions   Weight Bearing Precautions Per Order No   Other Precautions Bed Alarm;Chair Alarm;Cognitive;Fall Risk;Telemetry;Multiple lines;Impulsive  (condom cath, off during session)   Home Living   Type of Home Assisted living   Home Equipment   (RW per EMR)   Prior Function   Level of [place]   (Per EMR: Patient is resident at [facility].  Ambulates with walker at baseline but family notes he does require frequent reminders to use his walker.)   Lives With Facility staff   Falls in the last 6 months   (Pt denies falls, but pt here with \"multiple falls\")   Vocational Retired  (candidaly talks about  work)   General   Family/Caregiver Present No   Cognition   Overall Cognitive Status Impaired   Arousal/Participation Cooperative   Orientation Level Oriented to person  (\"[facility]\")   Memory Decreased recall of precautions;Decreased recall of recent events;Decreased short term memory   Following Commands Follows one " "step commands inconsistently  (w/ lower body MMT)   Subjective   Subjective Pt agreeable to participate, inconsistent with historical information. States he is 53 y/o now.   RLE Assessment   RLE Assessment   (limited flexibility observed while donning socks)   RLE Overall PROM   R Ankle Dorsiflexion limited to neutral during knee flexion   Strength RLE   R Hip Flexion 4-/5   R Knee Extension 4-/5   R Ankle Dorsiflexion 3/5   LLE Assessment   LLE Assessment   (limited flexibility observed while donning socks)   LLE Overall PROM   L Ankle Dorsiflexion WFL   Strength LLE   L Hip Flexion 4-/5   L Knee Extension 4-/5   L Ankle Dorsiflexion 4/5   Vision-Basic Assessment   Current Vision   (reports being able to see things in B visual fields during session)   Light Touch   RLE Light Touch Grossly intact  (Per gross assessment)   LLE Light Touch Grossly intact  (Per gross assessment)   Bed Mobility   Supine to Sit 4  Minimal assistance   Additional items Assist x 1;Bedrails;HOB elevated;Increased time required;Verbal cues;LE management  (L egress via OT)   Sit to Supine 4  Minimal assistance   Additional items Assist x 2;Increased time required;Verbal cues;LE management  (due to stretcher/cushion height)   Transfers   Sit to Stand 4  Minimal assistance   Additional items Increased time required;Armrests  (retropulsive)   Stand to Sit 4  Minimal assistance   Additional items Increased time required;Armrests  (retropulsive)   Stand pivot 4  Minimal assistance   Additional items Assist x 1;Increased time required;Verbal cues;Armrests  (pivot transfer stretcher to chair, more than reasonable time)   Ambulation/Elevation   Gait pattern Excessively slow;Step through pattern; limited R foot clearance as compared to L foot  (increased walker advancement, \"driving walker\" ahead around obstacles.  intermittent walker abandonment especially when close to target)   Gait Assistance 4  Minimal assist   Additional items Assist x 1;Verbal " "cues   Assistive Device Rolling walker   Distance 80'   Stair Management Assistance Not tested   Ambulation/Elevation Additional Comments Pt initially reports not using DME, but declined amb w/o device and reached for walker   Balance   Static Sitting Fair +   Static Standing Poor +   Ambulatory Poor +   Endurance Deficit   Endurance Deficit Yes   Endurance Deficit Description limited amb distance and standing tolerance   Activity Tolerance   Activity Tolerance Patient limited by fatigue   Medical Staff Made Aware care coordination w/Juanita from OT   Nurse Made Aware spoke to Mari BALDWIN before and after session   Assessment:   Pt is a 89 y.o. male who arrived at [facility] on 2/3/2025 after multiple falls, ? Head strike, +eliquis. Impression of Cellulitis of right upper extremity, amb dysfunction, NELLI, hyponaturemia .  Order placed for PT.      Prior to admission: Pt lived at [facility]--possibly secured unit. Per EMR- Pt used rolling walker \"inconsistently\" @ facility Per recent admission PT note from 11/2/2024: Pt needed max A for bed mobility, and was unable to participate in transfers/amb at that time.  Upon evaluation: Pt needed min A for most of his mobility including supine->sit, transfers and amb w/walker.  Pt needed more A sit-> supine to stretcher due to increased height. .      Pt's clinical presentation is currently unstable/unpredictable given the functional mobility deficits above, especially (but not limited to) decreased endurance, gait deviations, and decreased functional mobility tolerance, and combined with medical complications including hypertension , abnormal renal lab values, abnormal H&H, abnormal WBCs, and abnormal sodium values aspiration precautions.  Uncertain if pt is at his NEWER mobility baseline, however pt is mobilizing better than during recent admission in 11/24.  He is at risk for falls based on hx of falls, impulsivity, impaired balance, impaired coordination, impaired " judgement, decreased safety awareness, and decreased cognition.       During this admission, pt MAY benefit from continued skilled inpatient PT in the acute care setting in order to address deficits as defined above to maximize function and mobility.      Recommendations:    From a PT standpoint, recommend next several sessions focus on continued trials w/rolling vs rollator walker and appropirate management of same, balance activities, LE ROM/flexibility  Recommend further look @ Pt's limited R foot clearance       Prognosis Fair   Problem List Decreased strength;Decreased endurance;Decreased range of motion;Impaired balance;Decreased mobility;Decreased safety awareness;Impaired judgement;Decreased cognition;Decreased coordination  (gait deviations)   Barriers to Discharge   (uncertain when LOF pt need to be at prior to return)   Goals   Patient Goals none stated, but agreeable to amb   STG Expiration Date 02/14/25   Short Term Goal #1 Goals: Pt will: Perform rolling  and supine<>sit bed mobility tasks with no more than min A to prepare for transfers and reposition in bed. Perform transfers with no more than min A to promote proper use of assistive device. Perform ambulation with LRAD for at least 50' with Supervision to increase Indep in home environment and promote proper use of assistive device.   PT Treatment Day 0   Plan   Treatment/Interventions Functional transfer training;LE strengthening/ROM;Therapeutic exercise;Equipment eval/education;Bed mobility;Gait training;Patient/family training;Cognitive reorientation;Endurance training;Elevations   PT Frequency 3-5x/wk   Discharge Recommendation   Rehab Resource Intensity Level, PT III (Minimum Resource Intensity)   Equipment Recommended Walker   AM-PAC Basic Mobility Inpatient   Turning in Flat Bed Without Bedrails 3   Lying on Back to Sitting on Edge of Flat Bed Without Bedrails 2  (from flat HOB)   Moving Bed to Chair 3   Standing Up From Chair Using Arms 3  "  Walk in Room 3   Climb 3-5 Stairs With Railing 1   Basic Mobility Inpatient Raw Score 15   Basic Mobility Standardized Score 36.97   [facility] Highest Level Of Mobility   -[facility] Goal 4: Move to chair/commode   -[facility] Achieved 7: Walk 25 feet or more   Pt requires PT /OT co-eval due to required skilled interventions of at least 2 clinicians for care delivery, medical complexity, limited activity tolerance, and cognitive-behavioral impairments.   PT and OT goals addressed separately.   (Please find full objective findings from PT assessment regarding body systems outlined above).     The patient's -[facility] Basic Mobility Inpatient Short Form Raw Score is 15. A Raw score of less than or equal to 16 suggests the patient may benefit from discharge to post-acute rehabilitation services, which MAY NOT coincide with EVENTUAL above PT recommendations depending of amount of A facility can provide.     However please refer to therapist recommendation for discharge planning given other factors that may influence destination.     Adapted from Tyler WATKINS, Sagar J, Katt J, Brian J. [facility] “6-Clicks” Basic Mobility and Daily Activity Scores With Discharge Destination. Physical Therapy, 2021;101:1-9. DOI: [email or web address]    Portions of the record may have been created with voice recognition software.  Occasional wrong word or \"sound a like\" substitutions may have occurred due to the inherent limitations of voice recognition software.  Read the chart carefully and recognize, using context, where substitutions have occurred    "

## 2025-02-04 NOTE — ASSESSMENT & PLAN NOTE
- Multiple unwitnessed falls at SNF  - Unknown head strike, unknown LOC, takes eliquis daily  - GCS 14, disoriented to time. Otherwise fully neurovascularly intact  - No evidence of trauma on exam  - CT head, C-spine negative  - XR R shoulder completed due to abscess/ cellulitis on shoulder  - Multiple areas of excoriation on abdomen, bilateral upper and lower extremities  - Tylenol IV for chronic back pain  - Clindamycin 900 mg IV one time dose for cellulitis  - Leukocytosis, NELLI  - Updated family at bedside

## 2025-02-04 NOTE — PLAN OF CARE
Problem: PHYSICAL THERAPY ADULT  Goal: Performs mobility at highest level of function for planned discharge setting.  See evaluation for individualized goals.  Description: Treatment/Interventions: Functional transfer training, LE strengthening/ROM, Therapeutic exercise, Equipment eval/education, Bed mobility, Gait training, Patient/family training, Cognitive reorientation, Endurance training, Elevations  Equipment Recommended: Walker       See flowsheet documentation for full assessment, interventions and recommendations.  Note: Prognosis: Fair  Problem List: Decreased strength, Decreased endurance, Decreased range of motion, Impaired balance, Decreased mobility, Decreased safety awareness, Impaired judgement, Decreased cognition, Decreased coordination (gait deviations)     Barriers to Discharge:  (uncertain when LOF pt need to be at prior to return)     Rehab Resource Intensity Level, PT: III (Minimum Resource Intensity)    See flowsheet documentation for full assessment.

## 2025-02-04 NOTE — PLAN OF CARE
Problem: Prexisting or High Potential for Compromised Skin Integrity  Goal: Skin integrity is maintained or improved  Description: INTERVENTIONS:  - Identify patients at risk for skin breakdown  - Assess and monitor skin integrity  - Assess and monitor nutrition and hydration status  - Monitor labs   - Assess for incontinence   - Turn and reposition patient  - Assist with mobility/ambulation  - Relieve pressure over bony prominences  - Avoid friction and shearing  - Provide appropriate hygiene as needed including keeping skin clean and dry  - Evaluate need for skin moisturizer/barrier cream  - Collaborate with interdisciplinary team   - Patient/family teaching  - Consider wound care consult   Outcome: Progressing     Problem: Potential for Falls  Goal: Patient will remain free of falls  Description: INTERVENTIONS:  - Educate patient/family on patient safety including physical limitations  - Instruct patient to call for assistance with activity   - Consult OT/PT to assist with strengthening/mobility   - Keep Call bell within reach  - Keep bed low and locked with side rails adjusted as appropriate  - Keep care items and personal belongings within reach  - Initiate and maintain comfort rounds  - Make Fall Risk Sign visible to staff  - Offer Toileting every 2 Hours, in advance of need  - Initiate/Maintain bed/chair alarm  - Obtain necessary fall risk management equipment  - Apply yellow socks and bracelet for high fall risk patients  - Consider moving patient to room near nurses station  Outcome: Progressing     Problem: PAIN - ADULT  Goal: Verbalizes/displays adequate comfort level or baseline comfort level  Description: Interventions:  - Encourage patient to monitor pain and request assistance  - Assess pain using appropriate pain scale  - Administer analgesics based on type and severity of pain and evaluate response  - Implement non-pharmacological measures as appropriate and evaluate response  - Consider cultural  and social influences on pain and pain management  - Notify physician/advanced practitioner if interventions unsuccessful or patient reports new pain  Outcome: Progressing     Problem: INFECTION - ADULT  Goal: Absence or prevention of progression during hospitalization  Description: INTERVENTIONS:  - Assess and monitor for signs and symptoms of infection  - Monitor lab/diagnostic results  - Monitor all insertion sites, i.e. indwelling lines, tubes, and drains  - Monitor endotracheal if appropriate and nasal secretions for changes in amount and color  - [place] appropriate cooling/warming therapies per order  - Administer medications as ordered  - Instruct and encourage patient and family to use good hand hygiene technique  - Identify and instruct in appropriate isolation precautions for identified infection/condition  Outcome: Progressing  Goal: Absence of fever/infection during neutropenic period  Description: INTERVENTIONS:  - Monitor WBC    Outcome: Progressing     Problem: SAFETY ADULT  Goal: Patient will remain free of falls  Description: INTERVENTIONS:  - Educate patient/family on patient safety including physical limitations  - Instruct patient to call for assistance with activity   - Consult OT/PT to assist with strengthening/mobility   - Keep Call bell within reach  - Keep bed low and locked with side rails adjusted as appropriate  - Keep care items and personal belongings within reach  - Initiate and maintain comfort rounds  - Make Fall Risk Sign visible to staff  - Offer Toileting every 2 Hours, in advance of need  - Initiate/Maintain bed/chair alarm  - Obtain necessary fall risk management equipment  - Apply yellow socks and bracelet for high fall risk patients  - Consider moving patient to room near nurses station  Outcome: Progressing  Goal: Maintain or return to baseline ADL function  Description: INTERVENTIONS:  -  Assess patient's ability to carry out ADLs; assess patient's baseline for ADL  function and identify physical deficits which impact ability to perform ADLs (bathing, care of mouth/teeth, toileting, grooming, dressing, etc.)  - Assess/evaluate cause of self-care deficits   - Assess range of motion  - Assess patient's mobility; develop plan if impaired  - Assess patient's need for assistive devices and provide as appropriate  - Encourage maximum independence but intervene and supervise when necessary  - Involve family in performance of ADLs  - Assess for home care needs following discharge   - Consider OT consult to assist with ADL evaluation and planning for discharge  - Provide patient education as appropriate  Outcome: Progressing  Goal: Maintains/Returns to pre admission functional level  Description: INTERVENTIONS:  - Perform AM-PAC 6 Click Basic Mobility/ Daily Activity assessment daily.  - Set and communicate daily mobility goal to care team and patient/family/caregiver.   - Collaborate with rehabilitation services on mobility goals if consulted  - Perform Range of Motion 3 times a day.  - Reposition patient every 2 hours.  - Dangle patient 3 times a day  - Stand patient 3 times a day  - Ambulate patient 3 times a day  - Out of bed to chair 3 times a day   - Out of bed for meals 3 times a day  - Out of bed for toileting  - Record patient progress and toleration of activity level   Outcome: Progressing     Problem: DISCHARGE PLANNING  Goal: Discharge to home or other facility with appropriate resources  Description: INTERVENTIONS:  - Identify barriers to discharge w/patient and caregiver  - Arrange for needed discharge resources and transportation as appropriate  - Identify discharge learning needs (meds, wound care, etc.)  - Arrange for interpretive services to assist at discharge as needed  - Refer to Case Management Department for coordinating discharge planning if the patient needs post-hospital services based on physician/advanced practitioner order or complex needs related to  functional status, cognitive ability, or social support system  Outcome: Progressing     Problem: Knowledge Deficit  Goal: Patient/family/caregiver demonstrates understanding of disease process, treatment plan, medications, and discharge instructions  Description: Complete learning assessment and assess knowledge base.  Interventions:  - Provide teaching at level of understanding  - Provide teaching via preferred learning methods  Outcome: Progressing

## 2025-02-04 NOTE — ASSESSMENT & PLAN NOTE
Wt Readings from Last 3 Encounters:   02/03/25 82.5 kg (181 lb 14.1 oz)   01/27/25 78.6 kg (173 lb 4.8 oz)   11/20/24 77.2 kg (170 lb 3.2 oz)         NYHA class III stage C  LVEF 50% reduced from LVEF 55% by TTE on 6/29/20, conservative management   On physical exam euvolemic compensated  Blood pressure heart rate controlled  Not on diuretics  Continue on lisinopril 5 mg daily  2 g sodium diet   Euvolemic

## 2025-02-04 NOTE — PLAN OF CARE
Problem: OCCUPATIONAL THERAPY ADULT  Goal: Performs self-care activities at highest level of function for planned discharge setting.  See evaluation for individualized goals.  Description: Treatment Interventions: ADL retraining, Functional transfer training, Endurance training, Patient/family training, Equipment evaluation/education, Compensatory technique education, Cognitive reorientation, Energy conservation, Activityengagement          See flowsheet documentation for full assessment, interventions and recommendations.   Note: Limitation: Decreased ADL status, Decreased Safe judgement during ADL, Decreased cognition, Decreased endurance, Decreased self-care trans, Decreased high-level ADLs (impaired balance, fxnl mobility, act teri, fxnl reach, standing teri, strength, attention to task, direction following, safety awareness, insight, pacing, orientation, problem solving, learning new tasks, appropriate responses)  Prognosis: Good  Assessment: Pt is a 89 y.o. male seen for OT evaluation s/p admission to [facility] on 2/3/2025 due to weakness + multiple falls. Diagnosed with Cellulitis of right upper extremity. Personal and env factors supporting pt at time of IE include supportive facility staff and [facility]. Personal and env factors inhibiting engagement in occupations include advanced age, lifestyle patterns, difficulty completing ADLs, and difficulty completing IADLs. Performance deficits that affect the pt’s occupational performance can be seen above. Due to pt's current functional limitations and medical complications pt is functioning below baseline. Pt would benefit from continued skilled OT treatment in order to maximize safety, independence and overall performance with ADLs, functional mobility, functional transfers, and cognition in order to achieve highest level of function.     Rehab Resource Intensity Level, OT: III (Minimum Resource Intensity) (recommend continued (A) + (S) with ADLs and IADLs)

## 2025-02-05 LAB
ALBUMIN SERPL BCG-MCNC: 3.4 G/DL (ref 3.5–5)
ANION GAP SERPL CALCULATED.3IONS-SCNC: 5 MMOL/L (ref 4–13)
BUN SERPL-MCNC: 42 MG/DL (ref 5–25)
CALCIUM ALBUM COR SERPL-MCNC: 9.2 MG/DL (ref 8.3–10.1)
CALCIUM SERPL-MCNC: 8.7 MG/DL (ref 8.4–10.2)
CHLORIDE SERPL-SCNC: 106 MMOL/L (ref 96–108)
CO2 SERPL-SCNC: 24 MMOL/L (ref 21–32)
CREAT SERPL-MCNC: 2.29 MG/DL (ref 0.6–1.3)
CRP SERPL QL: 58.2 MG/L
ERYTHROCYTE [DISTWIDTH] IN BLOOD BY AUTOMATED COUNT: 15.2 % (ref 11.6–15.1)
GFR SERPL CREATININE-BSD FRML MDRD: 23 ML/MIN/1.73SQ M
GLUCOSE SERPL-MCNC: 151 MG/DL (ref 65–140)
GLUCOSE SERPL-MCNC: 156 MG/DL (ref 65–140)
GLUCOSE SERPL-MCNC: 156 MG/DL (ref 65–140)
GLUCOSE SERPL-MCNC: 303 MG/DL (ref 65–140)
GLUCOSE SERPL-MCNC: 352 MG/DL (ref 65–140)
HCT VFR BLD AUTO: 31.1 % (ref 36.5–49.3)
HGB BLD-MCNC: 10.2 G/DL (ref 12–17)
HOLD SPECIMEN: NORMAL
MAGNESIUM SERPL-MCNC: 1.8 MG/DL (ref 1.9–2.7)
MCH RBC QN AUTO: 30.7 PG (ref 26.8–34.3)
MCHC RBC AUTO-ENTMCNC: 32.8 G/DL (ref 31.4–37.4)
MCV RBC AUTO: 94 FL (ref 82–98)
MRSA NOSE QL CULT: NORMAL
PHOSPHATE SERPL-MCNC: 3.3 MG/DL (ref 2.3–4.1)
PLATELET # BLD AUTO: 166 THOUSANDS/UL (ref 149–390)
PMV BLD AUTO: 9.1 FL (ref 8.9–12.7)
POTASSIUM SERPL-SCNC: 4.6 MMOL/L (ref 3.5–5.3)
PROCALCITONIN SERPL-MCNC: 0.25 NG/ML
RBC # BLD AUTO: 3.32 MILLION/UL (ref 3.88–5.62)
SODIUM SERPL-SCNC: 135 MMOL/L (ref 135–147)
VANCOMYCIN SERPL-MCNC: 11.1 UG/ML (ref 10–20)
WBC # BLD AUTO: 7.61 THOUSAND/UL (ref 4.31–10.16)

## 2025-02-05 PROCEDURE — 85027 COMPLETE CBC AUTOMATED: CPT | Performed by: STUDENT IN AN ORGANIZED HEALTH CARE EDUCATION/TRAINING PROGRAM

## 2025-02-05 PROCEDURE — 83735 ASSAY OF MAGNESIUM: CPT | Performed by: STUDENT IN AN ORGANIZED HEALTH CARE EDUCATION/TRAINING PROGRAM

## 2025-02-05 PROCEDURE — 82948 REAGENT STRIP/BLOOD GLUCOSE: CPT

## 2025-02-05 PROCEDURE — 80202 ASSAY OF VANCOMYCIN: CPT | Performed by: HOSPITALIST

## 2025-02-05 PROCEDURE — 84145 PROCALCITONIN (PCT): CPT | Performed by: STUDENT IN AN ORGANIZED HEALTH CARE EDUCATION/TRAINING PROGRAM

## 2025-02-05 PROCEDURE — 86140 C-REACTIVE PROTEIN: CPT | Performed by: STUDENT IN AN ORGANIZED HEALTH CARE EDUCATION/TRAINING PROGRAM

## 2025-02-05 PROCEDURE — 80069 RENAL FUNCTION PANEL: CPT | Performed by: STUDENT IN AN ORGANIZED HEALTH CARE EDUCATION/TRAINING PROGRAM

## 2025-02-05 PROCEDURE — 99233 SBSQ HOSP IP/OBS HIGH 50: CPT | Performed by: STUDENT IN AN ORGANIZED HEALTH CARE EDUCATION/TRAINING PROGRAM

## 2025-02-05 RX ORDER — CEFAZOLIN SODIUM 1 G/50ML
1000 SOLUTION INTRAVENOUS EVERY 12 HOURS
Status: DISCONTINUED | OUTPATIENT
Start: 2025-02-05 | End: 2025-02-06

## 2025-02-05 RX ORDER — INSULIN GLARGINE 100 [IU]/ML
5 INJECTION, SOLUTION SUBCUTANEOUS
Status: DISCONTINUED | OUTPATIENT
Start: 2025-02-05 | End: 2025-02-06 | Stop reason: HOSPADM

## 2025-02-05 RX ORDER — CEFAZOLIN SODIUM 2 G/50ML
2000 SOLUTION INTRAVENOUS EVERY 8 HOURS
Status: DISCONTINUED | OUTPATIENT
Start: 2025-02-05 | End: 2025-02-05

## 2025-02-05 RX ORDER — MAGNESIUM SULFATE HEPTAHYDRATE 40 MG/ML
2 INJECTION, SOLUTION INTRAVENOUS ONCE
Status: COMPLETED | OUTPATIENT
Start: 2025-02-05 | End: 2025-02-05

## 2025-02-05 RX ORDER — SODIUM PHOSPHATE,MONO-DIBASIC 19G-7G/118
ENEMA (ML) RECTAL
Qty: 133 ML | Refills: 1 | Status: SHIPPED | OUTPATIENT
Start: 2025-02-05 | End: 2025-02-09

## 2025-02-05 RX ADMIN — APIXABAN 2.5 MG: 2.5 TABLET, FILM COATED ORAL at 08:09

## 2025-02-05 RX ADMIN — INSULIN LISPRO 1 UNITS: 100 INJECTION, SOLUTION INTRAVENOUS; SUBCUTANEOUS at 16:03

## 2025-02-05 RX ADMIN — MAGNESIUM SULFATE IN WATER FOR 2 G: 40 INJECTION INTRAVENOUS at 10:42

## 2025-02-05 RX ADMIN — INSULIN GLARGINE 5 UNITS: 100 INJECTION, SOLUTION SUBCUTANEOUS at 21:00

## 2025-02-05 RX ADMIN — GABAPENTIN 100 MG: 100 CAPSULE ORAL at 21:01

## 2025-02-05 RX ADMIN — QUETIAPINE 25 MG: 25 TABLET ORAL at 21:00

## 2025-02-05 RX ADMIN — VANCOMYCIN HYDROCHLORIDE 750 MG: 5 INJECTION, POWDER, LYOPHILIZED, FOR SOLUTION INTRAVENOUS at 09:17

## 2025-02-05 RX ADMIN — LISINOPRIL 5 MG: 5 TABLET ORAL at 08:09

## 2025-02-05 RX ADMIN — MELATONIN 3 MG: 3 TAB ORAL at 21:00

## 2025-02-05 RX ADMIN — INSULIN LISPRO 6 UNITS: 100 INJECTION, SOLUTION INTRAVENOUS; SUBCUTANEOUS at 11:23

## 2025-02-05 RX ADMIN — APIXABAN 2.5 MG: 2.5 TABLET, FILM COATED ORAL at 17:07

## 2025-02-05 RX ADMIN — Medication 1000 UNITS: at 08:09

## 2025-02-05 RX ADMIN — INSULIN LISPRO 3 UNITS: 100 INJECTION, SOLUTION INTRAVENOUS; SUBCUTANEOUS at 21:00

## 2025-02-05 RX ADMIN — ESCITALOPRAM 5 MG: 5 TABLET, FILM COATED ORAL at 08:09

## 2025-02-05 RX ADMIN — TERAZOSIN HYDROCHLORIDE 1 MG: 1 CAPSULE ORAL at 21:00

## 2025-02-05 RX ADMIN — Medication 400 MG: at 08:09

## 2025-02-05 RX ADMIN — INSULIN LISPRO 1 UNITS: 100 INJECTION, SOLUTION INTRAVENOUS; SUBCUTANEOUS at 08:11

## 2025-02-05 RX ADMIN — ATORVASTATIN CALCIUM 40 MG: 40 TABLET, FILM COATED ORAL at 15:56

## 2025-02-05 RX ADMIN — MIRTAZAPINE 15 MG: 15 TABLET, FILM COATED ORAL at 21:00

## 2025-02-05 RX ADMIN — CEFAZOLIN SODIUM 1000 MG: 1 SOLUTION INTRAVENOUS at 15:56

## 2025-02-05 NOTE — ASSESSMENT & PLAN NOTE
Generalized excoriation.  Right shoulder appears cellulitic-see media tab.    Recently completed keflex for UTI, unsure if this is cause of pruritus and excoriation   Started on IV vancomycin  MRSA nares came back negative  Switched to IV cefazolin

## 2025-02-05 NOTE — PROGRESS NOTES
Progress Note - Hospitalist   Name: Leroy Paredes 89 y.o. male I MRN: [id number]  Unit/Bed#: S -01 I Date of Admission: 2/3/2025   Date of Service: 2/5/2025 I Hospital Day: 1    Assessment & Plan  Cellulitis of right upper extremity  Generalized excoriation.  Right shoulder appears cellulitic-see media tab.    Recently completed keflex for UTI, unsure if this is cause of pruritus and excoriation   Started on IV vancomycin  MRSA nares came back negative  Switched to IV cefazolin   Sepsis (HCC)  Tachycardia, leukocytosis.  Lactate normal.   Suspected source: RUE cellulitis  Will also check UA     Plan   Antibiotics: clindamycin in ED.  Recently had keflex and unsure if patient developed skin rash following keflex.   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  Trauma level b - appreciate trauma.  Fortunately no traumatic injuries.   Pt ot fall precautions  Ambulates with walker.  Does need encouraging to use walker per family.  Dementia ([facility])  Family notes poor short term memory.  Mental status currently at baseline.   Continue home medications  Remeron 15 mg hs  Seroquel 25 mg hs  Lexapro 5 mg qd   Type 2 diabetes mellitus (HCC)  Lab Results   Component Value Date    HGBA1C 8.6 (H) 12/03/2024     Resume starlix on dc  Accucheck, ssi while hospitalized   Add lantus 5 units at nights for long acting insulin coverage  Chronic heart failure with preserved ejection fraction (HCC)  Wt Readings from Last 3 Encounters:   02/03/25 82.5 kg (181 lb 14.1 oz)   01/27/25 78.6 kg (173 lb 4.8 oz)   11/20/24 77.2 kg (170 lb 3.2 oz)         NYHA class III stage C  LVEF 50% reduced from LVEF 55% by TTE on 6/29/20, conservative management   On physical exam euvolemic compensated  Blood pressure heart rate  controlled  Not on diuretics  Continue on lisinopril 5 mg daily  2 g sodium diet   Euvolemic       VTE Pharmacologic Prophylaxis: VTE Score: 5 Moderate Risk (Score 3-4) - Pharmacological DVT Prophylaxis Ordered: apixaban (Eliquis).    Mobility:   Basic Mobility Inpatient Raw Score: 16  JH-HLM Goal: 5: Stand one or more mins  JH-HLM Achieved: 7: Walk 25 feet or more  JH-HLM Goal achieved. Continue to encourage appropriate mobility.    Patient Centered Rounds: I performed bedside rounds with nursing staff today.   Discussions with Specialists or Other Care Team Provider: case management    Education and Discussions with Family / Patient: Attempted to update  (niece) via phone. Left voicemail.     Current Length of Stay: 1 day(s)  Current Patient Status: Inpatient   Certification Statement: The patient will continue to require additional inpatient hospital stay due to cellultis  Discharge Plan: Anticipate discharge in 24-48 hrs to home.    Code Status: Level 3 - DNAR and DNI    Subjective   Pt states he's feeling well denies any fevers or chills    Objective :  Temp:  [97.6 °F (36.4 °C)-98.1 °F (36.7 °C)] 97.7 °F (36.5 °C)  HR:  [62-75] 64  BP: (134-187)/(52-86) 134/61  Resp:  [16-18] 18  SpO2:  [95 %-97 %] 97 %  O2 Device: None (Room air)    Body mass index is 25.37 kg/m².     Input and Output Summary (last 24 hours):     Intake/Output Summary (Last 24 hours) at 2/5/2025 1544  Last data filed at 2/5/2025 0924  Gross per 24 hour   Intake 180 ml   Output 1000 ml   Net -820 ml       Physical Exam  Vitals and nursing note reviewed.   Constitutional:       General: He is not in acute distress.     Appearance: He is well-developed.   HENT:      Head: Normocephalic and atraumatic.   Eyes:      Conjunctiva/sclera: Conjunctivae normal.   Cardiovascular:      Rate and Rhythm: Normal rate and regular rhythm.      Heart sounds: No murmur heard.  Pulmonary:      Effort: Pulmonary effort is normal. No respiratory  distress.      Breath sounds: Normal breath sounds.   Abdominal:      Palpations: Abdomen is soft.      Tenderness: There is no abdominal tenderness.   Musculoskeletal:         General: No swelling.   Skin:     Comments: Small area of erythema right shoulder, mildly improved   Neurological:      Mental Status: He is alert. Mental status is at baseline.   Psychiatric:         Mood and Affect: Mood normal.           Lines/Drains:  Lines/Drains/Airways       Active Status       Name Placement date Placement time Site Days    External Urinary Catheter Medium 02/04/25  0121  -- 1                            Lab Results: I have reviewed the following results:   Results from last 7 days   Lab Units 02/05/25  0517 02/04/25  0516   WBC Thousand/uL 7.61 10.08   HEMOGLOBIN g/dL 10.2* 9.8*   HEMATOCRIT % 31.1* 30.3*   PLATELETS Thousands/uL 166 162   SEGS PCT %  --  75   LYMPHO PCT %  --  12*   MONO PCT %  --  10   EOS PCT %  --  2     Results from last 7 days   Lab Units 02/05/25  0517   SODIUM mmol/L 135   POTASSIUM mmol/L 4.6   CHLORIDE mmol/L 106   CO2 mmol/L 24   BUN mg/dL 42*   CREATININE mg/dL 2.29*   ANION GAP mmol/L 5   CALCIUM mg/dL 8.7   ALBUMIN g/dL 3.4*   GLUCOSE RANDOM mg/dL 156*     Results from last 7 days   Lab Units 02/03/25  1907   INR  1.30*     Results from last 7 days   Lab Units 02/05/25  1119 02/05/25  0709 02/04/25  2132 02/04/25  1844 02/04/25  1225 02/04/25  0726 02/03/25  2247   POC GLUCOSE mg/dl 352* 156* 200* 203* 160* 175* 197*         Results from last 7 days   Lab Units 02/05/25  0517 02/04/25  0516 02/03/25  1925 02/03/25  1907   LACTIC ACID mmol/L  --   --  1.6  --    PROCALCITONIN ng/ml 0.25 0.28*  --  0.19       Recent Cultures (last 7 days):   Results from last 7 days   Lab Units 02/03/25  2308 02/03/25  2040 02/03/25  2028   BLOOD CULTURE   --  No Growth at 24 hrs. No Growth at 24 hrs.   GRAM STAIN RESULT  No Polys or Bacteria seen  --   --    WOUND CULTURE  Culture results to follow.  --    --        Imaging Results Review: I reviewed radiology reports from this admission including: shoulder xrays.  Other Study Results Review: EKG was reviewed.     Last 24 Hours Medication List:     Current Facility-Administered Medications:     acetaminophen (TYLENOL) tablet 650 mg, Q6H PRN    apixaban (ELIQUIS) tablet 2.5 mg, BID    atorvastatin (LIPITOR) tablet 40 mg, Daily With Dinner    bisacodyl (DULCOLAX) rectal suppository 10 mg, Daily    Cholecalciferol (VITAMIN D3) tablet 1,000 Units, Daily    [START ON 2/6/2025] cyanocobalamin (VITAMIN B-12) tablet 500 mcg, Once per day on Monday Thursday    escitalopram (LEXAPRO) tablet 5 mg, Daily    gabapentin (NEURONTIN) capsule 100 mg, HS    insulin lispro (HumALOG/ADMELOG) 100 units/mL subcutaneous injection 1-5 Units, HS    insulin lispro (HumALOG/ADMELOG) 100 units/mL subcutaneous injection 1-6 Units, TID AC **AND** Fingerstick Glucose (POCT), TID AC    lisinopril (ZESTRIL) tablet 5 mg, Daily    magnesium Oxide (MAG-OX) tablet 400 mg, Daily    melatonin tablet 3 mg, HS    mirtazapine (REMERON) tablet 15 mg, HS    oxyCODONE (ROXICODONE) IR tablet 5 mg, Q8H PRN    QUEtiapine (SEROquel) tablet 25 mg, HS    terazosin (HYTRIN) capsule 1 mg, HS    vancomycin 750 mg in sodium chloride 0.9% 250 mL, Daily PRN    Administrative Statements   Today, Patient Was Seen By: Zuleika Marmolejo DO      **Please Note: This note may have been constructed using a voice recognition system.**

## 2025-02-05 NOTE — PLAN OF CARE
Problem: Prexisting or High Potential for Compromised Skin Integrity  Goal: Skin integrity is maintained or improved  Description: INTERVENTIONS:  - Identify patients at risk for skin breakdown  - Assess and monitor skin integrity  - Assess and monitor nutrition and hydration status  - Monitor labs   - Assess for incontinence   - Turn and reposition patient  - Assist with mobility/ambulation  - Relieve pressure over bony prominences  - Avoid friction and shearing  - Provide appropriate hygiene as needed including keeping skin clean and dry  - Evaluate need for skin moisturizer/barrier cream  - Collaborate with interdisciplinary team   - Patient/family teaching  - Consider wound care consult   Outcome: Progressing     Problem: Potential for Falls  Goal: Patient will remain free of falls  Description: INTERVENTIONS:  - Educate patient/family on patient safety including physical limitations  - Instruct patient to call for assistance with activity   - Consult OT/PT to assist with strengthening/mobility   - Keep Call bell within reach  - Keep bed low and locked with side rails adjusted as appropriate  - Keep care items and personal belongings within reach  - Initiate and maintain comfort rounds  - Make Fall Risk Sign visible to staff  - Apply yellow socks and bracelet for high fall risk patients  - Consider moving patient to room near nurses station  Outcome: Progressing     Problem: PAIN - ADULT  Goal: Verbalizes/displays adequate comfort level or baseline comfort level  Description: Interventions:  - Encourage patient to monitor pain and request assistance  - Assess pain using appropriate pain scale  - Administer analgesics based on type and severity of pain and evaluate response  - Implement non-pharmacological measures as appropriate and evaluate response  - Consider cultural and social influences on pain and pain management  - Notify physician/advanced practitioner if interventions unsuccessful or patient reports  new pain  Outcome: Progressing     Problem: INFECTION - ADULT  Goal: Absence or prevention of progression during hospitalization  Description: INTERVENTIONS:  - Assess and monitor for signs and symptoms of infection  - Monitor lab/diagnostic results  - Monitor all insertion sites, i.e. indwelling lines, tubes, and drains  - Monitor endotracheal if appropriate and nasal secretions for changes in amount and color  - [place] appropriate cooling/warming therapies per order  - Administer medications as ordered  - Instruct and encourage patient and family to use good hand hygiene technique  - Identify and instruct in appropriate isolation precautions for identified infection/condition  Outcome: Progressing  Goal: Absence of fever/infection during neutropenic period  Description: INTERVENTIONS:  - Monitor WBC    Outcome: Progressing     Problem: SAFETY ADULT  Goal: Patient will remain free of falls  Description: INTERVENTIONS:  - Educate patient/family on patient safety including physical limitations  - Instruct patient to call for assistance with activity   - Consult OT/PT to assist with strengthening/mobility   - Keep Call bell within reach  - Keep bed low and locked with side rails adjusted as appropriate  - Keep care items and personal belongings within reach  - Initiate and maintain comfort rounds  - Make Fall Risk Sign visible to staff  - Offer Toileting every 2 Hours, in advance of need  - Initiate/Maintain bed alarm  - Obtain necessary fall risk management equipment: walker  - Apply yellow socks and bracelet for high fall risk patients  - Consider moving patient to room near nurses station  Outcome: Progressing  Goal: Maintain or return to baseline ADL function  Description: INTERVENTIONS:  -  Assess patient's ability to carry out ADLs; assess patient's baseline for ADL function and identify physical deficits which impact ability to perform ADLs (bathing, care of mouth/teeth, toileting, grooming, dressing,  etc.)  - Assess/evaluate cause of self-care deficits   - Assess range of motion  - Assess patient's mobility; develop plan if impaired  - Assess patient's need for assistive devices and provide as appropriate  - Encourage maximum independence but intervene and supervise when necessary  - Involve family in performance of ADLs  - Assess for home care needs following discharge   - Consider OT consult to assist with ADL evaluation and planning for discharge  - Provide patient education as appropriate  Outcome: Progressing  Goal: Maintains/Returns to pre admission functional level  Description: INTERVENTIONS:  - Perform AM-PAC 6 Click Basic Mobility/ Daily Activity assessment daily.  - Set and communicate daily mobility goal to care team and patient/family/caregiver.   - Collaborate with rehabilitation services on mobility goals if consulted  - Perform Range of Motion 3 times a day.  - Reposition patient every 2 hours.  - Dangle patient 3 times a day  - Stand patient 3 times a day  - Ambulate patient 3 times a day  - Out of bed to chair 3 times a day   - Out of bed for meals 3 times a day  - Out of bed for toileting  - Record patient progress and toleration of activity level   Outcome: Progressing     Problem: DISCHARGE PLANNING  Goal: Discharge to home or other facility with appropriate resources  Description: INTERVENTIONS:  - Identify barriers to discharge w/patient and caregiver  - Arrange for needed discharge resources and transportation as appropriate  - Identify discharge learning needs (meds, wound care, etc.)  - Arrange for interpretive services to assist at discharge as needed  - Refer to Case Management Department for coordinating discharge planning if the patient needs post-hospital services based on physician/advanced practitioner order or complex needs related to functional status, cognitive ability, or social support system  Outcome: Progressing     Problem: Knowledge Deficit  Goal:  Patient/family/caregiver demonstrates understanding of disease process, treatment plan, medications, and discharge instructions  Description: Complete learning assessment and assess knowledge base.  Interventions:  - Provide teaching at level of understanding  - Provide teaching via preferred learning methods  Outcome: Progressing

## 2025-02-05 NOTE — ASSESSMENT & PLAN NOTE
Lab Results   Component Value Date    HGBA1C 8.6 (H) 12/03/2024     Resume starlix on dc  Accucheck, ssi while hospitalized   Add lantus 5 units at nights for long acting insulin coverage

## 2025-02-05 NOTE — ASSESSMENT & PLAN NOTE
Unwitnessed fall at [facility] on [facility].  Trauma level b - appreciate trauma.  Fortunately no traumatic injuries.   Pt ot fall precautions  Ambulates with walker.  Does need encouraging to use walker per family.

## 2025-02-05 NOTE — ASSESSMENT & PLAN NOTE
Tachycardia, leukocytosis.  Lactate normal.   Suspected source: RUE cellulitis  Will also check UA     Plan   Antibiotics: clindamycin in ED.  Recently had keflex and unsure if patient developed skin rash following keflex.   See plan above

## 2025-02-05 NOTE — PROGRESS NOTES
Leroy Paredes is a 89 y.o. male who is currently ordered Vancomycin IV with management by the Pharmacy Consult service.  Relevant clinical data and objective / subjective history reviewed.  Vancomycin Assessment:  Indication and Goal AUC/Trough: Soft tissue (goal -600, trough >10)  Clinical Status: stable  Micro:     Renal Function:  SCr: 2.29 mg/dL  CrCl: 19.2 mL/min  Renal replacement: Not on dialysis  Days of Therapy: 3  Current Dose: 750 mg IV once as needed for vancomycin level < 15  Vancomycin Plan:  New Dosing: continue pulse dosing  Next Level: 2/6 @ 0600  Renal Function Monitoring: Daily BMP and UOP  Pharmacy will continue to follow closely for s/sx of nephrotoxicity, infusion reactions and appropriateness of therapy.  BMP and CBC will be ordered per protocol. We will continue to follow the patient’s culture results and clinical progress daily.    Lisa Castellanos, Pharmacist

## 2025-02-06 VITALS
HEART RATE: 75 BPM | RESPIRATION RATE: 19 BRPM | SYSTOLIC BLOOD PRESSURE: 113 MMHG | TEMPERATURE: 97.6 F | DIASTOLIC BLOOD PRESSURE: 62 MMHG | OXYGEN SATURATION: 95 % | BODY MASS INDEX: 25.37 KG/M2 | WEIGHT: 181.88 LBS

## 2025-02-06 LAB
ALBUMIN SERPL BCG-MCNC: 3.4 G/DL (ref 3.5–5)
ANION GAP SERPL CALCULATED.3IONS-SCNC: 6 MMOL/L (ref 4–13)
BACTERIA WND AEROBE CULT: ABNORMAL
BACTERIA WND AEROBE CULT: ABNORMAL
BUN SERPL-MCNC: 40 MG/DL (ref 5–25)
CALCIUM ALBUM COR SERPL-MCNC: 9.1 MG/DL (ref 8.3–10.1)
CALCIUM SERPL-MCNC: 8.6 MG/DL (ref 8.4–10.2)
CHLORIDE SERPL-SCNC: 107 MMOL/L (ref 96–108)
CO2 SERPL-SCNC: 21 MMOL/L (ref 21–32)
CREAT SERPL-MCNC: 2.01 MG/DL (ref 0.6–1.3)
ERYTHROCYTE [DISTWIDTH] IN BLOOD BY AUTOMATED COUNT: 14.7 % (ref 11.6–15.1)
GFR SERPL CREATININE-BSD FRML MDRD: 27 ML/MIN/1.73SQ M
GLUCOSE SERPL-MCNC: 173 MG/DL (ref 65–140)
GLUCOSE SERPL-MCNC: 179 MG/DL (ref 65–140)
GRAM STN SPEC: ABNORMAL
HCT VFR BLD AUTO: 29.7 % (ref 36.5–49.3)
HGB BLD-MCNC: 9.8 G/DL (ref 12–17)
MAGNESIUM SERPL-MCNC: 2.2 MG/DL (ref 1.9–2.7)
MCH RBC QN AUTO: 30.5 PG (ref 26.8–34.3)
MCHC RBC AUTO-ENTMCNC: 33 G/DL (ref 31.4–37.4)
MCV RBC AUTO: 93 FL (ref 82–98)
PHOSPHATE SERPL-MCNC: 3.7 MG/DL (ref 2.3–4.1)
PLATELET # BLD AUTO: 172 THOUSANDS/UL (ref 149–390)
PMV BLD AUTO: 9.2 FL (ref 8.9–12.7)
POTASSIUM SERPL-SCNC: 4.4 MMOL/L (ref 3.5–5.3)
RBC # BLD AUTO: 3.21 MILLION/UL (ref 3.88–5.62)
SODIUM SERPL-SCNC: 134 MMOL/L (ref 135–147)
VANCOMYCIN SERPL-MCNC: 13.6 UG/ML (ref 10–20)
WBC # BLD AUTO: 6.61 THOUSAND/UL (ref 4.31–10.16)

## 2025-02-06 PROCEDURE — 83735 ASSAY OF MAGNESIUM: CPT | Performed by: STUDENT IN AN ORGANIZED HEALTH CARE EDUCATION/TRAINING PROGRAM

## 2025-02-06 PROCEDURE — 80202 ASSAY OF VANCOMYCIN: CPT | Performed by: NURSE PRACTITIONER

## 2025-02-06 PROCEDURE — 80069 RENAL FUNCTION PANEL: CPT | Performed by: STUDENT IN AN ORGANIZED HEALTH CARE EDUCATION/TRAINING PROGRAM

## 2025-02-06 PROCEDURE — 99239 HOSP IP/OBS DSCHRG MGMT >30: CPT | Performed by: STUDENT IN AN ORGANIZED HEALTH CARE EDUCATION/TRAINING PROGRAM

## 2025-02-06 PROCEDURE — 85027 COMPLETE CBC AUTOMATED: CPT | Performed by: STUDENT IN AN ORGANIZED HEALTH CARE EDUCATION/TRAINING PROGRAM

## 2025-02-06 PROCEDURE — 82948 REAGENT STRIP/BLOOD GLUCOSE: CPT

## 2025-02-06 RX ORDER — CEPHALEXIN 500 MG/1
500 CAPSULE ORAL EVERY 8 HOURS SCHEDULED
Status: DISCONTINUED | OUTPATIENT
Start: 2025-02-06 | End: 2025-02-06 | Stop reason: HOSPADM

## 2025-02-06 RX ORDER — CEPHALEXIN 500 MG/1
500 CAPSULE ORAL EVERY 8 HOURS SCHEDULED
Qty: 18 CAPSULE | Refills: 0 | Status: SHIPPED | OUTPATIENT
Start: 2025-02-06 | End: 2025-02-12

## 2025-02-06 RX ADMIN — CEPHALEXIN 500 MG: 500 CAPSULE ORAL at 09:45

## 2025-02-06 RX ADMIN — Medication 400 MG: at 08:13

## 2025-02-06 RX ADMIN — INSULIN LISPRO 1 UNITS: 100 INJECTION, SOLUTION INTRAVENOUS; SUBCUTANEOUS at 08:15

## 2025-02-06 RX ADMIN — ESCITALOPRAM 5 MG: 5 TABLET, FILM COATED ORAL at 08:13

## 2025-02-06 RX ADMIN — Medication 500 MCG: at 08:13

## 2025-02-06 RX ADMIN — LISINOPRIL 5 MG: 5 TABLET ORAL at 08:14

## 2025-02-06 RX ADMIN — CEFAZOLIN SODIUM 1000 MG: 1 SOLUTION INTRAVENOUS at 04:09

## 2025-02-06 RX ADMIN — APIXABAN 2.5 MG: 2.5 TABLET, FILM COATED ORAL at 08:14

## 2025-02-06 RX ADMIN — Medication 1000 UNITS: at 08:14

## 2025-02-06 NOTE — ASSESSMENT & PLAN NOTE
Generalized excoriation with right shoulder cellulitis    Recently completed keflex for UTI, unsure if this is cause of pruritus and excoriation   Pt received 2 days of IV vancomycin then transitioned to IV cefazolin for 2 days after MRSA came back negative. Patient being discharged on Keflex 500 mg every 8 hours for 6 more days to complete 10-day course of antibiotics

## 2025-02-06 NOTE — ASSESSMENT & PLAN NOTE
Tachycardia, leukocytosis.  Lactate normal.   Suspected source: RUE cellulitis  Resolved with IVF and Abx

## 2025-02-06 NOTE — DISCHARGE INSTR - AVS FIRST PAGE
Mark Brennan were hospitalized for right shoulder cellulitis    He received 2 days of IV vancomycin, 2 days of IV cefazolin and are being discharged on Keflex 500 mg every 8 hours for 6 more days to complete 10-day course of antibiotics

## 2025-02-06 NOTE — ASSESSMENT & PLAN NOTE
Unwitnessed fall at [facility] on [facility].  Trauma level b - appreciate trauma.  Fortunately no traumatic injuries.   Pt ot fall precautions  Ambulates with walker.  Does need encouraging to use walker per family.

## 2025-02-06 NOTE — DISCHARGE SUMMARY
Discharge Summary - Hospitalist   Name: Leroy Paredes 89 y.o. male I MRN: [id number]  Unit/Bed#: S -01 I Date of Admission: 2/3/2025   Date of Service: 2/6/2025 I Hospital Day: 2     Assessment & Plan  Cellulitis of right upper extremity  Generalized excoriation with right shoulder cellulitis    Recently completed keflex for UTI, unsure if this is cause of pruritus and excoriation   Pt received 2 days of IV vancomycin then transitioned to IV cefazolin for 2 days after MRSA came back negative. Patient being discharged on Keflex 500 mg every 8 hours for 6 more days to complete 10-day course of antibiotics  Sepsis (HCC)  Tachycardia, leukocytosis.  Lactate normal.   Suspected source: RUE cellulitis  Resolved with IVF and Abx  CKD stage 4 due to type 2 diabetes mellitus (HCC)  Cr baseline 2.13 - 2.23   Monitor BMP  Essential hypertension  BP elevated on arrival, improving without intervention  Continue lisinopril   Multiple falls  Unwitnessed fall at [facility] on [facility].  Trauma level b - appreciate trauma.  Fortunately no traumatic injuries.   Pt ot fall precautions  Ambulates with walker.  Does need encouraging to use walker per family.  Dementia ([facility])  Family notes poor short term memory.  Mental status currently at baseline.   Continue home medications  Remeron 15 mg hs  Seroquel 25 mg hs  Lexapro 5 mg qd   Type 2 diabetes mellitus (HCC)  Lab Results   Component Value Date    HGBA1C 8.6 (H) 12/03/2024     Resume starlix on dc  Accucheck, ssi while hospitalized   Add lantus 5 units at nights for long acting insulin coverage  Chronic heart failure with preserved ejection fraction (HCC)  Wt Readings from Last 3 Encounters:   02/03/25 82.5 kg (181 lb 14.1 oz)   01/27/25 78.6 kg (173 lb 4.8 oz)   11/20/24 77.2 kg (170 lb 3.2 oz)         NYHA class III stage C  LVEF 50% reduced from LVEF 55% by TTE on 6/29/20, conservative management   On physical exam euvolemic compensated  Blood pressure heart rate  controlled  Not on diuretics  Continue on lisinopril 5 mg daily  2 g sodium diet   Euvolemic        Medical Problems       Resolved Problems  Date Reviewed: 11/20/2024   None       Discharging Physician / Practitioner: Zuleika Marmolejo DO  PCP: Pepito Foster DO  Admission Date:   Admission Orders (From admission, onward)       Ordered        02/04/25 1252  INPATIENT ADMISSION  Once            02/03/25 2042  Place in Observation  Once                          Discharge Date: 02/06/25    Consultations During Hospital Stay:  none    Procedures Performed:   none    Significant Findings / Test Results:   Cellulitis     Incidental Findings:   See above    Test Results Pending at Discharge (will require follow up):   Final blood culture results     Outpatient Tests Requested:  N/a    Complications:  none    Reason for Admission: Cellulitis    Hospital Course:   Leroy Paredes is a 89 y.o. male patient who originally presented to the hospital on 2/3/2025 due to multiple falls and generalized weakness. Patient presented as trauma alert including CT scan of the head, C-spine, and chest/abd/pelvis were negative for acute traumatic findings. Patient was found to have right shoulder cellulitis. Pt received 2 days of IV vancomycin then transitioned to IV cefazolin for 2 days after MRSA came back negative. Patient being discharged on Keflex 500 mg every 8 hours for 6 more days to complete 10-day course of antibiotics          Please see above list of diagnoses and related plan for additional information.     Condition at Discharge: stable    Discharge Day Visit / Exam:   Subjective:  Pt states he's feeling well, denies any fevers chills or acute complaints  Vitals: Blood Pressure: 113/62 (02/06/25 0727)  Pulse: 75 (02/06/25 0727)  Temperature: 97.6 °F (36.4 °C) (02/05/25 2206)  Temp Source: Oral (02/05/25 1533)  Respirations: 19 (02/06/25 0727)  Weight - Scale: 82.5 kg (181 lb 14.1 oz) (02/03/25 1859)  SpO2: 95 % (02/06/25  0800)  Physical Exam  Vitals and nursing note reviewed.   Constitutional:       General: He is not in acute distress.     Appearance: He is well-developed.   HENT:      Head: Normocephalic and atraumatic.   Eyes:      Conjunctiva/sclera: Conjunctivae normal.   Cardiovascular:      Rate and Rhythm: Normal rate and regular rhythm.      Heart sounds: No murmur heard.  Pulmonary:      Effort: Pulmonary effort is normal. No respiratory distress.      Breath sounds: Normal breath sounds.   Abdominal:      Palpations: Abdomen is soft.      Tenderness: There is no abdominal tenderness.   Musculoskeletal:         General: No swelling.   Skin:     General: Skin is warm and dry.   Neurological:      Mental Status: He is alert. Mental status is at baseline.   Psychiatric:         Mood and Affect: Mood normal.          Discussion with Family:  Spoke discharge plan with patient's Niece on 2/5.     Discharge instructions/Information to patient and family:   See after visit summary for information provided to patient and family.      Provisions for Follow-Up Care:  See after visit summary for information related to follow-up care and any pertinent home health orders.      Mobility at time of Discharge:   Basic Mobility Inpatient Raw Score: 16  JH-HLM Goal: 5: Stand one or more mins  JH-HLM Achieved: 6: Walk 10 steps or more  HLM Goal achieved. Continue to encourage appropriate mobility.     Disposition:   Acute Rehab at [facility]    Planned Readmission: no    Discharge Medications:  See after visit summary for reconciled discharge medications provided to patient and/or family.      Administrative Statements   Discharge Statement:  I have spent a total time of 35 minutes in caring for this patient on the day of the visit/encounter. >30 minutes of time was spent on: Diagnostic results, Prognosis, Risks and benefits of tx options, Instructions for management, and Patient and family education.    **Please Note: This note may have been  constructed using a voice recognition system**

## 2025-02-06 NOTE — CASE MANAGEMENT
Case Management Discharge Planning Note    Patient name Leroy Paredes  Location S /S -01 MRN [id number]  : 1930 Date 2025       Current Admission Date: 2/3/2025  Current Admission Diagnosis:Cellulitis of right upper extremity   Patient Active Problem List    Diagnosis Date Noted Date Diagnosed    Gastroenteritis and colitis, viral 2025     Multiple falls 2025     Hyperkalemia 2025     Sepsis (HCC) 2025     Dementia ([facility]) 2025     Type 2 diabetes mellitus ([facility]) 2025     Cellulitis of right upper extremity 2025     Agitation 2024     Cognitive impairment 2024     History of rib fracture 2024     Fracture of multiple ribs of left side 10/30/2024     Spleen injury, initial encounter 10/30/2024     Chronic heart failure with preserved ejection fraction ([facility]) 10/25/2024     Insomnia 2024     Ambulatory dysfunction 2024     Severe protein-calorie malnutrition ([facility]) 2024     Syncope 2024     Hyperlactatemia 2024     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[facility]) 2024     Encounter for loop recorder at end of battery life 2024     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  ([facility]) 2023     Closed fracture of one rib of left side with routine healing 09/15/2022     Fall 09/10/2022     Skin tear of right elbow without complication 2022     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[facility]) 2021     Embolic bilateral punctate CVA, acute as well as subacute 2020     Essential hypertension 2020     Secondary hyperparathyroidism of renal origin ([facility]) 2019     Acute kidney injury superimposed on chronic kidney disease  ([facility]) 2018     CKD stage 4 due to type 2 diabetes mellitus ([facility]) 2018     Palpitations 2018     Enlarged prostate without lower urinary tract symptoms (luts) 2013     Hyperlipidemia 2012     Benign hypertension with CKD  (chronic kidney disease) stage IV (HCC) 08/09/2012       LOS (days): 2  Geometric Mean LOS (GMLOS) (days): 3.5  Days to GMLOS:1.6     OBJECTIVE:  Risk of Unplanned Readmission Score: 43.59     Current admission status: Inpatient   Preferred Pharmacy:   [id number] - [place] - [address]  [address]  [place] [address]  Phone: [phone] Fax: [phone]    [facility] - [place] - [address]  [address]  [place] [address]  Phone: [phone] Fax: [phone]    [facility] - [place], [place] - [address]  [address] [address]  Phone: [phone] Fax: [phone]    Primary Care Provider: Pepito Foster DO    Primary Insurance: MEDICARE  Secondary Insurance: BLUE CROSS    DISCHARGE DETAILS:    Discharge planning discussed with:: Patient's Cristiane Orozco  Freedom of Choice: Yes     CM contacted family/caregiver?: Yes    Contacts  Patient Contacts: Pam Mosley (Niece) [phone]  Relationship to Patient:: Family  Contact Method: Phone  Phone Number: [phone]  Reason/Outcome: Discharge Planning    Other Referral/Resources/Interventions Provided:  Referral Comments: [facility]    Treatment Team Recommendation: Facility Return, Assisted Living  Discharge Destination Plan:: Facility Return, Assisted Living (Returning to [facility])  Transport at Discharge : Wheelchair van     Number/Name of Dispatcher: [employer]  Transported by (Company and Unit #): [facility]  ETA of Transport (Date): 02/06/25  ETA of Transport (Time): 1130    CM made aware by attending patient is medically cleared for discharge. CM called [facility] [place] to make aware of patient's discharge and return to [facility] today. ARTHUR spoke with Felicity ([phone]) who stated she will make staff aware of patient's return. Felicity also confirmed patient receives PT/OT at  [facility].     CM called patient's niece Pam to make her aware of today's discharge and confirmed need for transport to be arranged. Pam confirmed patient is in need of transport to [facility].     CM requested [facility] transport for 1130 which was claimed by [facility] and confirmed 1130  time. CM made provider, nursing, patient's niece and accepting [facility] aware.

## 2025-02-06 NOTE — PLAN OF CARE
Problem: Prexisting or High Potential for Compromised Skin Integrity  Goal: Skin integrity is maintained or improved  Description: INTERVENTIONS:  - Identify patients at risk for skin breakdown  - Assess and monitor skin integrity  - Assess and monitor nutrition and hydration status  - Monitor labs   - Assess for incontinence   - Turn and reposition patient  - Assist with mobility/ambulation  - Relieve pressure over bony prominences  - Avoid friction and shearing  - Provide appropriate hygiene as needed including keeping skin clean and dry  - Evaluate need for skin moisturizer/barrier cream  - Collaborate with interdisciplinary team   - Patient/family teaching  - Consider wound care consult   Outcome: Progressing     Problem: Potential for Falls  Goal: Patient will remain free of falls  Description: INTERVENTIONS:  - Educate patient/family on patient safety including physical limitations  - Instruct patient to call for assistance with activity   - Consult OT/PT to assist with strengthening/mobility   - Keep Call bell within reach  - Keep bed low and locked with side rails adjusted as appropriate  - Keep care items and personal belongings within reach  - Initiate and maintain comfort rounds  - Make Fall Risk Sign visible to staff  - Offer Toileting every 2 Hours, in advance of need  - Initiate/Maintain bed alarm  - Obtain necessary fall risk management equipment: yellow socks  - Apply yellow socks and bracelet for high fall risk patients  - Consider moving patient to room near nurses station  Outcome: Progressing     Problem: PAIN - ADULT  Goal: Verbalizes/displays adequate comfort level or baseline comfort level  Description: Interventions:  - Encourage patient to monitor pain and request assistance  - Assess pain using appropriate pain scale  - Administer analgesics based on type and severity of pain and evaluate response  - Implement non-pharmacological measures as appropriate and evaluate response  - Consider  cultural and social influences on pain and pain management  - Notify physician/advanced practitioner if interventions unsuccessful or patient reports new pain  Outcome: Progressing     Problem: INFECTION - ADULT  Goal: Absence or prevention of progression during hospitalization  Description: INTERVENTIONS:  - Assess and monitor for signs and symptoms of infection  - Monitor lab/diagnostic results  - Monitor all insertion sites, i.e. indwelling lines, tubes, and drains  - Monitor endotracheal if appropriate and nasal secretions for changes in amount and color  - [place] appropriate cooling/warming therapies per order  - Administer medications as ordered  - Instruct and encourage patient and family to use good hand hygiene technique  - Identify and instruct in appropriate isolation precautions for identified infection/condition  Outcome: Progressing  Goal: Absence of fever/infection during neutropenic period  Description: INTERVENTIONS:  - Monitor WBC    Outcome: Progressing     Problem: SAFETY ADULT  Goal: Patient will remain free of falls  Description: INTERVENTIONS:  - Educate patient/family on patient safety including physical limitations  - Instruct patient to call for assistance with activity   - Consult OT/PT to assist with strengthening/mobility   - Keep Call bell within reach  - Keep bed low and locked with side rails adjusted as appropriate  - Keep care items and personal belongings within reach  - Initiate and maintain comfort rounds  - Make Fall Risk Sign visible to staff  - Offer Toileting every 2 Hours, in advance of need  - Initiate/Maintain bed alarm  - Obtain necessary fall risk management equipment: yellow socks  - Apply yellow socks and bracelet for high fall risk patients  - Consider moving patient to room near nurses station  Outcome: Progressing  Goal: Maintain or return to baseline ADL function  Description: INTERVENTIONS:  -  Assess patient's ability to carry out ADLs; assess patient's  baseline for ADL function and identify physical deficits which impact ability to perform ADLs (bathing, care of mouth/teeth, toileting, grooming, dressing, etc.)  - Assess/evaluate cause of self-care deficits   - Assess range of motion  - Assess patient's mobility; develop plan if impaired  - Assess patient's need for assistive devices and provide as appropriate  - Encourage maximum independence but intervene and supervise when necessary  - Involve family in performance of ADLs  - Assess for home care needs following discharge   - Consider OT consult to assist with ADL evaluation and planning for discharge  - Provide patient education as appropriate  Outcome: Progressing  Goal: Maintains/Returns to pre admission functional level  Description: INTERVENTIONS:  - Perform AM-PAC 6 Click Basic Mobility/ Daily Activity assessment daily.  - Set and communicate daily mobility goal to care team and patient/family/caregiver.   - Collaborate with rehabilitation services on mobility goals if consulted  - Perform Range of Motion 2 times a day.  - Reposition patient every 2 hours.  - Dangle patient 2 times a day  - Stand patient 2 times a day  - Ambulate patient 2 times a day  - Out of bed to chair 2 times a day   - Out of bed for meals 2 times a day  - Out of bed for toileting  - Record patient progress and toleration of activity level   Outcome: Progressing     Problem: DISCHARGE PLANNING  Goal: Discharge to home or other facility with appropriate resources  Description: INTERVENTIONS:  - Identify barriers to discharge w/patient and caregiver  - Arrange for needed discharge resources and transportation as appropriate  - Identify discharge learning needs (meds, wound care, etc.)  - Arrange for interpretive services to assist at discharge as needed  - Refer to Case Management Department for coordinating discharge planning if the patient needs post-hospital services based on physician/advanced practitioner order or complex needs  related to functional status, cognitive ability, or social support system  Outcome: Progressing     Problem: Knowledge Deficit  Goal: Patient/family/caregiver demonstrates understanding of disease process, treatment plan, medications, and discharge instructions  Description: Complete learning assessment and assess knowledge base.  Interventions:  - Provide teaching at level of understanding  - Provide teaching via preferred learning methods  Outcome: Progressing

## 2025-02-06 NOTE — PROGRESS NOTES
Vancomycin IV Pharmacy-to-Dose Consultation     Vancomycin has been discontinued.  Pharmacy will sign off.  Please contact or re-consult with questions.    Lisa Castellanos, Pharmacist

## 2025-02-08 ENCOUNTER — HOSPITAL ENCOUNTER (OUTPATIENT)
Facility: HOSPITAL | Age: OVER 89
Setting detail: OBSERVATION
Discharge: NURSING, REHAB OR LONG-TERM CARE | End: 2025-02-09
Attending: EMERGENCY MEDICINE | Admitting: INTERNAL MEDICINE
Payer: MEDICARE

## 2025-02-08 ENCOUNTER — APPOINTMENT (EMERGENCY)
Dept: RADIOLOGY | Facility: HOSPITAL | Age: OVER 89
End: 2025-02-08
Payer: MEDICARE

## 2025-02-08 ENCOUNTER — APPOINTMENT (EMERGENCY)
Dept: CT IMAGING | Facility: HOSPITAL | Age: OVER 89
End: 2025-02-08
Payer: MEDICARE

## 2025-02-08 DIAGNOSIS — N17.9 AKI (ACUTE KIDNEY INJURY) (HCC): ICD-10-CM

## 2025-02-08 DIAGNOSIS — R55 SYNCOPE: Primary | ICD-10-CM

## 2025-02-08 PROBLEM — I48.20 CHRONIC ATRIAL FIBRILLATION (HCC): Status: ACTIVE | Noted: 2025-02-08

## 2025-02-08 LAB
2HR DELTA HS TROPONIN: -1 NG/L
ALBUMIN SERPL BCG-MCNC: 3.7 G/DL (ref 3.5–5)
ALP SERPL-CCNC: 74 U/L (ref 34–104)
ALT SERPL W P-5'-P-CCNC: 14 U/L (ref 7–52)
AMORPH URATE CRY URNS QL MICRO: ABNORMAL
ANION GAP SERPL CALCULATED.3IONS-SCNC: 11 MMOL/L (ref 4–13)
AST SERPL W P-5'-P-CCNC: 34 U/L (ref 13–39)
ATRIAL RATE: 72 BPM
BACTERIA BLD CULT: NORMAL
BACTERIA BLD CULT: NORMAL
BACTERIA UR QL AUTO: ABNORMAL /HPF
BASOPHILS # BLD AUTO: 0.03 THOUSANDS/ΜL (ref 0–0.1)
BASOPHILS NFR BLD AUTO: 0 % (ref 0–1)
BILIRUB SERPL-MCNC: 0.43 MG/DL (ref 0.2–1)
BILIRUB UR QL STRIP: NEGATIVE
BUN SERPL-MCNC: 61 MG/DL (ref 5–25)
CALCIUM SERPL-MCNC: 8.8 MG/DL (ref 8.4–10.2)
CARDIAC TROPONIN I PNL SERPL HS: 41 NG/L (ref ?–50)
CARDIAC TROPONIN I PNL SERPL HS: 42 NG/L (ref ?–50)
CHLORIDE SERPL-SCNC: 108 MMOL/L (ref 96–108)
CLARITY UR: ABNORMAL
CO2 SERPL-SCNC: 19 MMOL/L (ref 21–32)
COLOR UR: YELLOW
CREAT SERPL-MCNC: 3.32 MG/DL (ref 0.6–1.3)
EOSINOPHIL # BLD AUTO: 0.22 THOUSAND/ΜL (ref 0–0.61)
EOSINOPHIL NFR BLD AUTO: 3 % (ref 0–6)
ERYTHROCYTE [DISTWIDTH] IN BLOOD BY AUTOMATED COUNT: 15.1 % (ref 11.6–15.1)
FLUAV RNA RESP QL NAA+PROBE: NEGATIVE
FLUBV RNA RESP QL NAA+PROBE: NEGATIVE
GFR SERPL CREATININE-BSD FRML MDRD: 15 ML/MIN/1.73SQ M
GLUCOSE SERPL-MCNC: 138 MG/DL (ref 65–140)
GLUCOSE SERPL-MCNC: 184 MG/DL (ref 65–140)
GLUCOSE SERPL-MCNC: 98 MG/DL (ref 65–140)
GLUCOSE SERPL-MCNC: 98 MG/DL (ref 65–140)
GLUCOSE UR STRIP-MCNC: ABNORMAL MG/DL
HCT VFR BLD AUTO: 32.7 % (ref 36.5–49.3)
HGB BLD-MCNC: 10.5 G/DL (ref 12–17)
HGB UR QL STRIP.AUTO: ABNORMAL
HYALINE CASTS #/AREA URNS LPF: ABNORMAL /LPF
IMM GRANULOCYTES # BLD AUTO: 0.05 THOUSAND/UL (ref 0–0.2)
IMM GRANULOCYTES NFR BLD AUTO: 1 % (ref 0–2)
KETONES UR STRIP-MCNC: NEGATIVE MG/DL
LEUKOCYTE ESTERASE UR QL STRIP: NEGATIVE
LYMPHOCYTES # BLD AUTO: 0.9 THOUSANDS/ΜL (ref 0.6–4.47)
LYMPHOCYTES NFR BLD AUTO: 11 % (ref 14–44)
MCH RBC QN AUTO: 30.2 PG (ref 26.8–34.3)
MCHC RBC AUTO-ENTMCNC: 32.1 G/DL (ref 31.4–37.4)
MCV RBC AUTO: 94 FL (ref 82–98)
MONOCYTES # BLD AUTO: 0.86 THOUSAND/ΜL (ref 0.17–1.22)
MONOCYTES NFR BLD AUTO: 11 % (ref 4–12)
NEUTROPHILS # BLD AUTO: 5.83 THOUSANDS/ΜL (ref 1.85–7.62)
NEUTS SEG NFR BLD AUTO: 74 % (ref 43–75)
NITRITE UR QL STRIP: NEGATIVE
NON-SQ EPI CELLS URNS QL MICRO: ABNORMAL /HPF
NRBC BLD AUTO-RTO: 0 /100 WBCS
P AXIS: 107 DEGREES
PH UR STRIP.AUTO: 5 [PH]
PLATELET # BLD AUTO: 205 THOUSANDS/UL (ref 149–390)
PMV BLD AUTO: 9.5 FL (ref 8.9–12.7)
POTASSIUM SERPL-SCNC: 4.6 MMOL/L (ref 3.5–5.3)
PR INTERVAL: 206 MS
PROT SERPL-MCNC: 7.1 G/DL (ref 6.4–8.4)
PROT UR STRIP-MCNC: ABNORMAL MG/DL
QRS AXIS: -55 DEGREES
QRSD INTERVAL: 140 MS
QT INTERVAL: 456 MS
QTC INTERVAL: 499 MS
RBC # BLD AUTO: 3.48 MILLION/UL (ref 3.88–5.62)
RBC #/AREA URNS AUTO: ABNORMAL /HPF
RSV RNA RESP QL NAA+PROBE: NEGATIVE
SARS-COV-2 RNA RESP QL NAA+PROBE: NEGATIVE
SODIUM SERPL-SCNC: 138 MMOL/L (ref 135–147)
SP GR UR STRIP.AUTO: 1.02 (ref 1–1.03)
T WAVE AXIS: 90 DEGREES
UROBILINOGEN UR STRIP-ACNC: <2 MG/DL
VENTRICULAR RATE: 72 BPM
WBC # BLD AUTO: 7.89 THOUSAND/UL (ref 4.31–10.16)
WBC #/AREA URNS AUTO: ABNORMAL /HPF

## 2025-02-08 PROCEDURE — 99285 EMERGENCY DEPT VISIT HI MDM: CPT

## 2025-02-08 PROCEDURE — 36415 COLL VENOUS BLD VENIPUNCTURE: CPT

## 2025-02-08 PROCEDURE — 71045 X-RAY EXAM CHEST 1 VIEW: CPT

## 2025-02-08 PROCEDURE — 85025 COMPLETE CBC W/AUTO DIFF WBC: CPT

## 2025-02-08 PROCEDURE — 84484 ASSAY OF TROPONIN QUANT: CPT

## 2025-02-08 PROCEDURE — 99285 EMERGENCY DEPT VISIT HI MDM: CPT | Performed by: EMERGENCY MEDICINE

## 2025-02-08 PROCEDURE — 81001 URINALYSIS AUTO W/SCOPE: CPT

## 2025-02-08 PROCEDURE — 82948 REAGENT STRIP/BLOOD GLUCOSE: CPT

## 2025-02-08 PROCEDURE — 73030 X-RAY EXAM OF SHOULDER: CPT

## 2025-02-08 PROCEDURE — 73564 X-RAY EXAM KNEE 4 OR MORE: CPT

## 2025-02-08 PROCEDURE — 80053 COMPREHEN METABOLIC PANEL: CPT

## 2025-02-08 PROCEDURE — 70450 CT HEAD/BRAIN W/O DYE: CPT

## 2025-02-08 PROCEDURE — 72125 CT NECK SPINE W/O DYE: CPT

## 2025-02-08 PROCEDURE — 93005 ELECTROCARDIOGRAM TRACING: CPT

## 2025-02-08 PROCEDURE — 0241U HB NFCT DS VIR RESP RNA 4 TRGT: CPT

## 2025-02-08 PROCEDURE — 99223 1ST HOSP IP/OBS HIGH 75: CPT | Performed by: INTERNAL MEDICINE

## 2025-02-08 PROCEDURE — 93010 ELECTROCARDIOGRAM REPORT: CPT | Performed by: INTERNAL MEDICINE

## 2025-02-08 RX ORDER — ESCITALOPRAM OXALATE 5 MG/1
5 TABLET ORAL DAILY
Status: DISCONTINUED | OUTPATIENT
Start: 2025-02-09 | End: 2025-02-09 | Stop reason: HOSPADM

## 2025-02-08 RX ORDER — INSULIN LISPRO 100 [IU]/ML
1-6 INJECTION, SOLUTION INTRAVENOUS; SUBCUTANEOUS
Status: DISCONTINUED | OUTPATIENT
Start: 2025-02-08 | End: 2025-02-09 | Stop reason: HOSPADM

## 2025-02-08 RX ORDER — SODIUM CHLORIDE 9 MG/ML
75 INJECTION, SOLUTION INTRAVENOUS CONTINUOUS
Status: DISCONTINUED | OUTPATIENT
Start: 2025-02-08 | End: 2025-02-09 | Stop reason: HOSPADM

## 2025-02-08 RX ORDER — GABAPENTIN 100 MG/1
100 CAPSULE ORAL
Status: DISCONTINUED | OUTPATIENT
Start: 2025-02-08 | End: 2025-02-09 | Stop reason: HOSPADM

## 2025-02-08 RX ORDER — ACETAMINOPHEN 325 MG/1
650 TABLET ORAL EVERY 6 HOURS PRN
Status: DISCONTINUED | OUTPATIENT
Start: 2025-02-08 | End: 2025-02-09 | Stop reason: HOSPADM

## 2025-02-08 RX ORDER — LANOLIN ALCOHOL/MO/W.PET/CERES
400 CREAM (GRAM) TOPICAL DAILY
Status: DISCONTINUED | OUTPATIENT
Start: 2025-02-09 | End: 2025-02-09 | Stop reason: HOSPADM

## 2025-02-08 RX ORDER — MIRTAZAPINE 15 MG/1
15 TABLET, FILM COATED ORAL
Status: DISCONTINUED | OUTPATIENT
Start: 2025-02-08 | End: 2025-02-09 | Stop reason: HOSPADM

## 2025-02-08 RX ORDER — ATORVASTATIN CALCIUM 40 MG/1
40 TABLET, FILM COATED ORAL
Status: DISCONTINUED | OUTPATIENT
Start: 2025-02-08 | End: 2025-02-09 | Stop reason: HOSPADM

## 2025-02-08 RX ORDER — QUETIAPINE FUMARATE 25 MG/1
25 TABLET, FILM COATED ORAL
Status: DISCONTINUED | OUTPATIENT
Start: 2025-02-08 | End: 2025-02-09 | Stop reason: HOSPADM

## 2025-02-08 RX ORDER — ONDANSETRON 2 MG/ML
4 INJECTION INTRAMUSCULAR; INTRAVENOUS EVERY 6 HOURS PRN
Status: DISCONTINUED | OUTPATIENT
Start: 2025-02-08 | End: 2025-02-09 | Stop reason: HOSPADM

## 2025-02-08 RX ADMIN — QUETIAPINE FUMARATE 25 MG: 25 TABLET ORAL at 21:55

## 2025-02-08 RX ADMIN — CEPHALEXIN 500 MG: 250 CAPSULE ORAL at 18:41

## 2025-02-08 RX ADMIN — INSULIN LISPRO 1 UNITS: 100 INJECTION, SOLUTION INTRAVENOUS; SUBCUTANEOUS at 21:53

## 2025-02-08 RX ADMIN — ATORVASTATIN CALCIUM 40 MG: 40 TABLET, FILM COATED ORAL at 18:41

## 2025-02-08 RX ADMIN — GABAPENTIN 100 MG: 100 CAPSULE ORAL at 21:55

## 2025-02-08 RX ADMIN — SODIUM CHLORIDE 500 ML: 0.9 INJECTION, SOLUTION INTRAVENOUS at 15:01

## 2025-02-08 RX ADMIN — MIRTAZAPINE 15 MG: 15 TABLET, FILM COATED ORAL at 21:56

## 2025-02-08 RX ADMIN — SODIUM CHLORIDE 75 ML/HR: 0.9 INJECTION, SOLUTION INTRAVENOUS at 18:45

## 2025-02-08 RX ADMIN — Medication 3 MG: at 21:56

## 2025-02-08 RX ADMIN — CEPHALEXIN 500 MG: 250 CAPSULE ORAL at 21:54

## 2025-02-08 RX ADMIN — APIXABAN 2.5 MG: 2.5 TABLET, FILM COATED ORAL at 18:41

## 2025-02-08 NOTE — ASSESSMENT & PLAN NOTE
Problems with memory, appears at discharge   Continue supportive care - Remeron, Lexapro, Seroquel

## 2025-02-08 NOTE — ASSESSMENT & PLAN NOTE
Lab Results   Component Value Date    HGBA1C 8.6 (H) 12/03/2024       Recent Labs     02/05/25 2053 02/06/25  0725 02/08/25  1329   POCGLU 303* 179* 98       Blood Sugar Average: Last 72 hrs:  (P) 98  A1c acceptable for age. Normoglycemic on admission   Hold Starlix   ABEL   QID accuchecks

## 2025-02-08 NOTE — ASSESSMENT & PLAN NOTE
Wt Readings from Last 3 Encounters:   02/08/25 78.5 kg (173 lb 1 oz)   02/03/25 82.5 kg (181 lb 14.1 oz)   01/27/25 78.6 kg (173 lb 4.8 oz)     Appears dry on admission   Not on diuretic   Monitor volume status with IVF

## 2025-02-08 NOTE — ASSESSMENT & PLAN NOTE
Patient passed out in his wheelchair at his nursing home. He was hypotensive on admission and his daughter reports he has a history of blood pressure dropping and losing consciousness. Non-ischemic EKG  Admit patient to med/surg under observation   Telemetry   Hold BP medications IVF  Trend troponin   Monitor BP/vitals

## 2025-02-08 NOTE — ED ATTENDING ATTESTATION
2/8/2025  I, Elizabeth Downs MD, saw and evaluated the patient. I have discussed the patient with the resident/non-physician practitioner and agree with the resident's/non-physician practitioner's findings, Plan of Care, and MDM as documented in the resident's/non-physician practitioner's note, except where noted. All available labs and Radiology studies were reviewed.  I was present for key portions of any procedure(s) performed by the resident/non-physician practitioner and I was immediately available to provide assistance.       At this point I agree with the current assessment done in the Emergency Department.  I have conducted an independent evaluation of this patient a history and physical is as follows:    89-year-old female with history of CKD stage IV, DM 2, hypertension, atrial fibrillation on Eliquis, dementia, preserved ejection fraction CHF, recent admission for sepsis thought to be secondary to cellulitis to the right upper extremity, discharged 2 days ago, remains on oral Keflex.  Patient presents for evaluation after a witnessed syncopal episode in his wheelchair.  Patient was at a skilled nursing facility being wheeled to the dining area when he lost consciousness, fell forward out of his wheelchair onto the ground.  He was partially caught by staff and did not hit his head.  Per family, he is now at his cognitive baseline.  He was hypotensive to the 80s over 50s on arrival to the ED.  Patient does not recall the episode.  He denies any headache, chest pain, difficulty breathing, palpitations, dizziness, abdominal pain, fevers, or chills.  Family states that he seemed to wince when his right knee and hip were palpated by resident physician but patient denies any areas of pain to me.    Physical Exam  Vitals and nursing note reviewed.   Constitutional:       General: He is not in acute distress.     Appearance: He is well-developed. He is not ill-appearing, toxic-appearing or diaphoretic.   HENT:       Head: Normocephalic and atraumatic.      Right Ear: External ear normal.      Left Ear: External ear normal.      Nose: Nose normal.   Eyes:      Extraocular Movements: Extraocular movements intact.      Conjunctiva/sclera: Conjunctivae normal.      Pupils: Pupils are equal, round, and reactive to light.   Neck:      Comments: No midline tenderness to palpation over the cervical spine  Cardiovascular:      Rate and Rhythm: Normal rate and regular rhythm.      Pulses: Normal pulses.      Heart sounds: Normal heart sounds. No murmur heard.     No friction rub. No gallop.   Pulmonary:      Effort: Pulmonary effort is normal. No respiratory distress.      Breath sounds: Normal breath sounds. No wheezing or rales.   Chest:      Chest wall: No tenderness.   Abdominal:      General: Bowel sounds are normal. There is no distension.      Palpations: Abdomen is soft.      Tenderness: There is no abdominal tenderness. There is no guarding.   Musculoskeletal:         General: No deformity. Normal range of motion.      Cervical back: Normal range of motion and neck supple.      Comments: No midline tenderness to palpation over the T or L-spine.  Extremities atraumatic.  I am able to range the joints of the bilateral upper and lower extremities without pain   Skin:     General: Skin is warm and dry.   Neurological:      Mental Status: He is alert.      Motor: No abnormal muscle tone.      Comments: Oriented to person, place, and situation, though forgetful about recent events.  Normal strength in all 4 extremities.  Clear speech.  Face symmetric.           ED Course  ED Course as of 02/08/25 1618   Sat Feb 08, 2025   1328 I personally interpreted the patient's EKG which reveals normal rate, normal sinus rhythm, normal axis, QTc of 499 ms, right bundle branch block with left anterior fascicular block unchanged from prior.  New nonspecific T wave abnormality isolated to lead V3.  No ST segment deviation.   1429 Patient was  hypotensive to 85/44 on arrival, blood pressure improved to 122/50 5:07 100 cc of normal saline.  Patient is mentating at his baseline (mildly confused).  Area of cellulitis to the patient's right upper arm appears improved per family.  No other signs or symptoms of infection on exam.   1431 Patient is noted to have an NELLI with creatinine of 3.32 from 2.01 2 days ago. Patient appears hypovolemic on exam- will give additional 500 cc of IVFs.    1431 Patient does have a bifascicular block on EKG.  Differential for syncope includes intermittent third-degree heart block, arrhythmia, hypotension 2/2 hypovolemia. Neuro exam is non-focal- no evidence of CVA or history of focal deficits to suggest TIA.    1433 CT head with NAICA.    1549 No signs of trauma on my exam.  Specifically, I am able to range all the joints of the bilateral upper and lower extremities without pain or bony tenderness.  X-rays were obtained of the chest, left shoulder, and right knee by resident physician that showed no acute fracture or malalignment.         Critical Care Time  Procedures

## 2025-02-08 NOTE — H&P
H&P - Hospitalist   Name: Leroy Paredes 89 y.o. male I MRN: [id number]  Unit/Bed#: ED-38 I Date of Admission: 2/8/2025   Date of Service: 2/8/2025 I Hospital Day: 0     Assessment & Plan  Syncope  Patient passed out in his wheelchair at his nursing home. He was hypotensive on admission and his daughter reports he has a history of blood pressure dropping and losing consciousness. Non-ischemic EKG  Admit patient to med/surg under observation   Telemetry   Hold BP medications IVF  Trend troponin   Monitor BP/vitals   Chronic atrial fibrillation (HCC)  Rate controlled on monitor in ED   Not on rate control   Continue low dose Eliquis due to weight and age   Cellulitis of right upper extremity  Appears improving   Continue Keflex   Had diarrhea - check C. Diff  Acute renal failure superimposed on stage 4 chronic kidney disease (HCC)  Lab Results   Component Value Date    EGFR 15 02/08/2025    EGFR 27 02/06/2025    EGFR 23 02/05/2025    CREATININE 3.32 (H) 02/08/2025    CREATININE 2.01 (H) 02/06/2025    CREATININE 2.29 (H) 02/05/2025   Baseline creatinine per Nephrology note around 1.9-2.2. Up to 3.3 on admission secondary to hypotension and ACEI usage   IVF - NSS 75 cc/hr   Avoid hypotension   Hold Lisinopril, Hytrin   Would only restart Lisinopril at discharge if BP control needed   Avoid nephrotoxins  Retention protocol   BMP in AM   Essential hypertension  BP was low on admission to ER but improved with fluids   Hold medications - Lisinopril, Hytrin  Consider Lisinopril monotherapy   Chronic heart failure with preserved ejection fraction (HCC)  Wt Readings from Last 3 Encounters:   02/08/25 78.5 kg (173 lb 1 oz)   02/03/25 82.5 kg (181 lb 14.1 oz)   01/27/25 78.6 kg (173 lb 4.8 oz)     Appears dry on admission   Not on diuretic   Monitor volume status with IVF        Dementia (HCC)  Problems with memory, appears at discharge   Continue supportive care - Remeron, Lexapro, Seroquel   Severe protein-calorie malnutrition  ([facility])  Malnutrition Findings:                                 BMI Findings:           Body mass index is 24.14 kg/m².   BMI noted   Continue appetite support - Remeron   Type 2 diabetes mellitus (HCC)  Lab Results   Component Value Date    HGBA1C 8.6 (H) 12/03/2024       Recent Labs     02/05/25 2053 02/06/25  0725 02/08/25  1329   POCGLU 303* 179* 98       Blood Sugar Average: Last 72 hrs:  (P) 98  A1c acceptable for age. Normoglycemic on admission   Hold Starlix   SSI   QID accuchecks       VTE Pharmacologic Prophylaxis: VTE Score: 3 Moderate Risk (Score 3-4) - Pharmacological DVT Prophylaxis Ordered: apixaban (Eliquis).  Code Status: DNR/DNI  Discussion with family: Updated  (wife and daughter) at bedside.    Anticipated Length of Stay: Patient will be admitted on an observation basis with an anticipated length of stay of less than 2 midnights secondary to as per above assessment and plan .    History of Present Illness   Chief Complaint: Syncope    Leroy Paredes is a 89 y.o. male with a PMH of CHF, CKD4, T2DM, HTN who presents with syncope. He was recently discharged back to [facility] after being admitted for cellulitis of right shoulder. He was having leg weakness and was being wheeled around there. He was being taken to the [facility] today when he became dizzy and fell out of his wheelchair. His daughter at bedside reports that he has a history of his BP dropping and passing out. She also states that he had some diarrhea at the facility. Patient denies complaints right now. He denies chest pain or difficulty breathing. Denies abdominal pain, nausea, vomiting, or diarrhea. History is limited by patient's dementia and his daughter reports that he has problem with short term memory.     Review of Systems   Unable to perform ROS: Dementia       Historical Information   Past Medical History:   Diagnosis Date    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)     Last Assessed:  5/19/17    Chronic  kidney disease     Diabetes mellitus (HCC)     Hyperlipidemia     Hypertension     Osteoarthritis     Palpitations     TIA (transient ischemic attack)      Past Surgical History:   Procedure Laterality Date    APPENDECTOMY      CARDIAC ELECTROPHYSIOLOGY PROCEDURE N/A 1/17/2024    Procedure: Cardiac loop recorder explant;  Surgeon: Drew Olmos MD;  Location: [facility];  Service: Cardiology    COLONOSCOPY  2012    HEMORROIDECTOMY      TOOTH EXTRACTION  02/02/2022     Social History     Tobacco Use    Smoking status: Former    Smokeless tobacco: Never   Vaping Use    Vaping status: Never Used   Substance and Sexual Activity    Alcohol use: Not Currently     Comment: Social drinker    Drug use: No    Sexual activity: Not Currently     E-Cigarette/Vaping    E-Cigarette Use Never User      E-Cigarette/Vaping Substances    Nicotine No     THC No     CBD No     Flavoring No     Other No     Unknown No      Family History   Problem Relation Age of Onset    Diabetes Mother     Other Mother         Stroke syndrome    Diabetes Father     Emphysema Father      Social History:  Marital Status: Single   Occupation: Noncontributory   Patient Pre-hospital Living Situation: Long Term Care Facility  Patient Pre-hospital Level of Mobility: manual wheelchair  Patient Pre-hospital Diet Restrictions: None    Meds/Allergies   I have reveiwed home medications using records provided by [facility].  Prior to Admission medications    Medication Sig Start Date End Date Taking? Authorizing Provider   acetaminophen (TYLENOL) 325 mg tablet Take 2 tablets (650 mg total) by mouth every 6 (six) hours as needed for mild pain 5/18/23   Josselin Maya PA-C   apixaban (Eliquis) 2.5 mg Take 1 tablet (2.5 mg total) by mouth 2 (two) times a day 1/28/25   Leroy Humphries MD   atorvastatin (LIPITOR) 40 mg tablet TAKE 1 TABLET EVERY DAY WITH DINNER 10/23/24   Pepito Foster DO   bisacodyl ([facility] Bisacodyl) 10 mg suppository Insert 10 mg  into the rectum daily    Historical Provider, MD   Blood Glucose Monitoring Suppl (PRECISION XTRA MONITOR) MARY by Does not apply route daily 1/16/20   Pepito Foster DO   cephalexin (KEFLEX) 500 mg capsule Take 1 capsule (500 mg total) by mouth every 8 (eight) hours for 18 doses 2/6/25 2/12/25  Zuleika Marmolejo DO   Cholecalciferol (D3 High Potency) 25 MCG (1000 UT) capsule TAKE ONE CAPSULE BY MOUTH DAILY 12/13/24   Pepito Foster DO   escitalopram (LEXAPRO) 5 mg tablet TAKE ONE TABLET BY MOUTH DAILY 12/13/24   Pepito Foster DO   gabapentin (NEURONTIN) 100 mg capsule TAKE ONE CAPSULE BY MOUTH EVERY NIGHT AT BEDTIME 12/13/24   Pepito Foster DO   lisinopril (ZESTRIL) 5 mg tablet TAKE ONE TABLET BY MOUTH DAILY 11/18/24   KONSTANTIN Kim   loperamide (IMODIUM A-D) 2 MG tablet Take 1 tablet (2 mg total) by mouth 4 (four) times a day as needed for diarrhea  Patient not taking: Reported on 2/3/2025 2/3/25   Pepito Foster DO   magnesium Oxide (MAG-OX) 400 mg TABS TAKE ONE TABLET BY MOUTH DAILY 12/13/24   Pepito Foster DO   melatonin 3 mg TAKE ONE TABLET BY MOUTH EVERY NIGHT AT BEDTIME 12/13/24   Pepito Foster DO   mirtazapine (REMERON) 15 mg tablet Take 1 tablet (15 mg total) by mouth daily at bedtime 10/17/24   Pepito Foster DO   nateglinide (STARLIX) 60 mg tablet Take 1 tablet (60 mg total) by mouth 3 (three) times a day before meals 1/27/25   Pepito Foster DO   ondansetron (ZOFRAN) 4 mg tablet Take 1 tablet (4 mg total) by mouth every 8 (eight) hours as needed for nausea or vomiting  Patient not taking: Reported on 2/3/2025 2/3/25   Pepito Foster DO   PRECISION XTRA TEST STRIPS test strip USE TO TEST BLOOD GLUCOSE ONCE A DAY  Patient not taking: Reported on 2/3/2025 1/9/24   Pepito Foster DO   QUEtiapine (SEROquel) 25 mg tablet Take 1 tablet (25 mg total) by mouth daily at bedtime 11/5/24   Tamara Hernandez MD   sodium phosphate-biphosphate (FLEET) 7-19 g 118 mL enema INSERT 1 ENEMA INTO  THE RECTUM EVERY 6 DAYS AS NEEDED 2/5/25   Pepito EbenezerdarenDO   terazosin (HYTRIN) 1 mg capsule TAKE 1 CAPSULE AT BEDTIME 10/2/24   Pepito Foster    vitamin B-12 (CYANOCOBALAMIN) 500 MCG TABS Take 1 tablet (500 mcg total) by mouth 2 (two) times a week 10/17/24   Pepito Foster DO     No Known Allergies    Objective :  Temp:  [98.2 °F (36.8 °C)-98.8 °F (37.1 °C)] 98.8 °F (37.1 °C)  HR:  [66-68] 68  BP: ()/(44-64) 145/64  Resp:  [20] 20  SpO2:  [94 %-98 %] 98 %  O2 Device: None (Room air)    Physical Exam  Vitals and nursing note reviewed.   Constitutional:       General: He is sleeping. He is not in acute distress.     Appearance: Normal appearance. He is normal weight. He is not ill-appearing or diaphoretic.   HENT:      Head: Normocephalic and atraumatic.      Mouth/Throat:      Mouth: Mucous membranes are moist.   Eyes:      General: No scleral icterus.     Pupils: Pupils are equal, round, and reactive to light.   Cardiovascular:      Rate and Rhythm: Normal rate and regular rhythm.      Pulses: Normal pulses.      Heart sounds: Normal heart sounds, S1 normal and S2 normal. No murmur heard.     No systolic murmur is present.      No diastolic murmur is present.      No gallop. No S3 or S4 sounds.   Pulmonary:      Effort: Pulmonary effort is normal. No accessory muscle usage or respiratory distress.      Breath sounds: Normal breath sounds. No stridor. No decreased breath sounds, wheezing, rhonchi or rales.   Chest:      Chest wall: No tenderness.   Abdominal:      General: Bowel sounds are normal. There is no distension.      Palpations: Abdomen is soft.      Tenderness: There is no abdominal tenderness. There is no guarding.   Musculoskeletal:      Right lower leg: No edema.      Left lower leg: No edema.   Skin:     General: Skin is warm and dry.      Coloration: Skin is not jaundiced.   Neurological:      General: No focal deficit present.      Mental Status: He is easily aroused. Mental status  is at baseline.      Motor: No tremor or seizure activity.   Psychiatric:         Behavior: Behavior is cooperative.          Lines/Drains:            Lab Results: I have reviewed the following results:  Results from last 7 days   Lab Units 02/08/25  1324   WBC Thousand/uL 7.89   HEMOGLOBIN g/dL 10.5*   HEMATOCRIT % 32.7*   PLATELETS Thousands/uL 205   SEGS PCT % 74   LYMPHO PCT % 11*   MONO PCT % 11   EOS PCT % 3     Results from last 7 days   Lab Units 02/08/25  1324   SODIUM mmol/L 138   POTASSIUM mmol/L 4.6   CHLORIDE mmol/L 108   CO2 mmol/L 19*   BUN mg/dL 61*   CREATININE mg/dL 3.32*   ANION GAP mmol/L 11   CALCIUM mg/dL 8.8   ALBUMIN g/dL 3.7   TOTAL BILIRUBIN mg/dL 0.43   ALK PHOS U/L 74   ALT U/L 14   AST U/L 34   GLUCOSE RANDOM mg/dL 98     Results from last 7 days   Lab Units 02/03/25  1907   INR  1.30*     Results from last 7 days   Lab Units 02/08/25  1329 02/06/25  0725 02/05/25  2053 02/05/25  1557 02/05/25  1119 02/05/25  0709 02/04/25  2132 02/04/25  1844 02/04/25  1225 02/04/25  0726 02/03/25  2247   POC GLUCOSE mg/dl 98 179* 303* 151* 352* 156* 200* 203* 160* 175* 197*     Lab Results   Component Value Date    HGBA1C 8.6 (H) 12/03/2024    HGBA1C 7.9 (H) 11/08/2024    HGBA1C 6.8 (H) 07/12/2024     Results from last 7 days   Lab Units 02/05/25  0517 02/04/25  0516 02/03/25  1925 02/03/25  1907   LACTIC ACID mmol/L  --   --  1.6  --    PROCALCITONIN ng/ml 0.25 0.28*  --  0.19       Imaging Results Review: I reviewed radiology reports from this admission including: chest xray, CT head, CT C-spine, and xray(s).  Other Study Results Review: EKG was reviewed.     Administrative Statements   I have spent a total time of 75 minutes in caring for this patient on the day of the visit/encounter including Diagnostic results, Instructions for management, Impressions, Counseling / Coordination of care, Documenting in the medical record, Reviewing / ordering tests, medicine, procedures  , Obtaining or reviewing  history  , and Communicating with other healthcare professionals .    ** Please Note: This note has been constructed using a voice recognition system. **

## 2025-02-08 NOTE — ASSESSMENT & PLAN NOTE
Malnutrition Findings:                                 BMI Findings:           Body mass index is 24.14 kg/m².   BMI noted   Continue appetite support - Remeron

## 2025-02-08 NOTE — ASSESSMENT & PLAN NOTE
Lab Results   Component Value Date    EGFR 15 02/08/2025    EGFR 27 02/06/2025    EGFR 23 02/05/2025    CREATININE 3.32 (H) 02/08/2025    CREATININE 2.01 (H) 02/06/2025    CREATININE 2.29 (H) 02/05/2025   Baseline creatinine per Nephrology note around 1.9-2.2. Up to 3.3 on admission secondary to hypotension and ACEI usage   IVF - NSS 75 cc/hr   Avoid hypotension   Hold Lisinopril, Hytrin   Would only restart Lisinopril at discharge if BP control needed   Avoid nephrotoxins  Retention protocol   BMP in AM

## 2025-02-08 NOTE — ASSESSMENT & PLAN NOTE
BP was low on admission to ER but improved with fluids   Hold medications - Lisinopril, Hytrin  Consider Lisinopril monotherapy

## 2025-02-08 NOTE — ASSESSMENT & PLAN NOTE
Rate controlled on monitor in ED   Not on rate control   Continue low dose Eliquis due to weight and age

## 2025-02-08 NOTE — ED PROVIDER NOTES
Time reflects when diagnosis was documented in both MDM as applicable and the Disposition within this note       Time User Action Codes Description Comment    2/8/2025  3:12 PM Holgate, Kendall Add [R55] Syncope     2/8/2025  3:12 PM Holgate, Kendall Add [N17.9] NELLI (acute kidney injury) (HCC)           ED Disposition       ED Disposition   Admit    Condition   Stable    Date/Time   Sat Feb 8, 2025  3:12 PM    Comment   Case was discussed with BUDIM and the patient's admission status was agreed to be Admission Status: observation status to the service of Dr. Osorio .               Assessment & Plan       Medical Decision Making  Male patient who presented to the emergency department after a syncopal episode at his nursing facility.  On physical examination, patient was noted to have mild tenderness to palpation of the left superior humerus with full range of motion along with pain in his right knee with range of motion.  He was also noted to have chronic appearing skin abrasions over the lateral aspect of his right shoulder.  Patient's labs showed worsening renal function but no other acute concerns.  Patient's CT imaging showed no acute intracranial abnormality with signs of paranasal sinus disease as well as no cervical traumatic malalignment as per radiology.  Patient's x-ray images also showed no osseous abnormality or acute cardiopulmonary disease.  While in the emergency department, patient was given IV fluids for hydration.  Patient's case was discussed with internal medicine who accepted the patient for observation under the service of Dr. Osorio.  Patient was agreeable to plan.    Amount and/or Complexity of Data Reviewed  Labs: ordered. Decision-making details documented in ED Course.  Radiology: ordered and independent interpretation performed. Decision-making details documented in ED Course.    Risk  Decision regarding hospitalization.        ED Course as of 02/09/25 1702   Sat Feb 08, 2025   1251 Blood  Pressure(!)(S): 85/44   1334 POC Glucose: 98   1334 Blood Pressure: 122/57   1409 hs TnI 0hr: 42   1421 FLU/RSV/COVID - if FLU/RSV clinically relevant (2hr TAT)  Negative   1421 BUN(!): 61   1421 Creatinine(!): 3.32   1430 CT head without contrast  No acute intracranial hemorrhage or depressed calvarial fracture identified. Chronic right cerebral hemisphere infarcts as described above.     Paranasal sinus disease, as described above.     1500 CT cervical spine without contrast    No interval change. No acute fracture or evidence for traumatic malalignment.      1605 hs TnI 2hr: 41   1605 Delta 2hr hsTnI: -1       Medications   sodium chloride 0.9 % bolus 500 mL (0 mL Intravenous Stopped 2/8/25 1600)       ED Risk Strat Scores                          SBIRT 22yo+      Flowsheet Row Most Recent Value   Initial Alcohol Screen: US AUDIT-C     1. How often do you have a drink containing alcohol? 0 Filed at: 02/08/2025 1743   2. How many drinks containing alcohol do you have on a typical day you are drinking?  0 Filed at: 02/08/2025 1743   3a. Male UNDER 65: How often do you have five or more drinks on one occasion? 0 Filed at: 02/08/2025 1743   3b. FEMALE Any Age, or MALE 65+: How often do you have 4 or more drinks on one occassion? 0 Filed at: 02/08/2025 1743   Audit-C Score 0 Filed at: 02/08/2025 1743   [place]: How many times in the past year have you...    Used an illegal drug or used a prescription medication for non-medical reasons? Never Filed at: 02/08/2025 1743                            History of Present Illness       Chief Complaint   Patient presents with    Syncope     Pt presents to the ED with syncopal episode and fall. Pt presents via EMS from [facility]. Reportedly slipped out of wheelchair, no headstrike reported, pt on eliquis. Pt reportedly here for same on Monday with 2 similar episodes. GCS 14,        Past Medical History:   Diagnosis Date    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)      Last Assessed:  5/19/17    Chronic kidney disease     Diabetes mellitus (HCC)     Hyperlipidemia     Hypertension     Osteoarthritis     Palpitations     TIA (transient ischemic attack)       Past Surgical History:   Procedure Laterality Date    APPENDECTOMY      CARDIAC ELECTROPHYSIOLOGY PROCEDURE N/A 1/17/2024    Procedure: Cardiac loop recorder explant;  Surgeon: Drew Olmos MD;  Location:  CARDIAC CATH LAB;  Service: Cardiology    COLONOSCOPY  2012    HEMORROIDECTOMY      TOOTH EXTRACTION  02/02/2022      Family History   Problem Relation Age of Onset    Diabetes Mother     Other Mother         Stroke syndrome    Diabetes Father     Emphysema Father       Social History     Tobacco Use    Smoking status: Former    Smokeless tobacco: Never   Vaping Use    Vaping status: Never Used   Substance Use Topics    Alcohol use: Not Currently     Comment: Social drinker    Drug use: No      E-Cigarette/Vaping    E-Cigarette Use Never User       E-Cigarette/Vaping Substances    Nicotine No     THC No     CBD No     Flavoring No     Other No     Unknown No       I have reviewed and agree with the history as documented.     89-year-old male with significant PMH including HTN, HLD, DM, and CKD who presents to the emergency department via EMS from his skilled nursing facility after a syncopal episode.  As per family and patient at bedside, patient was in a wheelchair going to lunch when he had fell forward out of the wheelchair during a syncopal episode.  Patient did not hit his head but did not lose consciousness as per staff.  Patient does take Eliquis given left upper extremity blood clots which he was previously treated for.  Patient is complaining of pain in his right knee as well as his left shoulder but otherwise acting appropriately at bedside.  Patient was recently discharged from the hospital for similar symptoms and is currently being treated with antibiotics for skin abrasion over his right shoulder.  Family  member states that he has been having worsening ambulatory function and was in the wheelchair because he is not able to use the walker anymore.  Patient denies any other symptoms such as urinary or bowel difficulty.  Denies chest pain, SOB, cough, abdominal pain, n/v/d, fever, chills, dizziness, lightheadedness, HA, dysuria, hematuria, hematochezia, or melena.             Review of Systems   Constitutional:  Negative for appetite change, chills, diaphoresis, fatigue and fever.   HENT: Negative.  Negative for congestion, ear discharge, ear pain, postnasal drip, rhinorrhea, sore throat and trouble swallowing.    Eyes: Negative.  Negative for pain and visual disturbance.   Respiratory: Negative.  Negative for cough and shortness of breath.    Cardiovascular: Negative.  Negative for chest pain.   Gastrointestinal: Negative.  Negative for abdominal pain, blood in stool, constipation, diarrhea, nausea and vomiting.   Genitourinary: Negative.  Negative for decreased urine volume, difficulty urinating, dysuria, flank pain and hematuria.   Musculoskeletal:  Positive for arthralgias. Negative for back pain.   Skin: Negative.  Negative for color change and rash.   Neurological:  Positive for syncope. Negative for dizziness, weakness, light-headedness and headaches.           Objective       ED Triage Vitals   Temperature Pulse Blood Pressure Respirations SpO2 Patient Position - Orthostatic VS   02/08/25 1248 02/08/25 1245 02/08/25 1245 02/08/25 1245 02/08/25 1245 02/08/25 1245   98.2 °F (36.8 °C) 67 (S) (!) 85/44 20 94 % Sitting      Temp Source Heart Rate Source BP Location FiO2 (%) Pain Score    02/08/25 1248 02/08/25 1430 02/08/25 1245 -- 02/08/25 1530    Oral Monitor Right arm  No Pain      Vitals      Date and Time Temp Pulse SpO2 Resp BP Pain Score FACES Pain Rating User   02/09/25 0900 -- 68 95 % 16 156/67 -- -- LD   02/09/25 0600 -- 70 95 % 20 120/62 No Pain -- JH   02/09/25 0400 -- 73 95 % -- 146/66 -- --     02/09/25 0300 -- 73 96 % -- 165/67 -- -- JH   02/09/25 0100 -- 73 98 % 20 153/68 No Pain -- SB   02/09/25 0000 -- 71 97 % 20 143/65 No Pain -- SB   02/08/25 2330 -- 78 96 % 20 116/58 -- -- SB   02/08/25 2300 -- 69 98 % 20 128/61 No Pain -- SB   02/08/25 2130 -- 76 98 % 20 147/67 -- -- CR   02/08/25 2100 -- 80 98 % 20 174/74 -- -- CR   02/08/25 2030 -- 77 -- 20 147/62 -- -- EB   02/08/25 2000 -- 79 98 % 20 133/60 -- -- SB   02/08/25 1800 -- 77 98 % 20 153/68 -- -- SB   02/08/25 1730 -- 76 98 % 20 157/65 No Pain -- SB   02/08/25 1630 -- 74 98 % 20 140/65 No Pain -- SB   02/08/25 1530 98.8 °F (37.1 °C) 68 98 % 20 145/64 No Pain -- SB   02/08/25 1430 -- 66 97 % 20 123/56 -- -- SB   02/08/25 1330 -- -- -- -- 122/57 -- -- JI   02/08/25 1248 98.2 °F (36.8 °C) -- -- -- -- -- -- HVL   02/08/25 1245 -- 67 94 % 20  85/44 -- -- HVL            Physical Exam  Vitals and nursing note reviewed.   Constitutional:       General: He is not in acute distress.     Appearance: Normal appearance. He is normal weight.   HENT:      Head: Normocephalic and atraumatic.      Right Ear: External ear normal.      Left Ear: External ear normal.      Nose: Nose normal.      Mouth/Throat:      Pharynx: Oropharynx is clear.   Eyes:      Extraocular Movements: Extraocular movements intact.      Conjunctiva/sclera: Conjunctivae normal.      Pupils: Pupils are equal, round, and reactive to light.   Cardiovascular:      Rate and Rhythm: Normal rate and regular rhythm.      Pulses: Normal pulses.      Heart sounds: Normal heart sounds.      Comments: RRR with +S1 and S2, no murmurs appreciated on exam. Peripheral pulses intact.    Pulmonary:      Effort: Pulmonary effort is normal. No respiratory distress.      Breath sounds: Normal breath sounds. No wheezing, rhonchi or rales.      Comments: CTABL with no abnormal lung sounds such as wheezes or rales appreciated on exam.     Abdominal:      General: Abdomen is flat. Bowel sounds are normal. There is  no distension.      Palpations: Abdomen is soft.      Tenderness: There is no abdominal tenderness.      Comments: Soft, non tender, normo-active bowel sounds. Without rigidity, guarding, or distension.     Musculoskeletal:         General: Tenderness present. No swelling or deformity. Normal range of motion.      Cervical back: Normal range of motion. No tenderness.      Comments: Mild tenderness to palpation of the left superior humerus but with full range of motion.  Patient however, did have pain with range of motion of his right knee.  No tenderness to palpation. 5/5 strength in BL upper and lower extremities. No signs of abrasions, ecchymosis, erythema, or gross deformity was noted.     Skin:     General: Skin is warm and dry.      Findings: Abrasion present.      Comments: Chronic appearing skin abrasions over the lateral aspect of his right shoulder.   Neurological:      General: No focal deficit present.      Mental Status: He is alert and oriented to person, place, and time. Mental status is at baseline.      Comments: CN grossly intact on visualization. No focal neurologic deficits noted on exam. 5/5 strength in all extremities. Neurovascularly intact with normal sensation and motor function.             Results Reviewed       Procedure Component Value Units Date/Time    Fingerstick Glucose (POCT) [[id number]]  (Abnormal) Collected: 02/09/25 0857    Lab Status: Final result Specimen: Blood Updated: 02/09/25 0858     POC Glucose 188 mg/dl     Basic metabolic panel [[id number]]  (Abnormal) Collected: 02/09/25 0603    Lab Status: Final result Specimen: Blood from Arm, Right Updated: 02/09/25 0637     Sodium 134 mmol/L      Potassium 4.2 mmol/L      Chloride 109 mmol/L      CO2 17 mmol/L      ANION GAP 8 mmol/L      BUN 54 mg/dL      Creatinine 2.77 mg/dL      Glucose 208 mg/dL      Calcium 8.0 mg/dL      eGFR 18 ml/min/1.73sq m     Narrative:      National Kidney Disease Foundation guidelines for Chronic  Kidney Disease (CKD):     Stage 1 with normal or high GFR (GFR > 90 mL/min/1.73 square meters)    Stage 2 Mild CKD (GFR = 60-89 mL/min/1.73 square meters)    Stage 3A Moderate CKD (GFR = 45-59 mL/min/1.73 square meters)    Stage 3B Moderate CKD (GFR = 30-44 mL/min/1.73 square meters)    Stage 4 Severe CKD (GFR = 15-29 mL/min/1.73 square meters)    Stage 5 End Stage CKD (GFR <15 mL/min/1.73 square meters)  Note: GFR calculation is accurate only with a steady state creatinine    Fingerstick Glucose (POCT) [[id number]]  (Abnormal) Collected: 02/08/25 2147    Lab Status: Final result Specimen: Blood Updated: 02/08/25 2148     POC Glucose 184 mg/dl     Fingerstick Glucose (POCT) [[id number]]  (Normal) Collected: 02/08/25 1815    Lab Status: Final result Specimen: Blood Updated: 02/08/25 1816     POC Glucose 138 mg/dl     Clostridium difficile toxin by PCR with EIA [[id number]]     Lab Status: No result Specimen: Stool from Per Rectum     Urine Microscopic [[id number]]  (Abnormal) Collected: 02/08/25 1518    Lab Status: Final result Specimen: Urine, Clean Catch Updated: 02/08/25 1621     RBC, UA 1-2 /hpf      WBC, UA 2-4 /hpf      Epithelial Cells Occasional /hpf      Bacteria, UA Occasional /hpf      Hyaline Casts, UA 0-3 /lpf      Amorphous Crystals, UA Occasional    HS Troponin I 2hr [[id number]]  (Normal) Collected: 02/08/25 1501    Lab Status: Final result Specimen: Blood from Arm, Right Updated: 02/08/25 1553     hs TnI 2hr 41 ng/L      Delta 2hr hsTnI -1 ng/L     UA w Reflex to Microscopic w Reflex to Culture [[id number]]  (Abnormal) Collected: 02/08/25 1518    Lab Status: Final result Specimen: Urine, Clean Catch Updated: 02/08/25 1534     Color, UA Yellow     Clarity, UA Turbid     Specific Gravity, UA 1.020     pH, UA 5.0     Leukocytes, UA Negative     Nitrite, UA Negative     Protein, UA 70 (1+) mg/dl      Glucose, UA Trace mg/dl      Ketones, UA Negative mg/dl      Urobilinogen, UA <2.0 mg/dl      Bilirubin, UA  Negative     Occult Blood, UA Trace    Comprehensive metabolic panel [[id number]]  (Abnormal) Collected: 02/08/25 1324    Lab Status: Final result Specimen: Blood from Arm, Right Updated: 02/08/25 1410     Sodium 138 mmol/L      Potassium 4.6 mmol/L      Chloride 108 mmol/L      CO2 19 mmol/L      ANION GAP 11 mmol/L      BUN 61 mg/dL      Creatinine 3.32 mg/dL      Glucose 98 mg/dL      Calcium 8.8 mg/dL      AST 34 U/L      ALT 14 U/L      Alkaline Phosphatase 74 U/L      Total Protein 7.1 g/dL      Albumin 3.7 g/dL      Total Bilirubin 0.43 mg/dL      eGFR 15 ml/min/1.73sq m     Narrative:      National Kidney Disease Foundation guidelines for Chronic Kidney Disease (CKD):     Stage 1 with normal or high GFR (GFR > 90 mL/min/1.73 square meters)    Stage 2 Mild CKD (GFR = 60-89 mL/min/1.73 square meters)    Stage 3A Moderate CKD (GFR = 45-59 mL/min/1.73 square meters)    Stage 3B Moderate CKD (GFR = 30-44 mL/min/1.73 square meters)    Stage 4 Severe CKD (GFR = 15-29 mL/min/1.73 square meters)    Stage 5 End Stage CKD (GFR <15 mL/min/1.73 square meters)  Note: GFR calculation is accurate only with a steady state creatinine    FLU/RSV/COVID - if FLU/RSV clinically relevant (2hr TAT) [[id number]]  (Normal) Collected: 02/08/25 1324    Lab Status: Final result Specimen: Nares from Nasopharyngeal Swab Updated: 02/08/25 1410     SARS-CoV-2 Negative     INFLUENZA A PCR Negative     INFLUENZA B PCR Negative     RSV PCR Negative    Narrative:      This test has been performed using the CoV-2/Flu/RSV plus assay on the [employer] GeneXpert platform. This test has been validated by the  and verified by the performing laboratory.     This test is designed to amplify and detect the following: nucleocapsid (N), envelope (E), and RNA-dependent RNA polymerase (RdRP) genes of the SARS-CoV-2 genome; matrix (M), basic polymerase (PB2), and acidic protein (PA) segments of the influenza A genome; matrix (M) and non-structural  protein (NS) segments of the influenza B genome, and the nucleocapsid genes of RSV A and RSV B.     Positive results are indicative of the presence of Flu A, Flu B, RSV, and/or SARS-CoV-2 RNA. Positive results for SARS-CoV-2 or suspected novel influenza should be reported to state, local, or federal health departments according to local reporting requirements.      All results should be assessed in conjunction with clinical presentation and other laboratory markers for clinical management.     FOR PEDIATRIC PATIENTS - copy/paste COVID Guidelines URL to browser: [email or web address]       HS Troponin 0hr (reflex protocol) [[id number]]  (Normal) Collected: 02/08/25 1324    Lab Status: Final result Specimen: Blood from Arm, Right Updated: 02/08/25 1405     hs TnI 0hr 42 ng/L     CBC and differential [[id number]]  (Abnormal) Collected: 02/08/25 1324    Lab Status: Final result Specimen: Blood from Arm, Right Updated: 02/08/25 1341     WBC 7.89 Thousand/uL      RBC 3.48 Million/uL      Hemoglobin 10.5 g/dL      Hematocrit 32.7 %      MCV 94 fL      MCH 30.2 pg      MCHC 32.1 g/dL      RDW 15.1 %      MPV 9.5 fL      Platelets 205 Thousands/uL      nRBC 0 /100 WBCs      Segmented % 74 %      Immature Grans % 1 %      Lymphocytes % 11 %      Monocytes % 11 %      Eosinophils Relative 3 %      Basophils Relative 0 %      Absolute Neutrophils 5.83 Thousands/µL      Absolute Immature Grans 0.05 Thousand/uL      Absolute Lymphocytes 0.90 Thousands/µL      Absolute Monocytes 0.86 Thousand/µL      Eosinophils Absolute 0.22 Thousand/µL      Basophils Absolute 0.03 Thousands/µL     Fingerstick Glucose (POCT) [[id number]]  (Normal) Collected: 02/08/25 1329    Lab Status: Final result Specimen: Blood Updated: 02/08/25 1331     POC Glucose 98 mg/dl             CT head without contrast   Final Interpretation by Quentin Ryan MD (02/08 1423)      No acute intracranial hemorrhage or  depressed calvarial fracture identified. Chronic right cerebral hemisphere infarcts as described above.      Paranasal sinus disease, as described above.                  Workstation performed: [id number]         CT cervical spine without contrast   Final Interpretation by Quentin Ryan MD (02/08 1431)      No interval change. No acute fracture or evidence for traumatic malalignment.                  Workstation performed: [id number]         XR chest 1 view portable   ED Interpretation by Kendall Frias MD (02/08 1431)   Image was independently interpreted by myself and showed no acute concerns of cardiopulmonary disease at this time.        Final Interpretation by Noé Neumann MD (02/08 1911)      No acute cardiopulmonary disease.      Stable chronic interstitial lung disease.            Workstation performed: [id number]         XR shoulder 2+ views LEFT   ED Interpretation by Kendall Frias MD (02/08 1431)   Image was independently interpreted by myself and showed no acute concerns or fractures at this time.        Final Interpretation by Noé Neumann MD (02/08 1904)      No acute osseous abnormality.         Computerized Assisted Algorithm (CAA) may have been used to analyze all applicable images.         Workstation performed: [id number]         XR knee 4+ views Right injury   ED Interpretation by Kendall Frias MD (02/08 1430)   Image was independently interpreted by myself and showed no acute concerns or fractures at this time.        Final Interpretation by Noé Neumann MD (02/08 1906)      No acute osseous abnormality.         Computerized Assisted Algorithm (CAA) may have been used to analyze all applicable images.         Workstation performed: [id number]             ECG 12 Lead Documentation Only    Date/Time: 2/8/2025 12:48 PM    Performed by: Kendall Frias MD  Authorized by: Kendall Frias MD    ECG reviewed by me, the ED Provider: yes    Patient location:  ED  Previous ECG:     Previous ECG:  Compared to  current    Similarity:  No change  Interpretation:     Interpretation: abnormal    Rate:     ECG rate:  72    ECG rate assessment: normal    Rhythm:     Rhythm: sinus rhythm    Ectopy:     Ectopy: none    QRS:     QRS axis:  Left    QRS intervals:  Wide  Conduction:     Conduction: abnormal      Abnormal conduction: complete RBBB and LAFB    ST segments:     ST segments:  Normal  T waves:     T waves: normal        ED Medication and Procedure Management   Prior to Admission Medications   Prescriptions Last Dose Informant Patient Reported? Taking?   Blood Glucose Monitoring Suppl (PRECISION XTRA MONITOR) MARY  Self No No   Sig: by Does not apply route daily   Cholecalciferol (D3 High Potency) 25 MCG (1000 UT) capsule   No No   Sig: TAKE ONE CAPSULE BY MOUTH DAILY   PRECISION XTRA TEST STRIPS test strip  Self No No   Sig: USE TO TEST BLOOD GLUCOSE ONCE A DAY   Patient not taking: Reported on 2/3/2025   QUEtiapine (SEROquel) 25 mg tablet   No No   Sig: Take 1 tablet (25 mg total) by mouth daily at bedtime   acetaminophen (TYLENOL) 325 mg tablet  Self No No   Sig: Take 2 tablets (650 mg total) by mouth every 6 (six) hours as needed for mild pain   apixaban (Eliquis) 2.5 mg   No No   Sig: Take 1 tablet (2.5 mg total) by mouth 2 (two) times a day   atorvastatin (LIPITOR) 40 mg tablet  Self No No   Sig: TAKE 1 TABLET EVERY DAY WITH DINNER   bisacodyl ([facility] Bisacodyl) 10 mg suppository  Self Yes No   Sig: Insert 10 mg into the rectum daily   cephalexin (KEFLEX) 500 mg capsule   No No   Sig: Take 1 capsule (500 mg total) by mouth every 8 (eight) hours for 18 doses   escitalopram (LEXAPRO) 5 mg tablet   No No   Sig: TAKE ONE TABLET BY MOUTH DAILY   gabapentin (NEURONTIN) 100 mg capsule   No No   Sig: TAKE ONE CAPSULE BY MOUTH EVERY NIGHT AT BEDTIME   lisinopril (ZESTRIL) 5 mg tablet   No No   Sig: TAKE ONE TABLET BY MOUTH DAILY   loperamide (IMODIUM A-D) 2 MG tablet   No No   Sig: Take 1 tablet (2 mg total) by mouth 4 (four)  times a day as needed for diarrhea   Patient not taking: Reported on 2/3/2025   magnesium Oxide (MAG-OX) 400 mg TABS   No No   Sig: TAKE ONE TABLET BY MOUTH DAILY   melatonin 3 mg   No No   Sig: TAKE ONE TABLET BY MOUTH EVERY NIGHT AT BEDTIME   mirtazapine (REMERON) 15 mg tablet  Self No No   Sig: Take 1 tablet (15 mg total) by mouth daily at bedtime   nateglinide (STARLIX) 60 mg tablet   No No   Sig: Take 1 tablet (60 mg total) by mouth 3 (three) times a day before meals   ondansetron (ZOFRAN) 4 mg tablet   No No   Sig: Take 1 tablet (4 mg total) by mouth every 8 (eight) hours as needed for nausea or vomiting   Patient not taking: Reported on 2/3/2025   sodium phosphate-biphosphate (FLEET) 7-19 g 118 mL enema   No No   Sig: INSERT 1 ENEMA INTO THE RECTUM EVERY 6 DAYS AS NEEDED   terazosin (HYTRIN) 1 mg capsule  Self No No   Sig: TAKE 1 CAPSULE AT BEDTIME   vitamin B-12 (CYANOCOBALAMIN) 500 MCG TABS  Self No No   Sig: Take 1 tablet (500 mcg total) by mouth 2 (two) times a week      Facility-Administered Medications: None     Discharge Medication List as of 2/9/2025 11:07 AM        CONTINUE these medications which have NOT CHANGED    Details   acetaminophen (TYLENOL) 325 mg tablet Take 2 tablets (650 mg total) by mouth every 6 (six) hours as needed for mild pain, Starting Thu 5/18/2023, No Print      apixaban (Eliquis) 2.5 mg Take 1 tablet (2.5 mg total) by mouth 2 (two) times a day, Starting Tue 1/28/2025, Normal      atorvastatin (LIPITOR) 40 mg tablet TAKE 1 TABLET EVERY DAY WITH DINNER, Normal      bisacodyl ([facility] Bisacodyl) 10 mg suppository Insert 10 mg into the rectum daily, Historical Med      Blood Glucose Monitoring Suppl (PRECISION XTRA MONITOR) MARY by Does not apply route daily, Starting Thu 1/16/2020, Normal      cephalexin (KEFLEX) 500 mg capsule Take 1 capsule (500 mg total) by mouth every 8 (eight) hours for 18 doses, Starting Thu 2/6/2025, Until Wed 2/12/2025, Normal      Cholecalciferol (D3 High  Potency) 25 MCG (1000 UT) capsule TAKE ONE CAPSULE BY MOUTH DAILY, Starting Fri 12/13/2024, Normal      escitalopram (LEXAPRO) 5 mg tablet TAKE ONE TABLET BY MOUTH DAILY, Starting Fri 12/13/2024, Normal      gabapentin (NEURONTIN) 100 mg capsule TAKE ONE CAPSULE BY MOUTH EVERY NIGHT AT BEDTIME, Starting Fri 12/13/2024, Normal      magnesium Oxide (MAG-OX) 400 mg TABS TAKE ONE TABLET BY MOUTH DAILY, Starting Fri 12/13/2024, Normal      melatonin 3 mg TAKE ONE TABLET BY MOUTH EVERY NIGHT AT BEDTIME, Starting Fri 12/13/2024, Normal      mirtazapine (REMERON) 15 mg tablet Take 1 tablet (15 mg total) by mouth daily at bedtime, Starting Thu 10/17/2024, Normal      nateglinide (STARLIX) 60 mg tablet Take 1 tablet (60 mg total) by mouth 3 (three) times a day before meals, Starting Mon 1/27/2025, Normal      QUEtiapine (SEROquel) 25 mg tablet Take 1 tablet (25 mg total) by mouth daily at bedtime, Starting Tue 11/5/2024, No Print      vitamin B-12 (CYANOCOBALAMIN) 500 MCG TABS Take 1 tablet (500 mcg total) by mouth 2 (two) times a week, Starting Thu 10/17/2024, Normal           STOP taking these medications       lisinopril (ZESTRIL) 5 mg tablet Comments:   Reason for Stopping:         loperamide (IMODIUM A-D) 2 MG tablet Comments:   Reason for Stopping:         ondansetron (ZOFRAN) 4 mg tablet Comments:   Reason for Stopping:         PRECISION XTRA TEST STRIPS test strip Comments:   Reason for Stopping:         sodium phosphate-biphosphate (FLEET) 7-19 g 118 mL enema Comments:   Reason for Stopping:         terazosin (HYTRIN) 1 mg capsule Comments:   Reason for Stopping:             Outpatient Discharge Orders   Discharge Diet     NELLI Pathway:  Medications to avoid: phosphate or magnesium based laxatives, NSAIDs     NELLI Pathway: Maintain SBP between 120-140 mm/Hg     NELLI Pathway: Call Nursing Home MD for decreased oral intake     NELLI Pathway: Daily Weights     NELLI Pathway: Strict I&O     NELLI Pathway: Follow up bloodwork      Discharge Condtion:  Stabilized     Patient Aware of Diagnosis: Yes     Free of Communicable Disease:   Yes     Physical Therapy Eval And Treat     Occupational Therapy Eval and Treat     Activity:  Per Rehab Recommendations     Future Lab Orders at [facility]     ED SEPSIS DOCUMENTATION   Time reflects when diagnosis was documented in both MDM as applicable and the Disposition within this note       Time User Action Codes Description Comment    2/8/2025  3:12 PM Kendall Frias [R55] Syncope     2/8/2025  3:12 PM Kendall Frias [N17.9] NELLI (acute kidney injury) (HCC)                  Kendall Frias MD  02/09/25 6637

## 2025-02-09 VITALS
BODY MASS INDEX: 24.23 KG/M2 | WEIGHT: 173.06 LBS | HEIGHT: 71 IN | OXYGEN SATURATION: 95 % | HEART RATE: 68 BPM | DIASTOLIC BLOOD PRESSURE: 67 MMHG | SYSTOLIC BLOOD PRESSURE: 156 MMHG | TEMPERATURE: 98.8 F | RESPIRATION RATE: 16 BRPM

## 2025-02-09 LAB
ANION GAP SERPL CALCULATED.3IONS-SCNC: 8 MMOL/L (ref 4–13)
BUN SERPL-MCNC: 54 MG/DL (ref 5–25)
CALCIUM SERPL-MCNC: 8 MG/DL (ref 8.4–10.2)
CHLORIDE SERPL-SCNC: 109 MMOL/L (ref 96–108)
CO2 SERPL-SCNC: 17 MMOL/L (ref 21–32)
CREAT SERPL-MCNC: 2.77 MG/DL (ref 0.6–1.3)
GFR SERPL CREATININE-BSD FRML MDRD: 18 ML/MIN/1.73SQ M
GLUCOSE SERPL-MCNC: 188 MG/DL (ref 65–140)
GLUCOSE SERPL-MCNC: 208 MG/DL (ref 65–140)
POTASSIUM SERPL-SCNC: 4.2 MMOL/L (ref 3.5–5.3)
SODIUM SERPL-SCNC: 134 MMOL/L (ref 135–147)

## 2025-02-09 PROCEDURE — 36415 COLL VENOUS BLD VENIPUNCTURE: CPT | Performed by: PHYSICIAN ASSISTANT

## 2025-02-09 PROCEDURE — 99239 HOSP IP/OBS DSCHRG MGMT >30: CPT | Performed by: PHYSICIAN ASSISTANT

## 2025-02-09 PROCEDURE — 82948 REAGENT STRIP/BLOOD GLUCOSE: CPT

## 2025-02-09 PROCEDURE — 80048 BASIC METABOLIC PNL TOTAL CA: CPT | Performed by: PHYSICIAN ASSISTANT

## 2025-02-09 RX ADMIN — APIXABAN 2.5 MG: 2.5 TABLET, FILM COATED ORAL at 09:08

## 2025-02-09 RX ADMIN — INSULIN LISPRO 1 UNITS: 100 INJECTION, SOLUTION INTRAVENOUS; SUBCUTANEOUS at 09:07

## 2025-02-09 RX ADMIN — Medication 400 MG: at 09:08

## 2025-02-09 RX ADMIN — ESCITALOPRAM 5 MG: 5 TABLET, FILM COATED ORAL at 09:08

## 2025-02-09 RX ADMIN — CEPHALEXIN 500 MG: 250 CAPSULE ORAL at 06:04

## 2025-02-09 NOTE — ASSESSMENT & PLAN NOTE
Patient passed out in his wheelchair at his nursing home. He was hypotensive on admission and his daughter reports he has a history of blood pressure dropping and losing consciousness. Non-ischemic EKG  BP improved, no further dizzy symptoms   Telemetry with 1 episode of NSVT which was asymptomatic. Had intermittent brief tachycardia   Stable for discharge   Follow up with Cardiology   Hold off starting Metoprolol due to syncope being from low BP rather than arrhythmogenic it seems   Hold Lisinopirl, Hytrin

## 2025-02-09 NOTE — ASSESSMENT & PLAN NOTE
Lab Results   Component Value Date    HGBA1C 8.6 (H) 12/03/2024       Recent Labs     02/08/25  1329 02/08/25  1815 02/08/25  2147 02/09/25  0857   POCGLU 98 138 184* 188*       Blood Sugar Average: Last 72 hrs:  (P) 152  A1c acceptable for age. Normoglycemic on admission   Restart Starlix

## 2025-02-09 NOTE — ASSESSMENT & PLAN NOTE
Rate controlled on monitor in ED   Intermittent tachycardia which was very brief and not frequent   Not on rate control   Continue low dose Eliquis due to weight and age   Follow up with cardiology

## 2025-02-09 NOTE — ASSESSMENT & PLAN NOTE
BP was low on admission to ER but improved with fluids   Hold medications - Lisinopril, Hytrin  Monitor at facility - would restart Lisinopril if needed

## 2025-02-09 NOTE — DISCHARGE SUMMARY
Discharge Summary - Hospitalist   Name: Leroy Paredes 89 y.o. male I MRN: [id number]  Unit/Bed#: ED-38 I Date of Admission: 2/8/2025   Date of Service: 2/9/2025 I Hospital Day: 0     Assessment & Plan  Syncope  Patient passed out in his wheelchair at his nursing home. He was hypotensive on admission and his daughter reports he has a history of blood pressure dropping and losing consciousness. Non-ischemic EKG  BP improved, no further dizzy symptoms   Telemetry with 1 episode of NSVT which was asymptomatic. Had intermittent brief tachycardia   Stable for discharge   Follow up with Cardiology   Hold off starting Metoprolol due to syncope being from low BP rather than arrhythmogenic it seems   Hold Lisinopirl, Hytrin   Chronic atrial fibrillation (HCC)  Rate controlled on monitor in ED   Intermittent tachycardia which was very brief and not frequent   Not on rate control   Continue low dose Eliquis due to weight and age   Follow up with cardiology   Cellulitis of right upper extremity  Appears improving   Continue Keflex   Patient had no diarrhea here     Acute renal failure superimposed on stage 4 chronic kidney disease (HCC)  Lab Results   Component Value Date    EGFR 18 02/09/2025    EGFR 15 02/08/2025    EGFR 27 02/06/2025    CREATININE 2.77 (H) 02/09/2025    CREATININE 3.32 (H) 02/08/2025    CREATININE 2.01 (H) 02/06/2025   Baseline creatinine per Nephrology note around 1.9-2.2. Up to 3.3 on admission secondary to hypotension and ACEI usage   Improved to 2.77   IVF - NSS 75 cc/hr   Avoid hypotension   Hold Lisinopril, Hytrin at discharge   Avoid nephrotoxins  Retention protocol   BMP at facility    Essential hypertension  BP was low on admission to ER but improved with fluids   Hold medications - Lisinopril, Hytrin  Monitor at facility - would restart Lisinopril if needed  Chronic heart failure with preserved ejection fraction (HCC)  Wt Readings from Last 3 Encounters:   02/08/25 78.5 kg (173 lb 1 oz)   02/03/25  82.5 kg (181 lb 14.1 oz)   01/27/25 78.6 kg (173 lb 4.8 oz)     pears dry on admission   Not on diuretic           Dementia (HCC)  Problems with memory, appears at discharge   Continue supportive care - Remeron, Lexapro, Seroquel   Severe protein-calorie malnutrition (HCC)  Malnutrition Findings:                                 BMI Findings:           Body mass index is 24.14 kg/m².   BMI noted   Continue appetite support - Remeron   Type 2 diabetes mellitus (HCC)  Lab Results   Component Value Date    HGBA1C 8.6 (H) 12/03/2024       Recent Labs     02/08/25  1329 02/08/25  1815 02/08/25  2147 02/09/25  0857   POCGLU 98 138 184* 188*       Blood Sugar Average: Last 72 hrs:  (P) 152  A1c acceptable for age. Normoglycemic on admission   Restart Starlix        Medical Problems       Resolved Problems  Date Reviewed: 2/9/2025   None       Discharging Physician / Practitioner: Alphonse Jalloh PA-C  PCP: Pepito Foster DO  Admission Date:   Admission Orders (From admission, onward)       Ordered        02/08/25 1512  Place in Observation  Once                          Discharge Date: 02/09/25    Consultations During Hospital Stay:  None    Procedures Performed:   XR Left Shoulder  XR Right Knee  CXR  CT Head  CT C-Spine    Significant Findings / Test Results:   XR left shoulder: No acute osseous abnormality   XR Right Knee: No acute osseous abnormality   CXR: No acute pulmonary disease. Stable chronic interstitial lung disease  CT Head: No acute intracranial hemorrhage or depressed calvarial fracture identified. Chronic right cerebral hemisphere infarcts as described above. Paranasal sinus disease.   CT C-Spine:  No interval change. No acute fracture or evidence for traumatic malalignment     Incidental Findings:   None     Test Results Pending at Discharge (will require follow up):   none     Outpatient Tests Requested:  BMP    Complications:  None    Reason for Admission: Syncope, NELLI     Hospital Course:   Leroy  "KRISTA Paredes is a 89 y.o. male patient who originally presented to the hospital on 2/8/2025 due to syncope. Patient fell out of a wheelchair at [facility]. He had a trauma work up in the ED which was negative. He was admitted for syncope work up and elevation in creatinine likely due to dehydration, hypotension, and ACEI use. He was monitored on telemetry which had 1 episode of NSVT for which he was asymptomatic. His BP improved with fluids and was acceptable off medications given his age. At discharge he will hold off on Lisinopril and Hytrin and favor higher BPs to avoid syncope and injury. Patient's niece is in agreement with this plan. We discussed his episode of NSVT and agreed to hold off on initiation of BB due to propensity for low BP and the fact that his syncope was not arrhythmogenic but rather related to low BP. She was in agreement with this plan as well and will follow up with Dr. Humphries. His renal function improved nicely and will likely continue to improve being off ACEI and having higher BP. He will have repeat BMP at his facility. He was discharged there in stable condition.     Hospital Course: No notes on file      Please see above list of diagnoses and related plan for additional information.     Condition at Discharge: stable    Discharge Day Visit / Exam:   Subjective:  Patient states that he slept well. Denies chest pain or palpitations. Denies dizziness. Wants to get out of here.   Vitals: Blood Pressure: 156/67 (02/09/25 0900)  Pulse: 68 (02/09/25 0900)  Temperature: 98.8 °F (37.1 °C) (02/08/25 1530)  Temp Source: Oral (02/08/25 1530)  Respirations: 16 (02/09/25 0900)  Height: 5' 11\" (180.3 cm) (02/08/25 1530)  Weight - Scale: 78.5 kg (173 lb 1 oz) (02/08/25 1530)  SpO2: 95 % (02/09/25 0900)  Physical Exam  Vitals and nursing note reviewed.   Constitutional:       General: He is not in acute distress.     Appearance: Normal appearance. He is normal weight. He is not ill-appearing or diaphoretic. "   HENT:      Head: Normocephalic and atraumatic.      Mouth/Throat:      Mouth: Mucous membranes are moist.   Eyes:      General: No scleral icterus.     Pupils: Pupils are equal, round, and reactive to light.   Cardiovascular:      Rate and Rhythm: Normal rate and regular rhythm.      Pulses: Normal pulses.      Heart sounds: Normal heart sounds, S1 normal and S2 normal. No murmur heard.     No systolic murmur is present.      No diastolic murmur is present.      No gallop. No S3 or S4 sounds.   Pulmonary:      Effort: Pulmonary effort is normal. No accessory muscle usage or respiratory distress.      Breath sounds: Normal breath sounds. No stridor. No decreased breath sounds, wheezing, rhonchi or rales.   Chest:      Chest wall: No tenderness.   Abdominal:      General: Bowel sounds are normal. There is no distension.      Palpations: Abdomen is soft.      Tenderness: There is no abdominal tenderness. There is no guarding.   Musculoskeletal:      Right lower leg: No edema.      Left lower leg: No edema.   Skin:     General: Skin is warm and dry.      Coloration: Skin is not jaundiced.   Neurological:      General: No focal deficit present.      Mental Status: He is alert. Mental status is at baseline.      Motor: No tremor or seizure activity.   Psychiatric:         Behavior: Behavior is cooperative.          Discussion with Family: Updated  (niece) via phone.    Discharge instructions/Information to patient and family:   See after visit summary for information provided to patient and family.      Provisions for Follow-Up Care:  See after visit summary for information related to follow-up care and any pertinent home health orders.      Mobility at time of Discharge:   Basic Mobility Inpatient Raw Score: 15  -HLM Goal: 4: Move to chair/commode  -HLM Achieved: 5: Stand (1 or more minutes)  HLM Goal achieved. Continue to encourage appropriate mobility.     Disposition:   Other Skilled Nursing  Facility at [facility]    Planned Readmission: None    Discharge Medications:  See after visit summary for reconciled discharge medications provided to patient and/or family.      Administrative Statements   Discharge Statement:  I have spent a total time of 45 minutes in caring for this patient on the day of the visit/encounter. >30 minutes of time was spent on: Diagnostic results, Instructions for management, Impressions, Counseling / Coordination of care, Documenting in the medical record, Reviewing / ordering tests, medicine, procedures  , and Communicating with other healthcare professionals .    **Please Note: This note may have been constructed using a voice recognition system**

## 2025-02-09 NOTE — ASSESSMENT & PLAN NOTE
Wt Readings from Last 3 Encounters:   02/08/25 78.5 kg (173 lb 1 oz)   02/03/25 82.5 kg (181 lb 14.1 oz)   01/27/25 78.6 kg (173 lb 4.8 oz)     pears dry on admission   Not on diuretic

## 2025-02-09 NOTE — ASSESSMENT & PLAN NOTE
Lab Results   Component Value Date    EGFR 18 02/09/2025    EGFR 15 02/08/2025    EGFR 27 02/06/2025    CREATININE 2.77 (H) 02/09/2025    CREATININE 3.32 (H) 02/08/2025    CREATININE 2.01 (H) 02/06/2025   Baseline creatinine per Nephrology note around 1.9-2.2. Up to 3.3 on admission secondary to hypotension and ACEI usage   Improved to 2.77   IVF - NSS 75 cc/hr   Avoid hypotension   Hold Lisinopril, Hytrin at discharge   Avoid nephrotoxins  Retention protocol   BMP at facility

## 2025-02-09 NOTE — CASE MANAGEMENT
Case Management Discharge Planning Note    Patient name Leroy Paredes  Location ED-38/ED-38 MRN [id number]  : 1930 Date 2025       Current Admission Date: 2025  Current Admission Diagnosis:Syncope   Patient Active Problem List    Diagnosis Date Noted Date Diagnosed    Chronic atrial fibrillation (HCC) 2025     Gastroenteritis and colitis, viral 2025     Multiple falls 2025     Hyperkalemia 2025     Sepsis (HCC) 2025     Dementia ([facility]) 2025     Type 2 diabetes mellitus ([facility]) 2025     Cellulitis of right upper extremity 2025     Agitation 2024     Cognitive impairment 2024     History of rib fracture 2024     Fracture of multiple ribs of left side 10/30/2024     Spleen injury, initial encounter 10/30/2024     Chronic heart failure with preserved ejection fraction (HCC) 10/25/2024     Insomnia 2024     Ambulatory dysfunction 2024     Severe protein-calorie malnutrition ([facility]) 2024     Syncope 2024     Hyperlactatemia 2024     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[facility]) 2024     Encounter for loop recorder at end of battery life 2024     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  ([facility]) 2023     Closed fracture of one rib of left side with routine healing 09/15/2022     Fall 09/10/2022     Skin tear of right elbow without complication 2022     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     Embolic bilateral punctate CVA, acute as well as subacute 2020     Essential hypertension 2020     Secondary hyperparathyroidism of renal origin ([facility]) 2019     Acute renal failure superimposed on stage 4 chronic kidney disease ([facility]) 2018     CKD stage 4 due to type 2 diabetes mellitus ([facility]) 2018     Palpitations 2018     Enlarged prostate without lower urinary tract symptoms (luts) 2013     Hyperlipidemia 2012     Benign  hypertension with CKD (chronic kidney disease) stage IV (HCC) 08/09/2012       LOS (days): 0  Geometric Mean LOS (GMLOS) (days):   Days to GMLOS:     OBJECTIVE:            Current admission status: Observation   Preferred Pharmacy:   [id number] - [place] - [address]  [address]  [place] [address]  Phone: [phone] Fax: [phone]    [facility] - [place] - [address]  [address] [place] [address]  Phone: [phone] Fax: [phone]    [facility] - [place], [place] - [address]  [address] [address]  Phone: [phone] Fax: [phone]    Primary Care Provider: Pepito Foster DO    Primary Insurance: MEDICARE  Secondary Insurance: BLUE CROSS    DISCHARGE DETAILS:    Discharge planning discussed with:: Uli  Freedom of Choice: Yes  Comments - Freedom of Choice: Cm confirmed [facility] able to accept patient back. Cm requesting 10:30 transport. Provider, RN, and Uli Orozco advised  CM contacted family/caregiver?: Yes  Were Treatment Team discharge recommendations reviewed with patient/caregiver?: Yes  Did patient/caregiver verbalize understanding of patient care needs?: N/A- going to facility  Were patient/caregiver advised of the risks associated with not following Treatment Team discharge recommendations?: Yes    Contacts  Patient Contacts: Pam Mosley (Niece) [phone]  Relationship to Patient:: Friend  Contact Method: Phone  Reason/Outcome: Discharge Planning              Other Referral/Resources/Interventions Provided:  Interventions: Facility Return               Transport at Discharge : Wheelchair van     Number/Name of Dispatcher: Round Trip     ETA of Transport (Date): 02/09/25  ETA of Transport (Time): 1030                            Accepting Facility Name, City & State : [facility]  Receiving Facility/Agency Phone Number: [phone]

## 2025-02-10 ENCOUNTER — TRANSITIONAL CARE MANAGEMENT (OUTPATIENT)
Age: OVER 89
End: 2025-02-10

## 2025-02-12 LAB
BASE EXCESS BLDA CALC-SCNC: -2 MMOL/L (ref -2–3)
CA-I BLD-SCNC: 1.15 MMOL/L (ref 1.12–1.32)
GLUCOSE SERPL-MCNC: 255 MG/DL (ref 65–140)
HCO3 BLDA-SCNC: 21.4 MMOL/L (ref 24–30)
HCT VFR BLD CALC: 33 % (ref 36.5–49.3)
HGB BLDA-MCNC: 11.2 G/DL (ref 12–17)
PCO2 BLD: 22 MMOL/L (ref 21–32)
PCO2 BLD: 32 MM HG (ref 42–50)
PH BLD: 7.43 [PH] (ref 7.3–7.4)
PO2 BLD: 30 MM HG (ref 35–45)
POTASSIUM BLD-SCNC: 5.9 MMOL/L (ref 3.5–5.3)
SAO2 % BLD FROM PO2: 61 % (ref 60–85)
SODIUM BLD-SCNC: 135 MMOL/L (ref 136–145)
SPECIMEN SOURCE: ABNORMAL

## 2025-02-17 ENCOUNTER — OFFICE VISIT (OUTPATIENT)
Age: OVER 89
End: 2025-02-17
Payer: MEDICARE

## 2025-02-17 VITALS
HEART RATE: 66 BPM | HEIGHT: 71 IN | OXYGEN SATURATION: 96 % | DIASTOLIC BLOOD PRESSURE: 50 MMHG | WEIGHT: 172.6 LBS | RESPIRATION RATE: 16 BRPM | TEMPERATURE: 97 F | BODY MASS INDEX: 24.16 KG/M2 | SYSTOLIC BLOOD PRESSURE: 124 MMHG

## 2025-02-17 DIAGNOSIS — D63.1 ANEMIA IN STAGE 4 CHRONIC KIDNEY DISEASE  (HCC): ICD-10-CM

## 2025-02-17 DIAGNOSIS — A08.4 GASTROENTERITIS AND COLITIS, VIRAL: ICD-10-CM

## 2025-02-17 DIAGNOSIS — N18.4 ANEMIA IN STAGE 4 CHRONIC KIDNEY DISEASE  (HCC): ICD-10-CM

## 2025-02-17 DIAGNOSIS — E11.22 CKD STAGE 4 DUE TO TYPE 2 DIABETES MELLITUS (HCC): ICD-10-CM

## 2025-02-17 DIAGNOSIS — N18.4 CKD STAGE 4 DUE TO TYPE 2 DIABETES MELLITUS (HCC): ICD-10-CM

## 2025-02-17 DIAGNOSIS — I10 ESSENTIAL HYPERTENSION: Primary | ICD-10-CM

## 2025-02-17 DIAGNOSIS — E11.9 TYPE 2 DIABETES MELLITUS WITHOUT COMPLICATION, UNSPECIFIED WHETHER LONG TERM INSULIN USE (HCC): ICD-10-CM

## 2025-02-17 DIAGNOSIS — A41.9 SEPSIS, DUE TO UNSPECIFIED ORGANISM, UNSPECIFIED WHETHER ACUTE ORGAN DYSFUNCTION PRESENT (HCC): ICD-10-CM

## 2025-02-17 PROCEDURE — 99495 TRANSJ CARE MGMT MOD F2F 14D: CPT | Performed by: INTERNAL MEDICINE

## 2025-02-17 NOTE — ASSESSMENT & PLAN NOTE
For transitional care visit.  First admission to hospitalist with infection sepsis right arm after frequent falls and his assisted living facility.  Patient was evaluated and antibiotics IV then p.o. given.  Patient was stabilized and returned to his nursing facility.  Has had essentially full healing the wound site.  Concerns are with the patient having recurrent falls and injuries.

## 2025-02-17 NOTE — ASSESSMENT & PLAN NOTE
Lab Results   Component Value Date    HGBA1C 8.6 (H) 12/03/2024       Orders:    Basic metabolic panel; Future    CBC and differential; Future

## 2025-02-17 NOTE — ASSESSMENT & PLAN NOTE
Lab Results   Component Value Date    EGFR 18 02/09/2025    EGFR 15 02/08/2025    EGFR 27 02/06/2025    CREATININE 2.77 (H) 02/09/2025    CREATININE 3.32 (H) 02/08/2025    CREATININE 2.01 (H) 02/06/2025   Recheck renal function now and CBC.

## 2025-02-17 NOTE — ASSESSMENT & PLAN NOTE
Orders:    Basic metabolic panel; Future    CBC and differential; Future     Discharge Summary       Patient ID:  Man Acosta,   72 y.o., female  1956    PCP:  None    Admit Date: 1/20/2022  8:47 AM  Discharge Date:  No discharge date for patient encounter. Length of stay: 10 day(s)  Code Status: No Order  Discharging physician: Aleksandr Castro MD    Chief Complaint   Patient presents with    Vomiting       Discharge Medications  Current Discharge Medication List      START taking these medications    Details   ondansetron (ZOFRAN ODT) 4 mg disintegrating tablet Take 1 Tablet by mouth every eight (8) hours as needed for Nausea or Vomiting for up to 5 days. Qty: 15 Tablet, Refills: 0  Start date: 1/30/2022, End date: 2/4/2022         CONTINUE these medications which have NOT CHANGED    Details   atorvastatin (LIPITOR) 40 mg tablet Take 40 mg by mouth nightly. aspirin (Wendy Chewable Aspirin) 81 mg chewable tablet aspirin 81 mg tablet   Take 1 tablet every day by oral route. carvediloL (COREG) 6.25 mg tablet Take 6.25 mg by mouth two (2) times daily (with meals). folic acid 997 mcg tablet Take 400 mcg by mouth daily. Calcium Carbonate-Vit D3-Min 600 mg calcium- 400 unit tab Take 1 Tablet by mouth daily. nitroglycerin (NITROSTAT) 0.4 mg SL tablet place 1 tablet under the tongue if needed every 5 minutes for jonnie...  (REFER TO PRESCRIPTION NOTES). Reason For admission    Principal Problem:    Small bowel obstruction ([address] (1/20/2022)    Active Problems:    Abnormal EKG (1/20/2022)      Bradycardia (1/20/2022)      Moderate protein-calorie malnutrition ([address] (1/21/2022)         HPI on Admission (per admitting physician):   Noelle Nuñez is a 72 y.o. female who presents with past medical history for MI, hyperlipidemia, hypertension who presented to the emergency department with epigastric abdominal pain associate with nausea and vomiting for 5 days.   She initially attributed it to seafood she ate on Sunday and then the discomfort Name of Medication(s) Requested:  Requested Prescriptions     Pending Prescriptions Disp Refills    venlafaxine (EFFEXOR XR) 75 MG extended release capsule [Pharmacy Med Name: Venlafaxine HCl ER Oral Capsule Extended Release 24 Hour 75 MG] 90 capsule 0     Sig: TAKE ONE CAPSULE BY MOUTH DAILY     Refused Prescriptions Disp Refills    hydroCHLOROthiazide (HYDRODIURIL) 25 MG tablet [Pharmacy Med Name: hydroCHLOROthiazide Oral Tablet 25 MG] 90 tablet 0     Sig: TAKE ONE TABLET BY MOUTH DAILY       Medication is on current medication list Yes    Dosage and directions were verified? Yes    Quantity verified: 90 day supply     Pharmacy Verified?  Yes    Last Appointment:  1/7/2025    Future appts:  Future Appointments   Date Time Provider Department Center   2/7/2025  1:00 PM Ky Montejo DO [place] [facility] E[facility] DEP        (If no appt send self scheduling link. .REFILLAPPT)  Scheduling request sent?     [] Yes  [x] No    Does patient need updated?  [] Yes  [x] No    persisted burning in nature radiating to her back. She subsequently had related shortness of breath and diaphoresis.     She was evaluated in the emergency department and found to have a partial small bowel obstruction who was evaluated by the surgeon with it characterized as mild or even benign. Was also found to have a low potassium she feels could have been contributing to the partial SBO. Hospital Course and Discharge Diagnosis   The patient admitted for the following Principal Medical Problem:   Small bowel obstruction ([address]  - may due to adhesions  - She tolerated some diet yesterday but then vomited last night  - KUB:  Persistent gas-filled dilated small bowel,   - SBFT : Contrast material from the recent small bowel series has progressed into the colon with no high-grade obstruction identified. Mildly distended gas-filled small bowel loops identified centrally in the abdomen which is nonspecific. Abnormal EKG / Bradycardia  -sinus bradycardia; and regular, Rate 48;  ST/T wave: T wave inverted; Other findings: QTc 438. Deeply inverted T waves in II; III; aVF. No ST elevation noted in I or aVL. .  - Avoid BB, CCB , no event on tele, Lytes normal , HR improved 80's, cardiology consulted, ASA rectal   Hypomagnesemia   - replaced and cont' monitor lytes   Hyperlipidemia   - Statin on hold due to SBO  CAD  - ASA, Coreg and Lipitor on hold due to SBO:       The patient condition became clinically stable and No new complain or complication during the hospital course.     The Medication changes discussed with the patient and family on the discharge    Condition at discharge   Afebrile  Ambulating  Eating, Drinking, Voiding  Stable      Physical Exam on Discharge:  Visit Vitals  /72 (BP 1 Location: Right upper arm, BP Patient Position: At rest)   Pulse 74   Temp 98 °F (36.7 °C)   Resp 17   Ht 5' 2\" (1.575 m)   Wt 74.6 kg (164 lb 8 oz)   SpO2 100%   BMI 30.09 kg/m²       General Appearance:   Appears in no acute distress. , Sitting up.,   Skin:   Skin warm & dry, No rash, No jaundice,   Lymph: There is no lymphadenopathy,   HEENT:   PERRLA, EOMI, Moist oral mucous membranes, conjunctiva clear,   Neck:   Supple, Without masses,   Lungs:   Clear, No wheezes. , No rales. , Normal respiratory effort,   Heart:   Regular rate and rhythm, No gallop,   Abdomen:   Soft , Non-distended, Normal bowel sounds and Non-tender,   Extremities:  no edema of legs, Normal pedal and radial pulses,   Neuro:    alert, oriented, affect appropriate, speech fluent, cranial nerves intact, no focal neurological deficits and moves all extremities well    Procedures:    No discharge procedures on file.      Consultants/Treatment Team:    Treatment Team: Attending Provider: Jhony Long MD; Utilization Review: Francisca Polanco RN; Consulting Provider: Missy Florez MD; Primary Nurse: Jostin Hansen; Primary Nurse: Ranjeet Newton    Disposition:    Home    Follow Up   None    Most Recent Labs:  Recent Results (from the past 24 hour(s))   MAGNESIUM    Collection Time: 01/30/22  2:50 AM   Result Value Ref Range    Magnesium 1.7 1.7 - 2.8 mg/dL   METABOLIC PANEL, BASIC    Collection Time: 01/30/22  2:50 AM   Result Value Ref Range    Sodium 134 (L) 135 - 145 mmol/L    Potassium 4.4 3.2 - 5.1 mmol/L    Chloride 104 94 - 111 mmol/L    CO2 24 21 - 33 mmol/L    Anion gap 6 mmol/L    Glucose 88 70 - 110 mg/dL    BUN 8 (L) 9 - 21 mg/dL    Creatinine 0.70 0.70 - 1.20 mg/dL    BUN/Creatinine ratio 11      GFR est AA >60 ml/min/1.73m2    GFR est non-AA >60 ml/min/1.73m2    Calcium 8.9 8.5 - 10.5 mg/dL   CBC W/O DIFF    Collection Time: 01/30/22  2:50 AM   Result Value Ref Range    WBC 7.4 4.6 - 13.2 K/uL    RBC 4.61 4.20 - 5.30 M/uL    HGB 12.0 12.0 - 16.0 g/dL    HCT 37.2 35.0 - 45.0 %    MCV 80.7 78.0 - 100.0 FL    MCH 26.0 24.0 - 34.0 PG    MCHC 32.3 31.0 - 37.0 g/dL    RDW 14.3 11.6 - 14.5 %    PLATELET 794 423 - 901 K/uL    MPV 10.0 9.2 - 11.8 FL    NRBC 0.0 0. 0  WBC    ABSOLUTE NRBC 0.00 0.00 - 0.01 K/uL     Recent Labs     01/30/22  0250   BUN 8*   *   CO2 24     Recent Labs     01/30/22  0250   WBC 7.4   RBC 4.61   HCT 37.2   MCV 80.7   MCH 26.0   MCHC 32.3   RDW 14.3       XR Results:  Results from Hospital Encounter encounter on 01/20/22    XR ABD ACUTE W 1 V CHEST    Narrative  EXAM: Frontal view of the chest, supine and upright views of the abdomen. CLINICAL INDICATION/HISTORY: Abdominal pain    COMPARISON: Small bowel series dated 1/28/2022, abdominal radiograph dated  1/27/2022    _______________    FINDINGS:    Heart size is normal. Fine reticular opacities identified in the lungs. No focal  consolidation, effusion, or pneumothorax. Left PICC line in place with tip in  the region of the SVC. No free intraperitoneal air. Enteric contrast material from recent small bowel  has progressed into the colon with no high-grade obstruction identified. Gas-distended loop of small bowel noted centrally. Degenerative changes in the  lumbar spine.    _______________    Impression  1. Contrast material from the recent small bowel series has progressed into the  colon with no high-grade obstruction identified. Mildly distended gas-filled  small bowel loops identified centrally in the abdomen which is nonspecific. 2. Fine reticular opacities in the lungs are nonspecific, possibly edema and/or  atelectasis. CT Results:  Results from [place] encounter on 01/20/22    CT ABD PELV WO CONT    Narrative  EXAM: CT of the abdomen and pelvis    CLINICAL INDICATION/HISTORY: epigastric pain and vomiting  > Additional: None. COMPARISON: CT abdomen pelvis with contrast 02/06/2017  > Reference Exam: None. TECHNIQUE: Axial CT imaging of the abdomen and pelvis was performed without  intravenous contrast. Oral contrast not administered. Multiplanar reformats  were generated.  Dose reduction techniques used: automated exposure control,  adjustment of the mAs and/or kVp according to patient size, and iterative  reconstruction techniques. Digital Imaging and Communications in Medicine  (DICOM) format image data are available to nonaffiliated external healthcare  facilities or entities on a secure, media free, reciprocally searchable basis  with patient authorization for at least a 12-month period after this study. _______________    FINDINGS:    LOWER CHEST: Coronary atherosclerosis. Lung bases are clear. LIVER, BILIARY: Liver is unremarkable. No biliary dilation. Dependent gallstone  without gallbladder distention or adjacent inflammatory changes. PANCREAS: Unremarkable. SPLEEN: Unremarkable. ADRENALS: Unremarkable. KIDNEYS/URETERS/BLADDER: No hydronephrosis. No calculi. Upper pole low density  lesion measuring 8 mm, very likely benign such as cyst. No follow-up imaging is  recommended as incidental lesions are likely benign. LYMPH NODES: No enlarged lymph nodes. GASTROINTESTINAL TRACT: Long segment of mild small bowel dilatation (up to 3.5  cm diameter) extending from the mid small bowel to the distal small bowel. There  is decompressed proximal and distal small bowel. Distal transition appears to be  within the anterior abdomen (axial image 86; sagittal 54), with no mass, likely  secondary to adhesions. No colonic dilatation. Colonic diverticulosis. PELVIC ORGANS: Hysterectomy. VASCULATURE: Mild to moderate aortoiliac atherosclerosis. BONES: No acute or aggressive osseous abnormalities identified. Moderate lower  lumbar degenerative disc disease. OTHER: No ascites. [place] fat containing umbilical hernia with neck measuring  20 mm.    _______________    Impression  1. Distal mild small bowel obstruction. Transition appears to be within the  anterior abdomen, likely from adhesions. Proximal small bowel also is  decompressed, raising the possibility of closed loop obstruction. 2. Cholelithiasis.     3. [place] fat containing umbilical hernia. MRI Results:  No results found for this or any previous visit. Nuclear Medicine Results:  No results found for this or any previous visit.         Margarita Plascencia MD   Hospitalist

## 2025-02-17 NOTE — ASSESSMENT & PLAN NOTE
Last hemoglobin A1c 8.6.  With his last hospitalization was placed on low-dose of Lantus insulin 5 units at nighttime.  Does not have a special diet which is a consideration but apparently not available where he is residing.  Lab Results   Component Value Date    HGBA1C 8.6 (H) 12/03/2024

## 2025-02-17 NOTE — ASSESSMENT & PLAN NOTE
Apparently had a viral gastroenteritis nausea vomiting dehydration.  He may have been bacterial he is having syncopal episodes and bradycardia lower blood pressure readings.  Patient is now trying to keep well-hydrated.  We do have concerns especially with his declining renal function.

## 2025-02-17 NOTE — PROGRESS NOTES
Name: Leroy Paredes      : 1930      MRN: [id number]  Encounter Provider: Pepito Foster DO  Encounter Date: 2025   Encounter department: [facility] INTERNAL MEDICINE  TCM Call       Date and time call was made  2/10/2025  2:17 PM    Hospital care reviewed  Records reviewed    Patient was hospitialized at  [facility]    Date of Admission  25    Date of discharge  25    Diagnosis  syncope, NELLI    Disposition  Nursing Home; Rehabilitation center    Were the patients medications reviewed and updated  Yes    Current Symptoms  None          TCM Call       Post hospital issues  None    Scheduled for follow up?  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  First admission to hospitalist with infection sepsis right arm after frequent falls and his assisted living facility.  Patient was evaluated and antibiotics IV then p.o. given.  Patient was stabilized and returned to his nursing facility.  Has had essentially full healing the wound site.  Concerns are with the patient having recurrent falls and injuries.         Gastroenteritis and  colitis, viral    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  (HCC)         History of Present Illness     89-year-old male history of extensive medical problems outlined previously who is here today for transition care visit after 2 hospitalizations.  First hospitalization for cellulitis to his right arm and appropriate treatment with IV and changing over to p.o. antibiotics.  Second hospitalization was secondary to a syncopal episode and fall which was felt secondary to an consequence of dehydration and hypotension.  Patient was treated appropriately during both hospitalizations and we did review the results.  Patient is here for transition of care visit states he is doing relatively well accompanied by his family.  Review of Systems   Constitutional:  Positive for activity change. Negative for appetite change, chills, diaphoresis, fatigue, fever and unexpected weight change.        Is slowly getting back to his normal activity level, still walking with a walker.  Still is having episodic falls   HENT: Negative.     Eyes: Negative.    Respiratory: Negative.     Cardiovascular: Negative.    Gastrointestinal: Negative.         Was having problems with loose stools but apparently this is improved   Endocrine: Negative.    Genitourinary: Negative.    Musculoskeletal: Negative.    Skin: Negative.         Multiple abrasions.  No acute lesions.   Allergic/Immunologic: Negative.    Neurological:  Positive for syncope and light-headedness. Negative for dizziness, tremors, seizures, facial asymmetry, speech difficulty, weakness, numbness and headaches.   Hematological: Negative.    Psychiatric/Behavioral:  Positive for confusion. Negative for agitation, behavioral problems, decreased concentration, dysphoric mood, hallucinations, self-injury, sleep disturbance and suicidal ideas. The patient is not nervous/anxious and is not hyperactive.         Some chronic short-term memory loss   Past Medical History:   Diagnosis Date   •  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)     Last Assessed:  5/19/17   • Chronic kidney disease    • Diabetes mellitus (HCC)    • Hyperlipidemia    • Hypertension    • Osteoarthritis    • Palpitations    • TIA (transient ischemic attack)      Past Surgical History:   Procedure Laterality Date   • APPENDECTOMY     • CARDIAC ELECTROPHYSIOLOGY PROCEDURE N/A 1/17/2024    Procedure: Cardiac loop recorder explant;  Surgeon: Drew Olmos MD;  Location: [facility];  Service: Cardiology   • COLONOSCOPY  2012   • HEMORROIDECTOMY     • TOOTH EXTRACTION  02/02/2022     Family History   Problem Relation Age of Onset   • Diabetes Mother    • Other Mother         Stroke syndrome   • Diabetes Father    • Emphysema Father      Social History     Tobacco Use   • Smoking status: Former   • Smokeless tobacco: Never   Vaping Use   • Vaping status: Never Used   Substance and Sexual Activity   • Alcohol use: Not Currently     Comment: Social drinker   • Drug use: No   • Sexual activity: Not Currently     Current Outpatient Medications on File Prior to Visit   Medication Sig   • acetaminophen (TYLENOL) 325 mg tablet Take 2 tablets (650 mg total) by mouth every 6 (six) hours as needed for mild pain   • apixaban (Eliquis) 2.5 mg Take 1 tablet (2.5 mg total) by mouth 2 (two) times a day   • atorvastatin (LIPITOR) 40 mg tablet TAKE 1 TABLET EVERY DAY WITH DINNER   • bisacodyl ([facility] Bisacodyl) 10 mg suppository Insert 10 mg into the rectum daily   • Blood Glucose Monitoring Suppl (PRECISION XTRA MONITOR) MARY by Does not apply route daily   • Cholecalciferol (D3 High Potency) 25 MCG (1000 UT) capsule TAKE ONE CAPSULE BY MOUTH DAILY   • escitalopram (LEXAPRO) 5 mg tablet TAKE ONE TABLET BY MOUTH DAILY   • gabapentin (NEURONTIN) 100 mg capsule TAKE ONE CAPSULE BY MOUTH EVERY NIGHT AT BEDTIME   • magnesium Oxide (MAG-OX) 400 mg TABS TAKE ONE TABLET BY MOUTH DAILY   • melatonin 3 mg TAKE ONE TABLET BY MOUTH EVERY NIGHT AT BEDTIME   •  "mirtazapine (REMERON) 15 mg tablet Take 1 tablet (15 mg total) by mouth daily at bedtime   • nateglinide (STARLIX) 60 mg tablet Take 1 tablet (60 mg total) by mouth 3 (three) times a day before meals   • QUEtiapine (SEROquel) 25 mg tablet Take 1 tablet (25 mg total) by mouth daily at bedtime   • vitamin B-12 (CYANOCOBALAMIN) 500 MCG TABS Take 1 tablet (500 mcg total) by mouth 2 (two) times a week     No Known Allergies  Immunization History   Administered Date(s) Administered   • COVID-19 MODERNA VACC 0.5 ML IM 01/27/2021, 02/22/2021, 11/22/2021, 07/23/2022   • COVID-19 Pfizer mRNA vacc PF naresh-sucrose 12 yr and older (Comirnaty) 01/27/2025   • INFLUENZA 11/01/2002, 01/05/2003, 11/24/2003, 10/06/2004, 11/07/2005, 10/21/2006, 09/28/2007, 10/10/2008, 10/20/2008, 11/04/2009, 10/05/2010, 10/03/2011, 11/04/2012   • Influenza Split High Dose Preservative Free IM 09/27/2013, 09/25/2014, 09/29/2015, 09/23/2016, 09/19/2017   • Influenza, high dose seasonal 0.7 mL 09/25/2018, 10/15/2020, 09/20/2021, 09/26/2022, 10/02/2023   • Pneumococcal 01/01/1999   • Pneumococcal Conjugate 13-Valent 04/25/2019   • Pneumococcal Polysaccharide PPV23 10/01/2006   • TD (adult) Preservative Free 12/11/1930   • influenza, trivalent, adjuvanted 01/27/2025     Objective   /50   Pulse 66   Temp (!) 97 °F (36.1 °C)   Resp 16   Ht 5' 11\" (1.803 m)   Wt 78.3 kg (172 lb 9.6 oz)   SpO2 96%   BMI 24.07 kg/m²     Physical Exam  Vitals and nursing note reviewed.   Constitutional:       General: He is not in acute distress.     Appearance: Normal appearance. He is normal weight. He is not ill-appearing, toxic-appearing or diaphoretic.      Comments: Pleasant talkative 89-year-old male who is awake alert, walking with a walker accompanied by family.  In no acute distress   HENT:      Head: Normocephalic and atraumatic.      Right Ear: Tympanic membrane, ear canal and external ear normal. There is no impacted cerumen.      Left Ear: Tympanic " membrane, ear canal and external ear normal. There is no impacted cerumen.      Nose: Nose normal. No congestion or rhinorrhea.      Mouth/Throat:      Mouth: Mucous membranes are dry.      Pharynx: Oropharynx is clear. No oropharyngeal exudate or posterior oropharyngeal erythema.      Comments: Mucous membranes are extremely dry encouraged patient to increase fluid intake  Eyes:      General: No scleral icterus.        Right eye: No discharge.         Left eye: No discharge.      Extraocular Movements: Extraocular movements intact.      Conjunctiva/sclera: Conjunctivae normal.      Pupils: Pupils are equal, round, and reactive to light.      Comments: Was seen evaluated by ophthalmology on Monday   Neck:      Vascular: No carotid bruit.   Cardiovascular:      Rate and Rhythm: Normal rate. Rhythm irregular.      Heart sounds: Normal heart sounds. No murmur heard.     No friction rub. No gallop.      Comments: Occasional ectopy on auscultation.  Pulmonary:      Effort: Pulmonary effort is normal. No respiratory distress.      Breath sounds: No stridor. No wheezing, rhonchi or rales.      Comments: Mildly decreased breath sounds anteriorly and posteriorly but no rales rhonchi or wheezes could be appreciated on exam  Chest:      Chest wall: No tenderness.   Abdominal:      General: Abdomen is flat. Bowel sounds are normal. There is no distension.      Palpations: Abdomen is soft. There is no mass.      Tenderness: There is no abdominal tenderness. There is no right CVA tenderness, left CVA tenderness, guarding or rebound.      Hernia: No hernia is present.   Musculoskeletal:         General: Deformity present. No swelling, tenderness or signs of injury.      Cervical back: Normal range of motion and neck supple. No rigidity or tenderness.      Right lower leg: No edema.      Left lower leg: No edema.   Lymphadenopathy:      Cervical: No cervical adenopathy.   Skin:     General: Skin is warm and dry.      Coloration:  Skin is not jaundiced or pale.      Findings: No bruising, erythema, lesion or rash.   Neurological:      Mental Status: He is alert. Mental status is at baseline.      Cranial Nerves: No cranial nerve deficit.      Sensory: No sensory deficit.      Motor: Weakness present.      Coordination: Coordination normal.      Gait: Gait abnormal.      Deep Tendon Reflexes: Reflexes normal.      Comments: Walking with a walker, some weakness to bilateral lower extremities.   Psychiatric:         Mood and Affect: Mood normal.      Comments: Patient has a relatively cheerful mood.  Patient has some disorientation to time and date thought processes are sometimes erratic.  Patient still thinks he is living at home.  Definitely some short-term memory loss

## 2025-03-04 ENCOUNTER — APPOINTMENT (OUTPATIENT)
Dept: LAB | Facility: CLINIC | Age: OVER 89
End: 2025-03-04
Payer: MEDICARE

## 2025-03-04 DIAGNOSIS — Z00.00 HEALTHCARE MAINTENANCE: ICD-10-CM

## 2025-03-04 DIAGNOSIS — E11.22 CKD STAGE 4 DUE TO TYPE 2 DIABETES MELLITUS (HCC): ICD-10-CM

## 2025-03-04 DIAGNOSIS — N18.4 CKD STAGE 4 DUE TO TYPE 2 DIABETES MELLITUS (HCC): ICD-10-CM

## 2025-03-04 DIAGNOSIS — I10 ESSENTIAL HYPERTENSION: ICD-10-CM

## 2025-03-04 LAB
ALBUMIN SERPL BCG-MCNC: 3.5 G/DL (ref 3.5–5)
ALP SERPL-CCNC: 102 U/L (ref 34–104)
ALT SERPL W P-5'-P-CCNC: 19 U/L (ref 7–52)
ANION GAP SERPL CALCULATED.3IONS-SCNC: 6 MMOL/L (ref 4–13)
AST SERPL W P-5'-P-CCNC: 25 U/L (ref 13–39)
BASOPHILS # BLD AUTO: 0.06 THOUSANDS/ÂΜL (ref 0–0.1)
BASOPHILS NFR BLD AUTO: 1 % (ref 0–1)
BILIRUB SERPL-MCNC: 0.42 MG/DL (ref 0.2–1)
BUN SERPL-MCNC: 36 MG/DL (ref 5–25)
CALCIUM SERPL-MCNC: 8.7 MG/DL (ref 8.4–10.2)
CHLORIDE SERPL-SCNC: 109 MMOL/L (ref 96–108)
CHOLEST SERPL-MCNC: 90 MG/DL (ref ?–200)
CO2 SERPL-SCNC: 25 MMOL/L (ref 21–32)
CREAT SERPL-MCNC: 2.26 MG/DL (ref 0.6–1.3)
EOSINOPHIL # BLD AUTO: 0.75 THOUSAND/ÂΜL (ref 0–0.61)
EOSINOPHIL NFR BLD AUTO: 12 % (ref 0–6)
ERYTHROCYTE [DISTWIDTH] IN BLOOD BY AUTOMATED COUNT: 15.4 % (ref 11.6–15.1)
EST. AVERAGE GLUCOSE BLD GHB EST-MCNC: 183 MG/DL
GFR SERPL CREATININE-BSD FRML MDRD: 23 ML/MIN/1.73SQ M
GLUCOSE P FAST SERPL-MCNC: 192 MG/DL (ref 65–99)
HBA1C MFR BLD: 8 %
HCT VFR BLD AUTO: 31.8 % (ref 36.5–49.3)
HDLC SERPL-MCNC: 34 MG/DL
HGB BLD-MCNC: 10 G/DL (ref 12–17)
IMM GRANULOCYTES # BLD AUTO: 0.02 THOUSAND/UL (ref 0–0.2)
IMM GRANULOCYTES NFR BLD AUTO: 0 % (ref 0–2)
LDLC SERPL CALC-MCNC: 41 MG/DL (ref 0–100)
LYMPHOCYTES # BLD AUTO: 2.07 THOUSANDS/ÂΜL (ref 0.6–4.47)
LYMPHOCYTES NFR BLD AUTO: 33 % (ref 14–44)
MCH RBC QN AUTO: 31 PG (ref 26.8–34.3)
MCHC RBC AUTO-ENTMCNC: 31.4 G/DL (ref 31.4–37.4)
MCV RBC AUTO: 99 FL (ref 82–98)
MONOCYTES # BLD AUTO: 0.73 THOUSAND/ÂΜL (ref 0.17–1.22)
MONOCYTES NFR BLD AUTO: 12 % (ref 4–12)
NEUTROPHILS # BLD AUTO: 2.64 THOUSANDS/ÂΜL (ref 1.85–7.62)
NEUTS SEG NFR BLD AUTO: 42 % (ref 43–75)
NONHDLC SERPL-MCNC: 56 MG/DL
NRBC BLD AUTO-RTO: 0 /100 WBCS
PLATELET # BLD AUTO: 155 THOUSANDS/UL (ref 149–390)
PMV BLD AUTO: 10 FL (ref 8.9–12.7)
POTASSIUM SERPL-SCNC: 5.1 MMOL/L (ref 3.5–5.3)
PROT SERPL-MCNC: 6.9 G/DL (ref 6.4–8.4)
RBC # BLD AUTO: 3.23 MILLION/UL (ref 3.88–5.62)
SODIUM SERPL-SCNC: 140 MMOL/L (ref 135–147)
TRIGL SERPL-MCNC: 76 MG/DL (ref ?–150)
WBC # BLD AUTO: 6.27 THOUSAND/UL (ref 4.31–10.16)

## 2025-03-04 PROCEDURE — 80061 LIPID PANEL: CPT

## 2025-03-04 PROCEDURE — 36415 COLL VENOUS BLD VENIPUNCTURE: CPT

## 2025-03-04 PROCEDURE — 83036 HEMOGLOBIN GLYCOSYLATED A1C: CPT

## 2025-03-04 PROCEDURE — 85025 COMPLETE CBC W/AUTO DIFF WBC: CPT

## 2025-03-04 PROCEDURE — 80053 COMPREHEN METABOLIC PANEL: CPT

## 2025-03-05 PROBLEM — A08.4 GASTROENTERITIS AND COLITIS, VIRAL: Status: RESOLVED | Noted: 2025-02-03 | Resolved: 2025-03-05

## 2025-03-05 PROBLEM — A41.9 SEPSIS (HCC): Status: RESOLVED | Noted: 2025-02-03 | Resolved: 2025-03-05

## 2025-03-10 ENCOUNTER — APPOINTMENT (OUTPATIENT)
Dept: LAB | Facility: CLINIC | Age: OVER 89
End: 2025-03-10
Payer: MEDICARE

## 2025-03-10 LAB
BACTERIA UR QL AUTO: ABNORMAL /HPF
BILIRUB UR QL STRIP: NEGATIVE
CLARITY UR: CLEAR
COLOR UR: ABNORMAL
CREAT UR-MCNC: 71.9 MG/DL
GLUCOSE UR STRIP-MCNC: NEGATIVE MG/DL
HGB UR QL STRIP.AUTO: NEGATIVE
KETONES UR STRIP-MCNC: NEGATIVE MG/DL
LEUKOCYTE ESTERASE UR QL STRIP: NEGATIVE
NITRITE UR QL STRIP: NEGATIVE
NON-SQ EPI CELLS URNS QL MICRO: ABNORMAL /HPF
PH UR STRIP.AUTO: 6 [PH]
PROT UR STRIP-MCNC: ABNORMAL MG/DL
PROT UR-MCNC: 54.8 MG/DL
PROT/CREAT UR: 0.8 MG/G{CREAT} (ref 0–0.1)
RBC #/AREA URNS AUTO: ABNORMAL /HPF
SP GR UR STRIP.AUTO: 1.01 (ref 1–1.03)
UROBILINOGEN UR STRIP-ACNC: <2 MG/DL
WBC #/AREA URNS AUTO: ABNORMAL /HPF

## 2025-03-10 PROCEDURE — 81001 URINALYSIS AUTO W/SCOPE: CPT

## 2025-03-13 ENCOUNTER — CONSULT (OUTPATIENT)
Age: OVER 89
End: 2025-03-13
Payer: MEDICARE

## 2025-03-13 VITALS
DIASTOLIC BLOOD PRESSURE: 60 MMHG | WEIGHT: 171 LBS | BODY MASS INDEX: 24.48 KG/M2 | TEMPERATURE: 97.8 F | HEIGHT: 70 IN | OXYGEN SATURATION: 84 % | SYSTOLIC BLOOD PRESSURE: 140 MMHG | HEART RATE: 89 BPM

## 2025-03-13 DIAGNOSIS — F01.A11 MILD VASCULAR DEMENTIA WITH AGITATION (HCC): Primary | ICD-10-CM

## 2025-03-13 DIAGNOSIS — I48.20 CHRONIC ATRIAL FIBRILLATION (HCC): ICD-10-CM

## 2025-03-13 DIAGNOSIS — N18.4 CKD STAGE 4 DUE TO TYPE 2 DIABETES MELLITUS (HCC): ICD-10-CM

## 2025-03-13 DIAGNOSIS — E11.22 CKD STAGE 4 DUE TO TYPE 2 DIABETES MELLITUS (HCC): ICD-10-CM

## 2025-03-13 DIAGNOSIS — Z86.73 HISTORY OF CVA (CEREBROVASCULAR ACCIDENT): ICD-10-CM

## 2025-03-13 DIAGNOSIS — I10 ESSENTIAL HYPERTENSION: ICD-10-CM
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
## 2025-03-13 DIAGNOSIS — F51.01 PRIMARY INSOMNIA: ICD-10-CM

## 2025-03-13 PROCEDURE — 99215 OFFICE O/P EST HI 40 MIN: CPT | Performed by: FAMILY MEDICINE

## 2025-03-13 PROCEDURE — 99205 OFFICE O/P NEW HI 60 MIN: CPT | Performed by: FAMILY MEDICINE

## 2025-03-13 NOTE — PROGRESS NOTES
[facility]  Consultation  [address] [place] [address] [phone]      ASSESSMENT AND PLAN:  1. Mild vascular dementia with agitation (HCC)  2. Fall, subsequent encounter  -     Ambulatory Referral to Senior Care  3. Essential hypertension  4. CKD stage 4 due to type 2 diabetes mellitus (HCC)  5. Chronic atrial fibrillation (HCC)  6. History of CVA (cerebrovascular accident)  7. Primary insomnia      Decision-making capacity: ***    Staging: ***    Medications Review: ***    HPI:    We had the pleasure of evaluating Leroy Paredes who is a 89 y.o. male in Geriatric consultation today.  Mr. Paredes is in the office with his ***.  He lives with ***    HISTORY AS PER ***:    COGNITION:  Memory Issues noticed since {0 - 10:51490} {Time; day/wk/mo/yr(s):9012}    Memory affected: {ED  CD_MEMORY:99810}    Symptoms started: {DESC; ONSET:70049}  Over time the memory has:  {progression:0632460182}  Memory issue(s) were noted by: {Patient/Family/Caregiver:863208}   Difficulty finding the right word while speaking: {Yes or No:99563}  Requires repeat information or asking the same question repeatedly: {Yes or No:91703}  Fluctuation in alertness: {Yes or No:30100}  Changes in mood or personality:{Yes or No:90118}  Current or previous treatment for depression or anxiety: {Yes or No:45010}    Family member with dementia and what type? {YES/NO:42622}  History of head trauma: {Yes or No:03029}  History of stroke: {Yes or No:04236}  History of alcohol or substance abuse: {Yes or No:77345}    FUNCTIONAL STATUS:  BADLs  Does patient require assistance with:  Bathing: {Yes or No:59431}  Dressing: {Yes or No:49906}  Transferring: {Yes or No:44127}  Continence: {Yes or No:35249}  Toileting: {Yes or No:98452}  Feeding: {Yes or No:82954}    IADLs  Dose patient require assistance with:  Telephone: {Yes or No:60749}  Shopping: {Yes or No:29106}  Food Preparation: {Yes or No:55754}  Housekeeping: {Yes or  No:58433}  Laundry: {Yes or No:85125}  Transportation: {Yes or No:78940}  Medications: {Yes or No:33704}  Finances: {Yes or No:93168}    NEUROPSYCH SYMPTOMS:  Does patient get angry or hostile?  Resist care from others? {Yes or No:93758}  Does patient see or hear things that no one else can see or hear? {Yes or No:75243}  Does patient act impatient and cranky? Does mood frequently change for no apparent reason? {Yes or No:17390}  Does patient act suspicious without good reason (example: believes that others are stealing from him or her, or planning to harm him or her in some way)? {Yes or No:54816}  Does patient less interested in his or her usual activities or in the activities and plans of others? {Yes or No:67849}  Does patient have trouble sleeping at night? {Yes or No:27829}  Dose patient have abnormal movements while asleep? {Yes or No:23926}    SAFETY:  Hearing and vision issue: {Yes or No:40138}  Any gait or balance disorder: {Yes or No:59624}  Uses: ***  Any falls in the last year: {Yes or No:97137}  Any history of wandering: {Yes or No:78896}  Are there firearms or guns in the home: {Yes or No:45529}  Does patient drive: {Yes or No:07492}  Any driving accidents or citations in the last year: {Yes or No:14754}  Any concerns about patient's ability to drive: {Yes or No:03063}    ACP REVIEW:  Does patient have POA: {Yes or No:14574}  Does patient have a Living will: {Yes or No:02494}  Any legal assistance needed for healthcare planning?: {Yes or No:75974}    No Known Allergies    Medications:    Current Outpatient Medications on File Prior to Visit   Medication Sig Dispense Refill   • acetaminophen (TYLENOL) 325 mg tablet Take 2 tablets (650 mg total) by mouth every 6 (six) hours as needed for mild pain  0   • apixaban (Eliquis) 2.5 mg Take 1 tablet (2.5 mg total) by mouth 2 (two) times a day 180 tablet 0   • atorvastatin (LIPITOR) 40 mg tablet TAKE 1 TABLET EVERY DAY WITH DINNER 90 tablet 1   • bisacodyl  ([facility] Bisacodyl) 10 mg suppository Insert 10 mg into the rectum daily     • Blood Glucose Monitoring Suppl (PRECISION XTRA MONITOR) MARY by Does not apply route daily 1 each 0   • Cholecalciferol (D3 High Potency) 25 MCG (1000 UT) capsule TAKE ONE CAPSULE BY MOUTH DAILY 30 capsule 5   • escitalopram (LEXAPRO) 5 mg tablet TAKE ONE TABLET BY MOUTH DAILY 30 tablet 5   • gabapentin (NEURONTIN) 100 mg capsule TAKE ONE CAPSULE BY MOUTH EVERY NIGHT AT BEDTIME 30 capsule 5   • magnesium Oxide (MAG-OX) 400 mg TABS TAKE ONE TABLET BY MOUTH DAILY 30 tablet 5   • melatonin 3 mg TAKE ONE TABLET BY MOUTH EVERY NIGHT AT BEDTIME 30 tablet 5   • mirtazapine (REMERON) 15 mg tablet Take 1 tablet (15 mg total) by mouth daily at bedtime 30 tablet 5   • nateglinide (STARLIX) 60 mg tablet Take 1 tablet (60 mg total) by mouth 3 (three) times a day before meals 90 tablet 0   • QUEtiapine (SEROquel) 25 mg tablet Take 1 tablet (25 mg total) by mouth daily at bedtime     • vitamin B-12 (CYANOCOBALAMIN) 500 MCG TABS Take 1 tablet (500 mcg total) by mouth 2 (two) times a week 60 tablet 1     No current facility-administered medications on file prior to visit.       History:  Past Medical History:   Diagnosis Date   • 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)     Last Assessed:  5/19/17   • Chronic kidney disease    • Diabetes mellitus (HCC)    • Hyperlipidemia    • Hypertension    • Osteoarthritis    • Palpitations    • TIA (transient ischemic attack)      Past Surgical History:   Procedure Laterality Date   • APPENDECTOMY     • CARDIAC ELECTROPHYSIOLOGY PROCEDURE N/A 1/17/2024    Procedure: Cardiac loop recorder explant;  Surgeon: Drew Olmos MD;  [facility];  Service: Cardiology   • COLONOSCOPY  2012   • HEMORROIDECTOMY     • TOOTH EXTRACTION  02/02/2022     Family History   Problem Relation Age of Onset   • Diabetes Mother    • Other Mother         Stroke syndrome   • Diabetes Father    • Emphysema Father      Social  History     Socioeconomic History   • Marital status: Single     Spouse name: Not on file   • Number of children: Not on file   • Years of education: Not on file   • Highest education level: Not on file   Occupational History   • Not on file   Tobacco Use   • Smoking status: Former   • Smokeless tobacco: Never   Vaping Use   • Vaping status: Never Used   Substance and Sexual Activity   • Alcohol use: Not Currently     Comment: Social drinker   • Drug use: No   • Sexual activity: Not Currently   Other Topics Concern   • Not on file   Social History Narrative    Daily coffee consumption (2 cups/day)     Social Drivers of Health     Financial Resource Strain: Low Risk  (3/7/2024)    Overall Financial Resource Strain (CARDIA)    • Difficulty of Paying Living Expenses: Not very hard   Food Insecurity: No Food Insecurity (2/8/2025)    Nursing - Inadequate Food Risk Classification    • Worried About Running Out of Food in the Last Year: Never true    • Ran Out of Food in the Last Year: Never true    • Ran Out of Food in the Last Year: Never true   Transportation Needs: No Transportation Needs (2/8/2025)    Nursing - Transportation Risk Classification    • Lack of Transportation: Not on file    • Lack of Transportation: No   Physical Activity: Insufficiently Active (7/10/2020)    Exercise Vital Sign    • Days of Exercise per Week: 3 days    • Minutes of Exercise per Session: 20 min   Stress: Not on file   Social Connections: Not on file   Intimate Partner Violence: Unknown (2/8/2025)    Nursing IPS    • Feels Physically and Emotionally Safe: Not on file    • Physically Hurt by Someone: Not on file    • Humiliated or Emotionally Abused by Someone: Not on file    • Physically Hurt by Someone: No    • Hurt or Threatened by Someone: No   Housing Stability: Unknown (2/8/2025)    Nursing: Inadequate Housing Risk Classification    • Has Housing: Not on file    • Worried About Losing Housing: Not on file    • Unable to Get  "Utilities: Not on file    • Unable to Pay for Housing in the Last Year: No    • Has Housin     Past Surgical History:   Procedure Laterality Date   • APPENDECTOMY     • CARDIAC ELECTROPHYSIOLOGY PROCEDURE N/A 2024    Procedure: Cardiac loop recorder explant;  Surgeon: Drew Olmos MD;  [facility];  Service: Cardiology   • COLONOSCOPY     • HEMORROIDECTOMY     • TOOTH EXTRACTION  2022       OBJECTIVE:  Vitals:    25 0814   BP: 140/60   BP Location: Left arm   Patient Position: Sitting   Cuff Size: Standard   Pulse: 89   Temp: 97.8 °F (36.6 °C)   TempSrc: Temporal   SpO2: (!) 84%   Weight: 77.6 kg (171 lb)   Height: 5' 10\" (1.778 m)     Body mass index is 24.54 kg/m².  Physical Exam    MoCA: ***/30  Geriatric Depression Screen      Flowsheet Row Most Recent Value   Total Score (max 30) 2            Labs & Imaging:  Lab Results   Component Value Date    WBC 6.27 2025    HGB 10.0 (L) 2025    HCT 31.8 (L) 2025    MCV 99 (H) 2025     2025     Lab Results   Component Value Date     2016    SODIUM 140 2025    K 5.1 2025     (H) 2025    CO2 25 2025    ANIONGAP 7 2016    AGAP 6 2025    BUN 36 (H) 2025    CREATININE 2.26 (H) 2025    GLUC 208 (H) 2025    GLUF 192 (H) 2025    CALCIUM 8.7 2025    AST 25 2025    ALT 19 2025    ALKPHOS 102 2025    PROT 7.3 2016    TP 6.9 2025    BILITOT 0.91 2016    TBILI 0.42 2025    EGFR 23 2025     Lab Results   Component Value Date    HGBA1C 8.0 (H) 2025     Lab Results   Component Value Date    CHOLESTEROL 90 2025    CHOLESTEROL 109 2024    CHOLESTEROL 99 2023     Lab Results   Component Value Date    HDL 34 (L) 2025    HDL 32 (L) 2024    HDL 41 2023     Lab Results   Component Value Date    TRIG 76 2025    TRIG 85 2024    " "TRIG 60 01/04/2023     Lab Results   Component Value Date    NONHDLC 56 03/04/2025    NONHDLC 77 07/12/2024    NONHDLC 58 01/04/2023     Lab Results   Component Value Date    LDLCALC 41 03/04/2025    LDLCALC 60 07/12/2024    LDLDIRECT 71 01/23/2015     Lab Results   Component Value Date    UGTXHRNS88 295 08/26/2024     Lab Results   Component Value Date    ZOU8TXCKZBPR 1.519 08/26/2024     No results found for: \"SYPHILISAB\"  No results found for: \"RPR\"      Lab Results   Component Value Date    HDQD12KRDECM 20.3 (L) 09/27/2024      Results for orders placed during the hospital encounter of 02/08/25    CT head without contrast    Narrative  CT BRAIN - WITHOUT CONTRAST    INDICATION:   FALL, NO HS, ON ELIQUIS.    COMPARISON: 2/3/2025 and 10/30/2024    TECHNIQUE:  CT examination of the brain was performed.  Multiplanar 2D reformatted images were created from the source data.    Radiation dose length product (DLP) for this visit:  984 mGy-cm .  This examination, like all CT scans performed in the [facility], was performed utilizing techniques to minimize radiation dose exposure, including the use of iterative  reconstruction and automated exposure control.    IMAGE QUALITY:  Diagnostic.    FINDINGS:    PARENCHYMA:No intracranial mass, mass effect or midline shift. No CT signs of acute infarction.  No acute parenchymal hemorrhage. There is age-related involutional change. There is mild chronic microvascular ischemic change. There are stable chronic  cortically based infarcts involving the right frontal and inferior right parietal lobe. There is atherosclerotic intracranial calcification.    VENTRICLES AND EXTRA-AXIAL SPACES:  Normal for the patient's age.    VISUALIZED ORBITS:  The patient is status post bilateral cataract surgery.    PARANASAL SINUSES:  There is subtotal opacification of the left maxillary sinus with fluid. Calcifications present along the medial margin of the maxillary sinus wall with " extension into the nasal cavity. There is also complete opacification of the right maxillary sinus  with polypoid soft tissue containing calcifications in the right maxillary sinus. There is opacification of the right sphenoid sinus with calcifications. Findings may be on the basis of fungal colonization and/or mycetoma formation. There is ethmoid air  cell mucosal thickening.    CALVARIUM AND EXTRACRANIAL SOFT TISSUES:  Small left mastoid air cell effusion.    Impression  No acute intracranial hemorrhage or depressed calvarial fracture identified. Chronic right cerebral hemisphere infarcts as described above.    Paranasal sinus disease, as described above.            Workstation performed: [id number]    No results found for this or any previous visit.    No results found for this or any previous visit.    Results for orders placed during the hospital encounter of 06/26/20    MRI brain wo contrast    Narrative  MRI BRAIN WITHOUT CONTRAST    INDICATION: cva.    COMPARISON:   Head CT 6/26/2020    TECHNIQUE:  Sagittal T1, axial T2, axial FLAIR, axial T1, axial [place] and axial diffusion imaging.    IMAGE QUALITY:  Diagnostic.    FINDINGS:    BRAIN PARENCHYMA: A few punctuate cortical foci of mild restricted diffusion in the right frontal lobe (series 3 image 21-22)    Age-related cerebral atrophy. No intracranial hemorrhage. Nonspecific foci of T2/FLAIR hyperintensities involving periventricular and subcortical white matter most likely representing mild microangiopathic change. Sequela of chronic cortical infarcts in  the right frontal, parietal, and occipital lobes (series 5 image 24 and 19-20).    VENTRICLES:  Normal for the patient's age.    SELLA AND PITUITARY GLAND:  Normal.    ORBITS:  Normal.    PARANASAL SINUSES:  Paranasal sinus disease. No fluid level    VASCULATURE:  Evaluation of the major intracranial vasculature demonstrates appropriate flow voids.    CALVARIUM AND SKULL BASE:  Normal.    Left mastoid  effusion.    EXTRACRANIAL SOFT TISSUES:  Normal.    Impression  1.  Subacute appearing tiny frontal lobe cortical infarcts. No significant edema or mass effect. No intracranial hemorrhage.    2.  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.    3.  Paranasal sinus disease and left mastoid effusion.            I personally discussed this study with Sen Painter on 6/27/2020 at 11:22 AM.          Workstation performed: [id number]    No results found for this or any previous visit.    No results found for this or any previous visit.      I have spent *** minutes with {Patient /Family:87839} today including taking history from family, patient, review of records, charting, and counseling regarding diagnosis and management of conditions.

## 2025-03-13 NOTE — PROGRESS NOTES
[facility]  [address], [address]  [place] [address] [phone]        LSW spoke with patient's niece via phone to offer caregiver support group information as family expressed interest in this at the visit. Patient's niece provided her email address to be added to the [facility] caregiver support group for herself & her mom. LSW also mailed additional caregiver support groups available listed below:    -[facility] support group flyer  -[facility] caring for the caregiver support group  -[facility] with support group & education programs link/phone number  -[facility] caregiver support group flyer    Mailed to:   Pam Mosley  [address] [place] [address] [place] [address]      LSW to remain available as needed.

## 2025-03-13 NOTE — PROGRESS NOTES
ASSESSMENT AND PLAN:  1. Mild vascular dementia with agitation (HCC)    Patient presentation is consistent with Dementia, Vascular, mild to moderate in staging.     MOCA testing is 13/30. GDS of 2/15.     He has had a history of agitation but currently doing well with medication regimen of lexapro, seroquel, and remeron.     He continues to have supportive care. He currently resides in [facility].     Staging: Mild Stage Dementia    Decision-making capacity: Does not have capacity for complex medical or financial decisions    Caregiver Review: would like information of caregiver support group. He is at [facility]    Safety:  Medication Review: discussed he continues to be on Eliquis which is concerning due to his recent falls and increase risk for bleeding. At this time his gait is stable with a walker. Would continue shared decision making on the appropriateness of continuing on eliquis.     Driving: Not driving.  Fall Risk: moderate to high  fall risk, stable on walking with walker. But has a history of falling   ACP Review: has acp             2. Fall, subsequent encounter    Monitor, improved gait with use of walker.   Fall precuations  -     Ambulatory Referral to [facility]  3. Essential hypertension    Stable.   4. CKD stage 4 due to type 2 diabetes mellitus (HCC)    Mgt per nephrology.     5. Chronic atrial fibrillation (HCC)    On Ac and rate control.   Consider dc of anticoagulation due to fall risk   6. History of CVA (cerebrovascular accident)    7. Primary insomnia  Stable.   On remeron.     HPI:    We had the pleasure of evaluating Leroy Paredes who is a 89 y.o. male in Geriatric consultation today, along with gail Orozco and his sister Sloane  to provide further history.    Jc is here for a comprehensive consultation.  Family noted more forgetfulness for about a year now.  He was missing appointments and having financial mistakes.  In August 2024 noted more forgetfulness.  He was  not paying his bills.  This was mostly impairment of his short-term memory.  He was not taking his medications properly he needed help with food.  This led to multiple falls and hospitalizations.    He was then admitted and now resides in [facility].  He has been there is in September 2024.  He is currently in a dementia unit.    He has had a history of agitation and wandering.  He is currently doing well with the current medication regimen.  COGNITION:  Memory Issues noticed since 1 year(s)    Memory affected: short term memory loss    Symptoms started: gradual  Over time the memory has: worsened  Memory issue(s) were noted by: family   Difficulty finding the right word while speaking: No  Requires repeat information or asking the same question repeatedly: Yes    Family member with dementia and what type? no  History of head trauma: No  History of stroke: Yes  History of alcohol or substance abuse: No    MOOD:  Changes in mood or personality: No  Seems anxious: No  Seems depressed: No  With suicidal ideation: No  Current or previous treatment for depression or anxiety: No    FUNCTIONAL STATUS:  BADLs  Does patient require assistance with:  Bathing: No  Dressing: No  Transferring: No  Continence: No  Toileting: No  Feeding: No  Does need cuing and reminders but can do independently  IADLs  Does patient require assistance with:  Telephone: Yes  Shopping: Yes  Food Preparation: Yes  Housekeeping: Yes  Laundry: Yes  Transportation: Yes  Medications: Yes  Finances: Yes    NEUROPSYCH SYMPTOMS:  Is patient less interested in his or her usual activities or in the activities and plans of others? No  Does patient get angry or hostile?  Resist care from others? Yes  Does patient act impatient and cranky? Does mood frequently change for no apparent reason? No  Does patient have trouble sleeping at night? No  Does patient see or hear things that no one else can see or hear? No  Does patient act suspicious without good  reason (example: believes that others are stealing from him or her, or planning to harm him or her in some way)? No    SAFETY:  Hearing issue: No  Vision issue: No  Any gait or balance disorder: Yes  Uses: Walker  Any falls in the last year: Yes  Any history of wandering: Yes  Are there firearms or guns in the home: No  Does patient drive: No  Any driving accidents or citations in the last year: No  Any concerns about patient's ability to drive: Yes  POA papers completed: Yes  Any legal assistance needed for POA? No    SAFETY (per SW intake):      No Known Allergies    Medications:    Current Outpatient Medications on File Prior to Visit   Medication Sig Dispense Refill    acetaminophen (TYLENOL) 325 mg tablet Take 2 tablets (650 mg total) by mouth every 6 (six) hours as needed for mild pain  0    apixaban (Eliquis) 2.5 mg Take 1 tablet (2.5 mg total) by mouth 2 (two) times a day 180 tablet 0    atorvastatin (LIPITOR) 40 mg tablet TAKE 1 TABLET EVERY DAY WITH DINNER 90 tablet 1    bisacodyl ([facility] Bisacodyl) 10 mg suppository Insert 10 mg into the rectum daily      Blood Glucose Monitoring Suppl (PRECISION XTRA MONITOR) MARY by Does not apply route daily 1 each 0    Cholecalciferol (D3 High Potency) 25 MCG (1000 UT) capsule TAKE ONE CAPSULE BY MOUTH DAILY 30 capsule 5    escitalopram (LEXAPRO) 5 mg tablet TAKE ONE TABLET BY MOUTH DAILY 30 tablet 5    gabapentin (NEURONTIN) 100 mg capsule TAKE ONE CAPSULE BY MOUTH EVERY NIGHT AT BEDTIME 30 capsule 5    magnesium Oxide (MAG-OX) 400 mg TABS TAKE ONE TABLET BY MOUTH DAILY 30 tablet 5    melatonin 3 mg TAKE ONE TABLET BY MOUTH EVERY NIGHT AT BEDTIME 30 tablet 5    mirtazapine (REMERON) 15 mg tablet Take 1 tablet (15 mg total) by mouth daily at bedtime 30 tablet 5    nateglinide (STARLIX) 60 mg tablet Take 1 tablet (60 mg total) by mouth 3 (three) times a day before meals 90 tablet 0    QUEtiapine (SEROquel) 25 mg tablet Take 1 tablet (25 mg total) by mouth daily at bedtime       vitamin B-12 (CYANOCOBALAMIN) 500 MCG TABS Take 1 tablet (500 mcg total) by mouth 2 (two) times a week 60 tablet 1     No current facility-administered medications on file prior to visit.       History:  Past Medical History:   Diagnosis Date    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)     Last Assessed:  5/19/17    Chronic kidney disease     Diabetes mellitus (HCC)     Hyperlipidemia     Hypertension     Osteoarthritis     Palpitations     TIA (transient ischemic attack)      Past Surgical History:   Procedure Laterality Date    APPENDECTOMY      CARDIAC ELECTROPHYSIOLOGY PROCEDURE N/A 1/17/2024    Procedure: Cardiac loop recorder explant;  Surgeon: Drew Olmos MD;  Location: [facility];  Service: Cardiology    COLONOSCOPY  2012    HEMORROIDECTOMY      TOOTH EXTRACTION  02/02/2022     Family History   Problem Relation Age of Onset    Diabetes Mother     Other Mother         Stroke syndrome    Diabetes Father     Emphysema Father      Social History     Socioeconomic History    Marital status: Single     Spouse name: Not on file    Number of children: Not on file    Years of education: Not on file    Highest education level: Not on file   Occupational History    Not on file   Tobacco Use    Smoking status: Former    Smokeless tobacco: Never   Vaping Use    Vaping status: Never Used   Substance and Sexual Activity    Alcohol use: Not Currently     Comment: Social drinker    Drug use: No    Sexual activity: Not Currently   Other Topics Concern    Not on file   Social History Narrative    Daily coffee consumption (2 cups/day)     Social Drivers of Health     Financial Resource Strain: Low Risk  (3/7/2024)    Overall Financial Resource Strain (CARDIA)     Difficulty of Paying Living Expenses: Not very hard   Food Insecurity: No Food Insecurity (2/8/2025)    Nursing - Inadequate Food Risk Classification     Worried About Running Out of Food in the Last Year: Never true     Ran Out of Food in the Last  "Year: Never true     Ran Out of Food in the Last Year: Never true   Transportation Needs: No Transportation Needs (2025)    Nursing - Transportation Risk Classification     Lack of Transportation: Not on file     Lack of Transportation: No   Physical Activity: Insufficiently Active (7/10/2020)    Exercise Vital Sign     Days of Exercise per Week: 3 days     Minutes of Exercise per Session: 20 min   Stress: Not on file   Social Connections: Not on file   Intimate Partner Violence: Unknown (2025)    Nursing IPS     Feels Physically and Emotionally Safe: Not on file     Physically Hurt by Someone: Not on file     Humiliated or Emotionally Abused by Someone: Not on file     Physically Hurt by Someone: No     Hurt or Threatened by Someone: No   Housing Stability: Unknown (2025)    Nursing: Inadequate Housing Risk Classification     Has Housing: Not on file     Worried About Losing Housing: Not on file     Unable to Get Utilities: Not on file     Unable to Pay for Housing in the Last Year: No     Has Housin     Past Surgical History:   Procedure Laterality Date    APPENDECTOMY      CARDIAC ELECTROPHYSIOLOGY PROCEDURE N/A 2024    Procedure: Cardiac loop recorder explant;  Surgeon: Drew Olmos MD;  Location: [facility];  Service: Cardiology    COLONOSCOPY  2012    HEMORROIDECTOMY      TOOTH EXTRACTION  2022       OBJECTIVE:  Vitals:    25 0814   BP: 140/60   BP Location: Left arm   Patient Position: Sitting   Cuff Size: Standard   Pulse: 89   Temp: 97.8 °F (36.6 °C)   TempSrc: Temporal   SpO2: (!) 84%   Weight: 77.6 kg (171 lb)   Height: 5' 10\" (1.778 m)     Body mass index is 24.54 kg/m².  Physical Exam  Constitutional:       General: He is not in acute distress.     Appearance: Normal appearance. He is well-developed.   HENT:      Head: Normocephalic and atraumatic.      Right Ear: External ear normal.      Left Ear: External ear normal.      Nose: Nose normal.      " Mouth/Throat:      Mouth: Mucous membranes are moist.   Eyes:      Conjunctiva/sclera: Conjunctivae normal.      Pupils: Pupils are equal, round, and reactive to light.   Cardiovascular:      Rate and Rhythm: Normal rate and regular rhythm.      Heart sounds: Normal heart sounds. No murmur heard.     No friction rub. No gallop.   Pulmonary:      Effort: Pulmonary effort is normal. No respiratory distress.      Breath sounds: Normal breath sounds. No wheezing or rales.   Chest:      Chest wall: No tenderness.   Abdominal:      General: Bowel sounds are normal. There is no distension.      Palpations: Abdomen is soft. There is no mass.      Tenderness: There is no abdominal tenderness. There is no guarding or rebound.   Musculoskeletal:         General: Normal range of motion.      Cervical back: Normal range of motion and neck supple.   Skin:     General: Skin is warm.   Neurological:      Mental Status: He is alert and oriented to person, place, and time.      Comments: Gait is stable with the use of walker.   Some difficulty getting up from a chair.   Relatively low step height.    Psychiatric:         Mood and Affect: Mood normal.         Behavior: Behavior normal.         Thought Content: Thought content normal.         Judgment: Judgment normal.         MoCA: 13/30  GDS: 2/15    Labs & Imaging:  Lab Results   Component Value Date    WBC 6.27 03/04/2025    HGB 10.0 (L) 03/04/2025    HCT 31.8 (L) 03/04/2025    MCV 99 (H) 03/04/2025     03/04/2025     Lab Results   Component Value Date     01/04/2016    SODIUM 140 03/04/2025    K 5.1 03/04/2025     (H) 03/04/2025    CO2 25 03/04/2025    ANIONGAP 7 01/04/2016    AGAP 6 03/04/2025    BUN 36 (H) 03/04/2025    CREATININE 2.26 (H) 03/04/2025    GLUC 208 (H) 02/09/2025    GLUF 192 (H) 03/04/2025    CALCIUM 8.7 03/04/2025    AST 25 03/04/2025    ALT 19 03/04/2025    ALKPHOS 102 03/04/2025    PROT 7.3 01/04/2016    TP 6.9 03/04/2025    BILITOT 0.91  "01/04/2016    TBILI 0.42 03/04/2025    EGFR 23 03/04/2025     Lab Results   Component Value Date    HGBA1C 8.0 (H) 03/04/2025     Lab Results   Component Value Date    CHOLESTEROL 90 03/04/2025    CHOLESTEROL 109 07/12/2024    CHOLESTEROL 99 01/04/2023     Lab Results   Component Value Date    HDL 34 (L) 03/04/2025    HDL 32 (L) 07/12/2024    HDL 41 01/04/2023     Lab Results   Component Value Date    TRIG 76 03/04/2025    TRIG 85 07/12/2024    TRIG 60 01/04/2023     Lab Results   Component Value Date    NONHDLC 56 03/04/2025    NONHDLC 77 07/12/2024    NONHDLC 58 01/04/2023     Lab Results   Component Value Date    LDLCALC 41 03/04/2025    LDLCALC 60 07/12/2024    LDLDIRECT 71 01/23/2015     Lab Results   Component Value Date    WMWYLENY50 295 08/26/2024     Lab Results   Component Value Date    ARW4WOLNMEID 1.519 08/26/2024     No results found for: \"SYPHILISAB\"  No results found for: \"RPR\"      Lab Results   Component Value Date    ZHIP61UJSHHJ 20.3 (L) 09/27/2024      Results for orders placed during the hospital encounter of 02/08/25    CT head without contrast    Narrative  CT BRAIN - WITHOUT CONTRAST    INDICATION:   FALL, NO HS, ON ELIQUIS.    COMPARISON: 2/3/2025 and 10/30/2024    TECHNIQUE:  CT examination of the brain was performed.  Multiplanar 2D reformatted images were created from the source data.    Radiation dose length product (DLP) for this visit:  984 mGy-cm .  This examination, like all CT scans performed in the [facility], was performed utilizing techniques to minimize radiation dose exposure, including the use of iterative  reconstruction and automated exposure control.    IMAGE QUALITY:  Diagnostic.    FINDINGS:    PARENCHYMA:No intracranial mass, mass effect or midline shift. No CT signs of acute infarction.  No acute parenchymal hemorrhage. There is age-related involutional change. There is mild chronic microvascular ischemic change. There are stable chronic  cortically " based infarcts involving the right frontal and inferior right parietal lobe. There is atherosclerotic intracranial calcification.    VENTRICLES AND EXTRA-AXIAL SPACES:  Normal for the patient's age.    VISUALIZED ORBITS:  The patient is status post bilateral cataract surgery.    PARANASAL SINUSES:  There is subtotal opacification of the left maxillary sinus with fluid. Calcifications present along the medial margin of the maxillary sinus wall with extension into the nasal cavity. There is also complete opacification of the right maxillary sinus  with polypoid soft tissue containing calcifications in the right maxillary sinus. There is opacification of the right sphenoid sinus with calcifications. Findings may be on the basis of fungal colonization and/or mycetoma formation. There is ethmoid air  cell mucosal thickening.    CALVARIUM AND EXTRACRANIAL SOFT TISSUES:  Small left mastoid air cell effusion.    Impression  No acute intracranial hemorrhage or depressed calvarial fracture identified. Chronic right cerebral hemisphere infarcts as described above.    Paranasal sinus disease, as described above.            Workstation performed: [id number]    No results found for this or any previous visit.    No results found for this or any previous visit.    Results for orders placed during the hospital encounter of 06/26/20    MRI brain wo contrast    Narrative  MRI BRAIN WITHOUT CONTRAST    INDICATION: cva.    COMPARISON:   Head CT 6/26/2020    TECHNIQUE:  Sagittal T1, axial T2, axial FLAIR, axial T1, axial [place] and axial diffusion imaging.    IMAGE QUALITY:  Diagnostic.    FINDINGS:    BRAIN PARENCHYMA: A few punctuate cortical foci of mild restricted diffusion in the right frontal lobe (series 3 image 21-22)    Age-related cerebral atrophy. No intracranial hemorrhage. Nonspecific foci of T2/FLAIR hyperintensities involving periventricular and subcortical white matter most likely representing mild microangiopathic change.  Sequela of chronic cortical infarcts in  the right frontal, parietal, and occipital lobes (series 5 image 24 and 19-20).    VENTRICLES:  Normal for the patient's age.    SELLA AND PITUITARY GLAND:  Normal.    ORBITS:  Normal.    PARANASAL SINUSES:  Paranasal sinus disease. No fluid level    VASCULATURE:  Evaluation of the major intracranial vasculature demonstrates appropriate flow voids.    CALVARIUM AND SKULL BASE:  Normal.    Left mastoid effusion.    EXTRACRANIAL SOFT TISSUES:  Normal.    Impression  1.  Subacute appearing tiny frontal lobe cortical infarcts. No significant edema or mass effect. No intracranial hemorrhage.    2.  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.    3.  Paranasal sinus disease and left mastoid effusion.            I personally discussed this study with Sen Painter on 6/27/2020 at 11:22 AM.          Workstation performed: [id number]    No results found for this or any previous visit.    No results found for this or any previous visit.    No results found for this or any previous visit.    No results found for this or any previous visit.    No results found for this or any previous visit.      I have spent 60 minutes with Patient and family today including taking history from family, patient, review of records, charting, and counseling regarding diagnosis and management of conditions.

## 2025-03-17 ENCOUNTER — RA CDI HCC (OUTPATIENT)
Dept: OTHER | Facility: HOSPITAL | Age: OVER 89
End: 2025-03-17

## 2025-03-19 DIAGNOSIS — E78.5 HYPERLIPIDEMIA, UNSPECIFIED HYPERLIPIDEMIA TYPE: ICD-10-CM

## 2025-03-19 DIAGNOSIS — I63.40 CEREBROVASCULAR ACCIDENT (CVA) DUE TO EMBOLISM OF CEREBRAL ARTERY (HCC): ICD-10-CM

## 2025-03-19 DIAGNOSIS — I10 PRIMARY HYPERTENSION: Primary | ICD-10-CM

## 2025-03-20 RX ORDER — ATORVASTATIN CALCIUM 40 MG/1
TABLET, FILM COATED ORAL
Qty: 90 TABLET | Refills: 1 | Status: SHIPPED | OUTPATIENT
Start: 2025-03-20

## 2025-03-20 RX ORDER — LISINOPRIL 5 MG/1
5 TABLET ORAL DAILY
Qty: 90 TABLET | Refills: 3 | Status: SHIPPED | OUTPATIENT
Start: 2025-03-20

## 2025-03-27 ENCOUNTER — APPOINTMENT (OUTPATIENT)
Dept: LAB | Facility: CLINIC | Age: OVER 89
End: 2025-03-27
Payer: MEDICARE

## 2025-03-27 ENCOUNTER — TRANSCRIBE ORDERS (OUTPATIENT)
Dept: LAB | Facility: CLINIC | Age: OVER 89
End: 2025-03-27

## 2025-03-27 DIAGNOSIS — E11.22 TYPE 2 DIABETES MELLITUS WITH ESRD (END-STAGE RENAL DISEASE) (HCC): ICD-10-CM

## 2025-03-27 DIAGNOSIS — N18.4 CHRONIC KIDNEY DISEASE, STAGE IV (SEVERE) (HCC): ICD-10-CM

## 2025-03-27 DIAGNOSIS — N18.6 TYPE 2 DIABETES MELLITUS WITH ESRD (END-STAGE RENAL DISEASE) (HCC): ICD-10-CM

## 2025-03-27 DIAGNOSIS — E11.22 TYPE 2 DIABETES MELLITUS WITH ESRD (END-STAGE RENAL DISEASE) (HCC): Primary | ICD-10-CM

## 2025-03-27 DIAGNOSIS — N18.6 TYPE 2 DIABETES MELLITUS WITH ESRD (END-STAGE RENAL DISEASE) (HCC): Primary | ICD-10-CM

## 2025-03-27 LAB
CREAT UR-MCNC: 63.7 MG/DL
PROT UR-MCNC: 61.5 MG/DL
PROT/CREAT UR: 1 MG/G{CREAT} (ref 0–0.1)

## 2025-03-27 PROCEDURE — 84156 ASSAY OF PROTEIN URINE: CPT

## 2025-03-27 PROCEDURE — 82570 ASSAY OF URINE CREATININE: CPT

## 2025-04-03 ENCOUNTER — TELEPHONE (OUTPATIENT)
Age: OVER 89
End: 2025-04-03

## 2025-04-03 DIAGNOSIS — R41.0 ACUTE CONFUSION: Primary | ICD-10-CM

## 2025-04-03 DIAGNOSIS — G30.1 LATE ONSET ALZHEIMER'S DEMENTIA WITHOUT BEHAVIORAL DISTURBANCE, PSYCHOTIC DISTURBANCE, MOOD DISTURBANCE, OR ANXIETY, UNSPECIFIED DEMENTIA SEVERITY (HCC): ICD-10-CM

## 2025-04-03 DIAGNOSIS — F02.80 LATE ONSET ALZHEIMER'S DEMENTIA WITHOUT BEHAVIORAL DISTURBANCE, PSYCHOTIC DISTURBANCE, MOOD DISTURBANCE, OR ANXIETY, UNSPECIFIED DEMENTIA SEVERITY (HCC): ICD-10-CM

## 2025-04-03 DIAGNOSIS — R41.0 CONFUSION: Primary | ICD-10-CM

## 2025-04-03 NOTE — TELEPHONE ENCOUNTER
[facility] faxed a request -    During AM care, aid noticed resident has a rash basically all over the body but mainly in the stomach area.    Also having some confusion, check for UTI    I will fax UA and prescription to [facility] Please print script when done

## 2025-04-07 ENCOUNTER — APPOINTMENT (OUTPATIENT)
Dept: LAB | Facility: CLINIC | Age: OVER 89
End: 2025-04-07
Payer: MEDICARE

## 2025-04-07 DIAGNOSIS — R41.0 CONFUSION: ICD-10-CM

## 2025-04-07 DIAGNOSIS — R41.0 ACUTE CONFUSION: ICD-10-CM

## 2025-04-07 LAB
BACTERIA UR QL AUTO: NORMAL /HPF
BILIRUB UR QL STRIP: NEGATIVE
BILIRUB UR QL STRIP: NEGATIVE
CLARITY UR: CLEAR
CLARITY UR: CLEAR
COLOR UR: ABNORMAL
COLOR UR: ABNORMAL
GLUCOSE UR STRIP-MCNC: NEGATIVE MG/DL
GLUCOSE UR STRIP-MCNC: NEGATIVE MG/DL
HGB UR QL STRIP.AUTO: NEGATIVE
HGB UR QL STRIP.AUTO: NEGATIVE
KETONES UR STRIP-MCNC: NEGATIVE MG/DL
KETONES UR STRIP-MCNC: NEGATIVE MG/DL
LEUKOCYTE ESTERASE UR QL STRIP: NEGATIVE
LEUKOCYTE ESTERASE UR QL STRIP: NEGATIVE
NITRITE UR QL STRIP: NEGATIVE
NITRITE UR QL STRIP: NEGATIVE
NON-SQ EPI CELLS URNS QL MICRO: NORMAL /HPF
PH UR STRIP.AUTO: 6 [PH]
PH UR STRIP.AUTO: 6 [PH]
PROT UR STRIP-MCNC: ABNORMAL MG/DL
PROT UR STRIP-MCNC: ABNORMAL MG/DL
RBC #/AREA URNS AUTO: NORMAL /HPF
SP GR UR STRIP.AUTO: 1.01 (ref 1–1.03)
SP GR UR STRIP.AUTO: 1.01 (ref 1–1.03)
UROBILINOGEN UR STRIP-ACNC: <2 MG/DL
UROBILINOGEN UR STRIP-ACNC: <2 MG/DL
WBC #/AREA URNS AUTO: NORMAL /HPF

## 2025-04-07 PROCEDURE — 81001 URINALYSIS AUTO W/SCOPE: CPT

## 2025-04-08 DIAGNOSIS — R41.0 CONFUSION: Primary | ICD-10-CM

## 2025-04-08 DIAGNOSIS — M25.512 ACUTE PAIN OF LEFT SHOULDER: Primary | ICD-10-CM

## 2025-04-08 RX ORDER — MIRTAZAPINE 30 MG/1
30 TABLET, FILM COATED ORAL
Qty: 30 TABLET | Refills: 5 | Status: SHIPPED | OUTPATIENT
Start: 2025-04-08 | End: 2025-10-05

## 2025-04-11 ENCOUNTER — APPOINTMENT (EMERGENCY)
Dept: RADIOLOGY | Facility: HOSPITAL | Age: OVER 89
End: 2025-04-11
Payer: MEDICARE

## 2025-04-11 ENCOUNTER — HOSPITAL ENCOUNTER (EMERGENCY)
Facility: HOSPITAL | Age: OVER 89
Discharge: HOME | End: 2025-04-12
Attending: EMERGENCY MEDICINE
Payer: MEDICARE

## 2025-04-11 ENCOUNTER — APPOINTMENT (EMERGENCY)
Dept: CT IMAGING | Facility: HOSPITAL | Age: OVER 89
End: 2025-04-11
Payer: MEDICARE

## 2025-04-11 DIAGNOSIS — M25.512 ACUTE PAIN OF LEFT SHOULDER: Primary | ICD-10-CM

## 2025-04-11 DIAGNOSIS — I48.91 ATRIAL FIBRILLATION, UNSPECIFIED TYPE (HCC): ICD-10-CM

## 2025-04-11 LAB
BASOPHILS # BLD AUTO: 0.02 THOUSANDS/ÂΜL (ref 0–0.1)
BASOPHILS NFR BLD AUTO: 0 % (ref 0–1)
CARDIAC TROPONIN I PNL SERPL HS: 22 NG/L (ref ?–50)
EOSINOPHIL # BLD AUTO: 0.38 THOUSAND/ÂΜL (ref 0–0.61)
EOSINOPHIL NFR BLD AUTO: 5 % (ref 0–6)
ERYTHROCYTE [DISTWIDTH] IN BLOOD BY AUTOMATED COUNT: 14.4 % (ref 11.6–15.1)
ERYTHROCYTE [SEDIMENTATION RATE] IN BLOOD: 51 MM/HOUR (ref 0–19)
HCT VFR BLD AUTO: 28.1 % (ref 36.5–49.3)
HGB BLD-MCNC: 9.2 G/DL (ref 12–17)
IMM GRANULOCYTES # BLD AUTO: 0.03 THOUSAND/UL (ref 0–0.2)
IMM GRANULOCYTES NFR BLD AUTO: 0 % (ref 0–2)
LYMPHOCYTES # BLD AUTO: 0.81 THOUSANDS/ÂΜL (ref 0.6–4.47)
LYMPHOCYTES NFR BLD AUTO: 11 % (ref 14–44)
MCH RBC QN AUTO: 31.1 PG (ref 26.8–34.3)
MCHC RBC AUTO-ENTMCNC: 32.7 G/DL (ref 31.4–37.4)
MCV RBC AUTO: 95 FL (ref 82–98)
MONOCYTES # BLD AUTO: 0.61 THOUSAND/ÂΜL (ref 0.17–1.22)
MONOCYTES NFR BLD AUTO: 8 % (ref 4–12)
NEUTROPHILS # BLD AUTO: 5.89 THOUSANDS/ÂΜL (ref 1.85–7.62)
NEUTS SEG NFR BLD AUTO: 76 % (ref 43–75)
NRBC BLD AUTO-RTO: 0 /100 WBCS
PLATELET # BLD AUTO: 259 THOUSANDS/UL (ref 149–390)
PMV BLD AUTO: 8.6 FL (ref 8.9–12.7)
RBC # BLD AUTO: 2.96 MILLION/UL (ref 3.88–5.62)
WBC # BLD AUTO: 7.74 THOUSAND/UL (ref 4.31–10.16)

## 2025-04-11 PROCEDURE — 80053 COMPREHEN METABOLIC PANEL: CPT

## 2025-04-11 PROCEDURE — 85652 RBC SED RATE AUTOMATED: CPT

## 2025-04-11 PROCEDURE — 36415 COLL VENOUS BLD VENIPUNCTURE: CPT

## 2025-04-11 PROCEDURE — 93005 ELECTROCARDIOGRAM TRACING: CPT

## 2025-04-11 PROCEDURE — 76882 US LMTD JT/FCL EVL NVASC XTR: CPT | Performed by: EMERGENCY MEDICINE

## 2025-04-11 PROCEDURE — 99285 EMERGENCY DEPT VISIT HI MDM: CPT | Performed by: EMERGENCY MEDICINE

## 2025-04-11 PROCEDURE — 71045 X-RAY EXAM CHEST 1 VIEW: CPT

## 2025-04-11 PROCEDURE — 84484 ASSAY OF TROPONIN QUANT: CPT

## 2025-04-11 PROCEDURE — 85025 COMPLETE CBC W/AUTO DIFF WBC: CPT

## 2025-04-11 PROCEDURE — 73030 X-RAY EXAM OF SHOULDER: CPT

## 2025-04-11 PROCEDURE — 82550 ASSAY OF CK (CPK): CPT

## 2025-04-11 PROCEDURE — 73200 CT UPPER EXTREMITY W/O DYE: CPT

## 2025-04-11 PROCEDURE — 99284 EMERGENCY DEPT VISIT MOD MDM: CPT

## 2025-04-11 PROCEDURE — 86140 C-REACTIVE PROTEIN: CPT

## 2025-04-11 RX ORDER — LIDOCAINE 50 MG/G
1 PATCH TOPICAL ONCE
Status: DISCONTINUED | OUTPATIENT
Start: 2025-04-11 | End: 2025-04-12 | Stop reason: HOSPADM

## 2025-04-11 RX ORDER — ACETAMINOPHEN 325 MG/1
975 TABLET ORAL ONCE
Status: DISCONTINUED | OUTPATIENT
Start: 2025-04-11 | End: 2025-04-11

## 2025-04-11 RX ORDER — APIXABAN 2.5 MG/1
2.5 TABLET, FILM COATED ORAL 2 TIMES DAILY
Qty: 180 TABLET | Refills: 1 | Status: SHIPPED | OUTPATIENT
Start: 2025-04-11

## 2025-04-11 RX ADMIN — LIDOCAINE 1 PATCH: 700 PATCH TOPICAL at 23:51

## 2025-04-12 VITALS
HEART RATE: 61 BPM | SYSTOLIC BLOOD PRESSURE: 145 MMHG | DIASTOLIC BLOOD PRESSURE: 66 MMHG | TEMPERATURE: 97.7 F | OXYGEN SATURATION: 98 % | RESPIRATION RATE: 17 BRPM

## 2025-04-12 LAB
2HR DELTA HS TROPONIN: -1 NG/L
ALBUMIN SERPL BCG-MCNC: 3.6 G/DL (ref 3.5–5)
ALP SERPL-CCNC: 96 U/L (ref 34–104)
ALT SERPL W P-5'-P-CCNC: 11 U/L (ref 7–52)
ANION GAP SERPL CALCULATED.3IONS-SCNC: 8 MMOL/L (ref 4–13)
AST SERPL W P-5'-P-CCNC: 14 U/L (ref 13–39)
BILIRUB SERPL-MCNC: 0.73 MG/DL (ref 0.2–1)
BUN SERPL-MCNC: 44 MG/DL (ref 5–25)
CALCIUM SERPL-MCNC: 8.6 MG/DL (ref 8.4–10.2)
CARDIAC TROPONIN I PNL SERPL HS: 21 NG/L (ref ?–50)
CHLORIDE SERPL-SCNC: 106 MMOL/L (ref 96–108)
CK SERPL-CCNC: 46 U/L (ref 39–308)
CO2 SERPL-SCNC: 21 MMOL/L (ref 21–32)
CREAT SERPL-MCNC: 2.38 MG/DL (ref 0.6–1.3)
CRP SERPL QL: 13.4 MG/L
GFR SERPL CREATININE-BSD FRML MDRD: 22 ML/MIN/1.73SQ M
GLUCOSE SERPL-MCNC: 253 MG/DL (ref 65–140)
POTASSIUM SERPL-SCNC: 4.6 MMOL/L (ref 3.5–5.3)
PROT SERPL-MCNC: 7.3 G/DL (ref 6.4–8.4)
SODIUM SERPL-SCNC: 135 MMOL/L (ref 135–147)

## 2025-04-12 PROCEDURE — 36415 COLL VENOUS BLD VENIPUNCTURE: CPT

## 2025-04-12 PROCEDURE — 84484 ASSAY OF TROPONIN QUANT: CPT

## 2025-04-12 RX ORDER — ESCITALOPRAM OXALATE 5 MG/1
5 TABLET ORAL DAILY
Status: DISCONTINUED | OUTPATIENT
Start: 2025-04-12 | End: 2025-04-12 | Stop reason: HOSPADM

## 2025-04-12 RX ORDER — LISINOPRIL 5 MG/1
5 TABLET ORAL DAILY
Status: DISCONTINUED | OUTPATIENT
Start: 2025-04-12 | End: 2025-04-12 | Stop reason: HOSPADM

## 2025-04-12 RX ORDER — NATEGLINIDE 60 MG/1
60 TABLET ORAL
Status: DISCONTINUED | OUTPATIENT
Start: 2025-04-12 | End: 2025-04-12 | Stop reason: HOSPADM

## 2025-04-12 NOTE — ED ATTENDING ATTESTATION
4/11/2025  I, Eugene Aguilar MD, saw and evaluated the patient. I have discussed the patient with the resident/non-physician practitioner and agree with the resident's/non-physician practitioner's findings, Plan of Care, and MDM as documented in the resident's/non-physician practitioner's note, except where noted. All available labs and Radiology studies were reviewed.  I was present for key portions of any procedure(s) performed by the resident/non-physician practitioner and I was immediately available to provide assistance.       At this point I agree with the current assessment done in the Emergency Department.  I have conducted an independent evaluation of this patient a history and physical is as follows: Patient is a 89 year old male with left shoulder pain for past several days. Denies recent trauma. No chest pain or sob. No fever. No N/V. Patient is on eliquis for a. Fib. Was last seen at [facility] on 2/17/25 for essential HTN. [email or web address] website checked on this patient and no Rx found. NCAT. No scleral icterus. Moist mucous membranes. Lungs clear. Heart regular without murmur. Abdomen soft and nontender. Good bowel sounds. No edema. (+) left shoulder swelling without erythema or ecchymosis on left shoulder. (+) tenderness. Limited ROM due to pain. DDx including but not limited to: tendinitis, bursitis, arthritis, rotator cuff injury, fracture, dislocation, contusion, strain, sprain, brachial plexus impingement, radiculopathy, rhabdomyolysis, cyst, lipoma; doubt septic arthritis or cardiac etiology or DVT or arterial occlusion. Will check EKG, labs, x-rays.     ED Course         Critical Care Time  Procedures

## 2025-04-12 NOTE — ED NOTES
Per ED Resident, Hold prescribed Tylenol until MD notifies Nursing Home to verify when the last dose of Tylenol was administered.  Vinay RN updated     Jenelle Mckinney RN  04/11/25 [id number]

## 2025-04-12 NOTE — ED PROVIDER NOTES
Time reflects when diagnosis was documented in both MDM as applicable and the Disposition within this note       Time User Action Codes Description Comment    4/12/2025  2:03 AM Zaira Ventura Add [M25.512] Acute pain of left shoulder           ED Disposition       ED Disposition   Discharge    Condition   Stable    Date/Time   Sat Apr 12, 2025  2:03 AM    Comment   Leroy Paredes discharge to home/self care.                   Assessment & Plan       Medical Decision Making  Amount and/or Complexity of Data Reviewed  Labs: ordered. Decision-making details documented in ED Course.  Radiology: ordered and independent interpretation performed. Decision-making details documented in ED Course.    Risk  Prescription drug management.    89 yo M presented to ED for L shoulder pain. Associated symptoms: Pain with movement of left shoulder, swelling, bruising to left upper arm. Exam findings: Left anterior shoulder swelling, soft, no overlying erythema, ecchymosis to left upper arm mild tenderness left shoulder.  Passive and active range of motion limited due to pain.  Neurovascular intact distally.    Differentials diagnoses considered: Hematoma versus bursitis versus less likely abscess versus less likely fracture versus less likely ACS.     See ED course for more details on lab and imaging interpretation, as well as pertinent information that occurred during patient's ED stay.  Based on these results and H&P,a cute should pain. Results and clinical impressions were discussed with patient and family. They expressed understanding. Plan: Discharge back to nursing home. This plan was also discussed with patient, who was agreeable with this plan. Patient was given the opportunity to ask questions in ED. All questions and concerns were addressed in ED.       This document was created using speech voice recognition software.   Grammatical errors, random word insertions, pronoun errors, and incomplete sentences are an occasional  consequence of this system due to software limitations, ambient noise, and hardware issues.   Any formal questions or concerns about content, text, or information contained within the body of this dictation should be directly addressed to the provider for clarification.     ED Course as of 04/12/25 0232   Fri Apr 11, 2025   2323 Sed Rate(!): 51  At baseline per patient.   2323 CBC and differential(!)  Hemoglobin at baseline.  No leukocytosis or leukopenia.  Platelets within normal limits.  Otherwise unremarkable.   2338 hs TnI 0hr: 22   2340 XR chest 1 view portable  No acute cardiopulmonary process visualized.   2340 XR shoulder 2+ views LEFT  Arthritis visualized.  Clavicular acromial joint separation, otherwise no acute osseous abnormalities visualized.   2353 Patient was given Tylenol at 8 PM this evening.   Sat Apr 12, 2025 0103 C-REACTIVE PROTEIN(!): 13.4  Decreased from prior.   0103 Total CK: 46   0103 Comprehensive metabolic panel(!)  Kidney function at baseline.   0105 CT LUE going to [facility] for interpretation   0159 hs TnI 2hr: 21  -1 delta   0159 CT upper extremity wo contrast left  [facility] interpretation     IMPRESSION:   No evidence of soft tissue mass.   Degenerative changes of the acromioclavicular joint with associated bony and soft tissue   protuberance superiorly          Medications   lidocaine (LIDODERM) 5 % patch 1 patch (1 patch Topical Medication Applied 4/11/25 2351)   apixaban (ELIQUIS) tablet 2.5 mg (has no administration in time range)   escitalopram (LEXAPRO) tablet 5 mg (has no administration in time range)   lisinopril (ZESTRIL) tablet 5 mg (has no administration in time range)   nateglinide (STARLIX) tablet 60 mg (has no administration in time range)       ED Risk Strat Scores                    No data recorded                            History of Present Illness       Chief Complaint   Patient presents with    Shoulder Pain     Pt arrives via EMS  from [facility] c/o left  shoulder and rib pain x 3 days. Per EMS pt has a broken rib on left side. EMS and pt deny recent trauma          Past Medical History:   Diagnosis Date    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)     Last Assessed:  5/19/17    Chronic kidney disease     Diabetes mellitus (HCC)     Hyperlipidemia     Hypertension     Osteoarthritis     Palpitations     TIA (transient ischemic attack)       Past Surgical History:   Procedure Laterality Date    APPENDECTOMY      CARDIAC ELECTROPHYSIOLOGY PROCEDURE N/A 1/17/2024    Procedure: Cardiac loop recorder explant;  Surgeon: Drew Olmos MD;  Location: [facility];  Service: Cardiology    COLONOSCOPY  2012    HEMORROIDECTOMY      TOOTH EXTRACTION  02/02/2022      Family History   Problem Relation Age of Onset    Diabetes Mother     Other Mother         Stroke syndrome    Diabetes Father     Emphysema Father       Social History     Tobacco Use    Smoking status: Former    Smokeless tobacco: Never   Vaping Use    Vaping status: Never Used   Substance Use Topics    Alcohol use: Not Currently     Comment: Social drinker    Drug use: No      E-Cigarette/Vaping    E-Cigarette Use Never User       E-Cigarette/Vaping Substances    Nicotine No     THC No     CBD No     Flavoring No     Other No     Unknown No       I have reviewed and agree with the history as documented.     HPI  89-year-old M with h/o HLD, CKD, CHF, dementia, DM,  presenting to ED with left shoulder pain for 3 days.  Patient and nursing facility staff deny falls.  Reports associated pain with movement of left shoulder, swelling.  Pain is worse with movement of left shoulder.  Denies fever, chills, chest pain, shortness of breath, abdominal pain, nausea, vomiting, paresthesias, weakness, any other signs symptoms.      Review of Systems  ROS otherwise negative unless stated above.       Objective       ED Triage Vitals   Temperature Pulse Blood Pressure Respirations SpO2 Patient Position - Orthostatic VS    04/11/25 2253 04/11/25 2249 04/11/25 2249 04/11/25 2249 04/11/25 2249 04/12/25 0200   97.7 °F (36.5 °C) 78 163/74 18 98 % Lying      Temp Source Heart Rate Source BP Location FiO2 (%) Pain Score    04/11/25 2253 04/11/25 2249 04/11/25 2249 -- --    Oral Monitor Right arm        Vitals      Date and Time Temp Pulse SpO2 Resp BP Pain Score FACES Pain Rating User   04/12/25 0200 -- 66 98 % -- 182/80 -- -- JR   04/11/25 2253 97.7 °F (36.5 °C) -- -- -- -- -- -- PAZ   04/11/25 2249 -- 78 98 % 18 163/74 -- -- PAZ            Physical Exam  General appearance: resting comfortably, no acute distress   HENT: Normocephalic, atraumatic, hearing grossly intact, and mucous membranes moist   Eyes: Conjunctiva normal, PERRL, EOM intact   Lungs/Chest: Respirations even and unlabored, breath sounds normal  Cardiovascular: Normal rate, regular rhythm  Abdomen: soft, non-distended, non-tender  Musculoskeletal:    LUE: Area of swelling to anterior aspect of left shoulder, soft no overlying erythema or ecchymosis.  Does have ecchymosis to anterior aspect of left humerus that is old.  Passive range of motion of left shoulder is limited however able to range more with passive range of motion and active range of motion due to pain.  Range of motion intact to the elbow.  Neurovascular intact distally.  Pulses: radial / dorsalis pedis pulses palpable  Skin: warm and dry   Neurologic: Awake and alert, no apparent focal deficit  Psych: Mood consistent with affect     Results Reviewed       Procedure Component Value Units Date/Time    HS Troponin I 2hr [[id number]]  (Normal) Collected: 04/12/25 0112    Lab Status: Final result Specimen: Blood from Arm, Right Updated: 04/12/25 0147     hs TnI 2hr 21 ng/L      Delta 2hr hsTnI -1 ng/L     Comprehensive metabolic panel [[id number]]  (Abnormal) Collected: 04/11/25 2304    Lab Status: Final result Specimen: Blood from Arm, Right Updated: 04/12/25 0103     Sodium 135 mmol/L      Potassium 4.6 mmol/L       Chloride 106 mmol/L      CO2 21 mmol/L      ANION GAP 8 mmol/L      BUN 44 mg/dL      Creatinine 2.38 mg/dL      Glucose 253 mg/dL      Calcium 8.6 mg/dL      AST 14 U/L      ALT 11 U/L      Alkaline Phosphatase 96 U/L      Total Protein 7.3 g/dL      Albumin 3.6 g/dL      Total Bilirubin 0.73 mg/dL      eGFR 22 ml/min/1.73sq m     Narrative:      National Kid[employer] Foundation guidelines for Chronic Kidney Disease (CKD):     Stage 1 with normal or high GFR (GFR > 90 mL/min/1.73 square meters)    Stage 2 Mild CKD (GFR = 60-89 mL/min/1.73 square meters)    Stage 3A Moderate CKD (GFR = 45-59 mL/min/1.73 square meters)    Stage 3B Moderate CKD (GFR = 30-44 mL/min/1.73 square meters)    Stage 4 Severe CKD (GFR = 15-29 mL/min/1.73 square meters)    Stage 5 End Stage CKD (GFR <15 mL/min/1.73 square meters)  Note: GFR calculation is accurate only with a steady state creatinine    C-reactive protein [[id number]]  (Abnormal) Collected: 04/11/25 2304    Lab Status: Final result Specimen: Blood from Arm, Right Updated: 04/12/25 0103     CRP 13.4 mg/L     CK [[id number]]  (Normal) Collected: 04/11/25 2304    Lab Status: Final result Specimen: Blood from Arm, Right Updated: 04/12/25 0103     Total CK 46 U/L     HS Troponin 0hr (reflex protocol) [[id number]]  (Normal) Collected: 04/11/25 2304    Lab Status: Final result Specimen: Blood from Arm, Right Updated: 04/11/25 2336     hs TnI 0hr 22 ng/L     Sedimentation rate, automated [[id number]]  (Abnormal) Collected: 04/11/25 2304    Lab Status: Final result Specimen: Blood from Arm, Right Updated: 04/11/25 2319     Sed Rate 51 mm/hour     CBC and differential [[id number]]  (Abnormal) Collected: 04/11/25 2304    Lab Status: Final result Specimen: Blood from Arm, Right Updated: 04/11/25 2315     WBC 7.74 Thousand/uL      RBC 2.96 Million/uL      Hemoglobin 9.2 g/dL      Hematocrit 28.1 %      MCV 95 fL      MCH 31.1 pg      MCHC 32.7 g/dL      RDW 14.4 %      MPV 8.6 fL       Platelets 259 Thousands/uL      nRBC 0 /100 WBCs      Segmented % 76 %      Immature Grans % 0 %      Lymphocytes % 11 %      Monocytes % 8 %      Eosinophils Relative 5 %      Basophils Relative 0 %      Absolute Neutrophils 5.89 Thousands/µL      Absolute Immature Grans 0.03 Thousand/uL      Absolute Lymphocytes 0.81 Thousands/µL      Absolute Monocytes 0.61 Thousand/µL      Eosinophils Absolute 0.38 Thousand/µL      Basophils Absolute 0.02 Thousands/µL             CT upper extremity wo contrast left   ED Interpretation by Zaira Ventura DO (04/12 0159)   [facility] interpretation    IMPRESSION:  No evidence of soft tissue mass.  Degenerative changes of the acromioclavicular joint with associated bony and soft tissue  protuberance superiorly      XR chest 1 view portable   ED Interpretation by Zaira Ventura DO (04/11 2340)   No acute cardiopulmonary process visualized.      XR shoulder 2+ views LEFT   ED Interpretation by Zaira Ventura DO (04/11 2340)   Arthritis visualized.  Clavicular acromial joint separation, otherwise no acute osseous abnormalities visualized.          POC MSK/Soft Tissue US    Date/Time: 4/11/2025 11:27 PM    Performed by: Zaira Ventura DO  Authorized by: Zaira Ventura DO    Patient location:  ED  Performed by:  Resident  Procedure:     Performed: soft tissue ultrasound    Procedure details:     Exam Type:  Diagnostic    Longitudinal view:  Obtained    Transverse view:  Obtained    Image quality: limited diagnostic      Image availability:  Images available in PACS  Soft tissue ultrasound:     Soft tissue indications: swelling and pain      Anatomic location:  Upper extremity (left anterior shoulder)    Soft tissue findings comment:  Fluid collection without surrounding cobblestoning  Interpretation:     Soft tissue impressions comment:  Consistent with hematoma or burisitis  ECG 12 Lead Documentation Only    Date/Time: 4/11/2025 11:31 PM    Performed by:  Zaira Ventura DO  Authorized by: Zaira Ventura DO    Patient location:  ED  Previous ECG:     Previous ECG:  Compared to current    Similarity:  No change  Interpretation:     Interpretation: abnormal    Rate:     ECG rate:  66    ECG rate assessment: normal    Rhythm:     Rhythm: sinus rhythm and A-V block    Ectopy:     Ectopy: none    QRS:     QRS axis:  Normal    QRS intervals:  Wide  Conduction:     Conduction: abnormal      Abnormal conduction: complete RBBB and LAFB    ST segments:     ST segments:  Normal  T waves:     T waves: normal        ED Medication and Procedure Management   Prior to Admission Medications   Prescriptions Last Dose Informant Patient Reported? Taking?   Blood Glucose Monitoring Suppl (PRECISION XTRA MONITOR) MARY  Self No No   Sig: by Does not apply route daily   Cholecalciferol (D3 High Potency) 25 MCG (1000 UT) capsule   No No   Sig: TAKE ONE CAPSULE BY MOUTH DAILY   QUEtiapine (SEROquel) 25 mg tablet   No No   Sig: Take 1 tablet (25 mg total) by mouth daily at bedtime   acetaminophen (TYLENOL) 325 mg tablet  Self No No   Sig: Take 2 tablets (650 mg total) by mouth every 6 (six) hours as needed for mild pain   apixaban (Eliquis) 2.5 mg   No No   Sig: TAKE 1 TABLET TWICE DAILY   atorvastatin (LIPITOR) 40 mg tablet   No No   Sig: TAKE 1 TABLET EVERY DAY WITH DINNER   bisacodyl ([facility] Bisacodyl) 10 mg suppository  Self Yes No   Sig: Insert 10 mg into the rectum daily   escitalopram (LEXAPRO) 5 mg tablet   No No   Sig: TAKE ONE TABLET BY MOUTH DAILY   gabapentin (NEURONTIN) 100 mg capsule   No No   Sig: TAKE ONE CAPSULE BY MOUTH EVERY NIGHT AT BEDTIME   lisinopril (ZESTRIL) 5 mg tablet   No No   Sig: TAKE 1 TABLET EVERY DAY   magnesium Oxide (MAG-OX) 400 mg TABS   No No   Sig: TAKE ONE TABLET BY MOUTH DAILY   melatonin 3 mg   No No   Sig: TAKE ONE TABLET BY MOUTH EVERY NIGHT AT BEDTIME   mirtazapine (REMERON) 30 mg tablet   No No   Sig: Take 1 tablet (30 mg total) by mouth  daily at bedtime   nateglinide (STARLIX) 60 mg tablet   No No   Sig: Take 1 tablet (60 mg total) by mouth 3 (three) times a day before meals   vitamin B-12 (CYANOCOBALAMIN) 500 MCG TABS  Self No No   Sig: Take 1 tablet (500 mcg total) by mouth 2 (two) times a week      Facility-Administered Medications: None     Patient's Medications   Discharge Prescriptions    No medications on file       ED SEPSIS DOCUMENTATION   Time reflects when diagnosis was documented in both MDM as applicable and the Disposition within this note       Time User Action Codes Description Comment    4/12/2025  2:03 AM Zaira Ventura Add [M25.512] Acute pain of left shoulder                  Zaira Ventura,   04/12/25 0232       Zaira Ventura DO  04/12/25 0232

## 2025-04-14 ENCOUNTER — NURSE TRIAGE (OUTPATIENT)
Age: OVER 89
End: 2025-04-14

## 2025-04-14 ENCOUNTER — APPOINTMENT (EMERGENCY)
Dept: CT IMAGING | Facility: HOSPITAL | Age: OVER 89
End: 2025-04-14
Payer: MEDICARE

## 2025-04-14 ENCOUNTER — APPOINTMENT (EMERGENCY)
Dept: RADIOLOGY | Facility: HOSPITAL | Age: OVER 89
End: 2025-04-14
Payer: MEDICARE

## 2025-04-14 ENCOUNTER — HOSPITAL ENCOUNTER (EMERGENCY)
Facility: HOSPITAL | Age: OVER 89
Discharge: HOME | End: 2025-04-14
Attending: EMERGENCY MEDICINE
Payer: MEDICARE

## 2025-04-14 VITALS
RESPIRATION RATE: 20 BRPM | WEIGHT: 185.19 LBS | BODY MASS INDEX: 26.57 KG/M2 | OXYGEN SATURATION: 99 % | SYSTOLIC BLOOD PRESSURE: 187 MMHG | HEART RATE: 64 BPM | DIASTOLIC BLOOD PRESSURE: 80 MMHG | TEMPERATURE: 97.8 F

## 2025-04-14 DIAGNOSIS — M25.512 ACUTE PAIN OF LEFT SHOULDER: Primary | ICD-10-CM

## 2025-04-14 DIAGNOSIS — M75.50 BURSITIS AND TENDINITIS OF SHOULDER REGION: Primary | ICD-10-CM

## 2025-04-14 DIAGNOSIS — M75.90 BURSITIS AND TENDINITIS OF SHOULDER REGION: Primary | ICD-10-CM

## 2025-04-14 PROCEDURE — 99284 EMERGENCY DEPT VISIT MOD MDM: CPT | Performed by: EMERGENCY MEDICINE

## 2025-04-14 PROCEDURE — 72125 CT NECK SPINE W/O DYE: CPT

## 2025-04-14 PROCEDURE — 71250 CT THORAX DX C-: CPT

## 2025-04-14 PROCEDURE — 73030 X-RAY EXAM OF SHOULDER: CPT

## 2025-04-14 PROCEDURE — 99284 EMERGENCY DEPT VISIT MOD MDM: CPT

## 2025-04-14 PROCEDURE — 70450 CT HEAD/BRAIN W/O DYE: CPT

## 2025-04-14 RX ORDER — ACETAMINOPHEN 325 MG/1
650 TABLET ORAL ONCE
Status: COMPLETED | OUTPATIENT
Start: 2025-04-14 | End: 2025-04-14

## 2025-04-14 RX ORDER — CELECOXIB 200 MG/1
200 CAPSULE ORAL 2 TIMES DAILY
Qty: 60 CAPSULE | Refills: 0 | Status: SHIPPED | OUTPATIENT
Start: 2025-04-14 | End: 2025-05-02

## 2025-04-14 RX ADMIN — ACETAMINOPHEN 650 MG: 325 TABLET, FILM COATED ORAL at 12:12

## 2025-04-14 RX ADMIN — DICLOFENAC SODIUM 2 G: 10 GEL TOPICAL at 12:12

## 2025-04-14 NOTE — DISCHARGE INSTRUCTIONS
Take Tylenol, celebrex, and apply Voltaren gel as needed for pain.  Please follow-up with your PCP and orthopedist-referral provided.

## 2025-04-14 NOTE — ASSESSMENT & PLAN NOTE
Is having continued pain in his left shoulder.  Has been seen in the emergency room x 2.  They have prescribed Voltaren gel and also lidocaine to apply to the area to help with the pain.  Apparently they are only giving him Tylenol.  We will make sure that prescriptions were sent to the pharmacy for the Voltaren gel and also for either a lidocaine patch or the Lidoderm, lidocaine gel

## 2025-04-14 NOTE — TELEPHONE ENCOUNTER
"FOLLOW UP: Patient will be going to ED    REASON FOR CONVERSATION: Shoulder Pain    SYMPTOMS: Severe shoulder pain, moaning and rubbing shoulder when not moving arm.     OTHER: Seen in ED 4/11, see notes. Niece was hoping medication could be sent in but agreed best to be evaluated as patient is significantly worse today then when seen in ED on 4/11.    DISPOSITION: Go to ED Now (overriding Go to Office Now/Urgent Care)      Reason for Disposition   SEVERE pain (e.g., excruciating, unable to do any normal activities)    Answer Assessment - Initial Assessment Questions  1. ONSET: \"When did the pain start?\"      4/11, was seen in ED. Pain has now worsened  2. LOCATION: \"Where is the pain located?\"      Left shoulder  3. PAIN: \"How bad is the pain?\" (Scale 1-10; or mild, moderate, severe)      Severe, patient is moaning in bed  4. WORK OR EXERCISE: \"Has there been any recent work or exercise that involved this part of the body?\"      denies  5. CAUSE: \"What do you think is causing the shoulder pain?\"      unsure  6. OTHER SYMPTOMS: \"Do you have any other symptoms?\" (e.g., neck pain, swelling, rash, fever, numbness, weakness)      denies    Protocols used: Shoulder Pain-Adult-[place]    "

## 2025-04-14 NOTE — ED PROVIDER NOTES
Time reflects when diagnosis was documented in both MDM as applicable and the Disposition within this note       Time User Action Codes Description Comment    4/14/2025  2:08 PM Dre Waddell Add [M75.50,  M75.90] Bursitis and tendinitis of shoulder region           ED Disposition       ED Disposition   Discharge    Condition   Stable    Date/Time   Mon Apr 14, 2025  3:01 PM    Comment   Leroy Paredes discharge to home/self care.                   Assessment & Plan       Medical Decision Making  See ED course    Amount and/or Complexity of Data Reviewed  Radiology: ordered and independent interpretation performed. Decision-making details documented in ED Course.    Risk  OTC drugs.  Prescription drug management.        ED Course as of 04/14/25 1522   Mon Apr 14, 2025   1235 89-year-old male with history of dementia presenting for left shoulder pain.  Reports intermittent left shoulder pain and was previously evaluated for this 3 days ago.  Had a full workup which was negative for ACS picture or acute fractures.  Was noted to have a bursitis of the left shoulder.  Patient denies any acute pains.  There is a question whether the patient fell last week but this is unclear per family and facility.  Was previously found to have a rib fracture however.  Patient denies falls.  No other acute symptoms.   1404 CT cervical spine without contrast  IMPRESSION:     No cervical spine fracture or traumatic malalignment.     Diffuse idiopathic skeletal hyperostosis.     Chronically dilated proximal esophagus with tracheal and esophageal secretions. Correlate for esophageal dysmotility, reflux, and aspiration.   1407 CT head without contrast  IMPRESSION:     1. No acute intracranial abnormality. Chronic right cerebral hemisphere infarcts.     2. Unchanged chronic paranasal sinus disease with calcific material in the right maxillary and sphenoid sinuses, which may be secondary to polyps, fungal colonization or mycetoma formation.    1453 CT chest without contrast  IMPRESSION:     Usual interstitial pneumonitis pattern of interstitial lung disease.     Fluid and gas-filled distention of the upper esophagus. Correlate for signs/symptoms of dysmotility.     Subacute left 10th rib fracture.   1511 Prescribed Celebrex which patient can take in combination with Tylenol and Voltaren gel for bursitis.  Discharged with outpatient follow-up.       Medications   Diclofenac Sodium (VOLTAREN) 1 % topical gel 2 g (2 g Topical Given 4/14/25 1212)   acetaminophen (TYLENOL) tablet 650 mg (650 mg Oral Given 4/14/25 1212)       ED Risk Strat Scores                    No data recorded                            History of Present Illness       Chief Complaint   Patient presents with    Shoulder Pain     Patient arrived via EMS from facility for eval of Left shoulder pain that started today. EMS reports its been intermittent. Denies CP/SOB. Hx Dementia. Possible fall last week per facility staff and +rib fracture. Family wants patient admitted and MRI done. Pt grimacing and holding Left shoulder.        Past Medical History:   Diagnosis Date    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)     Last Assessed:  5/19/17    Chronic kidney disease     Diabetes mellitus (HCC)     Hyperlipidemia     Hypertension     Osteoarthritis     Palpitations     TIA (transient ischemic attack)       Past Surgical History:   Procedure Laterality Date    APPENDECTOMY      CARDIAC ELECTROPHYSIOLOGY PROCEDURE N/A 1/17/2024    Procedure: Cardiac loop recorder explant;  Surgeon: Drew Olmos MD;  Location: [facility];  Service: Cardiology    COLONOSCOPY  2012    HEMORROIDECTOMY      TOOTH EXTRACTION  02/02/2022      Family History   Problem Relation Age of Onset    Diabetes Mother     Other Mother         Stroke syndrome    Diabetes Father     Emphysema Father       Social History     Tobacco Use    Smoking status: Former    Smokeless tobacco: Never   Vaping Use    Vaping status:  Never Used   Substance Use Topics    Alcohol use: Not Currently     Comment: Social drinker    Drug use: No      E-Cigarette/Vaping    E-Cigarette Use Never User       E-Cigarette/Vaping Substances    Nicotine No     THC No     CBD No     Flavoring No     Other No     Unknown No       I have reviewed and agree with the history as documented.     89-year-old male with history of dementia presenting for left shoulder pain.  Reports intermittent left shoulder pain and was previously evaluated for this 3 days ago.  Had a full workup which was negative for ACS picture or acute fractures.  Was noted to have a bursitis of the left shoulder.  Patient denies any acute pains.  There is a question whether the patient fell last week but this is unclear per family and facility.  Was previously found to have a rib fracture however.  Patient denies falls.  No other acute symptoms.      Shoulder Pain  Associated symptoms: no back pain, no fever and no neck pain        Review of Systems   Constitutional:  Negative for fever.   HENT:  Negative for congestion and rhinorrhea.    Eyes:  Negative for visual disturbance.   Respiratory:  Negative for cough and shortness of breath.    Cardiovascular:  Negative for chest pain.   Gastrointestinal:  Negative for abdominal pain, diarrhea, nausea and vomiting.   Endocrine: Negative for polyuria.   Genitourinary:  Negative for dysuria, flank pain, frequency and hematuria.   Musculoskeletal:  Positive for arthralgias. Negative for back pain and neck pain.   Skin:  Negative for color change and rash.   Neurological:  Negative for syncope, weakness, numbness and headaches.           Objective       ED Triage Vitals   Temperature Pulse Blood Pressure Respirations SpO2 Patient Position - Orthostatic VS   04/14/25 1155 04/14/25 1153 04/14/25 1153 04/14/25 1153 04/14/25 1153 04/14/25 1153   97.8 °F (36.6 °C) 64 (!) 187/80 20 99 % Sitting      Temp Source Heart Rate Source BP Location FiO2 (%) Pain Score     04/14/25 1155 04/14/25 1153 04/14/25 1153 -- --    Oral Monitor Right arm        Vitals      Date and Time Temp Pulse SpO2 Resp BP Pain Score FACES Pain Rating User   04/14/25 1155 97.8 °F (36.6 °C) -- -- -- -- -- -- DP   04/14/25 1153 -- 64 99 % 20 187/80 -- -- DP            Physical Exam  Constitutional:       General: He is not in acute distress.     Appearance: Normal appearance. He is not toxic-appearing.   HENT:      Head: Normocephalic and atraumatic.      Nose: Nose normal.      Mouth/Throat:      Mouth: Mucous membranes are moist.   Eyes:      Extraocular Movements: Extraocular movements intact.      Pupils: Pupils are equal, round, and reactive to light.   Cardiovascular:      Rate and Rhythm: Normal rate and regular rhythm.   Pulmonary:      Effort: Pulmonary effort is normal. No respiratory distress.   Abdominal:      General: Abdomen is flat. There is no distension.   Musculoskeletal:         General: Tenderness (point tender to palpation to Left shoulder) present. No swelling, deformity or signs of injury. Normal range of motion.      Cervical back: Normal range of motion. No tenderness.   Skin:     General: Skin is warm.      Coloration: Skin is not jaundiced or pale.   Neurological:      General: No focal deficit present.      Mental Status: He is alert and oriented to person, place, and time.      Sensory: No sensory deficit.      Motor: No weakness.   Psychiatric:         Mood and Affect: Mood normal.         Behavior: Behavior normal.         Results Reviewed       None            CT head without contrast   Final Interpretation by Pallav N Shah, MD (04/14 0615)      1. No acute intracranial abnormality. Chronic right cerebral hemisphere infarcts.      2. Unchanged chronic paranasal sinus disease with calcific material in the right maxillary and sphenoid sinuses, which may be secondary to polyps, fungal colonization or mycetoma formation.                        Resident: Boy Harrison       I, the attending radiologist, have reviewed the images and agree with the final report above.      Workstation performed: [id number]         CT cervical spine without contrast   Final Interpretation by Pallav N Shah, MD (04/14 1401)      No cervical spine fracture or traumatic malalignment.      Diffuse idiopathic skeletal hyperostosis.      Chronically dilated proximal esophagus with tracheal and esophageal secretions. Correlate for esophageal dysmotility, reflux, and aspiration.                           Resident: Boy Harrison I, the attending radiologist, have reviewed the images and agree with the final report above.      Workstation performed: [id number]         CT chest without contrast   Final Interpretation by Jaylon Rasmussen MD (04/14 1444)      Usual interstitial pneumonitis pattern of interstitial lung disease.      Fluid and gas-filled distention of the upper esophagus. Correlate for signs/symptoms of dysmotility.      Subacute left 10th rib fracture.               Resident: Evan Marie I, the attending radiologist, have reviewed the images and agree with the final report above.      Workstation performed: [id number]         XR shoulder 2+ views LEFT   ED Interpretation by Dre Waddell MD (04/14 1225)   No acute bony pathology.          Procedures    ED Medication and Procedure Management   Prior to Admission Medications   Prescriptions Last Dose Informant Patient Reported? Taking?   Blood Glucose Monitoring Suppl (PRECISION XTRA MONITOR) MARY  Self No No   Sig: by Does not apply route daily   Cholecalciferol (D3 High Potency) 25 MCG (1000 UT) capsule   No No   Sig: TAKE ONE CAPSULE BY MOUTH DAILY   QUEtiapine (SEROquel) 25 mg tablet   No No   Sig: Take 1 tablet (25 mg total) by mouth daily at bedtime   acetaminophen (TYLENOL) 325 mg tablet  Self No No   Sig: Take 2 tablets (650 mg total) by mouth every 6 (six) hours as needed for mild pain   apixaban (Eliquis) 2.5 mg    No No   Sig: TAKE 1 TABLET TWICE DAILY   atorvastatin (LIPITOR) 40 mg tablet   No No   Sig: TAKE 1 TABLET EVERY DAY WITH DINNER   bisacodyl ([facility] Bisacodyl) 10 mg suppository  Self Yes No   Sig: Insert 10 mg into the rectum daily   escitalopram (LEXAPRO) 5 mg tablet   No No   Sig: TAKE ONE TABLET BY MOUTH DAILY   gabapentin (NEURONTIN) 100 mg capsule   No No   Sig: TAKE ONE CAPSULE BY MOUTH EVERY NIGHT AT BEDTIME   lisinopril (ZESTRIL) 5 mg tablet   No No   Sig: TAKE 1 TABLET EVERY DAY   magnesium Oxide (MAG-OX) 400 mg TABS   No No   Sig: TAKE ONE TABLET BY MOUTH DAILY   melatonin 3 mg   No No   Sig: TAKE ONE TABLET BY MOUTH EVERY NIGHT AT BEDTIME   mirtazapine (REMERON) 30 mg tablet   No No   Sig: Take 1 tablet (30 mg total) by mouth daily at bedtime   nateglinide (STARLIX) 60 mg tablet   No No   Sig: Take 1 tablet (60 mg total) by mouth 3 (three) times a day before meals   vitamin B-12 (CYANOCOBALAMIN) 500 MCG TABS  Self No No   Sig: Take 1 tablet (500 mcg total) by mouth 2 (two) times a week      Facility-Administered Medications: None     Patient's Medications   Discharge Prescriptions    CELECOXIB (CELEBREX) 200 MG CAPSULE    Take 1 capsule (200 mg total) by mouth 2 (two) times a day       Start Date: 4/14/2025 End Date: --       Order Dose: 200 mg       Quantity: 60 capsule    Refills: 0     No discharge procedures on file.  ED SEPSIS DOCUMENTATION   Time reflects when diagnosis was documented in both MDM as applicable and the Disposition within this note       Time User Action Codes Description Comment    4/14/2025  2:08 PM Dre Waddell Add [M75.50,  M75.90] Bursitis and tendinitis of shoulder region                  rDe Waddell MD  04/14/25 1729

## 2025-04-15 ENCOUNTER — TELEPHONE (OUTPATIENT)
Dept: NEPHROLOGY | Facility: CLINIC | Age: OVER 89
End: 2025-04-15

## 2025-04-15 ENCOUNTER — OFFICE VISIT (OUTPATIENT)
Dept: NEPHROLOGY | Facility: CLINIC | Age: OVER 89
End: 2025-04-15
Payer: MEDICARE

## 2025-04-15 VITALS — OXYGEN SATURATION: 96 % | HEART RATE: 70 BPM | DIASTOLIC BLOOD PRESSURE: 58 MMHG | SYSTOLIC BLOOD PRESSURE: 106 MMHG

## 2025-04-15 DIAGNOSIS — E11.22 CKD STAGE 4 DUE TO TYPE 2 DIABETES MELLITUS (HCC): Primary | ICD-10-CM

## 2025-04-15 DIAGNOSIS — I10 ESSENTIAL HYPERTENSION: ICD-10-CM

## 2025-04-15 DIAGNOSIS — N18.4 CKD STAGE 4 DUE TO TYPE 2 DIABETES MELLITUS (HCC): Primary | ICD-10-CM

## 2025-04-15 DIAGNOSIS — E78.2 MIXED HYPERLIPIDEMIA: ICD-10-CM

## 2025-04-15 DIAGNOSIS — I63.40 CEREBROVASCULAR ACCIDENT (CVA) DUE TO EMBOLISM OF CEREBRAL ARTERY (HCC): ICD-10-CM

## 2025-04-15 DIAGNOSIS — N18.4 ANEMIA IN STAGE 4 CHRONIC KIDNEY DISEASE  (HCC): ICD-10-CM

## 2025-04-15 DIAGNOSIS — G30.1 LATE ONSET ALZHEIMER'S DEMENTIA WITHOUT BEHAVIORAL DISTURBANCE, PSYCHOTIC DISTURBANCE, MOOD DISTURBANCE, OR ANXIETY, UNSPECIFIED DEMENTIA SEVERITY (HCC): ICD-10-CM

## 2025-04-15 DIAGNOSIS — F02.80 LATE ONSET ALZHEIMER'S DEMENTIA WITHOUT BEHAVIORAL DISTURBANCE, PSYCHOTIC DISTURBANCE, MOOD DISTURBANCE, OR ANXIETY, UNSPECIFIED DEMENTIA SEVERITY (HCC): ICD-10-CM

## 2025-04-15 DIAGNOSIS — D63.1 ANEMIA IN STAGE 4 CHRONIC KIDNEY DISEASE  (HCC): ICD-10-CM

## 2025-04-15 PROCEDURE — 99214 OFFICE O/P EST MOD 30 MIN: CPT | Performed by: INTERNAL MEDICINE

## 2025-04-15 PROCEDURE — G2211 COMPLEX E/M VISIT ADD ON: HCPCS | Performed by: INTERNAL MEDICINE

## 2025-04-15 RX ORDER — MAGNESIUM HYDROXIDE 2400 MG/30ML
30 SUSPENSION ORAL AS NEEDED
COMMUNITY

## 2025-04-15 NOTE — ASSESSMENT & PLAN NOTE
Blood pressure today stable in the lower side.  He is not on any antihypertensive medications.

## 2025-04-15 NOTE — PATIENT INSTRUCTIONS
As we discussed in the office visit and based on the most recent blood test your kidney function remains overall stable with a creatinine lately in the low twos.  You have advanced chronic kidney disease stage IV.  After discussion with you and your niece Pam, your stated you are not interested on dialysis in the future if needed.  Plan to continue monitoring every 6 months repeat labs and will continue with a conservative approach.  Stay well-hydrated.  Follow low-salt diet.  Avoid NSAIDs (no ibuprofen, Motrin, Advil, Aleve, naproxen).  Okay to take Tylenol or acetaminophen as needed for pain.  Continue close follow-up with primary care doctor.

## 2025-04-15 NOTE — ASSESSMENT & PLAN NOTE
Lab Results   Component Value Date    EGFR 22 04/11/2025    EGFR 23 03/04/2025    EGFR 18 02/09/2025    CREATININE 2.38 (H) 04/11/2025    CREATININE 2.26 (H) 03/04/2025    CREATININE 2.77 (H) 02/09/2025          Recent hemoglobin 9.2.  Follow-up in 6 months

## 2025-04-15 NOTE — ASSESSMENT & PLAN NOTE
Lab Results   Component Value Date    HGBA1C 8.0 (H) 03/04/2025       Orders:    CBC; Future    Renal function panel; Future    PTH, intact; Future    Protein / creatinine ratio, urine; Future  CKD stage IV with baseline around 1.9-2.2 since 2018.  Most recent creatinine is slightly higher at 2.38.  3 in the setting of hypertensive nephrosclerosis, atherosclerotic disease, possible diabetes component as well as age-related nephron loss  Patient is not interested on renal replacement therapy in the future if needed  Continue with conservative approach, follow-up in 6 months with repeat labs.  Avoid NSAIDs

## 2025-04-15 NOTE — PROGRESS NOTES
Name: Leroy Paredes      : 1930      MRN: [id number]  Encounter Provider: Otilio Esposito MD  Encounter Date: 4/15/2025   Encounter department: [facility]  :  Assessment & Plan  CKD stage 4 due to type 2 diabetes mellitus (HCC)    Lab Results   Component Value Date    HGBA1C 8.0 (H) 2025       Orders:    CBC; Future    Renal function panel; Future    PTH, intact; Future    Protein / creatinine ratio, urine; Future  CKD stage IV with baseline around 1.9-2.2 since 2018.  Most recent creatinine is slightly higher at 2.38.  3 in the setting of hypertensive nephrosclerosis, atherosclerotic disease, possible diabetes component as well as age-related nephron loss  Patient is not interested on renal replacement therapy in the future if needed  Continue with conservative approach, follow-up in 6 months with repeat labs.  Avoid NSAIDs    Essential hypertension       Blood pressure today stable in the lower side.  He is not on any antihypertensive medications.    Embolic bilateral punctate CVA, acute as well as subacute         Late onset Alzheimer's dementia without behavioral disturbance, psychotic disturbance, mood disturbance, or anxiety, unspecified dementia severity (HCC)       As per primary care doctor  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  (HCC)  Lab Results   Component Value Date    EGFR 22 2025    EGFR 23 2025    EGFR 18 2025    CREATININE 2.38 (H) 2025    CREATININE 2.26 (H) 2025    CREATININE 2.77 (H) 2025          Recent hemoglobin 9.2.  Follow-up in 6 months  Mixed hyperlipidemia       On Atorvastatin as per primary care doctor      Patient Instructions   As we discussed in the office visit and based on the most recent blood test your kidney function remains overall stable with a creatinine lately in the low twos.  You have advanced chronic kidney disease stage IV.  After discussion with you and your niece Pam, your  stated you are not interested on dialysis in the future if needed.  Plan to continue monitoring every 6 months repeat labs and will continue with a conservative approach.  Stay well-hydrated.  Follow low-salt diet.  Avoid NSAIDs (no ibuprofen, Motrin, Advil, Aleve, naproxen).  Okay to take Tylenol or acetaminophen as needed for pain.  Continue close follow-up with primary care doctor.      It was a pleasure evaluating your patient in the office today. Thank you for allowing our team to participate in the care of  Leroy Paredes. Please do not hesitate to contact our team if further issues/questions shall arise in the interim.     History of Present Illness   HPI  Leroy Paredes is a 89 y.o. male who presents to the office for CKD follow-up with his niece Pam (CASSANDRA) as well as sister Sloane.    Patient went to the ED yesterday due to shoulder pain, currently improved after treatment.  Noted he was hospitalized February of this year with concern for cellulitis versus UTI as well as NELLI on top of CKD.    Today in general is doing well, denies significant complaints.  Denies any chest pain, no shortness of breath, swelling.  Denies any abdominal pain, no nausea, no vomiting, no diarrhea or constipation, no appetite changes.  No urinary problems, no dysuria, no gross hematuria.  Medication list from nursing home reviewed    History obtained from: patient and patient's niece Pam (CASSANDRA)  Pertinent Medical History   CKD stage IV creatinine around 1.9-2.2 since 2018.  Hypertension.  Hyperlipidemia.  Diabetes.  History of CVA.  Dementia.  Chronic anemia    Review of Systems  ROS: All the systems were reviewed and were negative except as documented on the H&P.    Medical History Reviewed by provider this encounter:  Allergies  Meds     .       Current Outpatient Medications on File Prior to Visit   Medication Sig Dispense Refill    acetaminophen (TYLENOL) 325 mg tablet Take 2 tablets (650 mg total) by mouth every 6 (six)  hours as needed for mild pain  0    apixaban (Eliquis) 2.5 mg TAKE 1 TABLET TWICE DAILY 180 tablet 1    atorvastatin (LIPITOR) 40 mg tablet TAKE 1 TABLET EVERY DAY WITH DINNER 90 tablet 1    bisacodyl ([employer] Bisacodyl) 10 mg suppository Insert 10 mg into the rectum daily      Cholecalciferol (D3 High Potency) 25 MCG (1000 UT) capsule TAKE ONE CAPSULE BY MOUTH DAILY 30 capsule 5    escitalopram (LEXAPRO) 5 mg tablet TAKE ONE TABLET BY MOUTH DAILY 30 tablet 5    gabapentin (NEURONTIN) 100 mg capsule TAKE ONE CAPSULE BY MOUTH EVERY NIGHT AT BEDTIME 30 capsule 5    magnesium hydroxide (Milk of Magnesia) 400 mg/5 mL oral suspension Take 30 mL by mouth if needed for constipation Patient take every 4 days as needed.      magnesium Oxide (MAG-OX) 400 mg TABS TAKE ONE TABLET BY MOUTH DAILY 30 tablet 5    melatonin 3 mg TAKE ONE TABLET BY MOUTH EVERY NIGHT AT BEDTIME 30 tablet 5    mirtazapine (REMERON) 30 mg tablet Take 1 tablet (30 mg total) by mouth daily at bedtime 30 tablet 5    nateglinide (STARLIX) 60 mg tablet Take 1 tablet (60 mg total) by mouth 3 (three) times a day before meals 90 tablet 0    QUEtiapine (SEROquel) 25 mg tablet Take 1 tablet (25 mg total) by mouth daily at bedtime      vitamin B-12 (CYANOCOBALAMIN) 500 MCG TABS Take 1 tablet (500 mcg total) by mouth 2 (two) times a week 60 tablet 1    Blood Glucose Monitoring Suppl (PRECISION XTRA MONITOR) MARY by Does not apply route daily 1 each 0    celecoxib (CeleBREX) 200 mg capsule Take 1 capsule (200 mg total) by mouth 2 (two) times a day (Patient not taking: Reported on 4/15/2025) 60 capsule 0    Diclofenac Sodium (VOLTAREN) 1 % Apply 2 g topically 4 (four) times a day Use as needed. (Patient not taking: Reported on 4/15/2025) 300 g 1    lisinopril (ZESTRIL) 5 mg tablet TAKE 1 TABLET EVERY DAY (Patient not taking: Reported on 4/15/2025) 90 tablet 3     Current Facility-Administered Medications on File Prior to Visit   Medication Dose Route Frequency Provider  Last Rate Last Admin    [COMPLETED] acetaminophen (TYLENOL) tablet 650 mg  650 mg Oral Once Dre Waddell MD   650 mg at 04/14/25 1212    [DISCONTINUED] Diclofenac Sodium (VOLTAREN) 1 % topical gel 2 g  2 g Topical 4x Daily Dre Waddell MD   2 g at 04/14/25 1212     Objective   /58 (BP Location: Right arm, Patient Position: Sitting, Cuff Size: Adult)   Pulse 70   SpO2 96%      Physical Exam  General: conscious, cooperative, in not acute distress, sitting on a wheelchair  Eyes: conjunctivae pink, anicteric sclerae  ENT: lips and mucous membranes moist  Neck: supple, no JVD  Chest: clear breath sounds bilateral, no crackles, ronchus or wheezings  CVS: distinct S1 & S2, normal rate, regular rhythm  Abdomen: soft, non-tender, non-distended, normoactive bowel sounds  Back: no CVA tenderness  Extremities: no edema of both legs  Skin: no rash  Neuro: awake, alert, oriented      Laboratory Results:  Results from last 7 days   Lab Units 04/11/25  2304   WBC Thousand/uL 7.74   HEMOGLOBIN g/dL 9.2*   HEMATOCRIT % 28.1*   PLATELETS Thousands/uL 259   POTASSIUM mmol/L 4.6   CHLORIDE mmol/L 106   CO2 mmol/L 21   BUN mg/dL 44*   CREATININE mg/dL 2.38*   CALCIUM mg/dL 8.6       Results for orders placed or performed during the hospital encounter of 04/11/25   CBC and differential   Result Value Ref Range    WBC 7.74 4.31 - 10.16 Thousand/uL    RBC 2.96 (L) 3.88 - 5.62 Million/uL    Hemoglobin 9.2 (L) 12.0 - 17.0 g/dL    Hematocrit 28.1 (L) 36.5 - 49.3 %    MCV 95 82 - 98 fL    MCH 31.1 26.8 - 34.3 pg    MCHC 32.7 31.4 - 37.4 g/dL    RDW 14.4 11.6 - 15.1 %    MPV 8.6 (L) 8.9 - 12.7 fL    Platelets 259 149 - 390 Thousands/uL    nRBC 0 /100 WBCs    Segmented % 76 (H) 43 - 75 %    Immature Grans % 0 0 - 2 %    Lymphocytes % 11 (L) 14 - 44 %    Monocytes % 8 4 - 12 %    Eosinophils Relative 5 0 - 6 %    Basophils Relative 0 0 - 1 %    Absolute Neutrophils 5.89 1.85 - 7.62 Thousands/µL    Absolute Immature Grans 0.03 0.00 - 0.20  "Thousand/uL    Absolute Lymphocytes 0.81 0.60 - 4.47 Thousands/µL    Absolute Monocytes 0.61 0.17 - 1.22 Thousand/µL    Eosinophils Absolute 0.38 0.00 - 0.61 Thousand/µL    Basophils Absolute 0.02 0.00 - 0.10 Thousands/µL   Comprehensive metabolic panel   Result Value Ref Range    Sodium 135 135 - 147 mmol/L    Potassium 4.6 3.5 - 5.3 mmol/L    Chloride 106 96 - 108 mmol/L    CO2 21 21 - 32 mmol/L    ANION GAP 8 4 - 13 mmol/L    BUN 44 (H) 5 - 25 mg/dL    Creatinine 2.38 (H) 0.60 - 1.30 mg/dL    Glucose 253 (H) 65 - 140 mg/dL    Calcium 8.6 8.4 - 10.2 mg/dL    AST 14 13 - 39 U/L    ALT 11 7 - 52 U/L    Alkaline Phosphatase 96 34 - 104 U/L    Total Protein 7.3 6.4 - 8.4 g/dL    Albumin 3.6 3.5 - 5.0 g/dL    Total Bilirubin 0.73 0.20 - 1.00 mg/dL    eGFR 22 ml/min/1.73sq m   C-reactive protein   Result Value Ref Range    CRP 13.4 (H) <3.0 mg/L   Sedimentation rate, automated   Result Value Ref Range    Sed Rate 51 (H) 0 - 19 mm/hour   HS Troponin 0hr (reflex protocol)   Result Value Ref Range    hs TnI 0hr 22 \"Refer to ACS Flowchart\"- see link ng/L   CK   Result Value Ref Range    Total CK 46 39 - 308 U/L   HS Troponin I 2hr   Result Value Ref Range    hs TnI 2hr 21 \"Refer to ACS Flowchart\"- see link ng/L    Delta 2hr hsTnI -1 <20 ng/L         "

## 2025-04-15 NOTE — PROGRESS NOTES
[facility] INTERNAL MEDICINE- [place]  OFFICE VISIT     PATIENT INFORMATION     Leroy Paredes   89 y.o. male   MRN: [id number]    ASSESSMENT/PLAN     Assessment & Plan  Acute pain of left shoulder  On 04/11/25 patient presented to [facility] emergency department with complaint of left shoulder pain.  He was having pain with movement of his left shoulder, swelling, bruising to the left upper arm.  Patient and nursing facility staff deny any falls.  X-ray of shoulder showed arthritis and clavicular acromial joint separation but otherwise no acute osseous abnormality.  A CT upper extremity without contrast on the left side was performed which showed no evidence of soft tissue mass and degenerative changes of the acromioclavicular joint with associated bony and soft tissue protuberance superiorly.  Patient was given Tylenol in the ED and discharged back to nursing facility with referral to orthopedic surgery.  On 04/13/2025 patient's daughter contacted patient's primary care physician for further instructions on next step of care.  They only wanted to do an orthopedic referral if absolutely necessary to try to limit appointments outside for the patient.  Patient was given prescription for Voltaren gel to help with the shoulder pain.  04/14/25 patient presented back to [facility] emergency department for left shoulder pain.  Patient was diagnosed with bursitis of left shoulder.  Patient was prescribed Celebrex which she can take with Tylenol Voltaren gel for bursitis and was discharged from the ED in stable condition.  Patient presents to office accompanied by his sister and his niece.  He reports improvement in his shoulder pain.  States at rest he has no shoulder pain but he still experiences pain in shoulder with movement of his left arm.  Any movement makes it worse but in particular abduction seems to make it pain worse.  He is also limited in his range of motion and  unable to abduct his arm greater than 90 degrees.  No numbness, tingling, objective weakness.  I suspect patient's acute shoulder pain is likely multifactorial.  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  Discussed treatment strategies for this condition and patient, sister, and niece agree that conservative treatment would be in patient's best interest.  Would recommend continued treatment with Tylenol, Voltaren gel.  Patient  should also participate in physical therapy  to maintain range of motion in left shoulderand a prescription for physical therapy was provided to him today.  Since patient is interested in conservative treatment he and his niece inquired if he needed to go to the orthopedic appointment.  I do not see further benefit that could be obtained from orthopedics at this time with current plan to pursue conservative therapy.  Patient will intend to cancel his orthopedic appointment and will consider it down the line if need be and if no improvement.  Orders:    Ambulatory Referral to Physical Therapy; Future    Pulmonary emphysema, unspecified emphysema type (HCC)  HCC diagnosis reviewed.  Patient hemodynamically stable with appropriate SPO2 of 98% on room air.  No wheezing, rales, rhonchi in lung fields.  Patient asymptomatic with no dyspnea.            HEALTH MAINTENANCE     Immunization History   Administered Date(s) Administered    COVID-19 MODERNA VACC 0.5 ML IM 01/27/2021, 02/22/2021, 11/22/2021, 07/23/2022    COVID-19 Pfizer mRNA vacc PF naresh-sucrose 12 yr and older (Comirnaty) 01/27/2025    INFLUENZA 11/01/2002, 01/05/2003, 11/24/2003, 10/06/2004, 11/07/2005, 10/21/2006, 09/28/2007, 10/10/2008, 10/20/2008, 11/04/2009, 10/05/2010, 10/03/2011, 11/04/2012    Influenza Split High Dose Preservative Free IM 09/27/2013, 09/25/2014,  09/29/2015, 09/23/2016, 09/19/2017    Influenza, high dose seasonal 0.7 mL 09/25/2018, 10/15/2020, 09/20/2021, 09/26/2022, 10/02/2023    Pneumococcal 01/01/1999    Pneumococcal Conjugate 13-Valent 04/25/2019    Pneumococcal Polysaccharide PPV23 10/01/2006    TD (adult) Preservative Free 12/11/1930    influenza, trivalent, adjuvanted 01/27/2025         CHIEF COMPLAINT     No chief complaint on file.     HISTORY OF PRESENT ILLNESS     Mr. Leroy Paredes is a 89-year-old male with past medical history of benign hypertension with CKD stage IV, embolic bilateral punctate CVA, chronic heart failure with preserved ejection fraction, chronic atrial fibrillation, type 2 diabetes mellitus, secondary parathyroidism of renal origin, dementia, anemia in setting of stage IV CKD.  He presents to clinic today for follow-up of recent emergency department visits.    On 04/11/25 patient presented to [facility] emergency department with complaint of left shoulder pain.  He was having pain with movement of his left shoulder, swelling, bruising to the left upper arm.  Patient and nursing facility staff deny any falls.  Workup showed hemoglobin at baseline and no leukocytosis.  Chest x-ray negative, and x-ray of shoulder showed arthritis and clavicular acromial joint separation but otherwise no acute osseous abnormality.  A CT upper extremity without contrast on the left side was performed which showed no evidence of soft tissue mass and degenerative changes of the acromioclavicular joint with associated bony and soft tissue protuberance superiorly.  CT chest showed interstitial lung disease and a subacute left 10th rib fracture.  Patient was given Tylenol in the ED and discharged back to nursing facility with referral to orthopedic surgery.    On 04/13/2025 patient's daughter contacted patient's primary care physician for further instructions on next step of care.  They only wanted to do an orthopedic referral if  absolutely necessary to try to limit appointments outside for the patient.  Patient was given prescription for Voltaren gel to help with the shoulder pain.  Then on    04/14/25 patient presented back to [facility] emergency department for left shoulder pain.  Patient was diagnosed with bursitis of left shoulder.  Patient was prescribed Celebrex which she can take with Tylenol Voltaren gel for bursitis and was discharged from the ED in stable condition.    REVIEW OF SYSTEMS     Review of Systems   Constitutional:  Negative for chills and fever.   HENT:  Negative for congestion and rhinorrhea.    Respiratory:  Negative for cough, shortness of breath and wheezing.    Cardiovascular:  Negative for chest pain and leg swelling.   Gastrointestinal:  Negative for abdominal pain, constipation, diarrhea, nausea and vomiting.   Genitourinary:  Negative for difficulty urinating and dysuria.   Musculoskeletal:  Positive for arthralgias. Negative for back pain.   Skin:  Negative for rash and wound.   Neurological:  Positive for weakness. Negative for dizziness and headaches.   Psychiatric/Behavioral:  Negative for agitation, behavioral problems and confusion.      OBJECTIVE     Vitals:    04/16/25 1312   Pulse: 70   Resp: 16   SpO2: 98%     Physical Exam  Constitutional:       Appearance: Normal appearance.   HENT:      Right Ear: External ear normal.      Left Ear: External ear normal.   Eyes:      Extraocular Movements: Extraocular movements intact.      Conjunctiva/sclera: Conjunctivae normal.      Pupils: Pupils are equal, round, and reactive to light.   Cardiovascular:      Rate and Rhythm: Normal rate and regular rhythm.      Pulses: Normal pulses.      Heart sounds: Normal heart sounds. No murmur heard.  Pulmonary:      Effort: Pulmonary effort is normal. No respiratory distress.      Breath sounds: Normal breath sounds. No wheezing, rhonchi or rales.   Abdominal:      General: Abdomen is flat. Bowel  sounds are normal. There is no distension.      Palpations: Abdomen is soft.      Tenderness: There is no abdominal tenderness.   Musculoskeletal:         General: Tenderness (Patient with tenderness to palpation over posterior aspect of left shoulder.  Patient also unable to lift shoulder greater than 90 degrees.) present.      Right lower leg: No edema.      Left lower leg: No edema.   Skin:     General: Skin is warm and dry.   Neurological:      Mental Status: He is alert and oriented to person, place, and time.      Motor: No weakness.   Psychiatric:         Mood and Affect: Mood normal.         Behavior: Behavior normal.         Thought Content: Thought content normal.       CURRENT MEDICATIONS     Current Outpatient Medications:     acetaminophen (TYLENOL) 325 mg tablet, Take 2 tablets (650 mg total) by mouth every 6 (six) hours as needed for mild pain, Disp: , Rfl: 0    apixaban (Eliquis) 2.5 mg, TAKE 1 TABLET TWICE DAILY, Disp: 180 tablet, Rfl: 1    atorvastatin (LIPITOR) 40 mg tablet, TAKE 1 TABLET EVERY DAY WITH DINNER, Disp: 90 tablet, Rfl: 1    bisacodyl ([employer] Bisacodyl) 10 mg suppository, Insert 10 mg into the rectum daily, Disp: , Rfl:     Blood Glucose Monitoring Suppl (PRECISION XTRA MONITOR) MARY, by Does not apply route daily, Disp: 1 each, Rfl: 0    celecoxib (CeleBREX) 200 mg capsule, Take 1 capsule (200 mg total) by mouth 2 (two) times a day (Patient not taking: Reported on 4/15/2025), Disp: 60 capsule, Rfl: 0    Cholecalciferol (D3 High Potency) 25 MCG (1000 UT) capsule, TAKE ONE CAPSULE BY MOUTH DAILY, Disp: 30 capsule, Rfl: 5    Diclofenac Sodium (VOLTAREN) 1 %, Apply 2 g topically 4 (four) times a day Use as needed. (Patient not taking: Reported on 4/15/2025), Disp: 300 g, Rfl: 1    escitalopram (LEXAPRO) 5 mg tablet, TAKE ONE TABLET BY MOUTH DAILY, Disp: 30 tablet, Rfl: 5    gabapentin (NEURONTIN) 100 mg capsule, TAKE ONE CAPSULE BY MOUTH EVERY NIGHT AT BEDTIME, Disp: 30 capsule, Rfl: 5     lisinopril (ZESTRIL) 5 mg tablet, TAKE 1 TABLET EVERY DAY (Patient not taking: Reported on 4/15/2025), Disp: 90 tablet, Rfl: 3    magnesium hydroxide (Milk of Magnesia) 400 mg/5 mL oral suspension, Take 30 mL by mouth if needed for constipation Patient take every 4 days as needed., Disp: , Rfl:     magnesium Oxide (MAG-OX) 400 mg TABS, TAKE ONE TABLET BY MOUTH DAILY, Disp: 30 tablet, Rfl: 5    melatonin 3 mg, TAKE ONE TABLET BY MOUTH EVERY NIGHT AT BEDTIME, Disp: 30 tablet, Rfl: 5    mirtazapine (REMERON) 30 mg tablet, Take 1 tablet (30 mg total) by mouth daily at bedtime, Disp: 30 tablet, Rfl: 5    nateglinide (STARLIX) 60 mg tablet, TAKE 1 TABLET (60 MG TOTAL) BY MOUTH 3 (THREE) TIMES A DAY BEFORE MEALS, Disp: 90 tablet, Rfl: 5    QUEtiapine (SEROquel) 25 mg tablet, Take 1 tablet (25 mg total) by mouth daily at bedtime, Disp: , Rfl:     vitamin B-12 (CYANOCOBALAMIN) 500 MCG TABS, Take 1 tablet (500 mcg total) by mouth 2 (two) times a week, Disp: 60 tablet, Rfl: 1    PAST MEDICAL & SURGICAL HISTORY     Past Medical History:   Diagnosis Date    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)     Last Assessed:  5/19/17    Chronic kidney disease     Diabetes mellitus (HCC)     Hyperlipidemia     Hypertension     Osteoarthritis     Palpitations     TIA (transient ischemic attack)      Past Surgical History:   Procedure Laterality Date    APPENDECTOMY      CARDIAC ELECTROPHYSIOLOGY PROCEDURE N/A 1/17/2024    Procedure: Cardiac loop recorder explant;  Surgeon: Drew Olmos MD;  Location: [facility];  Service: Cardiology    COLONOSCOPY  2012    HEMORROIDECTOMY      TOOTH EXTRACTION  02/02/2022     SOCIAL & FAMILY HISTORY     Social History     Socioeconomic History    Marital status: Single     Spouse name: Not on file    Number of children: Not on file    Years of education: Not on file    Highest education level: Not on file   Occupational History    Not on file   Tobacco Use    Smoking status: Former    Smokeless  tobacco: Never   Vaping Use    Vaping status: Never Used   Substance and Sexual Activity    Alcohol use: Not Currently     Comment: Social drinker    Drug use: No    Sexual activity: Not Currently   Other Topics Concern    Not on file   Social History Narrative    Daily coffee consumption (2 cups/day)     Social Drivers of Health     Financial Resource Strain: Low Risk  (3/7/2024)    Overall Financial Resource Strain (CARDIA)     Difficulty of Paying Living Expenses: Not very hard   Food Insecurity: No Food Insecurity (2025)    Nursing - Inadequate Food Risk Classification     Worried About Running Out of Food in the Last Year: Never true     Ran Out of Food in the Last Year: Never true     Ran Out of Food in the Last Year: Never true   Transportation Needs: No Transportation Needs (2025)    Nursing - Transportation Risk Classification     Lack of Transportation: Not on file     Lack of Transportation: No   Physical Activity: Insufficiently Active (7/10/2020)    Exercise Vital Sign     Days of Exercise per Week: 3 days     Minutes of Exercise per Session: 20 min   Stress: Not on file   Social Connections: Not on file   Intimate Partner Violence: Unknown (2025)    Nursing IPS     Feels Physically and Emotionally Safe: Not on file     Physically Hurt by Someone: Not on file     Humiliated or Emotionally Abused by Someone: Not on file     Physically Hurt by Someone: No     Hurt or Threatened by Someone: No   Housing Stability: Unknown (2025)    Nursing: Inadequate Housing Risk Classification     Has Housing: Not on file     Worried About Losing Housing: Not on file     Unable to Get Utilities: Not on file     Unable to Pay for Housing in the Last Year: No     Has Housin     Social History     Substance and Sexual Activity   Alcohol Use Not Currently    Comment: Social drinker       Social History     Substance and Sexual Activity   Drug Use No     Social History     Tobacco Use   Smoking Status  Former   Smokeless Tobacco Never     Family History   Problem Relation Age of Onset    Diabetes Mother     Other Mother         Stroke syndrome    Diabetes Father     Emphysema Father      ==  Deon Alicia DO  [facility]  [address] [place] [address] [address]  [place] [address]  Office: [phone]  Fax: [phone]

## 2025-04-15 NOTE — TELEPHONE ENCOUNTER
Pt's daughter stated pt's facility will call to schedule October/ 6 month follow up with Dr. Esposito.

## 2025-04-16 ENCOUNTER — TELEPHONE (OUTPATIENT)
Age: OVER 89
End: 2025-04-16

## 2025-04-16 ENCOUNTER — OFFICE VISIT (OUTPATIENT)
Age: OVER 89
End: 2025-04-16
Payer: MEDICARE

## 2025-04-16 VITALS — OXYGEN SATURATION: 98 % | HEART RATE: 70 BPM | RESPIRATION RATE: 16 BRPM

## 2025-04-16 DIAGNOSIS — E08.21 DIABETES MELLITUS DUE TO UNDERLYING CONDITION, CONTROLLED, WITH DIABETIC NEPHROPATHY, WITH LONG-TERM CURRENT USE OF INSULIN (HCC): ICD-10-CM

## 2025-04-16 DIAGNOSIS — J43.9 PULMONARY EMPHYSEMA, UNSPECIFIED EMPHYSEMA TYPE (HCC): ICD-10-CM

## 2025-04-16 DIAGNOSIS — Z79.4 DIABETES MELLITUS DUE TO UNDERLYING CONDITION, CONTROLLED, WITH DIABETIC NEPHROPATHY, WITH LONG-TERM CURRENT USE OF INSULIN (HCC): ICD-10-CM

## 2025-04-16 DIAGNOSIS — M25.512 ACUTE PAIN OF LEFT SHOULDER: Primary | ICD-10-CM

## 2025-04-16 LAB
ATRIAL RATE: 66 BPM
P AXIS: 72 DEGREES
PR INTERVAL: 228 MS
QRS AXIS: -55 DEGREES
QRSD INTERVAL: 146 MS
QT INTERVAL: 482 MS
QTC INTERVAL: 505 MS
T WAVE AXIS: 61 DEGREES
VENTRICULAR RATE: 66 BPM

## 2025-04-16 PROCEDURE — G2211 COMPLEX E/M VISIT ADD ON: HCPCS | Performed by: STUDENT IN AN ORGANIZED HEALTH CARE EDUCATION/TRAINING PROGRAM

## 2025-04-16 PROCEDURE — 99213 OFFICE O/P EST LOW 20 MIN: CPT | Performed by: STUDENT IN AN ORGANIZED HEALTH CARE EDUCATION/TRAINING PROGRAM

## 2025-04-16 PROCEDURE — 93010 ELECTROCARDIOGRAM REPORT: CPT | Performed by: INTERNAL MEDICINE

## 2025-04-16 RX ORDER — NATEGLINIDE 60 MG/1
60 TABLET ORAL
Qty: 90 TABLET | Refills: 5 | Status: SHIPPED | OUTPATIENT
Start: 2025-04-16 | End: 2025-04-18

## 2025-04-16 NOTE — ASSESSMENT & PLAN NOTE
On 04/11/25 patient presented to [facility] emergency department with complaint of left shoulder pain.  He was having pain with movement of his left shoulder, swelling, bruising to the left upper arm.  Patient and nursing facility staff deny any falls.  X-ray of shoulder showed arthritis and clavicular acromial joint separation but otherwise no acute osseous abnormality.  A CT upper extremity without contrast on the left side was performed which showed no evidence of soft tissue mass and degenerative changes of the acromioclavicular joint with associated bony and soft tissue protuberance superiorly.  Patient was given Tylenol in the ED and discharged back to nursing facility with referral to orthopedic surgery.  On 04/13/2025 patient's daughter contacted patient's primary care physician for further instructions on next step of care.  They only wanted to do an orthopedic referral if absolutely necessary to try to limit appointments outside for the patient.  Patient was given prescription for Voltaren gel to help with the shoulder pain.  04/14/25 patient presented back to [facility] emergency department for left shoulder pain.  Patient was diagnosed with bursitis of left shoulder.  Patient was prescribed Celebrex which she can take with Tylenol Voltaren gel for bursitis and was discharged from the ED in stable condition.  Patient presents to office accompanied by his sister and his niece.  He reports improvement in his shoulder pain.  States at rest he has no shoulder pain but he still experiences pain in shoulder with movement of his left arm.  Any movement makes it worse but in particular abduction seems to make it pain worse.  He is also limited in his range of motion and unable to abduct his arm greater than 90 degrees.  No numbness, tingling, objective weakness.  I suspect patient's acute shoulder pain is likely multifactorial.  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  Discussed treatment strategies for this condition and patient, sister, and niece agree that conservative treatment would be in patient's best interest.  Would recommend continued treatment with Tylenol, Voltaren gel.  Patient  should also participate in physical therapy  to maintain range of motion in left shoulderand a prescription for physical therapy was provided to him today.  Since patient is interested in conservative treatment he and his niece inquired if he needed to go to the orthopedic appointment.  I do not see further benefit that could be obtained from orthopedics at this time with current plan to pursue conservative therapy.  Patient will intend to cancel his orthopedic appointment and will consider it down the line if need be and if no improvement.  Orders:    Ambulatory Referral to Physical Therapy; Future

## 2025-04-16 NOTE — ASSESSMENT & PLAN NOTE
HCC diagnosis reviewed.  Patient hemodynamically stable with appropriate SPO2 of 98% on room air.  No wheezing, rales, rhonchi in lung fields.  Patient asymptomatic with no dyspnea.

## 2025-04-17 ENCOUNTER — TELEPHONE (OUTPATIENT)
Age: OVER 89
End: 2025-04-17

## 2025-04-17 NOTE — TELEPHONE ENCOUNTER
Pt's niece Pam calling in; Dr. Alicia put in a referral for PT for pt but it was through [facility] because they gave Pam a call yesterday to schedule and pt uses [facility] while living in [facility].    Please advise and see if able to change or place new referral for PT with [facility] and give Pam a call back to confirm, TY.

## 2025-04-18 DIAGNOSIS — Z79.4 DIABETES MELLITUS DUE TO UNDERLYING CONDITION, CONTROLLED, WITH DIABETIC NEPHROPATHY, WITH LONG-TERM CURRENT USE OF INSULIN (HCC): ICD-10-CM

## 2025-04-18 DIAGNOSIS — E08.21 DIABETES MELLITUS DUE TO UNDERLYING CONDITION, CONTROLLED, WITH DIABETIC NEPHROPATHY, WITH LONG-TERM CURRENT USE OF INSULIN (HCC): ICD-10-CM

## 2025-04-18 DIAGNOSIS — M25.512 ACUTE PAIN OF LEFT SHOULDER: Primary | ICD-10-CM

## 2025-04-18 RX ORDER — NATEGLINIDE 60 MG/1
60 TABLET ORAL
Qty: 90 TABLET | Refills: 6 | Status: SHIPPED | OUTPATIENT
Start: 2025-04-18

## 2025-04-18 NOTE — TELEPHONE ENCOUNTER
[facility] was placed for patient.  Thank you.

## 2025-04-21 ENCOUNTER — APPOINTMENT (EMERGENCY)
Dept: RADIOLOGY | Facility: HOSPITAL | Age: OVER 89
End: 2025-04-21
Payer: MEDICARE

## 2025-04-21 ENCOUNTER — HOSPITAL ENCOUNTER (EMERGENCY)
Facility: HOSPITAL | Age: OVER 89
Discharge: HOME | End: 2025-04-21
Attending: STUDENT IN AN ORGANIZED HEALTH CARE EDUCATION/TRAINING PROGRAM
Payer: MEDICARE

## 2025-04-21 VITALS
SYSTOLIC BLOOD PRESSURE: 191 MMHG | HEART RATE: 61 BPM | TEMPERATURE: 97.3 F | RESPIRATION RATE: 18 BRPM | OXYGEN SATURATION: 97 % | DIASTOLIC BLOOD PRESSURE: 83 MMHG | WEIGHT: 173.94 LBS
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
## 2025-04-21 LAB
ANION GAP SERPL CALCULATED.3IONS-SCNC: 6 MMOL/L (ref 4–13)
BASE EXCESS BLDA CALC-SCNC: -2 MMOL/L (ref -2–3)
BASOPHILS # BLD AUTO: 0.05 THOUSANDS/ÂΜL (ref 0–0.1)
BASOPHILS NFR BLD AUTO: 1 % (ref 0–1)
BUN SERPL-MCNC: 37 MG/DL (ref 5–25)
CA-I BLD-SCNC: 1.2 MMOL/L (ref 1.12–1.32)
CALCIUM SERPL-MCNC: 8.7 MG/DL (ref 8.4–10.2)
CHLORIDE SERPL-SCNC: 107 MMOL/L (ref 96–108)
CO2 SERPL-SCNC: 22 MMOL/L (ref 21–32)
CREAT SERPL-MCNC: 2.39 MG/DL (ref 0.6–1.3)
EOSINOPHIL # BLD AUTO: 0.48 THOUSAND/ÂΜL (ref 0–0.61)
EOSINOPHIL NFR BLD AUTO: 6 % (ref 0–6)
ERYTHROCYTE [DISTWIDTH] IN BLOOD BY AUTOMATED COUNT: 14.6 % (ref 11.6–15.1)
GFR SERPL CREATININE-BSD FRML MDRD: 22 ML/MIN/1.73SQ M
GLUCOSE SERPL-MCNC: 229 MG/DL (ref 65–140)
GLUCOSE SERPL-MCNC: 234 MG/DL (ref 65–140)
HCO3 BLDA-SCNC: 21.2 MMOL/L (ref 24–30)
HCT VFR BLD AUTO: 27.4 % (ref 36.5–49.3)
HCT VFR BLD CALC: 28 % (ref 36.5–49.3)
HGB BLD-MCNC: 8.8 G/DL (ref 12–17)
HGB BLDA-MCNC: 9.5 G/DL (ref 12–17)
IMM GRANULOCYTES # BLD AUTO: 0.03 THOUSAND/UL (ref 0–0.2)
IMM GRANULOCYTES NFR BLD AUTO: 0 % (ref 0–2)
LYMPHOCYTES # BLD AUTO: 1.04 THOUSANDS/ÂΜL (ref 0.6–4.47)
LYMPHOCYTES NFR BLD AUTO: 13 % (ref 14–44)
MCH RBC QN AUTO: 31.5 PG (ref 26.8–34.3)
MCHC RBC AUTO-ENTMCNC: 32.1 G/DL (ref 31.4–37.4)
MCV RBC AUTO: 98 FL (ref 82–98)
MONOCYTES # BLD AUTO: 0.75 THOUSAND/ÂΜL (ref 0.17–1.22)
MONOCYTES NFR BLD AUTO: 10 % (ref 4–12)
NEUTROPHILS # BLD AUTO: 5.47 THOUSANDS/ÂΜL (ref 1.85–7.62)
NEUTS SEG NFR BLD AUTO: 70 % (ref 43–75)
NRBC BLD AUTO-RTO: 0 /100 WBCS
PCO2 BLD: 22 MMOL/L (ref 21–32)
PCO2 BLD: 30.1 MM HG (ref 42–50)
PH BLD: 7.46 [PH] (ref 7.3–7.4)
PLATELET # BLD AUTO: 189 THOUSANDS/UL (ref 149–390)
PMV BLD AUTO: 8.8 FL (ref 8.9–12.7)
PO2 BLD: 37 MM HG (ref 35–45)
POTASSIUM BLD-SCNC: 5.3 MMOL/L (ref 3.5–5.3)
POTASSIUM SERPL-SCNC: 5.3 MMOL/L (ref 3.5–5.3)
RBC # BLD AUTO: 2.79 MILLION/UL (ref 3.88–5.62)
SAO2 % BLD FROM PO2: 75 % (ref 60–85)
SODIUM BLD-SCNC: 139 MMOL/L (ref 136–145)
SODIUM SERPL-SCNC: 135 MMOL/L (ref 135–147)
SPECIMEN SOURCE: ABNORMAL
WBC # BLD AUTO: 7.82 THOUSAND/UL (ref 4.31–10.16)

## 2025-04-21 PROCEDURE — 82330 ASSAY OF CALCIUM: CPT

## 2025-04-21 PROCEDURE — 84295 ASSAY OF SERUM SODIUM: CPT

## 2025-04-21 PROCEDURE — 70450 CT HEAD/BRAIN W/O DYE: CPT

## 2025-04-21 PROCEDURE — 85014 HEMATOCRIT: CPT

## 2025-04-21 PROCEDURE — 82803 BLOOD GASES ANY COMBINATION: CPT

## 2025-04-21 PROCEDURE — 82947 ASSAY GLUCOSE BLOOD QUANT: CPT

## 2025-04-21 PROCEDURE — 93308 TTE F-UP OR LMTD: CPT | Performed by: STUDENT IN AN ORGANIZED HEALTH CARE EDUCATION/TRAINING PROGRAM

## 2025-04-21 PROCEDURE — 73020 X-RAY EXAM OF SHOULDER: CPT

## 2025-04-21 PROCEDURE — 73030 X-RAY EXAM OF SHOULDER: CPT

## 2025-04-21 PROCEDURE — 72125 CT NECK SPINE W/O DYE: CPT

## 2025-04-21 PROCEDURE — 73060 X-RAY EXAM OF HUMERUS: CPT

## 2025-04-21 PROCEDURE — 36415 COLL VENOUS BLD VENIPUNCTURE: CPT

## 2025-04-21 PROCEDURE — 80048 BASIC METABOLIC PNL TOTAL CA: CPT

## 2025-04-21 PROCEDURE — 99285 EMERGENCY DEPT VISIT HI MDM: CPT | Performed by: STUDENT IN AN ORGANIZED HEALTH CARE EDUCATION/TRAINING PROGRAM

## 2025-04-21 PROCEDURE — 85025 COMPLETE CBC W/AUTO DIFF WBC: CPT

## 2025-04-21 PROCEDURE — 84132 ASSAY OF SERUM POTASSIUM: CPT

## 2025-04-21 PROCEDURE — 76705 ECHO EXAM OF ABDOMEN: CPT | Performed by: STUDENT IN AN ORGANIZED HEALTH CARE EDUCATION/TRAINING PROGRAM

## 2025-04-21 PROCEDURE — 99284 EMERGENCY DEPT VISIT MOD MDM: CPT

## 2025-04-21 PROCEDURE — 71045 X-RAY EXAM CHEST 1 VIEW: CPT

## 2025-04-21 PROCEDURE — NC001 PR NO CHARGE: Performed by: EMERGENCY MEDICINE

## 2025-04-21 PROCEDURE — 73070 X-RAY EXAM OF ELBOW: CPT

## 2025-04-21 RX ORDER — KETOROLAC TROMETHAMINE 30 MG/ML
15 INJECTION, SOLUTION INTRAMUSCULAR; INTRAVENOUS ONCE
Status: DISCONTINUED | OUTPATIENT
Start: 2025-04-21 | End: 2025-04-21

## 2025-04-21 RX ORDER — KETOROLAC TROMETHAMINE 30 MG/ML
1 INJECTION, SOLUTION INTRAMUSCULAR; INTRAVENOUS ONCE
Status: COMPLETED | OUTPATIENT
Start: 2025-04-21 | End: 2025-04-21

## 2025-04-21 NOTE — H&P
"H&P - Trauma   Name: Leroy Paredes 89 y.o. male I MRN: [id number]  Unit/Bed#: TR 02 I Date of Admission: 4/21/2025   Date of Service: 4/21/2025 I Hospital Day: 0     Assessment & Plan    Fall  CT head, C-spine, left upper extremity x-rays  Repeat CBC/BMP  If labs and imaging negative plan for discharge back to facility    Trauma Alert: Level B   Model of Arrival: Ambulance    Trauma Team: Attending Shawna, Residents Ashley, and MELANY Rivera  Consultants:     None     History of Present Illness   Chief Complaint: \"What you want\"  Mechanism: Fall    Leroy Paredes is a 89 y.o. male with history of Eliquis use, presenting from his dementia unit after suffering a fall.  EMS reports that patient suffered a fall last night at his facility, it was unwitnessed and he had no complaints at that time.  This morning started complaining of left shoulder pain and was noted to have abrasions on his scalp, subsequently he was referred for medical evaluation.    Review of Systems   Musculoskeletal:  Positive for arthralgias (Chronic left shoulder pain).     Medical History Review: I have reviewed the patient's PMH, PSH, Social History, Family History, Meds, and Allergies   Historical Information   No past medical history on file.  No past surgical history on file.  Social History     Tobacco Use    Smoking status: Not on file    Smokeless tobacco: Not on file   Substance and Sexual Activity    Alcohol use: Not on file    Drug use: Not on file    Sexual activity: Not on file     No existing history information found.  No existing history information found.  No family history on file.  Social History     Tobacco Use    Smoking status: Not on file    Smokeless tobacco: Not on file   Substance and Sexual Activity    Alcohol use: Not on file    Drug use: Not on file    Sexual activity: Not on file     No current facility-administered medications for this encounter.  None     Patient has no allergy information on record.    There is no " immunization history on file for this patient.  Last Tetanus: NA    No data recordedISAR Score: Did you order a geriatric consult if the score was 2 or greater?: n/a       Objective :  HR:  [65] 65  Resp:  [18] 18  SpO2:  [97 %] 97 %    Initial Vitals:   Pulse: 65 (04/21/25 1106)  Respirations: 18 (04/21/25 1106)    Primary Survey:   Airway:        Status: patent;        Pre-hospital Interventions: none        Hospital Interventions: none  Breathing:        Pre-hospital Interventions: none       Effort: normal       Right breath sounds: normal       Left breath sounds: normal  Circulation:        Rhythm: regular       Rate: regular   Right Pulses Left Pulses    R radial: 2+  R femoral: 2+  R pedal: 2+     L radial: 2+  L femoral: 2+  L pedal: 2+       Disability:        GCS: Eye: 4; Verbal: 4 Motor: 6 Total: 14       Right Pupil: 3 mm;  round;  reactive         Left Pupil:  3 mm;  round;  reactive      R Motor Strength L Motor Strength    R : 5/5  R dorsiflex: 5/5  R plantarflex: 5/5 L : 5/5  L dorsiflex: 5/5  L plantarflex: 5/5        Sensory:  No sensory deficit  Exposure:       Completed: Yes      Secondary Survey:  Physical Exam  Constitutional:       General: He is not in acute distress.     Appearance: He is not ill-appearing.   HENT:      Head: Normocephalic.      Comments: Abrasions on left side of scalp.     Right Ear: External ear normal.      Left Ear: External ear normal.      Nose: Nose normal.      Mouth/Throat:      Mouth: Mucous membranes are moist.      Pharynx: Oropharynx is clear.   Eyes:      Extraocular Movements: Extraocular movements intact.      Pupils: Pupils are equal, round, and reactive to light.   Neck:      Comments: Cervical collar in place.  Cardiovascular:      Rate and Rhythm: Normal rate and regular rhythm.      Pulses: Normal pulses.      Heart sounds: Normal heart sounds.   Pulmonary:      Effort: Pulmonary effort is normal. No respiratory distress.      Breath sounds:  "Normal breath sounds. No stridor. No wheezing, rhonchi or rales.   Chest:      Chest wall: No tenderness.   Abdominal:      General: Abdomen is flat. Bowel sounds are normal. There is no distension.      Palpations: Abdomen is soft.      Tenderness: There is no abdominal tenderness. There is no guarding.   Genitourinary:     Comments: Pelvis is stable.  Musculoskeletal:      Comments: No C, T, L-spine tenderness.  No palpable step-offs.  Patient moving all extremities.  Limited range of motion of right shoulder.  Tenderness on palpation of left shoulder.  Ranging left elbow and wrist.   Skin:     General: Skin is warm and dry.      Capillary Refill: Capillary refill takes less than 2 seconds.   Neurological:      Mental Status: He is alert. Mental status is at baseline. He is disoriented.      Sensory: No sensory deficit.      Motor: No weakness.           Lab Results: I have reviewed the following results:  No results for input(s): \"WBC\", \"HGB\", \"HCT\", \"PLT\", \"BANDSPCT\", \"SODIUM\", \"K\", \"CL\", \"CO2\", \"BUN\", \"CREATININE\", \"GLUC\", \"CAIONIZED\", \"MG\", \"PHOS\", \"AST\", \"ALT\", \"ALB\", \"TBILI\", \"DBILI\", \"ALKPHOS\", \"PTT\", \"INR\", \"HSTNI0\", \"HSTNI2\", \"BNP\", \"LACTICACID\" in the last 72 hours.    Imaging Results: I have personally reviewed pertinent images saved in PACS. CT scan findings (and other pertinent positive findings on images) were discussed with radiology. My interpretation of the images/reports are as follows:  Chest Xray(s): negative for acute findings   FAST exam(s): negative for acute findings   CT Scan(s): negative for acute findings   Additional Xray(s): pending     Other Studies: Other Study Results Review: No additional pertinent studies reviewed.  "

## 2025-04-21 NOTE — PROGRESS NOTES
Patient fell on thinners.  Patient not available and no family present.   remains available.      04/21/25 1300   Clinical Encounter Type   Visited With Patient not available   Routine Visit Introduction   Crisis Visit Trauma

## 2025-04-21 NOTE — DISCHARGE INSTRUCTIONS
Please follow-up with your family care physician for further evaluation and management of your chronic left arm pain.    Your CT findings are below:    XR Trauma multiple ([facility] trauma bay ONLY)  Result Date: 4/21/2025  Impression: No acute cardiopulmonary disease within limitations of supine imaging. Workstation performed: [id number]     TRAUMA - CT spine cervical wo contrast  Result Date: 4/21/2025  Impression: No cervical spine fracture or traumatic subluxation. We personally discussed this study with ULISES MATOS on 4/21/2025 11:26 PM. Discussed final results with Dr. Matos at 12:17 PM. Resident: JOHNY Cochran I, the attending radiologist, have reviewed the images and agree with the final report above. Workstation performed: [id number]     TRAUMA - CT head wo contrast  Result Date: 4/21/2025  Impression: No acute intracranial abnormalities. We previewed the study on the scanner with ULISES MATOS on 4/21/2025 11:26 PM. Discussed final results with Dr. Matos at 12:17 PM. Resident: JOHNY Cochran I, the attending radiologist, have reviewed the images and agree with the final report above. Workstation performed: [id number]        Pt placed on non-rebreather

## 2025-04-21 NOTE — ED PROVIDER NOTES
Emergency Department Airway Evaluation and Management Form    History  Obtained from: ems, pt  Review of patient's allergies indicates no known allergies.    Chief Complaint:  Trauma Alert    HPI: Pt is a 89 y.o. male presents s/p fall, hit head on eliquis. Unknown loc. Pt with left shoulder pain.      I have reviewed and agree with the history as documented.      Physical Exam    Vitals:    04/21/25 1111   BP: (!) 208/94   Pulse: 65   Resp: 18   Temp:    SpO2: 97%     Supplemental Oxygen: none    GCS: 15    Neuro: Alert and oriented  Psych: not combative, not anxious, cooperative for exam  Neck: In collar, No JVD, No midline tenderness  Cardio:  Normal  Respiratory: Normal  Mouth:  Normal  Pharynx: Normal    Monitor:  NSR      ED Medications    No current facility-administered medications for this encounter.  No current outpatient medications on file.      Intubation    No intubation required    Final Diagnosis:  Closed head injury, left shoulder contusion    ED Provider  Electronically Signed by       Miles Jurado MD  04/21/25 5719

## 2025-04-21 NOTE — PROCEDURES
POC FAST US    Date/Time: 4/21/2025 12:07 PM    Performed by: Sen Ramirez MD  Authorized by: Sen Ramriez MD    Patient location:  Trauma  Other Assisting Provider: No    Procedure details:     Exam Type:  Diagnostic    Indications comment:  Fall    Assess for:  Intra-abdominal fluid, pericardial effusion and pneumothorax    Technique: FAST      Views obtained:  Heart - Pericardial sac, RUQ - Lackey's Pouch, LUQ - Splenorenal space and Suprapubic - Pouch of Paul    Image quality: limited diagnostic      Image availability:  Images available in PACS  FAST Findings:     RUQ (Hepatorenal) free fluid: absent      LUQ (Splenorenal) free fluid: absent      Suprapubic free fluid: absent      Cardiac wall motion: identified      Pericardial effusion: absent    Interpretation:     Impressions: negative

## 2025-04-22 ENCOUNTER — TELEPHONE (OUTPATIENT)
Dept: OTHER | Facility: HOSPITAL | Age: OVER 89
End: 2025-04-22

## 2025-04-22 NOTE — QUICK NOTE
Called [facility] 3 times to report patient's shoulder x-ray findings.  Would like to recommend follow-up CT as an outpatient if pain continues to be persistent.  No answer by facility, will talk to day team to try again.

## 2025-04-27 DIAGNOSIS — R45.1 AGITATION: ICD-10-CM

## 2025-04-28 RX ORDER — QUETIAPINE FUMARATE 25 MG/1
TABLET, FILM COATED ORAL
Qty: 30 TABLET | Refills: 6 | Status: SHIPPED | OUTPATIENT
Start: 2025-04-28

## 2025-05-01 ENCOUNTER — APPOINTMENT (EMERGENCY)
Dept: RADIOLOGY | Facility: HOSPITAL | Age: OVER 89
End: 2025-05-01
Payer: MEDICARE

## 2025-05-01 ENCOUNTER — HOSPITAL ENCOUNTER (OUTPATIENT)
Facility: HOSPITAL | Age: OVER 89
Setting detail: OBSERVATION
LOS: 1 days | Discharge: NURSING, REHAB OR LONG-TERM CARE | End: 2025-05-02
Attending: EMERGENCY MEDICINE | Admitting: STUDENT IN AN ORGANIZED HEALTH CARE EDUCATION/TRAINING PROGRAM
Payer: MEDICARE

## 2025-05-01 DIAGNOSIS — E83.42 HYPOMAGNESEMIA: ICD-10-CM

## 2025-05-01 DIAGNOSIS — R41.82 ALTERED MENTAL STATUS: ICD-10-CM

## 2025-05-01 DIAGNOSIS — M25.512 CHRONIC LEFT SHOULDER PAIN: ICD-10-CM

## 2025-05-01 DIAGNOSIS — N18.9 CKD (CHRONIC KIDNEY DISEASE): ICD-10-CM

## 2025-05-01 DIAGNOSIS — D64.9 ANEMIA: ICD-10-CM

## 2025-05-01 DIAGNOSIS — R55 SYNCOPE: ICD-10-CM

## 2025-05-01 DIAGNOSIS — I95.9 HYPOTENSION: ICD-10-CM

## 2025-05-01 DIAGNOSIS — R06.00 DYSPNEA: ICD-10-CM

## 2025-05-01 DIAGNOSIS — J18.9 PNEUMONIA: Primary | ICD-10-CM

## 2025-05-01 DIAGNOSIS — G89.29 CHRONIC LEFT SHOULDER PAIN: ICD-10-CM

## 2025-05-01 DIAGNOSIS — R79.89 ELEVATED LACTIC ACID LEVEL: ICD-10-CM

## 2025-05-01 LAB
2HR DELTA HS TROPONIN: 1 NG/L
ALBUMIN SERPL BCG-MCNC: 3.4 G/DL (ref 3.5–5)
ALP SERPL-CCNC: 96 U/L (ref 34–104)
ALT SERPL W P-5'-P-CCNC: 16 U/L (ref 7–52)
ANION GAP SERPL CALCULATED.3IONS-SCNC: 10 MMOL/L (ref 4–13)
APTT PPP: 32 SECONDS (ref 23–34)
AST SERPL W P-5'-P-CCNC: 15 U/L (ref 13–39)
ATRIAL RATE: 58 BPM
BASOPHILS # BLD AUTO: 0.03 THOUSANDS/ÂΜL (ref 0–0.1)
BASOPHILS NFR BLD AUTO: 0 % (ref 0–1)
BILIRUB SERPL-MCNC: 0.57 MG/DL (ref 0.2–1)
BNP SERPL-MCNC: 317 PG/ML (ref 0–100)
BUN SERPL-MCNC: 36 MG/DL (ref 5–25)
CALCIUM ALBUM COR SERPL-MCNC: 9.1 MG/DL (ref 8.3–10.1)
CALCIUM SERPL-MCNC: 8.6 MG/DL (ref 8.4–10.2)
CARDIAC TROPONIN I PNL SERPL HS: 22 NG/L (ref ?–50)
CARDIAC TROPONIN I PNL SERPL HS: 23 NG/L (ref ?–50)
CHLORIDE SERPL-SCNC: 109 MMOL/L (ref 96–108)
CO2 SERPL-SCNC: 19 MMOL/L (ref 21–32)
CREAT SERPL-MCNC: 2.24 MG/DL (ref 0.6–1.3)
EOSINOPHIL # BLD AUTO: 0.8 THOUSAND/ÂΜL (ref 0–0.61)
EOSINOPHIL NFR BLD AUTO: 11 % (ref 0–6)
ERYTHROCYTE [DISTWIDTH] IN BLOOD BY AUTOMATED COUNT: 14.9 % (ref 11.6–15.1)
FLUAV AG UPPER RESP QL IA.RAPID: NEGATIVE
FLUBV AG UPPER RESP QL IA.RAPID: NEGATIVE
GFR SERPL CREATININE-BSD FRML MDRD: 24 ML/MIN/1.73SQ M
GLUCOSE SERPL-MCNC: 165 MG/DL (ref 65–140)
GLUCOSE SERPL-MCNC: 254 MG/DL (ref 65–140)
GLUCOSE SERPL-MCNC: 255 MG/DL (ref 65–140)
HCT VFR BLD AUTO: 27.5 % (ref 36.5–49.3)
HGB BLD-MCNC: 8.7 G/DL (ref 12–17)
IMM GRANULOCYTES # BLD AUTO: 0.04 THOUSAND/UL (ref 0–0.2)
IMM GRANULOCYTES NFR BLD AUTO: 1 % (ref 0–2)
INR PPP: 1.17 (ref 0.85–1.19)
LACTATE SERPL-SCNC: 1.7 MMOL/L (ref 0.5–2)
LACTATE SERPL-SCNC: 2.8 MMOL/L (ref 0.5–2)
LYMPHOCYTES # BLD AUTO: 1.83 THOUSANDS/ÂΜL (ref 0.6–4.47)
LYMPHOCYTES NFR BLD AUTO: 25 % (ref 14–44)
MAGNESIUM SERPL-MCNC: 1.8 MG/DL (ref 1.9–2.7)
MCH RBC QN AUTO: 31 PG (ref 26.8–34.3)
MCHC RBC AUTO-ENTMCNC: 31.6 G/DL (ref 31.4–37.4)
MCV RBC AUTO: 98 FL (ref 82–98)
MONOCYTES # BLD AUTO: 0.68 THOUSAND/ÂΜL (ref 0.17–1.22)
MONOCYTES NFR BLD AUTO: 9 % (ref 4–12)
NEUTROPHILS # BLD AUTO: 4.08 THOUSANDS/ÂΜL (ref 1.85–7.62)
NEUTS SEG NFR BLD AUTO: 54 % (ref 43–75)
NRBC BLD AUTO-RTO: 0 /100 WBCS
PLATELET # BLD AUTO: 203 THOUSANDS/UL (ref 149–390)
PMV BLD AUTO: 9.4 FL (ref 8.9–12.7)
POTASSIUM SERPL-SCNC: 4.8 MMOL/L (ref 3.5–5.3)
PR INTERVAL: 210 MS
PROCALCITONIN SERPL-MCNC: 0.16 NG/ML
PROT SERPL-MCNC: 6.7 G/DL (ref 6.4–8.4)
PROTHROMBIN TIME: 15.6 SECONDS (ref 12.3–15)
QRS AXIS: -49 DEGREES
QRSD INTERVAL: 144 MS
QT INTERVAL: 486 MS
QTC INTERVAL: 477 MS
RBC # BLD AUTO: 2.81 MILLION/UL (ref 3.88–5.62)
SARS-COV+SARS-COV-2 AG RESP QL IA.RAPID: NEGATIVE
SODIUM SERPL-SCNC: 138 MMOL/L (ref 135–147)
T WAVE AXIS: -20 DEGREES
TSH SERPL DL<=0.05 MIU/L-ACNC: 2.83 UIU/ML (ref 0.45–4.5)
VENTRICULAR RATE: 58 BPM
WBC # BLD AUTO: 7.46 THOUSAND/UL (ref 4.31–10.16)

## 2025-05-01 PROCEDURE — 36415 COLL VENOUS BLD VENIPUNCTURE: CPT | Performed by: EMERGENCY MEDICINE

## 2025-05-01 PROCEDURE — 99285 EMERGENCY DEPT VISIT HI MDM: CPT | Performed by: EMERGENCY MEDICINE

## 2025-05-01 PROCEDURE — 84443 ASSAY THYROID STIM HORMONE: CPT | Performed by: EMERGENCY MEDICINE

## 2025-05-01 PROCEDURE — 99223 1ST HOSP IP/OBS HIGH 75: CPT | Performed by: STUDENT IN AN ORGANIZED HEALTH CARE EDUCATION/TRAINING PROGRAM

## 2025-05-01 PROCEDURE — 99285 EMERGENCY DEPT VISIT HI MDM: CPT

## 2025-05-01 PROCEDURE — 93010 ELECTROCARDIOGRAM REPORT: CPT | Performed by: INTERNAL MEDICINE

## 2025-05-01 PROCEDURE — 85610 PROTHROMBIN TIME: CPT | Performed by: EMERGENCY MEDICINE

## 2025-05-01 PROCEDURE — 85730 THROMBOPLASTIN TIME PARTIAL: CPT | Performed by: EMERGENCY MEDICINE

## 2025-05-01 PROCEDURE — 87811 SARS-COV-2 COVID19 W/OPTIC: CPT | Performed by: EMERGENCY MEDICINE

## 2025-05-01 PROCEDURE — 84145 PROCALCITONIN (PCT): CPT | Performed by: EMERGENCY MEDICINE

## 2025-05-01 PROCEDURE — 85025 COMPLETE CBC W/AUTO DIFF WBC: CPT | Performed by: EMERGENCY MEDICINE

## 2025-05-01 PROCEDURE — 83735 ASSAY OF MAGNESIUM: CPT | Performed by: EMERGENCY MEDICINE

## 2025-05-01 PROCEDURE — 93005 ELECTROCARDIOGRAM TRACING: CPT

## 2025-05-01 PROCEDURE — 87804 INFLUENZA ASSAY W/OPTIC: CPT | Performed by: EMERGENCY MEDICINE

## 2025-05-01 PROCEDURE — 96374 THER/PROPH/DIAG INJ IV PUSH: CPT

## 2025-05-01 PROCEDURE — 71045 X-RAY EXAM CHEST 1 VIEW: CPT

## 2025-05-01 PROCEDURE — 80053 COMPREHEN METABOLIC PANEL: CPT | Performed by: EMERGENCY MEDICINE

## 2025-05-01 PROCEDURE — 82948 REAGENT STRIP/BLOOD GLUCOSE: CPT

## 2025-05-01 PROCEDURE — 83880 ASSAY OF NATRIURETIC PEPTIDE: CPT | Performed by: EMERGENCY MEDICINE

## 2025-05-01 PROCEDURE — 83605 ASSAY OF LACTIC ACID: CPT | Performed by: EMERGENCY MEDICINE

## 2025-05-01 PROCEDURE — 87040 BLOOD CULTURE FOR BACTERIA: CPT | Performed by: EMERGENCY MEDICINE

## 2025-05-01 PROCEDURE — 84484 ASSAY OF TROPONIN QUANT: CPT | Performed by: EMERGENCY MEDICINE

## 2025-05-01 RX ORDER — ACETAMINOPHEN 325 MG/1
650 TABLET ORAL EVERY 6 HOURS PRN
Status: DISCONTINUED | OUTPATIENT
Start: 2025-05-01 | End: 2025-05-02 | Stop reason: HOSPADM

## 2025-05-01 RX ORDER — ATORVASTATIN CALCIUM 40 MG/1
40 TABLET, FILM COATED ORAL
Status: DISCONTINUED | OUTPATIENT
Start: 2025-05-01 | End: 2025-05-02 | Stop reason: HOSPADM

## 2025-05-01 RX ORDER — NATEGLINIDE 60 MG/1
60 TABLET ORAL
Status: DISCONTINUED | OUTPATIENT
Start: 2025-05-01 | End: 2025-05-02 | Stop reason: HOSPADM

## 2025-05-01 RX ORDER — GUAIFENESIN 600 MG/1
600 TABLET, EXTENDED RELEASE ORAL 2 TIMES DAILY
Status: DISCONTINUED | OUTPATIENT
Start: 2025-05-01 | End: 2025-05-01

## 2025-05-01 RX ORDER — LANOLIN ALCOHOL/MO/W.PET/CERES
400 CREAM (GRAM) TOPICAL DAILY
Status: DISCONTINUED | OUTPATIENT
Start: 2025-05-01 | End: 2025-05-02 | Stop reason: HOSPADM

## 2025-05-01 RX ORDER — QUETIAPINE FUMARATE 25 MG/1
25 TABLET, FILM COATED ORAL
Status: DISCONTINUED | OUTPATIENT
Start: 2025-05-01 | End: 2025-05-02 | Stop reason: HOSPADM

## 2025-05-01 RX ORDER — ACETAMINOPHEN 325 MG/1
650 TABLET ORAL EVERY 6 HOURS PRN
Status: DISCONTINUED | OUTPATIENT
Start: 2025-05-01 | End: 2025-05-01 | Stop reason: SDUPTHER

## 2025-05-01 RX ORDER — MIRTAZAPINE 15 MG/1
30 TABLET, FILM COATED ORAL
Status: DISCONTINUED | OUTPATIENT
Start: 2025-05-01 | End: 2025-05-02 | Stop reason: HOSPADM

## 2025-05-01 RX ORDER — ASPIRIN 81 MG/1
324 TABLET, CHEWABLE ORAL ONCE
Status: COMPLETED | OUTPATIENT
Start: 2025-05-01 | End: 2025-05-01

## 2025-05-01 RX ORDER — GABAPENTIN 100 MG/1
100 CAPSULE ORAL
Status: DISCONTINUED | OUTPATIENT
Start: 2025-05-01 | End: 2025-05-02 | Stop reason: HOSPADM

## 2025-05-01 RX ORDER — SODIUM CHLORIDE 9 MG/ML
3 INJECTION INTRAVENOUS
Status: DISCONTINUED | OUTPATIENT
Start: 2025-05-01 | End: 2025-05-02 | Stop reason: HOSPADM

## 2025-05-01 RX ORDER — INSULIN LISPRO 100 [IU]/ML
1-6 INJECTION, SOLUTION INTRAVENOUS; SUBCUTANEOUS
Status: DISCONTINUED | OUTPATIENT
Start: 2025-05-01 | End: 2025-05-02 | Stop reason: HOSPADM

## 2025-05-01 RX ORDER — LIDOCAINE 40 MG/G
CREAM TOPICAL 4 TIMES DAILY PRN
Status: DISCONTINUED | OUTPATIENT
Start: 2025-05-01 | End: 2025-05-02 | Stop reason: HOSPADM

## 2025-05-01 RX ADMIN — Medication 400 MG: at 14:49

## 2025-05-01 RX ADMIN — SODIUM CHLORIDE 500 ML: 0.9 INJECTION, SOLUTION INTRAVENOUS at 14:25

## 2025-05-01 RX ADMIN — INSULIN LISPRO 3 UNITS: 100 INJECTION, SOLUTION INTRAVENOUS; SUBCUTANEOUS at 18:30

## 2025-05-01 RX ADMIN — ASPIRIN 324 MG: 81 TABLET, CHEWABLE ORAL at 13:31

## 2025-05-01 RX ADMIN — MIRTAZAPINE 30 MG: 15 TABLET, FILM COATED ORAL at 22:23

## 2025-05-01 RX ADMIN — QUETIAPINE FUMARATE 25 MG: 25 TABLET ORAL at 22:24

## 2025-05-01 RX ADMIN — CEFTRIAXONE 1000 MG: 10 INJECTION, POWDER, FOR SOLUTION INTRAVENOUS at 14:24

## 2025-05-01 RX ADMIN — APIXABAN 2.5 MG: 2.5 TABLET, FILM COATED ORAL at 22:24

## 2025-05-01 RX ADMIN — GABAPENTIN 100 MG: 100 CAPSULE ORAL at 22:24

## 2025-05-01 RX ADMIN — INSULIN LISPRO 1 UNITS: 100 INJECTION, SOLUTION INTRAVENOUS; SUBCUTANEOUS at 22:24

## 2025-05-01 RX ADMIN — NATEGLINIDE 60 MG: 60 TABLET ORAL at 18:15

## 2025-05-01 RX ADMIN — LIDOCAINE: 40 CREAM TOPICAL at 23:45

## 2025-05-01 RX ADMIN — ATORVASTATIN CALCIUM 40 MG: 40 TABLET, FILM COATED ORAL at 17:18

## 2025-05-01 NOTE — ASSESSMENT & PLAN NOTE
"Lab Results   Component Value Date    HGBA1C 8.0 (H) 03/04/2025       No results for input(s): \"POCGLU\" in the last 72 hours.    Blood Sugar Average: Last 72 hrs:    CKD IV baseline creat 2.13 - 2.23   Currently at baseline    "

## 2025-05-01 NOTE — PLAN OF CARE
Problem: PAIN - ADULT  Goal: Verbalizes/displays adequate comfort level or baseline comfort level  Description: Interventions:- Encourage patient to monitor pain and request assistance- Assess pain using appropriate pain scale- Administer analgesics based on type and severity of pain and evaluate response- Implement non-pharmacological measures as appropriate and evaluate response- Consider cultural and social influences on pain and pain management- Notify physician/advanced practitioner if interventions unsuccessful or patient reports new pain  Outcome: Progressing     Problem: INFECTION - ADULT  Goal: Absence or prevention of progression during hospitalization  Description: INTERVENTIONS:- Assess and monitor for signs and symptoms of infection- Monitor lab/diagnostic results- Monitor all insertion sites, i.e. indwelling lines, tubes, and drains- Monitor endotracheal if appropriate and nasal secretions for changes in amount and color- Gary appropriate cooling/warming therapies per order- Administer medications as ordered- Instruct and encourage patient and family to use good hand hygiene technique- Identify and instruct in appropriate isolation precautions for identified infection/condition  Outcome: Progressing  Goal: Absence of fever/infection during neutropenic period  Description: INTERVENTIONS:- Monitor WBC  Outcome: Progressing     Problem: SAFETY ADULT  Goal: Patient will remain free of falls  Description: INTERVENTIONS:- Educate patient/family on patient safety including physical limitations- Instruct patient to call for assistance with activity - Consult OT/PT to assist with strengthening/mobility - Keep Call bell within reach- Keep bed low and locked with side rails adjusted as appropriate- Keep care items and personal belongings within reach- Initiate and maintain comfort rounds- Make Fall Risk Sign visible to staff- Offer Toileting every 2 Hours, in advance of need- Initiate/Maintain alarm-  Obtain necessary fall risk management equipment: - Apply yellow socks and bracelet for high fall risk patients- Consider moving patient to room near nurses station  Outcome: Progressing  Goal: Maintain or return to baseline ADL function  Description: INTERVENTIONS:-  Assess patient's ability to carry out ADLs; assess patient's baseline for ADL function and identify physical deficits which impact ability to perform ADLs (bathing, care of mouth/teeth, toileting, grooming, dressing, etc.)- Assess/evaluate cause of self-care deficits - Assess range of motion- Assess patient's mobility; develop plan if impaired- Assess patient's need for assistive devices and provide as appropriate- Encourage maximum independence but intervene and supervise when necessary- Involve family in performance of ADLs- Assess for home care needs following discharge - Consider OT consult to assist with ADL evaluation and planning for discharge- Provide patient education as appropriate  Outcome: Progressing  Goal: Maintains/Returns to pre admission functional level  Description: INTERVENTIONS:- Perform AM-PAC 6 Click Basic Mobility/ Daily Activity assessment daily.- Set and communicate daily mobility goal to care team and patient/family/caregiver. - Collaborate with rehabilitation services on mobility goals if consulted- Perform Range of Motion 3 times a day.- Reposition patient every 2 hours.- Dangle patient 3 times a day- Stand patient 3 times a day- Ambulate patient 3 times a day- Out of bed to chair 3 times a day - Out of bed for meals 3 times a day- Out of bed for toileting- Record patient progress and toleration of activity level   Outcome: Progressing     Problem: DISCHARGE PLANNING  Goal: Discharge to home or other facility with appropriate resources  Description: INTERVENTIONS:- Identify barriers to discharge w/patient and caregiver- Arrange for needed discharge resources and transportation as appropriate- Identify discharge learning  needs (meds, wound care, etc.)- Arrange for interpretive services to assist at discharge as needed- Refer to Case Management Department for coordinating discharge planning if the patient needs post-hospital services based on physician/advanced practitioner order or complex needs related to functional status, cognitive ability, or social support system  Outcome: Progressing     Problem: Knowledge Deficit  Goal: Patient/family/caregiver demonstrates understanding of disease process, treatment plan, medications, and discharge instructions  Description: Complete learning assessment and assess knowledge base.Interventions:- Provide teaching at level of understanding- Provide teaching via preferred learning methods  Outcome: Progressing

## 2025-05-01 NOTE — H&P
"H&P - Hospitalist   Name: Leroy Paredes 89 y.o. male I MRN: [id number]  Unit/Bed#: ED-37 I Date of Admission: 5/1/2025   Date of Service: 5/1/2025 I Hospital Day: 1     Assessment & Plan  Pneumonia of left lower lobe due to infectious organism  89M hx of dementia presented to ED via EMS for AMS and worsening shortness of breath.  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    Blood Sugar Average: Last 72 hrs:    CKD IV baseline creat 2.13 - 2.23   Currently at baseline    Chronic heart failure with preserved ejection fraction (HCC)  Wt Readings from Last 3 Encounters:   05/01/25 93 kg (205 lb)   04/21/25 78.9 kg (173 lb 15.1 oz)   04/14/25 84 kg (185 lb 3 oz)       NYHA class III stage C  LVEF 50% reduced from LVEF 55% by TTE on 6/29/20, conservative management   Euvolemic  Not on any diuretics  Not on ace due to episodes of hypotension       Dementia (HCC)  Continue home seroquel 25 mg qhs  Continue Remeron 30 mg qhs   Type 2 diabetes mellitus (HCC)  Lab Results   Component Value Date    HGBA1C 8.0 (H) 03/04/2025       No results for input(s): \"POCGLU\" in the last 72 hours.    Blood Sugar Average: Last 72 hrs:    Carb consistent diet  ISS      Chronic atrial fibrillation (HCC)  Continue home eliquis 2.5 mg bid  Not on any rete " controlling agents  HR in 60's       VTE Pharmacologic Prophylaxis:   High Risk (Score >/= 5) - Pharmacological DVT Prophylaxis Ordered: apixaban (Eliquis). Sequential Compression Devices Ordered.  Code Status: Level 3 - DNAR and DNI   Discussion with family: Updated  (wife and daughter) at bedside.    Anticipated Length of Stay: Patient will be admitted on an inpatient basis with an anticipated length of stay of greater than 2 midnights secondary to pneumonia/respiratory failure.    History of Present Illness   Chief Complaint: Altered mental status/Shortness of breath    Leroy Paredes is a 89 y.o. male with a PMH of dementia, ambulatory dysfunction, embolic CVA, enlarged prostate, CKD stage IV, hypertension, heart failure with preserved EF , type 2 diabetes,  who presents from [facility] [place] due to being short of breath and confused this morning.  Patient was brought to the ED via EMS.  On arrival he was hypotensive and given IV fluids with improvement in blood pressure.  Evaluation in the ED was noted for elevated lactic acid 2.8.  Chest x-ray was concerning for a left lower lobe pneumonia and started on IV ceftriaxone.  On evaluation with family at bedside patient denies any acute cough, sputum production, fevers or chills.  Patient denies any shortness of breath.  Family at bedside state that he appears to be at baseline mentation at this point.  Patient admits to having this chronic left shoulder pain that comes and goes. He states he's been worked up for this shoulder pain and is currently not interested in have any type of surgery. He is agreeable to ortho referral on discharge for possible shoulder injections to help alleviate pain.     Review of Systems   Constitutional:  Negative for chills and fever.   HENT:  Negative for sore throat.    Eyes:  Negative for visual disturbance.   Respiratory:  Negative for cough and shortness of breath.    Cardiovascular:  Negative for chest pain and  palpitations.   Gastrointestinal:  Negative for abdominal pain and vomiting.   Genitourinary:  Negative for dysuria.   Musculoskeletal:         Admits to having left shoulder pain   Skin:  Negative for color change and rash.   Neurological:  Negative for seizures and syncope.   All other systems reviewed and are negative.      Historical Information   Past Medical History:   Diagnosis Date    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)     Last Assessed:  5/19/17    Chronic kidney disease     Diabetes mellitus (HCC)     Hyperlipidemia     Hypertension     Osteoarthritis     Palpitations     TIA (transient ischemic attack)      Past Surgical History:   Procedure Laterality Date    APPENDECTOMY      CARDIAC ELECTROPHYSIOLOGY PROCEDURE N/A 1/17/2024    Procedure: Cardiac loop recorder explant;  Surgeon: Drew Olmos MD;  Location: [facility];  Service: Cardiology    COLONOSCOPY  2012    HEMORROIDECTOMY      TOOTH EXTRACTION  02/02/2022     Social History     Tobacco Use    Smoking status: Former    Smokeless tobacco: Never   Vaping Use    Vaping status: Never Used   Substance and Sexual Activity    Alcohol use: Not Currently     Comment: Social drinker    Drug use: No    Sexual activity: Not Currently     E-Cigarette/Vaping    E-Cigarette Use Never User      E-Cigarette/Vaping Substances    Nicotine No     THC No     CBD No     Flavoring No     Other No     Unknown No      Family history non-contributory  Social History:  Marital Status: Single   Occupation: retired  Patient Pre-hospital Living Situation: Long Term Care Facility  Patient Pre-hospital Level of Mobility: unable to be assessed at time of evaluation  Patient Pre-hospital Diet Restrictions: Diabetic diet    Meds/Allergies   I have reviewed home medications with patient family member.  Prior to Admission medications    Medication Sig Start Date End Date Taking? Authorizing Provider   QUEtiapine (SEROquel) 25 mg tablet TAKE ONE TABLET BY MOUTH EVERY  NIGHT AT BEDTIME *IN [facility] CARD/NURSE TO REORDER 4/28/25   Pepito Foster DO   acetaminophen (TYLENOL) 325 mg tablet Take 2 tablets (650 mg total) by mouth every 6 (six) hours as needed for mild pain 5/18/23   Josselin Maya PA-C   apixaban (Eliquis) 2.5 mg TAKE 1 TABLET TWICE DAILY 4/11/25   Leroy Humphries MD   atorvastatin (LIPITOR) 40 mg tablet TAKE 1 TABLET EVERY DAY WITH DINNER 3/20/25   Pepito Foster DO   bisacodyl ([facility] Bisacodyl) 10 mg suppository Insert 10 mg into the rectum daily    Historical Provider, MD   Blood Glucose Monitoring Suppl (PRECISION XTRA MONITOR) MARY by Does not apply route daily 1/16/20   Pepito Foster DO   celecoxib (CeleBREX) 200 mg capsule Take 1 capsule (200 mg total) by mouth 2 (two) times a day  Patient not taking: Reported on 4/15/2025 4/14/25   Dre Waddell MD   Cholecalciferol (D3 High Potency) 25 MCG (1000 UT) capsule TAKE ONE CAPSULE BY MOUTH DAILY 12/13/24   Pepito Foster DO   Diclofenac Sodium (VOLTAREN) 1 % Apply 2 g topically 4 (four) times a day Use as needed.  Patient not taking: Reported on 4/15/2025 4/14/25   Pepito Foster DO   escitalopram (LEXAPRO) 5 mg tablet TAKE ONE TABLET BY MOUTH DAILY 12/13/24   Pepito Foster DO   gabapentin (NEURONTIN) 100 mg capsule TAKE ONE CAPSULE BY MOUTH EVERY NIGHT AT BEDTIME 12/13/24   Pepito Foster DO   lisinopril (ZESTRIL) 5 mg tablet TAKE 1 TABLET EVERY DAY  Patient not taking: Reported on 4/15/2025 3/20/25   Leroy Humphries MD   magnesium hydroxide (Milk of Magnesia) 400 mg/5 mL oral suspension Take 30 mL by mouth if needed for constipation Patient take every 4 days as needed.    Historical Provider, MD   magnesium Oxide (MAG-OX) 400 mg TABS TAKE ONE TABLET BY MOUTH DAILY 12/13/24   Pepito Foster DO   melatonin 3 mg TAKE ONE TABLET BY MOUTH EVERY NIGHT AT BEDTIME 12/13/24   Pepito Foster DO   mirtazapine (REMERON) 30 mg tablet Take 1 tablet (30 mg total) by mouth daily at bedtime 4/8/25  10/5/25  Pepito DO Cristian   nateglinide (STARLIX) 60 mg tablet TAKE 1 TABLET (60 MG TOTAL) BY MOUTH 3 (THREE) TIMES A DAY BEFORE MEALS 4/18/25   Pepito FosterDO   vitamin B-12 (CYANOCOBALAMIN) 500 MCG TABS Take 1 tablet (500 mcg total) by mouth 2 (two) times a week 10/17/24   Pepito FosterDO     No Known Allergies    Objective :  Temp:  [97.5 °F (36.4 °C)] 97.5 °F (36.4 °C)  HR:  [59-66] 66  BP: (143-148)/(63-67) 148/63  Resp:  [22] 22  SpO2:  [100 %] 100 %  O2 Device: Nasal cannula  Nasal Cannula O2 Flow Rate (L/min):  [2 L/min] 2 L/min    Physical Exam  Vitals and nursing note reviewed.   Constitutional:       General: He is not in acute distress.     Appearance: He is well-developed.   HENT:      Head: Normocephalic and atraumatic.   Eyes:      Conjunctiva/sclera: Conjunctivae normal.   Cardiovascular:      Rate and Rhythm: Normal rate and regular rhythm.      Heart sounds: No murmur heard.  Pulmonary:      Effort: Pulmonary effort is normal.      Breath sounds: Rales present.   Abdominal:      Palpations: Abdomen is soft.      Tenderness: There is no abdominal tenderness.   Musculoskeletal:         General: No swelling.   Skin:     General: Skin is warm and dry.   Neurological:      Mental Status: He is alert. Mental status is at baseline.   Psychiatric:         Mood and Affect: Mood normal.          Lines/Drains:            Lab Results: I have reviewed the following results:  Results from last 7 days   Lab Units 05/01/25  1321   WBC Thousand/uL 7.46   HEMOGLOBIN g/dL 8.7*   HEMATOCRIT % 27.5*   PLATELETS Thousands/uL 203   SEGS PCT % 54   LYMPHO PCT % 25   MONO PCT % 9   EOS PCT % 11*     Results from last 7 days   Lab Units 05/01/25  1321   SODIUM mmol/L 138   POTASSIUM mmol/L 4.8   CHLORIDE mmol/L 109*   CO2 mmol/L 19*   BUN mg/dL 36*   CREATININE mg/dL 2.24*   ANION GAP mmol/L 10   CALCIUM mg/dL 8.6   ALBUMIN g/dL 3.4*   TOTAL BILIRUBIN mg/dL 0.57   ALK PHOS U/L 96   ALT U/L 16   AST U/L 15    GLUCOSE RANDOM mg/dL 254*     Results from last 7 days   Lab Units 05/01/25  1346   INR  1.17         Lab Results   Component Value Date    HGBA1C 8.0 (H) 03/04/2025    HGBA1C 8.6 (H) 12/03/2024    HGBA1C 7.9 (H) 11/08/2024     Results from last 7 days   Lab Units 05/01/25  1346   LACTIC ACID mmol/L 2.8*   PROCALCITONIN ng/ml 0.16       Imaging Results Review: I reviewed radiology reports from this admission including: chest xray.  Other Study Results Review: EKG was reviewed.     Administrative Statements       ** Please Note: This note has been constructed using a voice recognition system. **

## 2025-05-01 NOTE — ASSESSMENT & PLAN NOTE
89M hx of dementia presented to ED via EMS for AMS and worsening shortness of breath.  In the ED cxr wet read concerning for left lower lobe pneumonia  Patient started on IV ceftriaxone  On evaluation patient denies any shortness of breath, acute cough, sputum production, fevers or chills  Reviewed prior CXR from 4/11 noted to have similary hazy opacity left lower lobe  Low suspicion for pneumonia given no symptoms, no leukocytosis, no fevers, and negative procal  Hold further abx  Satting 100% on 2L NC, wean down to room air  Pt/ot  Check procal in AM if remains negative would continue to hold abx

## 2025-05-01 NOTE — SEPSIS NOTE
Sepsis Note   Leroy Paredes 89 y.o. male MRN: [id number]  Unit/Bed#: ED-37 Encounter: [id number]       Initial Sepsis Screening       Row Name 05/01/25 1417                Is the patient's history suggestive of a new or worsening infection? Yes (Proceed)  -HA        Suspected source of infection pneumonia  -HA        Indicate SIRS criteria Tachypnea > 20 resp per min  -HA        Are two or more of the above signs & symptoms of infection both present and new to the patient? No  -HA                  User Key  (r) = Recorded By, (t) = Taken By, (c) = Cosigned By      Initials Name Provider Type    DUEÑAS Heladio Devine DO Resident                        Body mass index is 28.59 kg/m².  Wt Readings from Last 1 Encounters:   05/01/25 93 kg (205 lb)     IBW (Ideal Body Weight): 75.3 kg    Ideal body weight: 75.3 kg (166 lb 0.1 oz)  Adjusted ideal body weight: 82.4 kg (181 lb 9.7 oz)

## 2025-05-01 NOTE — ASSESSMENT & PLAN NOTE
"Lab Results   Component Value Date    HGBA1C 8.0 (H) 03/04/2025       No results for input(s): \"POCGLU\" in the last 72 hours.    Blood Sugar Average: Last 72 hrs:    Carb consistent diet  ISS      "

## 2025-05-01 NOTE — ASSESSMENT & PLAN NOTE
Wt Readings from Last 3 Encounters:   05/01/25 93 kg (205 lb)   04/21/25 78.9 kg (173 lb 15.1 oz)   04/14/25 84 kg (185 lb 3 oz)       NYHA class III stage C  LVEF 50% reduced from LVEF 55% by TTE on 6/29/20, conservative management   Euvolemic  Not on any diuretics  Not on ace due to episodes of hypotension        36.7

## 2025-05-01 NOTE — ED PROVIDER NOTES
"Time reflects when diagnosis was documented in both MDM as applicable and the Disposition within this note       Time User Action Codes Description Comment    5/1/2025  1:54 PM Ahmed, Heladio Add [R06.00] Dyspnea     5/1/2025  1:54 PM Ahmed, Heladio Add [R55] Syncope     5/1/2025  1:54 PM Ahmed, Heladio Add [R41.82] Altered mental status     5/1/2025  1:54 PM Ahmed, Heladio Add [I95.9] Hypotension     5/1/2025  1:55 PM Ahmed, Heladio Add [N18.9] CKD (chronic kidney disease)     5/1/2025  1:55 PM Ahmed, Heladio Add [D64.9] Anemia     5/1/2025  2:18 PM Ahmed, Heladio Add [J18.9] Pneumonia     5/1/2025  2:18 PM Ahmed, Heladio Modify [R06.00] Dyspnea     5/1/2025  2:18 PM Ahmed, Heladio Modify [J18.9] Pneumonia     5/1/2025  2:18 PM Ahmed, Heladio Add [R79.89] Elevated lactic acid level     5/1/2025  3:11 PM Zuleika Marmolejo Add [M25.512,  G89.29] Chronic left shoulder pain     5/1/2025  3:36 PM Ahmed, Heladio Add [E83.42] Hypomagnesemia           ED Disposition       ED Disposition   Admit    Condition   Stable    Date/Time   Thu May 1, 2025  2:31 PM    Comment   Case was discussed with PUSHPA and the patient's admission status was agreed to be Admission Status: inpatient status to the service of Dr. Marmolejo .               Assessment & Plan       Medical Decision Making  Patient with history as below presented to triage with CC: \"Patient presents with:  Chest Pain: Patient came in via EMS from [facility]. According to EMS facility  called because patient was found in his chair unresponsive and appeared blue. Upon EMS arrival patient was responsive with GCS of 14. On the ambulance patient started complaining of left sided chest pain under his armpit and SOB.   \"  Hx obtained from pt and EMS    89-year-old male with history of dementia.  Per EMS patient was found unresponsive by nursing staff with reports of cyanosis.  Upon EMS arrival patient alert and oriented, GCS 14 with history of dementia.  Complaining of left-sided chest pain and " shortness of breath initially.  Had transient hypotension which responded to fluids and route.  On ED arrival patient with GCS 15, no complaints of chest pain.  Does appear mildly tachypneic, with left-sided rales.  No hypoxia.  EKG showing bifascicular block/right bundle branch block, left axis, and T wave inversions in the anterolateral leads.  Patient given 324 of aspirin on arrival.  Vital signs otherwise stable without hypotension on arrival.  Patient afebrile.    DDX including but not limited to: pneumonia, pleural effusion, CHF, SCAPE, PE, PTX, ACS, MI, asthma exacerbation, COPD exacerbation, COVID 19, EVALI, anemia, metabolic abnormality, renal failure.    Plan: Labs, EKG, chest x-ray, pain control, cardiopulmonary monitoring, reassessment, admit for further workup/management     I have independently ordered, reviewed and interpreted the following: labs and/or imaging and/or EKG studies listed below  Reviewed external records including notes, and prior labs/imaging results.    Disposition: Patient condition, stable. Discussed need for admission with patient for further management and workup with pt and they are agreeable with plan. Discussed case with [facility] hospitalist, who agreed to admit patient to IP  status.     See ED Course for further MDM.      PLEASE NOTE:  This encounter was completed utilizing the [employer] Direct Speech Voice Recognition Software. Grammatical errors, random word insertions, pronoun errors and incomplete sentences are occasional inherent consequences of the system due to software limitations, ambient noise and hardware issues.These may be missed by proof reading prior to affixing electronic signature. Any questions or concerns about the content, text or information contained within the body of this dictation should be directly addressed to the physician for clarification. Please do not hesitate to call me directly if you have any questions or concerns.      Amount and/or  Complexity of Data Reviewed  Independent Historian: EMS  External Data Reviewed: labs, radiology, ECG and notes.  Labs: ordered. Decision-making details documented in ED Course.  Radiology: ordered and independent interpretation performed. Decision-making details documented in ED Course.  ECG/medicine tests: ordered and independent interpretation performed. Decision-making details documented in ED Course.    Risk  OTC drugs.  Prescription drug management.  Decision regarding hospitalization.        ED Course as of 05/01/25 1536   Thu May 01, 2025   1349 CBC and differential(!)  No leukocytosis.  Anemia with hemoglobin 8.7 at baseline..  Platelets WNL.  No significant bandemia.   1350 Comprehensive metabolic panel(!)  Hyperchloremia and bicarb of 19.  No anion gap.  Creatinine appears at baseline with CKD.  Glucose 254.  No acute LFT/liver enzyme elevation.   1354 hs TnI 0hr: 22   1413 LACTIC ACID(!): 2.8   1413 Last echo reviewed:      Left Ventricle: Left ventricular cavity size is normal. Wall thickness is normal. The left ventricular ejection fraction is 50% by visual estimation. Systolic function is low normal. Diastolic function is mildly abnormal, consistent with grade I (abnormal) relaxation.  Left atrial filling pressure is normal.  ·  Right Ventricle: Right ventricular cavity size is mildly dilated.  ·  Left Atrium: The atrium is moderately dilated.  ·  Right Atrium: The atrium is dilated.  ·  Aortic Valve: There is aortic valve sclerosis.  ·  Tricuspid Valve: There is mild to moderate regurgitation. The right ventricular systolic pressure is normal. The estimated right ventricular systolic pressure is 34.00 mmHg.  ·  Pulmonic Valve: There is mild to moderate regurgitation.  ·  Aorta: The aortic root is normal in size. The aortic root exhibited mild fibrocalcific change.  ·  Pericardium: There is no pericardial effusion.  ·  Technically very difficult study.     1417 LACTIC ACID(!): 2.8   1418 Patient does  not meet SIRS criteria.  Chest x-ray consistent with left-sided pneumonia.  Initially presented with hypotension now normalized.  Lactate elevated 2.8.  No hypoxia.  Will treat with IV antibiotics and admit for further management.   1418 FLU/COVID Rapid Antigen (30 min. TAT) - Preferred screening test in ED  Negative.   1446 BNP(!): 317   1446 Procalcitonin: 0.16   1458 MAGNESIUM(!): 1.8       Medications   sodium chloride (PF) 0.9 % injection 3 mL (has no administration in time range)   acetaminophen (TYLENOL) tablet 650 mg (has no administration in time range)   apixaban (ELIQUIS) tablet 2.5 mg (has no administration in time range)   atorvastatin (LIPITOR) tablet 40 mg (has no administration in time range)   gabapentin (NEURONTIN) capsule 100 mg (has no administration in time range)   magnesium Oxide (MAG-OX) tablet 400 mg (400 mg Oral Given 5/1/25 1449)   mirtazapine (REMERON) tablet 30 mg (has no administration in time range)   nateglinide (STARLIX) tablet 60 mg (has no administration in time range)   QUEtiapine (SEROquel) tablet 25 mg (has no administration in time range)   insulin lispro (HumALOG/ADMELOG) 100 units/mL subcutaneous injection 1-6 Units (has no administration in time range)   insulin lispro (HumALOG/ADMELOG) 100 units/mL subcutaneous injection 1-6 Units (has no administration in time range)   aspirin chewable tablet 324 mg (324 mg Oral Given 5/1/25 1331)   sodium chloride 0.9 % bolus 500 mL (0 mL Intravenous Stopped 5/1/25 1525)   ceftriaxone (ROCEPHIN) 1 g/50 mL in dextrose IVPB (0 mg Intravenous Stopped 5/1/25 1454)       ED Risk Strat Scores   HEART Risk Score      Flowsheet Row Most Recent Value   Heart Score Risk Calculator    History 1 Filed at: 05/01/2025 1354   ECG 1 Filed at: 05/01/2025 1354   Age 2 Filed at: 05/01/2025 1354   Risk Factors 2 Filed at: 05/01/2025 1354   Troponin 1 Filed at: 05/01/2025 1354   HEART Score 7 Filed at: 05/01/2025 1354          HEART Risk Score      Flowsheet  Row Most Recent Value   Heart Score Risk Calculator    History 1 Filed at: 05/01/2025 1354   ECG 1 Filed at: 05/01/2025 1354   Age 2 Filed at: 05/01/2025 1354   Risk Factors 2 Filed at: 05/01/2025 1354   Troponin 1 Filed at: 05/01/2025 1354   HEART Score 7 Filed at: 05/01/2025 1354                      No data recorded        SBIRT 22yo+      Flowsheet Row Most Recent Value   Initial Alcohol Screen: US AUDIT-C     1. How often do you have a drink containing alcohol? 0 Filed at: 05/01/2025 1324   2. How many drinks containing alcohol do you have on a typical day you are drinking?  0 Filed at: 05/01/2025 1324   3a. Male UNDER 65: How often do you have five or more drinks on one occasion? 0 Filed at: 05/01/2025 1324   3b. FEMALE Any Age, or MALE 65+: How often do you have 4 or more drinks on one occassion? 0 Filed at: 05/01/2025 1324   Audit-C Score 0 Filed at: 05/01/2025 1324                            History of Present Illness       Chief Complaint   Patient presents with    Chest Pain     Patient came in via EMS from [facility]. According to EMS facility  called because patient was found in his chair unresponsive and appeared blue. Upon EMS arrival patient was responsive with GCS of 14. On the ambulance patient started complaining of left sided chest pain under his armpit and SOB.          Past Medical History:   Diagnosis Date    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)     Last Assessed:  5/19/17    Chronic kidney disease     Diabetes mellitus (HCC)     Hyperlipidemia     Hypertension     Osteoarthritis     Palpitations     TIA (transient ischemic attack)       Past Surgical History:   Procedure Laterality Date    APPENDECTOMY      CARDIAC ELECTROPHYSIOLOGY PROCEDURE N/A 1/17/2024    Procedure: Cardiac loop recorder explant;  Surgeon: Drew Olmos MD;  Location: [facility];  Service: Cardiology    COLONOSCOPY  2012    HEMORROIDECTOMY      TOOTH EXTRACTION  02/02/2022      Family History    Problem Relation Age of Onset    Diabetes Mother     Other Mother         Stroke syndrome    Diabetes Father     Emphysema Father       Social History     Tobacco Use    Smoking status: Former    Smokeless tobacco: Never   Vaping Use    Vaping status: Never Used   Substance Use Topics    Alcohol use: Not Currently     Comment: Social drinker    Drug use: No      E-Cigarette/Vaping    E-Cigarette Use Never User       E-Cigarette/Vaping Substances    Nicotine No     THC No     CBD No     Flavoring No     Other No     Unknown No       I have reviewed and agree with the history as documented.     89-year-old male with history of dementia, HTN, DM2, CKD presented to the ED with complaints of altered mental status.  Earlier today around noon to 1 PM patient was found unresponsive by nursing staff.  Patient was found sitting in his chair unresponsive and appeared blue.  Upon EMS arrival patient was responsive, and at baseline mental status with GCS of 15.  He has stable vitals at the time with glucose in the 220s.  And route patient started complaining of left-sided chest pain under his armpit and shortness of breath.  He was found to be hypotensive 90s over 40s which improved after IV fluid administration (200 cc).  On arrival to the ED patient GCS 15, only complaining of shortness of breath.  Denying any chest pain.  Denies recent fever, chills, abdominal pain, back pain, cough, congestion, headache, palpitations, PND, orthopnea, leg pain, leg swelling, rash.  HPI limited given history of dementia.        Review of Systems   Unable to perform ROS: Dementia           Objective       ED Triage Vitals [05/01/25 1315]   Temperature Pulse Blood Pressure Respirations SpO2 Patient Position - Orthostatic VS   97.5 °F (36.4 °C) 59 143/67 22 100 % Lying      Temp src Heart Rate Source BP Location FiO2 (%) Pain Score    -- Monitor Right arm -- --      Vitals      Date and Time Temp Pulse SpO2 Resp BP Pain Score FACES Pain Rating  User   05/01/25 1414 -- 66 100 % -- 148/63 -- -- HA   05/01/25 1315 97.5 °F (36.4 °C) 59 100 % 22 143/67 -- -- TW            Physical Exam  Vitals and nursing note reviewed.   Constitutional:       General: He is in acute distress.      Appearance: He is well-developed. He is ill-appearing. He is not toxic-appearing or diaphoretic.      Comments: Old and frail appearing.   HENT:      Head: Normocephalic and atraumatic.      Right Ear: External ear normal.      Left Ear: External ear normal.      Nose: Nose normal. No congestion.      Mouth/Throat:      Mouth: Mucous membranes are moist.      Pharynx: Oropharynx is clear. No oropharyngeal exudate or posterior oropharyngeal erythema.   Eyes:      Extraocular Movements: Extraocular movements intact.      Conjunctiva/sclera: Conjunctivae normal.      Pupils: Pupils are equal, round, and reactive to light.   Cardiovascular:      Rate and Rhythm: Regular rhythm. Bradycardia present.      Pulses: Normal pulses.           Radial pulses are 2+ on the right side and 2+ on the left side.        Dorsalis pedis pulses are 2+ on the right side and 2+ on the left side.        Posterior tibial pulses are 2+ on the right side and 2+ on the left side.      Heart sounds: Normal heart sounds. No murmur heard.     No friction rub. No gallop.   Pulmonary:      Effort: Pulmonary effort is normal. Tachypnea present. No accessory muscle usage.      Breath sounds: No stridor. Examination of the left-lower field reveals rales. Rales present. No wheezing or rhonchi.   Chest:      Chest wall: No mass or tenderness.   Abdominal:      General: Bowel sounds are normal.      Palpations: Abdomen is soft.      Tenderness: There is no abdominal tenderness. There is no right CVA tenderness, left CVA tenderness, guarding or rebound.   Musculoskeletal:         General: No swelling, tenderness or deformity. Normal range of motion.      Cervical back: Normal range of motion and neck supple. No rigidity or  tenderness.      Right lower leg: No tenderness. No edema.      Left lower leg: No tenderness. No edema.   Lymphadenopathy:      Cervical: No cervical adenopathy.   Skin:     General: Skin is warm and dry.      Capillary Refill: Capillary refill takes less than 2 seconds.      Coloration: Skin is not cyanotic, jaundiced or pale.      Findings: No bruising, erythema, lesion or rash.   Neurological:      General: No focal deficit present.      Mental Status: He is alert and oriented to person, place, and time. Mental status is at baseline.      GCS: GCS eye subscore is 4. GCS verbal subscore is 5. GCS motor subscore is 6.      Cranial Nerves: Cranial nerves 2-12 are intact. No cranial nerve deficit, dysarthria or facial asymmetry.      Sensory: Sensation is intact. No sensory deficit.      Motor: Motor function is intact. No abnormal muscle tone or pronator drift.      Coordination: Coordination is intact.      Gait: Gait is intact.   Psychiatric:         Mood and Affect: Mood normal.         Behavior: Behavior normal.         Thought Content: Thought content normal.         Results Reviewed       Procedure Component Value Units Date/Time    TSH, 3rd generation with Free T4 reflex [[id number]]     Lab Status: No result Specimen: Blood     Magnesium [[id number]]  (Abnormal) Collected: 05/01/25 1346    Lab Status: Final result Specimen: Blood from Arm, Left Updated: 05/01/25 1447     Magnesium 1.8 mg/dL     B-Type Natriuretic Peptide(BNP) [[id number]]  (Abnormal) Collected: 05/01/25 1321    Lab Status: Final result Specimen: Blood from Arm, Left Updated: 05/01/25 1438      pg/mL     Procalcitonin [[id number]]  (Normal) Collected: 05/01/25 1346    Lab Status: Final result Specimen: Blood from Arm, Left Updated: 05/01/25 1436     Procalcitonin 0.16 ng/ml     Protime-INR [[id number]]  (Abnormal) Collected: 05/01/25 1346    Lab Status: Final result Specimen: Blood from Arm, Left Updated: 05/01/25 1417     Protime 15.6  seconds      INR 1.17    Narrative:      INR Therapeutic Range    Indication                                             INR Range      Atrial Fibrillation                                               2.0-3.0  Hypercoagulable State                                    2.0.2.3  Left Ventricular Asist Device                            2.0-3.0  Mechanical Heart Valve                                  -    Aortic(with afib, MI, embolism, HF, LA enlargement,    and/or coagulopathy)                                     2.0-3.0 (2.5-3.5)     Mitral                                                             2.5-3.5  Prosthetic/Bioprosthetic Heart Valve               2.0-3.0  Venous thromboembolism (VTE: VT, PE        2.0-3.0    APTT [[id number]]  (Normal) Collected: 05/01/25 1346    Lab Status: Final result Specimen: Blood from Arm, Left Updated: 05/01/25 1417     PTT 32 seconds     FLU/COVID Rapid Antigen (30 min. TAT) - Preferred screening test in ED [[id number]]  (Normal) Collected: 05/01/25 1353    Lab Status: Final result Specimen: Nares from Nose Updated: 05/01/25 1417     SARS COV Rapid Antigen Negative     Influenza A Rapid Antigen Negative     Influenza B Rapid Antigen Negative    Narrative:      This test has been performed using the Quidel Esther 2 FLU+SARS Antigen test under the Emergency Use Authorization (EUA). This test has been validated by the  and verified by the performing laboratory. The Esther uses lateral flow immunofluorescent sandwich assay to detect SARS-COV, Influenza A and Influenza B Antigen.     The Quidel Esther 2 SARS Antigen test does not differentiate between SARS-CoV and SARS-CoV-2.     Negative results are presumptive and may be confirmed with a molecular assay, if necessary, for patient management. Negative results do not rule out SARS-CoV-2 or influenza infection and should not be used as the sole basis for treatment or patient management decisions. A negative test result may occur  if the level of antigen in a sample is below the limit of detection of this test.     Positive results are indicative of the presence of viral antigens, but do not rule out bacterial infection or co-infection with other viruses.     All test results should be used as an adjunct to clinical observations and other information available to the provider.    FOR PEDIATRIC PATIENTS - copy/paste COVID Guidelines URL to browser: [email or web address]    Lactic acid, plasma (w/reflex if result > 2.0) [[id number]]  (Abnormal) Collected: 05/01/25 1346    Lab Status: Final result Specimen: Blood from Arm, Left Updated: 05/01/25 1412     LACTIC ACID 2.8 mmol/L     Narrative:      Result may be elevated if tourniquet was used during collection.    Lactic acid 2 Hours [[id number]]     Lab Status: No result Specimen: Blood     Blood culture #2 [[id number]] Collected: 05/01/25 1346    Lab Status: In process Specimen: Blood from Arm, Right Updated: 05/01/25 1354    Blood culture #1 [[id number]] Collected: 05/01/25 1346    Lab Status: In process Specimen: Blood from Arm, Left Updated: 05/01/25 1354    HS Troponin 0hr (reflex protocol) [[id number]]  (Normal) Collected: 05/01/25 1321    Lab Status: Final result Specimen: Blood from Arm, Left Updated: 05/01/25 1353     hs TnI 0hr 22 ng/L     HS Troponin I 2hr [[id number]]     Lab Status: No result Specimen: Blood     Comprehensive metabolic panel [[id number]]  (Abnormal) Collected: 05/01/25 1321    Lab Status: Final result Specimen: Blood from Arm, Left Updated: 05/01/25 1349     Sodium 138 mmol/L      Potassium 4.8 mmol/L      Chloride 109 mmol/L      CO2 19 mmol/L      ANION GAP 10 mmol/L      BUN 36 mg/dL      Creatinine 2.24 mg/dL      Glucose 254 mg/dL      Calcium 8.6 mg/dL      Corrected Calcium 9.1 mg/dL      AST 15 U/L      ALT 16 U/L      Alkaline Phosphatase 96 U/L      Total Protein 6.7 g/dL      Albumin 3.4 g/dL      Total  Bilirubin 0.57 mg/dL      eGFR 24 ml/min/1.73sq m     Narrative:      National Kidney Disease Foundation guidelines for Chronic Kidney Disease (CKD):     Stage 1 with normal or high GFR (GFR > 90 mL/min/1.73 square meters)    Stage 2 Mild CKD (GFR = 60-89 mL/min/1.73 square meters)    Stage 3A Moderate CKD (GFR = 45-59 mL/min/1.73 square meters)    Stage 3B Moderate CKD (GFR = 30-44 mL/min/1.73 square meters)    Stage 4 Severe CKD (GFR = 15-29 mL/min/1.73 square meters)    Stage 5 End Stage CKD (GFR <15 mL/min/1.73 square meters)  Note: GFR calculation is accurate only with a steady state creatinine    CBC and differential [[id number]]  (Abnormal) Collected: 05/01/25 1321    Lab Status: Final result Specimen: Blood from Arm, Left Updated: 05/01/25 1331     WBC 7.46 Thousand/uL      RBC 2.81 Million/uL      Hemoglobin 8.7 g/dL      Hematocrit 27.5 %      MCV 98 fL      MCH 31.0 pg      MCHC 31.6 g/dL      RDW 14.9 %      MPV 9.4 fL      Platelets 203 Thousands/uL      nRBC 0 /100 WBCs      Segmented % 54 %      Immature Grans % 1 %      Lymphocytes % 25 %      Monocytes % 9 %      Eosinophils Relative 11 %      Basophils Relative 0 %      Absolute Neutrophils 4.08 Thousands/µL      Absolute Immature Grans 0.04 Thousand/uL      Absolute Lymphocytes 1.83 Thousands/µL      Absolute Monocytes 0.68 Thousand/µL      Eosinophils Absolute 0.80 Thousand/µL      Basophils Absolute 0.03 Thousands/µL     UA w Reflex to Microscopic w Reflex to Culture [[id number]]     Lab Status: No result Specimen: Urine             X-ray chest 1 view portable   ED Interpretation by Heladio Devine DO (05/01 1417)   Abnormal   Left-sided basilar opacity concerning for pneumonia.  No pleural effusion.  No pneumothorax.      Final Interpretation by Sarah Mackey MD (05/01 1512)      No acute cardiopulmonary disease.      Moderate pulmonary fibrosis.            Workstation performed: [id number]             ECG 12 Lead Documentation  Only    Date/Time: 5/1/2025 1:09 PM    Performed by: Heladio Devine DO  Authorized by: Heladio Devine DO    Indications / Diagnosis:  Chest pain.  ECG reviewed by me, the ED Provider: yes    Patient location:  ED  Previous ECG:     Previous ECG:  Compared to current    Comparison ECG info:  April 11, 2025.    Similarity:  Changes noted  Interpretation:     Interpretation: abnormal    Rate:     ECG rate:  58    ECG rate assessment: bradycardic    Rhythm:     Rhythm: sinus bradycardia    Ectopy:     Ectopy: none    QRS:     QRS axis:  Left    QRS intervals:  Wide  Conduction:     Conduction: abnormal      Abnormal conduction: complete RBBB, LAFB and bifascicular block    ST segments:     ST segments:  Normal  T waves:     T waves: inverted      Inverted:  II, III, aVF, V6, V5 and V4      ED Medication and Procedure Management   Prior to Admission Medications   Prescriptions Last Dose Informant Patient Reported? Taking?   Blood Glucose Monitoring Suppl (PRECISION XTRA MONITOR) MARY  Self No No   Sig: by Does not apply route daily   Cholecalciferol (D3 High Potency) 25 MCG (1000 UT) capsule   No No   Sig: TAKE ONE CAPSULE BY MOUTH DAILY   Diclofenac Sodium (VOLTAREN) 1 %   No No   Sig: Apply 2 g topically 4 (four) times a day Use as needed.   Patient not taking: Reported on 4/15/2025   QUEtiapine (SEROquel) 25 mg tablet   No No   Sig: TAKE ONE TABLET BY MOUTH EVERY NIGHT AT BEDTIME *IN [facility] CARD/NURSE TO REORDER   acetaminophen (TYLENOL) 325 mg tablet  Self No No   Sig: Take 2 tablets (650 mg total) by mouth every 6 (six) hours as needed for mild pain   apixaban (Eliquis) 2.5 mg   No No   Sig: TAKE 1 TABLET TWICE DAILY   atorvastatin (LIPITOR) 40 mg tablet   No No   Sig: TAKE 1 TABLET EVERY DAY WITH DINNER   bisacodyl ([facility] Bisacodyl) 10 mg suppository  Self Yes No   Sig: Insert 10 mg into the rectum daily   celecoxib (CeleBREX) 200 mg capsule   No No   Sig: Take 1 capsule (200 mg total) by mouth 2 (two) times a day    Patient not taking: Reported on 4/15/2025   escitalopram (LEXAPRO) 5 mg tablet   No No   Sig: TAKE ONE TABLET BY MOUTH DAILY   gabapentin (NEURONTIN) 100 mg capsule   No No   Sig: TAKE ONE CAPSULE BY MOUTH EVERY NIGHT AT BEDTIME   lisinopril (ZESTRIL) 5 mg tablet   No No   Sig: TAKE 1 TABLET EVERY DAY   Patient not taking: Reported on 4/15/2025   magnesium Oxide (MAG-OX) 400 mg TABS   No No   Sig: TAKE ONE TABLET BY MOUTH DAILY   magnesium hydroxide (Milk of Magnesia) 400 mg/5 mL oral suspension   Yes No   Sig: Take 30 mL by mouth if needed for constipation Patient take every 4 days as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

## 2025-05-01 NOTE — ASSESSMENT & PLAN NOTE
89M hx of dementia presented to ED via EMS for AMS and worsening shortness of breath.  In the ED cxr obtained concerning for left lower lobe pneumonia  Patient started on IV ceftriaxone  On evaluation patient denies any shortness of breath, acute cough, sputum production, fevers or chills  CXR from 4/11 noted to have similary hazy opacity left lower lobe  Low suspicion for pneumonia given no symptoms, no leukocytosis, no fevers, and negative procal  Hold further abx  Satting 100% on 2L NC, wean down to room air  Pt/ot

## 2025-05-01 NOTE — Clinical Note
Case was discussed with PUSHPA and the patient's admission status was agreed to be Admission Status: inpatient status to the service of Dr. GUILLERMO .

## 2025-05-02 VITALS
WEIGHT: 164.02 LBS | OXYGEN SATURATION: 97 % | SYSTOLIC BLOOD PRESSURE: 150 MMHG | RESPIRATION RATE: 17 BRPM | HEART RATE: 72 BPM | HEIGHT: 71 IN | TEMPERATURE: 97.8 F | BODY MASS INDEX: 22.96 KG/M2 | DIASTOLIC BLOOD PRESSURE: 61 MMHG

## 2025-05-02 PROBLEM — R41.82 ALTERED MENTAL STATUS: Status: ACTIVE | Noted: 2025-05-02

## 2025-05-02 PROBLEM — J18.9 PNEUMONIA OF LEFT LOWER LOBE DUE TO INFECTIOUS ORGANISM: Status: RESOLVED | Noted: 2025-05-01 | Resolved: 2025-05-02

## 2025-05-02 PROBLEM — R41.82 ALTERED MENTAL STATUS: Status: RESOLVED | Noted: 2025-05-02 | Resolved: 2025-05-02

## 2025-05-02 PROBLEM — R06.02 SHORTNESS OF BREATH: Status: RESOLVED | Noted: 2025-05-02 | Resolved: 2025-05-02

## 2025-05-02 PROBLEM — G89.29 CHRONIC LEFT SHOULDER PAIN: Status: ACTIVE | Noted: 2025-04-14

## 2025-05-02 PROBLEM — R06.02 SHORTNESS OF BREATH: Status: ACTIVE | Noted: 2025-05-02

## 2025-05-02 LAB
ALBUMIN SERPL BCG-MCNC: 3.6 G/DL (ref 3.5–5)
ANION GAP SERPL CALCULATED.3IONS-SCNC: 7 MMOL/L (ref 4–13)
BACTERIA UR QL AUTO: ABNORMAL /HPF
BILIRUB UR QL STRIP: NEGATIVE
BUN SERPL-MCNC: 39 MG/DL (ref 5–25)
CALCIUM SERPL-MCNC: 8.5 MG/DL (ref 8.4–10.2)
CHLORIDE SERPL-SCNC: 109 MMOL/L (ref 96–108)
CLARITY UR: CLEAR
CO2 SERPL-SCNC: 20 MMOL/L (ref 21–32)
COLOR UR: COLORLESS
CREAT SERPL-MCNC: 2.11 MG/DL (ref 0.6–1.3)
ERYTHROCYTE [DISTWIDTH] IN BLOOD BY AUTOMATED COUNT: 14.9 % (ref 11.6–15.1)
GFR SERPL CREATININE-BSD FRML MDRD: 26 ML/MIN/1.73SQ M
GLUCOSE P FAST SERPL-MCNC: 167 MG/DL (ref 65–99)
GLUCOSE SERPL-MCNC: 167 MG/DL (ref 65–140)
GLUCOSE SERPL-MCNC: 179 MG/DL (ref 65–140)
GLUCOSE SERPL-MCNC: 212 MG/DL (ref 65–140)
GLUCOSE SERPL-MCNC: 274 MG/DL (ref 65–140)
GLUCOSE UR STRIP-MCNC: ABNORMAL MG/DL
HCT VFR BLD AUTO: 26.8 % (ref 36.5–49.3)
HGB BLD-MCNC: 8.4 G/DL (ref 12–17)
HGB UR QL STRIP.AUTO: NEGATIVE
KETONES UR STRIP-MCNC: NEGATIVE MG/DL
LEUKOCYTE ESTERASE UR QL STRIP: NEGATIVE
MAGNESIUM SERPL-MCNC: 1.8 MG/DL (ref 1.9–2.7)
MCH RBC QN AUTO: 30.8 PG (ref 26.8–34.3)
MCHC RBC AUTO-ENTMCNC: 31.3 G/DL (ref 31.4–37.4)
MCV RBC AUTO: 98 FL (ref 82–98)
NITRITE UR QL STRIP: NEGATIVE
NON-SQ EPI CELLS URNS QL MICRO: ABNORMAL /HPF
PH UR STRIP.AUTO: 6.5 [PH]
PHOSPHATE SERPL-MCNC: 3 MG/DL (ref 2.3–4.1)
PLATELET # BLD AUTO: 201 THOUSANDS/UL (ref 149–390)
PMV BLD AUTO: 9.2 FL (ref 8.9–12.7)
POTASSIUM SERPL-SCNC: 5.4 MMOL/L (ref 3.5–5.3)
PROCALCITONIN SERPL-MCNC: 0.16 NG/ML
PROT UR STRIP-MCNC: ABNORMAL MG/DL
RBC # BLD AUTO: 2.73 MILLION/UL (ref 3.88–5.62)
RBC #/AREA URNS AUTO: ABNORMAL /HPF
SODIUM SERPL-SCNC: 136 MMOL/L (ref 135–147)
SP GR UR STRIP.AUTO: 1.01 (ref 1–1.03)
UROBILINOGEN UR STRIP-ACNC: <2 MG/DL
WBC # BLD AUTO: 7.8 THOUSAND/UL (ref 4.31–10.16)
WBC #/AREA URNS AUTO: ABNORMAL /HPF

## 2025-05-02 PROCEDURE — 80069 RENAL FUNCTION PANEL: CPT | Performed by: STUDENT IN AN ORGANIZED HEALTH CARE EDUCATION/TRAINING PROGRAM

## 2025-05-02 PROCEDURE — 87081 CULTURE SCREEN ONLY: CPT | Performed by: STUDENT IN AN ORGANIZED HEALTH CARE EDUCATION/TRAINING PROGRAM

## 2025-05-02 PROCEDURE — 85027 COMPLETE CBC AUTOMATED: CPT | Performed by: STUDENT IN AN ORGANIZED HEALTH CARE EDUCATION/TRAINING PROGRAM

## 2025-05-02 PROCEDURE — 99239 HOSP IP/OBS DSCHRG MGMT >30: CPT | Performed by: INTERNAL MEDICINE

## 2025-05-02 PROCEDURE — 83735 ASSAY OF MAGNESIUM: CPT | Performed by: STUDENT IN AN ORGANIZED HEALTH CARE EDUCATION/TRAINING PROGRAM

## 2025-05-02 PROCEDURE — 97167 OT EVAL HIGH COMPLEX 60 MIN: CPT

## 2025-05-02 PROCEDURE — 82948 REAGENT STRIP/BLOOD GLUCOSE: CPT

## 2025-05-02 PROCEDURE — 84145 PROCALCITONIN (PCT): CPT | Performed by: STUDENT IN AN ORGANIZED HEALTH CARE EDUCATION/TRAINING PROGRAM

## 2025-05-02 PROCEDURE — 81001 URINALYSIS AUTO W/SCOPE: CPT | Performed by: EMERGENCY MEDICINE

## 2025-05-02 PROCEDURE — 97163 PT EVAL HIGH COMPLEX 45 MIN: CPT

## 2025-05-02 RX ORDER — POLYETHYLENE GLYCOL 3350 17 G/17G
17 POWDER, FOR SOLUTION ORAL DAILY
Status: DISCONTINUED | OUTPATIENT
Start: 2025-05-02 | End: 2025-05-02 | Stop reason: HOSPADM

## 2025-05-02 RX ORDER — CAPSAICIN 0.025 %
1 CREAM (GRAM) TOPICAL ONCE
Status: COMPLETED | OUTPATIENT
Start: 2025-05-02 | End: 2025-05-02

## 2025-05-02 RX ORDER — MAGNESIUM SULFATE 1 G/100ML
1 INJECTION INTRAVENOUS ONCE
Status: COMPLETED | OUTPATIENT
Start: 2025-05-02 | End: 2025-05-02

## 2025-05-02 RX ADMIN — LIDOCAINE: 40 CREAM TOPICAL at 09:40

## 2025-05-02 RX ADMIN — INSULIN LISPRO 2 UNITS: 100 INJECTION, SOLUTION INTRAVENOUS; SUBCUTANEOUS at 12:39

## 2025-05-02 RX ADMIN — ACETAMINOPHEN 650 MG: 325 TABLET, FILM COATED ORAL at 09:38

## 2025-05-02 RX ADMIN — ATORVASTATIN CALCIUM 40 MG: 40 TABLET, FILM COATED ORAL at 16:58

## 2025-05-02 RX ADMIN — Medication 400 MG: at 09:09

## 2025-05-02 RX ADMIN — MAGNESIUM SULFATE 1 G: 1 INJECTION INTRAVENOUS at 14:55

## 2025-05-02 RX ADMIN — INSULIN LISPRO 4 UNITS: 100 INJECTION, SOLUTION INTRAVENOUS; SUBCUTANEOUS at 16:59

## 2025-05-02 RX ADMIN — POLYETHYLENE GLYCOL 3350 17 G: 17 POWDER, FOR SOLUTION ORAL at 14:09

## 2025-05-02 RX ADMIN — CAPSAICIN 1 SMALL APPLICATION: 0.25 CREAM TOPICAL at 11:03

## 2025-05-02 RX ADMIN — NATEGLINIDE 60 MG: 60 TABLET ORAL at 09:09

## 2025-05-02 RX ADMIN — NATEGLINIDE 60 MG: 60 TABLET ORAL at 12:39

## 2025-05-02 RX ADMIN — NATEGLINIDE 60 MG: 60 TABLET ORAL at 16:57

## 2025-05-02 RX ADMIN — INSULIN LISPRO 1 UNITS: 100 INJECTION, SOLUTION INTRAVENOUS; SUBCUTANEOUS at 09:08

## 2025-05-02 RX ADMIN — APIXABAN 2.5 MG: 2.5 TABLET, FILM COATED ORAL at 09:09

## 2025-05-02 NOTE — PROGRESS NOTES
Progress Note - Hospitalist   Name: Leroy Paredes 89 y.o. male I MRN: [id number]  Unit/Bed#: S -01 I Date of Admission: 5/1/2025   Date of Service: 5/2/2025 I Hospital Day: 1  { ?Quick Links I Problem List I PORCH I Billing Tip:53530}  Assessment & Plan  Pneumonia of left lower lobe due to infectious organism  89M hx of dementia presented to ED via EMS for AMS and worsening shortness of breath.  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)  Lab Results   Component Value Date    HGBA1C 8.0 (H) 03/04/2025       Recent Labs     05/01/25  1812 05/01/25  2100   POCGLU 255* 165*       Blood Sugar Average: Last 72 hrs:  (P) 210  CKD IV baseline creat 2.13 - 2.23   Currently at baseline    Chronic heart failure with preserved ejection fraction (HCC)  Wt Readings from Last 3 Encounters:   05/02/25 74.4 kg (164 lb 0.4 oz)   04/21/25 78.9 kg (173 lb 15.1 oz)   04/14/25 84 kg (185 lb 3 oz)       NYHA class III stage C  LVEF 50% reduced from LVEF 55% by TTE on 6/29/20, conservative management   Euvolemic  Not on any diuretics  Not on ace due to episodes of hypotension       Dementia (HCC)  Continue home seroquel 25 mg qhs  Continue Remeron 30 mg qhs   Type 2 diabetes mellitus (HCC)  Lab Results   Component Value Date    HGBA1C 8.0 (H) 03/04/2025       Recent Labs     05/01/25  1812 05/01/25  2100   POCGLU 255* 165*       Blood Sugar Average: Last 72 hrs:    Carb consistent diet  ISS      Chronic atrial fibrillation (HCC)  Continue home  eliquis 2.5 mg bid  Not on any rete controlling agents  HR in 60's     VTE Pharmacologic Prophylaxis:   {VTE Risk:84144}    Mobility:   Basic Mobility Inpatient Raw Score: 18  -HLM Goal: 6: Walk 10 steps or more  JH-HLM Achieved: 4: Move to chair/commode  {Mobility:74192}    Patient Centered Rounds: {Pt Centered Care Rounds:23572}   Discussions with Specialists or Other Care Team Provider: ***    Education and Discussions with Family / Patient: {Family Communication:47631}    Current Length of Stay: 1 day(s)  Current Patient Status: Observation   Certification Statement: {Certification Statement:15903}  Discharge Plan: {Discharge Plan:76163}    Code Status: Level 3 - DNAR and DNI    Subjective   Leroy Paredes is a 89 y.o. male with a PMH of dementia, ambulatory dysfunction, embolic CVA, enlarged prostate, CKD stage IV, hypertension, heart failure with preserved EF , type 2 diabetes,  who presents from [facility] due to being short of breath and confused this morning.  Patient was brought to the ED via EMS.  On arrival he was hypotensive and given IV fluids with improvement in blood pressure.  Evaluation in the ED was noted for elevated lactic acid 2.8.  Chest x-ray was concerning for a left lower lobe pneumonia and started on IV ceftriaxone.  On evaluation with family at bedside patient denies any acute cough, sputum production, fevers or chills.  Patient denies any shortness of breath.  Family at bedside state that he appears to be at baseline mentation at this point.  Patient admits to having this chronic left shoulder pain that comes and goes. He states he's been worked up for this shoulder pain and is currently not interested in have any type of surgery. He is agreeable to ortho referral on discharge for possible shoulder injections to help alleviate pain.     No acute events overnight.     Patient seen and examined at the bedside.Today, the patient states    The patient denies chest pain, shortness  of breath, fevers, chills, diaphoresis, lightheadedness, headache, N/V/D. The patient denies additional questions or concerns.    VS: wnl    PE:    Labs:  On admission   Glucose 255  Procal 0.16  LA 2.8  Blood cultures obtained  TSH 2.832  Flu/covid negative  Trops 22, 23,     WBC 7.8 > 7.46  Hgb 8.4 > 8.7 (drop from baseline 10s in February-March) - MCV 98  Plt 201 > 203      Imaging:  CXR with moderate pulmonary fibrosis, no acute cardiopulmonary disease  4/21 CXR with no acute cardiopulmonary disease  4/14 CT chest wo contrast: usual interstitial pneumonitis pattern of ILD      Plan:    Objective :{?Quick Links I ICU Summary I Vitals I I/Os I LDAs I Mobility (PT/OT) I Code Status / ACP   ?Quick Links I Active Meds I Pain Meds I Antibiotics I Anticoagulants:44133}  Temp:  [97.5 °F (36.4 °C)-98 °F (36.7 °C)] 97.7 °F (36.5 °C)  HR:  [59-96] 96  BP: (137-173)/(59-76) 137/59  Resp:  [17-22] 17  SpO2:  [92 %-100 %] 92 %  O2 Device: None (Room air)  Nasal Cannula O2 Flow Rate (L/min):  [2 L/min] 2 L/min    Body mass index is 22.88 kg/m².     Input and Output Summary (last 24 hours):     Intake/Output Summary (Last 24 hours) at 5/2/2025 0648  Last data filed at 5/2/2025 0510  Gross per 24 hour   Intake 290 ml   Output 1250 ml   Net -960 ml       Physical Exam  ***    Lines/Drains:        Telemetry:  Telemetry Orders (From admission, onward)               24 Hour Telemetry Monitoring  Continuous x 24 Hours (Telem)        Expiring   Question:  Reason for 24 Hour Telemetry  Answer:  Syncope suspected to be cardiac in origin                     Telemetry Reviewed: {JOEY Tele Review:60777}  Indication for Continued Telemetry Use: {Tele Indications:55979}             {?Quick Links I Lab Review I Micro Results I Radiology I Cardiology:97425}  Lab Results: I have reviewed the following results:   Results from last 7 days   Lab Units 05/02/25  0458 05/01/25  1321   WBC Thousand/uL 7.80 7.46   HEMOGLOBIN g/dL 8.4* 8.7*  "  HEMATOCRIT % 26.8* 27.5*   PLATELETS Thousands/uL 201 203   SEGS PCT %  --  54   LYMPHO PCT %  --  25   MONO PCT %  --  9   EOS PCT %  --  11*     Results from last 7 days   Lab Units 05/02/25  0458 05/01/25  1321   SODIUM mmol/L 136 138   POTASSIUM mmol/L 5.4* 4.8   CHLORIDE mmol/L 109* 109*   CO2 mmol/L 20* 19*   BUN mg/dL 39* 36*   CREATININE mg/dL 2.11* 2.24*   ANION GAP mmol/L 7 10   CALCIUM mg/dL 8.5 8.6   ALBUMIN g/dL 3.6 3.4*   TOTAL BILIRUBIN mg/dL  --  0.57   ALK PHOS U/L  --  96   ALT U/L  --  16   AST U/L  --  15   GLUCOSE RANDOM mg/dL 167* 254*     Results from last 7 days   Lab Units 05/01/25  1346   INR  1.17     Results from last 7 days   Lab Units 05/01/25  2100 05/01/25  1812   POC GLUCOSE mg/dl 165* 255*         Results from last 7 days   Lab Units 05/01/25  1600 05/01/25  1346   LACTIC ACID mmol/L 1.7 2.8*   PROCALCITONIN ng/ml  --  0.16       Recent Cultures (last 7 days):   Results from last 7 days   Lab Units 05/01/25  1346   BLOOD CULTURE  Received in Microbiology Lab. Culture in Progress.  Received in Microbiology Lab. Culture in Progress.       {Imaging Results Review:83491::\"No pertinent imaging studies reviewed.\"}  {Other Study Results Review:23327::\"No additional pertinent studies reviewed.\"}    Last 24 Hours Medication List:     Current Facility-Administered Medications:     acetaminophen (TYLENOL) tablet 650 mg, Q6H PRN    apixaban (ELIQUIS) tablet 2.5 mg, BID    atorvastatin (LIPITOR) tablet 40 mg, Daily With Dinner    gabapentin (NEURONTIN) capsule 100 mg, HS    insulin lispro (HumALOG/ADMELOG) 100 units/mL subcutaneous injection 1-6 Units, TID AC **AND** Fingerstick Glucose (POCT), TID AC    insulin lispro (HumALOG/ADMELOG) 100 units/mL subcutaneous injection 1-6 Units, HS    lidocaine (LMX) 4 % cream, 4x Daily PRN    magnesium Oxide (MAG-OX) tablet 400 mg, Daily    mirtazapine (REMERON) tablet 30 mg, HS    nateglinide (STARLIX) tablet 60 mg, TID AC    QUEtiapine (SEROquel) " tablet 25 mg, HS    Insert peripheral IV, Once **AND** sodium chloride (PF) 0.9 % injection 3 mL, Q1H PRN    Administrative Statements   Today, Patient Was Seen By: Oscar Cotto MD  {Time Spent (Optional):43536}    **Please Note: This note may have been constructed using a voice recognition system.**

## 2025-05-02 NOTE — PROGRESS NOTES
PT said that he wasn't sure how he got here. Thought that he had been here 4 or 5 days (admitted 5/1)

## 2025-05-02 NOTE — ASSESSMENT & PLAN NOTE
Wt Readings from Last 3 Encounters:   05/02/25 74.4 kg (164 lb 0.4 oz)   04/21/25 78.9 kg (173 lb 15.1 oz)   04/14/25 84 kg (185 lb 3 oz)       NYHA class III stage C  LVEF 50% reduced from LVEF 55% by TTE on 6/29/20, conservative management   Euvolemic  Not on any diuretics  Not on ace due to episodes of hypotension

## 2025-05-02 NOTE — PLAN OF CARE
Problem: OCCUPATIONAL THERAPY ADULT  Goal: Performs self-care activities at highest level of function for planned discharge setting.  See evaluation for individualized goals.  Description: Treatment Interventions: ADL retraining, Functional transfer training, UE strengthening/ROM, Cognitive reorientation, Patient/family training, Equipment evaluation/education, Compensatory technique education, Energy conservation, Activityengagement          See flowsheet documentation for full assessment, interventions and recommendations.   Note: Limitation: Decreased ADL status, Decreased Safe judgement during ADL, Decreased cognition, Decreased high-level ADLs, Decreased self-care trans  Prognosis: Good  Assessment: Patient is a 89 y.o. male seen for OT evaluation following admission on 5/1/2025  s/p Pneumonia of left lower lobe due to infectious organism. Please see above for comprehensive list of comorbidities and significant PMHx impacting functional performance. Upon initial evaluation, pt appears to be performing below baseline functional status. Pt requires MODA for UB ADLs, MODA for LB ADLs, supervision for bed mobility, supervision for transfers and supervision for functional mobility community distance  with RW. The AM-PAC & Barthel Index outcome tools were used to assist in determining pt safety w/self care /mobility and appropriate d/c recommendations, see above for score. Occupational performance is affected by the following deficits: decreased standing tolerance for self care tasks , decreased dynamic balance impacting functional reach, decreased activity tolerance , impaired memory , impaired judgement and problem solving , decreased emotional regulation and coping skills , and impaired safety awareness . Personal/Environmental factors impacting D/C include: Assistance needed for ADL/IADLs, Assistance needed for ADLs and functional mobility, High fall risk , decreased insight toward deficits , decreased recall  of precautions , health management, and baseline cognitive deficits. Supporting factors include: able to maintain FFSU, facility staff available for continued assistance, and attitude towards recovery . Patient would benefit from OT services within the acute care setting to maximize level of functional independence in the following areas fall prevention , self-care transfers, bed mobility , functional mobility, and ADLs.  From OT standpoint, recommendation at time of D/C would be Level III (Minimum Resource Intensity).     Rehab Resource Intensity Level, OT: III (Minimum Resource Intensity)     Shasha Bergman MS OTR/L   [place] Licensure# [id number]

## 2025-05-02 NOTE — PLAN OF CARE
Problem: PAIN - ADULT  Goal: Verbalizes/displays adequate comfort level or baseline comfort level  Description: Interventions:- Encourage patient to monitor pain and request assistance- Assess pain using appropriate pain scale- Administer analgesics based on type and severity of pain and evaluate response- Implement non-pharmacological measures as appropriate and evaluate response- Consider cultural and social influences on pain and pain management- Notify physician/advanced practitioner if interventions unsuccessful or patient reports new pain  Outcome: Progressing     Problem: INFECTION - ADULT  Goal: Absence or prevention of progression during hospitalization  Description: INTERVENTIONS:- Assess and monitor for signs and symptoms of infection- Monitor lab/diagnostic results- Monitor all insertion sites, i.e. indwelling lines, tubes, and drains- Monitor endotracheal if appropriate and nasal secretions for changes in amount and color- D Lo appropriate cooling/warming therapies per order- Administer medications as ordered- Instruct and encourage patient and family to use good hand hygiene technique- Identify and instruct in appropriate isolation precautions for identified infection/condition  Outcome: Progressing  Goal: Absence of fever/infection during neutropenic period  Description: INTERVENTIONS:- Monitor WBC  Outcome: Progressing     Problem: SAFETY ADULT  Goal: Patient will remain free of falls  Description: INTERVENTIONS:- Educate patient/family on patient safety including physical limitations- Instruct patient to call for assistance with activity - Consult OT/PT to assist with strengthening/mobility - Keep Call bell within reach- Keep bed low and locked with side rails adjusted as appropriate- Keep care items and personal belongings within reach- Initiate and maintain comfort rounds- Make Fall Risk Sign visible to staff- Offer Toileting every 2 Hours, in advance of need- Initiate/Maintain bed/chair  alarm-  Apply yellow socks and bracelet for high fall risk patients- Consider moving patient to room near nurses station  Outcome: Progressing  Goal: Maintain or return to baseline ADL function  Description: INTERVENTIONS:-  Assess patient's ability to carry out ADLs; assess patient's baseline for ADL function and identify physical deficits which impact ability to perform ADLs (bathing, care of mouth/teeth, toileting, grooming, dressing, etc.)- Assess/evaluate cause of self-care deficits - Assess range of motion- Assess patient's mobility; develop plan if impaired- Assess patient's need for assistive devices and provide as appropriate- Encourage maximum independence but intervene and supervise when necessary- Involve family in performance of ADLs- Assess for home care needs following discharge - Consider OT consult to assist with ADL evaluation and planning for discharge- Provide patient education as appropriate  Outcome: Progressing  Goal: Maintains/Returns to pre admission functional level  Description: INTERVENTIONS:- Perform AM-PAC 6 Click Basic Mobility/ Daily Activity assessment daily.- Set and communicate daily mobility goal to care team and patient/family/caregiver. - Collaborate with rehabilitation services on mobility goals if consulted- Perform Range of Motion 3 times a day.- Reposition patient every 2 hours.- Dangle patient 3 times a day- Stand patient 3 times a day- Ambulate patient 3 times a day- Out of bed to chair 3 times a day - Out of bed for meals 3 times a day- Out of bed for toileting- Record patient progress and toleration of activity level   Outcome: Progressing     Problem: DISCHARGE PLANNING  Goal: Discharge to home or other facility with appropriate resources  Description: INTERVENTIONS:- Identify barriers to discharge w/patient and caregiver- Arrange for needed discharge resources and transportation as appropriate- Identify discharge learning needs (meds, wound care, etc.)- Arrange for  interpretive services to assist at discharge as needed- Refer to Case Management Department for coordinating discharge planning if the patient needs post-hospital services based on physician/advanced practitioner order or complex needs related to functional status, cognitive ability, or social support system  Outcome: Progressing

## 2025-05-02 NOTE — DISCHARGE SUMMARY
Discharge Summary - Hospitalist   Name: Leroy Paredes 89 y.o. male I MRN: [id number]  Unit/Bed#: S -01 I Date of Admission: 5/1/2025   Date of Service: 5/2/2025 I Hospital Day: 1     Assessment & Plan  Shortness of breath (Resolved: 5/2/2025)  Final CXR Interpretation from Radiologist on 5/1/25:   No acute cardiopulmonary disease.  Moderate pulmonary fibrosis.  Troponins 22, 23 at 0 and 2 hours respectively    Today the patient does not endorse any shortness of breath, is on RA with SpO2 >95%    Altered mental status (Resolved: 5/2/2025)  Patient reported to be altered according to staff at [facility]    Upon discussion with family today, 5/2/25, they stated the patient's mentation is at baseline and is currently among the best they had experienced in previous visits with him  Chronic left shoulder pain  XR L. Shoulder 4/21/25:  Questionable subtle cortical irregularity of the superolateral humeral head, could be degenerative. Subtle fracture felt less likely. Otherwise, negative for left shoulder fracture.     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)  Lab Results   Component Value Date    HGBA1C 8.0 (H) 03/04/2025       Recent Labs     05/01/25  1812 05/01/25  2100 05/02/25  0837 05/02/25  1123   POCGLU 255* 165* 179* 212*       Blood Sugar Average: Last 72 hrs:  (P) 202.75  CKD IV baseline creat 2.13 - 2.23   Currently at baseline    Chronic heart failure with preserved ejection fraction (HCC)  Wt Readings from Last 3 Encounters:   05/02/25 74.4 kg (164 lb 0.4 oz)   04/21/25 78.9 kg (173 lb 15.1 oz)   04/14/25 84 kg (185 lb 3 oz)       NYHA class III stage C  LVEF 50% reduced from LVEF 55% by TTE on 6/29/20, conservative management   Euvolemic  Not on any diuretics  Not on ace due to episodes of hypotension       Dementia (HCC)  Continue home seroquel 25 mg  qhs  Continue Remeron 30 mg qhs   Chronic atrial fibrillation (HCC)  Continue home eliquis 2.5 mg bid  Not on any rete controlling agents  HR in 60's   Type 2 diabetes mellitus (HCC)  Lab Results   Component Value Date    HGBA1C 8.0 (H) 03/04/2025       Recent Labs     05/01/25  1812 05/01/25  2100 05/02/25  0837 05/02/25  1123   POCGLU 255* 165* 179* 212*       Blood Sugar Average: Last 72 hrs:    Carb consistent diet  ISS         Medical Problems       Resolved Problems  Date Reviewed: 5/2/2025          Resolved    Pneumonia of left lower lobe due to infectious organism 5/2/2025     Resolved by  Oscar Cotto MD        Discharging Physician / Practitioner: Oscar Cotto MD  PCP: Pepito Foster DO  Admission Date:   Admission Orders (From admission, onward)       Ordered        05/01/25 1524  Place in Observation  Once            05/01/25 1431  INPATIENT ADMISSION  Once,   Status:  Canceled                          Discharge Date: 05/02/25    Consultations During Hospital Stay:  None    Procedures Performed:   None    Significant Findings / Test Results:   CXR 5/1/25:  No acute cardiopulmonary disease.  Moderate pulmonary fibrosis.    Incidental Findings:   None     Test Results Pending at Discharge (will require follow up):   Blood cultures (negative at 24 hours)     Outpatient Tests Requested:  None    Complications:  None    Reason for Admission: Altered mental status, shortness of breath with reported hypoxia at living facility    Hospital Course:   Leroy Paredes is a 89 y.o. male patient who originally presented to the hospital on 5/1/2025 due to reported altered mental status and shortness of breath with low O2 saturation and discoloration. Per the patient's chart the patient was reported hypotensive and administered IV fluids with improvement in blood pressure and had resolution of previously elevated lactic acid of 2.8. Wet read of CXR was concerning for LLL pneumonia and IV ceftriaxone. However,  "radiologist's read reported no acute cardiopulmonary abnormalities or pneumonia. Additionally, the patient was without cough, sputum production, fevers or chills or leukocytosis for which future abx were held. The following day the patient was on room air with SpO2 95%, with baseline mentation and with PT/OT evaluation at level 3. Subsequently, the patient is deemed stable for discharge. The patient is provided with a orthopaedics referral for L. Shoulder pain.     Please see above list of diagnoses and related plan for additional information.     Condition at Discharge: stable    Discharge Day Visit / Exam:   Subjective:    No acute events overnight.     Patient seen and examined at the bedside.Today, the patient states he feels okay and does not have immediate concerns other than his L.shoulder pain. Upon discussion with Pepito, the patient's nephew who visited yesterday, they endorsed the patient's mentation being among the best it has ever been compared to previous visits. They believed he looked well and was without complaints during their visit last night.     The patient denies chest pain, shortness of breath, fevers, chills, diaphoresis, lightheadedness, headache, N/V/D. The patient denies additional questions or concerns.    Vitals: Blood Pressure: 158/63 (05/02/25 1125)  Pulse: 60 (05/02/25 1125)  Temperature: (!) 97.4 °F (36.3 °C) (05/02/25 1125)  Respirations: 17 (05/01/25 2223)  Height: 5' 11\" (180.3 cm) (05/01/25 1640)  Weight - Scale: 74.4 kg (164 lb 0.4 oz) (05/02/25 0606)  SpO2: 95 % (05/02/25 1125)  Physical Exam  Constitutional:       General: He is not in acute distress.     Appearance: Normal appearance. He is not ill-appearing, toxic-appearing or diaphoretic.   HENT:      Head: Normocephalic and atraumatic.      Mouth/Throat:      Mouth: Mucous membranes are dry.      Pharynx: Oropharynx is clear.   Eyes:      Extraocular Movements: Extraocular movements intact.      Pupils: Pupils are equal, " round, and reactive to light.   Cardiovascular:      Rate and Rhythm: Normal rate. Rhythm irregular.      Pulses: Normal pulses.   Pulmonary:      Effort: Pulmonary effort is normal.      Breath sounds: Normal breath sounds. No wheezing, rhonchi or rales.   Abdominal:      General: Abdomen is flat. Bowel sounds are normal.      Palpations: Abdomen is soft.      Tenderness: There is no abdominal tenderness. There is no guarding or rebound.   Skin:     General: Skin is warm and dry.   Neurological:      Mental Status: He is alert. Mental status is at baseline.      Comments: Oriented x1          Discussion with Family: Updated  (Mo Beyer) via phone.    Discharge instructions/Information to patient and family:   See after visit summary for information provided to patient and family.      Provisions for Follow-Up Care:  See after visit summary for information related to follow-up care and any pertinent home health orders.      Mobility at time of Discharge:   Basic Mobility Inpatient Raw Score: 18  JH-HLM Goal: 6: Walk 10 steps or more  JH-HLM Achieved: 7: Walk 25 feet or more  HLM Goal achieved. Continue to encourage appropriate mobility.     Disposition:   Assisted Living Facility at [facility]    Planned Readmission: None    Discharge Medications:  See after visit summary for reconciled discharge medications provided to patient and/or family.      Administrative Statements   Discharge Statement:  I have spent a total time of 35 minutes in caring for this patient on the day of the visit/encounter. >30 minutes of time was spent on: Patient and family education, Counseling / Coordination of care, Documenting in the medical record, Reviewing / ordering tests, medicine, procedures  , and Communicating with other healthcare professionals .    **Please Note: This note may have been constructed using a voice recognition system**

## 2025-05-02 NOTE — PLAN OF CARE
Problem: PAIN - ADULT  Goal: Verbalizes/displays adequate comfort level or baseline comfort level  Description: Interventions:- Encourage patient to monitor pain and request assistance- Assess pain using appropriate pain scale- Administer analgesics based on type and severity of pain and evaluate response- Implement non-pharmacological measures as appropriate and evaluate response- Consider cultural and social influences on pain and pain management- Notify physician/advanced practitioner if interventions unsuccessful or patient reports new pain  5/2/2025 0155 by Anu June RN  Outcome: Progressing  5/2/2025 0154 by Anu June RN  Outcome: Progressing     Problem: INFECTION - ADULT  Goal: Absence or prevention of progression during hospitalization  Description: INTERVENTIONS:- Assess and monitor for signs and symptoms of infection- Monitor lab/diagnostic results- Monitor all insertion sites, i.e. indwelling lines, tubes, and drains- Monitor endotracheal if appropriate and nasal secretions for changes in amount and color- [place] appropriate cooling/warming therapies per order- Administer medications as ordered- Instruct and encourage patient and family to use good hand hygiene technique- Identify and instruct in appropriate isolation precautions for identified infection/condition  5/2/2025 0155 by Anu June RN  Outcome: Progressing  5/2/2025 0154 by Anu June RN  Outcome: Progressing  Goal: Absence of fever/infection during neutropenic period  Description: INTERVENTIONS:- Monitor WBC  5/2/2025 0155 by Anu June RN  Outcome: Progressing  5/2/2025 0154 by Anu June RN  Outcome: Progressing     Problem: SAFETY ADULT  Goal: Patient will remain free of falls  Description: INTERVENTIONS:- Educate patient/family on patient safety including physical limitations- Instruct patient to call for assistance with activity - Consult OT/PT to assist with strengthening/mobility - Keep  Call bell within reach- Keep bed low and locked with side rails adjusted as appropriate- Keep care items and personal belongings within reach- Initiate and maintain comfort rounds- Make Fall Risk Sign visible to staff- Offer Toileting every 2 Hours, in advance of need- Initiate/Maintain bed alarm- Apply yellow socks and bracelet for high fall risk patients- Consider moving patient to room near nurses station  5/2/2025 0155 by Anu June RN  Outcome: Progressing  5/2/2025 0154 by Anu June RN  Outcome: Progressing  Goal: Maintain or return to baseline ADL function  Description: INTERVENTIONS:-  Assess patient's ability to carry out ADLs; assess patient's baseline for ADL function and identify physical deficits which impact ability to perform ADLs (bathing, care of mouth/teeth, toileting, grooming, dressing, etc.)- Assess/evaluate cause of self-care deficits - Assess range of motion- Assess patient's mobility; develop plan if impaired- Assess patient's need for assistive devices and provide as appropriate- Encourage maximum independence but intervene and supervise when necessary- Involve family in performance of ADLs- Assess for home care needs following discharge - Consider OT consult to assist with ADL evaluation and planning for discharge- Provide patient education as appropriate  5/2/2025 0155 by Anu June RN  Outcome: Progressing  5/2/2025 0154 by Anu June RN  Outcome: Progressing  Goal: Maintains/Returns to pre admission functional level  Description: INTERVENTIONS:- Perform AM-PAC 6 Click Basic Mobility/ Daily Activity assessment daily.- Set and communicate daily mobility goal to care team and patient/family/caregiver. - Collaborate with rehabilitation services on mobility goals if consulted- Perform Range of Motion 3 times a day.- Reposition patient every 2 hours.- Dangle patient 3 times a day- Stand patient 3 times a day- Ambulate patient 3 times a day- Out of bed to chair 3  times a day - Out of bed for meals 3 times a day- Out of bed for toileting- Record patient progress and toleration of activity level   5/2/2025 0155 by Anu June RN  Outcome: Progressing  5/2/2025 0154 by Anu June RN  Outcome: Progressing     Problem: DISCHARGE PLANNING  Goal: Discharge to home or other facility with appropriate resources  Description: INTERVENTIONS:- Identify barriers to discharge w/patient and caregiver- Arrange for needed discharge resources and transportation as appropriate- Identify discharge learning needs (meds, wound care, etc.)- Arrange for interpretive services to assist at discharge as needed- Refer to Case Management Department for coordinating discharge planning if the patient needs post-hospital services based on physician/advanced practitioner order or complex needs related to functional status, cognitive ability, or social support system  5/2/2025 0155 by Anu June RN  Outcome: Progressing  5/2/2025 0154 by Anu June RN  Outcome: Progressing     Problem: Knowledge Deficit  Goal: Patient/family/caregiver demonstrates understanding of disease process, treatment plan, medications, and discharge instructions  Description: Complete learning assessment and assess knowledge base.Interventions:- Provide teaching at level of understanding- Provide teaching via preferred learning methods  5/2/2025 0155 by Anu June RN  Outcome: Progressing  5/2/2025 0154 by Anu June RN  Outcome: Progressing     Problem: Prexisting or High Potential for Compromised Skin Integrity  Goal: Skin integrity is maintained or improved  Description: INTERVENTIONS:- Identify patients at risk for skin breakdown- Assess and monitor skin integrity- Assess and monitor nutrition and hydration status- Monitor labs - Assess for incontinence - Turn and reposition patient- Assist with mobility/ambulation- Relieve pressure over bony prominences- Avoid friction and shearing- Provide  appropriate hygiene as needed including keeping skin clean and dry- Evaluate need for skin moisturizer/barrier cream- Collaborate with interdisciplinary team - Patient/family teaching- Consider wound care consult   5/2/2025 0155 by Anu June RN  Outcome: Progressing  5/2/2025 0154 by Anu June RN  Outcome: Progressing     Problem: Nutrition/Hydration-ADULT  Goal: Nutrient/Hydration intake appropriate for improving, restoring or maintaining nutritional needs  Description: Monitor and assess patient's nutrition/hydration status for malnutrition. Collaborate with interdisciplinary team and initiate plan and interventions as ordered.  Monitor patient's weight and dietary intake as ordered or per policy. Utilize nutrition screening tool and intervene as necessary. Determine patient's food preferences and provide high-protein, high-caloric foods as appropriate. INTERVENTIONS:- Monitor oral intake, urinary output, labs, and treatment plans- Assess nutrition and hydration status and recommend course of action- Evaluate amount of meals eaten- Assist patient with eating if necessary - Allow adequate time for meals- Recommend/ encourage appropriate diets, oral nutritional supplements, and vitamin/mineral supplements- Order, calculate, and assess calorie counts as needed- Recommend, monitor, and adjust tube feedings and TPN/PPN based on assessed needs- Assess need for intravenous fluids- Provide specific nutrition/hydration education as appropriate- Include patient/family/caregiver in decisions related to nutrition  Outcome: Progressing     Problem: RESPIRATORY - ADULT  Goal: Achieves optimal ventilation and oxygenation  Description: INTERVENTIONS:- Assess for changes in respiratory status- Assess for changes in mentation and behavior- Position to facilitate oxygenation and minimize respiratory effort- Oxygen administered by appropriate delivery if ordered- Initiate smoking cessation education as indicated-  Encourage broncho-pulmonary hygiene including cough, deep breathe, Incentive Spirometry- Assess the need for suctioning and aspirate as needed- Assess and instruct to report SOB or any respiratory difficulty- Respiratory Therapy support as indicated  Outcome: Progressing

## 2025-05-02 NOTE — ASSESSMENT & PLAN NOTE
Final CXR Interpretation from Radiologist on 5/1/25:   No acute cardiopulmonary disease.  Moderate pulmonary fibrosis.  Troponins 22, 23 at 0 and 2 hours respectively    Today the patient does not endorse any shortness of breath, is on RA with SpO2 >95%

## 2025-05-02 NOTE — ASSESSMENT & PLAN NOTE
Lab Results   Component Value Date    HGBA1C 8.0 (H) 03/04/2025       Recent Labs     05/01/25  1812 05/01/25  2100 05/02/25  0837 05/02/25  1123   POCGLU 255* 165* 179* 212*       Blood Sugar Average: Last 72 hrs:  (P) 202.75  CKD IV baseline creat 2.13 - 2.23   Currently at baseline

## 2025-05-02 NOTE — PLAN OF CARE
Problem: PHYSICAL THERAPY ADULT  Goal: Performs mobility at highest level of function for planned discharge setting.  See evaluation for individualized goals.  Description: Treatment/Interventions: Functional transfer training, LE strengthening/ROM, Endurance training, Therapeutic exercise, Cognitive reorientation, Patient/family training, Equipment eval/education, Bed mobility, Gait training, Compensatory technique education  Equipment Recommended: Walker       See flowsheet documentation for full assessment, interventions and recommendations.  5/2/2025 1321 by Tatyana Sun PT  Note: Prognosis: Good  Problem List: Impaired balance, Decreased mobility, Decreased cognition, Impaired judgement, Decreased safety awareness  Assessment: Leroy Paredes is a 89 y.o. Male who presents to [facility] on 5/1/25 due to chest pain, found unresponsive at facility and diagnosis of pneumonia of L lower lobe due to infectious organism. Orders for PT eval and treat received. Comorbidities affecting pt's functional mobility at time of evaluation include: dementia, CKD, DM, h/o CVA, ambulatory dysfunction, HTN, ambulatory dysfunction. Personal factors affecting DC include: ambulating w/ assistive device, decreased cognition, and limited insight into impairments. At baseline, pt mobilizes w/ a walker (will forget at times per chart review). Upon evaluation, pt presents w/ the following deficits: impaired strength, impaired balance, impaired cognition, decreased safety awareness, and gait deviations. Pt currently requires  supervision for bed mobility, supervision for transfers, supervision w/ RW for ambulation. Pt's clinical presentation is unstable/unpredictable due to abnormal lab values, need for input for task focus, ongoing medical management. From a PT/mobility standpoint given the above findings, DC recommendation is level: III (Minimum Rehab Resource Intensity). During current admission, pt will benefit from continued skilled  inpatient PT in the acute care setting in order to address the above deficits and to maximize function and mobility prior to DC from acute care.        Rehab Resource Intensity Level, PT: III (Minimum Resource Intensity)    See flowsheet documentation for full assessment.

## 2025-05-02 NOTE — ASSESSMENT & PLAN NOTE
Lab Results   Component Value Date    HGBA1C 8.0 (H) 03/04/2025       Recent Labs     05/01/25  1812 05/01/25  2100 05/02/25  0837 05/02/25  1123   POCGLU 255* 165* 179* 212*       Blood Sugar Average: Last 72 hrs:    Carb consistent diet  ISS

## 2025-05-02 NOTE — PHYSICAL THERAPY NOTE
PHYSICAL THERAPY EVALUATION NOTE          Patient Name: Leroy Paredes  Today's Date: 2025        AGE:   89 y.o.  Mrn:   [id number]  ADMIT DX:  Hypomagnesemia [E83.42]  Syncope [R55]  Altered mental status [R41.82]  Chest pain [R07.9]  Pneumonia [J18.9]  Dyspnea [R06.00]  Anemia [D64.9]  Hypotension [I95.9]  CKD (chronic kidney disease) [N18.9]  Chronic left shoulder pain [M25.512, G89.29]  Elevated lactic acid level [R79.89]    Past Medical History:  Past Medical History:   Diagnosis Date    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)     Last Assessed:  17    Chronic kidney disease     Diabetes mellitus (HCC)     Hyperlipidemia     Hypertension     Osteoarthritis     Palpitations     TIA (transient ischemic attack)        Past Surgical History:  Past Surgical History:   Procedure Laterality Date    APPENDECTOMY      CARDIAC ELECTROPHYSIOLOGY PROCEDURE N/A 2024    Procedure: Cardiac loop recorder explant;  Surgeon: Drew Olmos MD;  [facility];  Service: Cardiology    COLONOSCOPY  2012    HEMORROIDECTOMY      TOOTH EXTRACTION  2022     Length Of Stay: 1        PHYSICAL THERAPY EVALUATION:    Patient's identity confirmed via 2 patient identifiers (full name and ) at start of session       25 1013   PT Last Visit   PT Visit Date 25   Note Type   Note type Evaluation   Pain Assessment   Pain Assessment Tool 0-10   Pain Score No Pain   Restrictions/Precautions   Weight Bearing Precautions Per Order No   Other Precautions Cognitive;Chair Alarm;Bed Alarm;Fall Risk;Hard of hearing   Home Living   Type of Home Assisted living  ([facility])   Home Layout One level;Performs ADLs on one level;Able to live on main level with bedroom/bathroom   Home Equipment Walker   Additional Comments Pt questionable historian, home set-up and baseline mobility obtained from chart review - pt reports he lives at home, drives,  "and climbs the stairs throughout the house often. Per chart review pt is admitted from a facility   Prior Function   Level of [place] Needs assistance with ADLs;Needs assistance with IADLS   Lives With Facility staff   IADLs Family/Friend/Other provides transportation;Family/Friend/Other provides meals;Family/Friend/Other provides medication management   Comments At baseline pt amb w/ RW, per chart review pt will forget to use the RW at times   General   Family/Caregiver Present No   Cognition   Overall Cognitive Status Impaired   Arousal/Participation Cooperative   Attention Attends with cues to redirect   Orientation Level Oriented to person;Oriented to place;Disoriented to time;Disoriented to situation  (\"did the police find me or did I park my car here?\")   Memory Decreased short term memory;Decreased recall of recent events;Decreased recall of precautions   Following Commands Follows one step commands with increased time or repetition   Comments Pt ID via name and ; pt agreeable to PT eval and mobility, pleasantly confused throughout   Subjective   Subjective \"I'm not a troublemaker, I'm mischievous\"   Strength RLE   RLE Overall Strength   (assessed w/ functional mobility, grossly 3+/5)   Strength LLE   LLE Overall Strength   (assessed w/ functional mobility, grossly 3+/5)   Bed Mobility   Supine to Sit 5  Supervision   Additional items Assist x 1;HOB elevated;Bedrails;Increased time required   Additional Comments able to maintain sitting balance at EOB w/ supervision   Transfers   Sit to Stand 5  Supervision   Additional items Assist x 1;Increased time required;Verbal cues   Stand to Sit 5  Supervision   Additional items Assist x 1;Armrests;Increased time required;Verbal cues   Ambulation/Elevation   Gait pattern Improper Weight shift;Wide SHAHIDA;Decreased foot clearance;Short stride;Decreased hip extension;Decreased heel strike   Gait Assistance 5  Supervision   Additional items Assist x 1;Verbal cues "   Assistive Device Rolling walker   Distance 140'   Balance   Static Sitting Good   Dynamic Sitting Fair +   Static Standing Fair  (w/ RW)   Ambulatory Fair  (w/ RW)   Activity Tolerance   Activity Tolerance Patient tolerated treatment well   Medical Staff Made Aware Pt benefited from PT/OT care coordination w/ OT Shasha due to cognitive/behavioral impairments affecting functional mobility and allow for challenge of pt's activity tolerance, PT and OT goals were addressed individually during session; ARTHUR Guerra   Nurse Made Aware DENNY Castillo   Assessment   Prognosis Good   Problem List Impaired balance;Decreased mobility;Decreased cognition;Impaired judgement;Decreased safety awareness   Assessment Leroy Paredes is a 89 y.o. Male who presents to [facility] on [facility] due to chest pain, found unresponsive at facility and diagnosis of pneumonia of L lower lobe due to infectious organism. Orders for PT eval and treat received. Comorbidities affecting pt's functional mobility at time of evaluation include: dementia, CKD, DM, h/o CVA, ambulatory dysfunction, HTN, ambulatory dysfunction. Personal factors affecting DC include: ambulating w/ assistive device, decreased cognition, and limited insight into impairments. At baseline, pt mobilizes w/ a walker (will forget at times per chart review). Upon evaluation, pt presents w/ the following deficits: impaired strength, impaired balance, impaired cognition, decreased safety awareness, and gait deviations. Pt currently requires  supervision for bed mobility, supervision for transfers, supervision w/ RW for ambulation. Pt's clinical presentation is unstable/unpredictable due to abnormal lab values, need for input for task focus, ongoing medical management. From a PT/mobility standpoint given the above findings, DC recommendation is level: III (Minimum Rehab Resource Intensity). During current admission, pt will benefit from continued skilled inpatient PT in the acute care setting in order  to address the above deficits and to maximize function and mobility prior to DC from acute care.   Goals   Patient Goals to get warm   STG Expiration Date 05/12/25   Short Term Goal #1 Pt will: perform bed mobility w/ mod I to decrease pt's burden of care and increase pt's independence w/ repositioning in bed; perform transfers w/ mod I to promote OOB mobility; ambulate 250' w/ LRAD and mod I to increase pt's ambulatory endurance/tolerance; increase all balance ratings by at least 1 grade to decrease pt's risk of falls   PT Treatment Day 0   Plan   Treatment/Interventions Functional transfer training;LE strengthening/ROM;Endurance training;Therapeutic exercise;Cognitive reorientation;Patient/family training;Equipment eval/education;Bed mobility;Gait training;Compensatory technique education   PT Frequency 2-3x/wk   Discharge Recommendation   Rehab Resource Intensity Level, PT III (Minimum Resource Intensity)   Equipment Recommended Walker   Walker Package Recommended Wheeled walker   Change/add to Walker Package? No   AM-PAC Basic Mobility Inpatient   Turning in Flat Bed Without Bedrails 4   Lying on Back to Sitting on Edge of Flat Bed Without Bedrails 3   Moving Bed to Chair 3   Standing Up From Chair Using Arms 3   Walk in Room 3   Climb 3-5 Stairs With Railing 2   Basic Mobility Inpatient Raw Score 18   Basic Mobility Standardized Score 41.05   [facility] Highest Level Of Mobility   -HL Goal 6: Walk 10 steps or more   -HLM Achieved 7: Walk 25 feet or more   End of Consult   Patient Position at End of Consult Bedside chair;Bed/Chair alarm activated;All needs within reach       The patient's AM-PAC Basic Mobility Inpatient Short Form Raw Score is 18. A Raw score of greater than 16 suggests the patient may benefit from discharge to home. Please also refer to the recommendation of the Physical Therapist for safe discharge planning.    Pt will benefit from skilled inpatient PT during this admission in order to  facilitate progress towards goals and to maximize functional independence prior to DC      DC rec: level III (Minimum Rehab Resource Intensity)        Tatyana Sun, PT, DPT  05/02/25

## 2025-05-02 NOTE — OCCUPATIONAL THERAPY NOTE
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)    Chronic heart failure with preserved ejection fraction (HCC)    Dementia (HCC)    Type 2 diabetes mellitus (HCC)    Chronic atrial fibrillation (HCC)    Past Medical History  Past Medical History:   Diagnosis Date    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)     Last Assessed:  5/19/17    Chronic kidney disease     Diabetes mellitus (HCC)     Hyperlipidemia     Hypertension     Osteoarthritis     Palpitations     TIA (transient ischemic attack)      Past Surgical History  Past Surgical History:   Procedure Laterality Date    APPENDECTOMY      CARDIAC ELECTROPHYSIOLOGY PROCEDURE N/A 1/17/2024    Procedure: Cardiac loop recorder explant;  Surgeon: Drew Olmos MD;  [facility];  Service: Cardiology    COLONOSCOPY  2012    HEMORROIDECTOMY      TOOTH EXTRACTION  02/02/2022 05/02/25 1012   OT Last Visit   OT Visit Date 05/02/25   Note Type   Note type Evaluation   Pain Assessment   Pain Assessment Tool 0-10   Pain Score No Pain   Restrictions/Precautions   Weight Bearing Precautions Per Order No   Other Precautions Cognitive;Chair Alarm;Bed Alarm;Telemetry;Fall Risk;Hard of hearing   Home Living   Type of Home Assisted living  ([facility])   Home Layout One level   Home Equipment Walker   Additional Comments Use of RW at baseline - per chart review pt often forgets to use   Prior Function   Level of [place] Needs assistance with ADLs;Needs assistance with IADLS   Lives With Facility staff   Receives Help From Personal care attendant   IADLs Family/Friend/Other provides transportation;Family/Friend/Other provides meals;Family/Friend/Other provides medication management   Falls in the last 6 months   (pt  "denies, although a questionable historian)   Vocational Retired   Comments Pt is a POOR historian - reports living at home, driving and climbing stairs throughout the home often. Information above obtained from previous OT evaluation, will confirm with case managment   Lifestyle   Autonomy Pt resides at [facility], requires (A) with ADLs/IADLs and ambulates w/ RW at baseline. (-)    Reciprocal Relationships Supportive facility staff   Service to Others Retired    General   Additional Pertinent History Admit with shortness of breath and confused   PMHx: dementia, ambulatory dysfunction, embolic CVA, enlarged prostate, CKD stage IV, hypertension, heart failure   Family/Caregiver Present No   Subjective   Subjective \"did the  find me?\"   ADL   Eating Assistance 5  Supervision/Setup   Grooming Assistance 4  Minimal Assistance   UB Bathing Assistance 3  Moderate Assistance   LB Bathing Assistance 3  Moderate Assistance   UB Dressing Assistance 3  Moderate Assistance   LB Dressing Assistance 3  Moderate Assistance   LB Dressing Deficit Don/doff R shoe;Don/doff L shoe;Tie shoes  (sitting at the EOB. Inc assistance required d/t decreased UE ROM/strength)   Toileting Assistance  4  Minimal Assistance   Additional Comments The above levels of assistance are anticipated based on functional performance deficits with use of clinical judgement.   Bed Mobility   Supine to Sit 5  Supervision   Additional items HOB elevated;Bedrails;Increased time required   Sit to Supine   (NT: OOB to recliner chair)   Additional Comments Good static sitting balance at the EOB displayed   Transfers   Sit to Stand 5  Supervision   Additional items Increased time required;Verbal cues   Stand to Sit 5  Supervision   Additional items Increased time required;Verbal cues   Additional Comments Use of RW. Verbal cues for optimal hand placement and body mechanics for safe transfers   Functional Mobility   Functional Mobility 5  Supervision "   Additional Comments for functional community distance w/ use of RW. No overt LOB or instability noted.   Additional items Rolling walker   Balance   Static Sitting Good   Dynamic Sitting Fair +   Static Standing Fair  (w/ RW)   Dynamic Standing Fair -   Activity Tolerance   Activity Tolerance Patient tolerated treatment well  (impaired cognition)   Medical Staff Made Aware Spoke with CM, PT   Nurse Made Aware Spoke with RN pre/post   RUE Assessment   RUE Assessment X  (significantly impaired shoulder flex/ext, elbow, wrist and hand WFL)   LUE Assessment   LUE Assessment X  (significantly impaired shoulder flex/ext, elbow, wrist and hand WFL)   Hand Function   Hand Function Comments Pt is R handed   Cognition   Overall Cognitive Status Impaired   Arousal/Participation Responsive;Cooperative   Attention Attends with cues to redirect   Orientation Level Oriented to person;Oriented to place;Disoriented to situation;Disoriented to time  (did the poice find me somewhere or did I park my car here: reports the year to be December)   Memory Decreased recall of precautions;Decreased recall of recent events;Decreased short term memory;Decreased long term memory   Following Commands Follows one step commands with increased time or repetition   Comments Pt ID via wristband, name and . Pt is pleasently confused. Poor insight into current limitiations and deficits. Hx of dementia.   Assessment   Limitation Decreased ADL status;Decreased Safe judgement during ADL;Decreased cognition;Decreased high-level ADLs;Decreased self-care trans   Prognosis Good   Assessment Patient is a 89 y.o. male seen for OT evaluation following admission on 2025  s/p Pneumonia of left lower lobe due to infectious organism. Please see above for comprehensive list of comorbidities and significant PMHx impacting functional performance. Upon initial evaluation, pt appears to be performing below baseline functional status. Pt requires MODA for UB  ADLs, MODA for LB ADLs, supervision for bed mobility, supervision for transfers and supervision for functional mobility community distance  with RW. The AM-PAC & Barthel Index outcome tools were used to assist in determining pt safety w/self care /mobility and appropriate d/c recommendations, see above for score. Occupational performance is affected by the following deficits: decreased standing tolerance for self care tasks , decreased dynamic balance impacting functional reach, decreased activity tolerance , impaired memory , impaired judgement and problem solving , decreased emotional regulation and coping skills , and impaired safety awareness . Personal/Environmental factors impacting D/C include: Assistance needed for ADL/IADLs, Assistance needed for ADLs and functional mobility, High fall risk , decreased insight toward deficits , decreased recall of precautions , health management, and baseline cognitive deficits. Supporting factors include: able to maintain FFSU, facility staff available for continued assistance, and attitude towards recovery . Patient would benefit from OT services within the acute care setting to maximize level of functional independence in the following areas fall prevention , self-care transfers, bed mobility , functional mobility, and ADLs.  From OT standpoint, recommendation at time of D/C would be Level III (Minimum Resource Intensity).   Goals   Patient Goals to go home today   LTG Time Frame 10-14   Long Term Goal See below   Plan   Treatment Interventions ADL retraining;Functional transfer training;UE strengthening/ROM;Cognitive reorientation;Patient/family training;Equipment evaluation/education;Compensatory technique education;Energy conservation;Activityengagement   Goal Expiration Date 05/12/25   OT Treatment Day 0   OT Frequency 2-3x/wk   Discharge Recommendation   Rehab Resource Intensity Level, OT III (Minimum Resource Intensity)   AM-PAC Daily Activity Inpatient   Lower  Body Dressing 2   Bathing 2   Toileting 2   Upper Body Dressing 2   Grooming 3   Eating 3   Daily Activity Raw Score 14   Daily Activity Standardized Score (Calc for Raw Score >=11) 33.39   AM-PAC Applied Cognition Inpatient   Following a Speech/Presentation 1   Understanding Ordinary Conversation 3   Taking Medications 1   Remembering Where Things Are Placed or Put Away 2   Remembering List of 4-5 Errands 2   Taking Care of Complicated Tasks 1   Applied Cognition Raw Score 10   Applied Cognition Standardized Score 24.98   Barthel Index   Feeding 5   Bathing 0   Grooming Score 0   Dressing Score 5   Bladder Score 10   Bowels Score 10   Toilet Use Score 5   Transfers (Bed/Chair) Score 10   Mobility (Level Surface) Score 0   Stairs Score 0   Barthel Index Score 45   End of Consult   Education Provided Yes   Patient Position at End of Consult Bedside chair;Bed/Chair alarm activated;All needs within reach   Nurse Communication Nurse aware of consult       GOALS:      -Patient will perform grooming tasks standing at sink with overall Mod I in order to increase overall independence     -Patient will be ADRIENNE with UB dressing using AE and AD as needed in order to increase (I) with ADLs     -Patient will be ADRIENNE with UB bathing using AE and AD as needed in order to increase (I) with ADLs     -Patient will be ADRIENNE with LB dressing with use of AE and AD as needed in order to increase (I) with ADLs     -Patient will be ADRIENNE with LB bathing with use of AE and AD as needed in order to increase (I) with ADLs    -Patient will complete toileting w/ ADRIENNE w/ G hygiene/thoroughness in order to reduce caregiver burden     -Patient will demonstrate Mod I with bed mobility for ability to manage own comfort and initiate OOB tasks.      -Patient will perform functional transfers with Mod I to/from all surfaces using DME as needed in order to increase (I) with functional tasks     -Patient will be Mod I with functional mobility to/from  bathroom for increased independence with toileting tasks     -Patient will tolerate therapeutic activities for greater than 30 min, in order to increase tolerance for functional activities.      -Patient will engage in ongoing cognitive assessment in order to assist with safe discharge planning/recommendations.     -Patient will demonstrate standing for 3 min in order to increase active participation in functional activities    -Patient will increase UE/LE MMT strength by 1/2 grade to improve bilateral coordination in ADL/IADL/leisure tasks with supervision.     -Patient will demonstrate 100% carryover UE PROM/AAROM/AROM s/p education on exercise program to improve bilateral coordination in ADL/IADL/leisure tasks with supervision    -Patient will independently identify and utilize 2-3 positive coping strategies to enhance overall wellbeing and engagement in valued occupations.    The patient's raw score on the -PAC Daily Activity Inpatient Short Form is 14. A raw score of less than 19 suggests the patient may benefit from discharge to post-acute rehabilitation services. Please refer to the recommendation of the Occupational Therapist for safe discharge planning.    Shasha Bergman MS OTR/L   [place] Licensure# [id number]

## 2025-05-02 NOTE — DISCHARGE INSTR - AVS FIRST PAGE
Dear Leroy Paredes,     It was our pleasure to care for you here at [facility].  It is our hope that we were always able to exceed the expected standards for your care during your stay.  You were hospitalized due to Altered Mental Status and Shortness of Breath.  You were cared for on the 3rd floor by Oscar Cotto MD under the service of Tania Osorio MD with the [facility]ist [facility] who covers for your primary care physician (PCP), Pepito Foster DO, while you were hospitalized.  If you have any questions or concerns related to this hospitalization, you may contact us at .  For follow up as well as any medication refills, we recommend that you follow up with your primary care physician.  A registered nurse will reach out to you by phone within a few days after your discharge to answer any additional questions that you may have after going home.  However, at this time we provide for you here, the most important instructions / recommendations at discharge:     Notable Medication Adjustments -   None  Testing Required after Discharge -   None  ** Please contact your PCP to request testing orders for any of the testing recommended here **  Important follow up information -   Please follow up with your PCP within 1-2 weeks of discharge  Please follow up with Orthopaedic Surgery outpatient for evaluation of his   Other Instructions -   If symptoms re-occur, or if new symptoms or concerns arise, please return to the hospital  Please review this entire after visit summary as additional general instructions including medication list, appointments, activity, diet, any pertinent wound care, and other additional recommendations from your care team that may be provided for you.      Sincerely,     Oscar Cotto MD

## 2025-05-02 NOTE — ASSESSMENT & PLAN NOTE
XR L. Shoulder 4/21/25:  Questionable subtle cortical irregularity of the superolateral humeral head, could be degenerative. Subtle fracture felt less likely. Otherwise, negative for left shoulder fracture.     Provided outpatient orthopaedics referral  Managed inpatient with aquaK and capsaicin cream

## 2025-05-02 NOTE — ASSESSMENT & PLAN NOTE
Patient reported to be altered according to staff at [facility]    Upon discussion with family today, 5/2/25, they stated the patient's mentation is at baseline and is currently among the best they had experienced in previous visits with him

## 2025-05-03 LAB — MRSA NOSE QL CULT: NORMAL

## 2025-05-04 LAB
BACTERIA BLD CULT: NORMAL
BACTERIA BLD CULT: NORMAL

## 2025-05-05 ENCOUNTER — TRANSITIONAL CARE MANAGEMENT (OUTPATIENT)
Age: OVER 89
End: 2025-05-05

## 2025-05-06 LAB
BACTERIA BLD CULT: NORMAL
BACTERIA BLD CULT: NORMAL

## 2025-05-08 ENCOUNTER — OFFICE VISIT (OUTPATIENT)
Dept: OBGYN CLINIC | Facility: OTHER | Age: OVER 89
End: 2025-05-08
Attending: STUDENT IN AN ORGANIZED HEALTH CARE EDUCATION/TRAINING PROGRAM
Payer: MEDICARE

## 2025-05-08 VITALS — BODY MASS INDEX: 22.96 KG/M2 | HEIGHT: 71 IN | WEIGHT: 164 LBS

## 2025-05-08 DIAGNOSIS — M19.012 PRIMARY OSTEOARTHRITIS OF LEFT SHOULDER: Primary | ICD-10-CM

## 2025-05-08 PROCEDURE — 99203 OFFICE O/P NEW LOW 30 MIN: CPT | Performed by: ORTHOPAEDIC SURGERY

## 2025-05-08 PROCEDURE — 20610 DRAIN/INJ JOINT/BURSA W/O US: CPT | Performed by: ORTHOPAEDIC SURGERY

## 2025-05-08 RX ORDER — BETAMETHASONE SODIUM PHOSPHATE AND BETAMETHASONE ACETATE 3; 3 MG/ML; MG/ML
6 INJECTION, SUSPENSION INTRA-ARTICULAR; INTRALESIONAL; INTRAMUSCULAR; SOFT TISSUE
Status: COMPLETED | OUTPATIENT
Start: 2025-05-08 | End: 2025-05-08

## 2025-05-08 RX ORDER — BUPIVACAINE HYDROCHLORIDE 2.5 MG/ML
2 INJECTION, SOLUTION INFILTRATION; PERINEURAL
Status: COMPLETED | OUTPATIENT
Start: 2025-05-08 | End: 2025-05-08

## 2025-05-08 RX ADMIN — BETAMETHASONE SODIUM PHOSPHATE AND BETAMETHASONE ACETATE 6 MG: 3; 3 INJECTION, SUSPENSION INTRA-ARTICULAR; INTRALESIONAL; INTRAMUSCULAR; SOFT TISSUE at 13:45

## 2025-05-08 RX ADMIN — BUPIVACAINE HYDROCHLORIDE 2 ML: 2.5 INJECTION, SOLUTION INFILTRATION; PERINEURAL at 13:45

## 2025-05-08 NOTE — ASSESSMENT & PLAN NOTE
Explained to the patient and his daughter that his x rays, examination and symptoms are consistent with an acute flare of his underlying glenohumeral osteoarthritis of his left shoulder. The pathoanatomy and natural history of this diagnosis was explained to the patient in detail today in the office.   He was provided with a cortisone injection today in the office. This is documented appropriately below. We can repeat these injections once every 4 months if symptoms tolerated  May continue gentle ROM exercises in PT at his nursing home  Follow up on an as needed basis

## 2025-05-15 ENCOUNTER — ESTABLISHED COMPREHENSIVE EXAM (OUTPATIENT)
Dept: URBAN - METROPOLITAN AREA CLINIC 6 | Facility: CLINIC | Age: OVER 89
End: 2025-05-15

## 2025-05-15 DIAGNOSIS — Z96.1: ICD-10-CM

## 2025-05-15 DIAGNOSIS — H35.3132: ICD-10-CM

## 2025-05-15 DIAGNOSIS — H40.023: ICD-10-CM

## 2025-05-15 DIAGNOSIS — E11.9: ICD-10-CM

## 2025-05-15 LAB
LEFT EYE DIABETIC RETINOPATHY: NORMAL
RIGHT EYE DIABETIC RETINOPATHY: NORMAL

## 2025-05-15 PROCEDURE — 92202 OPSCPY EXTND ON/MAC DRAW: CPT

## 2025-05-15 PROCEDURE — 92014 COMPRE OPH EXAM EST PT 1/>: CPT

## 2025-05-15 PROCEDURE — 92020 GONIOSCOPY: CPT

## 2025-05-15 PROCEDURE — 92133 CPTRZD OPH DX IMG PST SGM ON: CPT

## 2025-05-15 ASSESSMENT — TONOMETRY
OD_IOP_MMHG: 16
OS_IOP_MMHG: 17

## 2025-05-15 ASSESSMENT — VISUAL ACUITY
OD_SC: 20/30-2
OS_SC: 20/60

## 2025-05-16 ENCOUNTER — TELEPHONE (OUTPATIENT)
Dept: ADMINISTRATIVE | Facility: OTHER | Age: OVER 89
End: 2025-05-16

## 2025-05-16 NOTE — TELEPHONE ENCOUNTER
----- Message from Yenni MCLEAN sent at 5/16/2025  9:00 AM EDT -----  Regarding: diabetic eye  05/16/25 9:00 Angelo, our patient Leroy Paredes has had Diabetic Eye Exam completed/performed. Please assist in updating the patient chart by pulling the document from the Media Tab. The date of service is 05/15/2025. Thank you,KATHY Maldonado [place] [facility]

## 2025-05-19 NOTE — TELEPHONE ENCOUNTER
Upon review of the In Basket request we were able to locate, review, and update the patient chart as requested for Diabetic Eye Exam.    Any additional questions or concerns should be emailed to the Practice Liaisons via the appropriate education email address, please do not reply via In Basket.    Thank you  Elizabeth Huizar MA   [facility]

## 2025-06-04 ENCOUNTER — OFFICE VISIT (OUTPATIENT)
Age: OVER 89
End: 2025-06-04
Payer: MEDICARE

## 2025-06-04 VITALS
DIASTOLIC BLOOD PRESSURE: 40 MMHG | HEIGHT: 71 IN | HEART RATE: 74 BPM | WEIGHT: 166.6 LBS | BODY MASS INDEX: 23.32 KG/M2 | SYSTOLIC BLOOD PRESSURE: 128 MMHG | OXYGEN SATURATION: 97 % | RESPIRATION RATE: 16 BRPM | TEMPERATURE: 97.3 F

## 2025-06-04 DIAGNOSIS — R41.89 COGNITIVE IMPAIRMENT: ICD-10-CM

## 2025-06-04 DIAGNOSIS — E11.9 TYPE 2 DIABETES MELLITUS WITHOUT COMPLICATION, UNSPECIFIED WHETHER LONG TERM INSULIN USE (HCC): ICD-10-CM

## 2025-06-04 DIAGNOSIS — M25.512 CHRONIC LEFT SHOULDER PAIN: ICD-10-CM

## 2025-06-04 DIAGNOSIS — Z00.00 MEDICARE ANNUAL WELLNESS VISIT, SUBSEQUENT: Primary | ICD-10-CM

## 2025-06-04 DIAGNOSIS — E78.2 MIXED HYPERLIPIDEMIA: ICD-10-CM

## 2025-06-04 DIAGNOSIS — G89.29 CHRONIC LEFT SHOULDER PAIN: ICD-10-CM

## 2025-06-04 DIAGNOSIS — I48.20 CHRONIC ATRIAL FIBRILLATION (HCC): ICD-10-CM

## 2025-06-04 DIAGNOSIS — I10 ESSENTIAL HYPERTENSION: ICD-10-CM

## 2025-06-04 DIAGNOSIS — E43 SEVERE PROTEIN-CALORIE MALNUTRITION (HCC): ICD-10-CM

## 2025-06-04 DIAGNOSIS — E11.22 CKD STAGE 4 DUE TO TYPE 2 DIABETES MELLITUS (HCC): ICD-10-CM

## 2025-06-04 DIAGNOSIS — J43.9 PULMONARY EMPHYSEMA, UNSPECIFIED EMPHYSEMA TYPE (HCC): ICD-10-CM

## 2025-06-04 DIAGNOSIS — N18.4 BENIGN HYPERTENSION WITH CKD (CHRONIC KIDNEY DISEASE) STAGE IV (HCC): ICD-10-CM

## 2025-06-04 DIAGNOSIS — I12.9 BENIGN HYPERTENSION WITH CKD (CHRONIC KIDNEY DISEASE) STAGE IV (HCC): ICD-10-CM

## 2025-06-04 DIAGNOSIS — N18.4 CKD STAGE 4 DUE TO TYPE 2 DIABETES MELLITUS (HCC): ICD-10-CM

## 2025-06-04 PROCEDURE — 99214 OFFICE O/P EST MOD 30 MIN: CPT | Performed by: INTERNAL MEDICINE

## 2025-06-04 PROCEDURE — G0439 PPPS, SUBSEQ VISIT: HCPCS | Performed by: INTERNAL MEDICINE

## 2025-06-04 PROCEDURE — G2211 COMPLEX E/M VISIT ADD ON: HCPCS | Performed by: INTERNAL MEDICINE

## 2025-06-04 NOTE — ASSESSMENT & PLAN NOTE
History of hypertension.  Blood pressure is controlled.  Low diastolic pressures.  Continues to follow-up with nephrology.  Denies any lightheadedness or dizziness.

## 2025-06-04 NOTE — ASSESSMENT & PLAN NOTE
Lab Results   Component Value Date    EGFR 26 05/02/2025    EGFR 24 05/01/2025    EGFR 22 04/21/2025    CREATININE 2.11 (H) 05/02/2025    CREATININE 2.24 (H) 05/01/2025    CREATININE 2.39 (H) 04/21/2025   Patient is kidney function has been stable.  Continues to follow-up with nephrology

## 2025-06-04 NOTE — ASSESSMENT & PLAN NOTE
Patient does have a history of diabetes mellitus type 2.  He remains on medication specifically Starlix 60 mg 3 times a day with food.  Last hemoglobin A1c is 8.0 which is acceptable with the patient's age and underlying medical condition.  Will have her retest of labs done now and we will make adjustments to medication in the future if needed.  Lab Results   Component Value Date    HGBA1C 8.0 (H) 03/04/2025       Orders:    Hemoglobin A1C; Future

## 2025-06-04 NOTE — PROGRESS NOTES
Name: Leroy Paredes      : 1930      MRN: [id number]  Encounter Provider: Pepito Foster DO  Encounter Date: 2025   Encounter department: [facility] INTERNAL MEDICINE    Assessment & Plan  Medicare annual wellness visit, subsequent  Patient is a 89-year-old male with a history of multiple medical problems outlined previously who is here today for repeat Medicare wellness visit.  Patient relates he is doing well and he now resides in a personal care facility and he feels that he is getting adequate care.  Since last seen was seen by orthopedics had injection to his left shoulder for bursitis with some relief of his pain.  Patient denies any chest pain or pressure no increasing shortness of breath.  Apparently appetite is good and is having no bowel or bladder complaints.  Patient did undergo examination today in the office which was abbreviated with the patient in a wheelchair unable to get onto the exam table for full exam.  At this point he will continue present medication and surveillance and we will start routine lab testing now and call if there is any abnormalities that we need to address at this point in time.  Patient understands and agrees.    Orders:  •  Comprehensive metabolic panel; Future  •  CBC and differential; Future  •  Lipid panel; Future  •  Urinalysis with microscopic; Future  •  Hemoglobin A1C; Future    Mixed hyperlipidemia  History of hyperlipidemia.  Patient remains on atorvastatin 40 mg daily and we will check another lipid profile now and make adjustment medication as needed.  His diet is well-controlled         Essential hypertension  History of hypertension.  Blood pressure is controlled.  Low diastolic pressures.  Continues to follow-up with nephrology.  Denies any lightheadedness or dizziness.         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  Patient is getting proper nutrition and  hydration which is helping to keep the PE patient stable  Lab Results   Component Value Date    HGBA1C 8.0 (H) 03/04/2025            Type 2 diabetes mellitus without complication, unspecified whether long term insulin use (HCC)  Patient does have a history of diabetes mellitus type 2.  He remains on medication specifically Starlix 60 mg 3 times a day with food.  Last hemoglobin A1c is 8.0 which is acceptable with the patient's age and underlying medical condition.  Will have her retest of labs done now and we will make adjustments to medication in the future if needed.  Lab Results   Component Value Date    HGBA1C 8.0 (H) 03/04/2025       Orders:  •  Hemoglobin A1C; Future    Severe protein-calorie malnutrition (HCC)  Admitted to the personal care facility after hospitalization evaluation shown to have a severe protein calorie malnutrition.  He was living independently unable to take care of his needs and now he is in a situation where he is getting proper care including probably attrition.  Check patient's albumin and protein levels         Pulmonary emphysema, unspecified emphysema type (HCC)  Patient states he is mostly sedentary.  No increasing shortness of breath with exertion or exacerbation of his underlying lung disease.         Cognitive impairment  Has been diagnosed with a cognitive impairment early dementia.  Patient is able to carry on normal conversation.  Has some difficulties with with time date.  Is stable with no behavioral difficulties at this time.  Remains on Remeron         Chronic left shoulder pain  Diagnosis of her bursitis by orthopedics.  Was given therapeutic injection with corticosteroids which has helped with the less pain and better function.         Chronic atrial fibrillation (HCC)  Patient's heart rate is controlled.  Patient remains on Eliquis 2.5 mg twice a day.  No abnormal bleeding Check CBC.  No ectopy noted on auscultation today         Benign hypertension with CKD (chronic  kidney disease) stage IV (HCC)  Lab Results   Component Value Date    EGFR 26 05/02/2025    EGFR 24 05/01/2025    EGFR 22 04/21/2025    CREATININE 2.11 (H) 05/02/2025    CREATININE 2.24 (H) 05/01/2025    CREATININE 2.39 (H) 04/21/2025   Patient is kidney function has been stable.  Continues to follow-up with nephrology              History of Present Illness     Patient is a 89-year-old male history of extensive medical problems outlined previously who is here today for repeat Medicare wellness visit.  He is presenting in a wheelchair accompanied by family members.  Had no lab testing done recently but will have this done in the near future.  States he is doing well.  Since last visit was seen by orthopedics had therapeutic injection to left shoulder because of chronic pain which has shown some significant improvement.  States that he is doing well and he has no complaints as to his care    Review of Systems   Constitutional: Negative.    HENT: Negative.     Eyes: Negative.    Respiratory: Negative.     Cardiovascular: Negative.    Gastrointestinal: Negative.    Endocrine: Negative.    Genitourinary: Negative.    Musculoskeletal: Negative.         Patient states that his arthritis in his left shoulder has significantly improved with injection by orthopedics.  No acute orthopedic, musculoskeletal aches or pains.   Skin: Negative.    Allergic/Immunologic: Negative.    Neurological: Negative.    Hematological: Negative.    Psychiatric/Behavioral: Negative.       Past Medical History[1]  Past Surgical History[2]  Family History[3]  Social History[4]  Medications[5]  No Known Allergies  Immunization History   Administered Date(s) Administered   • COVID-19 MODERNA VACC 0.5 ML IM 01/27/2021, 02/22/2021, 11/22/2021, 07/23/2022   • COVID-19 Pfizer mRNA vacc PF naresh-sucrose 12 yr and older (Comirnaty) 01/27/2025   • INFLUENZA 11/01/2002, 01/05/2003, 11/24/2003, 10/06/2004, 11/07/2005, 10/21/2006, 09/28/2007, 10/10/2008,  "10/20/2008, 11/04/2009, 10/05/2010, 10/03/2011, 11/04/2012   • Influenza Split High Dose Preservative Free IM 09/27/2013, 09/25/2014, 09/29/2015, 09/23/2016, 09/19/2017   • Influenza, high dose seasonal 0.7 mL 09/25/2018, 10/15/2020, 09/20/2021, 09/26/2022, 10/02/2023   • Pneumococcal 01/01/1999   • Pneumococcal Conjugate 13-Valent 04/25/2019   • Pneumococcal Polysaccharide PPV23 10/01/2006   • TD (adult) Preservative Free 12/11/1930   • influenza, trivalent, adjuvanted 01/27/2025     Objective   BP (!) 128/40   Pulse 74   Temp (!) 97.3 °F (36.3 °C)   Resp 16   Ht 5' 11\" (1.803 m)   Wt 75.6 kg (166 lb 9.6 oz)   SpO2 97%   BMI 23.24 kg/m²     Physical Exam  Vitals and nursing note reviewed.   Constitutional:       General: He is not in acute distress.     Appearance: Normal appearance. He is normal weight. He is not ill-appearing, toxic-appearing or diaphoretic.      Comments: Pleasant, cheerful 89-year-old male who is awake alert accompanied by family presenting in a wheelchair.  No acute distress   HENT:      Head: Normocephalic and atraumatic.      Right Ear: Tympanic membrane, ear canal and external ear normal. There is no impacted cerumen.      Left Ear: Tympanic membrane, ear canal and external ear normal. There is no impacted cerumen.      Nose: Nose normal. No congestion or rhinorrhea.      Mouth/Throat:      Mouth: Mucous membranes are dry.      Pharynx: Oropharynx is clear. No oropharyngeal exudate or posterior oropharyngeal erythema.      Comments: Mucous membranes are extremely dry encouraged patient to increase fluid intake    Eyes:      General: No scleral icterus.        Right eye: No discharge.         Left eye: No discharge.      Extraocular Movements: Extraocular movements intact.      Conjunctiva/sclera: Conjunctivae normal.      Pupils: Pupils are equal, round, and reactive to light.      Comments: Was seen evaluated by ophthalmology on Monday   Neck:      Vascular: No carotid bruit. "     Cardiovascular:      Rate and Rhythm: Normal rate and regular rhythm.      Pulses: Pulses are weak.           Dorsalis pedis pulses are 1+ on the right side and 1+ on the left side.        Posterior tibial pulses are 1+ on the right side and 1+ on the left side.      Heart sounds: Normal heart sounds. No murmur heard.     No friction rub. No gallop.      Comments: Occasional ectopy on auscultation.  Pulmonary:      Effort: Pulmonary effort is normal. No respiratory distress.      Breath sounds: No stridor. No wheezing, rhonchi or rales.      Comments: Mildly decreased breath sounds anteriorly and posteriorly but no rales rhonchi or wheezes could be appreciated on exam  Chest:      Chest wall: No tenderness.   Abdominal:      General: Abdomen is flat. Bowel sounds are normal. There is no distension.      Palpations: Abdomen is soft. There is no mass.      Tenderness: There is no abdominal tenderness. There is no right CVA tenderness, left CVA tenderness, guarding or rebound.      Hernia: No hernia is present.     Musculoskeletal:         General: Deformity present. No swelling, tenderness or signs of injury.      Cervical back: Normal range of motion and neck supple. No rigidity or tenderness.      Right lower leg: No edema.      Left lower leg: No edema.      Comments: Patient has generalized arthritic changes but nothing acute.  Has better range of motion to his left shoulder girdle without plain   Feet:      Right foot:      Skin integrity: No ulcer, skin breakdown, erythema, warmth, callus or dry skin.      Left foot:      Skin integrity: No ulcer, skin breakdown, erythema, warmth, callus or dry skin.   Lymphadenopathy:      Cervical: No cervical adenopathy.     Skin:     General: Skin is warm and dry.      Coloration: Skin is not jaundiced or pale.      Findings: No bruising, erythema, lesion or rash.     Neurological:      Mental Status: He is alert. Mental status is at baseline.      Cranial Nerves: No  "cranial nerve deficit.      Sensory: No sensory deficit.      Motor: Weakness present.      Coordination: Coordination normal.      Gait: Gait abnormal.      Deep Tendon Reflexes: Reflexes normal.      Comments: Presenting in a wheelchair, apparently is walking with a walker where he resides.   Psychiatric:         Mood and Affect: Mood normal.      Comments: Patient has a relatively cheerful mood.  Patient has some disorientation to time and date thought processes are sometimes erratic.   Definitely some short-term memory loss       Answers submitted by the patient for this visit:  Medicare Annual Wellness Visit (Submitted on 6/3/2025)  How would you rate your overall health?: fair  Compared to last year, how is your physical health?: same  In general, how satisfied are you with your life?: satisfied  Compared to last year, how is your eyesight?: same  Compared to last year, how is your hearing?: slightly worse  Compared to last year, how is your emotional/mental health?: same  How often is anger a problem for you?: sometimes  How often do you feel unusually tired/fatigued?: sometimes  In the past 7 days, how much pain have you experienced?: some  If you answered \"some\" or \"a lot\", please rate the severity of your pain on a scale of 1 to 10 (1 being the least severe pain and 10 being the most intense pain).: 3/10  In the past 6 months, have you lost or gained 10 pounds without trying?: No  One or more falls in the last year: Yes  Do you have trouble with the stairs inside or outside your home?: Yes  Does your home have working smoke alarms?: Yes  Does your home have a carbon monoxide monitor?: Yes  Which safety hazards (if any) have you experienced in your home? Please select all that apply.: none  Additional Comments: At [facility] so hazards n/a  How would you describe your current diet? Please select all that apply.: Regular  In addition to prescription medications, are you taking any over-the-counter " supplements?: Yes  If yes, what supplements are you taking?: Magnesium, vitamin B, D, melatonin  Can you manage your medications?: Yes  Additional comments : Managed at [facility]  Are you currently taking any opioid medications?: No  Can you walk and transfer into and out of your bed and chair?: Yes  Can you dress and groom yourself?: Yes  Can you bathe or shower yourself?: No  Can you feed yourself?: Yes  Can you do your laundry/ housekeeping?: No  Can you manage your money, pay your bills, and track your expenses?: No  Can you make your own meals?: No  Can you do your own shopping?: No  Additional Comments:  [place] and POA take care of what Leroy cannot  Within the last 12 months, have you had any hospitalizations or Emergency Department visits?: Yes  If yes, how many times have you been hospitalized within the past year?: more than 4  Do you have a living will?: Yes  Do you have a Durable POA (Power of ) for healthcare decisions?: Yes  Do you have an Advanced Directive for end of life decisions?: Yes  How often have you used an illegal drug (including marijuana) or a prescription medication for non-medical reasons in the past year?: never  What is the typical number of drinks you consume in a day?: 0  What is the typical number of drinks you consume in a week?: 0  How often did you have a drink containing alcohol in the past year?: never  How many drinks did you have on a typical day  when you were drinking in the past year?: 0  How often did you have 6 or more drinks on one occasion in the past year?: never  Diabetic Foot Exam    Patient's shoes and socks removed.    Right Foot/Ankle   Right Foot Inspection  Skin Exam: skin normal and skin intact. No dry skin, no warmth, no callus, no erythema, no maceration, no abnormal color, no pre-ulcer, no ulcer and no callus.     Toe Exam: ROM and strength within normal limits. No swelling, no tenderness, erythema and  no right toe deformity    Sensory   Vibration:  absent  Proprioception: diminished  Monofilament testing: diminished    Vascular  Capillary refills: elevated  The right DP pulse is 1+. The right PT pulse is 1+.     Left Foot/Ankle  Left Foot Inspection  Skin Exam: skin normal and skin intact. No dry skin, no warmth, no erythema, no maceration, normal color, no pre-ulcer, no ulcer and no callus.     Toe Exam: ROM and strength within normal limits. No swelling, no tenderness, no erythema and no left toe deformity.     Sensory   Vibration: absent  Proprioception: diminished  Monofilament testing: diminished    Vascular  Capillary refills: elevated  The left DP pulse is 1+. The left PT pulse is 1+.     Assign Risk Category  No deformity present  No loss of protective sensation  Weak pulses  Risk: 1           [1]  Past Medical History:  Diagnosis Date   • 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)     Last Assessed:  5/19/17   • Chronic kidney disease    • Diabetes mellitus (HCC)    • Hyperlipidemia    • Hypertension    • Osteoarthritis    • Palpitations    • TIA (transient ischemic attack)    [2]  Past Surgical History:  Procedure Laterality Date   • APPENDECTOMY     • CARDIAC ELECTROPHYSIOLOGY PROCEDURE N/A 1/17/2024    Procedure: Cardiac loop recorder explant;  Surgeon: Drew Olmos MD;  Location: [facility];  Service: Cardiology   • COLONOSCOPY  2012   • HEMORROIDECTOMY     • TOOTH EXTRACTION  02/02/2022   [3]  Family History  Problem Relation Name Age of Onset   • Diabetes Mother     • Other Mother          Stroke syndrome   • Diabetes Father     • Emphysema Father     [4]  Social History  Tobacco Use   • Smoking status: Former   • Smokeless tobacco: Never   Vaping Use   • Vaping status: Never Used   Substance and Sexual Activity   • Alcohol use: Not Currently     Comment: Social drinker   • Drug use: No   • Sexual activity: Not Currently   [5]  Current Outpatient Medications on File Prior to Visit   Medication Sig   • apixaban (Eliquis) 2.5 mg TAKE 1  TABLET TWICE DAILY   • atorvastatin (LIPITOR) 40 mg tablet TAKE 1 TABLET EVERY DAY WITH DINNER   • Blood Glucose Monitoring Suppl (PRECISION XTRA MONITOR) MARY by Does not apply route daily   • Cholecalciferol (D3 High Potency) 25 MCG (1000 UT) capsule TAKE ONE CAPSULE BY MOUTH DAILY   • escitalopram (LEXAPRO) 5 mg tablet TAKE ONE TABLET BY MOUTH DAILY   • gabapentin (NEURONTIN) 100 mg capsule TAKE ONE CAPSULE BY MOUTH EVERY NIGHT AT BEDTIME   • magnesium Oxide (MAG-OX) 400 mg TABS TAKE ONE TABLET BY MOUTH DAILY   • melatonin 3 mg TAKE ONE TABLET BY MOUTH EVERY NIGHT AT BEDTIME   • mirtazapine (REMERON) 30 mg tablet Take 1 tablet (30 mg total) by mouth daily at bedtime   • nateglinide (STARLIX) 60 mg tablet TAKE 1 TABLET (60 MG TOTAL) BY MOUTH 3 (THREE) TIMES A DAY BEFORE MEALS   • QUEtiapine (SEROquel) 25 mg tablet TAKE ONE TABLET BY MOUTH EVERY NIGHT AT BEDTIME *IN [employer] CARD/NURSE TO REORDER   • vitamin B-12 (CYANOCOBALAMIN) 500 MCG TABS Take 1 tablet (500 mcg total) by mouth 2 (two) times a week   • acetaminophen (TYLENOL) 325 mg tablet Take 2 tablets (650 mg total) by mouth every 6 (six) hours as needed for mild pain (Patient not taking: Reported on 5/8/2025)   • lisinopril (ZESTRIL) 5 mg tablet TAKE 1 TABLET EVERY DAY (Patient not taking: Reported on 4/15/2025)   • magnesium hydroxide (Milk of Magnesia) 400 mg/5 mL oral suspension Take 30 mL by mouth if needed for constipation Patient take every 4 days as needed. (Patient not taking: Reported on 6/4/2025)

## 2025-06-04 NOTE — ASSESSMENT & PLAN NOTE
Admitted to the personal care facility after hospitalization evaluation shown to have a severe protein calorie malnutrition.  He was living independently unable to take care of his needs and now he is in a situation where he is getting proper care including probably attrition.  Check patient's albumin and protein levels

## 2025-06-04 NOTE — ASSESSMENT & PLAN NOTE
Diagnosis of her bursitis by orthopedics.  Was given therapeutic injection with corticosteroids which has helped with the less pain and better function.

## 2025-06-04 NOTE — PROGRESS NOTES
Name: Leroy Paredes      : 1930      MRN: [id number]  Encounter Provider: Pepito Foster DO  Encounter Date: 2025   Encounter department: [facility] INTERNAL MEDICINE  :  Assessment & Plan       Preventive health issues were discussed with patient, and age appropriate screening tests were ordered as noted in patient's After Visit Summary. Personalized health advice and appropriate referrals for health education or preventive services given if needed, as noted in patient's After Visit Summary.    History of Present Illness     HPI   Patient Care Team:  Pepito Foster DO as PCP - General  MD Pepito Gunter DO    Review of Systems  Medical History Reviewed by provider this encounter:       Annual Wellness Visit Questionnaire   Leroy is here for his Subsequent Wellness visit.     Health Risk Assessment:   Patient rates overall health as fair. Patient feels that their physical health rating is same. Patient is satisfied with their life. Eyesight was rated as same. Hearing was rated as slightly worse. Patient feels that their emotional and mental health rating is same. Patients states they are sometimes angry. Patient states they are sometimes unusually tired/fatigued. Pain experienced in the last 7 days has been some. Patient's pain rating has been 3/10. Patient states that he has experienced no weight loss or gain in last 6 months.     Depression Screening:   PHQ-2 Score: 0      Fall Risk Screening:   In the past year, patient has experienced: history of falling in past year      Home Safety:  Patient has trouble with stairs inside or outside of their home. Patient has working smoke alarms and has working carbon monoxide detector. Home safety hazards include: none. At  [place] so hazards n/a    Nutrition:   Current diet is Regular.     Medications:   Patient is currently taking over-the-counter supplements. OTC medications include: see medication list. Patient is able to  manage medications. Managed at [facility]    Activities of Daily Living (ADLs)/Instrumental Activities of Daily Living (IADLs):   Walk and transfer into and out of bed and chair?: Yes  Dress and groom yourself?: Yes    Bathe or shower yourself?: No    Feed yourself? Yes  Do your laundry/housekeeping?: No  Manage your money, pay your bills and track your expenses?: No  Make your own meals?: No    Do your own shopping?: No    ADL comments:  [place] and POA take care of what Leroy braun    Previous Hospitalizations:   Any hospitalizations or ED visits within the last 12 months?: Yes    How many hospitalizations have you had in the last year?: more than 4    Advance Care Planning:   Living will: Yes    Durable POA for healthcare: Yes    Advanced directive: Yes      Preventive Screenings      Cardiovascular Screening:    General: Screening Not Indicated and History Lipid Disorder      Diabetes Screening:     General: Screening Not Indicated and History Diabetes      Colorectal Cancer Screening:     General: Screening Not Indicated      Prostate Cancer Screening:    General: Screening Not Indicated      Abdominal Aortic Aneurysm (AAA) Screening:    Risk factors include: tobacco use        Lung Cancer Screening:     General: Screening Not Indicated    Immunizations:  - Immunizations due: Zoster (Shingrix)    Screening, Brief Intervention, and Referral to Treatment (SBIRT)     Screening  Typical number of drinks in a day: 0  Typical number of drinks in a week: 0  Interpretation: Low risk drinking behavior.    AUDIT-C Screenin) How often did you have a drink containing alcohol in the past year? never  2) How many drinks did you have on a typical day when you were drinking in the past year? 0  3) How often did you have 6 or more drinks on one occasion in the past year? never    AUDIT-C Score: 0  Interpretation: Score 0-3 (male): Negative screen for alcohol misuse    Single Item Drug Screening:  How often have you used an  "illegal drug (including marijuana) or a prescription medication for non-medical reasons in the past year? never    Single Item Drug Screen Score: 0  Interpretation: Negative screen for possible drug use disorder    Social Drivers of Health     Financial Resource Strain: Low Risk  (3/7/2024)    Overall Financial Resource Strain (CARDIA)    • Difficulty of Paying Living Expenses: Not very hard   Food Insecurity: Patient Unable To Answer (6/3/2025)    Nursing - Inadequate Food Risk Classification    • Worried About Running Out of Food in the Last Year: Never true    • Ran Out of Food in the Last Year: Never true    • Ran Out of Food in the Last Year: Patient unable to answer   Transportation Needs: No Transportation Needs (6/3/2025)    PRAPARE - Transportation    • Lack of Transportation (Medical): No    • Lack of Transportation (Non-Medical): No   Housing Stability: Low Risk  (6/3/2025)    Housing Stability Vital Sign    • Unable to Pay for Housing in the Last Year: No    • Number of Times Moved in the Last Year: 1    • Homeless in the Last Year: No   Utilities: Not At Risk (6/3/2025)    [facility] Utilities    • Threatened with loss of utilities: No     No results found.    Objective   BP (!) 128/40   Pulse 74   Temp (!) 97.3 °F (36.3 °C)   Resp 16   Ht 5' 11\" (1.803 m)   Wt 75.6 kg (166 lb 9.6 oz)   SpO2 97%   BMI 23.24 kg/m²     Physical Exam    "

## 2025-06-04 NOTE — ASSESSMENT & PLAN NOTE
History of hyperlipidemia.  Patient remains on atorvastatin 40 mg daily and we will check another lipid profile now and make adjustment medication as needed.  His diet is well-controlled

## 2025-06-04 NOTE — ASSESSMENT & PLAN NOTE
Patient's heart rate is controlled.  Patient remains on Eliquis 2.5 mg twice a day.  No abnormal bleeding Check CBC.  No ectopy noted on auscultation today

## 2025-06-04 NOTE — ASSESSMENT & PLAN NOTE
Patient states he is mostly sedentary.  No increasing shortness of breath with exertion or exacerbation of his underlying lung disease.

## 2025-06-04 NOTE — ASSESSMENT & PLAN NOTE
Continue to monitor kidney function along with his nephrologist.  Patient is getting proper nutrition and hydration which is helping to keep the PE patient stable  Lab Results   Component Value Date    HGBA1C 8.0 (H) 03/04/2025

## 2025-06-04 NOTE — ASSESSMENT & PLAN NOTE
Has been diagnosed with a cognitive impairment early dementia.  Patient is able to carry on normal conversation.  Has some difficulties with with time date.  Is stable with no behavioral difficulties at this time.  Remains on Remeron

## 2025-06-04 NOTE — ASSESSMENT & PLAN NOTE
Patient is a 89-year-old male with a history of multiple medical problems outlined previously who is here today for repeat Medicare wellness visit.  Patient relates he is doing well and he now resides in a personal care facility and he feels that he is getting adequate care.  Since last seen was seen by orthopedics had injection to his left shoulder for bursitis with some relief of his pain.  Patient denies any chest pain or pressure no increasing shortness of breath.  Apparently appetite is good and is having no bowel or bladder complaints.  Patient did undergo examination today in the office which was abbreviated with the patient in a wheelchair unable to get onto the exam table for full exam.  At this point he will continue present medication and surveillance and we will start routine lab testing now and call if there is any abnormalities that we need to address at this point in time.  Patient understands and agrees.    Orders:    Comprehensive metabolic panel; Future    CBC and differential; Future    Lipid panel; Future    Urinalysis with microscopic; Future    Hemoglobin A1C; Future

## 2025-06-13 DIAGNOSIS — F39 MOOD DISORDER (HCC): ICD-10-CM

## 2025-06-13 DIAGNOSIS — G47.00 INSOMNIA, UNSPECIFIED TYPE: ICD-10-CM

## 2025-06-13 DIAGNOSIS — Z00.00 HEALTHCARE MAINTENANCE: ICD-10-CM

## 2025-06-15 RX ORDER — LANOLIN ALCOHOL/MO/W.PET/CERES
400 CREAM (GRAM) TOPICAL DAILY
Qty: 30 TABLET | Refills: 5 | Status: SHIPPED | OUTPATIENT
Start: 2025-06-15

## 2025-06-15 RX ORDER — GABAPENTIN 100 MG/1
100 CAPSULE ORAL
Qty: 30 CAPSULE | Refills: 5 | Status: SHIPPED | OUTPATIENT
Start: 2025-06-15

## 2025-06-15 RX ORDER — ESCITALOPRAM OXALATE 5 MG/1
5 TABLET ORAL DAILY
Qty: 30 TABLET | Refills: 5 | Status: SHIPPED | OUTPATIENT
Start: 2025-06-15

## 2025-06-15 RX ORDER — CHOLECALCIFEROL (VITAMIN D3) 25 MCG
1000 CAPSULE ORAL DAILY
Qty: 30 CAPSULE | Refills: 5 | Status: SHIPPED | OUTPATIENT
Start: 2025-06-15

## 2025-06-26 ENCOUNTER — HOSPITAL ENCOUNTER (EMERGENCY)
Facility: HOSPITAL | Age: OVER 89
Discharge: HOME | End: 2025-06-26
Admitting: EMERGENCY MEDICINE
Payer: MEDICARE

## 2025-06-26 ENCOUNTER — APPOINTMENT (EMERGENCY)
Dept: RADIOLOGY | Facility: HOSPITAL | Age: OVER 89
End: 2025-06-26
Payer: MEDICARE

## 2025-06-26 VITALS
HEART RATE: 71 BPM | SYSTOLIC BLOOD PRESSURE: 174 MMHG | WEIGHT: 170.86 LBS | OXYGEN SATURATION: 97 % | DIASTOLIC BLOOD PRESSURE: 72 MMHG | BODY MASS INDEX: 23.83 KG/M2 | RESPIRATION RATE: 14 BRPM | TEMPERATURE: 97.9 F
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
## 2025-06-26 LAB
ATRIAL RATE: 80 BPM
BASE EXCESS BLDA CALC-SCNC: -4 MMOL/L (ref -2–3)
CA-I BLD-SCNC: 1.18 MMOL/L (ref 1.12–1.32)
GLUCOSE SERPL-MCNC: 192 MG/DL (ref 65–140)
HCO3 BLDA-SCNC: 21.3 MMOL/L (ref 24–30)
HCT VFR BLD CALC: 31 % (ref 36.5–49.3)
HGB BLDA-MCNC: 10.5 G/DL (ref 12–17)
HOLD SPECIMEN: NORMAL
P AXIS: 79 DEGREES
PCO2 BLD: 23 MMOL/L (ref 21–32)
PCO2 BLD: 41 MM HG (ref 42–50)
PH BLD: 7.32 [PH] (ref 7.3–7.4)
PO2 BLD: 33 MM HG (ref 35–45)
POTASSIUM BLD-SCNC: 4.8 MMOL/L (ref 3.5–5.3)
PR INTERVAL: 250 MS
QRS AXIS: -55 DEGREES
QRSD INTERVAL: 142 MS
QT INTERVAL: 426 MS
QTC INTERVAL: 491 MS
SAO2 % BLD FROM PO2: 59 % (ref 60–85)
SODIUM BLD-SCNC: 137 MMOL/L (ref 136–145)
SPECIMEN SOURCE: ABNORMAL
T WAVE AXIS: 65 DEGREES
VENTRICULAR RATE: 80 BPM

## 2025-06-26 PROCEDURE — 85014 HEMATOCRIT: CPT

## 2025-06-26 PROCEDURE — 36415 COLL VENOUS BLD VENIPUNCTURE: CPT | Performed by: NURSE PRACTITIONER

## 2025-06-26 PROCEDURE — 82330 ASSAY OF CALCIUM: CPT

## 2025-06-26 PROCEDURE — 84132 ASSAY OF SERUM POTASSIUM: CPT

## 2025-06-26 PROCEDURE — 82803 BLOOD GASES ANY COMBINATION: CPT

## 2025-06-26 PROCEDURE — 72125 CT NECK SPINE W/O DYE: CPT

## 2025-06-26 PROCEDURE — 84295 ASSAY OF SERUM SODIUM: CPT

## 2025-06-26 PROCEDURE — 76705 ECHO EXAM OF ABDOMEN: CPT | Performed by: NURSE PRACTITIONER

## 2025-06-26 PROCEDURE — 70486 CT MAXILLOFACIAL W/O DYE: CPT

## 2025-06-26 PROCEDURE — 93005 ELECTROCARDIOGRAM TRACING: CPT

## 2025-06-26 PROCEDURE — 93308 TTE F-UP OR LMTD: CPT | Performed by: NURSE PRACTITIONER

## 2025-06-26 PROCEDURE — 82947 ASSAY GLUCOSE BLOOD QUANT: CPT

## 2025-06-26 PROCEDURE — 70450 CT HEAD/BRAIN W/O DYE: CPT

## 2025-06-26 PROCEDURE — 93010 ELECTROCARDIOGRAM REPORT: CPT | Performed by: INTERNAL MEDICINE

## 2025-06-26 PROCEDURE — 71045 X-RAY EXAM CHEST 1 VIEW: CPT

## 2025-06-26 NOTE — H&P
Consultation - Trauma   Leroy Paredes 89 y.o. male MRN: [id number]  Unit/Bed#: [facility] Encounter: [id number]    Trauma Alert: Level B   Model of Arrival: Ambulance    Trauma Team: Attending Jeancarlos and MELANY Abbott  Consultants:     None     Assessment & Plan   Active Problems / Assessment:   Fall from standing  + Head Strike on Eliquis for Afib     Plan:   CTH  CT C-spine  CT Face  Syncope Workup: 12 lead EKG, Orthostatics    History of Present Illness   Physician Requesting Evaluation: No att. providers found  Reason for Evaluation / Principal Problem: Fall    Chief Complaint: Patient states has no HA. Patient did state when bent down was a little dizzy.    HPI:    Leroy Paredes is a 89 y.o. male on Eliquis for Atrial Fibrillation who states he saw something on the floor turned his body to bend down and pick it up when he had some dizziness and fell. Patient lives at [facility]. Has a PMHx of Afib, HFpEF no on Diuretics, Pulmonary fibrosis, HTN, HLD, DM type II and dementia.     Review of Systems   Constitutional:  Negative for chills, fatigue and fever.   HENT: Negative.     Eyes: Negative.    Respiratory: Negative.     Cardiovascular: Negative.    Gastrointestinal: Negative.    Genitourinary: Negative.    Neurological:  Positive for dizziness.   Psychiatric/Behavioral: Negative.       12-point, complete review of systems was reviewed and negative except as stated above.     Historical Information     Past Medical History[1]  Past Surgical History[2]     Social History[3]  Immunization History   Administered Date(s) Administered    COVID-19 MODERNA VACC 0.5 ML IM 01/27/2021, 02/22/2021, 11/22/2021, 07/23/2022    COVID-19 Pfizer mRNA vacc PF naresh-sucrose 12 yr and older (Comirnaty) 01/27/2025    INFLUENZA 11/01/2002, 01/05/2003, 11/24/2003, 10/06/2004, 11/07/2005, 10/21/2006, 09/28/2007, 10/10/2008, 10/20/2008, 11/04/2009, 10/05/2010, 10/03/2011, 11/04/2012    Influenza Split High Dose Preservative Free IM  09/27/2013, 09/25/2014, 09/29/2015, 09/23/2016, 09/19/2017    Influenza, high dose seasonal 0.7 mL 09/25/2018, 10/15/2020, 09/20/2021, 09/26/2022, 10/02/2023    Pneumococcal 01/01/1999    Pneumococcal Conjugate 13-Valent 04/25/2019    Pneumococcal Polysaccharide PPV23 10/01/2006    TD (adult) Preservative Free 12/11/1930    influenza, trivalent, adjuvanted 01/27/2025     Last Tetanus: Not on file  Family History: Non-contributory    ISAR Score: Did you order a geriatric consult if the score was 2 or greater?: no No data recorded     Meds/Allergies   current meds: No current facility-administered medications for this encounter.   Allergies[4]    Objective   Initial Vitals:   Temperature: (!) 97 °F (36.1 °C) (06/26/25 0845)  Pulse: 81 (06/26/25 0845)  Respirations: 14 (06/26/25 0845)  Blood Pressure: 147/68 (06/26/25 0845)    Physical Exam:  Physical Exam  Constitutional:       General: He is not in acute distress.     Appearance: Normal appearance. He is not toxic-appearing.   HENT:      Head: Normocephalic.      Comments: Tiny abrasion above Left forehead     Right Ear: Tympanic membrane, ear canal and external ear normal.      Left Ear: Tympanic membrane, ear canal and external ear normal.      Ears:      Comments: + cerumen B/L     Nose: Nose normal.      Mouth/Throat:      Mouth: Mucous membranes are dry.     Eyes:      Pupils: Pupils are equal, round, and reactive to light.       Cardiovascular:      Rate and Rhythm: Normal rate and regular rhythm.      Pulses: Normal pulses.      Heart sounds: Normal heart sounds. No murmur heard.     No gallop.   Pulmonary:      Effort: Pulmonary effort is normal. No respiratory distress.      Breath sounds: Normal breath sounds. No wheezing or rales.   Abdominal:      General: There is no distension.      Palpations: Abdomen is soft.      Tenderness: There is no abdominal tenderness. There is no guarding.     Musculoskeletal:         General: No swelling, tenderness,  deformity or signs of injury. Normal range of motion.      Cervical back: Normal range of motion. No rigidity or tenderness.      Right lower leg: No edema.      Left lower leg: No edema.   Lymphadenopathy:      Cervical: No cervical adenopathy.     Skin:     General: Skin is dry.      Capillary Refill: Capillary refill takes less than 2 seconds.     Neurological:      General: No focal deficit present.      Mental Status: He is alert and oriented to person, place, and time.     Psychiatric:         Mood and Affect: Mood normal.         Behavior: Behavior normal.         Invasive Devices       Peripheral Intravenous Line  Duration             Peripheral IV 06/26/25 Proximal;Right;Ventral (anterior) Forearm <1 day                  Lab Results: I have personally reviewed all pertinent laboratory/test results from 06/26/25, including the preceding 24 hours.  Recent Labs     06/26/25  0859   HGB 10.5*   HCT 31*   CO2 23   CAIONIZED 1.18       Imaging Results: I have personally reviewed pertinent images saved in PACS. CT scan findings (and other pertinent positive findings on images) were discussed with radiology. My interpretation of the images/reports are as follows:  Chest Xray(s): negative for acute findings   FAST exam(s): negative for acute findings   CT Scan(s): negative for acute findings   Additional Xray(s): N/A     Other Studies: None    Code Status: Prior  Advance Directive and Living Will:      Power of : Yes  POLST:                   [1]   Past Medical History:  Diagnosis Date    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)     Last Assessed:  5/19/17    Chronic kidney disease     Diabetes mellitus (HCC)     Hyperlipidemia     Hypertension     Osteoarthritis     Palpitations     TIA (transient ischemic attack)    [2]   Past Surgical History:  Procedure Laterality Date    APPENDECTOMY      CARDIAC ELECTROPHYSIOLOGY PROCEDURE N/A 1/17/2024    Procedure: Cardiac loop recorder explant;  Surgeon: Drew  MD Amarilis;  Location: [facility];  Service: Cardiology    COLONOSCOPY  2012    HEMORROIDECTOMY      TOOTH EXTRACTION  02/02/2022   [3]   Social History  Tobacco Use    Smoking status: Former    Smokeless tobacco: Never   Vaping Use    Vaping status: Never Used   Substance Use Topics    Alcohol use: Not Currently     Comment: Social drinker    Drug use: No   [4] No Known Allergies

## 2025-06-26 NOTE — PROCEDURES
POC FAST US    Date/Time: 6/26/2025 10:21 AM    Performed by: KONSTANTIN Chaparro  Authorized by: KONSTANTIN Chaparro    Patient location:  Trauma  Procedure details:     Exam Type:  Diagnostic    Indications: blunt abdominal trauma and blunt chest trauma      Assess for:  Intra-abdominal fluid and pericardial effusion    Technique: FAST      Views obtained:  Heart - Pericardial sac, LUQ - Splenorenal space, Suprapubic - Pouch of Paul and RUQ - Lackey's Pouch    Image quality: diagnostic      Image availability:  Images available in PACS and video obtained  FAST Findings:     RUQ (Hepatorenal) free fluid: absent      LUQ (Splenorenal) free fluid: absent      Suprapubic free fluid: absent      Cardiac wall motion: identified      Pericardial effusion: absent    Interpretation:     Impressions: negative            
No

## 2025-06-26 NOTE — DISCHARGE INSTR - AVS FIRST PAGE
Trauma Discharge Instructions:    Please follow-up as instructed. If you need a follow-up appointment, please call the office when you leave to schedule an appointment.   Follow up with your PCP    Activity:  - Please Hydrate!  - You may resume activity as tolerated.  - Walking and normal light activities are encouraged.  - Normal daily activities including climbing steps are okay.  - No driving until no longer using pain medications.      Diet:    - You may resume your normal diet.    Medications:  -Please resume all your medications.    Additional Instructions:  - May shower daily.  - If you have any questions or concerns after discharge please call the office.  - Call office or return to ER if fever greater than 101, chills, persistent nausea/vomiting, worsening/uncontrollable pain, develop productive cough, increasing shortness of breath, difficulty breathing, and/or increasing redness or purulent/foul smelling drainage from incision(s).

## 2025-06-26 NOTE — ED ATTENDING ATTESTATION
6/26/2025  I, Elizabeth Stallings DO, saw and evaluated the patient. I have discussed the patient with the resident/non-physician practitioner and agree with the resident's/non-physician practitioner's findings, Plan of Care, and MDM as documented in the resident's/non-physician practitioner's note, except where noted. All available labs and Radiology studies were reviewed.  I was present for key portions of any procedure(s) performed by the resident/non-physician practitioner and I was immediately available to provide assistance.       At this point I agree with the current assessment done in the Emergency Department.  I have conducted an independent evaluation of this patient a history and physical is as follows:    ED Course     Trauma airway note as per resident documentation.  Patient arrived as a level B trauma.  He had a fall with head strike without loss of consciousness.  Patient was awake and alert.  He did not require any airway interventions or supplemental oxygen.  Trauma team assumed care.    Critical Care Time  Procedures

## 2025-07-01 ENCOUNTER — APPOINTMENT (OUTPATIENT)
Dept: LAB | Facility: CLINIC | Age: OVER 89
End: 2025-07-01
Attending: INTERNAL MEDICINE
Payer: MEDICARE

## 2025-07-01 DIAGNOSIS — Z00.00 MEDICARE ANNUAL WELLNESS VISIT, SUBSEQUENT: ICD-10-CM

## 2025-07-01 DIAGNOSIS — E11.9 TYPE 2 DIABETES MELLITUS WITHOUT COMPLICATION, UNSPECIFIED WHETHER LONG TERM INSULIN USE (HCC): ICD-10-CM

## 2025-07-01 LAB
BACTERIA UR QL AUTO: ABNORMAL /HPF
BILIRUB UR QL STRIP: NEGATIVE
CLARITY UR: CLEAR
COLOR UR: ABNORMAL
GLUCOSE UR STRIP-MCNC: ABNORMAL MG/DL
HGB UR QL STRIP.AUTO: NEGATIVE
KETONES UR STRIP-MCNC: NEGATIVE MG/DL
LEUKOCYTE ESTERASE UR QL STRIP: ABNORMAL
NITRITE UR QL STRIP: POSITIVE
NON-SQ EPI CELLS URNS QL MICRO: ABNORMAL /HPF
PH UR STRIP.AUTO: 6 [PH]
PROT UR STRIP-MCNC: ABNORMAL MG/DL
RBC #/AREA URNS AUTO: ABNORMAL /HPF
SP GR UR STRIP.AUTO: 1.02 (ref 1–1.03)
UROBILINOGEN UR STRIP-ACNC: <2 MG/DL
WBC #/AREA URNS AUTO: ABNORMAL /HPF

## 2025-07-01 PROCEDURE — 81001 URINALYSIS AUTO W/SCOPE: CPT

## 2025-07-04 PROBLEM — Z00.00 MEDICARE ANNUAL WELLNESS VISIT, SUBSEQUENT: Status: RESOLVED | Noted: 2024-01-17 | Resolved: 2025-07-04

## 2025-07-13 DIAGNOSIS — I10 ESSENTIAL HYPERTENSION: ICD-10-CM

## 2025-07-14 RX ORDER — LISINOPRIL 5 MG/1
5 TABLET ORAL DAILY
Qty: 90 TABLET | Refills: 1 | OUTPATIENT
Start: 2025-07-14

## 2025-07-14 NOTE — TELEPHONE ENCOUNTER
The original prescription was discontinued on 2/9/2025 by Alphonse Jalloh PA-C. Renewing this prescription may not be appropriate.

## 2025-07-18 ENCOUNTER — OFFICE VISIT (OUTPATIENT)
Age: OVER 89
End: 2025-07-18
Payer: MEDICARE

## 2025-07-18 VITALS
OXYGEN SATURATION: 99 % | DIASTOLIC BLOOD PRESSURE: 70 MMHG | TEMPERATURE: 97.2 F | RESPIRATION RATE: 16 BRPM | HEART RATE: 68 BPM | SYSTOLIC BLOOD PRESSURE: 150 MMHG | HEIGHT: 71 IN | BODY MASS INDEX: 23.38 KG/M2 | WEIGHT: 167 LBS

## 2025-07-18 DIAGNOSIS — R41.89 COGNITIVE IMPAIRMENT: ICD-10-CM

## 2025-07-18 DIAGNOSIS — J43.9 PULMONARY EMPHYSEMA, UNSPECIFIED EMPHYSEMA TYPE (HCC): ICD-10-CM

## 2025-07-18 DIAGNOSIS — E78.2 MIXED HYPERLIPIDEMIA: ICD-10-CM

## 2025-07-18 DIAGNOSIS — Z00.00 HEALTHCARE MAINTENANCE: ICD-10-CM

## 2025-07-18 DIAGNOSIS — E11.9 TYPE 2 DIABETES MELLITUS WITHOUT COMPLICATION, UNSPECIFIED WHETHER LONG TERM INSULIN USE (HCC): ICD-10-CM

## 2025-07-18 DIAGNOSIS — I10 ESSENTIAL HYPERTENSION: Primary | ICD-10-CM

## 2025-07-18 DIAGNOSIS — N17.9 ACUTE RENAL FAILURE SUPERIMPOSED ON STAGE 4 CHRONIC KIDNEY DISEASE, UNSPECIFIED ACUTE RENAL FAILURE TYPE (HCC): ICD-10-CM

## 2025-07-18 DIAGNOSIS — N18.4 ACUTE RENAL FAILURE SUPERIMPOSED ON STAGE 4 CHRONIC KIDNEY DISEASE, UNSPECIFIED ACUTE RENAL FAILURE TYPE (HCC): ICD-10-CM

## 2025-07-18 DIAGNOSIS — G47.00 INSOMNIA, UNSPECIFIED TYPE: ICD-10-CM

## 2025-07-18 DIAGNOSIS — R45.1 AGITATION: ICD-10-CM

## 2025-07-18 DIAGNOSIS — R81 GLYCOSURIA: ICD-10-CM

## 2025-07-18 PROCEDURE — 99214 OFFICE O/P EST MOD 30 MIN: CPT | Performed by: INTERNAL MEDICINE

## 2025-07-18 PROCEDURE — G2211 COMPLEX E/M VISIT ADD ON: HCPCS | Performed by: INTERNAL MEDICINE

## 2025-07-18 RX ORDER — LOSARTAN POTASSIUM 50 MG/1
50 TABLET ORAL DAILY
Qty: 30 TABLET | Refills: 5 | Status: SHIPPED | OUTPATIENT
Start: 2025-07-18

## 2025-07-18 NOTE — ASSESSMENT & PLAN NOTE
Again episodes of agitation.  He remains on Remeron Lexapro and Seroquel.    Orders:    Ambulatory Referral to Senior Care; Future

## 2025-07-18 NOTE — ASSESSMENT & PLAN NOTE
Patient is continuing with difficulties with cognitive impairment.  He is in a locked unit but will wander.  They try to redirect the patient.  He was seen by geriatrics and they did not change any medication but he seems to have had a decline in his status and we asked for follow-up and reevaluation as to whether or not any change of medication may be needed or appropriate    Orders:    Ambulatory Referral to Senior Care; Future

## 2025-07-18 NOTE — ASSESSMENT & PLAN NOTE
Patient's hemoglobin A1c is up to 8.0.  He remains on medication Starlix 60 mg 3 times a day with food.  No reports of hypoglycemia.  They have not been checking his blood sugars where he is living which may be important if he is having episodes of increasing confusion especially.  Continues to follow-up with his ophthalmologist and we will be checking for urine for microalbumin.  Check a fasting blood sugar hemoglobin A1c with his next visit.  Lab Results   Component Value Date    HGBA1C 8.0 (H) 03/04/2025       Orders:    Urine culture; Future    Albumin / creatinine urine ratio; Future    Basic metabolic panel; Future    Hemoglobin A1C; Future

## 2025-07-18 NOTE — ASSESSMENT & PLAN NOTE
Lab Results   Component Value Date    EGFR 26 05/02/2025    EGFR 24 05/01/2025    EGFR 22 04/21/2025    CREATININE 2.11 (H) 05/02/2025    CREATININE 2.24 (H) 05/01/2025    CREATININE 2.39 (H) 04/21/2025   Last GFR 26 showing stability and actually some improvement.  Check this again with his next visit.  Especially important to keep an eye on this with us working to control his blood pressure

## 2025-07-18 NOTE — PROGRESS NOTES
Name: Leroy Paredes      : 1930      MRN: [id number]  Encounter Provider: Pepito Foster DO  Encounter Date: 2025   Encounter department: [facility] INTERNAL MEDICINE    Assessment & Plan  Healthcare maintenance    Orders:  •  Urine culture; Future  •  Albumin / creatinine urine ratio; Future    Type 2 diabetes mellitus without complication, unspecified whether long term insulin use (HCC)  Patient's hemoglobin A1c is up to 8.0.  He remains on medication Starlix 60 mg 3 times a day with food.  No reports of hypoglycemia.  They have not been checking his blood sugars where he is living which may be important if he is having episodes of increasing confusion especially.  Continues to follow-up with his ophthalmologist and we will be checking for urine for microalbumin.  Check a fasting blood sugar hemoglobin A1c with his next visit.  Lab Results   Component Value Date    HGBA1C 8.0 (H) 2025       Orders:  •  Urine culture; Future  •  Albumin / creatinine urine ratio; Future  •  Basic metabolic panel; Future  •  Hemoglobin A1C; Future    Glycosuria    Orders:  •  Urine culture; Future    Insomnia, unspecified type  Patient is on Remeron which has been helping the patient with his sleep disorder.         Cognitive impairment  Patient is continuing with difficulties with cognitive impairment.  He is in a locked unit but will wander.  They try to redirect the patient.  He was seen by geriatrics and they did not change any medication but he seems to have had a decline in his status and we asked for follow-up and reevaluation as to whether or not any change of medication may be needed or appropriate    Orders:  •  Ambulatory Referral to Senior Care; Future    Agitation  Again episodes of agitation.  He remains on Remeron Lexapro and Seroquel.    Orders:  •  Ambulatory Referral to Senior Care; Future    Pulmonary emphysema, unspecified emphysema type (HCC)  Since last seen no reports of any  episodes of respiratory difficulties.         Mixed hyperlipidemia  History of hyperlipidemia.  Remains on atorvastatin 40 mg daily.  Continue to monitor cholesterol levels and make adjustment medication if needed in the future.  There is statistical information stating that even advanced age control of cholesterol can be beneficial.         Essential hypertension  Patient does have a history of hypertension.  As noted there can be extreme variability in his blood pressure readings in the beginning of June blood pressure 128/40.  Blood pressure initially today in the office 162/72 but this did come down to an acceptable range once the patient was allowed to sit and relax.  He will continue present medication surveillance and we will check another cholesterol profile with his next visit.  Because of history of diabetes place him on a low-dose of Cozaar 50 mg daily which may help blood pressure readings.  Also protection against any difficulty occultly is with his kidney function    Orders:  •  losartan (Cozaar) 50 mg tablet; Take 1 tablet (50 mg total) by mouth daily    Acute renal failure superimposed on stage 4 chronic kidney disease, unspecified acute renal failure type (HCC)  Lab Results   Component Value Date    EGFR 26 05/02/2025    EGFR 24 05/01/2025    EGFR 22 04/21/2025    CREATININE 2.11 (H) 05/02/2025    CREATININE 2.24 (H) 05/01/2025    CREATININE 2.39 (H) 04/21/2025   Last GFR 26 showing stability and actually some improvement.  Check this again with his next visit.  Especially important to keep an eye on this with us working to control his blood pressure              History of Present Illness     Patient is a 89-year-old male with a history of extensive medical problems outlined previously who is here today for follow-up accompanied by his family.  Patient is presenting walking with a walker.  He is awake alert.  Patient is disoriented times date and location.  Patient states he is still living at  home.  He is in a facility where they maintain his nutrition and make sure that he gets medication as prescribed.  Normal he recently had extensive lab testing performed  Review of Systems   Constitutional: Negative.    HENT: Negative.     Eyes: Negative.         Recent evaluation by ophthalmology and no specific changes   Respiratory: Negative.          No reports of any difficulties with respiratory symptoms   Cardiovascular: Negative.    Gastrointestinal: Negative.    Endocrine: Negative.    Genitourinary: Negative.    Musculoskeletal: Negative.         History of diffuse arthritis but again patient is a poor historian.  Unsure of whether there is any new difficulties.  No acute accidents or injury recently   Skin: Negative.    Psychiatric/Behavioral:  Positive for confusion and decreased concentration. Negative for agitation, behavioral problems, dysphoric mood, hallucinations, self-injury, sleep disturbance and suicidal ideas. The patient is not nervous/anxious and is not hyperactive.    Past Medical History[1]  Past Surgical History[2]  Family History[3]  Social History[4]  Medications[5]  No Known Allergies  Immunization History   Administered Date(s) Administered   • COVID-19 MODERNA VACC 0.5 ML IM 01/27/2021, 02/22/2021, 11/22/2021, 07/23/2022   • COVID-19 Pfizer mRNA vacc PF naresh-sucrose 12 yr and older (Comirnaty) 01/27/2025   • INFLUENZA 11/01/2002, 01/05/2003, 11/24/2003, 10/06/2004, 11/07/2005, 10/21/2006, 09/28/2007, 10/10/2008, 10/20/2008, 11/04/2009, 10/05/2010, 10/03/2011, 11/04/2012   • Influenza Split High Dose Preservative Free IM 09/27/2013, 09/25/2014, 09/29/2015, 09/23/2016, 09/19/2017   • Influenza, high dose seasonal 0.7 mL 09/25/2018, 10/15/2020, 09/20/2021, 09/26/2022, 10/02/2023   • Pneumococcal 01/01/1999   • Pneumococcal Conjugate 13-Valent 04/25/2019   • Pneumococcal Polysaccharide PPV23 10/01/2006   • TD (adult) Preservative Free 12/11/1930   • influenza, trivalent, adjuvanted  "01/27/2025     Objective   /70   Pulse 68   Temp (!) 97.2 °F (36.2 °C)   Resp 16   Ht 5' 11\" (1.803 m)   Wt 75.8 kg (167 lb)   SpO2 99%   BMI 23.29 kg/m²     Physical Exam  Vitals and nursing note reviewed.   Constitutional:       General: He is not in acute distress.     Appearance: Normal appearance. He is normal weight. He is not ill-appearing, toxic-appearing or diaphoretic.      Comments: Pleasant, confused 89-year-old male who is awake, alert accompanied by his family in no acute distress.  Orientation time date.  Again thinks that he is living at home   HENT:      Head: Normocephalic and atraumatic.      Right Ear: Tympanic membrane, ear canal and external ear normal. There is no impacted cerumen.      Left Ear: Tympanic membrane, ear canal and external ear normal. There is no impacted cerumen.      Nose: Nose normal. No congestion or rhinorrhea.      Mouth/Throat:      Mouth: Mucous membranes are dry.      Pharynx: Oropharynx is clear. No oropharyngeal exudate or posterior oropharyngeal erythema.      Comments: Mucous membranes are extremely dry encouraged patient to increase fluid intake    Eyes:      General: No scleral icterus.        Right eye: No discharge.         Left eye: No discharge.      Extraocular Movements: Extraocular movements intact.      Conjunctiva/sclera: Conjunctivae normal.      Pupils: Pupils are equal, round, and reactive to light.     Neck:      Vascular: No carotid bruit.     Cardiovascular:      Rate and Rhythm: Normal rate and regular rhythm.      Pulses:           Dorsalis pedis pulses are 1+ on the right side and 1+ on the left side.        Posterior tibial pulses are 1+ on the right side and 1+ on the left side.      Heart sounds: Normal heart sounds. No murmur heard.     No friction rub. No gallop.      Comments: Occasional ectopy on auscultation.  Pulmonary:      Effort: Pulmonary effort is normal. No respiratory distress.      Breath sounds: No stridor. No " wheezing, rhonchi or rales.      Comments: Mildly decreased breath sounds anteriorly and posteriorly but no rales rhonchi or wheezes could be appreciated on exam  Chest:      Chest wall: No tenderness.   Abdominal:      General: Abdomen is flat. Bowel sounds are normal. There is no distension.      Palpations: Abdomen is soft. There is no mass.      Tenderness: There is no abdominal tenderness. There is no right CVA tenderness, left CVA tenderness, guarding or rebound.      Hernia: No hernia is present.     Musculoskeletal:         General: Deformity present. No swelling, tenderness or signs of injury.      Cervical back: Normal range of motion and neck supple. No rigidity or tenderness.      Right lower leg: No edema.      Left lower leg: No edema.      Comments: Patient has generalized arthritic changes but nothing acute.  Has better range of motion to his left shoulder girdle without plain   Feet:      Right foot:      Skin integrity: No ulcer, skin breakdown, erythema, warmth, callus or dry skin.      Left foot:      Skin integrity: No ulcer, skin breakdown, erythema, warmth, callus or dry skin.   Lymphadenopathy:      Cervical: No cervical adenopathy.     Skin:     General: Skin is warm and dry.      Coloration: Skin is not jaundiced or pale.      Findings: No bruising, erythema, lesion or rash.     Neurological:      Mental Status: He is alert. Mental status is at baseline.      Cranial Nerves: No cranial nerve deficit.      Sensory: No sensory deficit.      Motor: Weakness present.      Coordination: Coordination normal.      Gait: Gait abnormal.      Deep Tendon Reflexes: Reflexes normal.      Comments: Presenting in a wheelchair, apparently is walking with a walker where he resides.   Psychiatric:         Mood and Affect: Mood normal.      Comments: Patient has a relatively cheerful mood.  Patient has some disorientation to time and date thought processes are sometimes erratic.   Definitely some short-term  memory loss.  Patient seems to have declined and seems to have less orientation.  Again as noted feels that he is still living at home but is in a locked unit            [1]  Past Medical History:  Diagnosis Date   • 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)     Last Assessed:  5/19/17   • Chronic kidney disease    • Diabetes mellitus (HCC)    • Hyperlipidemia    • Hypertension    • Osteoarthritis    • Palpitations    • TIA (transient ischemic attack)    [2]  Past Surgical History:  Procedure Laterality Date   • APPENDECTOMY     • CARDIAC ELECTROPHYSIOLOGY PROCEDURE N/A 1/17/2024    Procedure: Cardiac loop recorder explant;  Surgeon: Drew Olmos MD;  Location: [facility];  Service: Cardiology   • COLONOSCOPY  2012   • HEMORROIDECTOMY     • TOOTH EXTRACTION  02/02/2022   [3]  Family History  Problem Relation Name Age of Onset   • Diabetes Mother     • Other Mother          Stroke syndrome   • Diabetes Father     • Emphysema Father     [4]  Social History  Tobacco Use   • Smoking status: Former   • Smokeless tobacco: Never   Vaping Use   • Vaping status: Never Used   Substance and Sexual Activity   • Alcohol use: Not Currently     Comment: Social drinker   • Drug use: No   • Sexual activity: Not Currently   [5]  Current Outpatient Medications on File Prior to Visit   Medication Sig   • apixaban (Eliquis) 2.5 mg TAKE 1 TABLET TWICE DAILY   • atorvastatin (LIPITOR) 40 mg tablet TAKE 1 TABLET EVERY DAY WITH DINNER   • Blood Glucose Monitoring Suppl (PRECISION XTRA MONITOR) MARY by Does not apply route daily   • Cholecalciferol (D3 High Potency) 25 MCG (1000 UT) capsule TAKE ONE CAPSULE BY MOUTH DAILY   • escitalopram (LEXAPRO) 5 mg tablet TAKE ONE TABLET BY MOUTH DAILY   • gabapentin (NEURONTIN) 100 mg capsule TAKE ONE CAPSULE BY MOUTH EVERY NIGHT AT BEDTIME   • magnesium Oxide (MAG-OX) 400 mg TABS TAKE ONE TABLET BY MOUTH DAILY   • melatonin 3 mg TAKE ONE TABLET BY MOUTH EVERY NIGHT AT BEDTIME   •  mirtazapine (REMERON) 30 mg tablet Take 1 tablet (30 mg total) by mouth daily at bedtime   • nateglinide (STARLIX) 60 mg tablet TAKE 1 TABLET (60 MG TOTAL) BY MOUTH 3 (THREE) TIMES A DAY BEFORE MEALS   • QUEtiapine (SEROquel) 25 mg tablet TAKE ONE TABLET BY MOUTH EVERY NIGHT AT BEDTIME *IN [facility] CARD/NURSE TO REORDER   • vitamin B-12 (CYANOCOBALAMIN) 500 MCG TABS Take 1 tablet (500 mcg total) by mouth 2 (two) times a week

## 2025-07-18 NOTE — ASSESSMENT & PLAN NOTE
History of hyperlipidemia.  Remains on atorvastatin 40 mg daily.  Continue to monitor cholesterol levels and make adjustment medication if needed in the future.  There is statistical information stating that even advanced age control of cholesterol can be beneficial.

## 2025-07-18 NOTE — ASSESSMENT & PLAN NOTE
Patient does have a history of hypertension.  As noted there can be extreme variability in his blood pressure readings in the beginning of June blood pressure 128/40.  Blood pressure initially today in the office 162/72 but this did come down to an acceptable range once the patient was allowed to sit and relax.  He will continue present medication surveillance and we will check another cholesterol profile with his next visit.  Because of history of diabetes place him on a low-dose of Cozaar 50 mg daily which may help blood pressure readings.  Also protection against any difficulty occultly is with his kidney function    Orders:    losartan (Cozaar) 50 mg tablet; Take 1 tablet (50 mg total) by mouth daily

## 2025-07-21 ENCOUNTER — TELEPHONE (OUTPATIENT)
Age: OVER 89
End: 2025-07-21

## 2025-07-23 ENCOUNTER — TELEPHONE (OUTPATIENT)
Age: OVER 89
End: 2025-07-23

## 2025-07-24 ENCOUNTER — APPOINTMENT (OUTPATIENT)
Dept: LAB | Facility: CLINIC | Age: OVER 89
End: 2025-07-24
Attending: INTERNAL MEDICINE

## 2025-07-24 DIAGNOSIS — R81 GLYCOSURIA: ICD-10-CM

## 2025-07-24 DIAGNOSIS — Z00.00 HEALTHCARE MAINTENANCE: ICD-10-CM

## 2025-07-24 DIAGNOSIS — E11.22 CKD STAGE 4 DUE TO TYPE 2 DIABETES MELLITUS (HCC): ICD-10-CM

## 2025-07-24 DIAGNOSIS — N18.4 CKD STAGE 4 DUE TO TYPE 2 DIABETES MELLITUS (HCC): ICD-10-CM

## 2025-07-24 DIAGNOSIS — E11.9 TYPE 2 DIABETES MELLITUS WITHOUT COMPLICATION, UNSPECIFIED WHETHER LONG TERM INSULIN USE (HCC): ICD-10-CM

## 2025-07-28 ENCOUNTER — APPOINTMENT (EMERGENCY)
Dept: RADIOLOGY | Facility: HOSPITAL | Age: 89
End: 2025-07-28
Payer: MEDICARE

## 2025-07-28 ENCOUNTER — APPOINTMENT (EMERGENCY)
Dept: CT IMAGING | Facility: HOSPITAL | Age: 89
End: 2025-07-28
Payer: MEDICARE

## 2025-07-28 ENCOUNTER — HOSPITAL ENCOUNTER (EMERGENCY)
Facility: HOSPITAL | Age: 89
Discharge: HOME | End: 2025-07-28
Attending: SURGERY | Admitting: SURGERY
Payer: MEDICARE

## 2025-07-28 VITALS
SYSTOLIC BLOOD PRESSURE: 141 MMHG | OXYGEN SATURATION: 96 % | HEART RATE: 89 BPM | TEMPERATURE: 99.8 F | DIASTOLIC BLOOD PRESSURE: 64 MMHG | WEIGHT: 181.88 LBS | RESPIRATION RATE: 16 BRPM

## 2025-07-28 DIAGNOSIS — S51.011A LACERATION OF RIGHT ELBOW, INITIAL ENCOUNTER: ICD-10-CM
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
## 2025-07-28 LAB
2HR DELTA HS TROPONIN: -3 NG/L
ANION GAP SERPL CALCULATED.3IONS-SCNC: 7 MMOL/L (ref 4–13)
ATRIAL RATE: 104 BPM
BASE EXCESS BLDA CALC-SCNC: -4 MMOL/L (ref -2–3)
BASOPHILS # BLD AUTO: 0.04 THOUSANDS/ÂΜL (ref 0–0.1)
BASOPHILS NFR BLD AUTO: 1 % (ref 0–1)
BUN SERPL-MCNC: 38 MG/DL (ref 5–25)
CA-I BLD-SCNC: 1.17 MMOL/L (ref 1.12–1.32)
CALCIUM SERPL-MCNC: 8.9 MG/DL (ref 8.4–10.2)
CARDIAC TROPONIN I PNL SERPL HS: 36 NG/L (ref ?–50)
CARDIAC TROPONIN I PNL SERPL HS: 39 NG/L (ref ?–50)
CHLORIDE SERPL-SCNC: 106 MMOL/L (ref 96–108)
CO2 SERPL-SCNC: 23 MMOL/L (ref 21–32)
CREAT SERPL-MCNC: 2.35 MG/DL (ref 0.6–1.3)
EOSINOPHIL # BLD AUTO: 0.37 THOUSAND/ÂΜL (ref 0–0.61)
EOSINOPHIL NFR BLD AUTO: 4 % (ref 0–6)
ERYTHROCYTE [DISTWIDTH] IN BLOOD BY AUTOMATED COUNT: 13.7 % (ref 11.6–15.1)
GFR SERPL CREATININE-BSD FRML MDRD: 22 ML/MIN/1.73SQ M
GLUCOSE SERPL-MCNC: 240 MG/DL (ref 65–140)
GLUCOSE SERPL-MCNC: 243 MG/DL (ref 65–140)
HCO3 BLDA-SCNC: 21.4 MMOL/L (ref 24–30)
HCT VFR BLD AUTO: 33.5 % (ref 36.5–49.3)
HCT VFR BLD CALC: 32 % (ref 36.5–49.3)
HGB BLD-MCNC: 10.7 G/DL (ref 12–17)
HGB BLDA-MCNC: 10.9 G/DL (ref 12–17)
IMM GRANULOCYTES # BLD AUTO: 0.03 THOUSAND/UL (ref 0–0.2)
IMM GRANULOCYTES NFR BLD AUTO: 0 % (ref 0–2)
LYMPHOCYTES # BLD AUTO: 0.94 THOUSANDS/ÂΜL (ref 0.6–4.47)
LYMPHOCYTES NFR BLD AUTO: 11 % (ref 14–44)
MCH RBC QN AUTO: 30.1 PG (ref 26.8–34.3)
MCHC RBC AUTO-ENTMCNC: 31.9 G/DL (ref 31.4–37.4)
MCV RBC AUTO: 94 FL (ref 82–98)
MONOCYTES # BLD AUTO: 0.89 THOUSAND/ÂΜL (ref 0.17–1.22)
MONOCYTES NFR BLD AUTO: 10 % (ref 4–12)
NEUTROPHILS # BLD AUTO: 6.6 THOUSANDS/ÂΜL (ref 1.85–7.62)
NEUTS SEG NFR BLD AUTO: 74 % (ref 43–75)
NRBC BLD AUTO-RTO: 0 /100 WBCS
PCO2 BLD: 23 MMOL/L (ref 21–32)
PCO2 BLD: 38.5 MM HG (ref 42–50)
PH BLD: 7.35 [PH] (ref 7.3–7.4)
PLATELET # BLD AUTO: 171 THOUSANDS/UL (ref 149–390)
PMV BLD AUTO: 8.9 FL (ref 8.9–12.7)
PO2 BLD: 23 MM HG (ref 35–45)
POTASSIUM BLD-SCNC: 4.7 MMOL/L (ref 3.5–5.3)
POTASSIUM SERPL-SCNC: 4.5 MMOL/L (ref 3.5–5.3)
QRS AXIS: -58 DEGREES
QRSD INTERVAL: 140 MS
QT INTERVAL: 396 MS
QTC INTERVAL: 513 MS
RBC # BLD AUTO: 3.55 MILLION/UL (ref 3.88–5.62)
SAO2 % BLD FROM PO2: 36 % (ref 60–85)
SODIUM BLD-SCNC: 139 MMOL/L (ref 136–145)
SODIUM SERPL-SCNC: 136 MMOL/L (ref 135–147)
SPECIMEN SOURCE: ABNORMAL
T WAVE AXIS: 101 DEGREES
VENTRICULAR RATE: 101 BPM
WBC # BLD AUTO: 8.87 THOUSAND/UL (ref 4.31–10.16)

## 2025-07-28 PROCEDURE — 72125 CT NECK SPINE W/O DYE: CPT

## 2025-07-28 PROCEDURE — 99285 EMERGENCY DEPT VISIT HI MDM: CPT

## 2025-07-28 PROCEDURE — 96365 THER/PROPH/DIAG IV INF INIT: CPT

## 2025-07-28 PROCEDURE — 74177 CT ABD & PELVIS W/CONTRAST: CPT

## 2025-07-28 PROCEDURE — 99285 EMERGENCY DEPT VISIT HI MDM: CPT | Performed by: SURGERY

## 2025-07-28 PROCEDURE — 73080 X-RAY EXAM OF ELBOW: CPT

## 2025-07-28 PROCEDURE — 85025 COMPLETE CBC W/AUTO DIFF WBC: CPT

## 2025-07-28 PROCEDURE — NC001 PR NO CHARGE: Performed by: SURGERY

## 2025-07-28 PROCEDURE — 85014 HEMATOCRIT: CPT

## 2025-07-28 PROCEDURE — 76705 ECHO EXAM OF ABDOMEN: CPT | Performed by: SURGERY

## 2025-07-28 PROCEDURE — 71260 CT THORAX DX C+: CPT

## 2025-07-28 PROCEDURE — 70450 CT HEAD/BRAIN W/O DYE: CPT

## 2025-07-28 PROCEDURE — 36415 COLL VENOUS BLD VENIPUNCTURE: CPT

## 2025-07-28 PROCEDURE — 71045 X-RAY EXAM CHEST 1 VIEW: CPT

## 2025-07-28 PROCEDURE — 80048 BASIC METABOLIC PNL TOTAL CA: CPT

## 2025-07-28 PROCEDURE — 90715 TDAP VACCINE 7 YRS/> IM: CPT

## 2025-07-28 PROCEDURE — EDAIR PR ED AIR: Performed by: EMERGENCY MEDICINE

## 2025-07-28 PROCEDURE — 12002 RPR S/N/AX/GEN/TRNK2.6-7.5CM: CPT | Performed by: SURGERY

## 2025-07-28 PROCEDURE — 93308 TTE F-UP OR LMTD: CPT | Performed by: SURGERY

## 2025-07-28 PROCEDURE — 93010 ELECTROCARDIOGRAM REPORT: CPT | Performed by: STUDENT IN AN ORGANIZED HEALTH CARE EDUCATION/TRAINING PROGRAM

## 2025-07-28 PROCEDURE — 93005 ELECTROCARDIOGRAM TRACING: CPT

## 2025-07-28 PROCEDURE — 84132 ASSAY OF SERUM POTASSIUM: CPT

## 2025-07-28 PROCEDURE — 82803 BLOOD GASES ANY COMBINATION: CPT

## 2025-07-28 PROCEDURE — 84484 ASSAY OF TROPONIN QUANT: CPT

## 2025-07-28 PROCEDURE — 82330 ASSAY OF CALCIUM: CPT

## 2025-07-28 PROCEDURE — 82947 ASSAY GLUCOSE BLOOD QUANT: CPT

## 2025-07-28 PROCEDURE — 84295 ASSAY OF SERUM SODIUM: CPT

## 2025-07-28 PROCEDURE — 90471 IMMUNIZATION ADMIN: CPT

## 2025-07-28 RX ORDER — LIDOCAINE HYDROCHLORIDE AND EPINEPHRINE 20; 5 MG/ML; UG/ML
20 INJECTION, SOLUTION EPIDURAL; INFILTRATION; INTRACAUDAL; PERINEURAL ONCE
Status: COMPLETED | OUTPATIENT
Start: 2025-07-28 | End: 2025-07-28

## 2025-07-28 RX ORDER — GINSENG 100 MG
1 CAPSULE ORAL ONCE
Status: COMPLETED | OUTPATIENT
Start: 2025-07-28 | End: 2025-07-28

## 2025-07-28 RX ADMIN — TETANUS TOXOID, REDUCED DIPHTHERIA TOXOID AND ACELLULAR PERTUSSIS VACCINE, ADSORBED 0.5 ML: 5; 2.5; 8; 8; 2.5 SUSPENSION INTRAMUSCULAR at 05:00

## 2025-07-28 RX ADMIN — IOHEXOL 100 ML: 350 INJECTION, SOLUTION INTRAVENOUS at 04:49

## 2025-07-28 RX ADMIN — SODIUM CHLORIDE, SODIUM LACTATE, POTASSIUM CHLORIDE, AND CALCIUM CHLORIDE 250 ML: .6; .31; .03; .02 INJECTION, SOLUTION INTRAVENOUS at 05:47

## 2025-07-28 RX ADMIN — LIDOCAINE HYDROCHLORIDE AND EPINEPHRINE 20 ML: 20; 5 INJECTION, SOLUTION EPIDURAL; INFILTRATION; INTRACAUDAL; PERINEURAL at 05:46

## 2025-07-28 RX ADMIN — BACITRACIN ZINC 1 SMALL APPLICATION: 500 OINTMENT TOPICAL at 05:55

## 2025-08-04 ENCOUNTER — OFFICE VISIT (OUTPATIENT)
Dept: CARDIOLOGY CLINIC | Facility: CLINIC | Age: OVER 89
End: 2025-08-04
Payer: MEDICARE

## 2025-08-04 VITALS
WEIGHT: 165.2 LBS | OXYGEN SATURATION: 94 % | SYSTOLIC BLOOD PRESSURE: 92 MMHG | BODY MASS INDEX: 23.13 KG/M2 | DIASTOLIC BLOOD PRESSURE: 42 MMHG | HEIGHT: 71 IN | HEART RATE: 76 BPM

## 2025-08-04 DIAGNOSIS — I10 PRIMARY HYPERTENSION: ICD-10-CM

## 2025-08-04 DIAGNOSIS — I45.10 RBBB: ICD-10-CM

## 2025-08-04 DIAGNOSIS — I48.91 ATRIAL FIBRILLATION, UNSPECIFIED TYPE (HCC): ICD-10-CM

## 2025-08-04 DIAGNOSIS — I10 ESSENTIAL HYPERTENSION: Primary | ICD-10-CM

## 2025-08-04 DIAGNOSIS — R06.00 DYSPNEA, UNSPECIFIED TYPE: ICD-10-CM

## 2025-08-04 DIAGNOSIS — R55 SYNCOPE, UNSPECIFIED SYNCOPE TYPE: ICD-10-CM

## 2025-08-04 PROCEDURE — 99214 OFFICE O/P EST MOD 30 MIN: CPT | Performed by: INTERNAL MEDICINE

## 2025-08-04 RX ORDER — LOSARTAN POTASSIUM 25 MG/1
25 TABLET ORAL DAILY
Qty: 90 TABLET | Refills: 3 | Status: SHIPPED | OUTPATIENT
Start: 2025-08-04

## 2025-08-10 ENCOUNTER — APPOINTMENT (EMERGENCY)
Dept: CT IMAGING | Facility: HOSPITAL | Age: OVER 89
End: 2025-08-10
Payer: MEDICARE

## 2025-08-10 ENCOUNTER — HOSPITAL ENCOUNTER (EMERGENCY)
Facility: HOSPITAL | Age: OVER 89
Discharge: HOME | End: 2025-08-10
Attending: EMERGENCY MEDICINE | Admitting: EMERGENCY MEDICINE
Payer: MEDICARE

## 2025-08-11 ENCOUNTER — TELEPHONE (OUTPATIENT)
Age: OVER 89
End: 2025-08-11

## 2025-08-13 ENCOUNTER — TELEPHONE (OUTPATIENT)
Age: OVER 89
End: 2025-08-13